# Patient Record
Sex: FEMALE | Race: WHITE | NOT HISPANIC OR LATINO | Employment: FULL TIME | URBAN - METROPOLITAN AREA
[De-identification: names, ages, dates, MRNs, and addresses within clinical notes are randomized per-mention and may not be internally consistent; named-entity substitution may affect disease eponyms.]

---

## 2023-06-01 LAB
CHLAMYDIA TRACH., AMPLIFIED: NEGATIVE
N. GONORRHEA, AMPLIFIED: NEGATIVE
URINE CULTURE: NORMAL

## 2023-06-20 LAB
ERYTHROCYTE DISTRIBUTION WIDTH (RATIO) BY AUTOMATED COUNT: 12.4 % (ref 11.5–14.5)
ERYTHROCYTE MEAN CORPUSCULAR HEMOGLOBIN CONCENTRATION (G/DL) BY AUTOMATED: 33.1 G/DL (ref 32–36)
ERYTHROCYTE MEAN CORPUSCULAR VOLUME (FL) BY AUTOMATED COUNT: 94 FL (ref 80–100)
ERYTHROCYTES (10*6/UL) IN BLOOD BY AUTOMATED COUNT: 4.62 X10E12/L (ref 4–5.2)
HEMATOCRIT (%) IN BLOOD BY AUTOMATED COUNT: 43.5 % (ref 36–46)
HEMOGLOBIN (G/DL) IN BLOOD: 14.4 G/DL (ref 12–16)
LEUKOCYTES (10*3/UL) IN BLOOD BY AUTOMATED COUNT: 7.2 X10E9/L (ref 4.4–11.3)
PLATELETS (10*3/UL) IN BLOOD AUTOMATED COUNT: 184 X10E9/L (ref 150–450)
REFLEX ADDED, ANEMIA PANEL: NORMAL

## 2023-06-21 LAB
ABO GROUP (TYPE) IN BLOOD: NORMAL
ANTIBODY SCREEN: NORMAL
HEPATITIS B VIRUS SURFACE AG PRESENCE IN SERUM: NONREACTIVE
HIV 1/ 2 AG/AB SCREEN: NONREACTIVE
RH FACTOR: NORMAL
RUBELLA VIRUS IGG AB: POSITIVE
SYPHILIS TOTAL AB: NONREACTIVE

## 2023-09-15 RX ORDER — ONDANSETRON 4 MG/1
TABLET, FILM COATED ORAL EVERY 6 HOURS
COMMUNITY
Start: 2023-07-25 | End: 2023-10-18 | Stop reason: ALTCHOICE

## 2023-09-15 RX ORDER — SERTRALINE HYDROCHLORIDE 50 MG/1
1 TABLET, FILM COATED ORAL DAILY
COMMUNITY
Start: 2021-06-07 | End: 2023-10-18 | Stop reason: SDUPTHER

## 2023-09-29 ENCOUNTER — OFFICE VISIT (OUTPATIENT)
Dept: PRIMARY CARE | Facility: CLINIC | Age: 23
End: 2023-09-29
Payer: COMMERCIAL

## 2023-09-29 VITALS
OXYGEN SATURATION: 99 % | SYSTOLIC BLOOD PRESSURE: 128 MMHG | HEIGHT: 66 IN | DIASTOLIC BLOOD PRESSURE: 78 MMHG | WEIGHT: 160 LBS | HEART RATE: 85 BPM | TEMPERATURE: 97.8 F | BODY MASS INDEX: 25.71 KG/M2

## 2023-09-29 DIAGNOSIS — F41.9 ANXIETY: ICD-10-CM

## 2023-09-29 DIAGNOSIS — Z12.4 CERVICAL CANCER SCREENING: Primary | ICD-10-CM

## 2023-09-29 PROCEDURE — 1036F TOBACCO NON-USER: CPT | Performed by: FAMILY MEDICINE

## 2023-09-29 PROCEDURE — 99395 PREV VISIT EST AGE 18-39: CPT | Performed by: FAMILY MEDICINE

## 2023-09-29 SDOH — ECONOMIC STABILITY: FOOD INSECURITY: WITHIN THE PAST 12 MONTHS, THE FOOD YOU BOUGHT JUST DIDN'T LAST AND YOU DIDN'T HAVE MONEY TO GET MORE.: NEVER TRUE

## 2023-09-29 SDOH — HEALTH STABILITY: PHYSICAL HEALTH: ON AVERAGE, HOW MANY MINUTES DO YOU ENGAGE IN EXERCISE AT THIS LEVEL?: 0 MIN

## 2023-09-29 SDOH — ECONOMIC STABILITY: TRANSPORTATION INSECURITY
IN THE PAST 12 MONTHS, HAS THE LACK OF TRANSPORTATION KEPT YOU FROM MEDICAL APPOINTMENTS OR FROM GETTING MEDICATIONS?: NO

## 2023-09-29 SDOH — HEALTH STABILITY: PHYSICAL HEALTH: ON AVERAGE, HOW MANY DAYS PER WEEK DO YOU ENGAGE IN MODERATE TO STRENUOUS EXERCISE (LIKE A BRISK WALK)?: 0 DAYS

## 2023-09-29 SDOH — ECONOMIC STABILITY: FOOD INSECURITY: WITHIN THE PAST 12 MONTHS, YOU WORRIED THAT YOUR FOOD WOULD RUN OUT BEFORE YOU GOT MONEY TO BUY MORE.: NEVER TRUE

## 2023-09-29 SDOH — ECONOMIC STABILITY: TRANSPORTATION INSECURITY
IN THE PAST 12 MONTHS, HAS LACK OF TRANSPORTATION KEPT YOU FROM MEETINGS, WORK, OR FROM GETTING THINGS NEEDED FOR DAILY LIVING?: NO

## 2023-09-29 ASSESSMENT — EDINBURGH POSTNATAL DEPRESSION SCALE (EPDS)
I HAVE BEEN ANXIOUS OR WORRIED FOR NO GOOD REASON: NO, NOT AT ALL
THE THOUGHT OF HARMING MYSELF HAS OCCURRED TO ME: NEVER
I HAVE FELT SAD OR MISERABLE: NO, NOT AT ALL
TOTAL SCORE: 0
I HAVE FELT SCARED OR PANICKY FOR NO GOOD REASON: NO, NOT AT ALL
I HAVE BEEN ABLE TO LAUGH AND SEE THE FUNNY SIDE OF THINGS: AS MUCH AS I ALWAYS COULD
I HAVE LOOKED FORWARD WITH ENJOYMENT TO THINGS: AS MUCH AS I EVER DID
THINGS HAVE BEEN GETTING ON TOP OF ME: NO, I HAVE BEEN COPING AS WELL AS EVER
I HAVE BEEN SO UNHAPPY THAT I HAVE BEEN CRYING: NO, NEVER
I HAVE BLAMED MYSELF UNNECESSARILY WHEN THINGS WENT WRONG: NO, NEVER
I HAVE BEEN SO UNHAPPY THAT I HAVE HAD DIFFICULTY SLEEPING: NOT AT ALL

## 2023-09-29 ASSESSMENT — LIFESTYLE VARIABLES
HOW OFTEN DO YOU HAVE A DRINK CONTAINING ALCOHOL: NEVER
HOW OFTEN DO YOU HAVE SIX OR MORE DRINKS ON ONE OCCASION: NEVER
AUDIT-C TOTAL SCORE: 0
HOW MANY STANDARD DRINKS CONTAINING ALCOHOL DO YOU HAVE ON A TYPICAL DAY: PATIENT DOES NOT DRINK
SKIP TO QUESTIONS 9-10: 1

## 2023-09-29 ASSESSMENT — SOCIAL DETERMINANTS OF HEALTH (SDOH)
HOW HARD IS IT FOR YOU TO PAY FOR THE VERY BASICS LIKE FOOD, HOUSING, MEDICAL CARE, AND HEATING?: NOT HARD AT ALL
WITHIN THE LAST YEAR, HAVE YOU BEEN KICKED, HIT, SLAPPED, OR OTHERWISE PHYSICALLY HURT BY YOUR PARTNER OR EX-PARTNER?: NO
WITHIN THE LAST YEAR, HAVE YOU BEEN AFRAID OF YOUR PARTNER OR EX-PARTNER?: NO
WITHIN THE LAST YEAR, HAVE TO BEEN RAPED OR FORCED TO HAVE ANY KIND OF SEXUAL ACTIVITY BY YOUR PARTNER OR EX-PARTNER?: NO
WITHIN THE LAST YEAR, HAVE YOU BEEN HUMILIATED OR EMOTIONALLY ABUSED IN OTHER WAYS BY YOUR PARTNER OR EX-PARTNER?: NO

## 2023-09-29 ASSESSMENT — PATIENT HEALTH QUESTIONNAIRE - PHQ9
1. LITTLE INTEREST OR PLEASURE IN DOING THINGS: NOT AT ALL
SUM OF ALL RESPONSES TO PHQ9 QUESTIONS 1 & 2: 0
2. FEELING DOWN, DEPRESSED OR HOPELESS: NOT AT ALL

## 2023-09-29 NOTE — PATIENT INSTRUCTIONS
Today we performed your Annual Physical Exam.  Your preventative health care, labs and vaccinations have been reviewed and are up to date.      Flu shot and covid boosters are recommended    Congratulations on your pregnancy!!     Follow up in 1 year for your Complete Physical Exam

## 2023-09-29 NOTE — PROGRESS NOTES
"Subjective   Patient ID: Ilda Peter is a 22 y.o. female who presents for Annual Exam.    Dentist and Eye Doctor appointments: UTD  Immunizations: UTD; defers flu shot   Diet: Pretty good diet  Exercise: Not really   Supplements: Prenatal  Menstrual Cycles: Pregnant  Sexually Active:Yes  Pregnancy History:  Cancer Screening:    Cervical:     Breast:N/A   Colon: N/A   Skin: Wear sunscreen   DEXA:N/A        Patient is 25 weeks pregnant!!- Having a boy         Review of Systems    Objective   /78   Pulse 85   Temp 36.6 °C (97.8 °F) (Temporal)   Ht 1.676 m (5' 6\")   Wt 72.6 kg (160 lb)   LMP 2023   SpO2 99%   BMI 25.82 kg/m²     Physical Exam  Constitutional:       General: She is not in acute distress.     Appearance: She is well-developed. She is not diaphoretic.   HENT:      Head: Normocephalic and atraumatic.      Right Ear: Tympanic membrane normal.      Left Ear: Tympanic membrane normal.      Nose: Nose normal.      Mouth/Throat:      Mouth: Mucous membranes are moist.   Eyes:      General: No scleral icterus.     Pupils: Pupils are equal, round, and reactive to light.   Neck:      Thyroid: No thyromegaly.      Vascular: No JVD.   Cardiovascular:      Rate and Rhythm: Normal rate and regular rhythm.      Heart sounds: Normal heart sounds. No murmur heard.     No friction rub. No gallop.   Pulmonary:      Effort: Pulmonary effort is normal. No respiratory distress.      Breath sounds: Normal breath sounds. No wheezing or rales.   Chest:      Chest wall: No tenderness.   Abdominal:      General: Bowel sounds are normal. There is no distension.      Palpations: Abdomen is soft. There is no mass.      Tenderness: There is no abdominal tenderness. There is no rebound.   Musculoskeletal:         General: Normal range of motion.      Cervical back: Normal range of motion and neck supple.   Lymphadenopathy:      Cervical: No cervical adenopathy.   Skin:     General: Skin is warm and " dry.   Neurological:      General: No focal deficit present.      Mental Status: She is alert and oriented to person, place, and time.      Deep Tendon Reflexes: Reflexes normal.   Psychiatric:         Mood and Affect: Mood normal.         Behavior: Behavior normal.         Assessment/Plan   Diagnoses and all orders for this visit:  Cervical cancer screening  Comments:  5/2023 - Dr. Chery Vargas

## 2023-10-16 PROBLEM — Z12.4 CERVICAL CANCER SCREENING: Status: RESOLVED | Noted: 2023-09-29 | Resolved: 2023-10-16

## 2023-10-16 PROBLEM — Q27.0 SINGLE UMBILICAL ARTERY (HHS-HCC): Status: ACTIVE | Noted: 2023-10-16

## 2023-10-18 ENCOUNTER — ROUTINE PRENATAL (OUTPATIENT)
Dept: OBSTETRICS AND GYNECOLOGY | Facility: CLINIC | Age: 23
End: 2023-10-18
Payer: COMMERCIAL

## 2023-10-18 VITALS
SYSTOLIC BLOOD PRESSURE: 118 MMHG | BODY MASS INDEX: 26.53 KG/M2 | WEIGHT: 164.38 LBS | DIASTOLIC BLOOD PRESSURE: 66 MMHG

## 2023-10-18 DIAGNOSIS — Z3A.27 27 WEEKS GESTATION OF PREGNANCY (HHS-HCC): ICD-10-CM

## 2023-10-18 DIAGNOSIS — F32.A ANXIETY AND DEPRESSION: Primary | ICD-10-CM

## 2023-10-18 DIAGNOSIS — Q27.0 SINGLE UMBILICAL ARTERY (HHS-HCC): ICD-10-CM

## 2023-10-18 DIAGNOSIS — Z13.1 SCREENING FOR DIABETES MELLITUS (DM): ICD-10-CM

## 2023-10-18 DIAGNOSIS — F41.9 ANXIETY AND DEPRESSION: Primary | ICD-10-CM

## 2023-10-18 DIAGNOSIS — Z23 ENCOUNTER FOR VACCINATION: ICD-10-CM

## 2023-10-18 PROCEDURE — 0501F PRENATAL FLOW SHEET: CPT | Performed by: OBSTETRICS & GYNECOLOGY

## 2023-10-18 PROCEDURE — 90686 IIV4 VACC NO PRSV 0.5 ML IM: CPT | Performed by: OBSTETRICS & GYNECOLOGY

## 2023-10-18 PROCEDURE — 36415 COLL VENOUS BLD VENIPUNCTURE: CPT

## 2023-10-18 PROCEDURE — 82947 ASSAY GLUCOSE BLOOD QUANT: CPT

## 2023-10-18 PROCEDURE — 90715 TDAP VACCINE 7 YRS/> IM: CPT | Performed by: OBSTETRICS & GYNECOLOGY

## 2023-10-18 PROCEDURE — 85027 COMPLETE CBC AUTOMATED: CPT

## 2023-10-18 PROCEDURE — 90471 IMMUNIZATION ADMIN: CPT | Performed by: OBSTETRICS & GYNECOLOGY

## 2023-10-18 PROCEDURE — 90472 IMMUNIZATION ADMIN EACH ADD: CPT | Performed by: OBSTETRICS & GYNECOLOGY

## 2023-10-18 RX ORDER — SERTRALINE HYDROCHLORIDE 50 MG/1
50 TABLET, FILM COATED ORAL DAILY
Qty: 90 TABLET | Refills: 2 | Status: SHIPPED | OUTPATIENT
Start: 2023-10-18 | End: 2024-07-14

## 2023-10-18 NOTE — PROGRESS NOTES
No complaints. 1hr GTT and CBC today.   Agrees to vaccine with flu and tdap.    Moods good on Zoloft.  Has growth scan at 30 weeks for Single Um Artery    Cat Gottlieb MD

## 2023-10-19 LAB
ERYTHROCYTE [DISTWIDTH] IN BLOOD BY AUTOMATED COUNT: 15.2 % (ref 11.5–14.5)
GLUCOSE 1H P 50 G GLC PO SERPL-MCNC: 73 MG/DL
HCT VFR BLD AUTO: 32.8 % (ref 36–46)
HGB BLD-MCNC: 10.2 G/DL (ref 12–16)
MCH RBC QN AUTO: 34 PG (ref 26–34)
MCHC RBC AUTO-ENTMCNC: 31.1 G/DL (ref 32–36)
MCV RBC AUTO: 109 FL (ref 80–100)
NRBC BLD-RTO: 0 /100 WBCS (ref 0–0)
PLATELET # BLD AUTO: 99 X10*3/UL (ref 150–450)
PMV BLD AUTO: 10.8 FL (ref 7.5–11.5)
RBC # BLD AUTO: 3 X10*6/UL (ref 4–5.2)
WBC # BLD AUTO: 5.8 X10*3/UL (ref 4.4–11.3)

## 2023-11-02 ENCOUNTER — ROUTINE PRENATAL (OUTPATIENT)
Dept: OBSTETRICS AND GYNECOLOGY | Facility: CLINIC | Age: 23
End: 2023-11-02
Payer: COMMERCIAL

## 2023-11-02 ENCOUNTER — ANCILLARY PROCEDURE (OUTPATIENT)
Dept: RADIOLOGY | Facility: CLINIC | Age: 23
End: 2023-11-02
Payer: COMMERCIAL

## 2023-11-02 VITALS
SYSTOLIC BLOOD PRESSURE: 124 MMHG | DIASTOLIC BLOOD PRESSURE: 78 MMHG | WEIGHT: 166.13 LBS | BODY MASS INDEX: 26.81 KG/M2

## 2023-11-02 DIAGNOSIS — Z34.90 ENCOUNTER FOR SUPERVISION OF NORMAL PREGNANCY, UNSPECIFIED, UNSPECIFIED TRIMESTER (HHS-HCC): ICD-10-CM

## 2023-11-02 DIAGNOSIS — Z3A.30 30 WEEKS GESTATION OF PREGNANCY (HHS-HCC): ICD-10-CM

## 2023-11-02 DIAGNOSIS — D50.8 OTHER IRON DEFICIENCY ANEMIA: Primary | ICD-10-CM

## 2023-11-02 PROCEDURE — 76819 FETAL BIOPHYS PROFIL W/O NST: CPT | Performed by: OBSTETRICS & GYNECOLOGY

## 2023-11-02 PROCEDURE — 0501F PRENATAL FLOW SHEET: CPT | Performed by: OBSTETRICS & GYNECOLOGY

## 2023-11-02 PROCEDURE — 76816 OB US FOLLOW-UP PER FETUS: CPT | Performed by: OBSTETRICS & GYNECOLOGY

## 2023-11-02 PROCEDURE — 76819 FETAL BIOPHYS PROFIL W/O NST: CPT

## 2023-11-02 PROCEDURE — 76816 OB US FOLLOW-UP PER FETUS: CPT

## 2023-11-02 RX ORDER — FERROUS SULFATE 325(65) MG
65 TABLET ORAL
Qty: 30 TABLET | Refills: 2 | Status: SHIPPED | OUTPATIENT
Start: 2023-11-02

## 2023-11-02 NOTE — PROGRESS NOTES
No complaints.  FLU and TDAP done last visit.    Doing well.  GLU wnl. HB = 10 Low in iron , Rx sent.  Had US growth for single umbilical artery today = wnl. Follow up at 36 weeks  Cat Gottlieb MD

## 2023-11-09 LAB — REFLEX ADDED, ANEMIA PANEL: NORMAL

## 2023-11-12 ENCOUNTER — TELEPHONE (OUTPATIENT)
Dept: OBSTETRICS AND GYNECOLOGY | Facility: HOSPITAL | Age: 23
End: 2023-11-12
Payer: COMMERCIAL

## 2023-11-12 DIAGNOSIS — O99.012 ANEMIA DURING PREGNANCY IN SECOND TRIMESTER (HHS-HCC): Primary | ICD-10-CM

## 2023-11-20 ENCOUNTER — ROUTINE PRENATAL (OUTPATIENT)
Dept: OBSTETRICS AND GYNECOLOGY | Facility: CLINIC | Age: 23
End: 2023-11-20
Payer: COMMERCIAL

## 2023-11-20 VITALS — WEIGHT: 168 LBS | SYSTOLIC BLOOD PRESSURE: 122 MMHG | DIASTOLIC BLOOD PRESSURE: 72 MMHG | BODY MASS INDEX: 27.12 KG/M2

## 2023-11-20 DIAGNOSIS — Z3A.32 32 WEEKS GESTATION OF PREGNANCY (HHS-HCC): Primary | ICD-10-CM

## 2023-11-20 PROCEDURE — 0501F PRENATAL FLOW SHEET: CPT | Performed by: OBSTETRICS & GYNECOLOGY

## 2023-12-04 ENCOUNTER — ROUTINE PRENATAL (OUTPATIENT)
Dept: OBSTETRICS AND GYNECOLOGY | Facility: CLINIC | Age: 23
End: 2023-12-04
Payer: COMMERCIAL

## 2023-12-04 VITALS — WEIGHT: 170.8 LBS | SYSTOLIC BLOOD PRESSURE: 136 MMHG | DIASTOLIC BLOOD PRESSURE: 82 MMHG | BODY MASS INDEX: 27.57 KG/M2

## 2023-12-04 DIAGNOSIS — Z34.83 PRENATAL CARE, SUBSEQUENT PREGNANCY IN THIRD TRIMESTER (HHS-HCC): ICD-10-CM

## 2023-12-04 DIAGNOSIS — Z3A.34 34 WEEKS GESTATION OF PREGNANCY (HHS-HCC): Primary | ICD-10-CM

## 2023-12-04 PROCEDURE — 0501F PRENATAL FLOW SHEET: CPT

## 2023-12-04 NOTE — PROGRESS NOTES
Assessment/Plan   22 y.o.  at 34w4d  - Discussed GBS screening to be collected next visit, all questions answered. Pt denies an allergy to PCN.   - Routine prenatal care    Follow up in 2 weeks for next prenatal visit. GBS and labor consent next visit.     LEDY Love-GUSTAVO    Subjective     Ilda Peter is a 22 y.o.  at 34w4d with a working estimated date of delivery of 2024, by Ultrasound presenting for a routine prenatal visit. She is doing well, no concerns. She denies vaginal bleeding, leakage of fluid, or regular contractions. She endorses good FM.    Her pregnancy is complicated by:  Pregnancy Problems (from 23 to present)       Problem Noted Resolved    Single umbilical artery 10/16/2023 by Cat Gottlieb MD No    Priority:  Medium      Overview Signed 10/18/2023  1:27 PM by Cat Gottlieb MD     Growth scan scheduled         Anxiety 2023 by Brandee Cross,  No    Priority:  Medium      Overview Addendum 10/18/2023  1:27 PM by Cat Gottlieb MD     On zoloft 50 mg                   Objective   Weight: 77.5 kg (170 lb 12.8 oz)  TW.2 kg (26 lb 12.8 oz)   Expected Total Weight Gain: 11.5 kg (25 lb)-16 kg (35 lb)   Pregravid BMI: 23.25  Pregravid Weight: 65.3 kg (144 lb)   BP: 136/82  Fetal Heart Rate: 153 Fundal Height (cm): 33 cm Presentation: Vertex

## 2023-12-14 ENCOUNTER — ANCILLARY PROCEDURE (OUTPATIENT)
Dept: RADIOLOGY | Facility: CLINIC | Age: 23
End: 2023-12-14
Payer: COMMERCIAL

## 2023-12-14 DIAGNOSIS — O35.8XX0 MATERNAL CARE FOR OTHER (SUSPECTED) FETAL ABNORMALITY AND DAMAGE, NOT APPLICABLE OR UNSPECIFIED (HHS-HCC): ICD-10-CM

## 2023-12-14 DIAGNOSIS — Z34.90 ENCOUNTER FOR SUPERVISION OF NORMAL PREGNANCY, UNSPECIFIED, UNSPECIFIED TRIMESTER (HHS-HCC): ICD-10-CM

## 2023-12-14 PROCEDURE — 76819 FETAL BIOPHYS PROFIL W/O NST: CPT | Performed by: OBSTETRICS & GYNECOLOGY

## 2023-12-14 PROCEDURE — 76816 OB US FOLLOW-UP PER FETUS: CPT | Performed by: OBSTETRICS & GYNECOLOGY

## 2023-12-14 PROCEDURE — 76819 FETAL BIOPHYS PROFIL W/O NST: CPT

## 2023-12-14 PROCEDURE — 76816 OB US FOLLOW-UP PER FETUS: CPT

## 2023-12-20 ENCOUNTER — ROUTINE PRENATAL (OUTPATIENT)
Dept: OBSTETRICS AND GYNECOLOGY | Facility: CLINIC | Age: 23
End: 2023-12-20
Payer: COMMERCIAL

## 2023-12-20 VITALS — DIASTOLIC BLOOD PRESSURE: 78 MMHG | BODY MASS INDEX: 27.92 KG/M2 | WEIGHT: 173 LBS | SYSTOLIC BLOOD PRESSURE: 124 MMHG

## 2023-12-20 DIAGNOSIS — Z3A.36 36 WEEKS GESTATION OF PREGNANCY (HHS-HCC): ICD-10-CM

## 2023-12-20 PROCEDURE — 0501F PRENATAL FLOW SHEET: CPT | Performed by: OBSTETRICS & GYNECOLOGY

## 2023-12-20 PROCEDURE — 87081 CULTURE SCREEN ONLY: CPT

## 2023-12-24 LAB — GP B STREP GENITAL QL CULT: NORMAL

## 2023-12-26 ENCOUNTER — ROUTINE PRENATAL (OUTPATIENT)
Dept: OBSTETRICS AND GYNECOLOGY | Facility: CLINIC | Age: 23
End: 2023-12-26
Payer: COMMERCIAL

## 2023-12-26 VITALS
SYSTOLIC BLOOD PRESSURE: 126 MMHG | DIASTOLIC BLOOD PRESSURE: 72 MMHG | BODY MASS INDEX: 28.14 KG/M2 | WEIGHT: 174.38 LBS

## 2023-12-26 DIAGNOSIS — Z3A.37 37 WEEKS GESTATION OF PREGNANCY (HHS-HCC): Primary | ICD-10-CM

## 2023-12-26 PROCEDURE — 0501F PRENATAL FLOW SHEET: CPT | Performed by: OBSTETRICS & GYNECOLOGY

## 2024-01-02 ENCOUNTER — ROUTINE PRENATAL (OUTPATIENT)
Dept: OBSTETRICS AND GYNECOLOGY | Facility: CLINIC | Age: 24
End: 2024-01-02
Payer: COMMERCIAL

## 2024-01-02 VITALS — SYSTOLIC BLOOD PRESSURE: 124 MMHG | WEIGHT: 175.5 LBS | DIASTOLIC BLOOD PRESSURE: 76 MMHG | BODY MASS INDEX: 28.33 KG/M2

## 2024-01-02 DIAGNOSIS — Q27.0 SINGLE UMBILICAL ARTERY (HHS-HCC): ICD-10-CM

## 2024-01-02 DIAGNOSIS — F41.9 ANXIETY: ICD-10-CM

## 2024-01-02 DIAGNOSIS — Z3A.38 38 WEEKS GESTATION OF PREGNANCY (HHS-HCC): Primary | ICD-10-CM

## 2024-01-02 PROCEDURE — 99213 OFFICE O/P EST LOW 20 MIN: CPT | Performed by: OBSTETRICS & GYNECOLOGY

## 2024-01-02 NOTE — PROGRESS NOTES
38w5d  Declines labor induction  Moods good on Zoloft 50 mg  GBS neg  Single umb artery : US wnl  Discussed Hosp stay , how to call, breast pump ordered.  Cat Gottlieb MD

## 2024-01-05 ENCOUNTER — ANESTHESIA EVENT (OUTPATIENT)
Dept: OBSTETRICS AND GYNECOLOGY | Facility: HOSPITAL | Age: 24
End: 2024-01-05
Payer: COMMERCIAL

## 2024-01-05 ENCOUNTER — HOSPITAL ENCOUNTER (INPATIENT)
Facility: HOSPITAL | Age: 24
LOS: 1 days | Discharge: HOME | End: 2024-01-06
Attending: OBSTETRICS & GYNECOLOGY | Admitting: MIDWIFE
Payer: COMMERCIAL

## 2024-01-05 ENCOUNTER — ANESTHESIA (OUTPATIENT)
Dept: OBSTETRICS AND GYNECOLOGY | Facility: HOSPITAL | Age: 24
End: 2024-01-05
Payer: COMMERCIAL

## 2024-01-05 LAB
ABO GROUP (TYPE) IN BLOOD: NORMAL
ANTIBODY SCREEN: NORMAL
ERYTHROCYTE [DISTWIDTH] IN BLOOD BY AUTOMATED COUNT: 13.6 % (ref 11.5–14.5)
HCT VFR BLD AUTO: 31 % (ref 36–46)
HGB BLD-MCNC: 10.4 G/DL (ref 12–16)
MCH RBC QN AUTO: 34.4 PG (ref 26–34)
MCHC RBC AUTO-ENTMCNC: 33.5 G/DL (ref 32–36)
MCV RBC AUTO: 103 FL (ref 80–100)
NRBC BLD-RTO: 0 /100 WBCS (ref 0–0)
PLATELET # BLD AUTO: 115 X10*3/UL (ref 150–450)
RBC # BLD AUTO: 3.02 X10*6/UL (ref 4–5.2)
RH FACTOR (ANTIGEN D): NORMAL
T PALLIDUM AB SER QL: NONREACTIVE
WBC # BLD AUTO: 7 X10*3/UL (ref 4.4–11.3)

## 2024-01-05 PROCEDURE — 36415 COLL VENOUS BLD VENIPUNCTURE: CPT | Performed by: MIDWIFE

## 2024-01-05 PROCEDURE — 59400 OBSTETRICAL CARE: CPT | Performed by: OBSTETRICS & GYNECOLOGY

## 2024-01-05 PROCEDURE — 59050 FETAL MONITOR W/REPORT: CPT

## 2024-01-05 PROCEDURE — 2500000004 HC RX 250 GENERAL PHARMACY W/ HCPCS (ALT 636 FOR OP/ED)

## 2024-01-05 PROCEDURE — 1120000001 HC OB PRIVATE ROOM DAILY

## 2024-01-05 PROCEDURE — 7100000016 HC LABOR RECOVERY PER HOUR

## 2024-01-05 PROCEDURE — 85027 COMPLETE CBC AUTOMATED: CPT | Performed by: MIDWIFE

## 2024-01-05 PROCEDURE — 86780 TREPONEMA PALLIDUM: CPT | Mod: STJLAB | Performed by: MIDWIFE

## 2024-01-05 PROCEDURE — 94760 N-INVAS EAR/PLS OXIMETRY 1: CPT

## 2024-01-05 PROCEDURE — 59409 OBSTETRICAL CARE: CPT | Performed by: OBSTETRICS & GYNECOLOGY

## 2024-01-05 PROCEDURE — 86901 BLOOD TYPING SEROLOGIC RH(D): CPT | Performed by: MIDWIFE

## 2024-01-05 PROCEDURE — 2500000001 HC RX 250 WO HCPCS SELF ADMINISTERED DRUGS (ALT 637 FOR MEDICARE OP): Performed by: OBSTETRICS & GYNECOLOGY

## 2024-01-05 PROCEDURE — 01967 NEURAXL LBR ANES VAG DLVR: CPT | Performed by: NURSE ANESTHETIST, CERTIFIED REGISTERED

## 2024-01-05 PROCEDURE — 7210000002 HC LABOR PER HOUR

## 2024-01-05 RX ORDER — HYDRALAZINE HYDROCHLORIDE 20 MG/ML
5 INJECTION INTRAMUSCULAR; INTRAVENOUS ONCE AS NEEDED
Status: DISCONTINUED | OUTPATIENT
Start: 2024-01-05 | End: 2024-01-07 | Stop reason: HOSPADM

## 2024-01-05 RX ORDER — LIDOCAINE HYDROCHLORIDE 10 MG/ML
0.5 INJECTION INFILTRATION; PERINEURAL ONCE AS NEEDED
Status: DISCONTINUED | OUTPATIENT
Start: 2024-01-05 | End: 2024-01-07 | Stop reason: HOSPADM

## 2024-01-05 RX ORDER — METHYLERGONOVINE MALEATE 0.2 MG/ML
0.2 INJECTION INTRAVENOUS ONCE AS NEEDED
Status: DISCONTINUED | OUTPATIENT
Start: 2024-01-05 | End: 2024-01-07 | Stop reason: HOSPADM

## 2024-01-05 RX ORDER — LOPERAMIDE HYDROCHLORIDE 2 MG/1
4 CAPSULE ORAL EVERY 2 HOUR PRN
Status: CANCELLED | OUTPATIENT
Start: 2024-01-05

## 2024-01-05 RX ORDER — NIFEDIPINE 10 MG/1
10 CAPSULE ORAL ONCE AS NEEDED
Status: DISCONTINUED | OUTPATIENT
Start: 2024-01-05 | End: 2024-01-05 | Stop reason: SDUPTHER

## 2024-01-05 RX ORDER — OXYTOCIN 10 [USP'U]/ML
10 INJECTION, SOLUTION INTRAMUSCULAR; INTRAVENOUS ONCE AS NEEDED
Status: DISCONTINUED | OUTPATIENT
Start: 2024-01-05 | End: 2024-01-07 | Stop reason: HOSPADM

## 2024-01-05 RX ORDER — BISACODYL 10 MG/1
10 SUPPOSITORY RECTAL DAILY PRN
Status: CANCELLED | OUTPATIENT
Start: 2024-01-05

## 2024-01-05 RX ORDER — LIDOCAINE HYDROCHLORIDE 10 MG/ML
30 INJECTION INFILTRATION; PERINEURAL ONCE AS NEEDED
Status: DISCONTINUED | OUTPATIENT
Start: 2024-01-05 | End: 2024-01-07 | Stop reason: HOSPADM

## 2024-01-05 RX ORDER — SERTRALINE HYDROCHLORIDE 50 MG/1
50 TABLET, FILM COATED ORAL DAILY
Status: CANCELLED | OUTPATIENT
Start: 2024-01-05

## 2024-01-05 RX ORDER — METOCLOPRAMIDE 10 MG/1
10 TABLET ORAL EVERY 6 HOURS PRN
Status: DISCONTINUED | OUTPATIENT
Start: 2024-01-05 | End: 2024-01-07 | Stop reason: HOSPADM

## 2024-01-05 RX ORDER — ONDANSETRON 4 MG/1
4 TABLET, FILM COATED ORAL EVERY 6 HOURS PRN
Status: DISCONTINUED | OUTPATIENT
Start: 2024-01-05 | End: 2024-01-05 | Stop reason: SDUPTHER

## 2024-01-05 RX ORDER — OXYTOCIN/0.9 % SODIUM CHLORIDE 30/500 ML
60 PLASTIC BAG, INJECTION (ML) INTRAVENOUS ONCE AS NEEDED
Status: CANCELLED | OUTPATIENT
Start: 2024-01-05

## 2024-01-05 RX ORDER — LOPERAMIDE HYDROCHLORIDE 2 MG/1
4 CAPSULE ORAL EVERY 2 HOUR PRN
Status: DISCONTINUED | OUTPATIENT
Start: 2024-01-05 | End: 2024-01-07 | Stop reason: HOSPADM

## 2024-01-05 RX ORDER — ONDANSETRON HYDROCHLORIDE 2 MG/ML
4 INJECTION, SOLUTION INTRAVENOUS EVERY 6 HOURS PRN
Status: DISCONTINUED | OUTPATIENT
Start: 2024-01-05 | End: 2024-01-07 | Stop reason: HOSPADM

## 2024-01-05 RX ORDER — LABETALOL HYDROCHLORIDE 5 MG/ML
20 INJECTION, SOLUTION INTRAVENOUS ONCE AS NEEDED
Status: DISCONTINUED | OUTPATIENT
Start: 2024-01-05 | End: 2024-01-05 | Stop reason: SDUPTHER

## 2024-01-05 RX ORDER — SODIUM CHLORIDE, SODIUM LACTATE, POTASSIUM CHLORIDE, CALCIUM CHLORIDE 600; 310; 30; 20 MG/100ML; MG/100ML; MG/100ML; MG/100ML
125 INJECTION, SOLUTION INTRAVENOUS CONTINUOUS
Status: DISCONTINUED | OUTPATIENT
Start: 2024-01-05 | End: 2024-01-07 | Stop reason: HOSPADM

## 2024-01-05 RX ORDER — MISOPROSTOL 200 UG/1
800 TABLET ORAL ONCE AS NEEDED
Status: CANCELLED | OUTPATIENT
Start: 2024-01-05

## 2024-01-05 RX ORDER — OXYTOCIN/0.9 % SODIUM CHLORIDE 30/500 ML
60 PLASTIC BAG, INJECTION (ML) INTRAVENOUS
Status: DISCONTINUED | OUTPATIENT
Start: 2024-01-05 | End: 2024-01-07 | Stop reason: HOSPADM

## 2024-01-05 RX ORDER — NIFEDIPINE 10 MG/1
10 CAPSULE ORAL ONCE AS NEEDED
Status: CANCELLED | OUTPATIENT
Start: 2024-01-05

## 2024-01-05 RX ORDER — OXYTOCIN 10 [USP'U]/ML
10 INJECTION, SOLUTION INTRAMUSCULAR; INTRAVENOUS ONCE AS NEEDED
Status: CANCELLED | OUTPATIENT
Start: 2024-01-05

## 2024-01-05 RX ORDER — TRANEXAMIC ACID 100 MG/ML
1000 INJECTION, SOLUTION INTRAVENOUS ONCE AS NEEDED
Status: CANCELLED | OUTPATIENT
Start: 2024-01-05

## 2024-01-05 RX ORDER — CARBOPROST TROMETHAMINE 250 UG/ML
250 INJECTION, SOLUTION INTRAMUSCULAR ONCE AS NEEDED
Status: CANCELLED | OUTPATIENT
Start: 2024-01-05

## 2024-01-05 RX ORDER — ONDANSETRON HYDROCHLORIDE 2 MG/ML
4 INJECTION, SOLUTION INTRAVENOUS EVERY 6 HOURS PRN
Status: CANCELLED | OUTPATIENT
Start: 2024-01-05

## 2024-01-05 RX ORDER — METOCLOPRAMIDE HYDROCHLORIDE 5 MG/ML
10 INJECTION INTRAMUSCULAR; INTRAVENOUS EVERY 6 HOURS PRN
Status: DISCONTINUED | OUTPATIENT
Start: 2024-01-05 | End: 2024-01-07 | Stop reason: HOSPADM

## 2024-01-05 RX ORDER — LIDOCAINE 560 MG/1
1 PATCH PERCUTANEOUS; TOPICAL; TRANSDERMAL
Status: CANCELLED | OUTPATIENT
Start: 2024-01-05

## 2024-01-05 RX ORDER — ACETAMINOPHEN 325 MG/1
975 TABLET ORAL EVERY 6 HOURS
Status: DISCONTINUED | OUTPATIENT
Start: 2024-01-05 | End: 2024-01-07 | Stop reason: HOSPADM

## 2024-01-05 RX ORDER — ONDANSETRON 4 MG/1
4 TABLET, FILM COATED ORAL EVERY 6 HOURS PRN
Status: CANCELLED | OUTPATIENT
Start: 2024-01-05

## 2024-01-05 RX ORDER — ADHESIVE BANDAGE
10 BANDAGE TOPICAL
Status: CANCELLED | OUTPATIENT
Start: 2024-01-05

## 2024-01-05 RX ORDER — DIPHENHYDRAMINE HCL 25 MG
25 CAPSULE ORAL EVERY 6 HOURS PRN
Status: CANCELLED | OUTPATIENT
Start: 2024-01-05

## 2024-01-05 RX ORDER — DIPHENHYDRAMINE HYDROCHLORIDE 50 MG/ML
25 INJECTION INTRAMUSCULAR; INTRAVENOUS EVERY 6 HOURS PRN
Status: CANCELLED | OUTPATIENT
Start: 2024-01-05

## 2024-01-05 RX ORDER — LABETALOL HYDROCHLORIDE 5 MG/ML
20 INJECTION, SOLUTION INTRAVENOUS ONCE AS NEEDED
Status: CANCELLED | OUTPATIENT
Start: 2024-01-05

## 2024-01-05 RX ORDER — MISOPROSTOL 200 UG/1
800 TABLET ORAL ONCE AS NEEDED
Status: DISCONTINUED | OUTPATIENT
Start: 2024-01-05 | End: 2024-01-07 | Stop reason: HOSPADM

## 2024-01-05 RX ORDER — TRANEXAMIC ACID 100 MG/ML
1000 INJECTION, SOLUTION INTRAVENOUS ONCE AS NEEDED
Status: DISCONTINUED | OUTPATIENT
Start: 2024-01-05 | End: 2024-01-07 | Stop reason: HOSPADM

## 2024-01-05 RX ORDER — MORPHINE SULFATE 2 MG/ML
INJECTION, SOLUTION INTRAMUSCULAR; INTRAVENOUS
Status: COMPLETED
Start: 2024-01-05 | End: 2024-01-05

## 2024-01-05 RX ORDER — HYDRALAZINE HYDROCHLORIDE 20 MG/ML
5 INJECTION INTRAMUSCULAR; INTRAVENOUS ONCE AS NEEDED
Status: DISCONTINUED | OUTPATIENT
Start: 2024-01-05 | End: 2024-01-05 | Stop reason: SDUPTHER

## 2024-01-05 RX ORDER — TERBUTALINE SULFATE 1 MG/ML
0.25 INJECTION SUBCUTANEOUS ONCE AS NEEDED
Status: DISCONTINUED | OUTPATIENT
Start: 2024-01-05 | End: 2024-01-07 | Stop reason: HOSPADM

## 2024-01-05 RX ORDER — ONDANSETRON HYDROCHLORIDE 2 MG/ML
4 INJECTION, SOLUTION INTRAVENOUS EVERY 6 HOURS PRN
Status: DISCONTINUED | OUTPATIENT
Start: 2024-01-05 | End: 2024-01-05 | Stop reason: SDUPTHER

## 2024-01-05 RX ORDER — NIFEDIPINE 10 MG/1
10 CAPSULE ORAL ONCE AS NEEDED
Status: DISCONTINUED | OUTPATIENT
Start: 2024-01-05 | End: 2024-01-07 | Stop reason: HOSPADM

## 2024-01-05 RX ORDER — SERTRALINE HYDROCHLORIDE 50 MG/1
50 TABLET, FILM COATED ORAL DAILY
Status: DISCONTINUED | OUTPATIENT
Start: 2024-01-05 | End: 2024-01-07 | Stop reason: HOSPADM

## 2024-01-05 RX ORDER — ONDANSETRON 4 MG/1
4 TABLET, FILM COATED ORAL EVERY 6 HOURS PRN
Status: DISCONTINUED | OUTPATIENT
Start: 2024-01-05 | End: 2024-01-07 | Stop reason: HOSPADM

## 2024-01-05 RX ORDER — FENTANYL/ROPIVACAINE/NS/PF 2MCG/ML-.2
0-25 PLASTIC BAG, INJECTION (ML) INJECTION CONTINUOUS
Status: DISCONTINUED | OUTPATIENT
Start: 2024-01-05 | End: 2024-01-07 | Stop reason: HOSPADM

## 2024-01-05 RX ORDER — SIMETHICONE 80 MG
80 TABLET,CHEWABLE ORAL 4 TIMES DAILY PRN
Status: CANCELLED | OUTPATIENT
Start: 2024-01-05

## 2024-01-05 RX ORDER — IBUPROFEN 600 MG/1
600 TABLET ORAL EVERY 6 HOURS
Status: DISCONTINUED | OUTPATIENT
Start: 2024-01-05 | End: 2024-01-07 | Stop reason: HOSPADM

## 2024-01-05 RX ORDER — LABETALOL HYDROCHLORIDE 5 MG/ML
20 INJECTION, SOLUTION INTRAVENOUS ONCE AS NEEDED
Status: DISCONTINUED | OUTPATIENT
Start: 2024-01-05 | End: 2024-01-07 | Stop reason: HOSPADM

## 2024-01-05 RX ORDER — POLYETHYLENE GLYCOL 3350 17 G/17G
17 POWDER, FOR SOLUTION ORAL 2 TIMES DAILY PRN
Status: CANCELLED | OUTPATIENT
Start: 2024-01-05

## 2024-01-05 RX ORDER — METHYLERGONOVINE MALEATE 0.2 MG/ML
0.2 INJECTION INTRAVENOUS ONCE AS NEEDED
Status: CANCELLED | OUTPATIENT
Start: 2024-01-05

## 2024-01-05 RX ORDER — HYDRALAZINE HYDROCHLORIDE 20 MG/ML
5 INJECTION INTRAMUSCULAR; INTRAVENOUS ONCE AS NEEDED
Status: CANCELLED | OUTPATIENT
Start: 2024-01-05

## 2024-01-05 RX ORDER — CARBOPROST TROMETHAMINE 250 UG/ML
250 INJECTION, SOLUTION INTRAMUSCULAR ONCE AS NEEDED
Status: DISCONTINUED | OUTPATIENT
Start: 2024-01-05 | End: 2024-01-07 | Stop reason: HOSPADM

## 2024-01-05 RX ADMIN — IBUPROFEN 600 MG: 600 TABLET, FILM COATED ORAL at 11:49

## 2024-01-05 RX ADMIN — ACETAMINOPHEN 975 MG: 325 TABLET ORAL at 11:49

## 2024-01-05 RX ADMIN — IBUPROFEN 600 MG: 600 TABLET, FILM COATED ORAL at 16:22

## 2024-01-05 RX ADMIN — MORPHINE SULFATE 2 MG: 2 INJECTION, SOLUTION INTRAMUSCULAR; INTRAVENOUS at 04:24

## 2024-01-05 RX ADMIN — ACETAMINOPHEN 975 MG: 325 TABLET ORAL at 16:21

## 2024-01-05 SDOH — HEALTH STABILITY: MENTAL HEALTH: CURRENT SMOKER: 0

## 2024-01-05 ASSESSMENT — PAIN SCALES - GENERAL
PAINLEVEL_OUTOF10: 3
PAINLEVEL_OUTOF10: 0 - NO PAIN
PAINLEVEL_OUTOF10: 0 - NO PAIN
PAINLEVEL_OUTOF10: 1
PAINLEVEL_OUTOF10: 9
PAINLEVEL_OUTOF10: 8
PAINLEVEL_OUTOF10: 0 - NO PAIN
PAIN_LEVEL: 1
PAINLEVEL_OUTOF10: 0 - NO PAIN

## 2024-01-05 ASSESSMENT — PAIN DESCRIPTION - LOCATION: LOCATION: ABDOMEN

## 2024-01-05 ASSESSMENT — PAIN - FUNCTIONAL ASSESSMENT: PAIN_FUNCTIONAL_ASSESSMENT: 0-10

## 2024-01-05 NOTE — ANESTHESIA PROCEDURE NOTES
Epidural Block    Patient location during procedure: OB  Start time: 1/5/2024 4:02 AM  Reason for block: labor analgesia  Staffing  Performed: CRNA   Authorized by: SHARLENE Cordon    Performed by: SHARLENE Cordon    Preanesthetic Checklist  Completed: patient identified, IV checked, site marked, risks and benefits discussed, surgical consent, pre-op evaluation, timeout performed and sterile techniques followed  Block Timeout  RN/Licensed healthcare professional reads aloud to the Anesthesia provider and entire team: Patient identity, procedure with side and site, patient position, and as applicable the availability of implants/special equipment/special requirements.  Patient on coagulant treatment: no  Timeout performed at: 1/5/2024 4:04 AM  Block Placement  Patient position: sitting  Prep: ChloraPrep  Sterility prep: cap, gloves, drape, mask and hand  Sedation level: no sedation  Patient monitoring: blood pressure, continuous pulse oximetry and heart rate  Approach: midline  Local numbing: lidocaine 1% to skin and subcutaneous tissues  Vertebral space: lumbar  Lumbar location: L3-L4  Epidural  Loss of resistance technique: air  Guidance: landmark technique        Needle  Needle type: Tuohy   Needle gauge: 17  Needle length: 10.2 cm  Needle insertion depth: 7 cm    Test dose: Other (see comment)  Test dose given at 1/5/2024 4:08 AM  Test dose: Other (see comment)            Assessment  Number of attempts: 1  Events: blood aspirated  Procedure assessment: procedure terminated at patient's request  Additional Notes  Pt.  Had to push and deliver before epidural catheter could be placed.

## 2024-01-05 NOTE — H&P
Obstetrical Admission History and Physical     Ilda Peter is a 23 y.o.  at 39w1d. JONATHAN: 2024, by Ultrasound. Estimated fetal weight: 7lbs 8oz. She has had prenatal care with Dr. Gottlieb .    Chief Complaint: Leakage/Loss of Fluid (0130 am srom)    Assessment/Plan    A: 24yo  at 39.1 per LMP c/w 6.6 week US      Category 1 FHR      Active labor      GBS-      SROM @0115 today      Anxiety/depression --> Zoloft 50mg daily      Antepartum anemia --> H/H 10/18 10.2/32.8    P: Routine admit labs and orders      Zoloft daily ordered      Epidural PRN      Encourage maternal positioning for labor/comfort      Anticipate       Principal Problem:    Normal labor and delivery      Pregnancy Problems (from 23 to present)       Problem Noted Resolved    Single umbilical artery 10/16/2023 by Cat Gottlieb MD No    Priority:  Medium      Overview Signed 10/18/2023  1:27 PM by Cat Gottlieb MD     Growth scan scheduled         Anxiety 2023 by Brandee Cross DO No    Priority:  Medium      Overview Addendum 10/18/2023  1:27 PM by Cat Gottlieb MD     On zoloft 50 mg                Options for delivery have been discussed with the patient and she elects a vaginal delivery.  Labor has been discussed in detail. The risks, benefits, complications, alternatives, expected outcomes, potential problems during recuperation and recovery, and the risks of not performing the procedure were discussed with the patient. The patient stated understanding that the risks of delivery include, but are not limited to: death; reaction to medications; injury to bowel, bladder, ureters, uterus, cervix, vagina, and other pelvic and abdominal structures, infection; blood loss and possible need for transfusion; and potential need for surgery, including hysterectomy. The risks of injury to the infant during delivery were also discussed. All questions were answered. The patient is wishing to continue with  plans for a vaginal delivery.    Admit to inpatient status. I anticipate that this patient will require a stay exceeding at least 2 midnights for delivery and postpartum.  Active management of rupture of membranes.  Management of pregnancy complications, as indicated.    Fer Gonsales presents to Labor & Delivery with Spontaneous Rupture of Membranes of clear   fluid at 0115   hr today  . Good fetal movement. Denies vaginal bleeding.        Obstetrical History   OB History    Para Term  AB Living   2 1 1 0 0 1   SAB IAB Ectopic Multiple Live Births   0 0 0 0 1      # Outcome Date GA Lbr Ronald/2nd Weight Sex Delivery Anes PTL Lv   2 Current            1 Term 20 39w1d  3487 g M Vag-Spont  N MICHAEL       Past Medical History  Past Medical History:   Diagnosis Date    Cannabis dependence, uncomplicated (CMS/[facility])     Marijuana dependence    Depression         Past Surgical History   Past Surgical History:   Procedure Laterality Date    WISDOM TOOTH EXTRACTION         Social History  Social History     Tobacco Use    Smoking status: Never    Smokeless tobacco: Never   Substance Use Topics    Alcohol use: Not Currently     Substance and Sexual Activity   Drug Use Not Currently    Types: Marijuana       Allergies  Codeine     Medications  Medications Prior to Admission   Medication Sig Dispense Refill Last Dose    ferrous sulfate 325 (65 Fe) MG tablet Take 1 tablet (65 mg of iron) by mouth once daily with a meal. 30 tablet 2     prenatal no.139-iron-folic-dha 33 mg iron- 800 mcg-350 mg combo pack Take by mouth.       sertraline (Zoloft) 50 mg tablet Take 1 tablet (50 mg) by mouth once daily. 90 tablet 2        Objective    Last Vitals  Temp Pulse Resp BP MAP O2 Sat   36.9 °C (98.4 °F) 104 16 132/85   98 %     Physical Examination  GENERAL: Examination reveals a well developed, well nourished, gravid female in no acute distress. She is alert and cooperative.  HEENT: PERRLA. External ears normal. Nose  normal, no erythema or discharge. Mouth and throat clear.  FHR is reactive, 135bpm, +accels, no decels noted  [place] reading:  Nadeen q5min per patient and progressively strengthening since SROM  The fetus is in a vertex presentation, determined by vaginal exam  VAGINA: normal appearing vagina with normal color and discharge and no lesions noted and positive for clear gushes of fluid upon visible exam that is nitrizine+  CERVIX:  7/100/-1 ; MEMBRANES are  SROM  NEUROLOGICAL: alert, oriented, normal speech, no focal findings or movement disorder noted    Lab Review  Labs in chart were reviewed.

## 2024-01-05 NOTE — ANESTHESIA PREPROCEDURE EVALUATION
Patient: Ilda Peter    Evaluation Method: In-person visit    Procedure Information    Date: 01/05/24  Procedure: Labor Analgesia         Relevant Problems   Neuro/Psych   (+) Anxiety       Clinical information reviewed:   Tobacco  Allergies  Meds   Med Hx  Surg Hx   Fam Hx          NPO Detail:  No data recorded     OB/Gyn Evaluation    Present Pregnancy    Patient is pregnant now.   Obstetric History                Physical Exam    Airway  Mallampati: II  TM distance: >3 FB  Neck ROM: full     Cardiovascular - normal exam  Rhythm: regular  Rate: normal     Dental - normal exam     Pulmonary - normal exam  Breath sounds clear to auscultation     Abdominal      Other findings: Gravid        Anesthesia Plan    ASA 2     epidural     The patient is not a current smoker.    Anesthetic plan and risks discussed with patient.  Use of blood products discussed with patient who consented to blood products.

## 2024-01-05 NOTE — L&D DELIVERY NOTE
OB Delivery Note  2024  Ilda Peter  23 y.o.   Vaginal, Spontaneous        Gestational Age: 39w1d  /Para:   Quantitative Blood Loss: Admission to Discharge: 0 mL (2024  2:18 AM - 2024  4:43 AM)  QBL: 200cc  Alejandra Peter [[id number]]      Labor Events    Rupture date/time: 2024 0115  Rupture type: Spontaneous  Fluid color: Clear  Labor type: Spontaneous Onset of Labor  Labor allowed to proceed with plans for an attempted vaginal birth?: Yes  Augmentation: None  Complications: None       Placenta    Placenta delivery date/time:   Placenta removal: Spontaneous  Placenta appearance: Intact  Placenta disposition: lab       Cord    Vessels: 2 vessels  Complications: Knot  Delayed cord clamping?: Yes  Cord blood disposition: Lab  Gases sent?: No  Stem cell collection (by provider): No       Lacerations    Episiotomy: None  Other lacerations?: No       Anesthesia    Method: None       Operative Delivery    Forceps attempted?: No  Vacuum extractor attempted?: No       Shoulder Dystocia    Shoulder dystocia present?: No        Delivery    Birth date/time: 2024 04:15:00  Delivery type: Vaginal, Spontaneous  Complications: None       Apgars    Living status:   Apgar Component Scores:  1 min.:  5 min.:  10 min.:  15 min.:  20 min.:    Skin color:         Heart rate:         Reflex irritability:         Muscle tone:         Respiratory effort:         Total:                Delivery Providers    Delivering clinician:    Provider Role     Delivery Nurse     Nursery Nurse     Resident                 Dixie Davis, DO

## 2024-01-05 NOTE — CARE PLAN
The patient's goals for the shift include stable recovery     The clinical goals for the shift include  stable vital signs    Over the shift, the pt is making progress Barriers to progression include . Recommendations to address these barriers include na.

## 2024-01-05 NOTE — ANESTHESIA POSTPROCEDURE EVALUATION
Patient: Ilda Peter    Procedure Summary       Date: 01/05/24 Room / Location:     Anesthesia Start: 0402 Anesthesia Stop:     Procedure: Labor Analgesia Diagnosis:     Scheduled Providers:  Responsible Provider: SHARLENE Cordon    Anesthesia Type: epidural ASA Status: 2            Anesthesia Type: epidural    Vitals Value Taken Time   /79 01/05/24 0439   Temp 36.5 01/05/24 0443   Pulse 88 01/05/24 0439   Resp 18 01/05/24 0443   SpO2 100 % 01/05/24 0438       Anesthesia Post Evaluation    Patient location during evaluation: bedside  Patient participation: complete - patient participated  Level of consciousness: awake and alert  Pain score: 1  Pain management: adequate  Multimodal analgesia pain management approach  Airway patency: patent  Cardiovascular status: acceptable  Respiratory status: acceptable  Hydration status: acceptable  Postoperative Nausea and Vomiting: none  Comments: Catheter could not be inserted before delivery.      Patient denies headache or severe back pain no bleeding at the site.      No notable events documented.

## 2024-01-05 NOTE — NURSING NOTE
"Pt up to br after stating \"I have to go to the bathroom\"  Gait steady-pt voided 500cc urine-pt walking around room while linen changed-pt had bowel movement in her pants and in the bed-vss  "

## 2024-01-06 VITALS
DIASTOLIC BLOOD PRESSURE: 74 MMHG | HEART RATE: 90 BPM | RESPIRATION RATE: 16 BRPM | TEMPERATURE: 97.8 F | OXYGEN SATURATION: 98 % | SYSTOLIC BLOOD PRESSURE: 127 MMHG

## 2024-01-06 PROCEDURE — 2500000001 HC RX 250 WO HCPCS SELF ADMINISTERED DRUGS (ALT 637 FOR MEDICARE OP): Performed by: OBSTETRICS & GYNECOLOGY

## 2024-01-06 RX ORDER — POLYETHYLENE GLYCOL 3350 17 G/17G
17 POWDER, FOR SOLUTION ORAL DAILY
Qty: 30 PACKET | Refills: 0 | Status: SHIPPED | OUTPATIENT
Start: 2024-01-06 | End: 2024-02-05

## 2024-01-06 RX ORDER — IBUPROFEN 600 MG/1
600 TABLET ORAL EVERY 6 HOURS
Qty: 20 TABLET | Refills: 0 | Status: SHIPPED | OUTPATIENT
Start: 2024-01-06 | End: 2024-01-11

## 2024-01-06 RX ORDER — ACETAMINOPHEN 500 MG
1000 TABLET ORAL EVERY 6 HOURS
Qty: 40 TABLET | Refills: 0 | Status: SHIPPED | OUTPATIENT
Start: 2024-01-06 | End: 2024-01-11

## 2024-01-06 RX ADMIN — IBUPROFEN 600 MG: 600 TABLET, FILM COATED ORAL at 11:35

## 2024-01-06 RX ADMIN — ACETAMINOPHEN 975 MG: 325 TABLET ORAL at 11:35

## 2024-01-06 RX ADMIN — ACETAMINOPHEN 975 MG: 325 TABLET ORAL at 17:28

## 2024-01-06 RX ADMIN — IBUPROFEN 600 MG: 600 TABLET, FILM COATED ORAL at 17:28

## 2024-01-06 RX ADMIN — IBUPROFEN 600 MG: 600 TABLET, FILM COATED ORAL at 04:33

## 2024-01-06 RX ADMIN — ACETAMINOPHEN 975 MG: 325 TABLET ORAL at 04:33

## 2024-01-06 ASSESSMENT — PAIN SCALES - GENERAL
PAINLEVEL_OUTOF10: 0 - NO PAIN

## 2024-01-06 NOTE — PROGRESS NOTES
"Spiritual Care Visit    Clinical Encounter Type  Visited With: Patient and family together  Routine Visit: Introduction          Purpose of visit was to congratulate mom and baby and offer the keepsake children's Bible.  Patient was very welcoming and engaged in conversation with this .  Patient said  Akbar was in yesterday and gave her the Bible.  She was so happy to have it and was glad to have a second visit from [facility] care.  I asked her about the \"blank\" [employer] and she said to update to Mandaen.  I have updated her record.                                         "

## 2024-01-06 NOTE — DISCHARGE SUMMARY
Discharge Summary    Admission Date: 1/5/2024  Discharge Date: 1/6/2024    Discharge Diagnosis  Normal labor and delivery    Hospital Course  Delivery Date: 1/5/2024  4:15 AM   Delivery type: Vaginal, Spontaneous    GA at delivery: 39w1d  Outcome: Living   Anesthesia during delivery: None   Intrapartum complications: None   Feeding method: Breastfeeding Status: No     Procedures: none  Contraception at discharge: none      Pertinent Physical Exam At Time of Discharge  General: Examination reveals a well developed, well nourished, female, in no acute distress and whose affect is appropriate. She is alert and cooperative.  Lungs: comfortable on room air.  Fundus: firm, below umbilicus, and nontender.  Extremities: no redness or tenderness in the calves or thighs, no edema.    Discharge Meds     Your medication list        START taking these medications        Instructions Last Dose Given Next Dose Due   acetaminophen 500 mg tablet  Commonly known as: Tylenol      Take 2 tablets (1,000 mg) by mouth every 6 hours for 5 days.       ibuprofen 600 mg tablet      Take 1 tablet (600 mg) by mouth every 6 hours for 5 days.       polyethylene glycol 17 gram packet  Commonly known as: Glycolax, Miralax      Take 17 g by mouth once daily.              CONTINUE taking these medications        Instructions Last Dose Given Next Dose Due   ferrous sulfate (325 mg ferrous sulfate) tablet      Take 1 tablet (65 mg of iron) by mouth once daily with a meal.       prenatal no.139-iron-folic-dha 33 mg iron- 800 mcg-350 mg combo pack           sertraline 50 mg tablet  Commonly known as: Zoloft      Take 1 tablet (50 mg) by mouth once daily.                 Where to Get Your Medications        These medications were sent to [id number] - [place], [place] - [address] AT [facility]  [address], [facility] [address]      Phone: [phone]   acetaminophen 500 mg  tablet  ibuprofen 600 mg tablet  polyethylene glycol 17 gram packet          Complications Requiring Follow-Up  Routine postpartum care     Test Results Pending At Discharge  Pending Labs       No current pending labs.            Outpatient Follow-Up  Future Appointments   Date Time Provider Department Center   1/9/2024  4:00 PM Cat Gottlieb MD [id number] [facility] spent 30 minutes in the professional and overall care of this patient.      Carmen Hampton MD

## 2024-01-06 NOTE — PROGRESS NOTES
Postpartum Progress Note    Assessment/Plan   Ilda Peter is a 23 y.o., , who delivered at 39w1d gestation and is now postpartum day 1 s/p  at 39.1wga.    PP - recovering well, formula feeding, meeting milestones, vitals wnl     Stable for discharge at patient preference later tonight or tomorrow morning     Principal Problem:    Normal labor and delivery    Pregnancy Problems (from 23 to present)       Problem Noted Resolved    Single umbilical artery 10/16/2023 by Cat Gottlieb MD No    Priority:  Medium      Overview Signed 10/18/2023  1:27 PM by Cat Gottlieb MD     Growth scan scheduled         Anxiety 2023 by Brandee Cross,  No    Priority:  Medium      Overview Addendum 10/18/2023  1:27 PM by Cat Gottlieb MD     On zoloft 50 mg                Hospital course: no complications  Vaginal Birth  Patient is not breastfeedingThe patient's blood type is O POS. The baby's blood type is B POS . Rhogam is not indicated.    Subjective   Her pain is well controlled with current medications  She is passing flatus  She is ambulating well  She is tolerating a Adult diet Regular  She reports no breast or nursing problems  She denies emotional concerns today   Her plan for contraception is none       Objective   Allergies:   Codeine         Last Vitals:  Temp Pulse Resp BP MAP Pulse Ox   36.8 °C (98.2 °F) 69 16 128/78   99 %     Vitals Min/Max Last 24 Hours:  Temp  Min: 36.8 °C (98.2 °F)  Max: 37 °C (98.6 °F)  Pulse  Min: 69  Max: 92  Resp  Min: 16  Max: 20  BP  Min: 111/57  Max: 135/84    Intake/Output:     Intake/Output Summary (Last 24 hours) at 2024 0815  Last data filed at 2024 1200  Gross per 24 hour   Intake --   Output 450 ml   Net -450 ml       Physical Exam:  General: Examination reveals a well developed, well nourished, female, in no acute distress and whose affect is appropriate. She is alert and cooperative.  Lungs: comfortable on room air.  Fundus: firm,  below umbilicus, and nontender.    Lab Data:  Lab Results   Component Value Date    ABO O 01/05/2024    LABRH POS 01/05/2024    ABSCRN NEG 01/05/2024     Lab Results   Component Value Date    WBC 7.0 01/05/2024    HGB 10.4 (L) 01/05/2024    HCT 31.0 (L) 01/05/2024     (L) 01/05/2024     GBS -

## 2024-01-07 NOTE — CARE PLAN
Problem: Postpartum  Goal: Experiences normal postpartum course  Outcome: Progressing  Goal: Appropriate maternal -  bonding  Outcome: Progressing  Goal: Establish and maintain infant feeding pattern for adequate nutrition  Outcome: Progressing  Goal: Incisions, wounds, or drain sites healing without S/S of infection  Outcome: Progressing  Goal: No s/sx infection  Outcome: Progressing  Goal: No s/sx of hemorrhage  Outcome: Progressing  Goal: Minimal s/sx of HDP and BP<160/110  Outcome: Progressing

## 2024-01-09 ENCOUNTER — APPOINTMENT (OUTPATIENT)
Dept: OBSTETRICS AND GYNECOLOGY | Facility: CLINIC | Age: 24
End: 2024-01-09
Payer: COMMERCIAL

## 2024-02-22 ENCOUNTER — POSTPARTUM VISIT (OUTPATIENT)
Dept: OBSTETRICS AND GYNECOLOGY | Facility: CLINIC | Age: 24
End: 2024-02-22
Payer: COMMERCIAL

## 2024-02-22 VITALS
SYSTOLIC BLOOD PRESSURE: 110 MMHG | HEIGHT: 66 IN | DIASTOLIC BLOOD PRESSURE: 60 MMHG | WEIGHT: 165.13 LBS | BODY MASS INDEX: 26.54 KG/M2

## 2024-02-22 DIAGNOSIS — Z30.013 ENCOUNTER FOR INITIAL PRESCRIPTION OF INJECTABLE CONTRACEPTIVE: ICD-10-CM

## 2024-02-22 LAB — PREGNANCY TEST URINE, POC: NEGATIVE

## 2024-02-22 PROCEDURE — 96372 THER/PROPH/DIAG INJ SC/IM: CPT | Performed by: OBSTETRICS & GYNECOLOGY

## 2024-02-22 PROCEDURE — 81025 URINE PREGNANCY TEST: CPT | Performed by: OBSTETRICS & GYNECOLOGY

## 2024-02-22 PROCEDURE — 0503F POSTPARTUM CARE VISIT: CPT | Performed by: OBSTETRICS & GYNECOLOGY

## 2024-02-22 RX ORDER — MEDROXYPROGESTERONE ACETATE 150 MG/ML
150 INJECTION, SUSPENSION INTRAMUSCULAR ONCE
Status: COMPLETED | OUTPATIENT
Start: 2024-02-22 | End: 2024-02-22

## 2024-02-22 RX ADMIN — MEDROXYPROGESTERONE ACETATE 150 MG: 150 INJECTION, SUSPENSION INTRAMUSCULAR at 15:33

## 2024-02-22 ASSESSMENT — EDINBURGH POSTNATAL DEPRESSION SCALE (EPDS)
I HAVE FELT SCARED OR PANICKY FOR NO GOOD REASON: NO, NOT MUCH
I HAVE LOOKED FORWARD WITH ENJOYMENT TO THINGS: AS MUCH AS I EVER DID
I HAVE BEEN SO UNHAPPY THAT I HAVE HAD DIFFICULTY SLEEPING: NOT AT ALL
THINGS HAVE BEEN GETTING ON TOP OF ME: NO, MOST OF THE TIME I HAVE COPED QUITE WELL
I HAVE BEEN SO UNHAPPY THAT I HAVE BEEN CRYING: NO, NEVER
I HAVE BLAMED MYSELF UNNECESSARILY WHEN THINGS WENT WRONG: NOT VERY OFTEN
I HAVE BEEN ABLE TO LAUGH AND SEE THE FUNNY SIDE OF THINGS: AS MUCH AS I ALWAYS COULD
I HAVE FELT SAD OR MISERABLE: NO, NOT AT ALL
I HAVE BEEN ANXIOUS OR WORRIED FOR NO GOOD REASON: HARDLY EVER
TOTAL SCORE: 4
THE THOUGHT OF HARMING MYSELF HAS OCCURRED TO ME: NEVER

## 2024-02-22 NOTE — PROGRESS NOTES
Subjective   Patient ID: Ilda Peter is a 23 y.o. female who presents for Postpartum Follow-up. 6 week PPV for vaginal delivery at [facility] by Dr. Davis on 1/5/2024. Baby boy, Tay, 6lbs 11oz. Last pap done on 5/31/2023 and was normal. Plans on using condoms for birth control.  HPI  Bottle feeding . Doing well. No complaints.       Review of Systems  Neg   Objective   Physical Exam  Physical Exam         Appearance: Normal appearance. Affect normal and alert  Pulmonary:      Effort: Pulmonary effort is normal. Breath sounds clear  Skin: no rashes or lesions   Breasts:     Breasts bilaterally are symmetrical. No masses or axillary adenopathy. No skin or nipple changes    Abdominal:     Abdomen is flat, soft, nontender. No distension. No mass palpated.      Genitourinary:     Labia: no skin lesions or rash       Urethra: No lesions.      Bladder with no prolapse     Vagina: No discharge, mucosa is pink with no lesions.     Cervix:    mobile and nontender no discharge     Uterus:   Not enlarged, small, nontender.      Adnexa: Bilaterally with  No mass or tenderness.            Extremities:  Nontender, no edema. Normal range of motion    Assessment/Plan   Diagnoses and all orders for this visit:  Postpartum exam  Encounter for initial prescription of injectable contraceptive  -     POCT pregnancy, urine manually resulted  -     medroxyPROGESTERone (Depo-Provera) injection 150 mg    Wants depoprovera bec forgets pills. Condoms not reliable.     CG = neg     MD Jennie Vallecillo, [facility] 02/22/24 2:27 PM

## 2024-05-10 ENCOUNTER — OFFICE VISIT (OUTPATIENT)
Dept: OBSTETRICS AND GYNECOLOGY | Facility: CLINIC | Age: 24
End: 2024-05-10
Payer: COMMERCIAL

## 2024-05-10 VITALS
WEIGHT: 152.25 LBS | DIASTOLIC BLOOD PRESSURE: 80 MMHG | HEIGHT: 65 IN | SYSTOLIC BLOOD PRESSURE: 124 MMHG | BODY MASS INDEX: 25.37 KG/M2

## 2024-05-10 DIAGNOSIS — Z30.42 ENCOUNTER FOR MANAGEMENT AND INJECTION OF DEPO-PROVERA: ICD-10-CM

## 2024-05-10 PROCEDURE — 96372 THER/PROPH/DIAG INJ SC/IM: CPT | Performed by: ADVANCED PRACTICE MIDWIFE

## 2024-05-10 RX ORDER — MEDROXYPROGESTERONE ACETATE 150 MG/ML
150 INJECTION, SUSPENSION INTRAMUSCULAR ONCE
Status: COMPLETED | OUTPATIENT
Start: 2024-05-10 | End: 2024-05-10

## 2024-05-10 RX ORDER — MEDROXYPROGESTERONE ACETATE 150 MG/ML
150 INJECTION, SUSPENSION INTRAMUSCULAR
COMMUNITY

## 2024-05-10 RX ADMIN — MEDROXYPROGESTERONE ACETATE 150 MG: 150 INJECTION, SUSPENSION INTRAMUSCULAR at 13:57

## 2024-05-10 NOTE — PROGRESS NOTES
Pt. here today for Depo Provera injection  on 24 started on depo postpartum  bottlefeeding  Last Depo 2024  Next Depo due -2024  Last VITA 2024 for 6 wk PPV where she started DEPO  UPT n/a  Periods irregular , 3/17,  last period brown spotting  Denies h/a, visual changes, CP sob or leg pain  ROS: all negative  PE:  Constitutional: no acute distress, well nourished, alert and oriented  Head and Neck: normocephalic  Lungs: respirations unlabored  Neuropsych: normal mood affect, well groomed good eye contact  Muskuloskeletal: normal posture    A: satisfied with depo provera for contraception  Complaints with being on Depo Provera  Plan:  Pt. given Depo Provera without difficulties  office supplied.  Pt. tolerated injection well.  Education offered.  Rto 10-12 weeks

## 2024-07-01 ENCOUNTER — APPOINTMENT (OUTPATIENT)
Dept: CARDIOLOGY | Facility: HOSPITAL | Age: 24
End: 2024-07-01
Payer: COMMERCIAL

## 2024-07-01 ENCOUNTER — APPOINTMENT (OUTPATIENT)
Dept: PRIMARY CARE | Facility: CLINIC | Age: 24
End: 2024-07-01
Payer: COMMERCIAL

## 2024-07-01 ENCOUNTER — APPOINTMENT (OUTPATIENT)
Dept: RADIOLOGY | Facility: HOSPITAL | Age: 24
End: 2024-07-01
Payer: COMMERCIAL

## 2024-07-01 ENCOUNTER — HOSPITAL ENCOUNTER (OUTPATIENT)
Facility: HOSPITAL | Age: 24
Setting detail: OBSERVATION
Discharge: HOME | End: 2024-07-03
Attending: EMERGENCY MEDICINE | Admitting: STUDENT IN AN ORGANIZED HEALTH CARE EDUCATION/TRAINING PROGRAM
Payer: COMMERCIAL

## 2024-07-01 VITALS
WEIGHT: 137 LBS | TEMPERATURE: 97.6 F | DIASTOLIC BLOOD PRESSURE: 86 MMHG | RESPIRATION RATE: 16 BRPM | SYSTOLIC BLOOD PRESSURE: 128 MMHG | BODY MASS INDEX: 22.8 KG/M2 | OXYGEN SATURATION: 98 % | HEART RATE: 119 BPM

## 2024-07-01 DIAGNOSIS — R17 ELEVATED BILIRUBIN: Primary | ICD-10-CM

## 2024-07-01 DIAGNOSIS — R10.9 ABDOMINAL PAIN, UNSPECIFIED ABDOMINAL LOCATION: Primary | ICD-10-CM

## 2024-07-01 DIAGNOSIS — R17 JAUNDICE: ICD-10-CM

## 2024-07-01 DIAGNOSIS — E79.0 HYPERURICEMIA: ICD-10-CM

## 2024-07-01 DIAGNOSIS — R82.90 ABNORMAL URINALYSIS: ICD-10-CM

## 2024-07-01 DIAGNOSIS — R11.2 INTRACTABLE NAUSEA AND VOMITING: ICD-10-CM

## 2024-07-01 DIAGNOSIS — D64.9 ANEMIA, UNSPECIFIED TYPE: ICD-10-CM

## 2024-07-01 DIAGNOSIS — D61.818 PANCYTOPENIA (MULTI): ICD-10-CM

## 2024-07-01 PROBLEM — E80.6 HYPERBILIRUBINEMIA: Status: ACTIVE | Noted: 2024-07-01

## 2024-07-01 LAB
ABO GROUP (TYPE) IN BLOOD: NORMAL
ALBUMIN SERPL BCP-MCNC: 4.7 G/DL (ref 3.4–5)
ALP SERPL-CCNC: 74 U/L (ref 33–110)
ALT SERPL W P-5'-P-CCNC: 41 U/L (ref 7–45)
ANION GAP SERPL CALC-SCNC: 12 MMOL/L (ref 10–20)
ANTIBODY SCREEN: NORMAL
AST SERPL W P-5'-P-CCNC: 44 U/L (ref 9–39)
B-HCG SERPL-ACNC: <2 MIU/ML
BASOPHILS # BLD MANUAL: 0.04 X10*3/UL (ref 0–0.1)
BASOPHILS NFR BLD MANUAL: 1 %
BILIRUB DIRECT SERPL-MCNC: 0.4 MG/DL (ref 0–0.3)
BILIRUB SERPL-MCNC: 3.2 MG/DL (ref 0–1.2)
BUN SERPL-MCNC: 8 MG/DL (ref 6–23)
CALCIUM SERPL-MCNC: 9.9 MG/DL (ref 8.6–10.3)
CHLORIDE SERPL-SCNC: 104 MMOL/L (ref 98–107)
CO2 SERPL-SCNC: 27 MMOL/L (ref 21–32)
CREAT SERPL-MCNC: 1.02 MG/DL (ref 0.5–1.05)
EGFRCR SERPLBLD CKD-EPI 2021: 79 ML/MIN/1.73M*2
EOSINOPHIL # BLD MANUAL: 0.04 X10*3/UL (ref 0–0.7)
EOSINOPHIL NFR BLD MANUAL: 1 %
ERYTHROCYTE [DISTWIDTH] IN BLOOD BY AUTOMATED COUNT: 14.3 % (ref 11.5–14.5)
GIANT PLATELETS BLD QL SMEAR: ABNORMAL
GLUCOSE SERPL-MCNC: 120 MG/DL (ref 74–99)
HCT VFR BLD AUTO: 23.9 % (ref 36–46)
HGB BLD-MCNC: 8.2 G/DL (ref 12–16)
IMM GRANULOCYTES # BLD AUTO: 0.2 X10*3/UL (ref 0–0.7)
IMM GRANULOCYTES NFR BLD AUTO: 4.9 % (ref 0–0.9)
LIPASE SERPL-CCNC: 18 U/L (ref 9–82)
LYMPHOCYTES # BLD MANUAL: 1.31 X10*3/UL (ref 1.2–4.8)
LYMPHOCYTES NFR BLD MANUAL: 32 %
MCH RBC QN AUTO: 33.1 PG (ref 26–34)
MCHC RBC AUTO-ENTMCNC: 34.3 G/DL (ref 32–36)
MCV RBC AUTO: 96 FL (ref 80–100)
MONOCYTES # BLD MANUAL: 0.04 X10*3/UL (ref 0.1–1)
MONOCYTES NFR BLD MANUAL: 1 %
MONONUCLEOSIS SCREEN: NEGATIVE
NEUTROPHILS # BLD MANUAL: 2.34 X10*3/UL (ref 1.2–7.7)
NEUTS BAND # BLD MANUAL: 0.08 X10*3/UL (ref 0–0.7)
NEUTS BAND NFR BLD MANUAL: 2 %
NEUTS SEG # BLD MANUAL: 2.26 X10*3/UL (ref 1.2–7)
NEUTS SEG NFR BLD MANUAL: 55 %
NRBC BLD-RTO: 10.8 /100 WBCS (ref 0–0)
PLATELET # BLD AUTO: 86 X10*3/UL (ref 150–450)
POC APPEARANCE, URINE: CLEAR
POC BILIRUBIN, URINE: ABNORMAL
POC BLOOD, URINE: ABNORMAL
POC COLOR, URINE: ABNORMAL
POC GLUCOSE, URINE: NEGATIVE MG/DL
POC KETONES, URINE: NEGATIVE MG/DL
POC LEUKOCYTES, URINE: NEGATIVE
POC NITRITE,URINE: NEGATIVE
POC PH, URINE: 6 PH
POC PROTEIN, URINE: ABNORMAL MG/DL
POC SPECIFIC GRAVITY, URINE: 1.02
POC UROBILINOGEN, URINE: 0.2 EU/DL
POLYCHROMASIA BLD QL SMEAR: ABNORMAL
POTASSIUM SERPL-SCNC: 3.5 MMOL/L (ref 3.5–5.3)
PREGNANCY TEST URINE, POC: NEGATIVE
PROT SERPL-MCNC: 7.4 G/DL (ref 6.4–8.2)
RBC # BLD AUTO: 2.48 X10*6/UL (ref 4–5.2)
RBC MORPH BLD: ABNORMAL
RH FACTOR (ANTIGEN D): NORMAL
SODIUM SERPL-SCNC: 139 MMOL/L (ref 136–145)
TOTAL CELLS COUNTED BLD: 100
VARIANT LYMPHS # BLD MANUAL: 0.33 X10*3/UL (ref 0–0.5)
VARIANT LYMPHS NFR BLD: 8 %
WBC # BLD AUTO: 4.1 X10*3/UL (ref 4.4–11.3)

## 2024-07-01 PROCEDURE — 86920 COMPATIBILITY TEST SPIN: CPT

## 2024-07-01 PROCEDURE — 99285 EMERGENCY DEPT VISIT HI MDM: CPT | Performed by: EMERGENCY MEDICINE

## 2024-07-01 PROCEDURE — 99285 EMERGENCY DEPT VISIT HI MDM: CPT

## 2024-07-01 PROCEDURE — 93005 ELECTROCARDIOGRAM TRACING: CPT

## 2024-07-01 PROCEDURE — 86705 HEP B CORE ANTIBODY IGM: CPT | Mod: STJLAB

## 2024-07-01 PROCEDURE — 1036F TOBACCO NON-USER: CPT | Performed by: FAMILY MEDICINE

## 2024-07-01 PROCEDURE — 81002 URINALYSIS NONAUTO W/O SCOPE: CPT | Performed by: FAMILY MEDICINE

## 2024-07-01 PROCEDURE — 83010 ASSAY OF HAPTOGLOBIN QUANT: CPT | Mod: STJLAB,WESLAB

## 2024-07-01 PROCEDURE — 99223 1ST HOSP IP/OBS HIGH 75: CPT | Performed by: STUDENT IN AN ORGANIZED HEALTH CARE EDUCATION/TRAINING PROGRAM

## 2024-07-01 PROCEDURE — 81025 URINE PREGNANCY TEST: CPT | Performed by: FAMILY MEDICINE

## 2024-07-01 PROCEDURE — 36415 COLL VENOUS BLD VENIPUNCTURE: CPT | Performed by: EMERGENCY MEDICINE

## 2024-07-01 PROCEDURE — 82248 BILIRUBIN DIRECT: CPT | Performed by: EMERGENCY MEDICINE

## 2024-07-01 PROCEDURE — 2550000001 HC RX 255 CONTRASTS: Performed by: EMERGENCY MEDICINE

## 2024-07-01 PROCEDURE — 86308 HETEROPHILE ANTIBODY SCREEN: CPT | Performed by: EMERGENCY MEDICINE

## 2024-07-01 PROCEDURE — 84702 CHORIONIC GONADOTROPIN TEST: CPT | Performed by: EMERGENCY MEDICINE

## 2024-07-01 PROCEDURE — 80053 COMPREHEN METABOLIC PANEL: CPT | Performed by: EMERGENCY MEDICINE

## 2024-07-01 PROCEDURE — 99214 OFFICE O/P EST MOD 30 MIN: CPT | Performed by: FAMILY MEDICINE

## 2024-07-01 PROCEDURE — 83690 ASSAY OF LIPASE: CPT | Performed by: EMERGENCY MEDICINE

## 2024-07-01 PROCEDURE — 74177 CT ABD & PELVIS W/CONTRAST: CPT | Mod: FOREIGN READ | Performed by: RADIOLOGY

## 2024-07-01 PROCEDURE — 36415 COLL VENOUS BLD VENIPUNCTURE: CPT

## 2024-07-01 PROCEDURE — 85027 COMPLETE CBC AUTOMATED: CPT | Performed by: EMERGENCY MEDICINE

## 2024-07-01 PROCEDURE — 74177 CT ABD & PELVIS W/CONTRAST: CPT

## 2024-07-01 PROCEDURE — 2500000004 HC RX 250 GENERAL PHARMACY W/ HCPCS (ALT 636 FOR OP/ED): Performed by: EMERGENCY MEDICINE

## 2024-07-01 PROCEDURE — 96360 HYDRATION IV INFUSION INIT: CPT | Mod: 59

## 2024-07-01 PROCEDURE — 83615 LACTATE (LD) (LDH) ENZYME: CPT | Performed by: STUDENT IN AN ORGANIZED HEALTH CARE EDUCATION/TRAINING PROGRAM

## 2024-07-01 PROCEDURE — 86901 BLOOD TYPING SEROLOGIC RH(D): CPT | Performed by: EMERGENCY MEDICINE

## 2024-07-01 PROCEDURE — 85007 BL SMEAR W/DIFF WBC COUNT: CPT | Performed by: EMERGENCY MEDICINE

## 2024-07-01 ASSESSMENT — ACTIVITIES OF DAILY LIVING (ADL)
DRESSING YOURSELF: INDEPENDENT
TOILETING: INDEPENDENT
GROOMING: INDEPENDENT
FEEDING YOURSELF: INDEPENDENT
ADEQUATE_TO_COMPLETE_ADL: YES
BATHING: INDEPENDENT
JUDGMENT_ADEQUATE_SAFELY_COMPLETE_DAILY_ACTIVITIES: YES
WALKS IN HOME: INDEPENDENT
HEARING - RIGHT EAR: FUNCTIONAL
PATIENT'S MEMORY ADEQUATE TO SAFELY COMPLETE DAILY ACTIVITIES?: YES
HEARING - LEFT EAR: FUNCTIONAL

## 2024-07-01 ASSESSMENT — LIFESTYLE VARIABLES
HAVE YOU EVER FELT YOU SHOULD CUT DOWN ON YOUR DRINKING: NO
HAVE PEOPLE ANNOYED YOU BY CRITICIZING YOUR DRINKING: NO
EVER FELT BAD OR GUILTY ABOUT YOUR DRINKING: NO
TOTAL SCORE: 0
EVER HAD A DRINK FIRST THING IN THE MORNING TO STEADY YOUR NERVES TO GET RID OF A HANGOVER: NO

## 2024-07-01 ASSESSMENT — PAIN SCALES - GENERAL
PAINLEVEL_OUTOF10: 0 - NO PAIN
PAINLEVEL_OUTOF10: 0 - NO PAIN

## 2024-07-01 ASSESSMENT — COLUMBIA-SUICIDE SEVERITY RATING SCALE - C-SSRS
6. HAVE YOU EVER DONE ANYTHING, STARTED TO DO ANYTHING, OR PREPARED TO DO ANYTHING TO END YOUR LIFE?: NO
2. HAVE YOU ACTUALLY HAD ANY THOUGHTS OF KILLING YOURSELF?: NO
1. IN THE PAST MONTH, HAVE YOU WISHED YOU WERE DEAD OR WISHED YOU COULD GO TO SLEEP AND NOT WAKE UP?: NO

## 2024-07-01 ASSESSMENT — PAIN - FUNCTIONAL ASSESSMENT: PAIN_FUNCTIONAL_ASSESSMENT: 0-10

## 2024-07-01 NOTE — PATIENT INSTRUCTIONS
Today we have addressed your abdominal pain, weight loss, jaundice and abnormal menstrual bleeding    I am sending you to the emergency room for IV fluids, labs, and a CT scan.      Follow up at discharge.

## 2024-07-01 NOTE — PROGRESS NOTES
Subjective   Patient ID: Ilda Peter is a 23 y.o. female who presents for Nausea (Since getting depo shot in February, patient has been nausea, vomiting, weight loss, yellow eyes, fatigue. Hard to keep liquids down. ).    She started getting depo shot in February, (obgyn office) bel[place].  Starting after that shot about a week so later she started to wake up and vomit.  She denies vomiting up food.  She hasn't been eating she can't keep anything down.  Everytime she eats she gets a dull achey pain in the upper belly and then a couple of hours later she vomits.  This happens every day.  She has lost 15 pounds.  She is trying to drink water and pedialyte.  She has headaches, feels lethargic, can't do anything.  She isn't nursing.  The second depo shot she got in May 10th and the symptoms got a lot worse.  She was 152 in May and now is 137. The past 2 days the pain has worsened to her stomach and lower back. She was having vaginal bleeding x 2 week even though she had depo.  Between February and may she had brown spotting, this time a month later she had non stop bleeding x 2 weeks.  She denies black stool or bloodin the stool.  She is burping a lot.  Her stool is diarrhea.  No vomiting of blood and nothing that looks like coffee grounds.  Her skin color and eyes are yellow.  No other new foods, supplements or recent travel.  No IV drug use, No high risk sexual behaviors, she smokes mj.           Review of Systems    Objective   /86   Pulse (!) 119   Temp 36.4 °C (97.6 °F)   Resp 16   Wt 62.1 kg (137 lb)   SpO2 98%   BMI 22.80 kg/m²     Physical Exam  Constitutional:       Appearance: Normal appearance.   HENT:      Head: Normocephalic.      Right Ear: Tympanic membrane normal.      Left Ear: Tympanic membrane normal.      Nose: No congestion.      Mouth/Throat:      Mouth: Mucous membranes are moist.   Eyes:      Pupils: Pupils are equal, round, and reactive to light.   Cardiovascular:      Rate and Rhythm:  Regular rhythm. Tachycardia present.   Pulmonary:      Effort: Pulmonary effort is normal.      Breath sounds: Normal breath sounds.   Abdominal:      General: Bowel sounds are normal.      Palpations: Abdomen is soft.      Tenderness: There is abdominal tenderness (minimal epigastric discomfort.). There is no guarding or rebound.   Musculoskeletal:      Cervical back: Normal range of motion.   Skin:     General: Skin is warm.      Coloration: Skin is jaundiced.   Neurological:      General: No focal deficit present.      Mental Status: She is alert and oriented to person, place, and time.   Psychiatric:         Mood and Affect: Mood normal.         Behavior: Behavior normal.         Assessment/Plan   Diagnoses and all orders for this visit:  Abdominal pain, unspecified abdominal location  Jaundice  -     POCT pregnancy, urine manually resulted  -     POCT UA (nonautomated) manually resulted  Abnormal urinalysis  -     Urine Culture; Future

## 2024-07-02 ENCOUNTER — APPOINTMENT (OUTPATIENT)
Dept: RADIOLOGY | Facility: HOSPITAL | Age: 24
End: 2024-07-02
Payer: COMMERCIAL

## 2024-07-02 PROBLEM — F12.20 CANNABIS DEPENDENCE (MULTI): Status: RESOLVED | Noted: 2024-07-02 | Resolved: 2024-07-02

## 2024-07-02 PROBLEM — R63.4 UNINTENTIONAL WEIGHT LOSS: Status: ACTIVE | Noted: 2024-07-02

## 2024-07-02 PROBLEM — F41.1 GENERALIZED ANXIETY DISORDER: Status: ACTIVE | Noted: 2024-07-02

## 2024-07-02 PROBLEM — R11.2 INTRACTABLE NAUSEA AND VOMITING: Status: ACTIVE | Noted: 2024-07-02

## 2024-07-02 PROBLEM — D61.818 PANCYTOPENIA (MULTI): Status: ACTIVE | Noted: 2024-07-02

## 2024-07-02 PROBLEM — G43.909 MIGRAINES: Status: ACTIVE | Noted: 2024-07-02

## 2024-07-02 PROBLEM — J31.0 CHRONIC ATROPHIC RHINITIS: Status: RESOLVED | Noted: 2024-07-02 | Resolved: 2024-07-02

## 2024-07-02 PROBLEM — L70.9 ACNE: Status: RESOLVED | Noted: 2024-07-02 | Resolved: 2024-07-02

## 2024-07-02 PROBLEM — B36.0 TINEA VERSICOLOR: Status: RESOLVED | Noted: 2024-07-02 | Resolved: 2024-07-02

## 2024-07-02 PROBLEM — N93.9 VAGINAL BLEEDING, ABNORMAL: Status: ACTIVE | Noted: 2024-07-02

## 2024-07-02 LAB
ALBUMIN SERPL BCP-MCNC: 4 G/DL (ref 3.4–5)
ALP SERPL-CCNC: 63 U/L (ref 33–110)
ALT SERPL W P-5'-P-CCNC: 31 U/L (ref 7–45)
AMPHETAMINES UR QL SCN: ABNORMAL
ANION GAP SERPL CALC-SCNC: 12 MMOL/L (ref 10–20)
APTT PPP: 27 SECONDS (ref 27–38)
AST SERPL W P-5'-P-CCNC: 31 U/L (ref 9–39)
ATRIAL RATE: 114 BPM
BARBITURATES UR QL SCN: ABNORMAL
BASOPHILS # BLD MANUAL: 0 X10*3/UL (ref 0–0.1)
BASOPHILS NFR BLD MANUAL: 0 %
BENZODIAZ UR QL SCN: ABNORMAL
BILIRUB SERPL-MCNC: 2.8 MG/DL (ref 0–1.2)
BUN SERPL-MCNC: 7 MG/DL (ref 6–23)
BZE UR QL SCN: ABNORMAL
CALCIUM SERPL-MCNC: 9 MG/DL (ref 8.6–10.3)
CANNABINOIDS UR QL SCN: ABNORMAL
CHLORIDE SERPL-SCNC: 104 MMOL/L (ref 98–107)
CO2 SERPL-SCNC: 26 MMOL/L (ref 21–32)
CREAT SERPL-MCNC: 0.84 MG/DL (ref 0.5–1.05)
DAT-POLYSPECIFIC: NORMAL
EGFRCR SERPLBLD CKD-EPI 2021: >90 ML/MIN/1.73M*2
EOSINOPHIL # BLD MANUAL: 0.05 X10*3/UL (ref 0–0.7)
EOSINOPHIL NFR BLD MANUAL: 2 %
ERYTHROCYTE [DISTWIDTH] IN BLOOD BY AUTOMATED COUNT: 14.5 % (ref 11.5–14.5)
ERYTHROCYTE [DISTWIDTH] IN BLOOD BY AUTOMATED COUNT: 14.5 % (ref 11.5–14.5)
FENTANYL+NORFENTANYL UR QL SCN: ABNORMAL
FIBRINOGEN PPP-MCNC: 363 MG/DL (ref 200–400)
GLUCOSE SERPL-MCNC: 107 MG/DL (ref 74–99)
HAV IGM SER QL: NONREACTIVE
HBV CORE IGM SER QL: NONREACTIVE
HBV SURFACE AG SERPL QL IA: NONREACTIVE
HCT VFR BLD AUTO: 18.9 % (ref 36–46)
HCT VFR BLD AUTO: 21 % (ref 36–46)
HCV AB SER QL: NONREACTIVE
HGB BLD-MCNC: 6.7 G/DL (ref 12–16)
HGB BLD-MCNC: 7.2 G/DL (ref 12–16)
HGB RETIC QN: 36 PG (ref 28–38)
IMM GRANULOCYTES # BLD AUTO: 0.14 X10*3/UL (ref 0–0.7)
IMM GRANULOCYTES NFR BLD AUTO: 5.2 % (ref 0–0.9)
IMMATURE RETIC FRACTION: 33.2 %
INR PPP: 1.2 (ref 0.9–1.1)
LDH SERPL L TO P-CCNC: 764 U/L (ref 84–246)
LYMPHOCYTES # BLD MANUAL: 1.22 X10*3/UL (ref 1.2–4.8)
LYMPHOCYTES NFR BLD MANUAL: 45 %
MAGNESIUM SERPL-MCNC: 1.81 MG/DL (ref 1.6–2.4)
MCH RBC QN AUTO: 33 PG (ref 26–34)
MCH RBC QN AUTO: 34 PG (ref 26–34)
MCHC RBC AUTO-ENTMCNC: 34.3 G/DL (ref 32–36)
MCHC RBC AUTO-ENTMCNC: 35.4 G/DL (ref 32–36)
MCV RBC AUTO: 96 FL (ref 80–100)
MCV RBC AUTO: 96 FL (ref 80–100)
METAMYELOCYTES # BLD MANUAL: 0.03 X10*3/UL
METAMYELOCYTES NFR BLD MANUAL: 1 %
METHADONE UR QL SCN: ABNORMAL
MONOCYTES # BLD MANUAL: 0.08 X10*3/UL (ref 0.1–1)
MONOCYTES NFR BLD MANUAL: 3 %
MYELOCYTES # BLD MANUAL: 0.03 X10*3/UL
MYELOCYTES NFR BLD MANUAL: 1 %
NEUTS SEG # BLD MANUAL: 1.11 X10*3/UL (ref 1.2–7)
NEUTS SEG NFR BLD MANUAL: 41 %
NRBC BLD-RTO: 10 /100 WBCS (ref 0–0)
NRBC BLD-RTO: 8.6 /100 WBCS (ref 0–0)
OPIATES UR QL SCN: ABNORMAL
OXYCODONE+OXYMORPHONE UR QL SCN: ABNORMAL
P AXIS: 74 DEGREES
P OFFSET: 190 MS
P ONSET: 137 MS
PATH REVIEW-CBC DIFFERENTIAL: NORMAL
PCP UR QL SCN: ABNORMAL
PLATELET # BLD AUTO: 55 X10*3/UL (ref 150–450)
PLATELET # BLD AUTO: 72 X10*3/UL (ref 150–450)
POTASSIUM SERPL-SCNC: 3.3 MMOL/L (ref 3.5–5.3)
PR INTERVAL: 170 MS
PROT SERPL-MCNC: 6.2 G/DL (ref 6.4–8.2)
PROTHROMBIN TIME: 14 SECONDS (ref 9.8–12.8)
Q ONSET: 222 MS
QRS COUNT: 19 BEATS
QRS DURATION: 78 MS
QT INTERVAL: 314 MS
QTC CALCULATION(BAZETT): 432 MS
QTC FREDERICIA: 388 MS
R AXIS: 78 DEGREES
RBC # BLD AUTO: 1.97 X10*6/UL (ref 4–5.2)
RBC # BLD AUTO: 2.18 X10*6/UL (ref 4–5.2)
RBC MORPH BLD: ABNORMAL
RETICS #: 0.03 X10*6/UL (ref 0.02–0.08)
RETICS/RBC NFR AUTO: 1.5 % (ref 0.5–2)
SODIUM SERPL-SCNC: 139 MMOL/L (ref 136–145)
T AXIS: 65 DEGREES
T OFFSET: 379 MS
TOTAL CELLS COUNTED BLD: 100
VARIANT LYMPHS # BLD MANUAL: 0.19 X10*3/UL (ref 0–0.5)
VARIANT LYMPHS NFR BLD: 7 %
VENTRICULAR RATE: 114 BPM
WBC # BLD AUTO: 2.7 X10*3/UL (ref 4.4–11.3)
WBC # BLD AUTO: 3.2 X10*3/UL (ref 4.4–11.3)

## 2024-07-02 PROCEDURE — 85027 COMPLETE CBC AUTOMATED: CPT | Performed by: STUDENT IN AN ORGANIZED HEALTH CARE EDUCATION/TRAINING PROGRAM

## 2024-07-02 PROCEDURE — 76376 3D RENDER W/INTRP POSTPROCES: CPT | Performed by: RADIOLOGY

## 2024-07-02 PROCEDURE — 85007 BL SMEAR W/DIFF WBC COUNT: CPT | Performed by: STUDENT IN AN ORGANIZED HEALTH CARE EDUCATION/TRAINING PROGRAM

## 2024-07-02 PROCEDURE — 74183 MRI ABD W/O CNTR FLWD CNTR: CPT | Performed by: RADIOLOGY

## 2024-07-02 PROCEDURE — 36415 COLL VENOUS BLD VENIPUNCTURE: CPT | Performed by: STUDENT IN AN ORGANIZED HEALTH CARE EDUCATION/TRAINING PROGRAM

## 2024-07-02 PROCEDURE — 2500000004 HC RX 250 GENERAL PHARMACY W/ HCPCS (ALT 636 FOR OP/ED)

## 2024-07-02 PROCEDURE — G0378 HOSPITAL OBSERVATION PER HR: HCPCS

## 2024-07-02 PROCEDURE — 2500000004 HC RX 250 GENERAL PHARMACY W/ HCPCS (ALT 636 FOR OP/ED): Performed by: STUDENT IN AN ORGANIZED HEALTH CARE EDUCATION/TRAINING PROGRAM

## 2024-07-02 PROCEDURE — 83010 ASSAY OF HAPTOGLOBIN QUANT: CPT | Mod: STJLAB,WESLAB | Performed by: STUDENT IN AN ORGANIZED HEALTH CARE EDUCATION/TRAINING PROGRAM

## 2024-07-02 PROCEDURE — A9575 INJ GADOTERATE MEGLUMI 0.1ML: HCPCS

## 2024-07-02 PROCEDURE — 85027 COMPLETE CBC AUTOMATED: CPT

## 2024-07-02 PROCEDURE — 86880 COOMBS TEST DIRECT: CPT

## 2024-07-02 PROCEDURE — 85384 FIBRINOGEN ACTIVITY: CPT | Performed by: STUDENT IN AN ORGANIZED HEALTH CARE EDUCATION/TRAINING PROGRAM

## 2024-07-02 PROCEDURE — 99233 SBSQ HOSP IP/OBS HIGH 50: CPT

## 2024-07-02 PROCEDURE — 99223 1ST HOSP IP/OBS HIGH 75: CPT | Performed by: INTERNAL MEDICINE

## 2024-07-02 PROCEDURE — 74183 MRI ABD W/O CNTR FLWD CNTR: CPT

## 2024-07-02 PROCEDURE — 88189 FLOWCYTOMETRY/READ 16 & >: CPT | Performed by: PATHOLOGY

## 2024-07-02 PROCEDURE — P9040 RBC LEUKOREDUCED IRRADIATED: HCPCS

## 2024-07-02 PROCEDURE — 2500000002 HC RX 250 W HCPCS SELF ADMINISTERED DRUGS (ALT 637 FOR MEDICARE OP, ALT 636 FOR OP/ED)

## 2024-07-02 PROCEDURE — 36430 TRANSFUSION BLD/BLD COMPNT: CPT

## 2024-07-02 PROCEDURE — 99222 1ST HOSP IP/OBS MODERATE 55: CPT | Performed by: NURSE PRACTITIONER

## 2024-07-02 PROCEDURE — 85060 BLOOD SMEAR INTERPRETATION: CPT | Performed by: PATHOLOGY

## 2024-07-02 PROCEDURE — 85045 AUTOMATED RETICULOCYTE COUNT: CPT | Performed by: STUDENT IN AN ORGANIZED HEALTH CARE EDUCATION/TRAINING PROGRAM

## 2024-07-02 PROCEDURE — 80053 COMPREHEN METABOLIC PANEL: CPT | Performed by: STUDENT IN AN ORGANIZED HEALTH CARE EDUCATION/TRAINING PROGRAM

## 2024-07-02 PROCEDURE — 84550 ASSAY OF BLOOD/URIC ACID: CPT | Mod: STJLAB,WESLAB

## 2024-07-02 PROCEDURE — 85610 PROTHROMBIN TIME: CPT | Performed by: STUDENT IN AN ORGANIZED HEALTH CARE EDUCATION/TRAINING PROGRAM

## 2024-07-02 PROCEDURE — 88185 FLOWCYTOMETRY/TC ADD-ON: CPT | Mod: TC | Performed by: STUDENT IN AN ORGANIZED HEALTH CARE EDUCATION/TRAINING PROGRAM

## 2024-07-02 PROCEDURE — 83735 ASSAY OF MAGNESIUM: CPT | Performed by: STUDENT IN AN ORGANIZED HEALTH CARE EDUCATION/TRAINING PROGRAM

## 2024-07-02 PROCEDURE — 80307 DRUG TEST PRSMV CHEM ANLYZR: CPT | Performed by: STUDENT IN AN ORGANIZED HEALTH CARE EDUCATION/TRAINING PROGRAM

## 2024-07-02 RX ORDER — SERTRALINE HYDROCHLORIDE 50 MG/1
50 TABLET, FILM COATED ORAL DAILY
Status: DISCONTINUED | OUTPATIENT
Start: 2024-07-02 | End: 2024-07-03 | Stop reason: HOSPADM

## 2024-07-02 RX ORDER — GADOTERATE MEGLUMINE 376.9 MG/ML
13 INJECTION INTRAVENOUS
Status: COMPLETED | OUTPATIENT
Start: 2024-07-02 | End: 2024-07-02

## 2024-07-02 RX ORDER — ACETAMINOPHEN 325 MG/1
650 TABLET ORAL EVERY 4 HOURS PRN
Status: DISCONTINUED | OUTPATIENT
Start: 2024-07-02 | End: 2024-07-03 | Stop reason: HOSPADM

## 2024-07-02 RX ORDER — ACETAMINOPHEN 160 MG/5ML
650 SOLUTION ORAL EVERY 4 HOURS PRN
Status: DISCONTINUED | OUTPATIENT
Start: 2024-07-02 | End: 2024-07-03 | Stop reason: HOSPADM

## 2024-07-02 RX ORDER — POTASSIUM CHLORIDE 20 MEQ/1
40 TABLET, EXTENDED RELEASE ORAL ONCE
Status: COMPLETED | OUTPATIENT
Start: 2024-07-02 | End: 2024-07-02

## 2024-07-02 RX ORDER — ACETAMINOPHEN 650 MG/1
650 SUPPOSITORY RECTAL EVERY 4 HOURS PRN
Status: DISCONTINUED | OUTPATIENT
Start: 2024-07-02 | End: 2024-07-03 | Stop reason: HOSPADM

## 2024-07-02 RX ORDER — ONDANSETRON 4 MG/1
4 TABLET, ORALLY DISINTEGRATING ORAL EVERY 8 HOURS PRN
Status: DISCONTINUED | OUTPATIENT
Start: 2024-07-02 | End: 2024-07-03 | Stop reason: HOSPADM

## 2024-07-02 RX ORDER — ONDANSETRON HYDROCHLORIDE 2 MG/ML
4 INJECTION, SOLUTION INTRAVENOUS EVERY 8 HOURS PRN
Status: DISCONTINUED | OUTPATIENT
Start: 2024-07-02 | End: 2024-07-03 | Stop reason: HOSPADM

## 2024-07-02 SDOH — ECONOMIC STABILITY: FOOD INSECURITY: WITHIN THE PAST 12 MONTHS, THE FOOD YOU BOUGHT JUST DIDN'T LAST AND YOU DIDN'T HAVE MONEY TO GET MORE.: NEVER TRUE

## 2024-07-02 SDOH — SOCIAL STABILITY: SOCIAL INSECURITY: ARE YOU OR HAVE YOU BEEN THREATENED OR ABUSED PHYSICALLY, EMOTIONALLY, OR SEXUALLY BY ANYONE?: NO

## 2024-07-02 SDOH — SOCIAL STABILITY: SOCIAL NETWORK: HOW OFTEN DO YOU ATTEND CHURCH OR RELIGIOUS SERVICES?: MORE THAN 4 TIMES PER YEAR

## 2024-07-02 SDOH — HEALTH STABILITY: MENTAL HEALTH
STRESS IS WHEN SOMEONE FEELS TENSE, NERVOUS, ANXIOUS, OR CAN'T SLEEP AT NIGHT BECAUSE THEIR MIND IS TROUBLED. HOW STRESSED ARE YOU?: NOT AT ALL

## 2024-07-02 SDOH — SOCIAL STABILITY: SOCIAL NETWORK
IN A TYPICAL WEEK, HOW MANY TIMES DO YOU TALK ON THE PHONE WITH FAMILY, FRIENDS, OR NEIGHBORS?: MORE THAN THREE TIMES A WEEK

## 2024-07-02 SDOH — SOCIAL STABILITY: SOCIAL NETWORK
DO YOU BELONG TO ANY CLUBS OR ORGANIZATIONS SUCH AS CHURCH GROUPS UNIONS, FRATERNAL OR ATHLETIC GROUPS, OR SCHOOL GROUPS?: NO

## 2024-07-02 SDOH — SOCIAL STABILITY: SOCIAL INSECURITY: HAVE YOU HAD THOUGHTS OF HARMING ANYONE ELSE?: NO

## 2024-07-02 SDOH — HEALTH STABILITY: MENTAL HEALTH
HOW OFTEN DO YOU NEED TO HAVE SOMEONE HELP YOU WHEN YOU READ INSTRUCTIONS, PAMPHLETS, OR OTHER WRITTEN MATERIAL FROM YOUR DOCTOR OR PHARMACY?: NEVER

## 2024-07-02 SDOH — SOCIAL STABILITY: SOCIAL INSECURITY
WITHIN THE LAST YEAR, HAVE YOU BEEN KICKED, HIT, SLAPPED, OR OTHERWISE PHYSICALLY HURT BY YOUR PARTNER OR EX-PARTNER?: NO

## 2024-07-02 SDOH — SOCIAL STABILITY: SOCIAL INSECURITY: WERE YOU ABLE TO COMPLETE ALL THE BEHAVIORAL HEALTH SCREENINGS?: YES

## 2024-07-02 SDOH — SOCIAL STABILITY: SOCIAL INSECURITY: ARE THERE ANY APPARENT SIGNS OF INJURIES/BEHAVIORS THAT COULD BE RELATED TO ABUSE/NEGLECT?: NO

## 2024-07-02 SDOH — SOCIAL STABILITY: SOCIAL NETWORK: ARE YOU MARRIED, WIDOWED, DIVORCED, SEPARATED, NEVER MARRIED, OR LIVING WITH A PARTNER?: NEVER MARRIED

## 2024-07-02 SDOH — SOCIAL STABILITY: SOCIAL INSECURITY: WITHIN THE LAST YEAR, HAVE YOU BEEN AFRAID OF YOUR PARTNER OR EX-PARTNER?: NO

## 2024-07-02 SDOH — SOCIAL STABILITY: SOCIAL INSECURITY: HAS ANYONE EVER THREATENED TO HURT YOUR FAMILY OR YOUR PETS?: NO

## 2024-07-02 SDOH — SOCIAL STABILITY: SOCIAL INSECURITY: DO YOU FEEL UNSAFE GOING BACK TO THE PLACE WHERE YOU ARE LIVING?: NO

## 2024-07-02 SDOH — SOCIAL STABILITY: SOCIAL NETWORK: HOW OFTEN DO YOU GET TOGETHER WITH FRIENDS OR RELATIVES?: MORE THAN THREE TIMES A WEEK

## 2024-07-02 SDOH — SOCIAL STABILITY: SOCIAL INSECURITY: WITHIN THE LAST YEAR, HAVE YOU BEEN HUMILIATED OR EMOTIONALLY ABUSED IN OTHER WAYS BY YOUR PARTNER OR EX-PARTNER?: NO

## 2024-07-02 SDOH — HEALTH STABILITY: PHYSICAL HEALTH: ON AVERAGE, HOW MANY DAYS PER WEEK DO YOU ENGAGE IN MODERATE TO STRENUOUS EXERCISE (LIKE A BRISK WALK)?: 0 DAYS

## 2024-07-02 SDOH — SOCIAL STABILITY: SOCIAL INSECURITY
WITHIN THE LAST YEAR, HAVE TO BEEN RAPED OR FORCED TO HAVE ANY KIND OF SEXUAL ACTIVITY BY YOUR PARTNER OR EX-PARTNER?: NO

## 2024-07-02 SDOH — ECONOMIC STABILITY: INCOME INSECURITY: HOW HARD IS IT FOR YOU TO PAY FOR THE VERY BASICS LIKE FOOD, HOUSING, MEDICAL CARE, AND HEATING?: NOT HARD AT ALL

## 2024-07-02 SDOH — ECONOMIC STABILITY: FOOD INSECURITY: WITHIN THE PAST 12 MONTHS, YOU WORRIED THAT YOUR FOOD WOULD RUN OUT BEFORE YOU GOT MONEY TO BUY MORE.: NEVER TRUE

## 2024-07-02 SDOH — SOCIAL STABILITY: SOCIAL INSECURITY: DO YOU FEEL ANYONE HAS EXPLOITED OR TAKEN ADVANTAGE OF YOU FINANCIALLY OR OF YOUR PERSONAL PROPERTY?: NO

## 2024-07-02 SDOH — SOCIAL STABILITY: SOCIAL NETWORK: HOW OFTEN DO YOU ATTENT MEETINGS OF THE CLUB OR ORGANIZATION YOU BELONG TO?: 1 TO 4 TIMES PER YEAR

## 2024-07-02 SDOH — SOCIAL STABILITY: SOCIAL INSECURITY: HAVE YOU HAD ANY THOUGHTS OF HARMING ANYONE ELSE?: NO

## 2024-07-02 SDOH — HEALTH STABILITY: PHYSICAL HEALTH: ON AVERAGE, HOW MANY MINUTES DO YOU ENGAGE IN EXERCISE AT THIS LEVEL?: 0 MIN

## 2024-07-02 SDOH — SOCIAL STABILITY: SOCIAL INSECURITY: ABUSE: ADULT

## 2024-07-02 SDOH — SOCIAL STABILITY: SOCIAL INSECURITY: DOES ANYONE TRY TO KEEP YOU FROM HAVING/CONTACTING OTHER FRIENDS OR DOING THINGS OUTSIDE YOUR HOME?: NO

## 2024-07-02 ASSESSMENT — LIFESTYLE VARIABLES
HOW OFTEN DO YOU HAVE A DRINK CONTAINING ALCOHOL: NEVER
SKIP TO QUESTIONS 9-10: 1
HOW OFTEN DO YOU HAVE 6 OR MORE DRINKS ON ONE OCCASION: NEVER
SUBSTANCE_ABUSE_PAST_12_MONTHS: YES
HOW MANY STANDARD DRINKS CONTAINING ALCOHOL DO YOU HAVE ON A TYPICAL DAY: PATIENT DOES NOT DRINK
PRESCIPTION_ABUSE_PAST_12_MONTHS: NO
AUDIT-C TOTAL SCORE: 0
AUDIT-C TOTAL SCORE: 0

## 2024-07-02 ASSESSMENT — ENCOUNTER SYMPTOMS
ABDOMINAL PAIN: 1
COLOR CHANGE: 1
HEADACHES: 1
CONFUSION: 0
BLOOD IN STOOL: 0
POLYPHAGIA: 0
VOMITING: 1
SPEECH DIFFICULTY: 0
NAUSEA: 1
APPETITE CHANGE: 1
SORE THROAT: 0
NECK PAIN: 0
WEAKNESS: 1
VOICE CHANGE: 0
CHILLS: 0
LIGHT-HEADEDNESS: 0
FATIGUE: 1
DYSURIA: 0
WHEEZING: 0
SHORTNESS OF BREATH: 0
DIARRHEA: 0
FEVER: 0
DIZZINESS: 0
UNEXPECTED WEIGHT CHANGE: 1
PALPITATIONS: 0
DYSPHORIC MOOD: 0
ABDOMINAL DISTENTION: 0

## 2024-07-02 ASSESSMENT — COGNITIVE AND FUNCTIONAL STATUS - GENERAL
MOBILITY SCORE: 24
MOBILITY SCORE: 24
DAILY ACTIVITIY SCORE: 24
DAILY ACTIVITIY SCORE: 24
PATIENT BASELINE BEDBOUND: NO
MOBILITY SCORE: 24

## 2024-07-02 ASSESSMENT — PATIENT HEALTH QUESTIONNAIRE - PHQ9
2. FEELING DOWN, DEPRESSED OR HOPELESS: NOT AT ALL
SUM OF ALL RESPONSES TO PHQ9 QUESTIONS 1 & 2: 0
1. LITTLE INTEREST OR PLEASURE IN DOING THINGS: NOT AT ALL

## 2024-07-02 ASSESSMENT — PAIN - FUNCTIONAL ASSESSMENT
PAIN_FUNCTIONAL_ASSESSMENT: 0-10

## 2024-07-02 ASSESSMENT — PAIN SCALES - GENERAL
PAINLEVEL_OUTOF10: 0 - NO PAIN

## 2024-07-02 ASSESSMENT — ACTIVITIES OF DAILY LIVING (ADL): LACK_OF_TRANSPORTATION: NO

## 2024-07-02 NOTE — ED PROVIDER NOTES
EMERGENCY DEPARTMENT ENCOUNTER      Pt Name: Ilda Peter  MRN: [id number]  Birthdate 2000  Date of evaluation: 7/1/2024    HISTORY OF PRESENT ILLNESS    Ilda Peter is an 23 y.o. female with history including recent pregnancy presenting to the emergency department for weight loss, nausea, vomiting, scleral icterus.  Patient states that since February she has had consistent nausea and vomiting.  She gave birth in January and started taking the Depo shot in February.  Initially she thought it was a reaction to the medication.  She received her second dose in May.  Patient continuingly has abdominal discomfort in the epigastric region.  This is worsened by nausea and vomiting.  In addition patient has cramping sensation in the epigastric region postprandial approximately 30 minutes.  Patient has had difficulty keeping any food down since.  She had a follow-up appoint with her primary care doctor who noted that patient had some scleral icterus and was concerned recommended coming in for evaluation.  Patient states that she is also lost 20 pounds in the last month.  She denies any fevers, night sweats but does have occasional chills.  Patient denies any chest pain, shortness of breath.  She notes that her stool is more pale than normal and her urine is darker than normal.    Marijuana use, denies alcohol tobacco or illicit drugs    PAST MEDICAL HISTORY     Past Medical History:   Diagnosis Date    Cannabis dependence, uncomplicated (Multi)     Marijuana dependence    Depression        SURGICAL HISTORY       Past Surgical History:   Procedure Laterality Date    WISDOM TOOTH EXTRACTION         CURRENT MEDICATIONS       Previous Medications    MEDROXYPROGESTERONE 150 MG/ML INJECTION    Inject 1 mL (150 mg) into the muscle every 12 weeks.    PRENATAL NO.139-IRON-FOLIC-DHA 33 MG IRON- 800 MCG-350 MG COMBO PACK    Take by mouth.    SERTRALINE (ZOLOFT) 50 MG TABLET    Take 1 tablet (50 mg) by mouth once daily.       ALLERGIES      Codeine    FAMILY HISTORY       Family History   Problem Relation Name Age of Onset    No Known Problems Mother      No Known Problems Father      No Known Problems Brother      Other (Bicuspid Aortic Valve; VSD) Son          SOCIAL HISTORY       Social History     Socioeconomic History    Marital status: Single     Spouse name: Not on file    Number of children: 1    Years of education: Not on file    Highest education level: 12th grade   Occupational History    Occupation: [employer]   Tobacco Use    Smoking status: Never    Smokeless tobacco: Never   Vaping Use    Vaping status: Never Used   Substance and Sexual Activity    Alcohol use: Not Currently    Drug use: Not Currently     Types: Marijuana    Sexual activity: Yes     Partners: Male   Other Topics Concern    Not on file   Social History Narrative    Not on file     Social Determinants of Health     Financial Resource Strain: Low Risk  (9/29/2023)    Overall Financial Resource Strain (CARDIA)     Difficulty of Paying Living Expenses: Not hard at all   Food Insecurity: No Food Insecurity (9/29/2023)    Hunger Vital Sign     Worried About Running Out of Food in the Last Year: Never true     Ran Out of Food in the Last Year: Never true   Transportation Needs: No Transportation Needs (9/29/2023)    PRAPARE - Transportation     Lack of Transportation (Medical): No     Lack of Transportation (Non-Medical): No   Physical Activity: Inactive (9/29/2023)    Exercise Vital Sign     Days of Exercise per Week: 0 days     Minutes of Exercise per Session: 0 min   Stress: No Stress Concern Present (9/29/2023)    [employer] - Occupational Stress Questionnaire     Feeling of Stress : Not at all   Social Connections: Not on file   Intimate Partner Violence: Not At Risk (9/29/2023)    Humiliation, Afraid, Rape, and Kick questionnaire     Fear of Current or Ex-Partner: No     Emotionally Abused: No     Physically Abused: No     Sexually Abused:  No       PHYSICAL EXAM       ED Triage Vitals [07/01/24 1825]   Temperature Heart Rate Respirations BP   36.5 °C (97.7 °F) (!) 123 18 (!) 153/93      Pulse Ox Temp Source Heart Rate Source Patient Position   100 % Tympanic Monitor Sitting      BP Location FiO2 (%)     Right arm --       Physical Exam  Constitutional:       General: She is not in acute distress.     Appearance: Normal appearance. She is not ill-appearing.      Interventions: Cervical collar in place.   HENT:      Head: Normocephalic and atraumatic.      Right Ear: No drainage.      Left Ear: No drainage.   Eyes:      General: Scleral icterus present.      Pupils: Pupils are equal, round, and reactive to light.   Neck:      Trachea: Trachea normal.   Cardiovascular:      Rate and Rhythm: Regular rhythm. Tachycardia present.      Heart sounds: S1 normal and S2 normal. No murmur heard.  Pulmonary:      Effort: Pulmonary effort is normal.      Breath sounds: Normal breath sounds.   Abdominal:      General: Abdomen is flat. There is no distension. There are no signs of injury.      Palpations: Abdomen is soft.      Tenderness: There is no abdominal tenderness.   Skin:     General: Skin is warm and dry.      Capillary Refill: Capillary refill takes less than 2 seconds.      Coloration: Skin is pale.      Findings: No bruising, signs of injury, laceration or wound.   Neurological:      General: No focal deficit present.      Mental Status: She is alert and oriented to person, place, and time.      Motor: No weakness.   Psychiatric:         Mood and Affect: Mood normal.         Behavior: Behavior normal.          DIAGNOSTIC RESULTS     LABS:  Labs Reviewed   CBC WITH AUTO DIFFERENTIAL - Abnormal       Result Value    WBC 4.1 (*)     nRBC 10.8 (*)     RBC 2.48 (*)     Hemoglobin 8.2 (*)     Hematocrit 23.9 (*)     MCV 96      MCH 33.1      MCHC 34.3      RDW 14.3      Platelets 86 (*)     Immature Granulocytes %, Automated 4.9 (*)     Immature Granulocytes  Absolute, Automated 0.20     COMPREHENSIVE METABOLIC PANEL - Abnormal    Glucose 120 (*)     Sodium 139      Potassium 3.5      Chloride 104      Bicarbonate 27      Anion Gap 12      Urea Nitrogen 8      Creatinine 1.02      eGFR 79      Calcium 9.9      Albumin 4.7      Alkaline Phosphatase 74      Total Protein 7.4      AST 44 (*)     Bilirubin, Total 3.2 (*)     ALT 41     BILIRUBIN, DIRECT - Abnormal    Bilirubin, Direct 0.4 (*)    MANUAL DIFFERENTIAL - Abnormal    Neutrophils %, Manual 55.0      Bands %, Manual 2.0      Lymphocytes %, Manual 32.0      Monocytes %, Manual 1.0      Eosinophils %, Manual 1.0      Basophils %, Manual 1.0      Atypical Lymphocytes %, Manual 8.0      Seg Neutrophils Absolute, Manual 2.26      Bands Absolute, Manual 0.08      Lymphocytes Absolute, Manual 1.31      Monocytes Absolute, Manual 0.04 (*)     Eosinophils Absolute, Manual 0.04      Basophils Absolute, Manual 0.04      Atypical Lymphs Absolute, Manual 0.33      Total Cells Counted 100      Neutrophils Absolute, Manual 2.34      RBC Morphology See Below      Polychromasia Mild      Giant Platelets Few     HUMAN CHORIONIC GONADOTROPIN, SERUM QUANTITATIVE - Normal    HCG, Beta-Quantitative <2      Narrative:      Total HCG measurement is performed using the [employer]   Immunoassay which detects intact HCG and free beta HCG subunit.    This test is not indicated for use as a tumor marker.   HCG testing is performed using a different test methodology at [facility] than other [facility]. Direct result comparison   should only be made within the same method.       LIPASE - Normal    Lipase 18      Narrative:     Venipuncture immediately after or during the administration of Metamizole may lead to falsely low results. Testing should be performed immediately prior to Metamizole dosing.   MONONUCLEOSIS SCREEN (HETEROPHILE ANTIBODY) - Normal    Mononucleosis Screen Negative     TYPE AND SCREEN    ABO  TYPE O      Rh TYPE POS      ANTIBODY SCREEN NEG     HEPATITIS PANEL, ACUTE       All other labs were within normal range or not returned as of this dictation.    Imaging  CT abdomen pelvis w IV contrast   Final Result   1. No acute inflammatory process or bowel obstruction.   2. Small gallstone.   3. Mild splenomegaly.   Signed by Reymundo Majano MD           Procedures  Procedures     EMERGENCY DEPARTMENT COURSE/MDM:   Medical Decision Making  Ilda Peter is an 23 y.o. female with history including recent pregnancy presenting to the emergency department for weight loss, nausea, vomiting, scleral icterus.  On initial exam patient does appear to be unwell and mildly tachycardic.  Patient given fluids which improved HR.    Patient's lab workup for jaundice shows an elevated total bilirubin at 3.2 more unconjugated.  She is got slight bump in AST and ALT as well.  Patient has a new anemia compared to 5 months ago.  Patient has no leukocytosis or in the likelihood of a leukemia or lymphoma.  CT imaging ordered to make sure patient does not have any evidence of cancer on scan.  Lipase was negative for acute pancreatitis but cannot rule out chronic pancreatitis at this time.  Patient CT imaging reviewed there is evidence of mild splenomegaly and a small gallstone patient could have an obstructive process that cannot be seen on CT and would need a MRCP or HIDA scan.  In addition patient could have hemolysis causing her elevated bilirubin levels.  Hepatitis panel was added onto her workup discussed case with admitting doctor Joellen who will follow-up on any hemolytic workup.        Diagnoses as of 07/02/24 0012   Elevated bilirubin   Anemia, unspecified type        External records reviewed: recent inpatient, clinic, and prior ED notes  Labs and Diagnostic imaging independently reviewed/interpreted by me.    Patient plan, care, lab results and imaging were all discussed with attending.    ED Medications administered this  visit:    Medications   lactated Ringer's bolus 1,000 mL (0 mL intravenous Stopped 7/1/24 2057)   iohexol (OMNIPaque) 350 mg iodine/mL solution 75 mL (75 mL intravenous Given 7/1/24 2039)     New Prescriptions from this visit:    New Prescriptions    No medications on file       (Please note that portions of this note were completed with a voice recognition program.  Efforts were made to edit the dictations but occasionally words are mis-transcribed.)     Jennifer Ruelas DO  Resident  07/02/24 0012

## 2024-07-02 NOTE — H&P
History Of Present Illness  Ilda Peter is a 23 y.o. female presenting with acute on chronic nausea/vomiting. H/o  with most recent delivery 2024,  with antepartum anemia otherwise uncomplicated. Otherwise notable for history of anxiety, cannabis use. Symptoms largely present post-partum starting around 2024, about a week after starting depo-provera. She would wake up and have emesis, with inability to tolerate PO secondary to postprandial ache in upper abdomen--when she did attempt to eat, she would typically vomit partially digested contents about 2 hours later. A/w lethargy, headache. This became profoundly worse after her second depo placement 2024, with persistent vaginal bleeding and spread of the pain to lower back. As a result, she has had around 15lbs of weight loss as noted at her PCP visit prior to arrival--at that time she was also noted to be very jaundiced and sent here. Denies CP, SOB, dysuria, melena, hematemesis, leg swelling, vision changes, recent exposures/travel., diarrhea, other medication changes. Pt does admit to cannabis use.    In the ED, noted to be tachycardic/hypertensive. Exam notable for pallor and scleral icterus, ill-appearing. Labs showing pancytopenia, elevated nRBCs/immature granulocytes, mild AST/ALT elevation, marked bilirubinemia both total/direct. Mono screen negative. Imaging showed small gallstone and splenomegaly. Pt given 1L LR, admitted for further evaluation of acute on chronic PO intolerance, pancytopenia, and hyperbilirubinemia.     Past Medical History  She has a past medical history of Cannabis dependence, uncomplicated (Multi) and Depression.    Surgical History  She has a past surgical history that includes [place] tooth extraction.     Social History  She reports that she has never smoked. She has never used smokeless tobacco. She reports that she does not currently use alcohol. She reports that she does not currently use drugs after having used the  following drugs: Marijuana.    Family History  Family History   Problem Relation Name Age of Onset    No Known Problems Mother      No Known Problems Father      No Known Problems Brother      Other (Bicuspid Aortic Valve; VSD) Son          Allergies  Codeine    Review of Systems   Constitutional:  Positive for appetite change, fatigue and unexpected weight change. Negative for chills and fever.   HENT:  Negative for ear pain, hearing loss, sore throat and voice change.    Eyes:  Negative for visual disturbance.   Respiratory:  Negative for shortness of breath and wheezing.    Cardiovascular:  Negative for chest pain and palpitations.   Gastrointestinal:  Positive for abdominal pain, nausea and vomiting. Negative for abdominal distention, blood in stool and diarrhea.   Endocrine: Negative for polyphagia and polyuria.   Genitourinary:  Positive for vaginal bleeding. Negative for dysuria.   Musculoskeletal:  Negative for neck pain.   Skin:  Positive for color change and pallor. Negative for rash.   Neurological:  Positive for weakness and headaches. Negative for dizziness, syncope, speech difficulty and light-headedness.   Psychiatric/Behavioral:  Negative for confusion, dysphoric mood and suicidal ideas.    All other systems reviewed and are negative.       Physical Exam  Vitals reviewed.   Constitutional:       General: She is awake. She is not in acute distress.     Appearance: Normal appearance. She is not toxic-appearing.   HENT:      Head: Normocephalic and atraumatic.      Right Ear: External ear normal.      Left Ear: External ear normal.      Nose: Nose normal.      Mouth/Throat:      Mouth: Mucous membranes are moist.      Pharynx: Oropharynx is clear.   Eyes:      Extraocular Movements: Extraocular movements intact.   Cardiovascular:      Rate and Rhythm: Normal rate and regular rhythm.   Pulmonary:      Effort: Pulmonary effort is normal. No respiratory distress.   Abdominal:      General: There is no  distension.      Palpations: Abdomen is soft.      Tenderness: There is abdominal tenderness in the epigastric area. There is no guarding.   Musculoskeletal:         General: No signs of injury. Normal range of motion.      Cervical back: Normal range of motion.   Skin:     General: Skin is warm and dry.   Neurological:      General: No focal deficit present.      Mental Status: She is alert and oriented to person, place, and time. Mental status is at baseline.   Psychiatric:         Mood and Affect: Mood normal.         Behavior: Behavior normal. Behavior is cooperative.          Last Recorded Vitals  /65   Pulse 84   Temp 36.5 °C (97.7 °F) (Tympanic)   Resp 18   Wt 62.1 kg (137 lb)   SpO2 100%     Relevant Results        No current facility-administered medications on file prior to encounter.     Current Outpatient Medications on File Prior to Encounter   Medication Sig Dispense Refill    medroxyPROGESTERone 150 mg/mL injection Inject 1 mL (150 mg) into the muscle every 12 weeks.      prenatal no.139-iron-folic-dha 33 mg iron- 800 mcg-350 mg combo pack Take by mouth.      sertraline (Zoloft) 50 mg tablet Take 1 tablet (50 mg) by mouth once daily. 90 tablet 2    [DISCONTINUED] ferrous sulfate 325 (65 Fe) MG tablet Take 1 tablet (65 mg of iron) by mouth once daily with a meal. (Patient not taking: Reported on 7/1/2024) 30 tablet 2     CT abdomen pelvis w IV contrast    Result Date: 7/1/2024  STUDY: CT Abdomen and Pelvis with IV Contrast; 7/1/2024 8:49 PM. INDICATION: New onset of jaundice.  Post prandial epigastric pain and nausea / vomiting. COMPARISON: None. ACCESSION NUMBER(S): [id number] ORDERING CLINICIAN: CAROLA OQUENDO TECHNIQUE: CT of the abdomen and pelvis was performed.  Contiguous axial images were obtained at 3 mm slice thickness through the abdomen and pelvis. Coronal and sagittal reconstructions at 3 mm slice thickness were performed.  Omnipaque 350 75 mL was administered intravenously.   "FINDINGS: LOWER CHEST: No cardiomegaly.  No pericardial effusion.  Lung bases are clear.  ABDOMEN:  LIVER: No hepatomegaly.  Smooth surface contour.  Normal attenuation.  BILE DUCTS: No intrahepatic bile duct dilatation identified.  GALLBLADDER: Gallbladder present.  Small gallstone seen.  STOMACH: No abnormalities identified.  PANCREAS: No masses or ductal dilatation.  SPLEEN: There is mild splenomegaly.  ADRENAL GLANDS: No thickening or nodules.  KIDNEYS AND URETERS: Kidneys are normal in size and location.  Small right renal cysts present.  Right left renal calculus 1 centimeter in size.  PELVIS:  BLADDER: No abnormalities identified.  REPRODUCTIVE ORGANS: No abnormalities identified.  BOWEL: No abnormalities identified.   Appendix appears normal.  VESSELS: No abnormalities identified.  Abdominal aorta is normal in caliber.  PERITONEUM/RETROPERITONEUM/LYMPH NODES: No free fluid.  No pneumoperitoneum. No lymphadenopathy.  ABDOMINAL WALL: No abnormalities identified. SOFT TISSUES: No abnormalities identified.  BONES: No acute fracture or aggressive osseous lesion.    1. No acute inflammatory process or bowel obstruction. 2. Small gallstone. 3. Mild splenomegaly. Signed by Reymundo Majano MD        Results from last 7 days   Lab Units 07/01/24  1830   WBC AUTO x10*3/uL 4.1*   RBC AUTO x10*6/uL 2.48*   HEMOGLOBIN g/dL 8.2*   HEMATOCRIT % 23.9*   MCV fL 96   PLATELETS AUTO x10*3/uL 86*           No lab exists for component: \"B12\"      Results from last 7 days   Lab Units 07/01/24  1830   IG PCT AUTO % 4.9*   BASOS ABS MAN x10*3/uL 0.04   EOS ABS MAN x10*3/uL 0.04   MONO ABS MAN x10*3/uL 0.04*         Results from last 7 days   Lab Units 07/01/24  1830   SODIUM mmol/L 139   POTASSIUM mmol/L 3.5   CHLORIDE mmol/L 104   CO2 mmol/L 27   BUN mg/dL 8   CREATININE mg/dL 1.02   CALCIUM mg/dL 9.9   BILIRUBIN TOTAL mg/dL 3.2*   ALT U/L 41   AST U/L 44*     Results from last 7 days   Lab Units 07/01/24  1801   POC COLOR, URINE " ME  Red-brown*   POC APPEARANCE, URINE  Clear   POC PH, URINE ME PH 6.0   POC SPECIFIC GRAVITY, URINE ME  1.025   POC PROTEIN, URINE ME mg/dl 100 (2+)*   POC BLOOD, URINE ME  LARGE (3+)*   POC NITRITE, URINE ME  NEGATIVE              Assessment/Plan   Principal Problem:    Intractable nausea and vomiting  Active Problems:    Anxiety    Abdominal pain    Jaundice    Elevated bilirubin    Hyperbilirubinemia    Unintentional weight loss    Pancytopenia (Multi)    Vaginal bleeding, abnormal    23/F presenting with acute on chronic nausea/vomiting with acute weight loss and scleral icterus admitted for evaluation of pancytopenia and hyperbilirubinemia.    Intractable NV: Unclear etiology, temporally related to administration of depo-provera. Imaging shows splenomegaly and single gallstone but not clearly obstructive, no wall thickening but profound hyperbili. Mono negative. Mild transaminitis.  - Follow hepatitis panel  - Symptom control with PRN zofran  - s/p 1L NS with resolution of tachycardia, continue mIVF with LR @ 100ml/hr  - While history not entirely c/w cannabis hyperemesis, will hold in differential and obtain UDS.  - Appreciate GI recs re: utility of MRCP/ERCP and evaluation of GI tract  - Given timing, could have utility in discussion with OB/Gyn re: contribution from depo-provera.    Pancytopenia: Likely related to acute prolonged vaginal bleeding, with splenomegaly suggestive of blood product sequestration. Elevated immature grans/nRBCs. Will complete evaluation with hemolysis/consumptive studies.  - AM CBCD  - Follow retics, LDH/haptoglobin, coag screen, fibrinogen, TOMASZ.    Anxiety, cannabis use: Home meds resumed as indicated       DVT PPX: low risk  Nutrition: CLD as tolerated; appreciate nutrition recs    Saurav Cuenca, DO  /Peds  [facility]

## 2024-07-02 NOTE — CONSULTS
Reason For Consult  pancytopenia    History Of Present Illness  Ilda Peter is a 23 y.o. female presenting with nausea, vomiting, rapidly declining blood counts.     Past Medical History  She has a past medical history of Cannabis dependence (Multi) (07/02/2024), Cannabis dependence, uncomplicated (Multi), Chronic atrophic rhinitis (07/02/2024), Depression, Tinea versicolor (07/02/2024), and Tinea versicolor (07/02/2024).    Surgical History  She has a past surgical history that includes [place] tooth extraction.     Social History  She reports that she has never smoked. She has never used smokeless tobacco. She reports that she does not currently use alcohol. She reports that she does not currently use drugs after having used the following drugs: Marijuana.    Family History  Family History   Problem Relation Name Age of Onset    No Known Problems Mother      No Known Problems Father      No Known Problems Brother      Other (Bicuspid Aortic Valve; VSD) Son          Allergies  Codeine    Review of Systems  Review of Systems   Constitutional:  Positive for fatigue.   HENT: Negative.     Eyes: Negative.    Respiratory: Negative.     Cardiovascular: Negative.    Gastrointestinal:  Positive for nausea and vomiting.   Endocrine: Negative.    Genitourinary: Negative.    Musculoskeletal: Negative.    Skin:  Positive for pallor.   Allergic/Immunologic: Negative.    Neurological: Negative.    Hematological: Negative.    Psychiatric/Behavioral: Negative.           Physical Exam  Physical Exam  Vitals reviewed.   Constitutional:       Appearance: Normal appearance.   HENT:      Head: Normocephalic.      Mouth/Throat:      Mouth: Mucous membranes are moist.   Eyes:      Extraocular Movements: Extraocular movements intact.      Pupils: Pupils are equal, round, and reactive to light.   Cardiovascular:      Rate and Rhythm: Normal rate and regular rhythm.      Heart sounds: Normal heart sounds.   Pulmonary:      Breath sounds:  "Normal breath sounds.   Abdominal:      General: Bowel sounds are normal.      Palpations: Abdomen is soft.   Musculoskeletal:         General: Normal range of motion.      Cervical back: Normal range of motion and neck supple.   Skin:     General: Skin is warm.   Neurological:      General: No focal deficit present.      Mental Status: She is alert and oriented to person, place, and time.   Psychiatric:         Mood and Affect: Mood normal.         Behavior: Behavior normal.           Last Recorded Vitals  Blood pressure 123/76, pulse 92, temperature 36.5 °C (97.7 °F), temperature source Temporal, resp. rate 18, height 1.651 m (5' 5\"), weight 63.2 kg (139 lb 4.8 oz), SpO2 100%, unknown if currently breastfeeding.    Relevant Results  5/4/2020 wbc 10.3, hgb 13.3, plt 199,000  7/21/2020 wbc 13.9, hgb 12.2, plt 169,000  6/7/2021 wbc 5.6, hgb 15.1, plt 218,000  6/20/2023 wbc 7.2, hgb 14.4, plt 184,000  10/18/2023 wbc 5.8, hgb 10.2, , plt 99,000  1/5/2024 wbc 7.0, hgb 10.4, , plt 115,000    7/1/2024 wbc 4.1, hgb 8.2, plt 86,000, creatinine 1.02, AST 44, total bili 3.2, ALT 41, direct bili 0.4, , haptoglobin <10  7/2/2024 wbc 2.7, hgb 6.7, plt 55,000, direct aydee negative, retic 1.5%, total bili 2.8  -pathologist noted few immature cells suspicious for blasts  -uric acid 7.1, fibrinogen 363  7/2/2024 wbc 3.2, hgb 7.2, plt 72,000     Assessment/Plan   1) pancytopenia  -2nd child delivered in January (baby boy)  -thrombocytopenia first noted in 10/2023  -now with rapidly evolving pancytopenia  -reviewed smear-no schistocytes seen; but there are numerous blasts seen as well as nucleated reds highly suggestive of infiltrative marrow process, such as leukemia; this was verified by pathologist on call earlier today  -LDH high, indirect bili high; retic normal (inappropriate for her degree of anemia)  -to be transfused 1 unit PRBC  -advise transfer to [facility] ([facility] to expedite workup    2) " anxiety  -on zoloft    3) vomiting  -intermittent, started 2/2024 a week after starting depo-provera  -CT abdomen showed no acute inflammatory process of bowel obstruction, mild splenomegaly  -MRCP showed no choledocholithiasis or biliary obstruction; splenomegaly      I spent 80 minutes in the professional and overall care of this patient.      Pradeep Knight MD

## 2024-07-02 NOTE — PROGRESS NOTES
"Nutrition Initial Assessment:   Nutrition Assessment    Reason for Assessment: Admission nursing screening (MD - education)    Patient is a 23 y.o. female presenting from home with family for intractable nausea and vomiting. Pt w/MRCP today. Per chart note, symptoms started after contraceptive shot (2/24) and worsened after 2nd shot (5/24).     Past Medical History: Anxiety   has a past medical history of Cannabis dependence (Multi) (07/02/2024), Cannabis dependence, uncomplicated (Multi), Chronic atrophic rhinitis (07/02/2024), Depression, Tinea versicolor (07/02/2024), and Tinea versicolor (07/02/2024).  Surgical History   has a past surgical history that includes [place] tooth extraction.    Nutrition History:  Energy Intake: Poor < 50 %  Food and Nutrient History: Pt reports symptoms have been going on x 4 months. Pt is not nursing infant. Pt endorses tolerance of diet has worsened the past 1.5 months. Pt drank water and Pedialyte (Gatorade wasn't tolerated). Pt would have good and bad days and ate toast as tolerated as well.  Vitamin/Herbal Supplement Use: not regular protein drink intake, but has tried in the past.  Food Allergies/Intolerances:  None  GI Symptoms: Nausea, Vomiting, and Abdominal pain  Oral Problems: None       Anthropometrics:  Height: 165.1 cm (5' 5\")   Weight: 63.2 kg (139 lb 4.8 oz)   BMI (Calculated): 23.18             Weight History: Pt w/birth of 1st child 7/22/20 and 2nd child 1/24.  Wt Readings from Last 22 Encounters:   07/02/24 63.2 kg (139 lb 4.8 oz)   07/01/24 62.1 kg (137 lb)   05/10/24 69.1 kg (152 lb 4 oz)   02/22/24 74.9 kg (165 lb 2 oz)   01/02/24 79.6 kg (175 lb 8 oz)   12/26/23 79.1 kg (174 lb 6 oz)   12/20/23 78.5 kg (173 lb)   12/04/23 77.5 kg (170 lb 12.8 oz)   11/20/23 76.2 kg (168 lb)   11/02/23 75.4 kg (166 lb 2 oz)   10/18/23 74.6 kg (164 lb 6 oz)   09/29/23 72.6 kg (160 lb)   08/29/23 68.9 kg (152 lb)   07/25/23 65.5 kg (144 lb 7 oz)   06/20/23 66.7 kg (147 lb) " "  05/31/23 65.4 kg (144 lb 4 oz)   09/28/22 63 kg (139 lb)   07/27/22 59.9 kg (132 lb)   09/27/21 58.1 kg (128 lb)   07/19/21 56 kg (123 lb 6.4 oz)   06/07/21 54.4 kg (120 lb)   09/25/20 60.8 kg (134 lb) (61%, Z= 0.27)*     * Growth percentiles are based on [facility] (Girls, 2-20 Years) data.       Weight Change %:  Weight History / % Weight Change: Per archives 6/7/21 120, 7/27/22 132#, 8/18/23 (during pregnancy), 12/4/23 170# (during pregnancy). Per pt 130# UBW (pre-pregnancy). Pt concerned w/rapid wt loss since early May. 8.5% wt loss x <3 months.  Significant Weight Loss: Yes  Interpretation of Weight Loss: >7.5% in 3 months    Nutrition Focused Physical Exam Findings:    Subcutaneous Fat Loss:   Orbital Fat Pads: Well nourished (slightly bulging fat pads)  Buccal Fat Pads: Well nourished (full, rounded cheeks)  Triceps: Well nourished (ample fat tissue)  Muscle Wasting:  Temporalis: Well nourished (well-defined muscle)  Pectoralis (Clavicular Region): Mild-Moderate (some protrusion of clavicle)  Quadriceps: Well nourished (well developed, well rounded)  Gastrocnemius: Well nourished (well developed bulbous muscle)  Edema:  Edema: none  Physical Findings:  Skin:  (jaundice)    Nutrition Significant Labs:  CBC Trend:   Results from last 7 days   Lab Units 07/02/24  0516 07/01/24  1830   WBC AUTO x10*3/uL 2.7* 4.1*   RBC AUTO x10*6/uL 1.97* 2.48*   HEMOGLOBIN g/dL 6.7* 8.2*   HEMATOCRIT % 18.9* 23.9*   MCV fL 96 96   PLATELETS AUTO x10*3/uL 55* 86*    , BMP Trend:   Results from last 7 days   Lab Units 07/02/24  0516 07/01/24  1830   GLUCOSE mg/dL 107* 120*   CALCIUM mg/dL 9.0 9.9   SODIUM mmol/L 139 139   POTASSIUM mmol/L 3.3* 3.5   CO2 mmol/L 26 27   CHLORIDE mmol/L 104 104   BUN mg/dL 7 8   CREATININE mg/dL 0.84 1.02    , A1C:No results found for: \"HGBA1C\", BG POCT trend:    , Liver Function Trend:   Results from last 7 days   Lab Units 07/02/24  0516 07/01/24  1830   ALK PHOS U/L 63 74   AST U/L 31 44*   ALT U/L 31 " "41   BILIRUBIN TOTAL mg/dL 2.8* 3.2*    , Renal Lab Trend:   Results from last 7 days   Lab Units 07/02/24  0516 07/01/24  1830   POTASSIUM mmol/L 3.3* 3.5   SODIUM mmol/L 139 139   MAGNESIUM mg/dL 1.81  --    EGFR mL/min/1.73m*2 >90 79   BUN mg/dL 7 8   CREATININE mg/dL 0.84 1.02    , TPN/PPN Labs:   Results from last 7 days   Lab Units 07/02/24  0516 07/01/24  1830   GLUCOSE mg/dL 107* 120*   POTASSIUM mmol/L 3.3* 3.5   MAGNESIUM mg/dL 1.81  --    SODIUM mmol/L 139 139   CHLORIDE mmol/L 104 104   ALT U/L 31 41   AST U/L 31 44*   ALK PHOS U/L 63 74   BILIRUBIN TOTAL mg/dL 2.8* 3.2*    , Lipid Panel: No results found for: \"CHOL\", \"HDL\", \"CHHDL\", \"LDLF\", \"VLDL\", \"TRIG\" , Vit D:   Lab Results   Component Value Date    VITD25 30 06/07/2021    , Vit B12: No results found for: \"IICAMKMC53\" , Iron Panel: No results found for: \"IRON\", \"TIBC\", \"FERRITIN\" , Folate: No results found for: \"FOLATE\"     Nutrition Specific Medications:  Scheduled medications     Continuous medications     PRN medications  PRN medications: acetaminophen **OR** acetaminophen **OR** acetaminophen, acetaminophen **OR** acetaminophen **OR** acetaminophen, ondansetron ODT **OR** ondansetron  0-10 (Numeric) Pain Score: 0 - No pain     I/O:   Last BM Date: 07/02/24; Stool Appearance: Loose (07/02/24 0100)    Dietary Orders (From admission, onward)       Start     Ordered    07/02/24 1356  Oral nutritional supplements  Until discontinued        Question Answer Comment   Deliver with All meals    Select supplement: Ensure Clear        07/02/24 1356    07/02/24 0053  Adult diet Clear Liquid  Diet effective now        Question:  Diet type  Answer:  Clear Liquid    07/02/24 0052                     Estimated Needs:   Total Energy Estimated Needs (kCal):  (1575-1900kcal/d (25-30kcal/kg))     Total Protein Estimated Needs (g):  (63-82g/d (1.0-1.3g/kg))     Total Fluid Estimated Needs (mL):  (1mL/kcal)           Nutrition Diagnosis   Malnutrition " "Diagnosis  Patient has Malnutrition Diagnosis: Yes  Diagnosis Status: New  Malnutrition Diagnosis: Moderate malnutrition related to acute disease or injury  As Evidenced by: unintentional wt loss of 8.5% x <3 months and pt consuming <75% of estimated energy needs x 2 months.    Nutrition Diagnosis  Patient has Nutrition Diagnosis: Yes  Diagnosis Status (1): New  Nutrition Diagnosis 1: Altered GI function  Related to (1): unknown etiology - MRCP results pending  As Evidenced by (1): intractable nausea, vomiting and abd pain.       Nutrition Interventions/Recommendations         Nutrition Prescription:  Individualized Nutrition Prescription Provided for : Diet: advance diet as medically appropriate/tolerated to goal of Low Fiber.        Nutrition Interventions:   Interventions: Medical food supplement  Meals and Snacks: Fiber-modified diet  Goal: will consume >50% of meals.  Medical Food Supplement: Commercial beverage  Goal: will provide Ensure Clear (240kcal, 8g pro per 8oz serving) TID w/meals.    Collaboration and Referral of Nutrition Care: Collaboration by nutrition professional with other providers  Goal: JONATHON Perdue    Nutrition Education: further education pending MRCP results.  Education Documentation  Nutrition Care Manual, taught by Nessa Mattson RD at 7/2/2024  2:12 PM.  Learner: Significant Other, Patient  Readiness: Acceptance  Method: Explanation, Handout  Response: Verbalizes Understanding  Comment: AND [place] [facility] [place]a and [place]omiting Nutrition [place]\". Further education to be provided based on pending MRCP results. Provided RD contact info and ONS discussion.         Nutrition Monitoring and Evaluation   Food/Nutrient Related History Monitoring  Monitoring and Evaluation Plan: Energy intake  Energy Intake: Estimated energy intake  Criteria: diet will advance beyond clear liquids in the next 72 hours.    Body Composition/Growth/Weight History  Monitoring and Evaluation Plan: " Weight  Weight: Measured weight  Criteria: obtain daily wts and maintain stable wt    Biochemical Data, Medical Tests and Procedures  Monitoring and Evaluation Plan: Electrolyte/renal panel, Glucose/endocrine profile  Electrolyte and Renal Panel: Sodium, Potassium, Phosphorus, Magnesium, Creatinine, BUN  Criteria: WNR  Glucose/Endocrine Profile: Glucose, casual  Criteria: BG 70-180mg/dL    Nutrition Focused Physical Findings  Monitoring and Evaluation Plan: Digestive System  Digestive System: Nausea, Vomiting  Criteria: will resolve GI symptoms.     Time Spent (min): 60 minutes  Last Date of nutrition visit: 7/2/24  Nutrition follow up needed: 3-8 days

## 2024-07-02 NOTE — PROGRESS NOTES
Ilda Peter is a 23 y.o. female on day 0 of admission presenting with Intractable nausea and vomiting.      Subjective   Seen and examined at bedside.  Has had one episode of emesis since admission, tolerated CLD.  Denies fever, chills, night sweats or rash.       Objective     Last Recorded Vitals  /72 (BP Location: Right arm, Patient Position: Lying)   Pulse 74   Temp 36.3 °C (97.3 °F) (Temporal)   Resp 19   Wt 63.2 kg (139 lb 4.8 oz)   SpO2 100%   Intake/Output last 3 Shifts:    Intake/Output Summary (Last 24 hours) at 7/2/2024 1533  Last data filed at 7/2/2024 1100  Gross per 24 hour   Intake 540 ml   Output 400 ml   Net 140 ml       Admission Weight  Weight: 62.1 kg (137 lb) (07/01/24 1825)    Daily Weight  07/02/24 : 63.2 kg (139 lb 4.8 oz)    Image Results  MRCP pancreas w and wo IV contrast  Narrative: Interpreted By:  Joel Wallace,   STUDY:  MRCP PANCREAS W AND WO IV CONTRAST;  7/2/2024 11:09 am      INDICATION:  Signs/Symptoms:elevated Bilirubin, N/V.      COMPARISON:  CT yesterday      ACCESSION NUMBER(S):  [id number]      ORDERING CLINICIAN:  JOSÉ MIGUEL BARROS      TECHNIQUE:  MRI PANCREAS; Multiplanar magnetic resonance images of the abdomen  were obtained including the following sequences; T2-weighted SSFSE  with and without fat saturation, T1-weighted GRE in/opposed phase,  DWI, fat saturated 3D-T1w GRE pre and dynamically post contrast.  Radial thick slab T2w RARE MRCP and coronally reconstructed navigator  gated high resolution 3-D T2w RESTORE MRCP with MIP reconstruction  were also performed for MRCP.  13 mL of Dotarem was administered  intravenously without immediate complication.      FINDINGS:  Motion degraded exam.      LIVER:  No signal changes of steatosis. Tiny flash fill hemangioma in segment  VII.      BILE DUCTS:  Bile ducts are diminutive. Common bile duct measures 2 mm. No  stricture, filling defect, or extrinsic compression identified.      GALLBLADDER:  No visualized stone or  wall thickening.      PANCREAS:  Grossly unremarkable. The main duct is diminutive which makes  characterization of ductal anatomy difficult.      SPLEEN:  Enlarged measuring 14.6 cm in length.      ADRENAL GLANDS:  Unremarkable.      KIDNEYS:  A few small cortical cysts. Large cortical calcification in the right  upper pole seen on CT is not well characterized on MR. Otherwise  unremarkable kidneys without hydronephrosis.      LYMPH NODES:  No lymphadenopathy.      ABDOMINAL VESSELS:  Abdominal aorta is patent without aneurysm. The major visceral  arterial branches are patent. IVC and visualized major branches are  patent. Major portal venous branches are patent.      BOWEL:  No dilated bowel is visualized.      PERITONEUM/RETROPERITONEUM:  No visualized free fluid.      BONES AND LOWER THORAX:  No abnormal marrow signal. Grossly clear lung bases.          Impression: 1.  No choledocholithiasis or biliary obstruction.  2. The suspected tiny gallstone identified on CT is not apparent on  MRI.  3. No acute findings of the abdomen identified on motion degraded  exam.  4. Splenomegaly.          MACRO:  None      Signed by: Joel Wallace 7/2/2024 12:08 PM  Dictation workstation:   [id number]  ECG 12 lead  Sinus tachycardia  Possible Left atrial enlargement  Borderline ECG  No previous ECGs available      Physical Exam  Constitutional: A&Ox4, NAD, resting comfortable   Head and Face: Atraumatic, normocephalic   Eyes: Normal external exam, EOMI, no scleral icterus noted  ENT: Normal external inspection of ears and nose. Oropharynx normal.  Cardiovascular: RRR, S1/S2, no murmurs, rubs, or gallops, radial pulses +2  Pulmonary: CTAB, no respiratory distress, no wheezing, rales or rhonchi, on RA  Abdomen: +BS, soft, Mild TTP, nondistended, no guarding rigidity or rebound tenderness, no masses noted  MSK: Negative for edema, No joint swelling, normal movements of all extremities.   Neuro: No focal deficits, normal motor  function, normal sensation, follows all commands  Skin- No lesions, contusions, or erythema. Pallor noted  Psychiatric: Judgment intact. Appropriate mood, affect and behavior   Relevant Results    Results for orders placed or performed during the hospital encounter of 07/01/24 (from the past 24 hour(s))   CBC with Differential   Result Value Ref Range    WBC 4.1 (L) 4.4 - 11.3 x10*3/uL    nRBC 10.8 (H) 0.0 - 0.0 /100 WBCs    RBC 2.48 (L) 4.00 - 5.20 x10*6/uL    Hemoglobin 8.2 (L) 12.0 - 16.0 g/dL    Hematocrit 23.9 (L) 36.0 - 46.0 %    MCV 96 80 - 100 fL    MCH 33.1 26.0 - 34.0 pg    MCHC 34.3 32.0 - 36.0 g/dL    RDW 14.3 11.5 - 14.5 %    Platelets 86 (L) 150 - 450 x10*3/uL    Immature Granulocytes %, Automated 4.9 (H) 0.0 - 0.9 %    Immature Granulocytes Absolute, Automated 0.20 0.00 - 0.70 x10*3/uL   Comprehensive Metabolic Panel   Result Value Ref Range    Glucose 120 (H) 74 - 99 mg/dL    Sodium 139 136 - 145 mmol/L    Potassium 3.5 3.5 - 5.3 mmol/L    Chloride 104 98 - 107 mmol/L    Bicarbonate 27 21 - 32 mmol/L    Anion Gap 12 10 - 20 mmol/L    Urea Nitrogen 8 6 - 23 mg/dL    Creatinine 1.02 0.50 - 1.05 mg/dL    eGFR 79 >60 mL/min/1.73m*2    Calcium 9.9 8.6 - 10.3 mg/dL    Albumin 4.7 3.4 - 5.0 g/dL    Alkaline Phosphatase 74 33 - 110 U/L    Total Protein 7.4 6.4 - 8.2 g/dL    AST 44 (H) 9 - 39 U/L    Bilirubin, Total 3.2 (H) 0.0 - 1.2 mg/dL    ALT 41 7 - 45 U/L   HCG, Serum Quantitative   Result Value Ref Range    HCG, Beta-Quantitative <2 <5 mIU/mL   Type and Screen   Result Value Ref Range    ABO TYPE O     Rh TYPE POS     ANTIBODY SCREEN NEG    Lipase   Result Value Ref Range    Lipase 18 9 - 82 U/L   Bilirubin, Direct   Result Value Ref Range    Bilirubin, Direct 0.4 (H) 0.0 - 0.3 mg/dL   Manual Differential   Result Value Ref Range    Neutrophils %, Manual 55.0 40.0 - 80.0 %    Bands %, Manual 2.0 0.0 - 5.0 %    Lymphocytes %, Manual 32.0 13.0 - 44.0 %    Monocytes %, Manual 1.0 2.0 - 10.0 %     Eosinophils %, Manual 1.0 0.0 - 6.0 %    Basophils %, Manual 1.0 0.0 - 2.0 %    Atypical Lymphocytes %, Manual 8.0 0.0 - 2.0 %    Seg Neutrophils Absolute, Manual 2.26 1.20 - 7.00 x10*3/uL    Bands Absolute, Manual 0.08 0.00 - 0.70 x10*3/uL    Lymphocytes Absolute, Manual 1.31 1.20 - 4.80 x10*3/uL    Monocytes Absolute, Manual 0.04 (L) 0.10 - 1.00 x10*3/uL    Eosinophils Absolute, Manual 0.04 0.00 - 0.70 x10*3/uL    Basophils Absolute, Manual 0.04 0.00 - 0.10 x10*3/uL    Atypical Lymphs Absolute, Manual 0.33 0.00 - 0.50 x10*3/uL    Total Cells Counted 100     Neutrophils Absolute, Manual 2.34 1.20 - 7.70 x10*3/uL    RBC Morphology See Below     Polychromasia Mild     Giant Platelets Few    Mononucleosis screen   Result Value Ref Range    Mononucleosis Screen Negative Negative   Lactate dehydrogenase   Result Value Ref Range     (H) 84 - 246 U/L   ECG 12 lead   Result Value Ref Range    Ventricular Rate 114 BPM    Atrial Rate 114 BPM    CO Interval 170 ms    QRS Duration 78 ms    QT Interval 314 ms    QTC Calculation(Bazett) 432 ms    P Axis 74 degrees    R Axis 78 degrees    T Axis 65 degrees    QRS Count 19 beats    Q Onset 222 ms    P Onset 137 ms    P Offset 190 ms    T Offset 379 ms    QTC Fredericia 388 ms   Hepatitis panel, acute   Result Value Ref Range    Hepatitis B Surface AG Nonreactive Nonreactive    Hepatitis A  AB- IgM Nonreactive Nonreactive    Hepatitis B Core AB; IgM Nonreactive Nonreactive    Hepatitis C AB Nonreactive Nonreactive   Drug Screen, Urine   Result Value Ref Range    Amphetamine Screen, Urine Presumptive Negative Presumptive Negative    Barbiturate Screen, Urine Presumptive Negative Presumptive Negative    Benzodiazepines Screen, Urine Presumptive Negative Presumptive Negative    Cannabinoid Screen, Urine Presumptive Positive (A) Presumptive Negative    Cocaine Metabolite Screen, Urine Presumptive Negative Presumptive Negative    Fentanyl Screen, Urine Presumptive Negative  Presumptive Negative    Opiate Screen, Urine Presumptive Negative Presumptive Negative    Oxycodone Screen, Urine Presumptive Negative Presumptive Negative    PCP Screen, Urine Presumptive Negative Presumptive Negative    Methadone Screen, Urine Presumptive Negative Presumptive Negative   CBC and Auto Differential   Result Value Ref Range    WBC 2.7 (L) 4.4 - 11.3 x10*3/uL    nRBC 10.0 (H) 0.0 - 0.0 /100 WBCs    RBC 1.97 (L) 4.00 - 5.20 x10*6/uL    Hemoglobin 6.7 (L) 12.0 - 16.0 g/dL    Hematocrit 18.9 (L) 36.0 - 46.0 %    MCV 96 80 - 100 fL    MCH 34.0 26.0 - 34.0 pg    MCHC 35.4 32.0 - 36.0 g/dL    RDW 14.5 11.5 - 14.5 %    Platelets 55 (L) 150 - 450 x10*3/uL    Immature Granulocytes %, Automated 5.2 (H) 0.0 - 0.9 %    Immature Granulocytes Absolute, Automated 0.14 0.00 - 0.70 x10*3/uL   Direct Antiglobulin Test   Result Value Ref Range    TOMASZ-POLYSPECIFIC NEG    Reticulocytes   Result Value Ref Range    Retic % 1.5 0.5 - 2.0 %    Retic Absolute 0.030 0.018 - 0.083 x10*6/uL    Reticulocyte Hemoglobin 36 28 - 38 pg    Immature Retic fraction 33.2 (H) <=16.0 %   Magnesium   Result Value Ref Range    Magnesium 1.81 1.60 - 2.40 mg/dL   Comprehensive metabolic panel   Result Value Ref Range    Glucose 107 (H) 74 - 99 mg/dL    Sodium 139 136 - 145 mmol/L    Potassium 3.3 (L) 3.5 - 5.3 mmol/L    Chloride 104 98 - 107 mmol/L    Bicarbonate 26 21 - 32 mmol/L    Anion Gap 12 10 - 20 mmol/L    Urea Nitrogen 7 6 - 23 mg/dL    Creatinine 0.84 0.50 - 1.05 mg/dL    eGFR >90 >60 mL/min/1.73m*2    Calcium 9.0 8.6 - 10.3 mg/dL    Albumin 4.0 3.4 - 5.0 g/dL    Alkaline Phosphatase 63 33 - 110 U/L    Total Protein 6.2 (L) 6.4 - 8.2 g/dL    AST 31 9 - 39 U/L    Bilirubin, Total 2.8 (H) 0.0 - 1.2 mg/dL    ALT 31 7 - 45 U/L   Pathologist Review-CBC Differential   Result Value Ref Range    Pathologist Review-CBC Differential       Leukopenia with left shift and few immature cells suspicious for blasts.  Normocytic anemia with increased  nucleated RBC.  Thrombocytopenia.  Recommend flow cytometry and hematology consult for further evaluation.   Manual Differential   Result Value Ref Range    Neutrophils %, Manual 41.0 40.0 - 80.0 %    Lymphocytes %, Manual 45.0 13.0 - 44.0 %    Monocytes %, Manual 3.0 2.0 - 10.0 %    Eosinophils %, Manual 2.0 0.0 - 6.0 %    Basophils %, Manual 0.0 0.0 - 2.0 %    Atypical Lymphocytes %, Manual 7.0 0.0 - 2.0 %    Metamyelocytes %, Manual 1.0 0.0 - 0.0 %    Myelocytes %, Manual 1.0 0.0 - 0.0 %    Seg Neutrophils Absolute, Manual 1.11 (L) 1.20 - 7.00 x10*3/uL    Lymphocytes Absolute, Manual 1.22 1.20 - 4.80 x10*3/uL    Monocytes Absolute, Manual 0.08 (L) 0.10 - 1.00 x10*3/uL    Eosinophils Absolute, Manual 0.05 0.00 - 0.70 x10*3/uL    Basophils Absolute, Manual 0.00 0.00 - 0.10 x10*3/uL    Atypical Lymphs Absolute, Manual 0.19 0.00 - 0.50 x10*3/uL    Metamyelocytes Absolute, Manual 0.03 0.00 - 0.00 x10*3/uL    Myelocytes Absolute, Manual 0.03 0.00 - 0.00 x10*3/uL    Total Cells Counted 100     RBC Morphology No significant RBC morphology present    Coagulation Screen   Result Value Ref Range    Protime 14.0 (H) 9.8 - 12.8 seconds    INR 1.2 (H) 0.9 - 1.1    aPTT 27 27 - 38 seconds   Fibrinogen   Result Value Ref Range    Fibrinogen 363 200 - 400 mg/dL                 Assessment/Plan          Principal Problem:    Intractable nausea and vomiting  Active Problems:    Anxiety    Abdominal pain    Jaundice    Elevated bilirubin    Hyperbilirubinemia    Unintentional weight loss    Pancytopenia (Multi)    Vaginal bleeding, abnormal        Malnutrition Diagnosis Status: New  Malnutrition Diagnosis: Moderate malnutrition related to acute disease or injury  As Evidenced by: unintentional wt loss of 8.5% x <3 months and pt consuming <75% of estimated energy needs x 2 months.  I agree with the dietitian's malnutrition diagnosis.     23/F presenting with acute on chronic nausea/vomiting with acute weight loss and scleral icterus  admitted for evaluation of pancytopenia and hyperbilirubinemia.     Intractable NV: Unclear etiology, temporally related to administration of depo-provera. Imaging shows splenomegaly and single gallstone but not clearly obstructive, no wall thickening but profound hyperbili. Mono negative. Mild transaminitis. She also was positive for marijuana   - Hepatitis panel negative  - Symptom control with PRN zofran  - s/p 2L NS with resolution of tachycardia, continue mIVF with LR @ 100ml/hr  - MRCP: negative for biliary obstruction  - Given timing, could have utility in discussion with OB/Gyn re: contribution from depo-provera.     Pancytopenia: Likely related to acute prolonged vaginal bleeding, with splenomegaly suggestive of blood product sequestration & also had mild splenomegaly  - LDH elevated, immature retic count elevated with normal percentage, manual differential: normal RBC morphology, possible blasts, & leukopenia with left shift  - TOMASZ negative, coag scren WNL, fibrinogen normal  - Will consult Heme/onc and consider transfusion with HgB 6.7, part of decrease of H/H could be dilutional with receiving 2 units pRBCs.     Anxiety, cannabis use: Home meds resumed as indicated  - Resume zoloft    Has multiple hematological abnormalities requiring further evaluation and workup, awaiting heme/onc consult. Complexity High      Gunner Pedro DO

## 2024-07-02 NOTE — CARE PLAN
The patient's goals for the shift include rest, no nausea    The clinical goals for the shift include less nausea, vomitting    Over the shift, the patient did make progress toward the following goals.  Since admission, pt has been dozing most of shift w/o incident.  Declined need for pain or nausea medication.  No new complications.  Safety measures maintained

## 2024-07-02 NOTE — PROGRESS NOTES
07/02/24 1405   Discharge Planning   Living Arrangements Family members   Support Systems Family members   Assistance Needed None   Type of Residence Private residence   Home or Post Acute Services None   Patient expects to be discharged to: Home   Does the patient need discharge transport arranged? No   Financial Resource Strain   How hard is it for you to pay for the very basics like food, housing, medical care, and heating? Not hard   Transportation Needs   In the past 12 months, has lack of transportation kept you from medical appointments or from getting medications? no   In the past 12 months, has lack of transportation kept you from meetings, work, or from getting things needed for daily living? No     Met with patient at the bedside. Confirmed address and contacts. Patient lives at home with family. States she is independent with all care, ADLs, denies issues with transportation. States PCP is Dr Brandee Cross-pamela annually. Denies issues obtaining or affording medications and understands them. Denies any home-going needs at this time.

## 2024-07-02 NOTE — CONSULTS
Reason for consult  Elevated LFT's    HPI  Ilda Peter is a 23 y.o. female presenting with acute on chronic N/V.  PMH anxiety,  cannabis use. Patient reports symptoms starting 1 month postpartum around February shortly after restarting Depo-Provera.  She notes emesis in the a.m. with difficulty tolerating oral intake and developing a dull upper abdominal discomfort after eating.  Emesis occurs within the next 2 hours of attempting to eat and consists of saliva, bile occurring up to 2-6 times daily.  She also reports recent diarrhea episodes a couple of times daily. Symptoms worsened significantly within a week after second Depo-Provera placement 5/2024.  No alleviating measures. She reports a 15 pound weight loss secondary to decreased oral intake.  She was seen by her PCP and was noted to be jaundiced and recommended to come to the ED.  No CP, SOB, hematemesis, constipation, hematochezia or melena.     Patient noted to have elevated bilirubin 3.2, downtrended to 2.8.  Initial direct bilirubin 0.4.  WBC 2.7, normocytic anemia with hemoglobin 8.2 now down trended to 6.7.  INR 1.2.  .  Tox screen positive for cannabinoids.  CT noting no biliary dilation.  Small gallstone noted in gallbladder.  Mild splenomegaly.     PMH  She has a past medical history of Cannabis dependence (Multi) (07/02/2024), Cannabis dependence, uncomplicated (Multi), Chronic atrophic rhinitis (07/02/2024), Depression, Tinea versicolor (07/02/2024), and Tinea versicolor (07/02/2024).    PSH  She has a past surgical history that includes [place] tooth extraction.    Family  Family History   Problem Relation Name Age of Onset    No Known Problems Mother      No Known Problems Father      No Known Problems Brother      Other (Bicuspid Aortic Valve; VSD) Son         Social  She reports that she has never smoked. She has never used smokeless tobacco. She reports that she does not currently use alcohol. She reports that she does not currently use  "drugs after having used the following drugs: Marijuana.      Review of Systems  ROS negative unless stated otherwise in HPI     Objective  /72 (BP Location: Right arm, Patient Position: Lying)   Pulse 74   Temp 36.3 °C (97.3 °F) (Temporal)   Resp 19   Ht 1.651 m (5' 5\")   Wt 63.2 kg (139 lb 4.8 oz)   SpO2 100%   BMI 23.18 kg/m²     Physical Exam  Constitutional: Alert, pleasant and interactive, in NAD  Eyes: PERRL, sclera clear, no conjunctival injection  Skin: Warm and dry, no rash or ecchymosis  ENMT: Mucous membranes moist, no lesions noted  Resp: CTAB, even and unlabored  CV: RRR, normal S1, S2, no m,r,g  GI: +BS, soft, round, NT, no rebound tenderness or guarding, no palpable masses or organomegaly  Extremities: Extremities warm, no edema, contusions, wounds or cyanosis  Neuro: Alert and oriented x3  Psych: Appropriate mood and behavior    Medications  Scheduled medications  lactated Ringer's, 1,000 mL, intravenous, Once      Continuous medications     PRN medications  PRN medications: acetaminophen **OR** acetaminophen **OR** acetaminophen, acetaminophen **OR** acetaminophen **OR** acetaminophen, ondansetron ODT **OR** ondansetron     Labs  Lab Results   Component Value Date    WBC 2.7 (L) 07/02/2024    HGB 6.7 (L) 07/02/2024    HCT 18.9 (L) 07/02/2024    MCV 96 07/02/2024    PLT 55 (L) 07/02/2024     Lab Results   Component Value Date    GLUCOSE 107 (H) 07/02/2024    CALCIUM 9.0 07/02/2024     07/02/2024    K 3.3 (L) 07/02/2024    CO2 26 07/02/2024     07/02/2024    BUN 7 07/02/2024    CREATININE 0.84 07/02/2024     Lab Results   Component Value Date    ALT 31 07/02/2024    AST 31 07/02/2024    ALKPHOS 63 07/02/2024    BILITOT 2.8 (H) 07/02/2024     No results found for: \"IRON\", \"TIBC\", \"FERRITIN\"  Lab Results   Component Value Date    INR 1.2 (H) 07/02/2024    PROTIME 14.0 (H) 07/02/2024       Radiology  MRCP pancreas with and without IV contrast 7/2/2024 noting:  IMPRESSION:  1.  No " choledocholithiasis or biliary obstruction.  2. The suspected tiny gallstone identified on CT is not apparent on  MRI.  3. No acute findings of the abdomen identified on motion degraded  exam.  4. Splenomegaly.    Signed by: Joel Wallace 7/2/2024 12:08 PM  Dictation workstation:   [id number]    Assessment:  Ilda Peter is a 23 y.o. female presenting with N/V, abdominal discomfort.  PMH of Anxiety.  Patient reports that symptoms have progressively worsened after her second dose of Depo-Provera in May.  Bilirubin noted to be 3.2-> 2.8.  .  Normocytic anemia with hemoglobin 8.2-> 6.7.  WBC 2.7.  Acute hepatitis panel and mononucleosis screen negative.  No overt signs of bleeding.  Otherwise remaining liver enzymes WNL.  Imaging noting no biliary dilation or concern for choledocholithiasis.  Mild splenomegaly noted    # N/V  # hyperbilirubinemia  # thrombocytopenia  # hemolysis   # diarrhea  # acute anemia    Plan:  - continue supportive care  - clear liquids as tolerated  - continue to trend liver enzymes daily  - continue analgesics and antiemetics as needed  - agree with heme-onc referral for pancytopenia, hemolysis, splenomegaly    Plan has been discussed with Dr. Mccray. GI will continue to follow.     Pavithra Cordova, LEDY/CNP

## 2024-07-03 ENCOUNTER — DOCUMENTATION (OUTPATIENT)
Dept: HEMATOLOGY/ONCOLOGY | Facility: HOSPITAL | Age: 24
End: 2024-07-03
Payer: COMMERCIAL

## 2024-07-03 ENCOUNTER — APPOINTMENT (OUTPATIENT)
Dept: OTHER | Facility: HOSPITAL | Age: 24
End: 2024-07-03
Payer: COMMERCIAL

## 2024-07-03 ENCOUNTER — TELEPHONE (OUTPATIENT)
Dept: HEMATOLOGY/ONCOLOGY | Facility: HOSPITAL | Age: 24
End: 2024-07-03
Payer: COMMERCIAL

## 2024-07-03 VITALS
BODY MASS INDEX: 23.21 KG/M2 | TEMPERATURE: 96.4 F | DIASTOLIC BLOOD PRESSURE: 78 MMHG | SYSTOLIC BLOOD PRESSURE: 137 MMHG | HEIGHT: 65 IN | HEART RATE: 81 BPM | RESPIRATION RATE: 18 BRPM | WEIGHT: 139.3 LBS | OXYGEN SATURATION: 100 %

## 2024-07-03 DIAGNOSIS — D61.818 PANCYTOPENIA (MULTI): Primary | ICD-10-CM

## 2024-07-03 DIAGNOSIS — D61.818 PANCYTOPENIA (MULTI): ICD-10-CM

## 2024-07-03 PROBLEM — R10.9 ABDOMINAL PAIN: Status: RESOLVED | Noted: 2024-07-01 | Resolved: 2024-07-03

## 2024-07-03 LAB
ALBUMIN SERPL BCP-MCNC: 4 G/DL (ref 3.4–5)
ALP SERPL-CCNC: 69 U/L (ref 33–110)
ALT SERPL W P-5'-P-CCNC: 41 U/L (ref 7–45)
ANION GAP SERPL CALC-SCNC: 9 MMOL/L (ref 10–20)
AST SERPL W P-5'-P-CCNC: 47 U/L (ref 9–39)
BACTERIA UR CULT: NORMAL
BASOPHILS # BLD MANUAL: 0 X10*3/UL (ref 0–0.1)
BASOPHILS NFR BLD MANUAL: 0 %
BILIRUB SERPL-MCNC: 3.1 MG/DL (ref 0–1.2)
BLOOD EXPIRATION DATE: NORMAL
BUN SERPL-MCNC: 5 MG/DL (ref 6–23)
CALCIUM SERPL-MCNC: 9.3 MG/DL (ref 8.6–10.3)
CHLORIDE SERPL-SCNC: 108 MMOL/L (ref 98–107)
CO2 SERPL-SCNC: 27 MMOL/L (ref 21–32)
CREAT SERPL-MCNC: 0.84 MG/DL (ref 0.5–1.05)
DISPENSE STATUS: NORMAL
EGFRCR SERPLBLD CKD-EPI 2021: >90 ML/MIN/1.73M*2
EOSINOPHIL # BLD MANUAL: 0 X10*3/UL (ref 0–0.7)
EOSINOPHIL NFR BLD MANUAL: 0 %
ERYTHROCYTE [DISTWIDTH] IN BLOOD BY AUTOMATED COUNT: 16 % (ref 11.5–14.5)
GIANT PLATELETS BLD QL SMEAR: ABNORMAL
GLUCOSE SERPL-MCNC: 108 MG/DL (ref 74–99)
HAPTOGLOB SERPL-MCNC: <10 MG/DL (ref 37–246)
HCT VFR BLD AUTO: 24.1 % (ref 36–46)
HGB BLD-MCNC: 8.2 G/DL (ref 12–16)
IMM GRANULOCYTES # BLD AUTO: 0.21 X10*3/UL (ref 0–0.7)
IMM GRANULOCYTES NFR BLD AUTO: 7.1 % (ref 0–0.9)
LYMPHOCYTES # BLD MANUAL: 0.99 X10*3/UL (ref 1.2–4.8)
LYMPHOCYTES NFR BLD MANUAL: 33 %
MAGNESIUM SERPL-MCNC: 1.9 MG/DL (ref 1.6–2.4)
MCH RBC QN AUTO: 32 PG (ref 26–34)
MCHC RBC AUTO-ENTMCNC: 34 G/DL (ref 32–36)
MCV RBC AUTO: 94 FL (ref 80–100)
MONOCYTES # BLD MANUAL: 0.15 X10*3/UL (ref 0.1–1)
MONOCYTES NFR BLD MANUAL: 5 %
NEUTROPHILS # BLD MANUAL: 1.35 X10*3/UL (ref 1.2–7.7)
NEUTS BAND # BLD MANUAL: 0.06 X10*3/UL (ref 0–0.7)
NEUTS BAND NFR BLD MANUAL: 2 %
NEUTS SEG # BLD MANUAL: 1.29 X10*3/UL (ref 1.2–7)
NEUTS SEG NFR BLD MANUAL: 43 %
NRBC BLD-RTO: 11.5 /100 WBCS (ref 0–0)
PLATELET # BLD AUTO: 57 X10*3/UL (ref 150–450)
POLYCHROMASIA BLD QL SMEAR: ABNORMAL
POTASSIUM SERPL-SCNC: 4 MMOL/L (ref 3.5–5.3)
PRODUCT BLOOD TYPE: 5100
PRODUCT CODE: NORMAL
PROT SERPL-MCNC: 6.3 G/DL (ref 6.4–8.2)
RBC # BLD AUTO: 2.56 X10*6/UL (ref 4–5.2)
RBC MORPH BLD: ABNORMAL
SODIUM SERPL-SCNC: 140 MMOL/L (ref 136–145)
TOTAL CELLS COUNTED BLD: 100
UNIT ABO: NORMAL
UNIT NUMBER: NORMAL
UNIT RH: NORMAL
UNIT VOLUME: 350
URATE SERPL-MCNC: 7.1 MG/DL (ref 2.3–6.7)
VARIANT LYMPHS # BLD MANUAL: 0.51 X10*3/UL (ref 0–0.5)
VARIANT LYMPHS NFR BLD: 17 %
WBC # BLD AUTO: 3 X10*3/UL (ref 4.4–11.3)
XM INTEP: NORMAL

## 2024-07-03 PROCEDURE — G0378 HOSPITAL OBSERVATION PER HR: HCPCS

## 2024-07-03 PROCEDURE — 80053 COMPREHEN METABOLIC PANEL: CPT | Performed by: STUDENT IN AN ORGANIZED HEALTH CARE EDUCATION/TRAINING PROGRAM

## 2024-07-03 PROCEDURE — 2500000004 HC RX 250 GENERAL PHARMACY W/ HCPCS (ALT 636 FOR OP/ED)

## 2024-07-03 PROCEDURE — 2500000002 HC RX 250 W HCPCS SELF ADMINISTERED DRUGS (ALT 637 FOR MEDICARE OP, ALT 636 FOR OP/ED)

## 2024-07-03 PROCEDURE — 96361 HYDRATE IV INFUSION ADD-ON: CPT | Mod: 59

## 2024-07-03 PROCEDURE — 36415 COLL VENOUS BLD VENIPUNCTURE: CPT | Performed by: STUDENT IN AN ORGANIZED HEALTH CARE EDUCATION/TRAINING PROGRAM

## 2024-07-03 PROCEDURE — 85027 COMPLETE CBC AUTOMATED: CPT | Performed by: STUDENT IN AN ORGANIZED HEALTH CARE EDUCATION/TRAINING PROGRAM

## 2024-07-03 PROCEDURE — 2500000001 HC RX 250 WO HCPCS SELF ADMINISTERED DRUGS (ALT 637 FOR MEDICARE OP)

## 2024-07-03 PROCEDURE — 99239 HOSP IP/OBS DSCHRG MGMT >30: CPT

## 2024-07-03 PROCEDURE — 83735 ASSAY OF MAGNESIUM: CPT | Performed by: STUDENT IN AN ORGANIZED HEALTH CARE EDUCATION/TRAINING PROGRAM

## 2024-07-03 PROCEDURE — 99232 SBSQ HOSP IP/OBS MODERATE 35: CPT | Performed by: NURSE PRACTITIONER

## 2024-07-03 PROCEDURE — 85007 BL SMEAR W/DIFF WBC COUNT: CPT | Performed by: STUDENT IN AN ORGANIZED HEALTH CARE EDUCATION/TRAINING PROGRAM

## 2024-07-03 RX ORDER — ONDANSETRON 4 MG/1
4 TABLET, ORALLY DISINTEGRATING ORAL EVERY 6 HOURS PRN
Qty: 20 TABLET | Refills: 0 | Status: SHIPPED | OUTPATIENT
Start: 2024-07-03 | End: 2024-07-08

## 2024-07-03 RX ORDER — ALLOPURINOL 300 MG/1
300 TABLET ORAL DAILY
Qty: 10 TABLET | Refills: 0 | Status: SHIPPED | OUTPATIENT
Start: 2024-07-03 | End: 2024-07-15

## 2024-07-03 RX ORDER — ALLOPURINOL 300 MG/1
300 TABLET ORAL DAILY
Status: DISCONTINUED | OUTPATIENT
Start: 2024-07-03 | End: 2024-07-03 | Stop reason: HOSPADM

## 2024-07-03 ASSESSMENT — COGNITIVE AND FUNCTIONAL STATUS - GENERAL
MOBILITY SCORE: 24
DAILY ACTIVITIY SCORE: 24

## 2024-07-03 ASSESSMENT — PAIN SCALES - WONG BAKER: WONGBAKER_NUMERICALRESPONSE: NO HURT

## 2024-07-03 ASSESSMENT — PAIN SCALES - GENERAL: PAINLEVEL_OUTOF10: 0 - NO PAIN

## 2024-07-03 NOTE — DISCHARGE INSTRUCTIONS
Plan for bone marrow biopsy on July 5 at 9am at [facility]    Follow up with Dr. Fields (Heme/Onc) on 07/09/24 at 11am    Start allopurinol 300 mg daily, discuss with heme/onc how long you should take it

## 2024-07-03 NOTE — DISCHARGE SUMMARY
Discharge Diagnosis  pancytopenia    Issues Requiring Follow-Up  Plan for bone marrow biopsy on July 5 at 9am at [facility]     Follow up with Dr. Fields (Heme/Onc) on 07/09/24 at 11am     Start allopurinol 300 mg daily, discuss with heme/onc how long you should take it    Discuss with your PCP and ob/gyn if you should continue taking the medroxyprogesterone       Discharge Meds     Your medication list        ASK your doctor about these medications        Instructions Last Dose Given Next Dose Due   medroxyPROGESTERone 150 mg/mL injection  Commonly known as: Depo-Provera           sertraline 50 mg tablet  Commonly known as: Zoloft      Take 1 tablet (50 mg) by mouth once daily.                Test Results Pending At Discharge  Pending Labs       Order Current Status    Haptoglobin Preliminary result            Hospital Course   23 YOF admitted for cyclical vomiting and abdominal.  Found to have an elevated indirect bilirubin CT A/P negative, underwent MRCP abdomen that was negative, no biliary obstruction.  At this time she developed a new onset pancytopenia.  Also had an elevated LDH, Haptoglobin < 10, normal fibrinogen & normal PT. Also had a mildly elevated uric acid. She denied any fever, chills, or night sweats.  Due to these symptoms concern for acute leukemia.  Spoke with both DR. Knight and Heme/Onc at [facility] [place].  Plan for bone marrow biopsy on Friday 07/05 and to see oncology on 07/09.  She also received 1 unit pRBCs due to HgB decreased to 6.7, repeat HgB showed 8.2  Given strict return precautions if she develops fevers, severe weakness, dark urine, or altered mentation. Discharged on allopurinol pure heme/oncs recommendation     Pertinent Physical Exam At Time of Discharge  Physical Exam  Constitutional: A&Ox4, NAD, resting comfortable   Head and Face: Atraumatic, normocephalic   Eyes: Normal external exam, EOMI, no scleral icterus noted  ENT: Normal external inspection of ears and nose.  Oropharynx normal.  Cardiovascular: RRR, S1/S2, no murmurs, rubs, or gallops, radial pulses +2  Pulmonary: CTAB, no respiratory distress, no wheezing, rales or rhonchi, on RA  Abdomen: +BS, soft, Mild TTP, nondistended, no guarding rigidity or rebound tenderness, no masses noted  MSK: Negative for edema, No joint swelling, normal movements of all extremities.   Neuro: No focal deficits, normal motor function, normal sensation, follows all commands  Skin- No lesions, contusions, or erythema. Pallor noted  Psychiatric: Judgment intact. Appropriate mood, affect and behavior   Outpatient Follow-Up  Future Appointments   Date Time Provider Department Center   7/5/2024  9:00 AM [facility] SCT PROCEDURE ROOM 5 [id number] Academic   7/9/2024 11:00 AM Brandee Fields MD [id number] Academic   8/5/2024  1:00 PM DO [id number] OBGYN [facility] NURSE RESOURCE [id number] Edgar Pedro, DO

## 2024-07-03 NOTE — CARE PLAN
The patient's goals for the shift include rest, no nausea    The clinical goals for the shift include will be free of nausea    No c/o nausea this shift.      Goal: Takes deep breaths with improved pain control throughout the shift  Outcome: Progressing  Goal: Turns in bed with improved pain control throughout the shift  Outcome: Progressing  Goal: Walks with improved pain control throughout the shift  Outcome: Progressing  Goal: Performs ADL's with improved pain control throughout shift  Outcome: Progressing  Goal: Participates in PT with improved pain control throughout the shift  Outcome: Progressing

## 2024-07-03 NOTE — PROGRESS NOTES
"Subjective  Patient sitting up in bed with decreased nausea and vomiting.  No current abdominal pain.  Bilirubin stable at 3.1.  Remaining enzymes WNL.  Hematology on board for pancytopenia, hemolysis, splenomegaly.  Plan to follow-up hematology recs.    Objective  Blood pressure 119/60, pulse 61, temperature 36.6 °C (97.9 °F), temperature source Temporal, resp. rate 17, height 1.651 m (5' 5\"), weight 63.2 kg (139 lb 4.8 oz), SpO2 100%, unknown if currently breastfeeding.    Physical Exam  Constitutional: Alert, pleasant and interactive, in NAD  Eyes: PERRL, sclera clear, no conjunctival injection  Skin: Warm and dry, no rash or ecchymosis  ENMT: Mucous membranes moist, no lesions noted  Resp: CTAB, even and unlabored  CV: RRR, normal S1, S2, no m,r,g  GI: +BS, soft, round, NT, no rebound tenderness or guarding, no palpable masses or organomegaly  Extremities: Extremities warm, no edema, contusions, wounds or cyanosis  Neuro: Alert and oriented x3  Psych: Appropriate mood and behavior    Medications  Scheduled medications  allopurinol, 300 mg, oral, Daily  sertraline, 50 mg, oral, Daily      Continuous medications     PRN medications  PRN medications: acetaminophen **OR** acetaminophen **OR** acetaminophen, acetaminophen **OR** acetaminophen **OR** acetaminophen, ondansetron ODT **OR** ondansetron     Labs  Lab Results   Component Value Date    WBC 3.0 (L) 07/03/2024    HGB 8.2 (L) 07/03/2024    HCT 24.1 (L) 07/03/2024    MCV 94 07/03/2024    PLT 57 (L) 07/03/2024     Lab Results   Component Value Date    GLUCOSE 108 (H) 07/03/2024    CALCIUM 9.3 07/03/2024     07/03/2024    K 4.0 07/03/2024    CO2 27 07/03/2024     (H) 07/03/2024    BUN 5 (L) 07/03/2024    CREATININE 0.84 07/03/2024     Lab Results   Component Value Date    ALT 41 07/03/2024    AST 47 (H) 07/03/2024    ALKPHOS 69 07/03/2024    BILITOT 3.1 (H) 07/03/2024     No results found for: \"IRON\", \"TIBC\", \"FERRITIN\"  Lab Results   Component Value " Date    INR 1.2 (H) 07/02/2024    PROTIME 14.0 (H) 07/02/2024        Radiology  MRCP pancreas with and without IV contrast 7/2/2024 noting:  IMPRESSION:  1.  No choledocholithiasis or biliary obstruction.  2. The suspected tiny gallstone identified on CT is not apparent on  MRI.  3. No acute findings of the abdomen identified on motion degraded  exam.  4. Splenomegaly.     Signed by: Joel Wallace 7/2/2024 12:08 PM  Dictation workstation:   [id number]     Assessment:  Ilda Peter is a 23 y.o. female presenting with N/V, abdominal discomfort.  PMH of Anxiety.  Patient reports that symptoms have progressively worsened after her second dose of Depo-Provera in May.  Bilirubin noted to be 3.2-> 2.8.  .  Normocytic anemia with hemoglobin 8.2-> 6.7.  WBC 2.7.  Acute hepatitis panel and mononucleosis screen negative.  No overt signs of bleeding.  Otherwise remaining liver enzymes WNL.  Imaging noting no biliary dilation or concern for choledocholithiasis.  Mild splenomegaly noted     # N/V- improved  # hyperbilirubinemia  # thrombocytopenia-concern for acute leukemia  # hemolysis   # diarrhea  # acute anemia     Plan:  - continue supportive care  - agree with diet as tolerated  - continue to trend liver enzymes daily  - continue analgesics and antiemetics as needed  - in review of chart, heme-onc with concern for acute leukemia  - follow-up hematology recs  - pt with plan for outpatient LP Friday     Plan has been discussed with Dr. Mccray. GI will sign off.      Pavithra Cordova, APRN/CNP        na

## 2024-07-03 NOTE — NURSING NOTE
Patient discharge went over verbally with patient and Significant other. Verbal understanding given of orders and follow ups. Patient discharged home with significant other.

## 2024-07-03 NOTE — PROGRESS NOTES
7/3/24 1545  Received referral for this patient from [facility] ED. Patient is a 22 yo who presented to the ED with n/v, jaundice, 15# weight loss, pancytopenia with a new possible leukemia. Patient will come in 7/5 for marrow, 7/9 to see Dr. Fields. I have spoken with patient. She is aware of appointment details. I have also sent her a text. Patient has my contact information. JONATHON Hernandez

## 2024-07-05 ENCOUNTER — OFFICE VISIT (OUTPATIENT)
Dept: OTHER | Facility: HOSPITAL | Age: 24
End: 2024-07-05
Payer: COMMERCIAL

## 2024-07-05 ENCOUNTER — LAB (OUTPATIENT)
Dept: LAB | Facility: HOSPITAL | Age: 24
End: 2024-07-05
Payer: COMMERCIAL

## 2024-07-05 ENCOUNTER — TELEPHONE (OUTPATIENT)
Dept: PRIMARY CARE | Facility: CLINIC | Age: 24
End: 2024-07-05
Payer: COMMERCIAL

## 2024-07-05 ENCOUNTER — PROCEDURE VISIT (OUTPATIENT)
Dept: OTHER | Facility: HOSPITAL | Age: 24
End: 2024-07-05
Payer: COMMERCIAL

## 2024-07-05 VITALS
BODY MASS INDEX: 23.41 KG/M2 | HEART RATE: 85 BPM | SYSTOLIC BLOOD PRESSURE: 141 MMHG | RESPIRATION RATE: 18 BRPM | OXYGEN SATURATION: 100 % | WEIGHT: 140.65 LBS | DIASTOLIC BLOOD PRESSURE: 77 MMHG | TEMPERATURE: 97.3 F

## 2024-07-05 DIAGNOSIS — E80.6 INDIRECT HYPERBILIRUBINEMIA: ICD-10-CM

## 2024-07-05 DIAGNOSIS — D61.818 PANCYTOPENIA (MULTI): Primary | ICD-10-CM

## 2024-07-05 DIAGNOSIS — D61.818 PANCYTOPENIA (MULTI): ICD-10-CM

## 2024-07-05 LAB
ABO GROUP (TYPE) IN BLOOD: NORMAL
ALBUMIN SERPL BCP-MCNC: 4.4 G/DL (ref 3.4–5)
ALP SERPL-CCNC: 80 U/L (ref 33–110)
ALT SERPL W P-5'-P-CCNC: 46 U/L (ref 7–45)
ANION GAP SERPL CALC-SCNC: 12 MMOL/L (ref 10–20)
ANTIBODY SCREEN: NORMAL
APTT PPP: 29 SECONDS (ref 27–38)
AST SERPL W P-5'-P-CCNC: 37 U/L (ref 9–39)
BASOPHILS # BLD MANUAL: 0.04 X10*3/UL (ref 0–0.1)
BASOPHILS NFR BLD MANUAL: 1 %
BILIRUB SERPL-MCNC: 1.9 MG/DL (ref 0–1.2)
BUN SERPL-MCNC: 16 MG/DL (ref 6–23)
CALCIUM SERPL-MCNC: 9.9 MG/DL (ref 8.6–10.3)
CELL COUNT (BLOOD): 3 X10*3/UL
CELL POPULATIONS: NORMAL
CHLORIDE SERPL-SCNC: 104 MMOL/L (ref 98–107)
CO2 SERPL-SCNC: 27 MMOL/L (ref 21–32)
CREAT SERPL-MCNC: 0.94 MG/DL (ref 0.5–1.05)
DACRYOCYTES BLD QL SMEAR: ABNORMAL
DIAGNOSIS: NORMAL
EGFRCR SERPLBLD CKD-EPI 2021: 88 ML/MIN/1.73M*2
EOSINOPHIL # BLD MANUAL: 0 X10*3/UL (ref 0–0.7)
EOSINOPHIL NFR BLD MANUAL: 0 %
ERYTHROCYTE [DISTWIDTH] IN BLOOD BY AUTOMATED COUNT: 15.1 % (ref 11.5–14.5)
FERRITIN SERPL-MCNC: 287 NG/ML (ref 8–150)
FIBRINOGEN PPP-MCNC: 379 MG/DL (ref 200–400)
FLOW DIFFERENTIAL: NORMAL
FLOW TEST ORDERED: NORMAL
FOLATE SERPL-MCNC: 12.4 NG/ML
GIANT PLATELETS BLD QL SMEAR: ABNORMAL
GLUCOSE SERPL-MCNC: 87 MG/DL (ref 74–99)
HAPTOGLOB SERPL-MCNC: <10 MG/DL (ref 37–246)
HCT VFR BLD AUTO: 25.9 % (ref 36–46)
HGB BLD-MCNC: 9 G/DL (ref 12–16)
HIV 1+2 AB+HIV1 P24 AG SERPL QL IA: NONREACTIVE
IMM GRANULOCYTES # BLD AUTO: 0.13 X10*3/UL (ref 0–0.7)
IMM GRANULOCYTES NFR BLD AUTO: 3.7 % (ref 0–0.9)
INR PPP: 1 (ref 0.9–1.1)
IRON SATN MFR SERPL: 52 % (ref 25–45)
IRON SERPL-MCNC: 157 UG/DL (ref 35–150)
LAB TEST METHOD: NORMAL
LDH SERPL L TO P-CCNC: 723 U/L (ref 84–246)
LYMPHOCYTES # BLD MANUAL: 1.3 X10*3/UL (ref 1.2–4.8)
LYMPHOCYTES NFR BLD MANUAL: 37 %
MCH RBC QN AUTO: 32.5 PG (ref 26–34)
MCHC RBC AUTO-ENTMCNC: 34.7 G/DL (ref 32–36)
MCV RBC AUTO: 94 FL (ref 80–100)
MONOCYTES # BLD MANUAL: 0.04 X10*3/UL (ref 0.1–1)
MONOCYTES NFR BLD MANUAL: 1 %
MYELOCYTES # BLD MANUAL: 0.07 X10*3/UL
MYELOCYTES NFR BLD MANUAL: 2 %
NEUTROPHILS # BLD MANUAL: 1.69 X10*3/UL (ref 1.2–7.7)
NEUTS BAND # BLD MANUAL: 0.11 X10*3/UL (ref 0–0.7)
NEUTS BAND NFR BLD MANUAL: 3 %
NEUTS SEG # BLD MANUAL: 1.58 X10*3/UL (ref 1.2–7)
NEUTS SEG NFR BLD MANUAL: 45 %
NRBC BLD-RTO: 8.3 /100 WBCS (ref 0–0)
NUMBER OF CELLS COLLECTED: NORMAL PER TUBE
OVALOCYTES BLD QL SMEAR: ABNORMAL
PATH REPORT.TOTAL CANCER: NORMAL
PLATELET # BLD AUTO: 61 X10*3/UL (ref 150–450)
POLYCHROMASIA BLD QL SMEAR: ABNORMAL
POTASSIUM SERPL-SCNC: 4.1 MMOL/L (ref 3.5–5.3)
PROT SERPL-MCNC: 7 G/DL (ref 6.4–8.2)
PROTHROMBIN TIME: 11.7 SECONDS (ref 9.8–12.8)
RBC # BLD AUTO: 2.77 X10*6/UL (ref 4–5.2)
RBC MORPH BLD: ABNORMAL
RH FACTOR (ANTIGEN D): NORMAL
SIGNATURE COMMENT: NORMAL
SODIUM SERPL-SCNC: 139 MMOL/L (ref 136–145)
SPECIMEN VIABILITY: NORMAL
TIBC SERPL-MCNC: 303 UG/DL (ref 240–445)
TOTAL CELLS COUNTED BLD: 100
UIBC SERPL-MCNC: 146 UG/DL (ref 110–370)
URATE SERPL-MCNC: 3.1 MG/DL (ref 2.3–6.7)
VARIANT LYMPHS # BLD MANUAL: 0.39 X10*3/UL (ref 0–0.5)
VARIANT LYMPHS NFR BLD: 11 %
VIT B12 SERPL-MCNC: 1191 PG/ML (ref 211–911)
WBC # BLD AUTO: 3.5 X10*3/UL (ref 4.4–11.3)

## 2024-07-05 PROCEDURE — 86901 BLOOD TYPING SEROLOGIC RH(D): CPT

## 2024-07-05 PROCEDURE — 38222 DX BONE MARROW BX & ASPIR: CPT | Performed by: PHYSICIAN ASSISTANT

## 2024-07-05 PROCEDURE — 36415 COLL VENOUS BLD VENIPUNCTURE: CPT

## 2024-07-05 PROCEDURE — 84550 ASSAY OF BLOOD/URIC ACID: CPT

## 2024-07-05 PROCEDURE — 99215 OFFICE O/P EST HI 40 MIN: CPT | Performed by: PHYSICIAN ASSISTANT

## 2024-07-05 PROCEDURE — 82607 VITAMIN B-12: CPT

## 2024-07-05 PROCEDURE — 1036F TOBACCO NON-USER: CPT | Performed by: PHYSICIAN ASSISTANT

## 2024-07-05 PROCEDURE — 85610 PROTHROMBIN TIME: CPT

## 2024-07-05 PROCEDURE — 87389 HIV-1 AG W/HIV-1&-2 AB AG IA: CPT | Performed by: PHYSICIAN ASSISTANT

## 2024-07-05 PROCEDURE — 82746 ASSAY OF FOLIC ACID SERUM: CPT

## 2024-07-05 PROCEDURE — 85384 FIBRINOGEN ACTIVITY: CPT

## 2024-07-05 PROCEDURE — 82728 ASSAY OF FERRITIN: CPT

## 2024-07-05 PROCEDURE — 85007 BL SMEAR W/DIFF WBC COUNT: CPT

## 2024-07-05 PROCEDURE — 84075 ASSAY ALKALINE PHOSPHATASE: CPT

## 2024-07-05 PROCEDURE — 99205 OFFICE O/P NEW HI 60 MIN: CPT | Performed by: PHYSICIAN ASSISTANT

## 2024-07-05 PROCEDURE — 85027 COMPLETE CBC AUTOMATED: CPT

## 2024-07-05 PROCEDURE — 83615 LACTATE (LD) (LDH) ENZYME: CPT

## 2024-07-05 PROCEDURE — 83540 ASSAY OF IRON: CPT

## 2024-07-05 RX ORDER — HEPARIN 100 UNIT/ML
500 SYRINGE INTRAVENOUS AS NEEDED
OUTPATIENT
Start: 2024-07-05

## 2024-07-05 RX ORDER — FAMOTIDINE 10 MG/ML
20 INJECTION INTRAVENOUS ONCE AS NEEDED
OUTPATIENT
Start: 2024-07-05

## 2024-07-05 RX ORDER — EPINEPHRINE 0.3 MG/.3ML
0.3 INJECTION SUBCUTANEOUS EVERY 5 MIN PRN
OUTPATIENT
Start: 2024-07-05

## 2024-07-05 RX ORDER — ALBUTEROL SULFATE 0.83 MG/ML
3 SOLUTION RESPIRATORY (INHALATION) AS NEEDED
OUTPATIENT
Start: 2024-07-05

## 2024-07-05 RX ORDER — HEPARIN SODIUM,PORCINE/PF 10 UNIT/ML
50 SYRINGE (ML) INTRAVENOUS AS NEEDED
OUTPATIENT
Start: 2024-07-05

## 2024-07-05 RX ORDER — DIPHENHYDRAMINE HYDROCHLORIDE 50 MG/ML
50 INJECTION INTRAMUSCULAR; INTRAVENOUS AS NEEDED
OUTPATIENT
Start: 2024-07-05

## 2024-07-05 ASSESSMENT — ENCOUNTER SYMPTOMS
VOMITING: 1
WOUND: 0
ARTHRALGIAS: 0
CHILLS: 0
FEVER: 0
UNEXPECTED WEIGHT CHANGE: 1
DIAPHORESIS: 0
HEADACHES: 0
LEG SWELLING: 0
HEMOPTYSIS: 0
DIZZINESS: 0
ABDOMINAL PAIN: 0
MYALGIAS: 0
FATIGUE: 1
CHEST TIGHTNESS: 0
DYSURIA: 0
ABDOMINAL DISTENTION: 0
BRUISES/BLEEDS EASILY: 0
BLOOD IN STOOL: 0
APPETITE CHANGE: 1
CONSTIPATION: 0
TROUBLE SWALLOWING: 0
EYE PROBLEMS: 0
SHORTNESS OF BREATH: 0
WHEEZING: 0
PALPITATIONS: 0
DIARRHEA: 0
SCLERAL ICTERUS: 1
NAUSEA: 1
SORE THROAT: 0
COUGH: 0
SPEECH DIFFICULTY: 0

## 2024-07-05 NOTE — ASSESSMENT & PLAN NOTE
"- Presented to ED (###) with abdominal pain and found to have pancytopenia with WBC 4.1 k/uL (8% \"atypical lymphocytes\"), Hgb 8.2 g/dL, platelets 86 k/uL  - Also with signs of significant hemolysis, with LDH ### and haptoglobin undetectable, though TOMASZ also negative (7/1/24)  - Reticulocytes inappropriately normal at 0.030 x 10^6/uL  - Pathologist review of preipheral smear from 7/2/24 shows leukopenia with left shift and a few immature cells suspicious for blasts, normocytic anemia with nucelated RBC  - Blood counts ### today (07/05/24), with ###  - Will complete anemia workup with vitamin B12, folate, iron/TIBC, ferritin (07/05/24): pending  - Bone marrow biopsy (07/05/24): pending  "

## 2024-07-05 NOTE — PROGRESS NOTES
"Patient ID: Ilda Peter is a 23 y.o. female.    Diagnosis: Pancytopenia    Past Medical History:     Past Medical History:   Diagnosis Date    Cannabis dependence (Multi) 07/02/2024    Chronic atrophic rhinitis 07/02/2024    Depression     Tinea versicolor 07/02/2024       Surgical History:     Past Surgical History:   Procedure Laterality Date    WISDOM TOOTH EXTRACTION          Family History:     Family History   Problem Relation Name Age of Onset    No Known Problems Mother      No Known Problems Father      No Known Problems Brother      Other (Bicuspid Aortic Valve; VSD) Son         Social History:     Social History     Tobacco Use    Smoking status: Never    Smokeless tobacco: Never   Vaping Use    Vaping status: Never Used   Substance Use Topics    Alcohol use: Not Currently    Drug use: Not Currently     Types: Marijuana      -------------------------------------------------------------------------------------------------------  Subjective       The patient presents to the clinic today (07/05/24) for post-ED follow up for pancytopenia hyperbilirubinemia, nausea, and vomiting; overall, she is doing much better.    She says that she started feeling nauseated every day in February; at first this was 2-3 times we week, and then increased to daily, and then up to 2-6 times per day she was dry-heaving in the bathroom. She had to call off work the first time ever on Sunday. She also lost about 15-16 pounds during that time. Her energy level was also signiciantly decreased during this time. She has not been breastfeeding at all recently.    She was having a little abdominal pain during this time. Hard to  early satiety.     She says that \"the last couple days have been great,\" since she got fluids and a blood transfusion, and she's been able to eat and drink well for the last few days.     Denies chest pain, dyspnea, cough, and productive cough. Denies fevers and chills. No nights sweats. No rashes or mouth " sores. She says recently she's had a little gum sore ness on the top left, but no come gum bleeding.     She says that a few weeks ago she had some swelling under her neck and it was hard to swallow, but this resolved. Denies hematochezia, melena, and hematuria.     She has been having menstrual bleeding relatively constantly for the last 16-17 days, though this has slowed down in the last few days.     Review of Systems   Constitutional:  Positive for appetite change, fatigue and unexpected weight change. Negative for chills, diaphoresis and fever.   HENT:   Negative for hearing loss, mouth sores, nosebleeds, sore throat and trouble swallowing.    Eyes:  Positive for icterus (improved). Negative for eye problems.   Respiratory:  Negative for chest tightness, cough, hemoptysis, shortness of breath and wheezing.    Cardiovascular:  Negative for chest pain, leg swelling and palpitations.   Gastrointestinal:  Positive for nausea (improved) and vomiting (improved). Negative for abdominal distention, abdominal pain, blood in stool, constipation and diarrhea.   Genitourinary:  Positive for vaginal bleeding (x 16 days, improving). Negative for dysuria.    Musculoskeletal:  Negative for arthralgias and myalgias.   Skin:  Negative for itching, rash and wound.   Neurological:  Negative for dizziness, headaches and speech difficulty.   Hematological:  Does not bruise/bleed easily.   All other systems reviewed and are negative.     -------------------------------------------------------------------------------------------------------  Objective   BSA: There is no height or weight on file to calculate BSA.  There were no vitals taken for this visit.    Physical Exam  HENT:      Mouth/Throat:      Mouth: Mucous membranes are moist.      Pharynx: No posterior oropharyngeal erythema.   Eyes:      General: No scleral icterus.     Extraocular Movements: Extraocular movements intact.      Pupils: Pupils are equal, round, and reactive  "to light.   Cardiovascular:      Rate and Rhythm: Normal rate and regular rhythm.      Heart sounds: No murmur heard.     No friction rub. No gallop.   Pulmonary:      Effort: No respiratory distress.      Breath sounds: No stridor. No wheezing, rhonchi or rales.   Abdominal:      General: There is no distension.      Palpations: There is no mass.      Tenderness: There is no abdominal tenderness. There is no guarding or rebound.   Musculoskeletal:         General: No swelling or deformity.      Right lower leg: No edema.      Left lower leg: No edema.   Lymphadenopathy:      Cervical: No cervical adenopathy.   Skin:     Findings: No lesion or rash.   Neurological:      Mental Status: She is alert.      Cranial Nerves: No cranial nerve deficit.      Motor: No weakness.   Psychiatric:         Mood and Affect: Mood normal.         Behavior: Behavior normal.       Performance Status:  Symptomatic; fully ambulatory  -------------------------------------------------------------------------------------------------------  Assessment/Plan    Pancytopenia (Multi)  :: Differential remains fairly broad at this point, and includes viral infection and autoimmune disorder, though the concern for malignancy such as MDS or Acute Leukemia is of course notable  :: The combination of seemingly non-autoimmune hemolysis with pancytopenia, low reticulocytes, low-level blasts along with mild splenomegaly seems to me to be most concerning for an MDS- or MDS/MPN-like picture, though bone marrow examination will be key  - Presented to ED (7/1/24) with abdominal pain and found to have pancytopenia with WBC 4.1 k/uL (8% \"atypical lymphocytes\"), Hgb 8.2 g/dL, platelets 86 k/uL  - CT abdomen (7/1/24): mild splenomegaly  - Also with signs of significant hemolysis, with  U/L and haptoglobin undetectable, though TOMASZ also negative (7/1/24)  - Reticulocytes inappropriately normal at 0.030 x 10^6/uL  - Pathologist review of preipheral smear " "from 7/2/24 shows leukopenia with left shift and a few immature cells suspicious for blasts, normocytic anemia with nucelated RBC  - Peripheral blood flow cytometry (7/2/24): pending  - Blood counts stable/improved today (07/05/24), with WBC 3.5 k/uL (11% \"atypical lymphocytes\"), Hgb 9.0 g/dL, platelets 61 k/uL  - Will complete anemia workup with vitamin B12, folate, iron/TIBC, ferritin (07/05/24): pending  - HIV screen (07/05/24): pending  - Bone marrow biopsy (07/05/24): pending  - Continue allopurinol 300 mg PO daily to protect against possible low-level TLS  - Follow-up with Dr. Fields on 7/9/24, including count check    Indirect hyperbilirubinemia  :: Most likely due to hemolysis secondary to hematologic condition  - Total bilirubin was 3.4 U/L with direct bilirubin only 0.4 U/L (7/1/24)  - LDH elevated and haptoglobin undetectable, as above  - MRCP (7/2/24): no choledocholithiasis or biliary obstruction, but some splenomegaly  - Bilirubin down to 1.9 U/L today (07/05/24)     RTC:  - 7/9/24: Count check and Dr. Fields to follow up on bone marrow biopsy results  - Patient given phone number for [facility] Nurse Line and told to call with any questions or concerns  -------------------------------------------------------------------------------------------------------  Tj Michael PA-C  [facility]  "

## 2024-07-05 NOTE — ASSESSMENT & PLAN NOTE
:: Most likely due to hemolysis secondary to hematologic condition  - Total bilirubin was 3.4 U/L with direct bilirubin only 0.4 U/L (7/1/24)  - LDH elevated and haptoglobin undetectable, as above  - MRCP (7/2/24): no choledocholithiasis or biliary obstruction, but some splenomegaly  - Bilirubin down to 1.9 U/L today (07/05/24)

## 2024-07-05 NOTE — PROGRESS NOTES
Patient ID: Ilda Peter is a 23 y.o. female.    Biopsy    Date/Time: 7/5/2024 10:25 AM    Performed by: Tj Michael PA-C  Authorized by: Tj Michael PA-C    Consent:     Consent obtained:  Verbal    Consent given by:  Patient    Risks, benefits, and alternatives were discussed: yes      Risks discussed:  Bleeding, infection and pain    Alternatives discussed:  Observation  Universal protocol:     Procedure explained and questions answered to patient or proxy's satisfaction: yes      Relevant documents present and verified: yes      Test results available: yes      Site/side marked: yes      Immediately prior to procedure, a time out was called: yes      Patient identity confirmed:  Verbally with patient  Indications:     Indications:  Pancytopenia  Pre-procedure details:     Skin preparation:  Chlorhexidine    Preparation: Patient was prepped and draped in the usual sterile fashion    Sedation:     Sedation type:  None  Anesthesia:     Anesthesia method:  Local infiltration    Local anesthetic:  Lidocaine 1% w/o epi and lidocaine 2% w/o epi  Procedure specific details:      Informed consent was obtained and potential risks including bleeding, infection and pain were reviewed with the patient.     The patient was placed in the prone position, and the Left posterior iliac crest was prepped with chlorhexidine.     The skin, subcutaneous tissues, and periosteum were anesthetized with 5mL of 1% lidocaine and 8 mL of 2% lidocaine.    A small incision was made with a #15 scalpel, and the Jamshidi needle was advanced through the periosteum into the intramedullary space.    10 mL bone marrow was aspirated; the needle was then advanced further and a 1 cm core biopsy obtained. The specimen was sent for morphology, flow cytometry, cytogenetics, and FISH/molecular per Hematopathology.    The needle was removed and hemostasis achieved.     The procedure was tolerated well and there were no  complications.    Procedure completed by: Tj Michael PA-C    Post-procedure details:     Procedure completion:  Tolerated

## 2024-07-05 NOTE — ASSESSMENT & PLAN NOTE
":: Differential remains fairly broad at this point, and includes viral infection and autoimmune disorder, though the concern for malignancy such as MDS or Acute Leukemia is of course notable  :: The combination of seemingly non-autoimmune hemolysis with pancytopenia, low reticulocytes, low-level blasts along with mild splenomegaly seems to me to be most concerning for an MDS- or MDS/MPN-like picture, though bone marrow examination will be key  - Presented to ED (7/1/24) with abdominal pain and found to have pancytopenia with WBC 4.1 k/uL (8% \"atypical lymphocytes\"), Hgb 8.2 g/dL, platelets 86 k/uL  - CT abdomen (7/1/24): mild splenomegaly  - Also with signs of significant hemolysis, with  U/L and haptoglobin undetectable, though TOMASZ also negative (7/1/24)  - Reticulocytes inappropriately normal at 0.030 x 10^6/uL  - Pathologist review of preipheral smear from 7/2/24 shows leukopenia with left shift and a few immature cells suspicious for blasts, normocytic anemia with nucelated RBC  - Peripheral blood flow cytometry (7/2/24): pending  - Blood counts stable/improved today (07/05/24), with WBC 3.5 k/uL (11% \"atypical lymphocytes\"), Hgb 9.0 g/dL, platelets 61 k/uL  - Will complete anemia workup with vitamin B12, folate, iron/TIBC, ferritin (07/05/24): pending  - HIV screen (07/05/24): pending  - Bone marrow biopsy (07/05/24): pending  - Continue allopurinol 300 mg PO daily to protect against possible low-level TLS  - Follow-up with Dr. Fields on 7/9/24, including count check  "

## 2024-07-05 NOTE — PROGRESS NOTES
Patient ID: Ilda Peter is a 23 y.o. female.    Diagnosis: No matching staging information was found for the patient.,    Treatment:   Oncology History    No history exists.       Past Medical History:     Past Medical History:   Diagnosis Date    Cannabis dependence (Multi) 07/02/2024    Chronic atrophic rhinitis 07/02/2024    Depression     Tinea versicolor 07/02/2024       Surgical History:     Past Surgical History:   Procedure Laterality Date    WISDOM TOOTH EXTRACTION          Family History:     Family History   Problem Relation Name Age of Onset    No Known Problems Mother      No Known Problems Father      No Known Problems Brother      Other (Bicuspid Aortic Valve; VSD) Son         Social History:     Social History     Tobacco Use    Smoking status: Never    Smokeless tobacco: Never   Vaping Use    Vaping status: Never Used   Substance Use Topics    Alcohol use: Not Currently    Drug use: Not Currently     Types: Marijuana      -------------------------------------------------------------------------------------------------------  Subjective       The patient presents to the clinic today (07/05/24) for ***; overall, she is doing ***.        Allopurinol    Review of Systems - Oncology   -------------------------------------------------------------------------------------------------------  Objective   BSA: There is no height or weight on file to calculate BSA.  There were no vitals taken for this visit.    Physical Exam    Performance Status:  {ECOG performance status:97907}  -------------------------------------------------------------------------------------------------------  Assessment/Plan    No problem-specific Assessment & Plan notes found for this encounter.      RTC:  - 7/9/24: Count Check, Dr. Fields  -------------------------------------------------------------------------------------------------------  [facility] SCT PROCEDURE ROOM 5  [facility]

## 2024-07-05 NOTE — PROGRESS NOTES
Patient arrived to ASCT unit via self-ambulation for bone marrow biopsy. She is alert and oriented x3, denies pain or any discomfort. Vital signs obtained. Blood drawn via R AC with 24G butterfly needle. Tj LOZADA performed procedure. Dressing is clean, dry and intact per Tj LOZADA. Education provided and PI sheet given. No adverse reactions, no complaints and no assistance needed.

## 2024-07-06 ASSESSMENT — ENCOUNTER SYMPTOMS
FATIGUE: 1
NAUSEA: 1
ENDOCRINE NEGATIVE: 1
ALLERGIC/IMMUNOLOGIC NEGATIVE: 1
NEUROLOGICAL NEGATIVE: 1
PSYCHIATRIC NEGATIVE: 1
HEMATOLOGIC/LYMPHATIC NEGATIVE: 1
MUSCULOSKELETAL NEGATIVE: 1
EYES NEGATIVE: 1
VOMITING: 1
RESPIRATORY NEGATIVE: 1
CARDIOVASCULAR NEGATIVE: 1

## 2024-07-08 LAB
CELL COUNT (BLOOD): 53.88 X10*3/UL
CELL POPULATIONS: NORMAL
DIAGNOSIS: NORMAL
FLOW DIFFERENTIAL: NORMAL
FLOW TEST ORDERED: NORMAL
LAB TEST METHOD: NORMAL
NUMBER OF CELLS COLLECTED: NORMAL
PATH REPORT.TOTAL CANCER: NORMAL
SIGNATURE COMMENT: NORMAL
SPECIMEN VIABILITY: NORMAL

## 2024-07-09 ENCOUNTER — APPOINTMENT (OUTPATIENT)
Dept: OTHER | Facility: HOSPITAL | Age: 24
End: 2024-07-09
Payer: COMMERCIAL

## 2024-07-09 ENCOUNTER — OFFICE VISIT (OUTPATIENT)
Dept: HEMATOLOGY/ONCOLOGY | Facility: HOSPITAL | Age: 24
End: 2024-07-09
Payer: COMMERCIAL

## 2024-07-09 ENCOUNTER — LAB (OUTPATIENT)
Dept: LAB | Facility: HOSPITAL | Age: 24
End: 2024-07-09
Payer: COMMERCIAL

## 2024-07-09 VITALS
BODY MASS INDEX: 23.26 KG/M2 | OXYGEN SATURATION: 100 % | HEART RATE: 109 BPM | SYSTOLIC BLOOD PRESSURE: 130 MMHG | DIASTOLIC BLOOD PRESSURE: 70 MMHG | TEMPERATURE: 97.9 F | WEIGHT: 139.77 LBS | RESPIRATION RATE: 17 BRPM

## 2024-07-09 DIAGNOSIS — D61.818 PANCYTOPENIA (MULTI): ICD-10-CM

## 2024-07-09 DIAGNOSIS — D61.818 PANCYTOPENIA (MULTI): Primary | ICD-10-CM

## 2024-07-09 LAB
ABO GROUP (TYPE) IN BLOOD: NORMAL
ALBUMIN SERPL BCP-MCNC: 4.4 G/DL (ref 3.4–5)
ALP SERPL-CCNC: 75 U/L (ref 33–110)
ALT SERPL W P-5'-P-CCNC: 19 U/L (ref 7–45)
ANION GAP SERPL CALC-SCNC: 11 MMOL/L (ref 10–20)
ANTIBODY SCREEN: NORMAL
AST SERPL W P-5'-P-CCNC: 39 U/L (ref 9–39)
BASOPHILS # BLD MANUAL: 0 X10*3/UL (ref 0–0.1)
BASOPHILS NFR BLD MANUAL: 0 %
BILIRUB SERPL-MCNC: 1.8 MG/DL (ref 0–1.2)
BUN SERPL-MCNC: 10 MG/DL (ref 6–23)
CALCIUM SERPL-MCNC: 10 MG/DL (ref 8.6–10.3)
CHLORIDE SERPL-SCNC: 103 MMOL/L (ref 98–107)
CO2 SERPL-SCNC: 30 MMOL/L (ref 21–32)
CREAT SERPL-MCNC: 0.91 MG/DL (ref 0.5–1.05)
DACRYOCYTES BLD QL SMEAR: ABNORMAL
EGFRCR SERPLBLD CKD-EPI 2021: >90 ML/MIN/1.73M*2
EOSINOPHIL # BLD MANUAL: 0.03 X10*3/UL (ref 0–0.7)
EOSINOPHIL NFR BLD MANUAL: 1 %
ERYTHROCYTE [DISTWIDTH] IN BLOOD BY AUTOMATED COUNT: 15.2 % (ref 11.5–14.5)
GIANT PLATELETS BLD QL SMEAR: ABNORMAL
GLUCOSE SERPL-MCNC: 98 MG/DL (ref 74–99)
HCT VFR BLD AUTO: 23.8 % (ref 36–46)
HGB BLD-MCNC: 8.2 G/DL (ref 12–16)
IMM GRANULOCYTES # BLD AUTO: 0.13 X10*3/UL (ref 0–0.7)
IMM GRANULOCYTES NFR BLD AUTO: 4 % (ref 0–0.9)
LYMPHOCYTES # BLD MANUAL: 1.09 X10*3/UL (ref 1.2–4.8)
LYMPHOCYTES NFR BLD MANUAL: 34 %
MCH RBC QN AUTO: 32.5 PG (ref 26–34)
MCHC RBC AUTO-ENTMCNC: 34.5 G/DL (ref 32–36)
MCV RBC AUTO: 94 FL (ref 80–100)
MONOCYTES # BLD MANUAL: 0.35 X10*3/UL (ref 0.1–1)
MONOCYTES NFR BLD MANUAL: 11 %
NEUTROPHILS # BLD MANUAL: 1.5 X10*3/UL (ref 1.2–7.7)
NEUTS BAND # BLD MANUAL: 0.06 X10*3/UL (ref 0–0.7)
NEUTS BAND NFR BLD MANUAL: 2 %
NEUTS SEG # BLD MANUAL: 1.44 X10*3/UL (ref 1.2–7)
NEUTS SEG NFR BLD MANUAL: 45 %
NRBC BLD-RTO: 6.8 /100 WBCS (ref 0–0)
OVALOCYTES BLD QL SMEAR: ABNORMAL
PLATELET # BLD AUTO: 59 X10*3/UL (ref 150–450)
POLYCHROMASIA BLD QL SMEAR: ABNORMAL
POTASSIUM SERPL-SCNC: 4.1 MMOL/L (ref 3.5–5.3)
PROT SERPL-MCNC: 7.1 G/DL (ref 6.4–8.2)
RBC # BLD AUTO: 2.52 X10*6/UL (ref 4–5.2)
RBC MORPH BLD: ABNORMAL
RH FACTOR (ANTIGEN D): NORMAL
SODIUM SERPL-SCNC: 140 MMOL/L (ref 136–145)
TOTAL CELLS COUNTED BLD: 100
VARIANT LYMPHS # BLD MANUAL: 0.22 X10*3/UL (ref 0–0.5)
VARIANT LYMPHS NFR BLD: 7 %
WBC # BLD AUTO: 3.2 X10*3/UL (ref 4.4–11.3)

## 2024-07-09 PROCEDURE — 99215 OFFICE O/P EST HI 40 MIN: CPT | Performed by: INTERNAL MEDICINE

## 2024-07-09 PROCEDURE — 85007 BL SMEAR W/DIFF WBC COUNT: CPT

## 2024-07-09 PROCEDURE — 86901 BLOOD TYPING SEROLOGIC RH(D): CPT

## 2024-07-09 PROCEDURE — 85027 COMPLETE CBC AUTOMATED: CPT

## 2024-07-09 PROCEDURE — 36415 COLL VENOUS BLD VENIPUNCTURE: CPT

## 2024-07-09 PROCEDURE — 1036F TOBACCO NON-USER: CPT | Performed by: INTERNAL MEDICINE

## 2024-07-09 PROCEDURE — 80053 COMPREHEN METABOLIC PANEL: CPT

## 2024-07-09 PROCEDURE — 86923 COMPATIBILITY TEST ELECTRIC: CPT

## 2024-07-09 ASSESSMENT — PAIN SCALES - GENERAL: PAINLEVEL: 0-NO PAIN

## 2024-07-09 NOTE — PROGRESS NOTES
Patient ID: Ilda Peter is a 23 y.o. female.    ASSESSMENT & PLAN          Pancytopenia (Multi)  New finding pancytopenia, predominantly anemia.  PB and BM IP show increased myeloblast populations.  We are awaiting BM results.  Suspicious for high grade myeloid neoplasm, based on age, suspect most likely AML vs. MDS.  Briefly introduced likelihood she will need intensive inpatient chemotherapy.  Plan for review of BM this week at Tumor Board.  She will return for count check on Friday, and I will see her with BM results and treatment plan.    ______________________________________________________________________________________________________________________________________________    SUBJECTIVE     Here today for planned follow up.  No major issues over the weekend.  She is overall feeling better after transfusions last week.  No new health issues.  Denies fevers, chills, nausea, vomiting, diarrhea, dyspnea, rash, and pain.    OBJECTIVE     /70   Pulse 109   Temp 36.6 °C (97.9 °F)   Resp 17   Wt 63.4 kg (139 lb 12.4 oz)   SpO2 100%   BMI 23.26 kg/m²      Physical Exam  Vitals reviewed.   Constitutional:       Appearance: Normal appearance.   Eyes:      Conjunctiva/sclera: Conjunctivae normal.      Pupils: Pupils are equal, round, and reactive to light.   Skin:     General: Skin is warm and dry.      Findings: No rash.   Psychiatric:         Mood and Affect: Mood normal.         Time Spent  Prep time on day of patient encounter: 10 minutes  Time spent directly with patient, family or caregiver: 25 minutes  Additional Time Spent on Patient Care Activities: 5 minutes  Documentation Time: 5 minutes  Other Time Spent: 0 minutes  Total: 45 minutes        Brandee Fields MD

## 2024-07-09 NOTE — ASSESSMENT & PLAN NOTE
New finding pancytopenia, predominantly anemia.  PB and BM IP show increased myeloblast populations.  We are awaiting BM results.  Suspicious for high grade myeloid neoplasm, based on age, suspect most likely AML vs. MDS.  Briefly introduced likelihood she will need intensive inpatient chemotherapy.  Plan for review of BM this week at Tumor Board.  She will return for count check on Friday, and I will see her with BM results and treatment plan.

## 2024-07-11 ENCOUNTER — TUMOR BOARD CONFERENCE (OUTPATIENT)
Dept: HEMATOLOGY/ONCOLOGY | Facility: HOSPITAL | Age: 24
End: 2024-07-11
Payer: COMMERCIAL

## 2024-07-11 PROBLEM — D46.Z: Status: ACTIVE | Noted: 2024-07-11

## 2024-07-11 LAB
BAND RESOLUTION: 400 BANDS
CHROM ANALY OVERALL INTERP-IMP: NORMAL
CHROM ANALY OVERALL INTERP-IMP: NORMAL
CHROMOSOME ANALYSIS CELLS ANALYZED: 20 CELLS
CHROMOSOME ANALYSIS CELLS IMAGED: 4 CELLS
CHROMOSOME ANALYSIS HYPERMODAL CELL COUNT: 0 CELLS
CHROMOSOME ANALYSIS HYPOMODAL CELL COUNT: 2 CELLS
CHROMOSOME ANALYSIS MODAL CHROMOSOME NO: 46 CHROMOSOMES
CHROMOSOME ANALYSIS STAINING METHOD: NORMAL
ELECTRONICALLY COSIGNED BY CYTOGENETICS: NORMAL
ELECTRONICALLY SIGNED BY CYTOGENETICS: NORMAL
ELECTRONICALLY SIGNED BY CYTOGENETICS: NORMAL
FISH ISCN RESULTS: NORMAL
KARYOTYP MAR: 2 CELLS
PATH REPORT.ADDENDUM SPEC: NORMAL
PATH REPORT.COMMENTS IMP SPEC: NORMAL
PATH REPORT.FINAL DX SPEC: NORMAL
PATH REPORT.GROSS SPEC: NORMAL
PATH REPORT.MICROSCOPIC SPEC OTHER STN: NORMAL
PATH REPORT.RELEVANT HX SPEC: NORMAL
PATH REPORT.TOTAL CANCER: NORMAL
TOTAL CELLS COUNTED MAR: 20 CELLS

## 2024-07-11 NOTE — TUMOR BOARD NOTE
TUMOR BOARD DISCUSSION SUMMARY    PRESENTER: Dr. Brandee Fields    DIAGNOSIS: high grade myeloid neoplasm    SUMMARY/PRESENTATION: Ilda Peter is a 23 y.o. female patient who presents with biopsy confirmed high grade myeloid neoplasm       Information reviewed: Pathology Review    Pathology: (07/05/24) Bone Marrow Biopsy: Hypercellular marrow for age (80-90%) with erythroid predominant hematopoiesis, dyserythropoiesis and increased blasts consistent with high grade myeloid neoplasm.    RECOMMENDATIONS: Consider FLAG+PEDRO vs 7/3 induction        Disclaimer     [facility] tumor board recommendations represent the consensus opinion of physicians present at a weekly patient care conference. The treating [facility] physician is not always present, and many of the physicians formulating the recommendation have not personally seen or examined the patient under discussion. It is understood that the treating [facility] physician considers the expertise of the Tumor Board Recommendation in formulating his/her plan for the patient. However, in many situations, based on individualized patient considerations, a different plan is determined by the treating physician to be the optimal medical management.     Scribe Attestation  By signing my name below, Sonya STRONG Scribe   attest that this documentation has been prepared under the direction and in the presence of [facility] TUMOR BOARD.

## 2024-07-12 ENCOUNTER — APPOINTMENT (OUTPATIENT)
Dept: LAB | Facility: HOSPITAL | Age: 24
End: 2024-07-12
Payer: COMMERCIAL

## 2024-07-12 ENCOUNTER — INFUSION (OUTPATIENT)
Dept: OTHER | Facility: HOSPITAL | Age: 24
End: 2024-07-12
Payer: COMMERCIAL

## 2024-07-12 ENCOUNTER — OFFICE VISIT (OUTPATIENT)
Dept: HEMATOLOGY/ONCOLOGY | Facility: HOSPITAL | Age: 24
End: 2024-07-12
Payer: COMMERCIAL

## 2024-07-12 VITALS
HEART RATE: 84 BPM | WEIGHT: 138.23 LBS | OXYGEN SATURATION: 100 % | SYSTOLIC BLOOD PRESSURE: 127 MMHG | BODY MASS INDEX: 23 KG/M2 | DIASTOLIC BLOOD PRESSURE: 75 MMHG | RESPIRATION RATE: 18 BRPM | TEMPERATURE: 98.4 F

## 2024-07-12 DIAGNOSIS — D61.818 PANCYTOPENIA (MULTI): Primary | ICD-10-CM

## 2024-07-12 DIAGNOSIS — C92.00 ACUTE MYELOID LEUKEMIA NOT HAVING ACHIEVED REMISSION (MULTI): Primary | ICD-10-CM

## 2024-07-12 DIAGNOSIS — D61.818 PANCYTOPENIA (MULTI): ICD-10-CM

## 2024-07-12 DIAGNOSIS — E79.0 HYPERURICEMIA: ICD-10-CM

## 2024-07-12 DIAGNOSIS — E80.6 INDIRECT HYPERBILIRUBINEMIA: ICD-10-CM

## 2024-07-12 LAB
ABO GROUP (TYPE) IN BLOOD: NORMAL
ALBUMIN SERPL BCP-MCNC: 4.1 G/DL (ref 3.4–5)
ALP SERPL-CCNC: 63 U/L (ref 33–110)
ALT SERPL W P-5'-P-CCNC: 14 U/L (ref 7–45)
ANION GAP SERPL CALC-SCNC: 11 MMOL/L (ref 10–20)
ANTIBODY SCREEN: NORMAL
APTT PPP: 31 SECONDS (ref 27–38)
AST SERPL W P-5'-P-CCNC: 34 U/L (ref 9–39)
BASO STIPL BLD QL SMEAR: PRESENT
BASOPHILS # BLD MANUAL: 0.03 X10*3/UL (ref 0–0.1)
BASOPHILS NFR BLD MANUAL: 1 %
BILIRUB SERPL-MCNC: 1.7 MG/DL (ref 0–1.2)
BLOOD EXPIRATION DATE: NORMAL
BUN SERPL-MCNC: 8 MG/DL (ref 6–23)
CALCIUM SERPL-MCNC: 9.4 MG/DL (ref 8.6–10.3)
CHLORIDE SERPL-SCNC: 106 MMOL/L (ref 98–107)
CO2 SERPL-SCNC: 27 MMOL/L (ref 21–32)
CREAT SERPL-MCNC: 0.87 MG/DL (ref 0.5–1.05)
DACRYOCYTES BLD QL SMEAR: ABNORMAL
DISPENSE STATUS: NORMAL
EGFRCR SERPLBLD CKD-EPI 2021: >90 ML/MIN/1.73M*2
ELECTRONICALLY SIGNED BY: NORMAL
EOSINOPHIL # BLD MANUAL: 0 X10*3/UL (ref 0–0.7)
EOSINOPHIL NFR BLD MANUAL: 0 %
ERYTHROCYTE [DISTWIDTH] IN BLOOD BY AUTOMATED COUNT: 15 % (ref 11.5–14.5)
FIBRINOGEN PPP-MCNC: 453 MG/DL (ref 200–400)
GIANT PLATELETS BLD QL SMEAR: ABNORMAL
GLUCOSE SERPL-MCNC: 96 MG/DL (ref 74–99)
HCT VFR BLD AUTO: 20.6 % (ref 36–46)
HGB BLD-MCNC: 6.9 G/DL (ref 12–16)
IMM GRANULOCYTES # BLD AUTO: 0.11 X10*3/UL (ref 0–0.7)
IMM GRANULOCYTES NFR BLD AUTO: 4.3 % (ref 0–0.9)
INR PPP: 1.2 (ref 0.9–1.1)
LDH SERPL L TO P-CCNC: 631 U/L (ref 84–246)
LYMPHOCYTES # BLD MANUAL: 0.88 X10*3/UL (ref 1.2–4.8)
LYMPHOCYTES NFR BLD MANUAL: 35 %
MAGNESIUM SERPL-MCNC: 1.85 MG/DL (ref 1.6–2.4)
MCH RBC QN AUTO: 31.9 PG (ref 26–34)
MCHC RBC AUTO-ENTMCNC: 33.5 G/DL (ref 32–36)
MCV RBC AUTO: 95 FL (ref 80–100)
METAMYELOCYTES # BLD MANUAL: 0.05 X10*3/UL
METAMYELOCYTES NFR BLD MANUAL: 2 %
MONOCYTES # BLD MANUAL: 0.1 X10*3/UL (ref 0.1–1)
MONOCYTES NFR BLD MANUAL: 4 %
MYELOCYTES # BLD MANUAL: 0.05 X10*3/UL
MYELOCYTES NFR BLD MANUAL: 2 %
MYELOID NGS RESULTS: NORMAL
NEUTS SEG # BLD MANUAL: 1.2 X10*3/UL (ref 1.2–7)
NEUTS SEG NFR BLD MANUAL: 48 %
NRBC BLD-RTO: 8.3 /100 WBCS (ref 0–0)
OVALOCYTES BLD QL SMEAR: ABNORMAL
PLATELET # BLD AUTO: 48 X10*3/UL (ref 150–450)
POLYCHROMASIA BLD QL SMEAR: ABNORMAL
POTASSIUM SERPL-SCNC: 3.7 MMOL/L (ref 3.5–5.3)
PRODUCT BLOOD TYPE: 5100
PRODUCT CODE: NORMAL
PROT SERPL-MCNC: 6.6 G/DL (ref 6.4–8.2)
PROTHROMBIN TIME: 13.2 SECONDS (ref 9.8–12.8)
RBC # BLD AUTO: 2.16 X10*6/UL (ref 4–5.2)
RBC MORPH BLD: ABNORMAL
RH FACTOR (ANTIGEN D): NORMAL
SODIUM SERPL-SCNC: 140 MMOL/L (ref 136–145)
TOTAL CELLS COUNTED BLD: 100
UNIT ABO: NORMAL
UNIT NUMBER: NORMAL
UNIT RH: NORMAL
UNIT VOLUME: 350
VARIANT LYMPHS # BLD MANUAL: 0.2 X10*3/UL (ref 0–0.5)
VARIANT LYMPHS NFR BLD: 8 %
WBC # BLD AUTO: 2.5 X10*3/UL (ref 4.4–11.3)
XM INTEP: NORMAL

## 2024-07-12 PROCEDURE — 85610 PROTHROMBIN TIME: CPT

## 2024-07-12 PROCEDURE — 36430 TRANSFUSION BLD/BLD COMPNT: CPT

## 2024-07-12 PROCEDURE — 85384 FIBRINOGEN ACTIVITY: CPT

## 2024-07-12 PROCEDURE — 99215 OFFICE O/P EST HI 40 MIN: CPT | Performed by: INTERNAL MEDICINE

## 2024-07-12 PROCEDURE — 83615 LACTATE (LD) (LDH) ENZYME: CPT

## 2024-07-12 PROCEDURE — 85007 BL SMEAR W/DIFF WBC COUNT: CPT

## 2024-07-12 PROCEDURE — 83735 ASSAY OF MAGNESIUM: CPT

## 2024-07-12 PROCEDURE — 85027 COMPLETE CBC AUTOMATED: CPT

## 2024-07-12 PROCEDURE — 86901 BLOOD TYPING SEROLOGIC RH(D): CPT

## 2024-07-12 PROCEDURE — P9040 RBC LEUKOREDUCED IRRADIATED: HCPCS

## 2024-07-12 PROCEDURE — 82310 ASSAY OF CALCIUM: CPT

## 2024-07-12 RX ORDER — EPINEPHRINE 0.3 MG/.3ML
0.3 INJECTION SUBCUTANEOUS EVERY 5 MIN PRN
OUTPATIENT
Start: 2024-07-12

## 2024-07-12 RX ORDER — FAMOTIDINE 10 MG/ML
20 INJECTION INTRAVENOUS ONCE AS NEEDED
OUTPATIENT
Start: 2024-07-12

## 2024-07-12 RX ORDER — DIPHENHYDRAMINE HYDROCHLORIDE 50 MG/ML
50 INJECTION INTRAMUSCULAR; INTRAVENOUS AS NEEDED
OUTPATIENT
Start: 2024-07-12

## 2024-07-12 RX ORDER — ALBUTEROL SULFATE 0.83 MG/ML
3 SOLUTION RESPIRATORY (INHALATION) AS NEEDED
OUTPATIENT
Start: 2024-07-12

## 2024-07-12 NOTE — PROGRESS NOTES
This nurse assumed care of the patient at 1500. Patient received 1 unit of prbc's as ordered. Patient tolerated the transfusion without noted reaction. Vitals remained stable throughout the transfusion. Right arm PIV flushed with NS, catheter removed intact without incident, site covered with gauze and adhesive dressing. Patient left the unit via self ambulation with mother.

## 2024-07-14 PROBLEM — F41.9 ANXIETY: Status: RESOLVED | Noted: 2023-09-29 | Resolved: 2024-07-14

## 2024-07-14 PROBLEM — G43.909 MIGRAINES: Status: RESOLVED | Noted: 2024-07-02 | Resolved: 2024-07-14

## 2024-07-14 PROBLEM — C92.00: Status: ACTIVE | Noted: 2024-07-14

## 2024-07-14 PROBLEM — C92.00 ACUTE MYELOID LEUKEMIA NOT HAVING ACHIEVED REMISSION (MULTI): Status: ACTIVE | Noted: 2024-07-11

## 2024-07-14 PROBLEM — R11.2 INTRACTABLE NAUSEA AND VOMITING: Status: RESOLVED | Noted: 2024-07-02 | Resolved: 2024-07-14

## 2024-07-14 PROBLEM — Q27.0 SINGLE UMBILICAL ARTERY (HHS-HCC): Status: RESOLVED | Noted: 2023-10-16 | Resolved: 2024-07-14

## 2024-07-14 PROBLEM — C92.00: Status: RESOLVED | Noted: 2024-07-14 | Resolved: 2024-07-14

## 2024-07-14 PROBLEM — N93.9 VAGINAL BLEEDING, ABNORMAL: Status: RESOLVED | Noted: 2024-07-02 | Resolved: 2024-07-14

## 2024-07-14 PROBLEM — R63.4 UNINTENTIONAL WEIGHT LOSS: Status: RESOLVED | Noted: 2024-07-02 | Resolved: 2024-07-14

## 2024-07-14 PROBLEM — D61.818 PANCYTOPENIA (MULTI): Status: RESOLVED | Noted: 2024-07-02 | Resolved: 2024-07-14

## 2024-07-14 RX ORDER — PROCHLORPERAZINE EDISYLATE 5 MG/ML
10 INJECTION INTRAMUSCULAR; INTRAVENOUS EVERY 6 HOURS PRN
Status: CANCELLED | OUTPATIENT
Start: 2024-07-19

## 2024-07-14 RX ORDER — ONDANSETRON HYDROCHLORIDE 2 MG/ML
8 INJECTION, SOLUTION INTRAVENOUS ONCE
OUTPATIENT
Start: 2024-07-24

## 2024-07-14 RX ORDER — PROCHLORPERAZINE MALEATE 10 MG
10 TABLET ORAL EVERY 6 HOURS PRN
Status: CANCELLED | OUTPATIENT
Start: 2024-07-19

## 2024-07-14 RX ORDER — ONDANSETRON HYDROCHLORIDE 2 MG/ML
8 INJECTION, SOLUTION INTRAVENOUS ONCE
OUTPATIENT
Start: 2024-07-22

## 2024-07-14 RX ORDER — ALBUTEROL SULFATE 0.83 MG/ML
3 SOLUTION RESPIRATORY (INHALATION) AS NEEDED
Status: CANCELLED | OUTPATIENT
Start: 2024-07-19

## 2024-07-14 RX ORDER — EPINEPHRINE 1 MG/ML
0.3 INJECTION INTRAMUSCULAR; INTRAVENOUS; SUBCUTANEOUS EVERY 5 MIN PRN
Status: CANCELLED | OUTPATIENT
Start: 2024-07-19

## 2024-07-14 RX ORDER — IDARUBICIN HYDROCHLORIDE 1 MG/ML
12 INJECTION, SOLUTION INTRAVENOUS ONCE
Status: CANCELLED | OUTPATIENT
Start: 2024-07-19

## 2024-07-14 RX ORDER — DIPHENHYDRAMINE HYDROCHLORIDE 50 MG/ML
50 INJECTION INTRAMUSCULAR; INTRAVENOUS AS NEEDED
Status: CANCELLED | OUTPATIENT
Start: 2024-07-19

## 2024-07-14 RX ORDER — IDARUBICIN HYDROCHLORIDE 1 MG/ML
12 INJECTION, SOLUTION INTRAVENOUS ONCE
Status: CANCELLED | OUTPATIENT
Start: 2024-07-20

## 2024-07-14 RX ORDER — ONDANSETRON HYDROCHLORIDE 2 MG/ML
8 INJECTION, SOLUTION INTRAVENOUS ONCE
OUTPATIENT
Start: 2024-07-23

## 2024-07-14 RX ORDER — ONDANSETRON HYDROCHLORIDE 2 MG/ML
8 INJECTION, SOLUTION INTRAVENOUS ONCE
OUTPATIENT
Start: 2024-07-25

## 2024-07-14 RX ORDER — FAMOTIDINE 10 MG/ML
20 INJECTION INTRAVENOUS AS NEEDED
Status: CANCELLED | OUTPATIENT
Start: 2024-07-19

## 2024-07-14 RX ORDER — IDARUBICIN HYDROCHLORIDE 1 MG/ML
12 INJECTION, SOLUTION INTRAVENOUS ONCE
Status: CANCELLED | OUTPATIENT
Start: 2024-07-21

## 2024-07-14 NOTE — ASSESSMENT & PLAN NOTE
Ms. Peter is here today with a new diagnosis of AML after presenting with anemia.  She does not have current evidence of tumor lysis syndrome, DIC, leukostasis, or active infection.  I introduced the diagnosis, natural history, and general treatment approaches.  In particular, I reviewed options of intensive therapy (traditonally 7+3 or similar approach), lower intensity chemotherapy (hypomethylating agent with venetoclax or other targeted agent), novel therapy, supportive care only, and the potential for clinical trials. Given her [id number] AML Risk Stratification: FAVORABLE: Mutated NPM1 without FLT3-ITD risk AML by ELN  and other co-morbidities, I recommended induction with cytarabine and idarubicin (7+3).  I highlighted the toxicities of these therapies, specifically myelosuppression and increased need for transfusions of blood and platelets as well as very high risk for infection.  She is requesting delay in her admission for 1 week so that she can attend her 's doctor's appointments next week.  She was advised to call for any fevers or other new symptoms.  The goal of initial therapy would be to achieve MRD- CR1 with post-remission therapy dictated by her response, AML risk, co-morbidities, and donor options.  I described the impact of cytogenetic and molecular features which can stratify overall rates for relapse, remission, etc.  With active AML, the patient is a at risk for infections, including life threatening, severe cytopenias, and other complications of leukemia.  She is agreeable to proceed.  Will see if we can get her echo and line placed next week prior to admission.  Will have fertility team see her inpatient.

## 2024-07-14 NOTE — PROGRESS NOTES
Patient ID: Ilda Peter is a 23 y.o. female.    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 Peter is here today with a new diagnosis of AML after presenting with anemia.  She does not have current evidence of tumor lysis syndrome, DIC, leukostasis, or active infection.  I introduced the diagnosis, natural history, and general treatment approaches.  In particular, I reviewed options of intensive therapy (traditonally 7+3 or similar approach), lower intensity chemotherapy (hypomethylating agent with venetoclax or other targeted agent), novel therapy, supportive care only, and the potential for clinical trials. Given her [id number] AML Risk Stratification: FAVORABLE: Mutated NPM1 without FLT3-ITD risk AML by ELN  and other co-morbidities, I recommended induction with cytarabine and idarubicin (7+3).  I highlighted the toxicities of these therapies, specifically myelosuppression and increased need for transfusions of blood and platelets as well as very high risk for infection.  She is requesting delay in her admission for 1 week so that she can attend her 's doctor's appointments next week.  She was advised to call for any fevers or other new symptoms.  The goal of initial therapy would be to achieve MRD- CR1 with post-remission  therapy dictated by her response, AML risk, co-morbidities, and donor options.  I described the impact of cytogenetic and molecular features which can stratify overall rates for relapse, remission, etc.  With active AML, the patient is a at risk for infections, including life threatening, severe cytopenias, and other complications of leukemia.  She is agreeable to proceed.  Will see if we can get her echo and line placed next week prior to admission.  Will have fertility team see her inpatient.  Indirect hyperbilirubinemia  Suspect secondary to recent transfusions and some low grade hemolysis.  Will continue to monitor.       ______________________________________________________________________________________________________________    SUBJECTIVE     Here today for planned follow up.  Has been doing well at home.  Some rare nausea.  No new health issues.  Denies fevers, chills, vomiting, diarrhea, dyspnea, rash, and pain.    OBJECTIVE        Physical Exam  Vitals reviewed.   Constitutional:       Appearance: Normal appearance.   Eyes:      Conjunctiva/sclera: Conjunctivae normal.      Pupils: Pupils are equal, round, and reactive to light.   Skin:     General: Skin is warm and dry.      Findings: No rash.   Psychiatric:         Mood and Affect: Mood normal.              Brandee Fields MD

## 2024-07-15 LAB
PATH REPORT.ADDENDUM SPEC: NORMAL
PATH REPORT.ADDENDUM SPEC: NORMAL
PATH REPORT.COMMENTS IMP SPEC: NORMAL
PATH REPORT.FINAL DX SPEC: NORMAL
PATH REPORT.GROSS SPEC: NORMAL
PATH REPORT.MICROSCOPIC SPEC OTHER STN: NORMAL
PATH REPORT.RELEVANT HX SPEC: NORMAL
PATH REPORT.TOTAL CANCER: NORMAL

## 2024-07-15 RX ORDER — ALLOPURINOL 300 MG/1
TABLET ORAL
Qty: 10 TABLET | Refills: 0 | Status: ON HOLD | OUTPATIENT
Start: 2024-07-15

## 2024-07-15 NOTE — TELEPHONE ENCOUNTER
I called Ilda and let her know the RX for Allopurinol was placed. She said Dr. Pedro wasn't her regular internal medicine and this is why Dr. Fields was now prescribing this medication. She had no further questions and will call back if needs anything else.

## 2024-07-15 NOTE — TELEPHONE ENCOUNTER
Ilda called nurse line and said Dr. Fields was supposed to put a RX for Allopurinol at the last visit on 7/12. The RX is pended below I see but for Dr. Pedro and needs signed. Ilda did want to confirm she should continue this medication? She also thought Dr. Fields will be prescribing this medication going forward. Please clarify? Messaged team and added Dr. Pedro.

## 2024-07-16 ENCOUNTER — HOSPITAL ENCOUNTER (OUTPATIENT)
Dept: CARDIOLOGY | Facility: HOSPITAL | Age: 24
Discharge: HOME | End: 2024-07-16
Payer: COMMERCIAL

## 2024-07-16 DIAGNOSIS — Z51.81 ENCOUNTER FOR THERAPEUTIC DRUG LEVEL MONITORING: ICD-10-CM

## 2024-07-16 DIAGNOSIS — C92.00 ACUTE MYELOID LEUKEMIA NOT HAVING ACHIEVED REMISSION (MULTI): ICD-10-CM

## 2024-07-16 LAB
AORTIC VALVE MEAN GRADIENT: 2.8 MMHG
AORTIC VALVE PEAK VELOCITY: 1.12 M/S
AV PEAK GRADIENT: 5 MMHG
AVA (PEAK VEL): 2.72 CM2
AVA (VTI): 2.73 CM2
EJECTION FRACTION APICAL 4 CHAMBER: 52.9
EJECTION FRACTION: 55 %
LEFT ATRIUM VOLUME AREA LENGTH INDEX BSA: 24.4 ML/M2
LEFT VENTRICLE INTERNAL DIMENSION DIASTOLE: 4.38 CM (ref 3.5–6)
LEFT VENTRICULAR OUTFLOW TRACT DIAMETER: 1.95 CM
MITRAL VALVE E/A RATIO: 1.03
RIGHT VENTRICLE FREE WALL PEAK S': 14 CM/S
TRICUSPID ANNULAR PLANE SYSTOLIC EXCURSION: 2.1 CM

## 2024-07-16 PROCEDURE — 76376 3D RENDER W/INTRP POSTPROCES: CPT

## 2024-07-16 PROCEDURE — 93306 TTE W/DOPPLER COMPLETE: CPT | Performed by: INTERNAL MEDICINE

## 2024-07-16 PROCEDURE — 76376 3D RENDER W/INTRP POSTPROCES: CPT | Performed by: INTERNAL MEDICINE

## 2024-07-16 PROCEDURE — 93356 MYOCRD STRAIN IMG SPCKL TRCK: CPT | Performed by: INTERNAL MEDICINE

## 2024-07-17 ENCOUNTER — HOSPITAL ENCOUNTER (OUTPATIENT)
Dept: RADIOLOGY | Facility: HOSPITAL | Age: 24
Discharge: HOME | End: 2024-07-17
Payer: COMMERCIAL

## 2024-07-17 VITALS
BODY MASS INDEX: 22.82 KG/M2 | HEIGHT: 65 IN | SYSTOLIC BLOOD PRESSURE: 131 MMHG | TEMPERATURE: 97.5 F | OXYGEN SATURATION: 98 % | WEIGHT: 137 LBS | DIASTOLIC BLOOD PRESSURE: 74 MMHG | RESPIRATION RATE: 14 BRPM | HEART RATE: 76 BPM

## 2024-07-17 DIAGNOSIS — C92.00 ACUTE MYELOID LEUKEMIA NOT HAVING ACHIEVED REMISSION (MULTI): ICD-10-CM

## 2024-07-17 LAB — HCG UR QL IA.RAPID: NEGATIVE

## 2024-07-17 PROCEDURE — 2500000004 HC RX 250 GENERAL PHARMACY W/ HCPCS (ALT 636 FOR OP/ED): Performed by: RADIOLOGY

## 2024-07-17 PROCEDURE — 7100000009 HC PHASE TWO TIME - INITIAL BASE CHARGE

## 2024-07-17 PROCEDURE — 81025 URINE PREGNANCY TEST: CPT | Performed by: RADIOLOGY

## 2024-07-17 PROCEDURE — 99152 MOD SED SAME PHYS/QHP 5/>YRS: CPT

## 2024-07-17 PROCEDURE — 76937 US GUIDE VASCULAR ACCESS: CPT | Mod: RT | Performed by: RADIOLOGY

## 2024-07-17 PROCEDURE — 36010 PLACE CATHETER IN VEIN: CPT | Performed by: RADIOLOGY

## 2024-07-17 PROCEDURE — 99152 MOD SED SAME PHYS/QHP 5/>YRS: CPT | Performed by: RADIOLOGY

## 2024-07-17 PROCEDURE — C1751 CATH, INF, PER/CENT/MIDLINE: HCPCS

## 2024-07-17 PROCEDURE — 77001 FLUOROGUIDE FOR VEIN DEVICE: CPT | Mod: RT | Performed by: RADIOLOGY

## 2024-07-17 PROCEDURE — 7100000002 HC RECOVERY ROOM TIME - EACH INCREMENTAL 1 MINUTE

## 2024-07-17 PROCEDURE — 7100000010 HC PHASE TWO TIME - EACH INCREMENTAL 1 MINUTE

## 2024-07-17 PROCEDURE — 2720000007 HC OR 272 NO HCPCS

## 2024-07-17 PROCEDURE — 2500000005 HC RX 250 GENERAL PHARMACY W/O HCPCS: Performed by: RADIOLOGY

## 2024-07-17 PROCEDURE — 2780000003 HC OR 278 NO HCPCS

## 2024-07-17 PROCEDURE — 36558 INSERT TUNNELED CV CATH: CPT | Mod: RT | Performed by: RADIOLOGY

## 2024-07-17 PROCEDURE — 7100000001 HC RECOVERY ROOM TIME - INITIAL BASE CHARGE

## 2024-07-17 RX ORDER — SODIUM CHLORIDE 9 MG/ML
30 INJECTION, SOLUTION INTRAVENOUS CONTINUOUS
Status: DISCONTINUED | OUTPATIENT
Start: 2024-07-17 | End: 2024-07-18 | Stop reason: HOSPADM

## 2024-07-17 RX ORDER — LIDOCAINE HYDROCHLORIDE AND EPINEPHRINE 10; 10 MG/ML; UG/ML
INJECTION, SOLUTION INFILTRATION; PERINEURAL
Status: COMPLETED | OUTPATIENT
Start: 2024-07-17 | End: 2024-07-17

## 2024-07-17 RX ORDER — FENTANYL CITRATE 50 UG/ML
INJECTION, SOLUTION INTRAMUSCULAR; INTRAVENOUS
Status: COMPLETED | OUTPATIENT
Start: 2024-07-17 | End: 2024-07-17

## 2024-07-17 RX ORDER — MIDAZOLAM HYDROCHLORIDE 1 MG/ML
INJECTION, SOLUTION INTRAMUSCULAR; INTRAVENOUS
Status: COMPLETED | OUTPATIENT
Start: 2024-07-17 | End: 2024-07-17

## 2024-07-17 ASSESSMENT — PAIN SCALES - GENERAL
PAINLEVEL_OUTOF10: 0 - NO PAIN
PAINLEVEL_OUTOF10: 3
PAINLEVEL_OUTOF10: 0 - NO PAIN

## 2024-07-17 ASSESSMENT — PAIN - FUNCTIONAL ASSESSMENT: PAIN_FUNCTIONAL_ASSESSMENT: 0-10

## 2024-07-17 NOTE — PRE-PROCEDURE NOTE
Interventional Radiology Preprocedure Note    Indication for procedure: The encounter diagnosis was Acute myeloid leukemia not having achieved remission (Multi). Bailon catheter placement for therapy.    Relevant review of systems: NA    Relevant Labs:   Lab Results   Component Value Date    CREATININE 0.87 07/12/2024    EGFR >90 07/12/2024    INR 1.2 (H) 07/12/2024    PROTIME 13.2 (H) 07/12/2024    PREGTESTUR Negative 07/01/2024       Planned Sedation/Anesthesia: Moderate    Airway assessment: normal    Directed physical examination:    Awake and alert, oriented  No acute distress  Regular rate, rhythm  Breathing non-labored      Mallampati: I (soft palate, uvula, fauces, and tonsillar pillars visible)    ASA Score: ASA 3 - Patient with moderate systemic disease with functional limitations    Benefits, risks and alternatives of procedure and planned sedation have been discussed with the patient and/or their representative. All questions answered and they agree to proceed.

## 2024-07-17 NOTE — POST-PROCEDURE NOTE
Interventional Radiology Brief Postprocedure Note    Attending: Arsalan Parker MD    Assistant: none    Diagnosis: AML    Description of procedure: Successful placement of tunneled RIJ dual lumen armstrong catheter. Ready for immediate use      Anesthesia:  moderate    Complications: None    Estimated Blood Loss: none    Medications (Filter: Administrations occurring from 1315 to 1354 on 07/17/24) As of 07/17/24 1354      oxygen (O2) therapy (L/min) Total volume:  Not documented*   *Total volume has not been documented. View each administration to see the amount administered.     Date/Time Rate/Dose/Volume Action       07/17/24  1328 3 L/min - 180,000 mL/hr New Bag               fentaNYL PF (Sublimaze) injection (mcg) Total dose:  50 mcg      Date/Time Rate/Dose/Volume Action       07/17/24  1338 50 mcg Given               midazolam (Versed) injection (mg) Total dose:  2 mg      Date/Time Rate/Dose/Volume Action       07/17/24  1338 2 mg Given               lidocaine-epinephrine (Xylocaine W/EPI) 1 %-1:100,000 injection (mL) Total volume:  15 mL      Date/Time Rate/Dose/Volume Action       07/17/24  1343 5 mL Given      1346 10 mL Given                   No specimens collected      See detailed result report with images in PACS.    The patient tolerated the procedure well without incident or complication and is in stable condition.

## 2024-07-18 LAB
HLA CLS I TYP PNL BLD/T DONR HIGH RES: NORMAL
HLA RESULTS: NORMAL
HLA-DP2 QL: NORMAL
HLA-DQB1 HIGH RES: NORMAL
HLA-DRB1 HIGH RES: NORMAL

## 2024-07-19 ENCOUNTER — HOSPITAL ENCOUNTER (INPATIENT)
Facility: HOSPITAL | Age: 24
End: 2024-07-19
Attending: INTERNAL MEDICINE | Admitting: PHYSICIAN ASSISTANT
Payer: COMMERCIAL

## 2024-07-19 DIAGNOSIS — C92.00 ACUTE MYELOID LEUKEMIA IN ADULT (MULTI): Primary | ICD-10-CM

## 2024-07-19 DIAGNOSIS — C92.00 ACUTE MYELOID LEUKEMIA NOT HAVING ACHIEVED REMISSION (MULTI): ICD-10-CM

## 2024-07-19 LAB
ALBUMIN SERPL BCP-MCNC: 4.2 G/DL (ref 3.4–5)
ALP SERPL-CCNC: 71 U/L (ref 33–110)
ALT SERPL W P-5'-P-CCNC: 15 U/L (ref 7–45)
ANION GAP SERPL CALC-SCNC: 14 MMOL/L (ref 10–20)
APTT PPP: 30 SECONDS (ref 27–38)
AST SERPL W P-5'-P-CCNC: 37 U/L (ref 9–39)
B-HCG SERPL-ACNC: <3 MIU/ML
BASOPHILS # BLD MANUAL: 0.18 X10*3/UL (ref 0–0.1)
BASOPHILS NFR BLD MANUAL: 7.1 %
BILIRUB DIRECT SERPL-MCNC: 0.3 MG/DL (ref 0–0.3)
BILIRUB SERPL-MCNC: 2 MG/DL (ref 0–1.2)
BLASTS # BLD MANUAL: 0.21 X10*3/UL
BLASTS NFR BLD MANUAL: 8 %
BUN SERPL-MCNC: 15 MG/DL (ref 6–23)
CALCIUM SERPL-MCNC: 9.6 MG/DL (ref 8.6–10.6)
CHLORIDE SERPL-SCNC: 105 MMOL/L (ref 98–107)
CHROM ANALY OVERALL INTERP-IMP: NORMAL
CO2 SERPL-SCNC: 26 MMOL/L (ref 21–32)
CREAT SERPL-MCNC: 0.95 MG/DL (ref 0.5–1.05)
EGFRCR SERPLBLD CKD-EPI 2021: 87 ML/MIN/1.73M*2
ELECTRONICALLY COSIGNED BY CYTOGENETICS: NORMAL
ELECTRONICALLY SIGNED BY CYTOGENETICS: NORMAL
EOSINOPHIL # BLD MANUAL: 0.09 X10*3/UL (ref 0–0.7)
EOSINOPHIL NFR BLD MANUAL: 3.5 %
ERYTHROCYTE [DISTWIDTH] IN BLOOD BY AUTOMATED COUNT: 15.8 % (ref 11.5–14.5)
FIBRINOGEN PPP-MCNC: 520 MG/DL (ref 200–400)
FISH ISCN RESULTS: NORMAL
GLUCOSE SERPL-MCNC: 104 MG/DL (ref 74–99)
HCG UR QL IA.RAPID: NEGATIVE
HCT VFR BLD AUTO: 21.8 % (ref 36–46)
HGB BLD-MCNC: 7.2 G/DL (ref 12–16)
HLA RESULTS: NORMAL
HLA-A+B+C AB NFR SER: NORMAL %
HLA-DP+DQ+DR AB NFR SER: NORMAL %
IMM GRANULOCYTES # BLD AUTO: 0.11 X10*3/UL (ref 0–0.7)
IMM GRANULOCYTES NFR BLD AUTO: 4.2 % (ref 0–0.9)
INR PPP: 1.2 (ref 0.9–1.1)
LYMPHOCYTES # BLD MANUAL: 0.87 X10*3/UL (ref 1.2–4.8)
LYMPHOCYTES NFR BLD MANUAL: 33.6 %
MAGNESIUM SERPL-MCNC: 1.69 MG/DL (ref 1.6–2.4)
MCH RBC QN AUTO: 30.6 PG (ref 26–34)
MCHC RBC AUTO-ENTMCNC: 33 G/DL (ref 32–36)
MCV RBC AUTO: 93 FL (ref 80–100)
MONOCYTES # BLD MANUAL: 0 X10*3/UL (ref 0.1–1)
MONOCYTES NFR BLD MANUAL: 0 %
NEUTS SEG # BLD MANUAL: 1.24 X10*3/UL (ref 1.2–7)
NEUTS SEG NFR BLD MANUAL: 47.8 %
NRBC BLD-RTO: 8.1 /100 WBCS (ref 0–0)
OVALOCYTES BLD QL SMEAR: ABNORMAL
PLATELET # BLD AUTO: 40 X10*3/UL (ref 150–450)
POLYCHROMASIA BLD QL SMEAR: ABNORMAL
POTASSIUM SERPL-SCNC: 3.8 MMOL/L (ref 3.5–5.3)
PROT SERPL-MCNC: 6.3 G/DL (ref 6.4–8.2)
PROTHROMBIN TIME: 13.4 SECONDS (ref 9.8–12.8)
RBC # BLD AUTO: 2.35 X10*6/UL (ref 4–5.2)
RBC MORPH BLD: ABNORMAL
SODIUM SERPL-SCNC: 141 MMOL/L (ref 136–145)
TOTAL CELLS COUNTED BLD: 113
URATE SERPL-MCNC: 4.4 MG/DL (ref 2.3–6.7)
WBC # BLD AUTO: 2.6 X10*3/UL (ref 4.4–11.3)

## 2024-07-19 PROCEDURE — 85007 BL SMEAR W/DIFF WBC COUNT: CPT | Performed by: PHYSICIAN ASSISTANT

## 2024-07-19 PROCEDURE — 81025 URINE PREGNANCY TEST: CPT | Performed by: INTERNAL MEDICINE

## 2024-07-19 PROCEDURE — 84702 CHORIONIC GONADOTROPIN TEST: CPT | Performed by: PHYSICIAN ASSISTANT

## 2024-07-19 PROCEDURE — 84550 ASSAY OF BLOOD/URIC ACID: CPT | Performed by: PHYSICIAN ASSISTANT

## 2024-07-19 PROCEDURE — 82248 BILIRUBIN DIRECT: CPT | Performed by: INTERNAL MEDICINE

## 2024-07-19 PROCEDURE — 1170000001 HC PRIVATE ONCOLOGY ROOM DAILY

## 2024-07-19 PROCEDURE — 2500000004 HC RX 250 GENERAL PHARMACY W/ HCPCS (ALT 636 FOR OP/ED): Performed by: PHYSICIAN ASSISTANT

## 2024-07-19 PROCEDURE — 83010 ASSAY OF HAPTOGLOBIN QUANT: CPT | Mod: WESLAB | Performed by: NURSE PRACTITIONER

## 2024-07-19 PROCEDURE — 85384 FIBRINOGEN ACTIVITY: CPT | Performed by: PHYSICIAN ASSISTANT

## 2024-07-19 PROCEDURE — 84075 ASSAY ALKALINE PHOSPHATASE: CPT | Performed by: PHYSICIAN ASSISTANT

## 2024-07-19 PROCEDURE — XW043B3 INTRODUCTION OF CYTARABINE AND DAUNORUBICIN LIPOSOME ANTINEOPLASTIC INTO CENTRAL VEIN, PERCUTANEOUS APPROACH, NEW TECHNOLOGY GROUP 3: ICD-10-PCS | Performed by: INTERNAL MEDICINE

## 2024-07-19 PROCEDURE — 2500000004 HC RX 250 GENERAL PHARMACY W/ HCPCS (ALT 636 FOR OP/ED): Performed by: INTERNAL MEDICINE

## 2024-07-19 PROCEDURE — 85610 PROTHROMBIN TIME: CPT | Performed by: PHYSICIAN ASSISTANT

## 2024-07-19 PROCEDURE — 2500000001 HC RX 250 WO HCPCS SELF ADMINISTERED DRUGS (ALT 637 FOR MEDICARE OP): Performed by: PHYSICIAN ASSISTANT

## 2024-07-19 PROCEDURE — 83735 ASSAY OF MAGNESIUM: CPT | Performed by: PHYSICIAN ASSISTANT

## 2024-07-19 PROCEDURE — 85027 COMPLETE CBC AUTOMATED: CPT | Performed by: PHYSICIAN ASSISTANT

## 2024-07-19 PROCEDURE — 99223 1ST HOSP IP/OBS HIGH 75: CPT | Performed by: INTERNAL MEDICINE

## 2024-07-19 RX ORDER — FAMOTIDINE 10 MG/ML
20 INJECTION INTRAVENOUS AS NEEDED
Status: ACTIVE | OUTPATIENT
Start: 2024-07-19

## 2024-07-19 RX ORDER — PROCHLORPERAZINE MALEATE 10 MG
10 TABLET ORAL EVERY 6 HOURS PRN
Status: DISCONTINUED | OUTPATIENT
Start: 2024-07-19 | End: 2024-07-25

## 2024-07-19 RX ORDER — ALLOPURINOL 300 MG/1
300 TABLET ORAL DAILY
Status: DISCONTINUED | OUTPATIENT
Start: 2024-07-20 | End: 2024-07-24

## 2024-07-19 RX ORDER — DIPHENHYDRAMINE HYDROCHLORIDE 50 MG/ML
50 INJECTION INTRAMUSCULAR; INTRAVENOUS AS NEEDED
Status: ACTIVE | OUTPATIENT
Start: 2024-07-19

## 2024-07-19 RX ORDER — LANOLIN ALCOHOL/MO/W.PET/CERES
400 CREAM (GRAM) TOPICAL ONCE
Status: COMPLETED | OUTPATIENT
Start: 2024-07-19 | End: 2024-07-19

## 2024-07-19 RX ORDER — ACYCLOVIR 400 MG/1
400 TABLET ORAL EVERY 12 HOURS SCHEDULED
Status: DISPENSED | OUTPATIENT
Start: 2024-07-19

## 2024-07-19 RX ORDER — ONDANSETRON HYDROCHLORIDE 2 MG/ML
8 INJECTION, SOLUTION INTRAVENOUS EVERY 8 HOURS PRN
Status: DISCONTINUED | OUTPATIENT
Start: 2024-07-19 | End: 2024-07-25

## 2024-07-19 RX ORDER — EPINEPHRINE 1 MG/ML
0.3 INJECTION, SOLUTION, CONCENTRATE INTRAVENOUS EVERY 5 MIN PRN
Status: ACTIVE | OUTPATIENT
Start: 2024-07-19

## 2024-07-19 RX ORDER — ALBUTEROL SULFATE 0.83 MG/ML
3 SOLUTION RESPIRATORY (INHALATION) AS NEEDED
Status: ACTIVE | OUTPATIENT
Start: 2024-07-19

## 2024-07-19 RX ORDER — SERTRALINE HYDROCHLORIDE 50 MG/1
50 TABLET, FILM COATED ORAL DAILY
Status: DISPENSED | OUTPATIENT
Start: 2024-07-20

## 2024-07-19 RX ORDER — PROCHLORPERAZINE EDISYLATE 5 MG/ML
10 INJECTION INTRAMUSCULAR; INTRAVENOUS EVERY 6 HOURS PRN
Status: DISCONTINUED | OUTPATIENT
Start: 2024-07-19 | End: 2024-07-25

## 2024-07-19 RX ORDER — PANTOPRAZOLE SODIUM 40 MG/1
40 TABLET, DELAYED RELEASE ORAL
Status: DISPENSED | OUTPATIENT
Start: 2024-07-20

## 2024-07-19 RX ORDER — PROCHLORPERAZINE EDISYLATE 5 MG/ML
10 INJECTION INTRAMUSCULAR; INTRAVENOUS EVERY 6 HOURS PRN
Status: ACTIVE | OUTPATIENT
Start: 2024-07-19

## 2024-07-19 RX ORDER — POSACONAZOLE 100 MG/1
300 TABLET, DELAYED RELEASE ORAL
Status: DISCONTINUED | OUTPATIENT
Start: 2024-07-20 | End: 2024-07-28

## 2024-07-19 RX ORDER — ONDANSETRON 8 MG/1
8 TABLET, ORALLY DISINTEGRATING ORAL EVERY 8 HOURS PRN
Status: DISCONTINUED | OUTPATIENT
Start: 2024-07-19 | End: 2024-07-19

## 2024-07-19 SDOH — SOCIAL STABILITY: SOCIAL INSECURITY: DO YOU FEEL ANYONE HAS EXPLOITED OR TAKEN ADVANTAGE OF YOU FINANCIALLY OR OF YOUR PERSONAL PROPERTY?: NO

## 2024-07-19 SDOH — SOCIAL STABILITY: SOCIAL INSECURITY: ARE YOU OR HAVE YOU BEEN THREATENED OR ABUSED PHYSICALLY, EMOTIONALLY, OR SEXUALLY BY ANYONE?: NO

## 2024-07-19 SDOH — SOCIAL STABILITY: SOCIAL INSECURITY: DO YOU FEEL UNSAFE GOING BACK TO THE PLACE WHERE YOU ARE LIVING?: NO

## 2024-07-19 SDOH — SOCIAL STABILITY: SOCIAL INSECURITY: WERE YOU ABLE TO COMPLETE ALL THE BEHAVIORAL HEALTH SCREENINGS?: YES

## 2024-07-19 SDOH — SOCIAL STABILITY: SOCIAL INSECURITY: HAVE YOU HAD THOUGHTS OF HARMING ANYONE ELSE?: NO

## 2024-07-19 SDOH — SOCIAL STABILITY: SOCIAL INSECURITY: DOES ANYONE TRY TO KEEP YOU FROM HAVING/CONTACTING OTHER FRIENDS OR DOING THINGS OUTSIDE YOUR HOME?: NO

## 2024-07-19 SDOH — SOCIAL STABILITY: SOCIAL INSECURITY: ARE THERE ANY APPARENT SIGNS OF INJURIES/BEHAVIORS THAT COULD BE RELATED TO ABUSE/NEGLECT?: NO

## 2024-07-19 SDOH — SOCIAL STABILITY: SOCIAL INSECURITY: HAVE YOU HAD ANY THOUGHTS OF HARMING ANYONE ELSE?: NO

## 2024-07-19 SDOH — ECONOMIC STABILITY: INCOME INSECURITY: HOW HARD IS IT FOR YOU TO PAY FOR THE VERY BASICS LIKE FOOD, HOUSING, MEDICAL CARE, AND HEATING?: NOT VERY HARD

## 2024-07-19 SDOH — SOCIAL STABILITY: SOCIAL INSECURITY: ABUSE: ADULT

## 2024-07-19 SDOH — SOCIAL STABILITY: SOCIAL INSECURITY: HAS ANYONE EVER THREATENED TO HURT YOUR FAMILY OR YOUR PETS?: NO

## 2024-07-19 ASSESSMENT — ENCOUNTER SYMPTOMS
DIFFICULTY URINATING: 0
DIARRHEA: 1
CONFUSION: 0
ABDOMINAL PAIN: 0
NUMBNESS: 0
JOINT SWELLING: 0
NAUSEA: 1
FEVER: 0
CONSTIPATION: 0
VOMITING: 1
DIZZINESS: 1
SHORTNESS OF BREATH: 0
SORE THROAT: 0
CHILLS: 0
HEADACHES: 0

## 2024-07-19 ASSESSMENT — COLUMBIA-SUICIDE SEVERITY RATING SCALE - C-SSRS
2. HAVE YOU ACTUALLY HAD ANY THOUGHTS OF KILLING YOURSELF?: NO
1. IN THE PAST MONTH, HAVE YOU WISHED YOU WERE DEAD OR WISHED YOU COULD GO TO SLEEP AND NOT WAKE UP?: NO
6. HAVE YOU EVER DONE ANYTHING, STARTED TO DO ANYTHING, OR PREPARED TO DO ANYTHING TO END YOUR LIFE?: NO

## 2024-07-19 ASSESSMENT — COGNITIVE AND FUNCTIONAL STATUS - GENERAL
DAILY ACTIVITIY SCORE: 24
PATIENT BASELINE BEDBOUND: NO
MOBILITY SCORE: 24

## 2024-07-19 ASSESSMENT — ACTIVITIES OF DAILY LIVING (ADL)
BATHING: INDEPENDENT
TOILETING: INDEPENDENT
JUDGMENT_ADEQUATE_SAFELY_COMPLETE_DAILY_ACTIVITIES: YES
DRESSING YOURSELF: INDEPENDENT
FEEDING YOURSELF: INDEPENDENT
PATIENT'S MEMORY ADEQUATE TO SAFELY COMPLETE DAILY ACTIVITIES?: YES
ADEQUATE_TO_COMPLETE_ADL: YES
WALKS IN HOME: INDEPENDENT
GROOMING: INDEPENDENT
HEARING - LEFT EAR: FUNCTIONAL
HEARING - RIGHT EAR: FUNCTIONAL

## 2024-07-19 ASSESSMENT — PAIN - FUNCTIONAL ASSESSMENT
PAIN_FUNCTIONAL_ASSESSMENT: 0-10
PAIN_FUNCTIONAL_ASSESSMENT: 0-10

## 2024-07-19 ASSESSMENT — LIFESTYLE VARIABLES
SUBSTANCE_ABUSE_PAST_12_MONTHS: NO
HOW MANY STANDARD DRINKS CONTAINING ALCOHOL DO YOU HAVE ON A TYPICAL DAY: PATIENT DOES NOT DRINK
AUDIT-C TOTAL SCORE: 0
HOW OFTEN DO YOU HAVE 6 OR MORE DRINKS ON ONE OCCASION: NEVER
PRESCIPTION_ABUSE_PAST_12_MONTHS: NO
HOW OFTEN DO YOU HAVE A DRINK CONTAINING ALCOHOL: NEVER
AUDIT-C TOTAL SCORE: 0
SKIP TO QUESTIONS 9-10: 1

## 2024-07-19 ASSESSMENT — PAIN SCALES - GENERAL
PAINLEVEL_OUTOF10: 0 - NO PAIN
PAINLEVEL_OUTOF10: 0 - NO PAIN

## 2024-07-19 NOTE — H&P
History Of Present Illness  Ilda Peter is a 23 y.o. female with newly diagnosed AML admitted on 7/19/2024 for induction chemotherapy with Cytarabine and Idarubicin.    On admit, patient feeling better. Per patient, she initially noticed not feeling well in February of this year. She developed nausea w/intermittent vomiting at this time which progressively got worse over the next couple of months. She subsequently was evaluated in the ED for persistent nausea, vomiting, and dizziness (early July 2024). On evaluation, the patient was found to be pancytopenic with elevated bilirubin levels. She was discharged from the ED and had scheduled Bmbx and follow up with Dr. Fields outpatient. Bmbx done on 7/5/24 c/w AML. Patient now admitted today for induction chemo with 7+3. Patient reports nausea and vomiting is better since receiving fluids and blood transfusion recently. She does note recent dizziness when getting up too quickly. She is currently menstruating and goes through 2 pads daily. She endorses having a depo injection last February and May of this year. She is also reporting diarrhea (3-4 small stools daily). She currently denies fever, chills, headache, mouth sores, sore throat, congestion, SOB, CP, constipation, abdominal pain, vision changes, rash, urinary issues, or numbness/tingling of her extremities.      Past Medical History  She has a past medical history of AML (acute myeloblastic leukemia) (Multi), Anxiety, Cannabis dependence (Multi) (07/02/2024), Chronic atrophic rhinitis (07/02/2024), Depression, Hyperbilirubinemia, and Tinea versicolor (07/02/2024).    Surgical History  She has a past surgical history that includes Pelican tooth extraction.    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 She has never used smokeless tobacco. She reports that she does not currently use alcohol. She reports current drug use. Drug: Marijuana.  Worked as supervisor at [employer]  Has 2 children. Youngest is 6 months old.    Family Hx  Father, Mother, and brother - alive and well  No family hx/o  hematological malignancies       Allergies  Codeine    Review of Systems   Constitutional:  Negative for chills and fever.   HENT:  Negative for congestion, mouth sores and sore throat.    Eyes:  Negative for visual disturbance.   Respiratory:  Negative for shortness of breath.    Cardiovascular:  Negative for chest pain and leg swelling.   Gastrointestinal:  Positive for diarrhea, nausea and vomiting. Negative for abdominal pain and constipation.   Genitourinary:  Positive for vaginal bleeding. Negative for difficulty urinating.   Musculoskeletal:  Negative for joint swelling.   Skin:  Negative for rash.   Neurological:  Positive for dizziness. Negative for numbness and headaches.   Hematological:         Several bruises on the extremities   Psychiatric/Behavioral:  Negative for confusion.    All other systems reviewed and are negative.       Physical Exam  Vitals reviewed.   Constitutional:       General: She is not in acute distress.     Appearance: Normal appearance. She is not toxic-appearing.   HENT:      Head: Normocephalic and atraumatic.      Nose: Nose normal.      Mouth/Throat:      Mouth: Mucous membranes are moist.   Eyes:      Extraocular Movements: Extraocular movements intact.      Pupils: Pupils  "are equal, round, and reactive to light.   Cardiovascular:      Rate and Rhythm: Normal rate and regular rhythm.      Heart sounds: Normal heart sounds.   Pulmonary:      Effort: Pulmonary effort is normal.      Breath sounds: Normal breath sounds. No wheezing, rhonchi or rales.   Abdominal:      General: Abdomen is flat. Bowel sounds are normal.      Palpations: Abdomen is soft.      Tenderness: There is no abdominal tenderness.      Comments: Palpable spleen   Musculoskeletal:         General: No swelling. Normal range of motion.      Cervical back: Normal range of motion.   Skin:     General: Skin is warm and dry.      Findings: Bruising present. No rash.      Comments: Mild jaundice   Neurological:      General: No focal deficit present.      Mental Status: She is alert and oriented to person, place, and time.      Cranial Nerves: No cranial nerve deficit.      Gait: Gait normal.   Psychiatric:         Mood and Affect: Mood normal.         Behavior: Behavior normal.          Last Recorded Vitals  Blood pressure 115/77, pulse 101, temperature 36.7 °C (98.1 °F), temperature source Temporal, resp. rate 16, height (S) 1.65 m (5' 4.96\"), weight (S) 61.9 kg (136 lb 7.4 oz), last menstrual period 07/17/2024, SpO2 98%, not currently breastfeeding.    Relevant Results  Type Scheduled medications  [START ON 7/20/2024] allopurinol, 300 mg, oral, Daily  cytarabine, 100 mg/m2 (Treatment Plan Recorded), intravenous, Once  dexAMETHasone, 12 mg, intravenous, Once  IDArubicin, 12 mg/m2 (Treatment Plan Recorded), intravenous, Once  ondansetron, 16 mg, intravenous, Once  [START ON 7/20/2024] pantoprazole, 40 mg, oral, Daily before breakfast  [START ON 7/20/2024] sertraline, 50 mg, oral, Daily      Continuous medications     PRN medications  PRN medications: albuterol, alteplase, dextrose, diphenhydrAMINE, EPINEPHrine HCl, famotidine, methylPREDNISolone sodium succinate (PF), [DISCONTINUED] ondansetron ODT **OR** ondansetron, " prochlorperazine, prochlorperazine, prochlorperazine, sodium chloride    Type   Results for orders placed or performed during the hospital encounter of 07/19/24 (from the past 24 hour(s))   Comprehensive Metabolic Panel   Result Value Ref Range    Glucose 104 (H) 74 - 99 mg/dL    Sodium 141 136 - 145 mmol/L    Potassium 3.8 3.5 - 5.3 mmol/L    Chloride 105 98 - 107 mmol/L    Bicarbonate 26 21 - 32 mmol/L    Anion Gap 14 10 - 20 mmol/L    Urea Nitrogen 15 6 - 23 mg/dL    Creatinine 0.95 0.50 - 1.05 mg/dL    eGFR 87 >60 mL/min/1.73m*2    Calcium 9.6 8.6 - 10.6 mg/dL    Albumin 4.2 3.4 - 5.0 g/dL    Alkaline Phosphatase 71 33 - 110 U/L    Total Protein 6.3 (L) 6.4 - 8.2 g/dL    AST 37 9 - 39 U/L    Bilirubin, Total 2.0 (H) 0.0 - 1.2 mg/dL    ALT 15 7 - 45 U/L   Coagulation Screen   Result Value Ref Range    Protime 13.4 (H) 9.8 - 12.8 seconds    INR 1.2 (H) 0.9 - 1.1    aPTT 30 27 - 38 seconds   Magnesium   Result Value Ref Range    Magnesium 1.69 1.60 - 2.40 mg/dL   Uric Acid   Result Value Ref Range    Uric Acid 4.4 2.3 - 6.7 mg/dL   CBC and Auto Differential   Result Value Ref Range    WBC 2.6 (L) 4.4 - 11.3 x10*3/uL    nRBC 8.1 (H) 0.0 - 0.0 /100 WBCs    RBC 2.35 (L) 4.00 - 5.20 x10*6/uL    Hemoglobin 7.2 (L) 12.0 - 16.0 g/dL    Hematocrit 21.8 (L) 36.0 - 46.0 %    MCV 93 80 - 100 fL    MCH 30.6 26.0 - 34.0 pg    MCHC 33.0 32.0 - 36.0 g/dL    RDW 15.8 (H) 11.5 - 14.5 %    Platelets 40 (LL) 150 - 450 x10*3/uL    Immature Granulocytes %, Automated 4.2 (H) 0.0 - 0.9 %    Immature Granulocytes Absolute, Automated 0.11 0.00 - 0.70 x10*3/uL   Fibrinogen   Result Value Ref Range    Fibrinogen 520 (H) 200 - 400 mg/dL   hCG, quantitative, pregnancy   Result Value Ref Range    HCG, Beta-Quantitative <3 <5 mIU/mL   Manual Differential   Result Value Ref Range    Neutrophils %, Manual 47.8 40.0 - 80.0 %    Lymphocytes %, Manual 33.6 13.0 - 44.0 %    Monocytes %, Manual 0.0 2.0 - 10.0 %    Eosinophils %, Manual 3.5 0.0 - 6.0 %     Basophils %, Manual 7.1 0.0 - 2.0 %    Blasts %, Manual 8.0 0.0 - 0.0 %    Seg Neutrophils Absolute, Manual 1.24 1.20 - 7.00 x10*3/uL    Lymphocytes Absolute, Manual 0.87 (L) 1.20 - 4.80 x10*3/uL    Monocytes Absolute, Manual 0.00 (L) 0.10 - 1.00 x10*3/uL    Eosinophils Absolute, Manual 0.09 0.00 - 0.70 x10*3/uL    Basophils Absolute, Manual 0.18 (H) 0.00 - 0.10 x10*3/uL    Blasts Absolute, Manual 0.21 0.00 - 0.00 x10*3/uL    Total Cells Counted 113     RBC Morphology See Below     Polychromasia Mild     Ovalocytes Few              Assessment/Plan   Active Problems:    Acute myeloid leukemia in adult (Multi)    22 yo female with newly diagnosed AML admitted on 7/19/24 for induction chemotherapy with Cytarabine and Idarubicin.    Cytarabine (100mg/m2) = 170mg IV daily x 7 days (7/19->)  Idarubicin (12mg/m2) = 20mg IV daily x 3 days (7/19->)     ONC:  - Newly diagnosed AML   - Bmbx (7/5/24) c/w hypercellular BM w/erythroid predominant hematopoiesis, dyserythropoiesis and increased blasts c/w high grade myeloid neoplasm.  - FISH neg  - Myeloid NGS: NPM1 (54%), NRAS (25%), PTPN11 (15%), IDH1 (2%)  - HLA typing sent 7/9  - HCG, beta quantitative neg on admit  - Start Cytarabine and Idarubicin this evening (7/19)      HEME:  - Pancytopenia d/t malignancy  - Keep hgb > 7 and Plts > 10  - Transfuse as needed      ID:  - Afebrile  - Started ACV and Posaconazole prophy on admit  - Plan Levaquin when neutropenic  - Plan Zosyn if febrile      FEN/GI:  - Admit wt: 61.9 kg  - Hypomagnesemia repleted last on 7/19  - Monitor electrolytes and replete as needed  - Started PPI on admit  - Recent hx/o nausea/vomiting. Added PRN Compazine/PRN Zofran on admit.  - Recent diarrhea.  Check Cdiff with next episode.   - Cont Allopurinol prophy. Monitor for TLS.      CV:  - ECHO (7/16/23) w/EF of 55%    GYN:  - Recent Vaginal bleeding. Had Depo shot in February and May of this year. Consider consulting GYN d/t ongoing vaginal bleeding.  -  Per Dr. Fields, plan to contact Fertility team early next week      PSYCH:  - Anxiety. Cont Sertraline      DISP:  - Full Code  - Patient of Dr. Fields  - Bailon line placed in IR on 7/17/24  - PT ordered        Patient seen, examined, and discussed with Dr. Fields    I spent 60 minutes in the professional and overall care of this patient.      ALLAN CaroC

## 2024-07-19 NOTE — CARE PLAN
The patient's goals for the shift include      The clinical goals for the shift include Remain HDS    Over the shift, the patient did not make progress toward the following goals. Barriers to progression include education. Recommendations to address these barriers include new diagnosis.

## 2024-07-20 LAB
ALBUMIN SERPL BCP-MCNC: 4 G/DL (ref 3.4–5)
ALBUMIN SERPL BCP-MCNC: 4.3 G/DL (ref 3.4–5)
ALP SERPL-CCNC: 75 U/L (ref 33–110)
ALT SERPL W P-5'-P-CCNC: 20 U/L (ref 7–45)
ANION GAP SERPL CALC-SCNC: 13 MMOL/L (ref 10–20)
ANION GAP SERPL CALC-SCNC: 14 MMOL/L (ref 10–20)
AST SERPL W P-5'-P-CCNC: 43 U/L (ref 9–39)
BASOPHILS # BLD MANUAL: 0.09 X10*3/UL (ref 0–0.1)
BASOPHILS NFR BLD MANUAL: 3.4 %
BILIRUB SERPL-MCNC: 2 MG/DL (ref 0–1.2)
BUN SERPL-MCNC: 17 MG/DL (ref 6–23)
BUN SERPL-MCNC: 18 MG/DL (ref 6–23)
CALCIUM SERPL-MCNC: 9.2 MG/DL (ref 8.6–10.6)
CALCIUM SERPL-MCNC: 9.4 MG/DL (ref 8.6–10.6)
CHLORIDE SERPL-SCNC: 102 MMOL/L (ref 98–107)
CHLORIDE SERPL-SCNC: 103 MMOL/L (ref 98–107)
CO2 SERPL-SCNC: 25 MMOL/L (ref 21–32)
CO2 SERPL-SCNC: 27 MMOL/L (ref 21–32)
CREAT SERPL-MCNC: 0.93 MG/DL (ref 0.5–1.05)
CREAT SERPL-MCNC: 0.97 MG/DL (ref 0.5–1.05)
EGFRCR SERPLBLD CKD-EPI 2021: 84 ML/MIN/1.73M*2
EGFRCR SERPLBLD CKD-EPI 2021: 89 ML/MIN/1.73M*2
EOSINOPHIL # BLD MANUAL: 0.02 X10*3/UL (ref 0–0.7)
EOSINOPHIL NFR BLD MANUAL: 0.9 %
ERYTHROCYTE [DISTWIDTH] IN BLOOD BY AUTOMATED COUNT: 15.8 % (ref 11.5–14.5)
GLUCOSE SERPL-MCNC: 143 MG/DL (ref 74–99)
GLUCOSE SERPL-MCNC: 95 MG/DL (ref 74–99)
HCT VFR BLD AUTO: 22.3 % (ref 36–46)
HGB BLD-MCNC: 7.2 G/DL (ref 12–16)
IMM GRANULOCYTES # BLD AUTO: 0.19 X10*3/UL (ref 0–0.7)
IMM GRANULOCYTES NFR BLD AUTO: 7.2 % (ref 0–0.9)
LYMPHOCYTES # BLD MANUAL: 0.5 X10*3/UL (ref 1.2–4.8)
LYMPHOCYTES NFR BLD MANUAL: 19.3 %
MAGNESIUM SERPL-MCNC: 1.71 MG/DL (ref 1.6–2.4)
MCH RBC QN AUTO: 30 PG (ref 26–34)
MCHC RBC AUTO-ENTMCNC: 32.3 G/DL (ref 32–36)
MCV RBC AUTO: 93 FL (ref 80–100)
MONOCYTES # BLD MANUAL: 0 X10*3/UL (ref 0.1–1)
MONOCYTES NFR BLD MANUAL: 0 %
MYELOCYTES # BLD MANUAL: 0.02 X10*3/UL
MYELOCYTES NFR BLD MANUAL: 0.8 %
NEUTS SEG # BLD MANUAL: 1.84 X10*3/UL (ref 1.2–7)
NEUTS SEG NFR BLD MANUAL: 70.6 %
NRBC BLD-RTO: 6.8 /100 WBCS (ref 0–0)
PHOSPHATE SERPL-MCNC: 4.3 MG/DL (ref 2.5–4.9)
PLATELET # BLD AUTO: 37 X10*3/UL (ref 150–450)
POTASSIUM SERPL-SCNC: 3.4 MMOL/L (ref 3.5–5.3)
POTASSIUM SERPL-SCNC: 4 MMOL/L (ref 3.5–5.3)
PROMYELOCYTES # BLD MANUAL: 0.02 X10*3/UL
PROMYELOCYTES NFR BLD MANUAL: 0.8 %
PROT SERPL-MCNC: 6.5 G/DL (ref 6.4–8.2)
RBC # BLD AUTO: 2.4 X10*6/UL (ref 4–5.2)
RBC MORPH BLD: ABNORMAL
SODIUM SERPL-SCNC: 138 MMOL/L (ref 136–145)
SODIUM SERPL-SCNC: 139 MMOL/L (ref 136–145)
TOTAL CELLS COUNTED BLD: 119
URATE SERPL-MCNC: 3.4 MG/DL (ref 2.3–6.7)
URATE SERPL-MCNC: 3.8 MG/DL (ref 2.3–6.7)
VARIANT LYMPHS # BLD MANUAL: 0.11 X10*3/UL (ref 0–0.5)
VARIANT LYMPHS NFR BLD: 4.2 %
WBC # BLD AUTO: 2.6 X10*3/UL (ref 4.4–11.3)

## 2024-07-20 PROCEDURE — 1170000001 HC PRIVATE ONCOLOGY ROOM DAILY

## 2024-07-20 PROCEDURE — 84550 ASSAY OF BLOOD/URIC ACID: CPT | Performed by: PHYSICIAN ASSISTANT

## 2024-07-20 PROCEDURE — 2500000004 HC RX 250 GENERAL PHARMACY W/ HCPCS (ALT 636 FOR OP/ED): Performed by: INTERNAL MEDICINE

## 2024-07-20 PROCEDURE — 99233 SBSQ HOSP IP/OBS HIGH 50: CPT | Performed by: INTERNAL MEDICINE

## 2024-07-20 PROCEDURE — 80069 RENAL FUNCTION PANEL: CPT | Mod: CCI | Performed by: PHYSICIAN ASSISTANT

## 2024-07-20 PROCEDURE — 85007 BL SMEAR W/DIFF WBC COUNT: CPT | Performed by: PHYSICIAN ASSISTANT

## 2024-07-20 PROCEDURE — 80053 COMPREHEN METABOLIC PANEL: CPT | Performed by: PHYSICIAN ASSISTANT

## 2024-07-20 PROCEDURE — 2500000001 HC RX 250 WO HCPCS SELF ADMINISTERED DRUGS (ALT 637 FOR MEDICARE OP): Performed by: PHYSICIAN ASSISTANT

## 2024-07-20 PROCEDURE — 2500000002 HC RX 250 W HCPCS SELF ADMINISTERED DRUGS (ALT 637 FOR MEDICARE OP, ALT 636 FOR OP/ED): Performed by: PHYSICIAN ASSISTANT

## 2024-07-20 PROCEDURE — 85027 COMPLETE CBC AUTOMATED: CPT | Performed by: PHYSICIAN ASSISTANT

## 2024-07-20 PROCEDURE — 83735 ASSAY OF MAGNESIUM: CPT | Performed by: PHYSICIAN ASSISTANT

## 2024-07-20 RX ORDER — POTASSIUM CHLORIDE 1.5 G/1.58G
40 POWDER, FOR SOLUTION ORAL ONCE
Status: COMPLETED | OUTPATIENT
Start: 2024-07-20 | End: 2024-07-21

## 2024-07-20 ASSESSMENT — COGNITIVE AND FUNCTIONAL STATUS - GENERAL
MOBILITY SCORE: 24
DAILY ACTIVITIY SCORE: 24
DAILY ACTIVITIY SCORE: 24
MOBILITY SCORE: 24
MOBILITY SCORE: 24
DAILY ACTIVITIY SCORE: 24

## 2024-07-20 ASSESSMENT — PAIN - FUNCTIONAL ASSESSMENT
PAIN_FUNCTIONAL_ASSESSMENT: 0-10

## 2024-07-20 ASSESSMENT — PAIN SCALES - GENERAL
PAINLEVEL_OUTOF10: 0 - NO PAIN

## 2024-07-20 NOTE — PROGRESS NOTES
07/20/24 1226   Discharge Planning   Living Arrangements Spouse/significant other   Support Systems Spouse/significant other   Assistance Needed none   Type of Residence Private residence   Number of Stairs to Enter Residence 0   Number of Stairs Within Residence 0   Do you have animals or pets at home? No   Who is requesting discharge planning? Provider   Expected Discharge Disposition HH Services   Does the patient need discharge transport arranged? No   Financial Resource Strain   How hard is it for you to pay for the very basics like food, housing, medical care, and heating? Not very   Transportation Needs   In the past 12 months, has lack of transportation kept you from medical appointments or from getting medications? no   Patient Choice   Provider Choice list and CMS website (https://medicare.gov/care-compare#search) for post-acute Quality and Resource Measure Data were provided and reviewed with: Patient   Patient / Family choosing to utilize agency / facility established prior to hospitalization No

## 2024-07-20 NOTE — CARE PLAN
Problem: Nutrition  Goal: Less than 5 days NPO/clear liquids  Outcome: Progressing  Goal: Oral intake greater than 50%  Outcome: Progressing  Goal: Oral intake greater 75%  Outcome: Progressing  Goal: Consume prescribed supplement  Outcome: Progressing  Goal: Adequate PO fluid intake  Outcome: Progressing  Goal: Nutrition support goals are met within 48 hrs  Outcome: Progressing  Goal: Nutrition support is meeting 75% of nutrient needs  Outcome: Progressing  Goal: Tube feed tolerance  Outcome: Progressing  Goal: BG  mg/dL  Outcome: Progressing  Goal: Lab values WNL  Outcome: Progressing  Goal: Electrolytes WNL  Outcome: Progressing  Goal: Promote healing  Outcome: Progressing  Goal: Maintain stable weight  Outcome: Progressing  Goal: Reduce weight from edema/fluid  Outcome: Progressing  Goal: Gradual weight gain  Outcome: Progressing  Goal: Improve ostomy output  Outcome: Progressing   The patient's goals for the shift include      The clinical goals for the shift include

## 2024-07-20 NOTE — PROGRESS NOTES
Ilda Peter is a 23 y.o. 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    Subjective   Overall feeling well, had one episode of nausea and vomiting this AM, patient felt it was related to indigestion and not chemo related. Denies CP SOB palpitations HA vision changes no abdominal pain dysuria or flank pain    Continues to endorse vaginal bleeding that has not changed in several weeks, stated the menorrhea has lasted approx 3 weeks, without any secondary symptoms, denies cramping, weight gain. Denies clots. Encouraged patient to notify team if any sudden changes in bleeding volume        Objective     Physical Exam  Constitutional:       General: She is not in acute distress.     Appearance: She is not toxic-appearing.   HENT:      Head: Normocephalic.      Nose: Nose normal. No congestion.      Mouth/Throat:      Mouth: Mucous membranes are moist.      Pharynx: Oropharynx is clear. No oropharyngeal exudate.   Eyes:      General: No scleral icterus.     Pupils: Pupils are equal, round, and reactive to light.   Cardiovascular:      Rate and Rhythm: Normal rate and regular rhythm.   Pulmonary:      Effort: Pulmonary effort is normal. No respiratory distress.      Breath sounds: Normal breath sounds. No wheezing or rhonchi.   Abdominal:      General: Bowel sounds are normal. There is no distension.      Palpations: Abdomen is soft.      Tenderness: There is no abdominal tenderness. There is no guarding.   Genitourinary:     Comments: Vaginal bleeding  Musculoskeletal:         General: No swelling or deformity. Normal range of motion.      Cervical back: Normal range of motion.   Skin:     General: Skin is warm.      Capillary Refill: Capillary refill takes less than 2 seconds.      Findings: Bruising present.   Neurological:      Mental Status: She is alert and oriented to person, place, and time.         Last Recorded Vitals  Blood pressure  "110/66, pulse 84, temperature 36.8 °C (98.2 °F), temperature source Temporal, resp. rate 16, height (S) 1.65 m (5' 4.96\"), weight (S) 61.9 kg (136 lb 7.4 oz), last menstrual period 07/17/2024, SpO2 100%, not currently breastfeeding.  Intake/Output last 3 Shifts:  No intake/output data recorded.    Relevant Results  Scheduled medications  acyclovir, 400 mg, oral, q12h JORDYN  allopurinol, 300 mg, oral, Daily  cytarabine, 100 mg/m2 (Treatment Plan Recorded), intravenous, Once  cytarabine, 100 mg/m2 (Treatment Plan Recorded), intravenous, Once  dexAMETHasone, 12 mg, intravenous, Once  IDArubicin, 12 mg/m2 (Treatment Plan Recorded), intravenous, Once  ondansetron, 16 mg, intravenous, Once  pantoprazole, 40 mg, oral, Daily before breakfast  posaconazole, 300 mg, oral, Daily with breakfast  sertraline, 50 mg, oral, Daily        Assessment/Plan   Active Problems:    Acute myeloid leukemia in adult (Multi)  24 yo female with newly diagnosed AML admitted on 7/19/24 for induction chemotherapy with Cytarabine and Idarubicin.     Currently D2 Induction 7+3 (Estefany) started on 7/19    ONC:  - Newly diagnosed AML   - Bmbx (7/5/24) c/w hypercellular BM w/erythroid predominant hematopoiesis, dyserythropoiesis and increased blasts c/w high grade myeloid neoplasm.  - FISH neg  - Myeloid NGS: NPM1 (54%), NRAS (25%), PTPN11 (15%), IDH1 (2%)  - HLA typing sent 7/9    CHEMO:  Hydration and anti-emetics per chemotherapy order set  Cytarabine (100mg/m2) = 170mg IV daily x 7 days (7/19->)  Idarubicin (12mg/m2) = 20mg IV daily x 3 days (7/19->)    - HCG, beta quantitative neg on admit    HEME:  Pancytopenia d/t malignancy, known vaginal bleeding on admit   - Keep hgb > 7 and Plts > 30 (for vaginal bleeding)  - Transfuse as needed     ID:  - Afebrile  - Started ACV and Posaconazole prophy on admit  - Plan Levaquin when neutropenic  - Plan Zosyn if febrile        FEN/GI:  - Admit wt: 61.9 kg  Hydration and anti-emetics per chemotherapy order set   - " Monitor electrolytes and replete as needed  - Started PPI on admit  - PRN Compazine/PRN Zofran on admit.  - Cont Allopurinol prophy        CV:  - ECHO (7/16/23) w/EF of 55%     GYN:  Prolong Vaginal bleeding since early July  --Depo shot last 05/2024   --Consider consulting Gyn for recommendations on hormone therapy given break thru bleeding on current treatment   - Per Dr. Fields, plan to contact Fertility team early next week     PSYCH:  - Anxiety. Cont Sertraline     DISP:  - Full Code  - Patient of Dr. Fields  - Bailon line placed in IR on 7/17/24  - PT ordered    seen, discussed and examined with Dr. Brandee Max, APRN-CNP

## 2024-07-20 NOTE — CARE PLAN
The patient's goals for the shift include remain HDS.     The clinical goals for the shift include Patient will remain HDS through end of shift    Over the shift, the patient did not make progress toward the following goals. Barriers to progression include chemotherapy administration. Recommendations to address these barriers include encourage patient to be up for meals and ambulating in hallway.

## 2024-07-21 VITALS
TEMPERATURE: 98.2 F | RESPIRATION RATE: 16 BRPM | HEIGHT: 65 IN | DIASTOLIC BLOOD PRESSURE: 66 MMHG | WEIGHT: 138 LBS | HEART RATE: 80 BPM | SYSTOLIC BLOOD PRESSURE: 110 MMHG | BODY MASS INDEX: 22.99 KG/M2 | OXYGEN SATURATION: 98 %

## 2024-07-21 LAB
ABO GROUP (TYPE) IN BLOOD: NORMAL
ALBUMIN SERPL BCP-MCNC: 3.9 G/DL (ref 3.4–5)
ALBUMIN SERPL BCP-MCNC: 3.9 G/DL (ref 3.4–5)
ALP SERPL-CCNC: 65 U/L (ref 33–110)
ALT SERPL W P-5'-P-CCNC: 18 U/L (ref 7–45)
ANION GAP SERPL CALC-SCNC: 13 MMOL/L (ref 10–20)
ANION GAP SERPL CALC-SCNC: 13 MMOL/L (ref 10–20)
ANTIBODY SCREEN: NORMAL
AST SERPL W P-5'-P-CCNC: 31 U/L (ref 9–39)
BASOPHILS # BLD AUTO: 0.01 X10*3/UL (ref 0–0.1)
BASOPHILS NFR BLD AUTO: 0.6 %
BILIRUB SERPL-MCNC: 1.8 MG/DL (ref 0–1.2)
BLOOD EXPIRATION DATE: NORMAL
BUN SERPL-MCNC: 16 MG/DL (ref 6–23)
BUN SERPL-MCNC: 18 MG/DL (ref 6–23)
CALCIUM SERPL-MCNC: 9 MG/DL (ref 8.6–10.6)
CALCIUM SERPL-MCNC: 9 MG/DL (ref 8.6–10.6)
CHLORIDE SERPL-SCNC: 103 MMOL/L (ref 98–107)
CHLORIDE SERPL-SCNC: 104 MMOL/L (ref 98–107)
CO2 SERPL-SCNC: 24 MMOL/L (ref 21–32)
CO2 SERPL-SCNC: 24 MMOL/L (ref 21–32)
CREAT SERPL-MCNC: 0.85 MG/DL (ref 0.5–1.05)
CREAT SERPL-MCNC: 0.93 MG/DL (ref 0.5–1.05)
DISPENSE STATUS: NORMAL
EGFRCR SERPLBLD CKD-EPI 2021: 89 ML/MIN/1.73M*2
EGFRCR SERPLBLD CKD-EPI 2021: >90 ML/MIN/1.73M*2
EOSINOPHIL # BLD AUTO: 0 X10*3/UL (ref 0–0.7)
EOSINOPHIL NFR BLD AUTO: 0 %
ERYTHROCYTE [DISTWIDTH] IN BLOOD BY AUTOMATED COUNT: 15.9 % (ref 11.5–14.5)
GLUCOSE BLD MANUAL STRIP-MCNC: 123 MG/DL (ref 74–99)
GLUCOSE SERPL-MCNC: 183 MG/DL (ref 74–99)
GLUCOSE SERPL-MCNC: 260 MG/DL (ref 74–99)
HCT VFR BLD AUTO: 19.7 % (ref 36–46)
HGB BLD-MCNC: 6.5 G/DL (ref 12–16)
IMM GRANULOCYTES # BLD AUTO: 0.1 X10*3/UL (ref 0–0.7)
IMM GRANULOCYTES NFR BLD AUTO: 5.7 % (ref 0–0.9)
LYMPHOCYTES # BLD AUTO: 0.33 X10*3/UL (ref 1.2–4.8)
LYMPHOCYTES NFR BLD AUTO: 19 %
MAGNESIUM SERPL-MCNC: 1.79 MG/DL (ref 1.6–2.4)
MCH RBC QN AUTO: 30.7 PG (ref 26–34)
MCHC RBC AUTO-ENTMCNC: 33 G/DL (ref 32–36)
MCV RBC AUTO: 93 FL (ref 80–100)
MONOCYTES # BLD AUTO: 0.09 X10*3/UL (ref 0.1–1)
MONOCYTES NFR BLD AUTO: 5.2 %
NEUTROPHILS # BLD AUTO: 1.21 X10*3/UL (ref 1.2–7.7)
NEUTROPHILS NFR BLD AUTO: 69.5 %
NRBC BLD-RTO: 2.9 /100 WBCS (ref 0–0)
PHOSPHATE SERPL-MCNC: 3.9 MG/DL (ref 2.5–4.9)
PLATELET # BLD AUTO: 33 X10*3/UL (ref 150–450)
POTASSIUM SERPL-SCNC: 4.1 MMOL/L (ref 3.5–5.3)
POTASSIUM SERPL-SCNC: 4.1 MMOL/L (ref 3.5–5.3)
PRODUCT BLOOD TYPE: 5100
PRODUCT CODE: NORMAL
PROT SERPL-MCNC: 6.1 G/DL (ref 6.4–8.2)
RBC # BLD AUTO: 2.12 X10*6/UL (ref 4–5.2)
RBC MORPH BLD: NORMAL
RH FACTOR (ANTIGEN D): NORMAL
SODIUM SERPL-SCNC: 136 MMOL/L (ref 136–145)
SODIUM SERPL-SCNC: 137 MMOL/L (ref 136–145)
UNIT ABO: NORMAL
UNIT NUMBER: NORMAL
UNIT RH: NORMAL
UNIT VOLUME: 350
URATE SERPL-MCNC: 3 MG/DL (ref 2.3–6.7)
URATE SERPL-MCNC: 3.3 MG/DL (ref 2.3–6.7)
WBC # BLD AUTO: 1.7 X10*3/UL (ref 4.4–11.3)
XM INTEP: NORMAL

## 2024-07-21 PROCEDURE — 83735 ASSAY OF MAGNESIUM: CPT | Performed by: PHYSICIAN ASSISTANT

## 2024-07-21 PROCEDURE — 1170000001 HC PRIVATE ONCOLOGY ROOM DAILY

## 2024-07-21 PROCEDURE — 36430 TRANSFUSION BLD/BLD COMPNT: CPT

## 2024-07-21 PROCEDURE — 85025 COMPLETE CBC W/AUTO DIFF WBC: CPT | Performed by: PHYSICIAN ASSISTANT

## 2024-07-21 PROCEDURE — 84550 ASSAY OF BLOOD/URIC ACID: CPT | Performed by: PHYSICIAN ASSISTANT

## 2024-07-21 PROCEDURE — 82947 ASSAY GLUCOSE BLOOD QUANT: CPT

## 2024-07-21 PROCEDURE — 84075 ASSAY ALKALINE PHOSPHATASE: CPT | Performed by: PHYSICIAN ASSISTANT

## 2024-07-21 PROCEDURE — 99233 SBSQ HOSP IP/OBS HIGH 50: CPT | Performed by: INTERNAL MEDICINE

## 2024-07-21 PROCEDURE — 80069 RENAL FUNCTION PANEL: CPT | Mod: CCI | Performed by: PHYSICIAN ASSISTANT

## 2024-07-21 PROCEDURE — 2500000002 HC RX 250 W HCPCS SELF ADMINISTERED DRUGS (ALT 637 FOR MEDICARE OP, ALT 636 FOR OP/ED): Performed by: PHYSICIAN ASSISTANT

## 2024-07-21 PROCEDURE — 86923 COMPATIBILITY TEST ELECTRIC: CPT

## 2024-07-21 PROCEDURE — P9040 RBC LEUKOREDUCED IRRADIATED: HCPCS

## 2024-07-21 PROCEDURE — 2500000001 HC RX 250 WO HCPCS SELF ADMINISTERED DRUGS (ALT 637 FOR MEDICARE OP): Performed by: PHYSICIAN ASSISTANT

## 2024-07-21 PROCEDURE — 2500000001 HC RX 250 WO HCPCS SELF ADMINISTERED DRUGS (ALT 637 FOR MEDICARE OP)

## 2024-07-21 PROCEDURE — 86901 BLOOD TYPING SEROLOGIC RH(D): CPT

## 2024-07-21 RX ORDER — DEXTROSE 50 % IN WATER (D50W) INTRAVENOUS SYRINGE
12.5
Status: DISCONTINUED | OUTPATIENT
Start: 2024-07-21 | End: 2024-07-24

## 2024-07-21 RX ORDER — INSULIN LISPRO 100 [IU]/ML
0-5 INJECTION, SOLUTION INTRAVENOUS; SUBCUTANEOUS
Status: DISCONTINUED | OUTPATIENT
Start: 2024-07-21 | End: 2024-07-23

## 2024-07-21 RX ORDER — DEXTROSE 50 % IN WATER (D50W) INTRAVENOUS SYRINGE
25
Status: DISCONTINUED | OUTPATIENT
Start: 2024-07-21 | End: 2024-07-24

## 2024-07-21 ASSESSMENT — COGNITIVE AND FUNCTIONAL STATUS - GENERAL
DAILY ACTIVITIY SCORE: 24
MOBILITY SCORE: 24
MOBILITY SCORE: 24
DAILY ACTIVITIY SCORE: 24

## 2024-07-21 ASSESSMENT — PAIN - FUNCTIONAL ASSESSMENT
PAIN_FUNCTIONAL_ASSESSMENT: 0-10

## 2024-07-21 ASSESSMENT — PAIN SCALES - GENERAL
PAINLEVEL_OUTOF10: 0 - NO PAIN

## 2024-07-21 NOTE — PROGRESS NOTES
"Ilda Peter is a 23 y.o. female on day 2 of admission presenting with Acute myeloid leukemia (AML) due to recurrent genetic abnormality (Multi).    Subjective   Afebrile. Offers no complaints     Vaginal bleeding has stopped        Objective     Physical Exam  Constitutional:       General: She is not in acute distress.     Appearance: She is not toxic-appearing.   HENT:      Head: Normocephalic.      Nose: Nose normal. No congestion.      Mouth/Throat:      Mouth: Mucous membranes are moist.      Pharynx: Oropharynx is clear. No oropharyngeal exudate.   Eyes:      General: No scleral icterus.     Pupils: Pupils are equal, round, and reactive to light.   Cardiovascular:      Rate and Rhythm: Normal rate and regular rhythm.   Pulmonary:      Effort: Pulmonary effort is normal. No respiratory distress.      Breath sounds: Normal breath sounds. No wheezing or rhonchi.   Abdominal:      General: Bowel sounds are normal. There is no distension.      Palpations: Abdomen is soft.      Tenderness: There is no abdominal tenderness. There is no guarding.   Genitourinary:     Comments: Vaginal bleeding  Musculoskeletal:         General: No swelling or deformity. Normal range of motion.      Cervical back: Normal range of motion.   Skin:     General: Skin is warm.      Capillary Refill: Capillary refill takes less than 2 seconds.      Findings: Bruising present.   Neurological:      Mental Status: She is alert and oriented to person, place, and time.         Last Recorded Vitals  Blood pressure 103/64, pulse 78, temperature 36.7 °C (98.1 °F), temperature source Temporal, resp. rate 14, height (S) 1.65 m (5' 4.96\"), weight 62.6 kg (138 lb), last menstrual period 07/17/2024, SpO2 100%, not currently breastfeeding.  Intake/Output last 3 Shifts:  I/O last 3 completed shifts:  In: 1278 (20.4 mL/kg) [P.O.:100; IV Piggyback:1178]  Out: - (0 mL/kg)   Weight: 62.6 kg     Relevant Results  Scheduled medications  acyclovir, 400 mg, oral, " q12h JORDYN  allopurinol, 300 mg, oral, Daily  cytarabine, 100 mg/m2 (Treatment Plan Recorded), intravenous, Once  cytarabine, 100 mg/m2 (Treatment Plan Recorded), intravenous, Once  dexAMETHasone, 12 mg, intravenous, Once  IDArubicin, 12 mg/m2 (Treatment Plan Recorded), intravenous, Once  ondansetron, 16 mg, intravenous, Once  pantoprazole, 40 mg, oral, Daily before breakfast  posaconazole, 300 mg, oral, Daily with breakfast  sertraline, 50 mg, oral, Daily        Assessment/Plan   Active Problems:    Acute myeloid leukemia in adult (Multi)  24 yo female with newly diagnosed AML admitted on 7/19/24 for induction chemotherapy with Cytarabine and Idarubicin.     Currently D3 Induction 7+3 (Estefany) started on 7/19    ONC:  - Newly diagnosed AML   - Bmbx (7/5/24) c/w hypercellular BM w/erythroid predominant hematopoiesis, dyserythropoiesis and increased blasts c/w high grade myeloid neoplasm.  - FISH neg  - Myeloid NGS: NPM1 (54%), NRAS (25%), PTPN11 (15%), IDH1 (2%)  - HLA typing sent 7/9    CHEMO:  Hydration and anti-emetics per chemotherapy order set  Cytarabine (100mg/m2) = 170mg IV daily x 7 days (7/19->)  Idarubicin (12mg/m2) = 20mg IV daily x 3 days (7/19->)    - HCG, beta quantitative neg on admit    HEME:  Pancytopenia d/t malignancy, known vaginal bleeding on admit   - Keep hgb > 7 and Plts > 30 (for vaginal bleeding)      - RBC transfusion 7/21  - Transfuse as needed     ID:  - Afebrile  - Started ACV and Posaconazole prophy on admit  - Plan Levaquin when neutropenic  - Plan Zosyn if febrile        FEN/GI:  - Admit wt: 61.9 kg Daily Wt: 62.6 kg  Hydration and anti-emetics per chemotherapy order set   - Monitor electrolytes and replete as needed  - Started PPI on admit  - PRN Compazine/PRN Zofran on admit.  - Cont Allopurinol prophy        CV:  - ECHO (7/16/23) w/EF of 55%     GYN:  Prolong Vaginal bleeding since early July  --Depo shot last 05/2024   --Consider consulting Gyn for recommendations on hormone therapy  given break thru bleeding on current treatment   - Fertility team Monday 7/21 for hormone therapy     ENDO:  Steroid induced hyperglycemia  - Lispro SS #1 with meals     - A1c (7/22): PENDING     PSYCH:  - Anxiety. Cont Sertraline     DISP:  - Full Code  - Patient of Dr. Fields  - Bailon line placed in IR on 7/17/24  - PT ordered    seen, discussed and examined with Dr. Brandee Max, APRN-CNP

## 2024-07-21 NOTE — CARE PLAN
Problem: Nutrition  Goal: Adequate PO fluid intake  Outcome: Progressing  Goal: BG  mg/dL  Outcome: Progressing  Goal: Lab values WNL  Outcome: Progressing  Goal: Electrolytes WNL  Outcome: Progressing  Goal: Promote healing  Outcome: Progressing  Goal: Maintain stable weight  Outcome: Progressing     Problem: Pain - Adult  Goal: Verbalizes/displays adequate comfort level or baseline comfort level  Outcome: Progressing     Problem: Safety - Adult  Goal: Free from fall injury  Outcome: Progressing     Problem: PICC line  Goal: I will remain free from symptoms of infection  Outcome: Progressing

## 2024-07-21 NOTE — CARE PLAN
The patient's goals for the shift include      The clinical goals for the shift include patient shall remain safe and free from falls    Over the shift, the patient did not make progress toward the following goals. Barriers to progression include n/a. Recommendations to address these barriers include n/a.      Problem: Nutrition  Goal: Less than 5 days NPO/clear liquids  Outcome: Progressing  Goal: Oral intake greater than 50%  Outcome: Progressing  Goal: Oral intake greater 75%  Outcome: Progressing  Goal: Consume prescribed supplement  Outcome: Progressing  Goal: Adequate PO fluid intake  Outcome: Progressing  Goal: Nutrition support goals are met within 48 hrs  Outcome: Progressing  Goal: Nutrition support is meeting 75% of nutrient needs  Outcome: Progressing  Goal: Tube feed tolerance  Outcome: Progressing  Goal: BG  mg/dL  Outcome: Progressing  Goal: Lab values WNL  Outcome: Progressing  Goal: Electrolytes WNL  Outcome: Progressing  Goal: Promote healing  Outcome: Progressing  Goal: Maintain stable weight  Outcome: Progressing  Goal: Reduce weight from edema/fluid  Outcome: Progressing  Goal: Gradual weight gain  Outcome: Progressing  Goal: Improve ostomy output  Outcome: Progressing

## 2024-07-22 LAB
ALBUMIN SERPL BCP-MCNC: 3.9 G/DL (ref 3.4–5)
ALBUMIN SERPL BCP-MCNC: 4.1 G/DL (ref 3.4–5)
ALP SERPL-CCNC: 59 U/L (ref 33–110)
ALT SERPL W P-5'-P-CCNC: 14 U/L (ref 7–45)
ANION GAP SERPL CALC-SCNC: 11 MMOL/L (ref 10–20)
ANION GAP SERPL CALC-SCNC: 12 MMOL/L (ref 10–20)
APTT PPP: 24 SECONDS (ref 27–38)
AST SERPL W P-5'-P-CCNC: 24 U/L (ref 9–39)
BASOPHILS # BLD MANUAL: 0.02 X10*3/UL (ref 0–0.1)
BASOPHILS NFR BLD MANUAL: 0.9 %
BILIRUB SERPL-MCNC: 2.1 MG/DL (ref 0–1.2)
BUN SERPL-MCNC: 18 MG/DL (ref 6–23)
BUN SERPL-MCNC: 19 MG/DL (ref 6–23)
CALCIUM SERPL-MCNC: 9 MG/DL (ref 8.6–10.6)
CALCIUM SERPL-MCNC: 9.1 MG/DL (ref 8.6–10.6)
CHLORIDE SERPL-SCNC: 102 MMOL/L (ref 98–107)
CHLORIDE SERPL-SCNC: 103 MMOL/L (ref 98–107)
CO2 SERPL-SCNC: 26 MMOL/L (ref 21–32)
CO2 SERPL-SCNC: 26 MMOL/L (ref 21–32)
CREAT SERPL-MCNC: 0.79 MG/DL (ref 0.5–1.05)
CREAT SERPL-MCNC: 0.82 MG/DL (ref 0.5–1.05)
EGFRCR SERPLBLD CKD-EPI 2021: >90 ML/MIN/1.73M*2
EGFRCR SERPLBLD CKD-EPI 2021: >90 ML/MIN/1.73M*2
EOSINOPHIL # BLD MANUAL: 0 X10*3/UL (ref 0–0.7)
EOSINOPHIL NFR BLD MANUAL: 0 %
ERYTHROCYTE [DISTWIDTH] IN BLOOD BY AUTOMATED COUNT: 15.5 % (ref 11.5–14.5)
EST. AVERAGE GLUCOSE BLD GHB EST-MCNC: 126 MG/DL
GLUCOSE BLD MANUAL STRIP-MCNC: 115 MG/DL (ref 74–99)
GLUCOSE BLD MANUAL STRIP-MCNC: 125 MG/DL (ref 74–99)
GLUCOSE BLD MANUAL STRIP-MCNC: 126 MG/DL (ref 74–99)
GLUCOSE SERPL-MCNC: 129 MG/DL (ref 74–99)
GLUCOSE SERPL-MCNC: 161 MG/DL (ref 74–99)
HBA1C MFR BLD: 6 %
HCT VFR BLD AUTO: 23.7 % (ref 36–46)
HGB BLD-MCNC: 8 G/DL (ref 12–16)
IMM GRANULOCYTES # BLD AUTO: 0.03 X10*3/UL (ref 0–0.7)
IMM GRANULOCYTES NFR BLD AUTO: 1.3 % (ref 0–0.9)
INR PPP: 1.1 (ref 0.9–1.1)
LYMPHOCYTES # BLD MANUAL: 0.12 X10*3/UL (ref 1.2–4.8)
LYMPHOCYTES NFR BLD MANUAL: 5.3 %
MAGNESIUM SERPL-MCNC: 1.84 MG/DL (ref 1.6–2.4)
MCH RBC QN AUTO: 30.7 PG (ref 26–34)
MCHC RBC AUTO-ENTMCNC: 33.8 G/DL (ref 32–36)
MCV RBC AUTO: 91 FL (ref 80–100)
MONOCYTES # BLD MANUAL: 0 X10*3/UL (ref 0.1–1)
MONOCYTES NFR BLD MANUAL: 0 %
NEUTROPHILS # BLD MANUAL: 2.14 X10*3/UL (ref 1.2–7.7)
NEUTS BAND # BLD MANUAL: 0.02 X10*3/UL (ref 0–0.7)
NEUTS BAND NFR BLD MANUAL: 0.9 %
NEUTS SEG # BLD MANUAL: 2.12 X10*3/UL (ref 1.2–7)
NEUTS SEG NFR BLD MANUAL: 92 %
NRBC BLD-RTO: 1.3 /100 WBCS (ref 0–0)
PHOSPHATE SERPL-MCNC: 3.8 MG/DL (ref 2.5–4.9)
PLATELET # BLD AUTO: 38 X10*3/UL (ref 150–450)
POTASSIUM SERPL-SCNC: 4 MMOL/L (ref 3.5–5.3)
POTASSIUM SERPL-SCNC: 4.1 MMOL/L (ref 3.5–5.3)
PROT SERPL-MCNC: 6.1 G/DL (ref 6.4–8.2)
PROTHROMBIN TIME: 12.2 SECONDS (ref 9.8–12.8)
RBC # BLD AUTO: 2.61 X10*6/UL (ref 4–5.2)
RBC MORPH BLD: ABNORMAL
SODIUM SERPL-SCNC: 135 MMOL/L (ref 136–145)
SODIUM SERPL-SCNC: 137 MMOL/L (ref 136–145)
TOTAL CELLS COUNTED BLD: 112
URATE SERPL-MCNC: 2.8 MG/DL (ref 2.3–6.7)
URATE SERPL-MCNC: 3.1 MG/DL (ref 2.3–6.7)
VARIANT LYMPHS # BLD MANUAL: 0.02 X10*3/UL (ref 0–0.5)
VARIANT LYMPHS NFR BLD: 0.9 %
WBC # BLD AUTO: 2.3 X10*3/UL (ref 4.4–11.3)

## 2024-07-22 PROCEDURE — 84460 ALANINE AMINO (ALT) (SGPT): CPT | Performed by: PHYSICIAN ASSISTANT

## 2024-07-22 PROCEDURE — 80069 RENAL FUNCTION PANEL: CPT | Mod: CCI | Performed by: PHYSICIAN ASSISTANT

## 2024-07-22 PROCEDURE — 99222 1ST HOSP IP/OBS MODERATE 55: CPT | Performed by: NURSE PRACTITIONER

## 2024-07-22 PROCEDURE — 2500000004 HC RX 250 GENERAL PHARMACY W/ HCPCS (ALT 636 FOR OP/ED): Performed by: INTERNAL MEDICINE

## 2024-07-22 PROCEDURE — 83036 HEMOGLOBIN GLYCOSYLATED A1C: CPT | Performed by: NURSE PRACTITIONER

## 2024-07-22 PROCEDURE — 85007 BL SMEAR W/DIFF WBC COUNT: CPT | Performed by: PHYSICIAN ASSISTANT

## 2024-07-22 PROCEDURE — 2500000001 HC RX 250 WO HCPCS SELF ADMINISTERED DRUGS (ALT 637 FOR MEDICARE OP): Performed by: PHYSICIAN ASSISTANT

## 2024-07-22 PROCEDURE — 97161 PT EVAL LOW COMPLEX 20 MIN: CPT | Mod: GP

## 2024-07-22 PROCEDURE — 2500000002 HC RX 250 W HCPCS SELF ADMINISTERED DRUGS (ALT 637 FOR MEDICARE OP, ALT 636 FOR OP/ED): Performed by: PHYSICIAN ASSISTANT

## 2024-07-22 PROCEDURE — 83735 ASSAY OF MAGNESIUM: CPT | Performed by: PHYSICIAN ASSISTANT

## 2024-07-22 PROCEDURE — 1170000001 HC PRIVATE ONCOLOGY ROOM DAILY

## 2024-07-22 PROCEDURE — 99232 SBSQ HOSP IP/OBS MODERATE 35: CPT | Performed by: INTERNAL MEDICINE

## 2024-07-22 PROCEDURE — 84550 ASSAY OF BLOOD/URIC ACID: CPT | Performed by: PHYSICIAN ASSISTANT

## 2024-07-22 PROCEDURE — 82947 ASSAY GLUCOSE BLOOD QUANT: CPT

## 2024-07-22 PROCEDURE — 85027 COMPLETE CBC AUTOMATED: CPT | Performed by: PHYSICIAN ASSISTANT

## 2024-07-22 PROCEDURE — 81371 HLA I & II TYPE VERIFY LR: CPT | Mod: OUT | Performed by: INTERNAL MEDICINE

## 2024-07-22 PROCEDURE — 85730 THROMBOPLASTIN TIME PARTIAL: CPT | Performed by: PHYSICIAN ASSISTANT

## 2024-07-22 RX ORDER — ONDANSETRON HYDROCHLORIDE 2 MG/ML
8 INJECTION, SOLUTION INTRAVENOUS ONCE
Status: COMPLETED | OUTPATIENT
Start: 2024-07-22 | End: 2024-07-22

## 2024-07-22 ASSESSMENT — COGNITIVE AND FUNCTIONAL STATUS - GENERAL
DAILY ACTIVITIY SCORE: 24
MOBILITY SCORE: 24
DAILY ACTIVITIY SCORE: 24
MOBILITY SCORE: 24
MOBILITY SCORE: 24

## 2024-07-22 ASSESSMENT — PAIN - FUNCTIONAL ASSESSMENT
PAIN_FUNCTIONAL_ASSESSMENT: 0-10

## 2024-07-22 ASSESSMENT — PAIN SCALES - GENERAL
PAINLEVEL_OUTOF10: 0 - NO PAIN

## 2024-07-22 ASSESSMENT — ACTIVITIES OF DAILY LIVING (ADL): ADL_ASSISTANCE: INDEPENDENT

## 2024-07-22 NOTE — PROGRESS NOTES
Physical Therapy    Physical Therapy Evaluation    Patient Name: Ilda Peter  MRN: [id number]  Today's Date: 7/22/2024   Time Calculation  Start Time: 1041  Stop Time: 1053  Time Calculation (min): 12 min    Assessment/Plan   PT Assessment  PT Assessment Results:  (Pt demonstrates no deficits at this time; we will continue to monitor pt's status as they continue their stay in the hospital and continue treatment)  Rehab Prognosis: Good  Evaluation/Treatment Tolerance: Patient tolerated treatment well  Medical Staff Made Aware: Yes  End of Session Communication: Bedside nurse  Assessment Comment: Pt is a 23 YOF who presents with AML; pt does not demonstrate any deficits at this time and we will continue to monitor functional mobility as she continues treatment  End of Session Patient Position: Bed, 3 rail up, Alarm off, not on at start of session  IP OR SWING BED PT PLAN  Inpatient or Swing Bed: Inpatient  PT Plan  Treatment/Interventions: Bed mobility, Transfer training, Gait training, Stair training, Balance training, Neuromuscular re-education, Strengthening, Endurance training, Range of motion, Therapeutic exercise, Therapeutic activity, Home exercise program, Positioning, Postural re-education  PT Plan: Ongoing PT  PT Frequency: 1 time per week  PT Discharge Recommendations: No PT needed after discharge  PT Recommended Transfer Status: Independent        Subjective   General Visit Information:  General  Reason for Referral: AML  Past Medical History Relevant to Rehab: Anxiety, Cannabis dependence (Multi) (07/02/2024), Chronic atrophic rhinitis (07/02/2024), Depression, Hyperbilirubinemia, and Tinea versicolor  Prior to Session Communication: Bedside nurse  Patient Position Received: Bed, 3 rail up, Alarm off, not on at start of session  Preferred Learning Style: verbal, visual  General Comment: Pt is pleasant, cooperative, and willing to participate in therapy  Home Living:  Home Living  Type of Home: [place]  Lives  With: Parent(s), Significant other, Other (Comment) (Children; two boys (4 and 6mo))  Home Adaptive Equipment: None  Home Layout: Two level, Able to live on main level with bedroom/bathroom  Home Access: Stairs to enter with rails  Entrance Stairs-Number of Steps: 2  Bathroom Shower/Tub: Tub/shower unit  Bathroom Toilet: Standard  Bathroom Equipment: None  Prior Level of Function:  Prior Function Per Pt/Caregiver Report  Level of [place]: Independent with ADLs and functional transfers, Independent with homemaking with ambulation  Receives Help From: Family  ADL Assistance: Independent  Homemaking Assistance: Independent  Ambulatory Assistance: Independent  Vocational: Full time employment  Leisure: Go on walks, be outside  Prior Function Comments: (+) driving, (-) hx of falls  Precautions:  Precautions  Medical Precautions: Fall precautions  Precautions Comment: Protective H/H: 8/23.7; Plt: 38; WBC: 2.3      Objective   Pain:  Pain Assessment  Pain Assessment: 0-10  0-10 (Numeric) Pain Score: 0 - No pain  Cognition:  Cognition  Overall Cognitive Status: Within Functional Limits  Orientation Level: Oriented X4    General Assessments:  Activity Tolerance  Endurance: Endurance does not limit participation in activity    Sensation  Sensation Comment: Pt does not report numbness or tingling in hands or feet at this time    Postural Control  Postural Control: Within Functional Limits    Static Sitting Balance  Static Sitting-Balance Support: Feet supported, No upper extremity supported  Static Sitting-Level of Assistance: Independent  Dynamic Sitting Balance  Dynamic Sitting-Balance Support: Feet supported, No upper extremity supported  Dynamic Sitting-Comments: Independent    Static Standing Balance  Static Standing-Balance Support: No upper extremity supported  Static Standing-Level of Assistance: Independent  Dynamic Standing Balance  Dynamic Standing-Balance Support: No upper extremity supported  Dynamic  Standing-Comments: Independent  Functional Assessments:  Bed Mobility  Bed Mobility: Yes  Bed Mobility 1  Bed Mobility 1: Supine to sitting, Sitting to supine  Level of Assistance 1: Independent    Transfers  Transfer: Yes  Transfer 1  Transfer From 1: Sit to, Stand to  Transfer to 1: Stand, Sit  Technique 1: Sit to stand, Stand to sit  Transfer Level of Assistance 1: Independent  Trials/Comments 1: multiple trials during session; good control of COM over JORDAN    Ambulation/Gait Training  Ambulation/Gait Training Performed: Yes  Ambulation/Gait Training 1  Surface 1: Level tile  Device 1: IV Pole (Stable enough to walk without IV pole)  Assistance 1: Independent  Quality of Gait 1: Narrow base of support  Comments/Distance (ft) 1: 930 ft; good control of gait, COM over JORDAN well controlled  Extremity/Trunk Assessments:  RUE   RUE : Within Functional Limits (>/= 3+/5 observed via function)  LUE   LUE: Within Functional Limits (>/= 3+/5 observed via function)  RLE   RLE : Within Functional Limits (>/= 3+/5 observed via function)  LLE   LLE : Within Functional Limits (>/= 3+/5 observed via function)  Outcome Measures:  [facility] Basic Mobility  Turning from your back to your side while in a flat bed without using bedrails: None  Moving from lying on your back to sitting on the side of a flat bed without using bedrails: None  Moving to and from bed to chair (including a wheelchair): None  Standing up from a chair using your arms (e.g. wheelchair or bedside chair): None  To walk in hospital room: None  Climbing 3-5 steps with railing: None  Basic Mobility - Total Score: 24    Other Measures  30 Second Sit to Stand: 11 in 30 sec    Encounter Problems       Encounter Problems (Active)       Balance       Pt will score a 25/28 or greater on the Tinetti balance assessment in order to demonstrate low fall risk.  (Progressing)       Start:  07/22/24    Expected End:  08/05/24               Mobility       STG - Patient will ambulate  1000 ft indep with LRD and report no fatigue or SOB (Progressing)       Start:  07/22/24    Expected End:  08/05/24            STG - Patient will ascend and descend four to six stairs indep (Progressing)       Start:  07/22/24    Expected End:  08/05/24            Pt will be able to ambulate 1200 ft independently within 6 minutes in order to demonstrate functional endurance of a community dweller.  (Progressing)       Start:  07/22/24    Expected End:  08/05/24               PT Transfers       STG - Patient will perform bed mobility indep (Progressing)       Start:  07/22/24    Expected End:  08/05/24            Pt will be able to perform 12 STS transfers in 30 seconds to demonstrate average LE strength of community dweller  (Progressing)       Start:  07/22/24    Expected End:  08/05/24                   Education Documentation  Home Exercise Program, taught by WIL Ramesh at 7/22/2024 11:27 AM.  Learner: Patient  Readiness: Acceptance  Method: Explanation  Response: Verbalizes Understanding    Mobility Training, taught by WIL Ramesh at 7/22/2024 11:27 AM.  Learner: Patient  Readiness: Acceptance  Method: Explanation  Response: Verbalizes Understanding    Education Comments  No comments found.

## 2024-07-22 NOTE — PROGRESS NOTES
"Ilda Peter is a 23 y.o. female on day 3 of admission presenting with Acute myeloid leukemia (AML) due to recurrent genetic abnormality (Multi).    Subjective   Afebrile. Offers no complaints no episodes of NV in the past 24h    Vaginal bleeding has stopped        Objective     Physical Exam  Constitutional:       General: She is not in acute distress.     Appearance: She is not toxic-appearing.   HENT:      Head: Normocephalic.      Nose: Nose normal. No congestion.      Mouth/Throat:      Mouth: Mucous membranes are moist.      Pharynx: Oropharynx is clear. No oropharyngeal exudate.   Eyes:      General: No scleral icterus.     Pupils: Pupils are equal, round, and reactive to light.   Cardiovascular:      Rate and Rhythm: Normal rate and regular rhythm.   Pulmonary:      Effort: Pulmonary effort is normal. No respiratory distress.      Breath sounds: Normal breath sounds. No wheezing or rhonchi.   Abdominal:      General: Bowel sounds are normal. There is no distension.      Palpations: Abdomen is soft.      Tenderness: There is no abdominal tenderness. There is no guarding.   Musculoskeletal:         General: No swelling or deformity. Normal range of motion.      Cervical back: Normal range of motion.   Skin:     General: Skin is warm.      Capillary Refill: Capillary refill takes less than 2 seconds.      Findings: Bruising present.   Neurological:      Mental Status: She is alert and oriented to person, place, and time.         Last Recorded Vitals  Blood pressure 115/56, pulse 80, temperature 36.5 °C (97.7 °F), temperature source Temporal, resp. rate 16, height (S) 1.65 m (5' 4.96\"), weight 61.4 kg (135 lb 5.8 oz), last menstrual period 07/17/2024, SpO2 100%, not currently breastfeeding.  Intake/Output last 3 Shifts:  I/O last 3 completed shifts:  In: 1068.1 (17.1 mL/kg) [P.O.:490; Blood:322.1; IV Piggyback:256]  Out: 2 (0 mL/kg) [Urine:2 (0 mL/kg/hr)]  Weight: 62.6 kg     Relevant Results  Scheduled " medications  acyclovir, 400 mg, oral, q12h JORDYN  allopurinol, 300 mg, oral, Daily  cytarabine, 100 mg/m2 (Treatment Plan Recorded), intravenous, Once  cytarabine, 100 mg/m2 (Treatment Plan Recorded), intravenous, Once  insulin lispro, 0-5 Units, subcutaneous, TID  ondansetron, 8 mg, intravenous, Once  pantoprazole, 40 mg, oral, Daily before breakfast  posaconazole, 300 mg, oral, Daily with breakfast  sertraline, 50 mg, oral, Daily        Assessment/Plan   Active Problems:    Acute myeloid leukemia in adult (Multi)  Ms. Ilda Peter is a 24 yo female with newly diagnosed AML admitted on 7/19/24 for induction chemotherapy with Cytarabine and Idarubicin.     Currently D4 Induction 7+3 (Estefany) started on 7/19    ONC:  - Newly diagnosed AML   - Bmbx (7/5/24) c/w hypercellular BM w/erythroid predominant hematopoiesis, dyserythropoiesis and increased blasts c/w high grade myeloid neoplasm.  - FISH neg  - Myeloid NGS: NPM1 (54%), NRAS (25%), PTPN11 (15%), IDH1 (2%)  - HLA typing sent 7/9    CHEMO:  Hydration and anti-emetics per chemotherapy order set  Cytarabine (100mg/m2) = 170mg IV daily x 7 days (7/19->)  Idarubicin (12mg/m2) = 20mg IV daily x 3 days (7/19->)    - HCG, beta quantitative neg on admit  HLA typing sent (7/23)    HEME:  Pancytopenia d/t malignancy, known vaginal bleeding on admit since resolved  - Keep hgb > 7 and Plts > 10       - RBC transfusion 7/21  - Transfuse as needed     ID:  - Afebrile  - Started ACV and Posaconazole prophy on admit  - Plan Levaquin when neutropenic  - Plan Zosyn if febrile        FEN/GI:  - Admit wt: 61.9 kg Daily Wt: 62.6 kg  Hydration and anti-emetics per chemotherapy order set   - Monitor electrolytes and replete as needed  - Started PPI on admit  - PRN Compazine/PRN Zofran on admit.  - Cont Allopurinol prophy        CV:  - ECHO (7/16/23) w/EF of 55%     GYN:  Prolong Vaginal bleeding since early July  --Depo shot last 05/2024   --Consider consulting Gyn for recommendations on  hormone therapy given break thru bleeding on current treatment   - [facility] Fertility consulted (7/22) appreciate recs    ENDO:  Steroid induced hyperglycemia  - Lispro SS #1 with meals     - A1c (7/22): 6    Elevated Liver Enzymes of unclear etiology   - Tbili (7/22) 2   - will continue to trend T/Th/Sat  - may need to adjust antifungal if levels continue to elevate     PSYCH:  - Anxiety. Cont Sertraline     DISP:  - Full Code  - Patient of Dr. Fields  - Bailon line placed in IR on 7/17/24  - PT ordered    seen, discussed and examined with Dr. Layton Max, APRN-CNP

## 2024-07-22 NOTE — PROGRESS NOTES
Child Life Assessment:   Reason for Consult  Discipline: Child Life Specialist  Reason for Consult: Family support, Coping skill development/planning  Referral Source: Self         Patient Intervention(s)  Type of Intervention Performed: Healing environment interventions  Healing Environment Intervention(s): Assessment, Coping skill development/planning, Empathetic listening/validation of emotions, Resources provided, Orientation to services, Expressive outlet            Session Details: Certified Child Life Specialist (CCLS) met with patient to introduce services, assess coping and provide support.  CCLS and Patient explored some of Patient's coping mechanisms and talked about Patient's children.  Patient stated that today is her son's 4th birthday.  CCLS provided some education and information on helping children understand and managing a parent's illness.  CCLS also provided some developmentally appropriate activities for when Patient's son comes to visit.   Child Life will continue to follow.    Rose Florentino MA, CCLS (secure chat)

## 2024-07-22 NOTE — CONSULTS
Reason For Consult  Fertility Preservation/Young Adult Oncology    History Of Present Illness  Ilda Peter is a 23 y.o. female presenting with new diagnosis of acute myeloid leukemia.    Relays history of GI side effects beginning in February which she attributed to her injection of depo-provera; these symptoms initially resolved and then recurred after second dose of depo. She then went on to develop jaundice and dizziness prompting her to present to ED 24; there she was found to be pancytopenic, smear concerning for infiltrative process/blasts noted. 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     She was admitted  for induction chemotherapy with cytarabine and idarubicin (7+3).     Overall relays coping well. Notes history of anxiety, well controlled with sertraline, has never done 1:1 therapy in the past. Feels adjusted to illness, notes boredom and irritation at prolonged inpatient stay. Understands diagnosis, underlying molecular testing and future treatment plan pending she achieves remission. Relays prior to diagnosis she experienced prolonged vaginal bleeding x 4 weeks, vaginal bleeding stopped yesterday. She is interested in future family building.     OB History:       Menstrual/Gyn History: Menarche at 11  LMP: 24  Cycle Length: 29 days                                 Premenstrual spotting: occasionally                    Dysmenorrhea: no  Dyspareunia: no  Abnl paps: no  Last pap:   STD: no  Contraception: yes, prior depo provera last dose 2024, OCPs historically  Hx PCOS: no  Hx cysts: no  Hx endometriosis: no  Hx fibroids: no  Previous mammogram: no  Sexual activity: yes - straight female, no concerns with sexual function    Endocrine/Infertility History:  Hx of infertility: no  Nipple Discharge: no  Vision changes / headaches: no  Excess hair growth: no  Acne/oily skin: yes  Recent weight change:  "no  Exercise: no     Family History:  Baptist heritage: no  Breast ca: no  Ovarian ca: no  Other gyn ca: no  Colon ca: no  Blood ca: no  VTE < 50 years: no  MI or stroke < 50: no  CF: no  MR: no  Sickle cell disease: no  SIblings: 1 brother, Children: 2 sons, Trouble conceiving or miscarriages no  Genetic diseases/birth defects: no    Social History: Grew up in and lives in [place] with her two children. Is close to parents. Works as a supervisor at [employer]. Is in a relationship, boyfriend is the father of her 6mo old son Tay, she also has a 4 year old son from a previous relationship, Sergio. She is a never smoker, she does not vape, she does not drink alcohol, she does use marijuana.      Past Medical History  She has a past medical history of AML (acute myeloblastic leukemia) (Multi), Anxiety, Cannabis dependence (Multi) (07/02/2024), Chronic atrophic rhinitis (07/02/2024), Depression, Hyperbilirubinemia, and Tinea versicolor (07/02/2024).    Surgical History  She has a past surgical history that includes [place] tooth extraction.     Social History  She reports that she has never smoked. She has never used smokeless tobacco. She reports that she does not currently use alcohol. She reports current drug use. Drug: Marijuana.    Family History  Family History   Problem Relation Name Age of Onset    No Known Problems Mother      No Known Problems Father      No Known Problems Brother      Other (Bicuspid Aortic Valve; VSD) Son Jorge      Allergies  Codeine    Last Recorded Vitals  Blood pressure 115/73, pulse 82, temperature 36.4 °C (97.5 °F), temperature source Temporal, resp. rate 17, height (S) 1.65 m (5' 4.96\"), weight 61.4 kg (135 lb 5.8 oz), last menstrual period 07/17/2024, SpO2 98%, not currently breastfeeding.    Relevant Results  Lab Results   Component Value Date    WBC 2.3 (L) 07/22/2024    HGB 8.0 (L) 07/22/2024    HCT 23.7 (L) 07/22/2024    MCV 91 07/22/2024    PLT 38 (LL) 07/22/2024 "     Assessment/Plan   Ilda Peter is a 23 y.o. F with a recent diagnosis of acute myeloid leukemia, currently receiving induction chemotherapy with cytarabine/idarubicin    #Risk for infertility:  - Discussed the damaging effect cancer treatment can have on the ovaries.   - Discussed natural age related decline in fertility and menopause that occurs as a result of ovarian aging, and that cancer treatments can accellerate that progression and diminish ovarian reserve.  - Individuals may experience varying degrees of short or long term ovarian dysfunction and amenorrhea, and that this is dependent upon type of cancer treatment, cumulative dose, and patients age.   - Loss of ovarian function may be permanent or temporary.  - Amenorrhea may be temporary or permanent -- temporary amenorrhea results from destruction of maturing follicles whereas permanent amenorrhea results from depletion of viable primordial follicles.  - Risk to fertility is LOW based on cancer treatment of Cytarabine/Idarubicin -- we did discuss if she does not respond as anticipated or if stem cell transplant as part of treatment plan her risk for infertility would then be HIGH  - Briefly discussed options of ovarian stimulation/egg retrieval or ovarian tissue freezing in first remission  - Send baseline hormone testing FSH, LH, E2, AMH   - Discussed menstrual suppression/ovarian suppression with lupron - recommend its use based on literature documenting decreased time to count recovery and decreased transfusion burden in leukemia patients - Unable to dose depo lupron while on chemotherapy -- consider its use at least 2 weeks after chemotherapy is administered, when she experiences count recovery,     #Psychosocial:  - Following with child-life services for young adult <25 years and parent to two young boys   - Provided her with resource for how to talk to her children about her cancer  - Introduced young adult oncology supportive resources and social  programs, will send monthly invitations to meet ups     #Sexual Dysfunction/Contraception:  - Will address at follow up    I spent 60 minutes in the professional and overall care of this patient.      LEDY Whitfield-CNP

## 2024-07-22 NOTE — CONSULTS
"Nutrition Initial Assessment:   Nutrition Assessment    --->Reason for Assessment: Admission nursing screening    Patient is a 23 y.o. female with newly diagnosed AML, admitted on 07/19/24 for Induction Chemo--today is day #4 of treatment.    Nutrition History:  This service met with pt this am, was sitting up on couch at time of visit with her.    Tells she has had issues with intermittent n/v x at least the last 4-5 mos, worsened over the last 1-2 mos PTA.    Appetite/PO intakes would depend upon how she was feeling, some days she could eat more than others but reported her appetite as always being, \"small.\" Rather than trying to eat larger meals throughout the day would eat smaller/more frequent meals/snacks. Had not been using any oral nutrition supplements on a regular basis at home.    Since admit, PO remains the same here as what it has been at home. Was eating breakfast meal during visit with her, had consumed 100% of her chocolate pudding + 25% of her pancakes, not finished eating yet. Feels the n/v has been under control since admit, last episode of emesis was 2 days ago. Denied any n/v during visit with her this am.    Reported no issues with diarrhea or trouble chewing/swallowing at home or since admit.    --Vitamin/Herbal Supplement Use: none  --Food Allergies/Intolerances: none       Anthropometrics:  Height: (S) 165 cm (5' 4.96\")   Weight: 61.4 kg (135 lb 5.8 oz)   BMI (Calculated): 22.55  IBW/kg (Dietitian Calculated): 56.8 kg  Percent of IBW: 108 %       Weight History:   --Pt reports she had a baby in 01/2024. Quickly lost ~17# thereafter, feels weight losses a combination of loss of fluids from pregnancy + decreased PO with h/o n/v.    Wt Readings per review of EMR:   07/22/24 61.4 kg (135 lb 5.8 oz) (current wt)   07/17/24 62.1 kg (137 lb)   07/12/24 62.7 kg (138 lb 3.7 oz)   07/09/24 63.4 kg (139 lb 12.4 oz)   07/05/24 63.8 kg (140 lb 10.5 oz)   07/02/24 63.2 kg (139 lb 4.8 oz)   07/01/24 62.1 " kg (137 lb)   05/10/24 69.1 kg (152 lb 4 oz)--->11% wt loss from this date to present (significant)   02/22/24 74.9 kg (165 lb 2 oz)--->18% wt loss from this date to present (significant)   01/02/24 79.6 kg (175 lb 8 oz)--->23% wt loss from this date to present (significant)   12/26/23 79.1 kg (174 lb 6 oz)   12/20/23 78.5 kg (173 lb)   12/04/23 77.5 kg (170 lb 12.8 oz)   11/20/23 76.2 kg (168 lb)   11/02/23 75.4 kg (166 lb 2 oz)   10/18/23 74.6 kg (164 lb 6 oz)   09/29/23 72.6 kg (160 lb)   08/29/23 68.9 kg (152 lb)   07/25/23 65.5 kg (144 lb 7 oz)   06/20/23 66.7 kg (147 lb)   --Pt presenting with significant wt losses, but this writer having difficulty differentiating between fluid losses s/p childbirth beginning of 2024 v loss of LBM d/t decreased PO with n/v. Weight losses likely a combination of the two, though unable to distinguish how much from fluid v other causes at this time.    Nutrition Focused Physical Exam Findings:  defer: pt eating breakfast meal; visually appears well-nourished  Edema:  Edema: none  Physical Findings:  Skin: Negative    Nutrition Significant Labs:  CBC Trend:   Results from last 7 days   Lab Units 07/22/24  0143 07/21/24  0320 07/20/24  0143 07/19/24  1740   WBC AUTO x10*3/uL 2.3* 1.7* 2.6* 2.6*   RBC AUTO x10*6/uL 2.61* 2.12* 2.40* 2.35*   HEMOGLOBIN g/dL 8.0* 6.5* 7.2* 7.2*   HEMATOCRIT % 23.7* 19.7* 22.3* 21.8*   MCV fL 91 93 93 93   PLATELETS AUTO x10*3/uL 38* 33* 37* 40*   BMP Trend:   Results from last 7 days   Lab Units 07/22/24  0143 07/21/24  1815 07/21/24  0320 07/20/24  2055   GLUCOSE mg/dL 129* 183* 260* 95   CALCIUM mg/dL 9.1 9.0 9.0 9.2   SODIUM mmol/L 137 137 136 139   POTASSIUM mmol/L 4.0 4.1 4.1 3.4*   CO2 mmol/L 26 24 24 27   CHLORIDE mmol/L 103 104 103 102   BUN mg/dL 18 16 18 18   CREATININE mg/dL 0.79 0.85 0.93 0.93   Liver Function Trend:   Results from last 7 days   Lab Units 07/22/24  0143 07/21/24  0320 07/20/24  0143 07/19/24  1740   ALK PHOS U/L 59 65 75  71   AST U/L 24 31 43* 37   ALT U/L 14 18 20 15   BILIRUBIN TOTAL mg/dL 2.1* 1.8* 2.0* 2.0*   Renal Lab Trend:   Results from last 7 days   Lab Units 07/22/24  0143 07/21/24  1815 07/21/24  0320 07/20/24 2055   POTASSIUM mmol/L 4.0 4.1 4.1 3.4*   PHOSPHORUS mg/dL  --  3.9  --  4.3   SODIUM mmol/L 137 137 136 139   MAGNESIUM mg/dL 1.84  --  1.79  --    EGFR mL/min/1.73m*2 >90 >90 89 89   BUN mg/dL 18 16 18 18   CREATININE mg/dL 0.79 0.85 0.93 0.93   Vit D:   Lab Results   Component Value Date    VITD25 30 06/07/2021   Vit B12:   Lab Results   Component Value Date    YAPQXQPS06 1,191 (H) 07/05/2024   Folate:   Lab Results   Component Value Date    FOLATE 12.4 07/05/2024     Medications:  Scheduled medications  acyclovir, 400 mg, oral, q12h JORDYN  allopurinol, 300 mg, oral, Daily  cytarabine, 100 mg/m2 (Treatment Plan Recorded), intravenous, Once  cytarabine, 100 mg/m2 (Treatment Plan Recorded), intravenous, Once  insulin lispro, 0-5 Units, subcutaneous, TID  ondansetron, 8 mg, intravenous, Once  pantoprazole, 40 mg, oral, Daily before breakfast  posaconazole, 300 mg, oral, Daily with breakfast  sertraline, 50 mg, oral, Daily    PRN medications  PRN medications: albuterol, alteplase, dextrose, dextrose, dextrose, diphenhydrAMINE, EPINEPHrine HCl, famotidine, glucagon, glucagon, methylPREDNISolone sodium succinate (PF), [DISCONTINUED] ondansetron ODT **OR** ondansetron, prochlorperazine, prochlorperazine, prochlorperazine, sodium chloride    I/O:   Last BM Date: 07/21/24; Stool Appearance: Formed (07/21/24 0945)    Dietary Orders (From admission, onward)       Start     Ordered    07/22/24 1231  Snacks  Until discontinued        Comments: pt may order from Extended Stay Menu + [facility] Snack List, if requested    07/22/24 1230    07/19/24 1729  Adult diet Low pathogen  Diet effective now        Question:  Diet type  Answer:  Low pathogen    07/19/24 1733                Estimated Needs:   Total Energy Estimated Needs  (kCal): 5044-7039  Method for Estimating Needs: 61 x ~30-35  Total Protein Estimated Needs (g): 75+  Method for Estimating Needs: 61 x ~1.2g/kg+  Total Fluid Estimated Needs (mL): per MD/team        Nutrition Diagnosis   Malnutrition Diagnosis  Patient has Malnutrition Diagnosis: Unable to fully determine nutritional status at this time, will reattempt nutrition exam, as able, at nutrition follow-up.    Nutrition Diagnosis  Patient has Nutrition Diagnosis: Yes  Diagnosis Status (1): New  Nutrition Diagnosis 1: Inadequate oral intake  Related to (1): decreased PO with newly diagnosed AML  As Evidenced by (1): pt report of issues with n/v and variable/poor PO x ~4-5 mos PTA, consuming smaller amounts of food at a time    Additional Nutrition Diagnosis: Diagnosis 2  Diagnosis Status (2): New  Nutrition Diagnosis 2: Increased nutrient needs  Related to (2): increased metabolic demand  As Evidenced by (2): pt with new AML, undergoing treatment    Additional Assessment Information: Do feel, considering decreased PO with hypermetabolic state, pt would benefit from use of oral nutrition supplements in-house; discussed with her, would like to hold off for now but is open to them in the future, as needed, pending ongoing PO.       Nutrition Interventions/Recommendations     1. Continue Low Pathogen Diet, only as tolerated.  --Pt declines need for oral nutrition supplements for now. As needed/if pt agreeable, recommend trial Ensure Clear v Ensure Plus for added nutrition, if ongoing PO decreased.  --RDN provided Extended Stay Menu + [facility] Snack List to allow pt more options when ordering.        Nutrition Prescription for Oral Nutrition: 8095-8069 kcals/day, 75+ g pro/day        Nutrition Interventions:   Interventions: Meals and snacks  Meals and Snacks: General healthful diet  Goal: Low Pathogen Diet    Nutrition Education: Encouraged ongoing PO, as tolerated. Reviewed role of oral nutrition supplements in the daily diet.  Pt denied any particular questions for RDN at this time.       Nutrition Monitoring and Evaluation   Food/Nutrient Related History Monitoring  Monitoring and Evaluation Plan: Amount of food  Amount of Food: Estimated amout of food  Criteria: consume >50% of meals/snacks    Body Composition/Growth/Weight History  Monitoring and Evaluation Plan: Weight  Weight: Measured weight  Criteria: maintain stable wt    Biochemical Data, Medical Tests and Procedures  Monitoring and Evaluation Plan: Electrolyte/renal panel  Electrolyte and Renal Panel: Magnesium, Phosphorus, Sodium, Potassium  Criteria: WNL        Time Spent (min): 45 minutes

## 2024-07-22 NOTE — SIGNIFICANT EVENT
07/22/24 0348   Prechemo Checklist   Has the patient been in the hospital, ED, or urgent care since last date of service N/A   Chemo/Immuno Consent Completed and Signed Yes   Protocol/Indications Verified Yes   Confirmed to previous date/time of medication Yes   Compared to previous dose Yes   All medications are dated accurately Yes   Pregnancy Test Negative Yes   Parameters Met Yes   BSA/Weight-Height Verified Yes   Dose Calculations Verified (current, total, cumulative) Yes

## 2024-07-22 NOTE — CARE PLAN
The patient's goals for the shift include      The clinical goals for the shift include patient shall remain safe and free from falls    Over the shift, the patient did not make progress toward the following goals. Barriers to progression include n/a. Recommendations to address these barriers include n/a.        Problem: Nutrition  Goal: Adequate PO fluid intake  Outcome: Progressing  Goal: BG  mg/dL  Outcome: Progressing  Goal: Lab values WNL  Outcome: Progressing  Goal: Electrolytes WNL  Outcome: Progressing  Goal: Promote healing  Outcome: Progressing  Goal: Maintain stable weight  Outcome: Progressing     Problem: Pain - Adult  Goal: Verbalizes/displays adequate comfort level or baseline comfort level  Outcome: Progressing     Problem: Safety - Adult  Goal: Free from fall injury  Outcome: Progressing     Problem: PICC line  Goal: I will remain free from symptoms of infection  Outcome: Progressing

## 2024-07-22 NOTE — CARE PLAN
The patient's goals for the shift include      The clinical goals for the shift include patient shall remain safe and free from falls

## 2024-07-23 LAB
ALBUMIN SERPL BCP-MCNC: 3.7 G/DL (ref 3.4–5)
ALBUMIN SERPL BCP-MCNC: 3.9 G/DL (ref 3.4–5)
ALP SERPL-CCNC: 54 U/L (ref 33–110)
ALT SERPL W P-5'-P-CCNC: 12 U/L (ref 7–45)
ANION GAP SERPL CALC-SCNC: 11 MMOL/L (ref 10–20)
ANION GAP SERPL CALC-SCNC: 12 MMOL/L (ref 10–20)
APPEARANCE UR: CLEAR
AST SERPL W P-5'-P-CCNC: 21 U/L (ref 9–39)
BASOPHILS # BLD AUTO: 0 X10*3/UL (ref 0–0.1)
BASOPHILS NFR BLD AUTO: 0 %
BILIRUB DIRECT SERPL-MCNC: 0.4 MG/DL (ref 0–0.3)
BILIRUB SERPL-MCNC: 2.3 MG/DL (ref 0–1.2)
BILIRUB UR STRIP.AUTO-MCNC: NEGATIVE MG/DL
BUN SERPL-MCNC: 23 MG/DL (ref 6–23)
BUN SERPL-MCNC: 24 MG/DL (ref 6–23)
CALCIUM SERPL-MCNC: 8.8 MG/DL (ref 8.6–10.6)
CALCIUM SERPL-MCNC: 9.3 MG/DL (ref 8.6–10.6)
CHLORIDE SERPL-SCNC: 102 MMOL/L (ref 98–107)
CHLORIDE SERPL-SCNC: 103 MMOL/L (ref 98–107)
CO2 SERPL-SCNC: 25 MMOL/L (ref 21–32)
CO2 SERPL-SCNC: 28 MMOL/L (ref 21–32)
COLOR UR: NORMAL
CREAT SERPL-MCNC: 0.78 MG/DL (ref 0.5–1.05)
CREAT SERPL-MCNC: 0.92 MG/DL (ref 0.5–1.05)
CREAT UR-MCNC: 63.7 MG/DL (ref 20–320)
DAT-POLYSPECIFIC: NORMAL
EGFRCR SERPLBLD CKD-EPI 2021: 90 ML/MIN/1.73M*2
EGFRCR SERPLBLD CKD-EPI 2021: >90 ML/MIN/1.73M*2
EOSINOPHIL # BLD AUTO: 0 X10*3/UL (ref 0–0.7)
EOSINOPHIL NFR BLD AUTO: 0 %
ERYTHROCYTE [DISTWIDTH] IN BLOOD BY AUTOMATED COUNT: 15.6 % (ref 11.5–14.5)
ESTRADIOL SERPL-MCNC: 34 PG/ML
FSH SERPL-ACNC: 3.8 IU/L
GLUCOSE BLD MANUAL STRIP-MCNC: 107 MG/DL (ref 74–99)
GLUCOSE BLD MANUAL STRIP-MCNC: 120 MG/DL (ref 74–99)
GLUCOSE BLD MANUAL STRIP-MCNC: 125 MG/DL (ref 74–99)
GLUCOSE BLD MANUAL STRIP-MCNC: 89 MG/DL (ref 74–99)
GLUCOSE SERPL-MCNC: 133 MG/DL (ref 74–99)
GLUCOSE SERPL-MCNC: 95 MG/DL (ref 74–99)
GLUCOSE UR STRIP.AUTO-MCNC: NORMAL MG/DL
HCT VFR BLD AUTO: 21.9 % (ref 36–46)
HGB BLD-MCNC: 7.3 G/DL (ref 12–16)
HGB RETIC QN: 37 PG (ref 28–38)
IMM GRANULOCYTES # BLD AUTO: 0.02 X10*3/UL (ref 0–0.7)
IMM GRANULOCYTES NFR BLD AUTO: 1 % (ref 0–0.9)
IMMATURE RETIC FRACTION: 3.2 %
KETONES UR STRIP.AUTO-MCNC: NEGATIVE MG/DL
LDH SERPL L TO P-CCNC: 377 U/L (ref 84–246)
LEUKOCYTE ESTERASE UR QL STRIP.AUTO: NEGATIVE
LH SERPL-ACNC: 4.5 IU/L
LYMPHOCYTES # BLD AUTO: 0.1 X10*3/UL (ref 1.2–4.8)
LYMPHOCYTES NFR BLD AUTO: 4.8 %
MAGNESIUM SERPL-MCNC: 1.74 MG/DL (ref 1.6–2.4)
MCH RBC QN AUTO: 30.2 PG (ref 26–34)
MCHC RBC AUTO-ENTMCNC: 33.3 G/DL (ref 32–36)
MCV RBC AUTO: 91 FL (ref 80–100)
MONOCYTES # BLD AUTO: 0.01 X10*3/UL (ref 0.1–1)
MONOCYTES NFR BLD AUTO: 0.5 %
NEUTROPHILS # BLD AUTO: 1.97 X10*3/UL (ref 1.2–7.7)
NEUTROPHILS NFR BLD AUTO: 93.7 %
NITRITE UR QL STRIP.AUTO: NEGATIVE
NRBC BLD-RTO: 0 /100 WBCS (ref 0–0)
PH UR STRIP.AUTO: 6.5 [PH]
PHOSPHATE SERPL-MCNC: 3.5 MG/DL (ref 2.5–4.9)
PHOSPHATE SERPL-MCNC: 3.9 MG/DL (ref 2.5–4.9)
PLATELET # BLD AUTO: 33 X10*3/UL (ref 150–450)
POTASSIUM SERPL-SCNC: 4.1 MMOL/L (ref 3.5–5.3)
POTASSIUM SERPL-SCNC: 4.1 MMOL/L (ref 3.5–5.3)
PROT SERPL-MCNC: 6.2 G/DL (ref 6.4–8.2)
PROT UR STRIP.AUTO-MCNC: NEGATIVE MG/DL
PROT UR-ACNC: 6 MG/DL (ref 5–24)
PROT/CREAT UR: 0.09 MG/MG CREAT (ref 0–0.17)
RBC # BLD AUTO: 2.42 X10*6/UL (ref 4–5.2)
RBC # UR STRIP.AUTO: NEGATIVE /UL
RETICS #: 0.01 X10*6/UL (ref 0.02–0.08)
RETICS/RBC NFR AUTO: 0.4 % (ref 0.5–2)
SODIUM SERPL-SCNC: 135 MMOL/L (ref 136–145)
SODIUM SERPL-SCNC: 138 MMOL/L (ref 136–145)
SP GR UR STRIP.AUTO: 1.01
URATE SERPL-MCNC: 2.7 MG/DL (ref 2.3–6.7)
URATE SERPL-MCNC: 3.2 MG/DL (ref 2.3–6.7)
UROBILINOGEN UR STRIP.AUTO-MCNC: NORMAL MG/DL
WBC # BLD AUTO: 2.1 X10*3/UL (ref 4.4–11.3)

## 2024-07-23 PROCEDURE — 83615 LACTATE (LD) (LDH) ENZYME: CPT | Performed by: NURSE PRACTITIONER

## 2024-07-23 PROCEDURE — 1170000001 HC PRIVATE ONCOLOGY ROOM DAILY

## 2024-07-23 PROCEDURE — 86880 COOMBS TEST DIRECT: CPT

## 2024-07-23 PROCEDURE — 82570 ASSAY OF URINE CREATININE: CPT | Performed by: NURSE PRACTITIONER

## 2024-07-23 PROCEDURE — 2500000004 HC RX 250 GENERAL PHARMACY W/ HCPCS (ALT 636 FOR OP/ED): Performed by: INTERNAL MEDICINE

## 2024-07-23 PROCEDURE — 85025 COMPLETE CBC W/AUTO DIFF WBC: CPT | Performed by: PHYSICIAN ASSISTANT

## 2024-07-23 PROCEDURE — 84550 ASSAY OF BLOOD/URIC ACID: CPT | Performed by: PHYSICIAN ASSISTANT

## 2024-07-23 PROCEDURE — 83001 ASSAY OF GONADOTROPIN (FSH): CPT | Performed by: NURSE PRACTITIONER

## 2024-07-23 PROCEDURE — 2500000004 HC RX 250 GENERAL PHARMACY W/ HCPCS (ALT 636 FOR OP/ED): Performed by: PHYSICIAN ASSISTANT

## 2024-07-23 PROCEDURE — 99232 SBSQ HOSP IP/OBS MODERATE 35: CPT | Performed by: INTERNAL MEDICINE

## 2024-07-23 PROCEDURE — 83002 ASSAY OF GONADOTROPIN (LH): CPT | Performed by: NURSE PRACTITIONER

## 2024-07-23 PROCEDURE — 80048 BASIC METABOLIC PNL TOTAL CA: CPT | Performed by: NURSE PRACTITIONER

## 2024-07-23 PROCEDURE — 2500000001 HC RX 250 WO HCPCS SELF ADMINISTERED DRUGS (ALT 637 FOR MEDICARE OP): Performed by: PHYSICIAN ASSISTANT

## 2024-07-23 PROCEDURE — 81003 URINALYSIS AUTO W/O SCOPE: CPT | Performed by: NURSE PRACTITIONER

## 2024-07-23 PROCEDURE — 80069 RENAL FUNCTION PANEL: CPT | Mod: CCI | Performed by: PHYSICIAN ASSISTANT

## 2024-07-23 PROCEDURE — 84100 ASSAY OF PHOSPHORUS: CPT | Performed by: NURSE PRACTITIONER

## 2024-07-23 PROCEDURE — 85045 AUTOMATED RETICULOCYTE COUNT: CPT | Performed by: NURSE PRACTITIONER

## 2024-07-23 PROCEDURE — 82670 ASSAY OF TOTAL ESTRADIOL: CPT | Performed by: NURSE PRACTITIONER

## 2024-07-23 PROCEDURE — 86832 HLA CLASS I HIGH DEFIN QUAL: CPT | Performed by: NURSE PRACTITIONER

## 2024-07-23 PROCEDURE — 81379 HLA I TYPING COMPLETE HR: CPT | Performed by: NURSE PRACTITIONER

## 2024-07-23 PROCEDURE — 82947 ASSAY GLUCOSE BLOOD QUANT: CPT

## 2024-07-23 PROCEDURE — 83735 ASSAY OF MAGNESIUM: CPT | Performed by: PHYSICIAN ASSISTANT

## 2024-07-23 PROCEDURE — 83520 IMMUNOASSAY QUANT NOS NONAB: CPT | Performed by: NURSE PRACTITIONER

## 2024-07-23 PROCEDURE — 82248 BILIRUBIN DIRECT: CPT | Performed by: NURSE PRACTITIONER

## 2024-07-23 PROCEDURE — 2500000002 HC RX 250 W HCPCS SELF ADMINISTERED DRUGS (ALT 637 FOR MEDICARE OP, ALT 636 FOR OP/ED): Performed by: PHYSICIAN ASSISTANT

## 2024-07-23 RX ORDER — ONDANSETRON HYDROCHLORIDE 2 MG/ML
8 INJECTION, SOLUTION INTRAVENOUS ONCE
Status: COMPLETED | OUTPATIENT
Start: 2024-07-23 | End: 2024-07-24

## 2024-07-23 ASSESSMENT — COGNITIVE AND FUNCTIONAL STATUS - GENERAL
DAILY ACTIVITIY SCORE: 24
DAILY ACTIVITIY SCORE: 24
MOBILITY SCORE: 24
MOBILITY SCORE: 24

## 2024-07-23 ASSESSMENT — PAIN SCALES - GENERAL
PAINLEVEL_OUTOF10: 0 - NO PAIN
PAINLEVEL_OUTOF10: 0 - NO PAIN

## 2024-07-23 NOTE — PROGRESS NOTES
"Ilda Peter is a 23 y.o. female on day 4 of admission presenting with Acute myeloid leukemia (AML) due to recurrent genetic abnormality (Multi).    Subjective   Overall feeling well offers no complaints this AM. Denies bleeding       Objective     Physical Exam  Constitutional:       General: She is not in acute distress.     Appearance: She is not toxic-appearing.   HENT:      Head: Normocephalic.      Nose: Nose normal. No congestion.      Mouth/Throat:      Mouth: Mucous membranes are moist.      Pharynx: Oropharynx is clear. No oropharyngeal exudate.   Eyes:      General: No scleral icterus.     Pupils: Pupils are equal, round, and reactive to light.   Cardiovascular:      Rate and Rhythm: Normal rate and regular rhythm.   Pulmonary:      Effort: Pulmonary effort is normal. No respiratory distress.      Breath sounds: Normal breath sounds. No wheezing or rhonchi.   Abdominal:      General: Bowel sounds are normal. There is no distension.      Palpations: Abdomen is soft.      Tenderness: There is no abdominal tenderness. There is no guarding.   Musculoskeletal:         General: No swelling or deformity. Normal range of motion.      Cervical back: Normal range of motion.   Skin:     General: Skin is warm.      Capillary Refill: Capillary refill takes less than 2 seconds.      Findings: Bruising present.   Neurological:      Mental Status: She is alert and oriented to person, place, and time.         Last Recorded Vitals  Blood pressure 105/63, pulse 82, temperature 36.4 °C (97.5 °F), temperature source Temporal, resp. rate 18, height (S) 1.65 m (5' 4.96\"), weight 61.7 kg (136 lb 0.4 oz), last menstrual period 07/17/2024, SpO2 99%, not currently breastfeeding.  Intake/Output last 3 Shifts:  I/O last 3 completed shifts:  In: 191 (3.1 mL/kg) [IV Piggyback:191]  Out: - (0 mL/kg)   Weight: 61.4 kg     Relevant Results  Scheduled medications  acyclovir, 400 mg, oral, q12h JORDYN  allopurinol, 300 mg, oral, " Daily  cytarabine, 100 mg/m2 (Treatment Plan Recorded), intravenous, Once  cytarabine, 100 mg/m2 (Treatment Plan Recorded), intravenous, Once  insulin lispro, 0-5 Units, subcutaneous, TID  ondansetron, 8 mg, intravenous, Once  pantoprazole, 40 mg, oral, Daily before breakfast  posaconazole, 300 mg, oral, Daily with breakfast  sertraline, 50 mg, oral, Daily        Assessment/Plan   Active Problems:    Acute myeloid leukemia in adult (Multi)  Ms. Ilda Peter is a 22 yo female with newly diagnosed AML admitted on 7/19/24 for induction chemotherapy with Cytarabine and Idarubicin.     Currently D5 Induction 7+3 (Estefany) started on 7/19    ONC:  - Newly diagnosed AML   - Bmbx (7/5/24) c/w hypercellular BM w/erythroid predominant hematopoiesis, dyserythropoiesis and increased blasts c/w high grade myeloid neoplasm.  - FISH neg  - Myeloid NGS: NPM1 (54%), NRAS (25%), PTPN11 (15%), IDH1 (2%)  - HLA typing sent 7/9    CHEMO:  Hydration and anti-emetics per chemotherapy order set  Cytarabine (100mg/m2) = 170mg IV daily x 7 days (7/19->)  Idarubicin (12mg/m2) = 20mg IV daily x 3 days (7/19->)    - HCG, beta quantitative neg on admit  HLA typing sent (7/23)    HEME:  Pancytopenia d/t malignancy, known vaginal bleeding on admit since resolved  - Keep hgb > 7 and Plts > 10       - RBC transfusion 7/21  - Transfuse as needed    Hemolysis work-up sent (7/23) for elevated LFTs  Protein Urine: PENDING   Urinalysis: PENDING  LDH: PENDING; Retics: PENDING; TOMASZ: PENDING        ID:  - Afebrile  - Started ACV and Posaconazole prophy on admit  - Plan Levaquin when neutropenic  - Plan Zosyn if febrile        FEN/GI:  - Admit wt: 61.9 kg Daily Wt: 62.6 kg  Hydration and anti-emetics per chemotherapy order set   - Monitor electrolytes and replete as needed  - Started PPI on admit  - PRN Compazine/PRN Zofran on admit.  - Cont Allopurinol prophy        CV:  - ECHO (7/16/23) w/EF of 55%     GYN:  Prolong Vaginal bleeding since early July  --Depo shot  last 05/2024   --Consider consulting Gyn for recommendations on hormone therapy given break thru bleeding on current treatment   - Onco Fertility consulted (7/22) Labs sent     ENDO:  Steroid induced hyperglycemia  - Lispro SS #1 with meals     - A1c (7/22): 6    Elevated Liver Enzymes of unclear etiology   - Tbili (7/22) 2   - will continue to trend T/Th/Sat  - Due to elevated LFTs will send hemolysis labs (7/23) see above  - may need to adjust antifungal if levels continue to elevate     PSYCH:  - Anxiety. Cont Sertraline     DISP:  - Full Code  - Patient of Dr. Diamond Bailon line placed in IR on 7/17/24  - PT ordered    seen, discussed and examined with Dr. Layton Max, APRN-CNP

## 2024-07-23 NOTE — CARE PLAN
Problem: Nutrition  Goal: Adequate PO fluid intake  Outcome: Progressing  Goal: BG  mg/dL  Outcome: Progressing  Goal: Lab values WNL  Outcome: Progressing  Goal: Electrolytes WNL  Outcome: Progressing  Goal: Promote healing  Outcome: Progressing  Goal: Maintain stable weight  Outcome: Progressing     Problem: Pain - Adult  Goal: Verbalizes/displays adequate comfort level or baseline comfort level  Outcome: Progressing     Problem: Safety - Adult  Goal: Free from fall injury  Outcome: Progressing     Problem: PICC line  Goal: I will remain free from symptoms of infection  Outcome: Progressing   The patient's goals for the shift include

## 2024-07-24 LAB
ALBUMIN SERPL BCP-MCNC: 3.5 G/DL (ref 3.4–5)
ALBUMIN SERPL BCP-MCNC: 3.6 G/DL (ref 3.4–5)
ALBUMIN SERPL BCP-MCNC: 4.1 G/DL (ref 3.4–5)
ALP SERPL-CCNC: 52 U/L (ref 33–110)
ALT SERPL W P-5'-P-CCNC: 13 U/L (ref 7–45)
ANION GAP SERPL CALC-SCNC: 12 MMOL/L (ref 10–20)
ANION GAP SERPL CALC-SCNC: 12 MMOL/L (ref 10–20)
AST SERPL W P-5'-P-CCNC: 20 U/L (ref 9–39)
BASOPHILS # BLD MANUAL: 0 X10*3/UL (ref 0–0.1)
BASOPHILS NFR BLD MANUAL: 0 %
BILIRUB DIRECT SERPL-MCNC: 0.3 MG/DL (ref 0–0.3)
BILIRUB SERPL-MCNC: 1.9 MG/DL (ref 0–1.2)
BLOOD EXPIRATION DATE: NORMAL
BUN SERPL-MCNC: 19 MG/DL (ref 6–23)
BUN SERPL-MCNC: 22 MG/DL (ref 6–23)
CALCIUM SERPL-MCNC: 8.7 MG/DL (ref 8.6–10.6)
CALCIUM SERPL-MCNC: 8.9 MG/DL (ref 8.6–10.6)
CHLORIDE SERPL-SCNC: 102 MMOL/L (ref 98–107)
CHLORIDE SERPL-SCNC: 99 MMOL/L (ref 98–107)
CO2 SERPL-SCNC: 25 MMOL/L (ref 21–32)
CO2 SERPL-SCNC: 27 MMOL/L (ref 21–32)
CREAT SERPL-MCNC: 0.81 MG/DL (ref 0.5–1.05)
CREAT SERPL-MCNC: 0.86 MG/DL (ref 0.5–1.05)
DISPENSE STATUS: NORMAL
EGFRCR SERPLBLD CKD-EPI 2021: >90 ML/MIN/1.73M*2
EGFRCR SERPLBLD CKD-EPI 2021: >90 ML/MIN/1.73M*2
EOSINOPHIL # BLD MANUAL: 0 X10*3/UL (ref 0–0.7)
EOSINOPHIL NFR BLD MANUAL: 0 %
ERYTHROCYTE [DISTWIDTH] IN BLOOD BY AUTOMATED COUNT: 15.2 % (ref 11.5–14.5)
GLUCOSE BLD MANUAL STRIP-MCNC: 96 MG/DL (ref 74–99)
GLUCOSE SERPL-MCNC: 107 MG/DL (ref 74–99)
GLUCOSE SERPL-MCNC: 98 MG/DL (ref 74–99)
HCT VFR BLD AUTO: 19.7 % (ref 36–46)
HGB BLD-MCNC: 6.5 G/DL (ref 12–16)
HLA RESULTS: NORMAL
HLA-A+B+C AB NFR SER: NORMAL %
HLA-DP+DQ+DR AB NFR SER: NORMAL %
HOLD SPECIMEN: NORMAL
IMM GRANULOCYTES # BLD AUTO: 0.14 X10*3/UL (ref 0–0.7)
IMM GRANULOCYTES NFR BLD AUTO: 19.2 % (ref 0–0.9)
LDH SERPL L TO P-CCNC: 298 U/L (ref 84–246)
LYMPHOCYTES # BLD MANUAL: 0.08 X10*3/UL (ref 1.2–4.8)
LYMPHOCYTES NFR BLD MANUAL: 11.2 %
MAGNESIUM SERPL-MCNC: 1.57 MG/DL (ref 1.6–2.4)
MCH RBC QN AUTO: 30.2 PG (ref 26–34)
MCHC RBC AUTO-ENTMCNC: 33 G/DL (ref 32–36)
MCV RBC AUTO: 92 FL (ref 80–100)
MONOCYTES # BLD MANUAL: 0 X10*3/UL (ref 0.1–1)
MONOCYTES NFR BLD MANUAL: 0 %
NEUTS SEG # BLD MANUAL: 0.62 X10*3/UL (ref 1.2–7)
NEUTS SEG NFR BLD MANUAL: 88.8 %
NRBC BLD-RTO: 0 /100 WBCS (ref 0–0)
PHOSPHATE SERPL-MCNC: 3.5 MG/DL (ref 2.5–4.9)
PHOSPHATE SERPL-MCNC: 4.1 MG/DL (ref 2.5–4.9)
PLATELET # BLD AUTO: 20 X10*3/UL (ref 150–450)
POTASSIUM SERPL-SCNC: 3.9 MMOL/L (ref 3.5–5.3)
POTASSIUM SERPL-SCNC: 3.9 MMOL/L (ref 3.5–5.3)
PRODUCT BLOOD TYPE: 5100
PRODUCT CODE: NORMAL
PROT SERPL-MCNC: 5.6 G/DL (ref 6.4–8.2)
RBC # BLD AUTO: 2.15 X10*6/UL (ref 4–5.2)
RBC MORPH BLD: ABNORMAL
SODIUM SERPL-SCNC: 134 MMOL/L (ref 136–145)
SODIUM SERPL-SCNC: 135 MMOL/L (ref 136–145)
TOTAL CELLS COUNTED BLD: 89
UNIT ABO: NORMAL
UNIT NUMBER: NORMAL
UNIT RH: NORMAL
UNIT VOLUME: 350
URATE SERPL-MCNC: 3 MG/DL (ref 2.3–6.7)
URATE SERPL-MCNC: 3 MG/DL (ref 2.3–6.7)
WBC # BLD AUTO: 0.7 X10*3/UL (ref 4.4–11.3)
XM INTEP: NORMAL

## 2024-07-24 PROCEDURE — P9040 RBC LEUKOREDUCED IRRADIATED: HCPCS

## 2024-07-24 PROCEDURE — 83615 LACTATE (LD) (LDH) ENZYME: CPT | Performed by: NURSE PRACTITIONER

## 2024-07-24 PROCEDURE — 83735 ASSAY OF MAGNESIUM: CPT | Performed by: PHYSICIAN ASSISTANT

## 2024-07-24 PROCEDURE — 84550 ASSAY OF BLOOD/URIC ACID: CPT | Performed by: PHYSICIAN ASSISTANT

## 2024-07-24 PROCEDURE — 85007 BL SMEAR W/DIFF WBC COUNT: CPT | Performed by: PHYSICIAN ASSISTANT

## 2024-07-24 PROCEDURE — 1170000001 HC PRIVATE ONCOLOGY ROOM DAILY

## 2024-07-24 PROCEDURE — 82947 ASSAY GLUCOSE BLOOD QUANT: CPT

## 2024-07-24 PROCEDURE — 84100 ASSAY OF PHOSPHORUS: CPT | Performed by: NURSE PRACTITIONER

## 2024-07-24 PROCEDURE — 36430 TRANSFUSION BLD/BLD COMPNT: CPT

## 2024-07-24 PROCEDURE — 82040 ASSAY OF SERUM ALBUMIN: CPT | Performed by: PHYSICIAN ASSISTANT

## 2024-07-24 PROCEDURE — 2500000001 HC RX 250 WO HCPCS SELF ADMINISTERED DRUGS (ALT 637 FOR MEDICARE OP): Performed by: NURSE PRACTITIONER

## 2024-07-24 PROCEDURE — 82040 ASSAY OF SERUM ALBUMIN: CPT | Performed by: NURSE PRACTITIONER

## 2024-07-24 PROCEDURE — 2500000004 HC RX 250 GENERAL PHARMACY W/ HCPCS (ALT 636 FOR OP/ED)

## 2024-07-24 PROCEDURE — 2500000001 HC RX 250 WO HCPCS SELF ADMINISTERED DRUGS (ALT 637 FOR MEDICARE OP): Performed by: PHYSICIAN ASSISTANT

## 2024-07-24 PROCEDURE — 80069 RENAL FUNCTION PANEL: CPT | Mod: CCI | Performed by: NURSE PRACTITIONER

## 2024-07-24 PROCEDURE — 2500000002 HC RX 250 W HCPCS SELF ADMINISTERED DRUGS (ALT 637 FOR MEDICARE OP, ALT 636 FOR OP/ED): Performed by: PHYSICIAN ASSISTANT

## 2024-07-24 PROCEDURE — 85027 COMPLETE CBC AUTOMATED: CPT | Performed by: PHYSICIAN ASSISTANT

## 2024-07-24 PROCEDURE — 99232 SBSQ HOSP IP/OBS MODERATE 35: CPT | Performed by: INTERNAL MEDICINE

## 2024-07-24 PROCEDURE — 2500000004 HC RX 250 GENERAL PHARMACY W/ HCPCS (ALT 636 FOR OP/ED): Performed by: INTERNAL MEDICINE

## 2024-07-24 RX ORDER — ONDANSETRON HYDROCHLORIDE 2 MG/ML
8 INJECTION, SOLUTION INTRAVENOUS ONCE
Status: COMPLETED | OUTPATIENT
Start: 2024-07-25 | End: 2024-07-25

## 2024-07-24 RX ORDER — LEVOFLOXACIN 500 MG/1
500 TABLET, FILM COATED ORAL
Status: DISCONTINUED | OUTPATIENT
Start: 2024-07-24 | End: 2024-07-29

## 2024-07-24 RX ORDER — MAGNESIUM SULFATE HEPTAHYDRATE 40 MG/ML
2 INJECTION, SOLUTION INTRAVENOUS ONCE
Status: COMPLETED | OUTPATIENT
Start: 2024-07-24 | End: 2024-07-24

## 2024-07-24 ASSESSMENT — PAIN SCALES - GENERAL
PAINLEVEL_OUTOF10: 0 - NO PAIN

## 2024-07-24 ASSESSMENT — COGNITIVE AND FUNCTIONAL STATUS - GENERAL
MOBILITY SCORE: 24
DAILY ACTIVITIY SCORE: 24
DAILY ACTIVITIY SCORE: 24
MOBILITY SCORE: 24

## 2024-07-24 ASSESSMENT — PAIN - FUNCTIONAL ASSESSMENT: PAIN_FUNCTIONAL_ASSESSMENT: 0-10

## 2024-07-24 NOTE — PROGRESS NOTES
"Ilda Peter is a 23 y.o. female on day 5 of admission presenting with Acute myeloid leukemia (AML) due to recurrent genetic abnormality (Multi).    Subjective   Pt lying in bed, offers no complaints. Starting Levo prophy today.    Denies SOB, HA, CP, chills, n/v/d/c, abdom pain, dysuria, tarry stools.       Objective     Physical Exam  Constitutional:       General: She is not in acute distress.     Appearance: She is not toxic-appearing.   HENT:      Head: Normocephalic.      Nose: Nose normal. No congestion.      Mouth/Throat:      Mouth: Mucous membranes are moist.      Pharynx: Oropharynx is clear. No oropharyngeal exudate.   Eyes:      General: No scleral icterus.     Pupils: Pupils are equal, round, and reactive to light.   Cardiovascular:      Rate and Rhythm: Normal rate and regular rhythm.   Pulmonary:      Effort: Pulmonary effort is normal. No respiratory distress.      Breath sounds: Normal breath sounds. No wheezing or rhonchi.   Abdominal:      General: Bowel sounds are normal. There is no distension.      Palpations: Abdomen is soft.      Tenderness: There is no abdominal tenderness. There is no guarding.   Musculoskeletal:         General: No swelling or deformity. Normal range of motion.      Cervical back: Normal range of motion.   Skin:     General: Skin is warm.      Capillary Refill: Capillary refill takes less than 2 seconds.      Findings: Bruising present.   Neurological:      Mental Status: She is alert and oriented to person, place, and time.         Last Recorded Vitals  Blood pressure 96/60, pulse 92, temperature 37.5 °C (99.5 °F), temperature source Temporal, resp. rate 18, height (S) 1.65 m (5' 4.96\"), weight 61.7 kg (136 lb 0.4 oz), last menstrual period 07/17/2024, SpO2 98%, not currently breastfeeding.  Intake/Output last 3 Shifts:  I/O last 3 completed shifts:  In: 1113 (18 mL/kg) [IV Piggyback:1113]  Out: - (0 mL/kg)   Weight: 61.7 kg     Relevant Results  Scheduled " medications  acyclovir, 400 mg, oral, q12h JORDYN  allopurinol, 300 mg, oral, Daily  cytarabine, 100 mg/m2 (Treatment Plan Recorded), intravenous, Once  [START ON 7/25/2024] cytarabine, 100 mg/m2 (Treatment Plan Recorded), intravenous, Once  levoFLOXacin, 500 mg, oral, q24h JORDYN  [START ON 7/25/2024] ondansetron, 8 mg, intravenous, Once  pantoprazole, 40 mg, oral, Daily before breakfast  posaconazole, 300 mg, oral, Daily with breakfast  sertraline, 50 mg, oral, Daily        Assessment/Plan   Active Problems:    Acute myeloid leukemia in adult (Multi)    Ms. Ilda Peter is a 24 yo female with newly diagnosed AML admitted on 7/19/24 for induction chemotherapy with Cytarabine and Idarubicin.     Day 6 with induction 7+3 (Estefany) (started on 7/19/24)    ONC:  - Newly diagnosed AML   - Bmbx (7/5/24) c/w hypercellular BM w/erythroid predominant hematopoiesis, dyserythropoiesis and increased blasts c/w high grade myeloid neoplasm.  - FISH neg  - Myeloid NGS: NPM1 (54%), NRAS (25%), PTPN11 (15%), IDH1 (2%)  - HLA typing sent 7/9  - Plan for Day 14 BMBx (around 8/1)    CHEMO:  Hydration and anti-emetics per chemotherapy order set  Cytarabine (100mg/m2) = 170mg IV daily x 7 days (7/19->)  Idarubicin (12mg/m2) = 20mg IV daily x 3 days (7/19->)    - HCG, beta quantitative neg on admit  HLA typing sent (7/23)  HLA Buccal swab ordered 7/24    HEME:  # Pancytopenia d/t malignancy, known vaginal bleeding on admit since resolved  - Keep hgb > 7 and Plts > 10   # Hemolysis work-up sent (7/23) for elevated LFTs  Protein Urine: PENDING   Urinalysis: PENDING  LDH: PENDING; Retics: PENDING; TOMASZ: PENDING        ID:  - Afebrile  - Started ACV and Posaconazole prophy on admit  - Plan Zosyn if febrile  - Started Levo (7/24) for neutropenia        FEN/GI:  - Admit wt: 61.9 kg Daily Wt: 61.7 kg (7/23)  - Hydration and anti-emetics per chemotherapy order set   - Monitor electrolytes and replete as needed  - Started PPI on admit  - PRN Compazine/PRN  Zofran on admit.  - Cont Allopurinol prophy        CV:  - ECHO (7/16/23) w/EF of 55%     GYN:  Prolong Vaginal bleeding since early July  --Depo shot last 05/2024   --Consider consulting Gyn for recommendations on hormone therapy given break thru bleeding on current treatment   - Onco Fertility consulted (7/22) Labs sent     ENDO:  Steroid induced hyperglycemia  - Lispro SS #1 with meals     - A1c (7/22): 6    Elevated Liver Enzymes of unclear etiology   - Tbili (7/22) 2   - will continue to trend T/Th/Sat  - Due to elevated LFTs will send hemolysis labs (7/23) see above  - may need to adjust antifungal if levels continue to elevate     PSYCH:  - Anxiety. Cont Sertraline     DISP:  - Full Code  - Patient of Dr. Fields  - Adamaris line placed in IR on 7/17/24  - PT ordered    Pt seen, discussed, and examined with Dr. Layton Decker, LEDY-CNP

## 2024-07-24 NOTE — CARE PLAN
The patient's goals for the shift include  adequate rest today    The clinical goals for the shift include Patient will remain safe and free from falls    VSS; afebrile this shift. Patient had no c/o pain this shift; no c/o n/v/d.  Patient started on po levaquin per order.  Remained safe and free of injury.       Problem: Nutrition  Goal: Adequate PO fluid intake  Outcome: Progressing     Problem: Pain - Adult  Goal: Verbalizes/displays adequate comfort level or baseline comfort level  Outcome: Progressing     Problem: Safety - Adult  Goal: Free from fall injury  Outcome: Progressing     Problem: PICC line  Goal: I will remain free from symptoms of infection  Outcome: Progressing

## 2024-07-25 ENCOUNTER — LAB REQUISITION (OUTPATIENT)
Dept: LAB | Facility: CLINIC | Age: 24
End: 2024-07-25
Payer: COMMERCIAL

## 2024-07-25 DIAGNOSIS — D61.818 OTHER PANCYTOPENIA (MULTI): ICD-10-CM

## 2024-07-25 LAB
ALBUMIN SERPL BCP-MCNC: 3.5 G/DL (ref 3.4–5)
ALP SERPL-CCNC: 55 U/L (ref 33–110)
ALT SERPL W P-5'-P-CCNC: 19 U/L (ref 7–45)
ANION GAP SERPL CALC-SCNC: 11 MMOL/L (ref 10–20)
AST SERPL W P-5'-P-CCNC: 22 U/L (ref 9–39)
BASOPHILS # BLD AUTO: 0.01 X10*3/UL (ref 0–0.1)
BASOPHILS NFR BLD AUTO: 1.7 %
BILIRUB DIRECT SERPL-MCNC: 0.2 MG/DL (ref 0–0.3)
BILIRUB SERPL-MCNC: 1.4 MG/DL (ref 0–1.2)
BUN SERPL-MCNC: 18 MG/DL (ref 6–23)
CALCIUM SERPL-MCNC: 8.5 MG/DL (ref 8.6–10.6)
CHLORIDE SERPL-SCNC: 101 MMOL/L (ref 98–107)
CO2 SERPL-SCNC: 26 MMOL/L (ref 21–32)
CREAT SERPL-MCNC: 0.61 MG/DL (ref 0.5–1.05)
EGFRCR SERPLBLD CKD-EPI 2021: >90 ML/MIN/1.73M*2
EOSINOPHIL # BLD AUTO: 0 X10*3/UL (ref 0–0.7)
EOSINOPHIL NFR BLD AUTO: 0 %
ERYTHROCYTE [DISTWIDTH] IN BLOOD BY AUTOMATED COUNT: 14.6 % (ref 11.5–14.5)
GLUCOSE SERPL-MCNC: 107 MG/DL (ref 74–99)
HAPTOGLOB SERPL-MCNC: <10 MG/DL (ref 37–246)
HCT VFR BLD AUTO: 21.4 % (ref 36–46)
HGB BLD-MCNC: 7.3 G/DL (ref 12–16)
IMM GRANULOCYTES # BLD AUTO: 0.02 X10*3/UL (ref 0–0.7)
IMM GRANULOCYTES NFR BLD AUTO: 3.4 % (ref 0–0.9)
LYMPHOCYTES # BLD AUTO: 0.31 X10*3/UL (ref 1.2–4.8)
LYMPHOCYTES NFR BLD AUTO: 53.4 %
MAGNESIUM SERPL-MCNC: 1.91 MG/DL (ref 1.6–2.4)
MCH RBC QN AUTO: 30.3 PG (ref 26–34)
MCHC RBC AUTO-ENTMCNC: 34.1 G/DL (ref 32–36)
MCV RBC AUTO: 89 FL (ref 80–100)
MIS SERPL-MCNC: 7.74 NG/ML (ref 0.4–16.02)
MONOCYTES # BLD AUTO: 0.01 X10*3/UL (ref 0.1–1)
MONOCYTES NFR BLD AUTO: 1.7 %
NEUTROPHILS # BLD AUTO: 0.23 X10*3/UL (ref 1.2–7.7)
NEUTROPHILS NFR BLD AUTO: 39.8 %
NRBC BLD-RTO: 0 /100 WBCS (ref 0–0)
PHOSPHATE SERPL-MCNC: 3.4 MG/DL (ref 2.5–4.9)
PLATELET # BLD AUTO: 23 X10*3/UL (ref 150–450)
POTASSIUM SERPL-SCNC: 3.6 MMOL/L (ref 3.5–5.3)
PROT SERPL-MCNC: 5.5 G/DL (ref 6.4–8.2)
RBC # BLD AUTO: 2.41 X10*6/UL (ref 4–5.2)
RBC MORPH BLD: NORMAL
SODIUM SERPL-SCNC: 134 MMOL/L (ref 136–145)
SPECIMEN HANDLING: NORMAL
URATE SERPL-MCNC: 2.8 MG/DL (ref 2.3–6.7)
WBC # BLD AUTO: 0.6 X10*3/UL (ref 4.4–11.3)

## 2024-07-25 PROCEDURE — 99232 SBSQ HOSP IP/OBS MODERATE 35: CPT | Performed by: INTERNAL MEDICINE

## 2024-07-25 PROCEDURE — 99001 SPECIMEN HANDLING PT-LAB: CPT | Mod: OUT | Performed by: INTERNAL MEDICINE

## 2024-07-25 PROCEDURE — 84550 ASSAY OF BLOOD/URIC ACID: CPT | Performed by: PHYSICIAN ASSISTANT

## 2024-07-25 PROCEDURE — 84100 ASSAY OF PHOSPHORUS: CPT | Performed by: NURSE PRACTITIONER

## 2024-07-25 PROCEDURE — 80048 BASIC METABOLIC PNL TOTAL CA: CPT | Performed by: NURSE PRACTITIONER

## 2024-07-25 PROCEDURE — 2500000002 HC RX 250 W HCPCS SELF ADMINISTERED DRUGS (ALT 637 FOR MEDICARE OP, ALT 636 FOR OP/ED): Performed by: PHYSICIAN ASSISTANT

## 2024-07-25 PROCEDURE — 85025 COMPLETE CBC W/AUTO DIFF WBC: CPT | Performed by: PHYSICIAN ASSISTANT

## 2024-07-25 PROCEDURE — 1170000001 HC PRIVATE ONCOLOGY ROOM DAILY

## 2024-07-25 PROCEDURE — 83735 ASSAY OF MAGNESIUM: CPT | Performed by: PHYSICIAN ASSISTANT

## 2024-07-25 PROCEDURE — 2500000001 HC RX 250 WO HCPCS SELF ADMINISTERED DRUGS (ALT 637 FOR MEDICARE OP): Performed by: NURSE PRACTITIONER

## 2024-07-25 PROCEDURE — 2500000004 HC RX 250 GENERAL PHARMACY W/ HCPCS (ALT 636 FOR OP/ED): Performed by: NURSE PRACTITIONER

## 2024-07-25 PROCEDURE — 2500000004 HC RX 250 GENERAL PHARMACY W/ HCPCS (ALT 636 FOR OP/ED): Performed by: INTERNAL MEDICINE

## 2024-07-25 PROCEDURE — 2500000001 HC RX 250 WO HCPCS SELF ADMINISTERED DRUGS (ALT 637 FOR MEDICARE OP): Performed by: PHYSICIAN ASSISTANT

## 2024-07-25 PROCEDURE — 82248 BILIRUBIN DIRECT: CPT | Performed by: NURSE PRACTITIONER

## 2024-07-25 RX ORDER — ONDANSETRON HYDROCHLORIDE 2 MG/ML
8 INJECTION, SOLUTION INTRAVENOUS ONCE
Status: COMPLETED | OUTPATIENT
Start: 2024-07-26 | End: 2024-07-26

## 2024-07-25 RX ORDER — ONDANSETRON HYDROCHLORIDE 2 MG/ML
8 INJECTION, SOLUTION INTRAVENOUS EVERY 8 HOURS PRN
Status: DISCONTINUED | OUTPATIENT
Start: 2024-07-25 | End: 2024-07-27

## 2024-07-25 RX ORDER — POTASSIUM CHLORIDE 29.8 MG/ML
40 INJECTION INTRAVENOUS ONCE
Status: COMPLETED | OUTPATIENT
Start: 2024-07-25 | End: 2024-07-25

## 2024-07-25 ASSESSMENT — PAIN SCALES - GENERAL
PAINLEVEL_OUTOF10: 0 - NO PAIN
PAINLEVEL_OUTOF10: 0 - NO PAIN

## 2024-07-25 ASSESSMENT — COGNITIVE AND FUNCTIONAL STATUS - GENERAL
DAILY ACTIVITIY SCORE: 24
MOBILITY SCORE: 24
DAILY ACTIVITIY SCORE: 24
MOBILITY SCORE: 24

## 2024-07-25 ASSESSMENT — PAIN - FUNCTIONAL ASSESSMENT: PAIN_FUNCTIONAL_ASSESSMENT: 0-10

## 2024-07-25 NOTE — CARE PLAN
Problem: Nutrition  Goal: Adequate PO fluid intake  Outcome: Progressing  Goal: BG  mg/dL  Outcome: Progressing  Goal: Lab values WNL  Outcome: Progressing  Goal: Electrolytes WNL  Outcome: Progressing  Goal: Promote healing  Outcome: Progressing  Goal: Maintain stable weight  Outcome: Progressing     Problem: Pain - Adult  Goal: Verbalizes/displays adequate comfort level or baseline comfort level  Outcome: Progressing     Problem: Safety - Adult  Goal: Free from fall injury  Outcome: Progressing     Problem: PICC line  Goal: I will remain free from symptoms of infection  Outcome: Progressing   The patient's goals for the shift include      The clinical goals for the shift include Patient will have no chemo related n/v/d

## 2024-07-25 NOTE — CARE PLAN
The patient's goals for the shift include  remain free of falls    The clinical goals for the shift include Patient will have no chemo related n/v/d    VSS; afebrile this shift. Patient had no c/o pain; no c/o n/v/d this shift. Patient continues to receive cytarabine per order.  Patient tolerating well.  Up in room and in hallways ad emilee.  Remained safe and free of falls.       Problem: Nutrition  Goal: Adequate PO fluid intake  Outcome: Progressing     Problem: Pain - Adult  Goal: Verbalizes/displays adequate comfort level or baseline comfort level  Outcome: Progressing     Problem: Safety - Adult  Goal: Free from fall injury  Outcome: Progressing     Problem: PICC line  Goal: I will remain free from symptoms of infection  Outcome: Progressing

## 2024-07-25 NOTE — PROGRESS NOTES
"Ilda Peter is a 23 y.o. female on day 6 of admission presenting with Acute myeloid leukemia (AML) due to recurrent genetic abnormality (Multi).    Subjective   Pt awake sitting up in bed. No complaints today. Feels overall a little better than yesterday. Appetite fair, encouraged fluids and food as tolerated.    Denies SOB, HA, CP, chills, n/v/d/c, abdom pain, dysuria, tarry stools.       Objective     Physical Exam  Constitutional:       General: She is not in acute distress.     Appearance: She is not toxic-appearing.   HENT:      Head: Normocephalic.      Nose: Nose normal. No congestion.      Mouth/Throat:      Mouth: Mucous membranes are moist.      Pharynx: Oropharynx is clear. No oropharyngeal exudate.   Eyes:      General: No scleral icterus.     Pupils: Pupils are equal, round, and reactive to light.   Cardiovascular:      Rate and Rhythm: Normal rate and regular rhythm.   Pulmonary:      Effort: Pulmonary effort is normal. No respiratory distress.      Breath sounds: Normal breath sounds. No wheezing or rhonchi.   Abdominal:      General: Bowel sounds are normal. There is no distension.      Palpations: Abdomen is soft.      Tenderness: There is no abdominal tenderness. There is no guarding.   Musculoskeletal:         General: No swelling or deformity. Normal range of motion.      Cervical back: Normal range of motion.   Skin:     General: Skin is warm.      Capillary Refill: Capillary refill takes less than 2 seconds.      Findings: Bruising present.   Neurological:      Mental Status: She is alert and oriented to person, place, and time.         Last Recorded Vitals  Blood pressure 107/69, pulse 67, temperature 36.7 °C (98.1 °F), temperature source Temporal, resp. rate 18, height (S) 1.65 m (5' 4.96\"), weight 61.7 kg (136 lb 0.4 oz), last menstrual period 07/17/2024, SpO2 100%, not currently breastfeeding.  Intake/Output last 3 Shifts:  I/O last 3 completed shifts:  In: 1400 (22.7 mL/kg) [I.V.:50 (0.8 " mL/kg); Blood:350; IV Piggyback:1000]  Out: - (0 mL/kg)   Weight: 61.7 kg     Relevant Results  Scheduled medications  acyclovir, 400 mg, oral, q12h JORDYN  cytarabine, 100 mg/m2 (Treatment Plan Recorded), intravenous, Once  [START ON 7/26/2024] cytarabine, 100 mg/m2 (Treatment Plan Recorded), intravenous, Once  levoFLOXacin, 500 mg, oral, q24h JORDYN  [START ON 7/26/2024] ondansetron, 8 mg, intravenous, Once  pantoprazole, 40 mg, oral, Daily before breakfast  posaconazole, 300 mg, oral, Daily with breakfast  sertraline, 50 mg, oral, Daily        Assessment/Plan   Active Problems:    Acute myeloid leukemia in adult (Multi)    Ms. Ilda Peter is a 24 yo female with newly diagnosed AML admitted on 7/19/24 for induction chemotherapy with Cytarabine and Idarubicin. No other significant PMHx.     Day 7 with induction 7+3 (Estefany) (started on 7/19/24)    ONC:  # Newly diagnosed AML, favorable risk  - Presented with n/v & dizziness worsening over the last few months  - Found to be pancytopenic in ED, was discharged home and had BMBx done outpatient.  - Bmbx (7/5/24): c/w hypercellular BM w/erythroid predominant hematopoiesis, dyserythropoiesis and increased blasts c/w high grade myeloid neoplasm.  - FISH neg  - Myeloid NGS: NPM1 (54%), NRAS (25%), PTPN11 (15%), IDH1 (2%)  - HLA typing sent 7/9  - Plan for Day 14 BMBx (around 8/1)    CHEMO:  Hydration and anti-emetics per chemotherapy order set  Cytarabine (100mg/m2) = 170mg IV daily x 7 days (7/19->)  Idarubicin (12mg/m2) = 20mg IV daily x 3 days (7/19->)    - HCG, beta quantitative neg on admit  HLA typing sent (7/23)  HLA Buccal swab ordered 7/24    HEME:  # Pancytopenia d/t malignancy, known vaginal bleeding on admit since resolved  - Keep hgb > 7 and Plts > 10   # Transaminitis, likely d/t chemotherapy, resolved.  - Hemolysis workup neg    ID:  - Afebrile  # Prophylaxis: ACV and Posaconazole  - Plan Zosyn if febrile  - Started Levo (7/24) for neutropenia     FEN/GI:  - Admit wt:  61.9 kg Daily Wt: 61.7 kg (7/23)  - Hydration and anti-emetics per chemotherapy order set   - Monitor electrolytes and replete as needed  - Started PPI on admit  - PRN Compazine/PRN Zofran on admit.     CV:  - ECHO (7/16/23) w/EF of 55%     GYN:  Prolong Vaginal bleeding since early July  --Depo shot last 05/2024   --Consider consulting Gyn for recommendations on hormone therapy given break thru bleeding on current treatment   - Onco Fertility consulted (7/22) Labs sent     ENDO:  Steroid induced hyperglycemia  - Lispro SS #1 with meals  - A1c (7/22): 6.0     PSYCH:  # Anxiety.   - Cont home Sertraline     DISP:  - Full Code  - Primary Onc: Dr. Fields  - LINDA AliceaBailon line placed in IR on 7/17/24  - Discharge pending count recovery    Pt seen, discussed, and examined with Dr. Layton Decker, APRAGUILAR-CNP

## 2024-07-25 NOTE — SIGNIFICANT EVENT
07/25/24 0335   Prechemo Checklist   Has the patient been in the hospital, ED, or urgent care since last date of service N/A   Chemo/Immuno Consent Completed and Signed Yes   Protocol/Indications Verified Yes   Confirmed to previous date/time of medication Yes   Compared to previous dose Yes   All medications are dated accurately Yes   Pregnancy Test Negative Yes   Parameters Met Yes   BSA/Weight-Height Verified Yes   Dose Calculations Verified (current, total, cumulative) Yes

## 2024-07-26 LAB
ALBUMIN SERPL BCP-MCNC: 3.7 G/DL (ref 3.4–5)
ALP SERPL-CCNC: 61 U/L (ref 33–110)
ALT SERPL W P-5'-P-CCNC: 24 U/L (ref 7–45)
ANION GAP SERPL CALC-SCNC: 13 MMOL/L (ref 10–20)
AST SERPL W P-5'-P-CCNC: 19 U/L (ref 9–39)
BASOPHILS # BLD AUTO: 0.01 X10*3/UL (ref 0–0.1)
BASOPHILS NFR BLD AUTO: 1 %
BILIRUB DIRECT SERPL-MCNC: 0.2 MG/DL (ref 0–0.3)
BILIRUB SERPL-MCNC: 1.3 MG/DL (ref 0–1.2)
BUN SERPL-MCNC: 19 MG/DL (ref 6–23)
CALCIUM SERPL-MCNC: 8.9 MG/DL (ref 8.6–10.6)
CHLORIDE SERPL-SCNC: 105 MMOL/L (ref 98–107)
CO2 SERPL-SCNC: 24 MMOL/L (ref 21–32)
CREAT SERPL-MCNC: 0.71 MG/DL (ref 0.5–1.05)
EGFRCR SERPLBLD CKD-EPI 2021: >90 ML/MIN/1.73M*2
EOSINOPHIL # BLD AUTO: 0.25 X10*3/UL (ref 0–0.7)
EOSINOPHIL NFR BLD AUTO: 24 %
ERYTHROCYTE [DISTWIDTH] IN BLOOD BY AUTOMATED COUNT: 14.6 % (ref 11.5–14.5)
GLUCOSE SERPL-MCNC: 98 MG/DL (ref 74–99)
HCT VFR BLD AUTO: 23.1 % (ref 36–46)
HGB BLD-MCNC: 7.7 G/DL (ref 12–16)
IMM GRANULOCYTES # BLD AUTO: 0.02 X10*3/UL (ref 0–0.7)
IMM GRANULOCYTES NFR BLD AUTO: 1.9 % (ref 0–0.9)
LDH SERPL L TO P-CCNC: 233 U/L (ref 84–246)
LYMPHOCYTES # BLD AUTO: 0.36 X10*3/UL (ref 1.2–4.8)
LYMPHOCYTES NFR BLD AUTO: 34.6 %
MAGNESIUM SERPL-MCNC: 1.95 MG/DL (ref 1.6–2.4)
MCH RBC QN AUTO: 29.7 PG (ref 26–34)
MCHC RBC AUTO-ENTMCNC: 33.3 G/DL (ref 32–36)
MCV RBC AUTO: 89 FL (ref 80–100)
MONOCYTES # BLD AUTO: 0.02 X10*3/UL (ref 0.1–1)
MONOCYTES NFR BLD AUTO: 1.9 %
NEUTROPHILS # BLD AUTO: 0.38 X10*3/UL (ref 1.2–7.7)
NEUTROPHILS NFR BLD AUTO: 36.6 %
NRBC BLD-RTO: 0 /100 WBCS (ref 0–0)
PHOSPHATE SERPL-MCNC: 3.5 MG/DL (ref 2.5–4.9)
PLATELET # BLD AUTO: 19 X10*3/UL (ref 150–450)
POTASSIUM SERPL-SCNC: 4.3 MMOL/L (ref 3.5–5.3)
PROT SERPL-MCNC: 5.6 G/DL (ref 6.4–8.2)
RBC # BLD AUTO: 2.59 X10*6/UL (ref 4–5.2)
RBC MORPH BLD: NORMAL
SODIUM SERPL-SCNC: 138 MMOL/L (ref 136–145)
URATE SERPL-MCNC: 3.2 MG/DL (ref 2.3–6.7)
WBC # BLD AUTO: 1 X10*3/UL (ref 4.4–11.3)

## 2024-07-26 PROCEDURE — 83735 ASSAY OF MAGNESIUM: CPT | Performed by: PHYSICIAN ASSISTANT

## 2024-07-26 PROCEDURE — 2500000004 HC RX 250 GENERAL PHARMACY W/ HCPCS (ALT 636 FOR OP/ED): Performed by: INTERNAL MEDICINE

## 2024-07-26 PROCEDURE — 83615 LACTATE (LD) (LDH) ENZYME: CPT | Performed by: NURSE PRACTITIONER

## 2024-07-26 PROCEDURE — 1170000001 HC PRIVATE ONCOLOGY ROOM DAILY

## 2024-07-26 PROCEDURE — 2500000002 HC RX 250 W HCPCS SELF ADMINISTERED DRUGS (ALT 637 FOR MEDICARE OP, ALT 636 FOR OP/ED): Performed by: PHYSICIAN ASSISTANT

## 2024-07-26 PROCEDURE — 99232 SBSQ HOSP IP/OBS MODERATE 35: CPT | Performed by: INTERNAL MEDICINE

## 2024-07-26 PROCEDURE — 2500000001 HC RX 250 WO HCPCS SELF ADMINISTERED DRUGS (ALT 637 FOR MEDICARE OP): Performed by: PHYSICIAN ASSISTANT

## 2024-07-26 PROCEDURE — 84100 ASSAY OF PHOSPHORUS: CPT | Performed by: NURSE PRACTITIONER

## 2024-07-26 PROCEDURE — 85025 COMPLETE CBC W/AUTO DIFF WBC: CPT | Performed by: PHYSICIAN ASSISTANT

## 2024-07-26 PROCEDURE — 2500000001 HC RX 250 WO HCPCS SELF ADMINISTERED DRUGS (ALT 637 FOR MEDICARE OP): Performed by: NURSE PRACTITIONER

## 2024-07-26 PROCEDURE — 82248 BILIRUBIN DIRECT: CPT | Performed by: NURSE PRACTITIONER

## 2024-07-26 PROCEDURE — 80048 BASIC METABOLIC PNL TOTAL CA: CPT | Performed by: NURSE PRACTITIONER

## 2024-07-26 PROCEDURE — 84550 ASSAY OF BLOOD/URIC ACID: CPT | Performed by: PHYSICIAN ASSISTANT

## 2024-07-26 PROCEDURE — 84460 ALANINE AMINO (ALT) (SGPT): CPT | Performed by: NURSE PRACTITIONER

## 2024-07-26 ASSESSMENT — COGNITIVE AND FUNCTIONAL STATUS - GENERAL
DAILY ACTIVITIY SCORE: 24
MOBILITY SCORE: 24

## 2024-07-26 ASSESSMENT — PAIN SCALES - GENERAL
PAINLEVEL_OUTOF10: 0 - NO PAIN
PAINLEVEL_OUTOF10: 0 - NO PAIN

## 2024-07-26 NOTE — PROGRESS NOTES
"Ilda Peter is a 23 y.o. female on day 7 of admission presenting with Acute myeloid leukemia (AML) due to recurrent genetic abnormality (Multi).    Subjective   Pt awake sitting up in bed. No complaints today. Feels overall a little better than yesterday. Appetite fair, encouraged fluids and food as tolerated.    Denies SOB, HA, CP, chills, n/v/d/c, abdom pain, dysuria, tarry stools.       Objective     Physical Exam  Constitutional:       General: She is not in acute distress.     Appearance: She is not toxic-appearing.   HENT:      Head: Normocephalic.      Nose: Nose normal. No congestion.      Mouth/Throat:      Mouth: Mucous membranes are moist.      Pharynx: Oropharynx is clear. No oropharyngeal exudate.   Eyes:      General: No scleral icterus.     Pupils: Pupils are equal, round, and reactive to light.   Cardiovascular:      Rate and Rhythm: Normal rate and regular rhythm.   Pulmonary:      Effort: Pulmonary effort is normal. No respiratory distress.      Breath sounds: Normal breath sounds. No wheezing or rhonchi.   Abdominal:      General: Bowel sounds are normal. There is no distension.      Palpations: Abdomen is soft.      Tenderness: There is no abdominal tenderness. There is no guarding.   Musculoskeletal:         General: No swelling or deformity. Normal range of motion.      Cervical back: Normal range of motion.   Skin:     General: Skin is warm.      Capillary Refill: Capillary refill takes less than 2 seconds.      Findings: Bruising present.   Neurological:      Mental Status: She is alert and oriented to person, place, and time.         Last Recorded Vitals  Blood pressure 95/60, pulse 82, temperature 37.1 °C (98.8 °F), temperature source Temporal, resp. rate 18, height (S) 1.65 m (5' 4.96\"), weight 61 kg (134 lb 7.7 oz), last menstrual period 07/17/2024, SpO2 100%, not currently breastfeeding.  Intake/Output last 3 Shifts:  I/O last 3 completed shifts:  In: 1100 (18 mL/kg) [IV " Piggyback:1100]  Out: - (0 mL/kg)   Weight: 61 kg     Relevant Results  Scheduled medications  acyclovir, 400 mg, oral, q12h JORDYN  cytarabine, 100 mg/m2 (Treatment Plan Recorded), intravenous, Once  levoFLOXacin, 500 mg, oral, q24h JORDYN  pantoprazole, 40 mg, oral, Daily before breakfast  posaconazole, 300 mg, oral, Daily with breakfast  sertraline, 50 mg, oral, Daily        Assessment/Plan   Active Problems:    Acute myeloid leukemia in adult (Multi)    Ms. Ilda Peter is a 24 yo female with newly diagnosed AML admitted on 7/19/24 for induction chemotherapy with Cytarabine and Idarubicin. No other significant PMHx.     Day 8 with induction 7+3 (Estefany) (started on 7/19/24)    ONC:  # Newly diagnosed AML, favorable risk  - Presented with n/v & dizziness worsening over the last few months  - Found to be pancytopenic in ED, was discharged home and had BMBx done outpatient.  - Bmbx (7/5/24): c/w hypercellular BM w/erythroid predominant hematopoiesis, dyserythropoiesis and increased blasts c/w high grade myeloid neoplasm.  - FISH neg  - Myeloid NGS: NPM1 (54%), NRAS (25%), PTPN11 (15%), IDH1 (2%)  - HLA typing sent 7/9  - Plan for Day 14 BMBx (around 8/1)    CHEMO:  Hydration and anti-emetics per chemotherapy order set  Cytarabine (100mg/m2) = 170mg IV daily x 7 days (7/19->)  Idarubicin (12mg/m2) = 20mg IV daily x 3 days (7/19->)    - HCG, beta quantitative neg on admit  HLA typing sent (7/23)  HLA Buccal swab ordered 7/24    HEME:  # Pancytopenia d/t malignancy, known vaginal bleeding on admit since resolved  - Keep hgb > 7 and Plts > 10   # Transaminitis, likely d/t chemotherapy, resolved.  - Hemolysis workup neg    ID:  - Afebrile  # Prophylaxis: ACV and Posaconazole  - Plan Zosyn if febrile  - Started Levo (7/24) for neutropenia     FEN/GI:  - Admit wt: 61.9 kg Daily Wt: 61 kg (7/26)  - Hydration and anti-emetics per chemotherapy order set   - Monitor electrolytes and replete as needed  - Started PPI on admit  - PRN  Compazine/PRN Zofran on admit.     CV:  - ECHO (7/16/23) w/EF of 55%     GYN:  Prolong Vaginal bleeding since early July  --Depo shot last 05/2024   --Consider consulting Gyn for recommendations on hormone therapy given break thru bleeding on current treatment   - Onco Fertility consulted (7/22) Labs sent     ENDO:  Steroid induced hyperglycemia  - Lispro SS #1 with meals  - A1c (7/22): 6.0     PSYCH:  # Anxiety.   - Cont home Sertraline     DISP:  - Full Code  - Primary Onc: Dr. Fields  - LINDA AliceaBailon line placed in IR on 7/17/24  - Discharge pending count recovery & Day 14 BMBx    Pt seen, discussed, and examined with LEDY Lew-CNP

## 2024-07-26 NOTE — CARE PLAN
The patient's goals for the shift include      The clinical goals for the shift include Patient will fritz=main free from nausea and vomiting through end of shift    Over the shift, the patient did not make progress toward the following goals. Barriers to progression include chemotherapy administration . Recommendations to address these barriers include anti-emetics as needed.

## 2024-07-27 LAB
ALBUMIN SERPL BCP-MCNC: 3.8 G/DL (ref 3.4–5)
ANION GAP SERPL CALC-SCNC: 13 MMOL/L (ref 10–20)
APTT PPP: 29 SECONDS (ref 27–38)
BASOPHILS # BLD MANUAL: 0.02 X10*3/UL (ref 0–0.1)
BASOPHILS NFR BLD MANUAL: 3.3 %
BUN SERPL-MCNC: 13 MG/DL (ref 6–23)
CALCIUM SERPL-MCNC: 9.2 MG/DL (ref 8.6–10.6)
CHLORIDE SERPL-SCNC: 104 MMOL/L (ref 98–107)
CO2 SERPL-SCNC: 26 MMOL/L (ref 21–32)
CREAT SERPL-MCNC: 0.69 MG/DL (ref 0.5–1.05)
EGFRCR SERPLBLD CKD-EPI 2021: >90 ML/MIN/1.73M*2
EOSINOPHIL # BLD MANUAL: 0 X10*3/UL (ref 0–0.7)
EOSINOPHIL NFR BLD MANUAL: 0 %
ERYTHROCYTE [DISTWIDTH] IN BLOOD BY AUTOMATED COUNT: 14.4 % (ref 11.5–14.5)
GLUCOSE SERPL-MCNC: 91 MG/DL (ref 74–99)
HCT VFR BLD AUTO: 21.3 % (ref 36–46)
HGB BLD-MCNC: 7.2 G/DL (ref 12–16)
HLA CLS I TYP PNL BLD/T DONR HIGH RES: NORMAL
HLA RESULTS: NORMAL
HLA-DP2 QL: NORMAL
HLA-DQB1 HIGH RES: NORMAL
HLA-DRB1 HIGH RES: NORMAL
IMM GRANULOCYTES # BLD AUTO: 0.06 X10*3/UL (ref 0–0.7)
IMM GRANULOCYTES NFR BLD AUTO: 8.5 % (ref 0–0.9)
INR PPP: 1.3 (ref 0.9–1.1)
LDH SERPL L TO P-CCNC: 212 U/L (ref 84–246)
LYMPHOCYTES # BLD MANUAL: 0.29 X10*3/UL (ref 1.2–4.8)
LYMPHOCYTES NFR BLD MANUAL: 41.3 %
MAGNESIUM SERPL-MCNC: 1.94 MG/DL (ref 1.6–2.4)
MCH RBC QN AUTO: 30.1 PG (ref 26–34)
MCHC RBC AUTO-ENTMCNC: 33.8 G/DL (ref 32–36)
MCV RBC AUTO: 89 FL (ref 80–100)
MONOCYTES # BLD MANUAL: 0 X10*3/UL (ref 0.1–1)
MONOCYTES NFR BLD MANUAL: 0 %
NEUTROPHILS # BLD MANUAL: 0.39 X10*3/UL (ref 1.2–7.7)
NEUTS BAND # BLD MANUAL: 0.01 X10*3/UL (ref 0–0.7)
NEUTS BAND NFR BLD MANUAL: 1.1 %
NEUTS SEG # BLD MANUAL: 0.38 X10*3/UL (ref 1.2–7)
NEUTS SEG NFR BLD MANUAL: 54.3 %
NRBC BLD-RTO: 0 /100 WBCS (ref 0–0)
PHOSPHATE SERPL-MCNC: 3.2 MG/DL (ref 2.5–4.9)
PLATELET # BLD AUTO: 13 X10*3/UL (ref 150–450)
POTASSIUM SERPL-SCNC: 3.9 MMOL/L (ref 3.5–5.3)
PROTHROMBIN TIME: 14.2 SECONDS (ref 9.8–12.8)
RBC # BLD AUTO: 2.39 X10*6/UL (ref 4–5.2)
RBC MORPH BLD: ABNORMAL
SODIUM SERPL-SCNC: 139 MMOL/L (ref 136–145)
TOTAL CELLS COUNTED BLD: 92
URATE SERPL-MCNC: 3.1 MG/DL (ref 2.3–6.7)
WBC # BLD AUTO: 0.7 X10*3/UL (ref 4.4–11.3)

## 2024-07-27 PROCEDURE — 85027 COMPLETE CBC AUTOMATED: CPT | Performed by: PHYSICIAN ASSISTANT

## 2024-07-27 PROCEDURE — 2500000004 HC RX 250 GENERAL PHARMACY W/ HCPCS (ALT 636 FOR OP/ED)

## 2024-07-27 PROCEDURE — 83615 LACTATE (LD) (LDH) ENZYME: CPT

## 2024-07-27 PROCEDURE — 2500000002 HC RX 250 W HCPCS SELF ADMINISTERED DRUGS (ALT 637 FOR MEDICARE OP, ALT 636 FOR OP/ED): Performed by: PHYSICIAN ASSISTANT

## 2024-07-27 PROCEDURE — 84550 ASSAY OF BLOOD/URIC ACID: CPT | Performed by: PHYSICIAN ASSISTANT

## 2024-07-27 PROCEDURE — 1170000001 HC PRIVATE ONCOLOGY ROOM DAILY

## 2024-07-27 PROCEDURE — 2500000001 HC RX 250 WO HCPCS SELF ADMINISTERED DRUGS (ALT 637 FOR MEDICARE OP): Performed by: PHYSICIAN ASSISTANT

## 2024-07-27 PROCEDURE — 85007 BL SMEAR W/DIFF WBC COUNT: CPT | Performed by: PHYSICIAN ASSISTANT

## 2024-07-27 PROCEDURE — 99232 SBSQ HOSP IP/OBS MODERATE 35: CPT | Performed by: INTERNAL MEDICINE

## 2024-07-27 PROCEDURE — 83735 ASSAY OF MAGNESIUM: CPT | Performed by: PHYSICIAN ASSISTANT

## 2024-07-27 PROCEDURE — 80069 RENAL FUNCTION PANEL: CPT | Performed by: NURSE PRACTITIONER

## 2024-07-27 PROCEDURE — 2500000001 HC RX 250 WO HCPCS SELF ADMINISTERED DRUGS (ALT 637 FOR MEDICARE OP): Performed by: NURSE PRACTITIONER

## 2024-07-27 PROCEDURE — 85610 PROTHROMBIN TIME: CPT

## 2024-07-27 RX ORDER — ONDANSETRON HYDROCHLORIDE 2 MG/ML
8 INJECTION, SOLUTION INTRAVENOUS EVERY 8 HOURS
Status: DISPENSED | OUTPATIENT
Start: 2024-07-27

## 2024-07-27 ASSESSMENT — COGNITIVE AND FUNCTIONAL STATUS - GENERAL
MOBILITY SCORE: 24
DAILY ACTIVITIY SCORE: 24
DAILY ACTIVITIY SCORE: 24
MOBILITY SCORE: 24

## 2024-07-27 ASSESSMENT — PAIN SCALES - GENERAL
PAINLEVEL_OUTOF10: 0 - NO PAIN

## 2024-07-27 NOTE — CARE PLAN
The patient's goals for the shift include      The clinical goals for the shift include patient will remain free from falls and injury this shift

## 2024-07-27 NOTE — CARE PLAN
The patient's goals for the shift include      The clinical goals for the shift include Patient will have no complaints of nausea or vomiting through end of shift.    Over the shift, the patient did not make progress toward the following goals. Barriers to progression include chemotherapy.  Recommendations to address these barriers include food choices and medications to control nausea.

## 2024-07-27 NOTE — PROGRESS NOTES
"Ilda Peter is a 23 y.o. female on day 8 of admission presenting with Acute myeloid leukemia (AML) due to recurrent genetic abnormality (Multi).    Subjective   Pt awake sitting up in bed. Endorings nausea today, states she threw up in the shower this morning due to the heat and moisture. Zofran and Compazine ordered PRN. Will consider scehduling the Zofran to gain better control of her nausea.     Denies SOB, HA, CP, chills, n/v/d/c, abdom pain, dysuria, tarry stools.       Objective     Physical Exam  Constitutional:       General: She is not in acute distress.     Appearance: She is not toxic-appearing.   HENT:      Head: Normocephalic.      Nose: Nose normal. No congestion.      Mouth/Throat:      Mouth: Mucous membranes are moist.      Pharynx: Oropharynx is clear. No oropharyngeal exudate.   Eyes:      General: No scleral icterus.     Pupils: Pupils are equal, round, and reactive to light.   Cardiovascular:      Rate and Rhythm: Normal rate and regular rhythm.   Pulmonary:      Effort: Pulmonary effort is normal. No respiratory distress.      Breath sounds: Normal breath sounds. No wheezing or rhonchi.   Abdominal:      General: Bowel sounds are normal. There is no distension.      Palpations: Abdomen is soft.      Tenderness: There is no abdominal tenderness. There is no guarding.   Musculoskeletal:         General: No swelling or deformity. Normal range of motion.      Cervical back: Normal range of motion.   Skin:     General: Skin is warm.      Capillary Refill: Capillary refill takes less than 2 seconds.      Findings: Bruising present.   Neurological:      Mental Status: She is alert and oriented to person, place, and time.         Last Recorded Vitals  Blood pressure 122/86, pulse 99, temperature 36.5 °C (97.7 °F), temperature source Temporal, resp. rate 18, height (S) 1.65 m (5' 4.96\"), weight 61 kg (134 lb 7.7 oz), last menstrual period 07/17/2024, SpO2 100%, not currently breastfeeding.  Intake/Output " last 3 Shifts:  I/O last 3 completed shifts:  In: 1000 (16.4 mL/kg) [IV Piggyback:1000]  Out: - (0 mL/kg)   Weight: 61 kg     Relevant Results  Scheduled medications  acyclovir, 400 mg, oral, q12h JORDYN  levoFLOXacin, 500 mg, oral, q24h JORDYN  pantoprazole, 40 mg, oral, Daily before breakfast  posaconazole, 300 mg, oral, Daily with breakfast  sertraline, 50 mg, oral, Daily        Assessment/Plan   Active Problems:    Acute myeloid leukemia in adult (Multi)    Ms. Ilda Peter is a 24 yo female with newly diagnosed AML admitted on 7/19/24 for induction chemotherapy with Cytarabine and Idarubicin. No other significant PMHx.     Day 9 with induction 7+3 (Estefany) (started on 7/19/24)    ONC:  # Newly diagnosed AML, favorable risk  - Presented with n/v & dizziness worsening over the last few months  - Found to be pancytopenic in ED, was discharged home and had BMBx done outpatient.  - Bmbx (7/5/24): c/w hypercellular BM w/erythroid predominant hematopoiesis, dyserythropoiesis and increased blasts c/w high grade myeloid neoplasm.  - FISH neg  - Myeloid NGS: NPM1 (54%), NRAS (25%), PTPN11 (15%), IDH1 (2%)  - HLA typing sent 7/9  - Plan for Day 14 BMBx (around 8/1)    CHEMO:  Hydration and anti-emetics per chemotherapy order set  Cytarabine (100mg/m2) = 170mg IV daily x 7 days (7/19->)  Idarubicin (12mg/m2) = 20mg IV daily x 3 days (7/19->)    - HCG, beta quantitative neg on admit  HLA typing sent (7/23)  HLA Buccal swab ordered 7/24    HEME:  # Pancytopenia d/t malignancy, known vaginal bleeding on admit since resolved  - Keep hgb > 7 and Plts > 10   # Transaminitis, likely d/t chemotherapy, resolved.  - Hemolysis workup neg    ID:  - Afebrile  # Prophylaxis: ACV and Posaconazole  - Plan Zosyn if febrile  - Started Levo (7/24) for neutropenia     FEN/GI:  - Admit wt: 61.9 kg Daily Wt: 61 kg (7/26)  - Hydration and anti-emetics per chemotherapy order set. Scheduled zofran trial on 7/27.   - Monitor electrolytes and replete as  needed  - Started PPI on admit  - PRN Compazine/PRN Zofran on admit.     CV:  - ECHO (7/16/23) w/EF of 55%     GYN:  Prolong Vaginal bleeding since early July  --Depo shot last 05/2024   --Consider consulting Gyn for recommendations on hormone therapy given break thru bleeding on current treatment   - Onco Fertility consulted (7/22) Labs sent     ENDO:  Steroid induced hyperglycemia  - Lispro SS #1 with meals  - A1c (7/22): 6.0     PSYCH:  # Anxiety.   - Cont home Sertraline     DISP:  - Full Code  - Primary Onc: Dr. Fields  - DL Bailon line placed in IR on 7/17/24  - Discharge pending count recovery & Day 14 BMBx    Pt seen, discussed, and examined with Dr. Layton Bertrand PA-C

## 2024-07-28 ENCOUNTER — OFFICE VISIT (OUTPATIENT)
Dept: HEMATOLOGY/ONCOLOGY | Facility: HOSPITAL | Age: 24
End: 2024-07-28
Payer: COMMERCIAL

## 2024-07-28 VITALS
SYSTOLIC BLOOD PRESSURE: 109 MMHG | BODY MASS INDEX: 22.41 KG/M2 | OXYGEN SATURATION: 100 % | RESPIRATION RATE: 18 BRPM | HEART RATE: 125 BPM | DIASTOLIC BLOOD PRESSURE: 61 MMHG | HEIGHT: 65 IN | TEMPERATURE: 98.6 F | WEIGHT: 134.48 LBS

## 2024-07-28 LAB
ABO GROUP (TYPE) IN BLOOD: NORMAL
ALBUMIN SERPL BCP-MCNC: 3.7 G/DL (ref 3.4–5)
ANION GAP SERPL CALC-SCNC: 13 MMOL/L (ref 10–20)
ANTIBODY SCREEN: NORMAL
APTT PPP: 28 SECONDS (ref 27–38)
BASOPHILS # BLD MANUAL: 0.03 X10*3/UL (ref 0–0.1)
BASOPHILS NFR BLD MANUAL: 4 %
BLOOD EXPIRATION DATE: NORMAL
BLOOD EXPIRATION DATE: NORMAL
BUN SERPL-MCNC: 16 MG/DL (ref 6–23)
CALCIUM SERPL-MCNC: 9 MG/DL (ref 8.6–10.6)
CHLORIDE SERPL-SCNC: 103 MMOL/L (ref 98–107)
CO2 SERPL-SCNC: 25 MMOL/L (ref 21–32)
CREAT SERPL-MCNC: 0.77 MG/DL (ref 0.5–1.05)
DISPENSE STATUS: NORMAL
DISPENSE STATUS: NORMAL
EGFRCR SERPLBLD CKD-EPI 2021: >90 ML/MIN/1.73M*2
EOSINOPHIL # BLD MANUAL: 0.02 X10*3/UL (ref 0–0.7)
EOSINOPHIL NFR BLD MANUAL: 2.7 %
ERYTHROCYTE [DISTWIDTH] IN BLOOD BY AUTOMATED COUNT: 14.1 % (ref 11.5–14.5)
GLUCOSE SERPL-MCNC: 106 MG/DL (ref 74–99)
HCT VFR BLD AUTO: 18.1 % (ref 36–46)
HGB BLD-MCNC: 6.2 G/DL (ref 12–16)
IMM GRANULOCYTES # BLD AUTO: 0.01 X10*3/UL (ref 0–0.7)
IMM GRANULOCYTES NFR BLD AUTO: 1.4 % (ref 0–0.9)
INR PPP: 1.2 (ref 0.9–1.1)
LDH SERPL L TO P-CCNC: 183 U/L (ref 84–246)
LYMPHOCYTES # BLD MANUAL: 0.38 X10*3/UL (ref 1.2–4.8)
LYMPHOCYTES NFR BLD MANUAL: 54.6 %
MAGNESIUM SERPL-MCNC: 1.9 MG/DL (ref 1.6–2.4)
MCH RBC QN AUTO: 30.5 PG (ref 26–34)
MCHC RBC AUTO-ENTMCNC: 34.3 G/DL (ref 32–36)
MCV RBC AUTO: 89 FL (ref 80–100)
MONOCYTES # BLD MANUAL: 0 X10*3/UL (ref 0.1–1)
MONOCYTES NFR BLD MANUAL: 0 %
NEUTS SEG # BLD MANUAL: 0.27 X10*3/UL (ref 1.2–7)
NEUTS SEG NFR BLD MANUAL: 38.7 %
NRBC BLD-RTO: 0 /100 WBCS (ref 0–0)
PHOSPHATE SERPL-MCNC: 3.8 MG/DL (ref 2.5–4.9)
PLATELET # BLD AUTO: 9 X10*3/UL (ref 150–450)
POTASSIUM SERPL-SCNC: 4 MMOL/L (ref 3.5–5.3)
PRODUCT BLOOD TYPE: 5100
PRODUCT BLOOD TYPE: 5100
PRODUCT CODE: NORMAL
PRODUCT CODE: NORMAL
PROTHROMBIN TIME: 14 SECONDS (ref 9.8–12.8)
RBC # BLD AUTO: 2.03 X10*6/UL (ref 4–5.2)
RBC MORPH BLD: ABNORMAL
RH FACTOR (ANTIGEN D): NORMAL
SODIUM SERPL-SCNC: 137 MMOL/L (ref 136–145)
TOTAL CELLS COUNTED BLD: 75
UNIT ABO: NORMAL
UNIT ABO: NORMAL
UNIT NUMBER: NORMAL
UNIT NUMBER: NORMAL
UNIT RH: NORMAL
UNIT RH: NORMAL
UNIT VOLUME: 235
UNIT VOLUME: 350
URATE SERPL-MCNC: 3.2 MG/DL (ref 2.3–6.7)
WBC # BLD AUTO: 0.7 X10*3/UL (ref 4.4–11.3)
XM INTEP: NORMAL

## 2024-07-28 PROCEDURE — 80069 RENAL FUNCTION PANEL: CPT | Performed by: NURSE PRACTITIONER

## 2024-07-28 PROCEDURE — 2500000004 HC RX 250 GENERAL PHARMACY W/ HCPCS (ALT 636 FOR OP/ED): Mod: JZ | Performed by: PHYSICIAN ASSISTANT

## 2024-07-28 PROCEDURE — 86901 BLOOD TYPING SEROLOGIC RH(D): CPT

## 2024-07-28 PROCEDURE — 84550 ASSAY OF BLOOD/URIC ACID: CPT | Performed by: PHYSICIAN ASSISTANT

## 2024-07-28 PROCEDURE — 30233R1 TRANSFUSION OF NONAUTOLOGOUS PLATELETS INTO PERIPHERAL VEIN, PERCUTANEOUS APPROACH: ICD-10-PCS | Performed by: INTERNAL MEDICINE

## 2024-07-28 PROCEDURE — P9037 PLATE PHERES LEUKOREDU IRRAD: HCPCS

## 2024-07-28 PROCEDURE — 93005 ELECTROCARDIOGRAM TRACING: CPT

## 2024-07-28 PROCEDURE — 83735 ASSAY OF MAGNESIUM: CPT | Performed by: PHYSICIAN ASSISTANT

## 2024-07-28 PROCEDURE — 85007 BL SMEAR W/DIFF WBC COUNT: CPT | Performed by: PHYSICIAN ASSISTANT

## 2024-07-28 PROCEDURE — 85610 PROTHROMBIN TIME: CPT

## 2024-07-28 PROCEDURE — 2500000004 HC RX 250 GENERAL PHARMACY W/ HCPCS (ALT 636 FOR OP/ED)

## 2024-07-28 PROCEDURE — 30233N1 TRANSFUSION OF NONAUTOLOGOUS RED BLOOD CELLS INTO PERIPHERAL VEIN, PERCUTANEOUS APPROACH: ICD-10-PCS | Performed by: INTERNAL MEDICINE

## 2024-07-28 PROCEDURE — 2500000002 HC RX 250 W HCPCS SELF ADMINISTERED DRUGS (ALT 637 FOR MEDICARE OP, ALT 636 FOR OP/ED): Performed by: PHYSICIAN ASSISTANT

## 2024-07-28 PROCEDURE — 1170000001 HC PRIVATE ONCOLOGY ROOM DAILY

## 2024-07-28 PROCEDURE — 86923 COMPATIBILITY TEST ELECTRIC: CPT

## 2024-07-28 PROCEDURE — 85027 COMPLETE CBC AUTOMATED: CPT | Performed by: PHYSICIAN ASSISTANT

## 2024-07-28 PROCEDURE — 36430 TRANSFUSION BLD/BLD COMPNT: CPT

## 2024-07-28 PROCEDURE — P9040 RBC LEUKOREDUCED IRRADIATED: HCPCS

## 2024-07-28 PROCEDURE — 2500000001 HC RX 250 WO HCPCS SELF ADMINISTERED DRUGS (ALT 637 FOR MEDICARE OP): Performed by: PHYSICIAN ASSISTANT

## 2024-07-28 PROCEDURE — 2500000001 HC RX 250 WO HCPCS SELF ADMINISTERED DRUGS (ALT 637 FOR MEDICARE OP): Performed by: NURSE PRACTITIONER

## 2024-07-28 PROCEDURE — 93010 ELECTROCARDIOGRAM REPORT: CPT | Performed by: INTERNAL MEDICINE

## 2024-07-28 PROCEDURE — 99232 SBSQ HOSP IP/OBS MODERATE 35: CPT | Performed by: INTERNAL MEDICINE

## 2024-07-28 PROCEDURE — 83615 LACTATE (LD) (LDH) ENZYME: CPT

## 2024-07-28 RX ORDER — POSACONAZOLE 100 MG/1
300 TABLET, DELAYED RELEASE ORAL
Status: DISPENSED | OUTPATIENT
Start: 2024-07-29

## 2024-07-28 ASSESSMENT — PAIN - FUNCTIONAL ASSESSMENT
PAIN_FUNCTIONAL_ASSESSMENT: 0-10
PAIN_FUNCTIONAL_ASSESSMENT: 0-10

## 2024-07-28 ASSESSMENT — COGNITIVE AND FUNCTIONAL STATUS - GENERAL
DAILY ACTIVITIY SCORE: 24
MOBILITY SCORE: 24
DAILY ACTIVITIY SCORE: 24
MOBILITY SCORE: 24

## 2024-07-28 ASSESSMENT — PAIN SCALES - GENERAL
PAINLEVEL_OUTOF10: 0 - NO PAIN

## 2024-07-28 NOTE — PROGRESS NOTES
Ilda Peter is a 23 y.o. 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    Subjective   Pt awake sitting up in bed. Endorings stomach upset with her mornign meds. Rescheduling her posa to later in the day and will get zofran before her levaquin and acyclovir in the AM. If patient unable to tolerate PO ACV and Levaquin will transition to IV formulation. Patient otherwise doing well and denies any other acute complaints. Remainder of ROS is negative. PT was able to spend time with her friend yesterday grabbing coffee and spending time in the [facility] seating area.     Denies SOB, HA, CP, chills, n/v/d/c, abdom pain, dysuria, tarry stools.       Objective     Physical Exam  Constitutional:       General: She is not in acute distress.     Appearance: She is not toxic-appearing.   HENT:      Head: Normocephalic.      Nose: Nose normal. No congestion.      Mouth/Throat:      Mouth: Mucous membranes are moist.      Pharynx: Oropharynx is clear. No oropharyngeal exudate.   Eyes:      General: No scleral icterus.     Pupils: Pupils are equal, round, and reactive to light.   Cardiovascular:      Rate and Rhythm: Normal rate and regular rhythm.   Pulmonary:      Effort: Pulmonary effort is normal. No respiratory distress.      Breath sounds: Normal breath sounds. No wheezing or rhonchi.   Abdominal:      General: Bowel sounds are normal. There is no distension.      Palpations: Abdomen is soft.      Tenderness: There is no abdominal tenderness. There is no guarding.   Musculoskeletal:         General: No swelling or deformity. Normal range of motion.      Cervical back: Normal range of motion.   Skin:     General: Skin is warm.      Capillary Refill: Capillary refill takes less than 2 seconds.      Findings: Bruising present.   Neurological:      Mental Status: She is alert and oriented to person, place, and time.         Last Recorded Vitals  Blood  "pressure 128/82, pulse 98, temperature 36.7 °C (98.1 °F), temperature source Temporal, resp. rate 18, height (S) 1.65 m (5' 4.96\"), weight 61 kg (134 lb 7.7 oz), last menstrual period 07/17/2024, SpO2 100%, not currently breastfeeding.  Intake/Output last 3 Shifts:  I/O last 3 completed shifts:  In: 240 (3.9 mL/kg) [P.O.:240]  Out: - (0 mL/kg)   Weight: 61 kg     Relevant Results  Scheduled medications  acyclovir, 400 mg, oral, q12h JORDYN  levoFLOXacin, 500 mg, oral, q24h JORDYN  ondansetron, 8 mg, intravenous, q8h  pantoprazole, 40 mg, oral, Daily before breakfast  posaconazole, 300 mg, oral, Daily with breakfast  sertraline, 50 mg, oral, Daily        Assessment/Plan   Active Problems:    Acute myeloid leukemia in adult (Multi)    Ms. Ilda Peter is a 24 yo female with newly diagnosed AML admitted on 7/19/24 for induction chemotherapy with Cytarabine and Idarubicin. No other significant PMHx.     Day 10 with induction 7+3 (Estefany) (started on 7/19/24)    ONC:  # Newly diagnosed AML, favorable risk  - Presented with n/v & dizziness worsening over the last few months  - Found to be pancytopenic in ED, was discharged home and had BMBx done outpatient.  - Bmbx (7/5/24): c/w hypercellular BM w/erythroid predominant hematopoiesis, dyserythropoiesis and increased blasts c/w high grade myeloid neoplasm.  - FISH neg  - Myeloid NGS: NPM1 (54%), NRAS (25%), PTPN11 (15%), IDH1 (2%)  - HLA typing sent 7/9  - Plan for Day 14 BMBx (around 8/1)    CHEMO:  Hydration and anti-emetics per chemotherapy order set  Cytarabine (100mg/m2) = 170mg IV daily x 7 days (7/19->)  Idarubicin (12mg/m2) = 20mg IV daily x 3 days (7/19->)    - HCG, beta quantitative neg on admit  HLA typing sent (7/23)  HLA Buccal swab ordered 7/24    HEME:  # Pancytopenia d/t malignancy, known vaginal bleeding on admit since resolved  - Keep hgb > 7 and Plts > 10   # Transaminitis, likely d/t chemotherapy, resolved.  - Hemolysis workup neg    ID:  - Afebrile  # Prophylaxis: " ACV and Posaconazole  - Plan Zosyn if febrile  - Started Levo (7/24) for neutropenia     FEN/GI:  - Admit wt: 61.9 kg Daily Wt: 61 kg (7/25)  - Hydration and anti-emetics per chemotherapy order set. - Monitor electrolytes and replete as needed  - Started PPI on admit  - PRN Compazine/PRN Zofran on admit. Scheduled zofran steartedon 7/27.      CV:  - ECHO (7/16/23) w/EF of 55%     GYN:  Prolong Vaginal bleeding since early July  --Depo shot last 05/2024   --Consider consulting Gyn for recommendations on hormone therapy given break thru bleeding on current treatment   - Onco Fertility consulted (7/22) Labs sent     ENDO:  Steroid induced hyperglycemia  - Lispro SS #1 with meals  - A1c (7/22): 6.0     PSYCH:  # Anxiety.   - Cont home Sertraline     DISP:  - Full Code  - Primary Onc: Dr. Fields  - DL Bailon line placed in IR on 7/17/24  - Discharge pending count recovery & Day 14 BMBx    Pt seen, discussed, and examined with Dr. Layton Bertrand PA-C

## 2024-07-28 NOTE — CARE PLAN
The patient's goals for the shift include      The clinical goals for the shift include Patient will have no nausea or vomiting this shift

## 2024-07-28 NOTE — CARE PLAN
The patient's goals for the shift include  remain safe and free of falls.     The clinical goals for the shift include Patient will remain HDS    VSS; afebrile this shift.  Patient had no c/o pain; no c/o n/v/d this shift, but remains on scheduled IVP zofran per order.  Patient received 1u pRBCs this shift and 5-pk platelets with no premedications given. Patient tolerated well.  Up in room ad emilee; remained safe and free of falls.        Problem: Nutrition  Goal: Adequate PO fluid intake  Outcome: Progressing     Problem: Pain - Adult  Goal: Verbalizes/displays adequate comfort level or baseline comfort level  Outcome: Progressing     Problem: Safety - Adult  Goal: Free from fall injury  Outcome: Progressing     Problem: PICC line  Goal: I will remain free from symptoms of infection  Outcome: Progressing

## 2024-07-29 ENCOUNTER — APPOINTMENT (OUTPATIENT)
Dept: RADIOLOGY | Facility: HOSPITAL | Age: 24
End: 2024-07-29
Payer: COMMERCIAL

## 2024-07-29 LAB
ALBUMIN SERPL BCP-MCNC: 4 G/DL (ref 3.4–5)
ALP SERPL-CCNC: 93 U/L (ref 33–110)
ALT SERPL W P-5'-P-CCNC: 31 U/L (ref 7–45)
ANION GAP SERPL CALC-SCNC: 13 MMOL/L (ref 10–20)
APPEARANCE UR: CLEAR
APTT PPP: 29 SECONDS (ref 27–38)
AST SERPL W P-5'-P-CCNC: 17 U/L (ref 9–39)
BASOPHILS # BLD MANUAL: 0.02 X10*3/UL (ref 0–0.1)
BASOPHILS NFR BLD MANUAL: 10.3 %
BILIRUB DIRECT SERPL-MCNC: 0.6 MG/DL (ref 0–0.3)
BILIRUB SERPL-MCNC: 2.4 MG/DL (ref 0–1.2)
BILIRUB UR STRIP.AUTO-MCNC: NEGATIVE MG/DL
BUN SERPL-MCNC: 14 MG/DL (ref 6–23)
CALCIUM SERPL-MCNC: 8.9 MG/DL (ref 8.6–10.6)
CHLORIDE SERPL-SCNC: 99 MMOL/L (ref 98–107)
CO2 SERPL-SCNC: 26 MMOL/L (ref 21–32)
COLOR UR: YELLOW
CREAT SERPL-MCNC: 0.86 MG/DL (ref 0.5–1.05)
EGFRCR SERPLBLD CKD-EPI 2021: >90 ML/MIN/1.73M*2
EOSINOPHIL # BLD MANUAL: 0 X10*3/UL (ref 0–0.7)
EOSINOPHIL NFR BLD MANUAL: 0 %
ERYTHROCYTE [DISTWIDTH] IN BLOOD BY AUTOMATED COUNT: 13.9 % (ref 11.5–14.5)
FIBRINOGEN PPP-MCNC: 585 MG/DL (ref 200–400)
GLUCOSE SERPL-MCNC: 116 MG/DL (ref 74–99)
GLUCOSE UR STRIP.AUTO-MCNC: NORMAL MG/DL
HCT VFR BLD AUTO: 21.8 % (ref 36–46)
HGB BLD-MCNC: 7.5 G/DL (ref 12–16)
HOLD SPECIMEN: NORMAL
IMM GRANULOCYTES # BLD AUTO: 0 X10*3/UL (ref 0–0.7)
IMM GRANULOCYTES NFR BLD AUTO: 0 % (ref 0–0.9)
INR PPP: 1.4 (ref 0.9–1.1)
KETONES UR STRIP.AUTO-MCNC: NEGATIVE MG/DL
LDH SERPL L TO P-CCNC: 188 U/L (ref 84–246)
LEUKOCYTE ESTERASE UR QL STRIP.AUTO: NEGATIVE
LYMPHOCYTES # BLD MANUAL: 0.12 X10*3/UL (ref 1.2–4.8)
LYMPHOCYTES NFR BLD MANUAL: 62.1 %
MAGNESIUM SERPL-MCNC: 1.64 MG/DL (ref 1.6–2.4)
MCH RBC QN AUTO: 30.2 PG (ref 26–34)
MCHC RBC AUTO-ENTMCNC: 34.4 G/DL (ref 32–36)
MCV RBC AUTO: 88 FL (ref 80–100)
MONOCYTES # BLD MANUAL: 0 X10*3/UL (ref 0.1–1)
MONOCYTES NFR BLD MANUAL: 0 %
NEUTS SEG # BLD MANUAL: 0.06 X10*3/UL (ref 1.2–7)
NEUTS SEG NFR BLD MANUAL: 27.6 %
NITRITE UR QL STRIP.AUTO: NEGATIVE
NRBC BLD-RTO: 0 /100 WBCS (ref 0–0)
PH UR STRIP.AUTO: 6.5 [PH]
PHOSPHATE SERPL-MCNC: 3.4 MG/DL (ref 2.5–4.9)
PLATELET # BLD AUTO: 18 X10*3/UL (ref 150–450)
POTASSIUM SERPL-SCNC: 3.9 MMOL/L (ref 3.5–5.3)
PROT SERPL-MCNC: 6.1 G/DL (ref 6.4–8.2)
PROT UR STRIP.AUTO-MCNC: ABNORMAL MG/DL
PROTHROMBIN TIME: 15.3 SECONDS (ref 9.8–12.8)
RBC # BLD AUTO: 2.48 X10*6/UL (ref 4–5.2)
RBC # UR STRIP.AUTO: ABNORMAL /UL
RBC #/AREA URNS AUTO: NORMAL /HPF
RBC MORPH BLD: ABNORMAL
SODIUM SERPL-SCNC: 134 MMOL/L (ref 136–145)
SP GR UR STRIP.AUTO: 1.02
SQUAMOUS #/AREA URNS AUTO: NORMAL /HPF
TOTAL CELLS COUNTED BLD: 29
URATE SERPL-MCNC: 2.6 MG/DL (ref 2.3–6.7)
UROBILINOGEN UR STRIP.AUTO-MCNC: ABNORMAL MG/DL
WBC # BLD AUTO: 0.2 X10*3/UL (ref 4.4–11.3)
WBC #/AREA URNS AUTO: NORMAL /HPF

## 2024-07-29 PROCEDURE — 80076 HEPATIC FUNCTION PANEL: CPT | Performed by: NURSE PRACTITIONER

## 2024-07-29 PROCEDURE — 85027 COMPLETE CBC AUTOMATED: CPT | Performed by: PHYSICIAN ASSISTANT

## 2024-07-29 PROCEDURE — 71045 X-RAY EXAM CHEST 1 VIEW: CPT

## 2024-07-29 PROCEDURE — 2500000002 HC RX 250 W HCPCS SELF ADMINISTERED DRUGS (ALT 637 FOR MEDICARE OP, ALT 636 FOR OP/ED)

## 2024-07-29 PROCEDURE — 2500000004 HC RX 250 GENERAL PHARMACY W/ HCPCS (ALT 636 FOR OP/ED)

## 2024-07-29 PROCEDURE — 83735 ASSAY OF MAGNESIUM: CPT | Performed by: PHYSICIAN ASSISTANT

## 2024-07-29 PROCEDURE — 99233 SBSQ HOSP IP/OBS HIGH 50: CPT | Performed by: STUDENT IN AN ORGANIZED HEALTH CARE EDUCATION/TRAINING PROGRAM

## 2024-07-29 PROCEDURE — 36415 COLL VENOUS BLD VENIPUNCTURE: CPT

## 2024-07-29 PROCEDURE — 87040 BLOOD CULTURE FOR BACTERIA: CPT

## 2024-07-29 PROCEDURE — 2500000002 HC RX 250 W HCPCS SELF ADMINISTERED DRUGS (ALT 637 FOR MEDICARE OP, ALT 636 FOR OP/ED): Performed by: PHYSICIAN ASSISTANT

## 2024-07-29 PROCEDURE — 1170000001 HC PRIVATE ONCOLOGY ROOM DAILY

## 2024-07-29 PROCEDURE — 2500000001 HC RX 250 WO HCPCS SELF ADMINISTERED DRUGS (ALT 637 FOR MEDICARE OP): Performed by: PHYSICIAN ASSISTANT

## 2024-07-29 PROCEDURE — 84550 ASSAY OF BLOOD/URIC ACID: CPT | Performed by: PHYSICIAN ASSISTANT

## 2024-07-29 PROCEDURE — 85007 BL SMEAR W/DIFF WBC COUNT: CPT | Performed by: PHYSICIAN ASSISTANT

## 2024-07-29 PROCEDURE — 85384 FIBRINOGEN ACTIVITY: CPT | Performed by: NURSE PRACTITIONER

## 2024-07-29 PROCEDURE — 83615 LACTATE (LD) (LDH) ENZYME: CPT | Performed by: NURSE PRACTITIONER

## 2024-07-29 PROCEDURE — 85610 PROTHROMBIN TIME: CPT

## 2024-07-29 PROCEDURE — 71045 X-RAY EXAM CHEST 1 VIEW: CPT | Performed by: RADIOLOGY

## 2024-07-29 PROCEDURE — 84100 ASSAY OF PHOSPHORUS: CPT | Performed by: NURSE PRACTITIONER

## 2024-07-29 PROCEDURE — 81001 URINALYSIS AUTO W/SCOPE: CPT

## 2024-07-29 RX ORDER — ACETAMINOPHEN 325 MG/1
650 TABLET ORAL ONCE
Status: COMPLETED | OUTPATIENT
Start: 2024-07-29 | End: 2024-07-29

## 2024-07-29 ASSESSMENT — COGNITIVE AND FUNCTIONAL STATUS - GENERAL
MOBILITY SCORE: 24
DAILY ACTIVITIY SCORE: 24

## 2024-07-29 ASSESSMENT — PAIN SCALES - GENERAL
PAINLEVEL_OUTOF10: 0 - NO PAIN

## 2024-07-29 ASSESSMENT — PAIN - FUNCTIONAL ASSESSMENT: PAIN_FUNCTIONAL_ASSESSMENT: 0-10

## 2024-07-29 NOTE — PROGRESS NOTES
"Ilda Peter is a 23 y.o. 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    Subjective   Febrile overnight, infectious workup started. Nausea improved, sitting up on couch starting a puzzle. Appetite fair.    Patient otherwise doing well and denies any other acute complaints.     Remainder of ROS is negative. PT was able to spend time with her friend yesterday grabbing coffee and spending time in the [facility] seating area.     Denies SOB, HA, CP, chills, n/v/d/c, abdom pain, dysuria, tarry stools.       Objective     Physical Exam  Constitutional:       General: She is not in acute distress.     Appearance: She is not toxic-appearing.   HENT:      Head: Normocephalic.      Nose: Nose normal. No congestion.      Mouth/Throat:      Mouth: Mucous membranes are moist.      Pharynx: Oropharynx is clear. No oropharyngeal exudate.   Eyes:      General: No scleral icterus.     Pupils: Pupils are equal, round, and reactive to light.   Cardiovascular:      Rate and Rhythm: Normal rate and regular rhythm.   Pulmonary:      Effort: Pulmonary effort is normal. No respiratory distress.      Breath sounds: Normal breath sounds. No wheezing or rhonchi.   Abdominal:      General: Bowel sounds are normal. There is no distension.      Palpations: Abdomen is soft.      Tenderness: There is no abdominal tenderness. There is no guarding.   Musculoskeletal:         General: No swelling or deformity. Normal range of motion.      Cervical back: Normal range of motion.   Skin:     General: Skin is warm.      Capillary Refill: Capillary refill takes less than 2 seconds.      Findings: Bruising present.   Neurological:      Mental Status: She is alert and oriented to person, place, and time.       Last Recorded Vitals  Blood pressure 99/63, pulse 87, temperature 36.2 °C (97.2 °F), temperature source Temporal, resp. rate 18, height (S) 1.65 m (5' 4.96\"), weight 59.3 kg " (130 lb 11.7 oz), last menstrual period 07/17/2024, SpO2 99%, not currently breastfeeding.  Intake/Output last 3 Shifts:  I/O last 3 completed shifts:  In: 1559.8 (25.6 mL/kg) [P.O.:860; Blood:649.8; IV Piggyback:50]  Out: 300 (4.9 mL/kg) [Urine:300 (0.1 mL/kg/hr)]  Weight: 61 kg     Relevant Results  Scheduled medications  acyclovir, 400 mg, oral, q12h JORDYN  ondansetron, 8 mg, intravenous, q8h  pantoprazole, 40 mg, oral, Daily before breakfast  piperacillin-tazobactam, 3.375 g, intravenous, q6h  posaconazole, 300 mg, oral, Daily with breakfast  sertraline, 50 mg, oral, Daily        Assessment/Plan   Active Problems:    Acute myeloid leukemia in adult (Multi)    Ms. Ilda Peter is a 22 yo female with newly diagnosed AML admitted on 7/19/24 for induction chemotherapy with Cytarabine and Idarubicin. No other significant PMHx.     Day 12 with induction 7+3 (Estefany) (started on 7/19/24)    ONC:  # Newly diagnosed AML, favorable risk  - Presented with n/v & dizziness worsening over the last few months  - Found to be pancytopenic in ED, was discharged home and had BMBx done outpatient.  - Bmbx (7/5/24): c/w hypercellular BM w/erythroid predominant hematopoiesis, dyserythropoiesis and increased blasts c/w high grade myeloid neoplasm.  - FISH neg  - Myeloid NGS: NPM1 (54%), NRAS (25%), PTPN11 (15%), IDH1 (2%)  - HLA typing & buccal done (7/24)  - Family typing info given  - Plan for Day 14 BMBx (around 8/1)    CHEMO:  - Cytarabine (100mg/m2) = 170mg IV daily x 7 days (7/19)  - Idarubicin (12mg/m2) = 20mg IV daily x 3 days (7/19)   - HCG quantitative neg (7/19)    HEME:  # Pancytopenia d/t malignancy, known vaginal bleeding on admit since resolved  - Keep hgb > 7 and Plts > 10   # Transaminitis, likely d/t chemotherapy, resolved.  - Hemolysis workup neg    ID:  # Neutropenic fever 38.6 on 7/28  - Full workup done: BC x2, CXR, UA  - Zosyn (started 7/28)  # Prophylaxis: ACV and Posaconazole       FEN/GI:  Admit wt: 61.9 kg, Daily  Wt: 59.3 kg (7/29)  - Hydration and anti-emetics per chemotherapy order set.   - Monitor electrolytes and replete as needed  - Started PPI on admit  # LANETTE  - PRN Compazine/PRN Zofran on admit.   - Scheduled Zofran Q8 (7/27) w/ symptom improvement.  # Diarrhea worsening  - Ordered Cdiff r/o     CV:  - ECHO (7/16/23) w/EF of 55%     GYN:  # Prolonged Vaginal bleeding since early July  - Depo shot last 05/2024   - Consider consulting Gyn for recommendations on hormone therapy given break thru bleeding on current treatment   - Onco Fertility consulted (7/22) and labs sent.    ENDO:  # Steroid induced hyperglycemia, now resolved  - Lispro SS #1 with meals  - A1c (7/22): 6.0     PSYCH:  # Anxiety  - Cont home Sertraline     DISP:  - Full Code  - Primary Onc: Dr. Fields  - DL Bailon line placed in IR on 7/17/24  - Discharge pending count recovery & Day 14 BMBx    Pt seen, discussed, and examined with Dr. Sherron Decker, APRN-CNP

## 2024-07-29 NOTE — CARE PLAN
The patient's goals for the shift include      The clinical goals for the shift include Patient will remain HDS

## 2024-07-29 NOTE — CARE PLAN
The patient's goals for the shift include      The clinical goals for the shift include Patient will remain afebrile through end of shift    Over the shift, the patient did not make progress toward the following goals. Barriers to progression include neutropenia.  Recommendations to address these barriers include continue neutropenic precautions.

## 2024-07-29 NOTE — HOSPITAL COURSE
Ilda Peter is a 23 yr old female with no significant PMHx, presented to OSH ED for worsening n/v & dizziness, found to be pancytopenic on labs. Had outpatient BMBx done, found to have favorable risk AML (NPM1 positive). Pt started induction with 7+3 (on 7/29/24). Day 14 Bmbx empty.    Hospital course:  - Neutropenic fever on 7/28. Infectious work up neg.  - Chemo induced diarrhea. Cdiff neg. Uses Imodium as needed.    Will go home on ACV prophy    DISPO:  - Adamaris (placed 7/17/24)  - Primary Onc: Diamond, rosy  - Has recovery BMBx on 8/21

## 2024-07-29 NOTE — PROGRESS NOTES
Ilda Peter is a 23 y.o. 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    LSW met with pt at bedside for support. Pt reports she lives at home with her mom, boyfriend and her young children. Pt reports her mom doesn't work d/t her own health issues and pt/boyfriend were paying the bills. 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 Pt reports her loss of income d/t diagnosis/treatment has been a big hit for them and she is concerned about being able to provide for her children. LSW provided supportive presence and validation of feelings. LSW discussed resources including SSI/SSDI, LLS, SNAP, WIC, TGP and unemployment and provided applications/resources. LSW also recommended pt reach out to mortgage/utility companies and inquire about medical payment forbearance. LSW to remain available for assistance as needed.    OSIRIS Wilson

## 2024-07-30 ENCOUNTER — LAB REQUISITION (OUTPATIENT)
Dept: LAB | Facility: CLINIC | Age: 24
End: 2024-07-30
Payer: COMMERCIAL

## 2024-07-30 DIAGNOSIS — D61.818 OTHER PANCYTOPENIA (MULTI): ICD-10-CM

## 2024-07-30 LAB
ALBUMIN SERPL BCP-MCNC: 3.3 G/DL (ref 3.4–5)
ALBUMIN SERPL BCP-MCNC: 3.4 G/DL (ref 3.4–5)
ALP SERPL-CCNC: 88 U/L (ref 33–110)
ALT SERPL W P-5'-P-CCNC: 33 U/L (ref 7–45)
ANION GAP SERPL CALC-SCNC: 13 MMOL/L (ref 10–20)
APTT PPP: 28 SECONDS (ref 27–38)
AST SERPL W P-5'-P-CCNC: 17 U/L (ref 9–39)
BASOPHILS # BLD AUTO: 0 X10*3/UL (ref 0–0.1)
BASOPHILS NFR BLD AUTO: 0 %
BILIRUB DIRECT SERPL-MCNC: 0.4 MG/DL (ref 0–0.3)
BILIRUB SERPL-MCNC: 1.2 MG/DL (ref 0–1.2)
BLOOD EXPIRATION DATE: NORMAL
BUN SERPL-MCNC: 20 MG/DL (ref 6–23)
CALCIUM SERPL-MCNC: 8.7 MG/DL (ref 8.6–10.6)
CHLORIDE SERPL-SCNC: 104 MMOL/L (ref 98–107)
CO2 SERPL-SCNC: 25 MMOL/L (ref 21–32)
CREAT SERPL-MCNC: 0.99 MG/DL (ref 0.5–1.05)
DISPENSE STATUS: NORMAL
EGFRCR SERPLBLD CKD-EPI 2021: 82 ML/MIN/1.73M*2
EOSINOPHIL # BLD AUTO: 0.02 X10*3/UL (ref 0–0.7)
EOSINOPHIL NFR BLD AUTO: 4.9 %
ERYTHROCYTE [DISTWIDTH] IN BLOOD BY AUTOMATED COUNT: 13.7 % (ref 11.5–14.5)
GLUCOSE SERPL-MCNC: 111 MG/DL (ref 74–99)
HCT VFR BLD AUTO: 18.9 % (ref 36–46)
HGB BLD-MCNC: 6.6 G/DL (ref 12–16)
HLA CLS I TYP PNL BLD/T DONR HIGH RES: NORMAL
HLA RESULTS: NORMAL
HLA-DP2 QL: NORMAL
HLA-DQB1 HIGH RES: NORMAL
HLA-DRB1 HIGH RES: NORMAL
IMM GRANULOCYTES # BLD AUTO: 0 X10*3/UL (ref 0–0.7)
IMM GRANULOCYTES NFR BLD AUTO: 0 % (ref 0–0.9)
INR PPP: 1.3 (ref 0.9–1.1)
LDH SERPL L TO P-CCNC: 145 U/L (ref 84–246)
LYMPHOCYTES # BLD AUTO: 0.31 X10*3/UL (ref 1.2–4.8)
LYMPHOCYTES NFR BLD AUTO: 75.6 %
MAGNESIUM SERPL-MCNC: 1.78 MG/DL (ref 1.6–2.4)
MCH RBC QN AUTO: 30.6 PG (ref 26–34)
MCHC RBC AUTO-ENTMCNC: 34.9 G/DL (ref 32–36)
MCV RBC AUTO: 88 FL (ref 80–100)
MONOCYTES # BLD AUTO: 0 X10*3/UL (ref 0.1–1)
MONOCYTES NFR BLD AUTO: 0 %
NEUTROPHILS # BLD AUTO: 0.08 X10*3/UL (ref 1.2–7.7)
NEUTROPHILS NFR BLD AUTO: 19.5 %
NRBC BLD-RTO: 0 /100 WBCS (ref 0–0)
PHOSPHATE SERPL-MCNC: 3.1 MG/DL (ref 2.5–4.9)
PLATELET # BLD AUTO: 11 X10*3/UL (ref 150–450)
POTASSIUM SERPL-SCNC: 3.7 MMOL/L (ref 3.5–5.3)
PRODUCT BLOOD TYPE: 5100
PRODUCT CODE: NORMAL
PROT SERPL-MCNC: 5.6 G/DL (ref 6.4–8.2)
PROTHROMBIN TIME: 15.2 SECONDS (ref 9.8–12.8)
RBC # BLD AUTO: 2.16 X10*6/UL (ref 4–5.2)
RBC MORPH BLD: NORMAL
SODIUM SERPL-SCNC: 138 MMOL/L (ref 136–145)
UNIT ABO: NORMAL
UNIT NUMBER: NORMAL
UNIT RH: NORMAL
UNIT VOLUME: 333
URATE SERPL-MCNC: 1.6 MG/DL (ref 2.3–6.7)
WBC # BLD AUTO: 0.4 X10*3/UL (ref 4.4–11.3)
XM INTEP: NORMAL

## 2024-07-30 PROCEDURE — 87493 C DIFF AMPLIFIED PROBE: CPT | Performed by: NURSE PRACTITIONER

## 2024-07-30 PROCEDURE — 83735 ASSAY OF MAGNESIUM: CPT | Performed by: PHYSICIAN ASSISTANT

## 2024-07-30 PROCEDURE — 82040 ASSAY OF SERUM ALBUMIN: CPT | Performed by: NURSE PRACTITIONER

## 2024-07-30 PROCEDURE — 83615 LACTATE (LD) (LDH) ENZYME: CPT

## 2024-07-30 PROCEDURE — 85025 COMPLETE CBC W/AUTO DIFF WBC: CPT | Performed by: PHYSICIAN ASSISTANT

## 2024-07-30 PROCEDURE — 2500000004 HC RX 250 GENERAL PHARMACY W/ HCPCS (ALT 636 FOR OP/ED)

## 2024-07-30 PROCEDURE — 80069 RENAL FUNCTION PANEL: CPT | Performed by: NURSE PRACTITIONER

## 2024-07-30 PROCEDURE — 84100 ASSAY OF PHOSPHORUS: CPT | Performed by: NURSE PRACTITIONER

## 2024-07-30 PROCEDURE — 1170000001 HC PRIVATE ONCOLOGY ROOM DAILY

## 2024-07-30 PROCEDURE — 2500000001 HC RX 250 WO HCPCS SELF ADMINISTERED DRUGS (ALT 637 FOR MEDICARE OP): Performed by: PHYSICIAN ASSISTANT

## 2024-07-30 PROCEDURE — 85610 PROTHROMBIN TIME: CPT

## 2024-07-30 PROCEDURE — 2500000004 HC RX 250 GENERAL PHARMACY W/ HCPCS (ALT 636 FOR OP/ED): Performed by: NURSE PRACTITIONER

## 2024-07-30 PROCEDURE — 2500000002 HC RX 250 W HCPCS SELF ADMINISTERED DRUGS (ALT 637 FOR MEDICARE OP, ALT 636 FOR OP/ED): Performed by: PHYSICIAN ASSISTANT

## 2024-07-30 PROCEDURE — 84550 ASSAY OF BLOOD/URIC ACID: CPT | Performed by: PHYSICIAN ASSISTANT

## 2024-07-30 PROCEDURE — 36430 TRANSFUSION BLD/BLD COMPNT: CPT

## 2024-07-30 PROCEDURE — P9040 RBC LEUKOREDUCED IRRADIATED: HCPCS

## 2024-07-30 PROCEDURE — 2500000002 HC RX 250 W HCPCS SELF ADMINISTERED DRUGS (ALT 637 FOR MEDICARE OP, ALT 636 FOR OP/ED)

## 2024-07-30 RX ORDER — POTASSIUM CHLORIDE 29.8 MG/ML
40 INJECTION INTRAVENOUS ONCE
Status: COMPLETED | OUTPATIENT
Start: 2024-07-30 | End: 2024-07-30

## 2024-07-30 RX ORDER — MAGNESIUM SULFATE HEPTAHYDRATE 40 MG/ML
2 INJECTION, SOLUTION INTRAVENOUS ONCE
Status: COMPLETED | OUTPATIENT
Start: 2024-07-30 | End: 2024-07-30

## 2024-07-30 ASSESSMENT — PAIN SCALES - GENERAL
PAINLEVEL_OUTOF10: 0 - NO PAIN
PAINLEVEL_OUTOF10: 0 - NO PAIN

## 2024-07-30 ASSESSMENT — COGNITIVE AND FUNCTIONAL STATUS - GENERAL
MOBILITY SCORE: 24
DAILY ACTIVITIY SCORE: 24
MOBILITY SCORE: 24
DAILY ACTIVITIY SCORE: 24

## 2024-07-30 NOTE — CARE PLAN
The patient's goals for the shift include      The clinical goals for the shift include Patient will report a decrease in dairrhea through end of shift    Over the shift, the patient did not make progress toward the following goals. Barriers to progression include chemotherapy.   Recommendations to address these barriers include possible anti diarrheal administration.

## 2024-07-31 LAB
ALBUMIN SERPL BCP-MCNC: 3.3 G/DL (ref 3.4–5)
ALP SERPL-CCNC: 77 U/L (ref 33–110)
ALT SERPL W P-5'-P-CCNC: 27 U/L (ref 7–45)
ANION GAP SERPL CALC-SCNC: 13 MMOL/L (ref 10–20)
APTT PPP: 28 SECONDS (ref 27–38)
AST SERPL W P-5'-P-CCNC: 11 U/L (ref 9–39)
BASOPHILS # BLD AUTO: 0.01 X10*3/UL (ref 0–0.1)
BASOPHILS NFR BLD AUTO: 1.9 %
BILIRUB DIRECT SERPL-MCNC: 0.3 MG/DL (ref 0–0.3)
BILIRUB SERPL-MCNC: 1.6 MG/DL (ref 0–1.2)
BLOOD EXPIRATION DATE: NORMAL
BLOOD EXPIRATION DATE: NORMAL
BUN SERPL-MCNC: 17 MG/DL (ref 6–23)
C DIF TOX TCDA+TCDB STL QL NAA+PROBE: NOT DETECTED
CALCIUM SERPL-MCNC: 8.8 MG/DL (ref 8.6–10.6)
CHLORIDE SERPL-SCNC: 107 MMOL/L (ref 98–107)
CO2 SERPL-SCNC: 25 MMOL/L (ref 21–32)
CREAT SERPL-MCNC: 0.81 MG/DL (ref 0.5–1.05)
DISPENSE STATUS: NORMAL
DISPENSE STATUS: NORMAL
EGFRCR SERPLBLD CKD-EPI 2021: >90 ML/MIN/1.73M*2
EOSINOPHIL # BLD AUTO: 0 X10*3/UL (ref 0–0.7)
EOSINOPHIL NFR BLD AUTO: 0 %
ERYTHROCYTE [DISTWIDTH] IN BLOOD BY AUTOMATED COUNT: 14.3 % (ref 11.5–14.5)
GLUCOSE SERPL-MCNC: 102 MG/DL (ref 74–99)
HCT VFR BLD AUTO: 19 % (ref 36–46)
HGB BLD-MCNC: 6.6 G/DL (ref 12–16)
IMM GRANULOCYTES # BLD AUTO: 0 X10*3/UL (ref 0–0.7)
IMM GRANULOCYTES NFR BLD AUTO: 0 % (ref 0–0.9)
INR PPP: 1.2 (ref 0.9–1.1)
LDH SERPL L TO P-CCNC: 129 U/L (ref 84–246)
LYMPHOCYTES # BLD AUTO: 0.41 X10*3/UL (ref 1.2–4.8)
LYMPHOCYTES NFR BLD AUTO: 77.4 %
MAGNESIUM SERPL-MCNC: 2.12 MG/DL (ref 1.6–2.4)
MCH RBC QN AUTO: 29.3 PG (ref 26–34)
MCHC RBC AUTO-ENTMCNC: 34.7 G/DL (ref 32–36)
MCV RBC AUTO: 84 FL (ref 80–100)
MONOCYTES # BLD AUTO: 0.01 X10*3/UL (ref 0.1–1)
MONOCYTES NFR BLD AUTO: 1.9 %
NEUTROPHILS # BLD AUTO: 0.1 X10*3/UL (ref 1.2–7.7)
NEUTROPHILS NFR BLD AUTO: 18.8 %
NRBC BLD-RTO: 0 /100 WBCS (ref 0–0)
PHOSPHATE SERPL-MCNC: 3.3 MG/DL (ref 2.5–4.9)
PLATELET # BLD AUTO: 7 X10*3/UL (ref 150–450)
POTASSIUM SERPL-SCNC: 3.8 MMOL/L (ref 3.5–5.3)
PRODUCT BLOOD TYPE: 5100
PRODUCT BLOOD TYPE: 5100
PRODUCT CODE: NORMAL
PRODUCT CODE: NORMAL
PROT SERPL-MCNC: 5.3 G/DL (ref 6.4–8.2)
PROTHROMBIN TIME: 13.8 SECONDS (ref 9.8–12.8)
RBC # BLD AUTO: 2.25 X10*6/UL (ref 4–5.2)
RBC MORPH BLD: NORMAL
SODIUM SERPL-SCNC: 141 MMOL/L (ref 136–145)
UNIT ABO: NORMAL
UNIT ABO: NORMAL
UNIT NUMBER: NORMAL
UNIT NUMBER: NORMAL
UNIT RH: NORMAL
UNIT RH: NORMAL
UNIT VOLUME: 279
UNIT VOLUME: 325
URATE SERPL-MCNC: 2 MG/DL (ref 2.3–6.7)
WBC # BLD AUTO: 0.5 X10*3/UL (ref 4.4–11.3)
XM INTEP: NORMAL

## 2024-07-31 PROCEDURE — 2500000004 HC RX 250 GENERAL PHARMACY W/ HCPCS (ALT 636 FOR OP/ED)

## 2024-07-31 PROCEDURE — 84550 ASSAY OF BLOOD/URIC ACID: CPT | Performed by: PHYSICIAN ASSISTANT

## 2024-07-31 PROCEDURE — 85025 COMPLETE CBC W/AUTO DIFF WBC: CPT | Performed by: PHYSICIAN ASSISTANT

## 2024-07-31 PROCEDURE — 2500000001 HC RX 250 WO HCPCS SELF ADMINISTERED DRUGS (ALT 637 FOR MEDICARE OP): Performed by: PHYSICIAN ASSISTANT

## 2024-07-31 PROCEDURE — 83615 LACTATE (LD) (LDH) ENZYME: CPT | Performed by: NURSE PRACTITIONER

## 2024-07-31 PROCEDURE — 83735 ASSAY OF MAGNESIUM: CPT | Performed by: PHYSICIAN ASSISTANT

## 2024-07-31 PROCEDURE — 80048 BASIC METABOLIC PNL TOTAL CA: CPT | Performed by: NURSE PRACTITIONER

## 2024-07-31 PROCEDURE — 2500000001 HC RX 250 WO HCPCS SELF ADMINISTERED DRUGS (ALT 637 FOR MEDICARE OP)

## 2024-07-31 PROCEDURE — 99233 SBSQ HOSP IP/OBS HIGH 50: CPT | Performed by: STUDENT IN AN ORGANIZED HEALTH CARE EDUCATION/TRAINING PROGRAM

## 2024-07-31 PROCEDURE — 2500000002 HC RX 250 W HCPCS SELF ADMINISTERED DRUGS (ALT 637 FOR MEDICARE OP, ALT 636 FOR OP/ED)

## 2024-07-31 PROCEDURE — 36430 TRANSFUSION BLD/BLD COMPNT: CPT

## 2024-07-31 PROCEDURE — 82248 BILIRUBIN DIRECT: CPT | Performed by: NURSE PRACTITIONER

## 2024-07-31 PROCEDURE — 84100 ASSAY OF PHOSPHORUS: CPT | Performed by: NURSE PRACTITIONER

## 2024-07-31 PROCEDURE — 85610 PROTHROMBIN TIME: CPT

## 2024-07-31 PROCEDURE — P9040 RBC LEUKOREDUCED IRRADIATED: HCPCS

## 2024-07-31 PROCEDURE — 1170000001 HC PRIVATE ONCOLOGY ROOM DAILY

## 2024-07-31 PROCEDURE — 2500000002 HC RX 250 W HCPCS SELF ADMINISTERED DRUGS (ALT 637 FOR MEDICARE OP, ALT 636 FOR OP/ED): Performed by: PHYSICIAN ASSISTANT

## 2024-07-31 PROCEDURE — P9073 PLATELETS PHERESIS PATH REDU: HCPCS

## 2024-07-31 RX ORDER — LOPERAMIDE HYDROCHLORIDE 2 MG/1
2 CAPSULE ORAL 4 TIMES DAILY PRN
Status: DISCONTINUED | OUTPATIENT
Start: 2024-07-31 | End: 2024-08-02

## 2024-07-31 SDOH — ECONOMIC STABILITY: FOOD INSECURITY: WITHIN THE PAST 12 MONTHS, YOU WORRIED THAT YOUR FOOD WOULD RUN OUT BEFORE YOU GOT MONEY TO BUY MORE.: NEVER TRUE

## 2024-07-31 SDOH — ECONOMIC STABILITY: FOOD INSECURITY: WITHIN THE PAST 12 MONTHS, THE FOOD YOU BOUGHT JUST DIDN'T LAST AND YOU DIDN'T HAVE MONEY TO GET MORE.: NEVER TRUE

## 2024-07-31 SDOH — ECONOMIC STABILITY: FOOD INSECURITY: WITHIN THE PAST 12 MONTHS, YOU WORRIED THAT YOUR FOOD WOULD RUN OUT BEFORE YOU GOT THE MONEY TO BUY MORE.: NEVER TRUE

## 2024-07-31 SDOH — ECONOMIC STABILITY: TRANSPORTATION INSECURITY: IN THE PAST 12 MONTHS, HAS LACK OF TRANSPORTATION KEPT YOU FROM MEDICAL APPOINTMENTS OR FROM GETTING MEDICATIONS?: NO

## 2024-07-31 SDOH — ECONOMIC STABILITY: FOOD INSECURITY

## 2024-07-31 SDOH — ECONOMIC STABILITY: TRANSPORTATION INSECURITY

## 2024-07-31 ASSESSMENT — COGNITIVE AND FUNCTIONAL STATUS - GENERAL
MOBILITY SCORE: 24
DAILY ACTIVITIY SCORE: 24

## 2024-07-31 ASSESSMENT — ACTIVITIES OF DAILY LIVING (ADL): LACK_OF_TRANSPORTATION: NO

## 2024-07-31 ASSESSMENT — PAIN - FUNCTIONAL ASSESSMENT: PAIN_FUNCTIONAL_ASSESSMENT: 0-10

## 2024-07-31 ASSESSMENT — PAIN SCALES - GENERAL
PAINLEVEL_OUTOF10: 0 - NO PAIN
PAINLEVEL_OUTOF10: 0 - NO PAIN

## 2024-07-31 NOTE — TREATMENT PLAN
This is a shared visit. I have reviewed the Advanced Practice Provider's encounter note, approve the Advanced Practice Provider's documentation, and provide the following additional information from my personal encounter.      Ms. Peter is a 22 yo woman with newly diagnosed AML admitted for induction chemotherapy with Cytarabine and Idarubicin.     Currently D12 Induction 7+3 (Estefany) started on 7/19     No acute events overnight.  VS, labs, medications reviewed.  WBC 0.4, ANC 80, Hgb 6.6.     On interview, pt lying in bed in a fetal position.  C/o abdominal pain and cramping.  Thinks her period is coming soon due to pink discharge.  Frequent BMs.  RRR, CTAB, normoactive bowel sounds.     PLAN:    Favorable risk AML:  NPM1 54%, NRAS 25%, PTPN11 15%, IDH1 2%  - Cont 7+3 induction, D14 BMBx on 8/1  - HLA typed, family typing information obtained (though will not need transplant unless MRD+)  - Onco-fertility following; s/p depo 5/2024     ID:  - ppx with ACV, posa  - zosyn for neutropenic fever     CV:  - TTE with EF of 55% on 7/16     GI:  - Hyperbilirubinemia fluctuating but overall downtrending     Pt will follow with RK/MD after discharge.

## 2024-07-31 NOTE — PROGRESS NOTES
"Ilda Peter is a 23 y.o. 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    Subjective   ROS unremarkable. No longer having vaginal bleeding.      Objective     Physical Exam  Constitutional:       Appearance: Normal appearance.   HENT:      Head: Normocephalic and atraumatic.      Nose: Nose normal.      Mouth/Throat:      Mouth: Mucous membranes are moist.      Pharynx: Oropharynx is clear.   Eyes:      Conjunctiva/sclera: Conjunctivae normal.      Pupils: Pupils are equal, round, and reactive to light.   Cardiovascular:      Rate and Rhythm: Normal rate and regular rhythm.      Pulses: Normal pulses.      Heart sounds: Normal heart sounds.   Pulmonary:      Effort: Pulmonary effort is normal.      Breath sounds: Normal breath sounds.   Abdominal:      General: Bowel sounds are normal.      Palpations: Abdomen is soft.   Musculoskeletal:         General: Normal range of motion.      Cervical back: Normal range of motion and neck supple.   Skin:     General: Skin is warm and dry.      Capillary Refill: Capillary refill takes 2 to 3 seconds.   Neurological:      General: No focal deficit present.      Mental Status: She is alert and oriented to person, place, and time.   Psychiatric:         Mood and Affect: Mood normal.         Behavior: Behavior normal.         Last Recorded Vitals  Blood pressure 118/77, pulse 66, temperature 36.5 °C (97.7 °F), temperature source Temporal, resp. rate 16, height (S) 1.65 m (5' 4.96\"), weight 60.5 kg (133 lb 6.1 oz), last menstrual period 07/17/2024, SpO2 99%, not currently breastfeeding.  Intake/Output last 3 Shifts:  I/O last 3 completed shifts:  In: 396.7 (6.7 mL/kg) [I.V.:50 (0.8 mL/kg); Blood:346.7]  Out: - (0 mL/kg)   Weight: 59.3 kg     Daily labs reviewed (7/31): Needs blood and plts today (ordered)    Assessment/Plan   Active Problems:    Acute myeloid leukemia in adult (Multi)    Ms. Ilda Peter is a " 24 yo female with newly diagnosed AML admitted on 7/19/24 for induction chemotherapy with Cytarabine and Idarubicin. No other significant PMHx.     Day 13 with induction 7+3 (Estefany) (started on 7/19/24)     ONC:  # Newly diagnosed AML, favorable risk  - Presented with n/v & dizziness worsening over the last few months  - Found to be pancytopenic in ED, was discharged home and had BMBx done outpatient.  - Bmbx (7/5/24): c/w hypercellular BM w/erythroid predominant hematopoiesis, dyserythropoiesis and increased blasts c/w high grade myeloid neoplasm.  - FISH neg  - Myeloid NGS: NPM1 (54%), NRAS (25%), PTPN11 (15%), IDH1 (2%)  - HLA typing & buccal done (7/24)  - Family typing info given  - Day 14 BMBx scheduled for BMBx on 8/1 @ 1230. Will needs platelets prior     CHEMO:  - Cytarabine (100mg/m2) = 170mg IV daily x 7 days (7/19)  - Idarubicin (12mg/m2) = 20mg IV daily x 3 days (7/19)   - HCG quantitative neg (7/19)     HEME:  # Pancytopenia d/t malignancy, known vaginal bleeding on admit since resolved  - Keep Hgb > 7 and Plts > 10     - S/p PRBC: 7/31    - S/p PLTS: 7/31  # Transaminitis, likely d/t chemotherapy, resolved.  - Hemolysis workup neg     ID:  # Neutropenic fever 38.6 on 7/28  - Full workup done: Bld cx x2, CXR, UA all negative   - Zosyn (started 7/28)  # Prophylaxis: ACV and Posaconazole        FEN/GI:  Admit wt: 61.9 kg, Daily Wt: 60.5 kg (7/31)  - Hydration and anti-emetics per chemotherapy order set.   - Monitor electrolytes and replete as needed  - Started PPI on admit  # LANETTE  - PRN Compazine/PRN Zofran on admit.   - Scheduled Zofran Q8 (7/27) w/ symptom improvement.  # Diarrhea worsening  - Ordered Cdiff r/o     CV:  - ECHO (7/16/23) w/EF of 55%     GYN:  # Prolonged Vaginal bleeding since early July  - Depo shot last 05/2024   - Consider consulting Gyn for recommendations on hormone therapy given break thru bleeding on current treatment. Did not get a chance to do this on 7/31. Vaginal bleeding did  resolve  - Onco Fertility consulted (7/22) and labs sent.     ENDO:  # Steroid induced hyperglycemia, now resolved  - Lispro SS #1 with meals  - A1c (7/22): 6.0     PSYCH:  # Anxiety  - Cont home Sertraline     DISP:  - Full Code  - Primary Onc: Dr. Fields  - DL Bailon line placed in IR on 7/17/24  - Discharge pending count recovery & Day 14 BMBx     Pt seen, discussed, and examined with Dr. Sherron Hwang  I spent 45 minutes in the professional and overall care of this patient.      Emily Josue, APRN-CNP

## 2024-07-31 NOTE — CARE PLAN
Problem: Pain - Adult  Goal: Verbalizes/displays adequate comfort level or baseline comfort level  Outcome: Progressing     Problem: Safety - Adult  Goal: Free from fall injury  Outcome: Progressing     Problem: PICC line  Goal: I will remain free from symptoms of infection  Outcome: Progressing

## 2024-07-31 NOTE — PROGRESS NOTES
"Nutrition Follow Up Assessment  Nutrition Assessment      Since first nutrition visit, today is day #13 of Induction with 7+3 (started on 07/19).    Nutrition History:  This service met with pt earlier today, was sitting up on edge of bed at time of visit with her.    Tells appetite is fair, is making sure she eats throughout the day, just still not able to consume a lot at one time, \"I make goals to try to eat at least 1/2 of my meal.\" Did report her n/v is under much better control now, \"the medicine really helps that.\" Denied trouble chewing/swallowing. Is still able to taste but feels her taste is starting to change. Also with c/o loose stools/diarrhea at times.    Family has been bringing food in for her + she also orders meals from this facility. This am consumed 100% of an \"Uncrustable\" sandwich (family has been providing those for her, did make her aware we do have them here--order placed to allow pt to order them PRN). Last pm ate ~50% of the chicken parmesan from this facility's kitchen.    Anthropometrics:  Height: (S) 165 cm (5' 4.96\")   Weight: 60.5 kg (133 lb 6.1 oz)   BMI (Calculated): 22.22  IBW/kg (Dietitian Calculated): 56.8 kg  Percent of IBW: 107 %       Admission Weight Trend:  07/19-62.4 kg (admit wt)  07/22-61.4 kg (wt at last nutrition visit)  07/31-60.5 kg (current wt)--total of 3% wt loss from admit to present (significant)    Nutrition Focused Physical Exam Findings:  Subcutaneous Fat Loss:   Orbital Fat Pads: Mild-Moderate (slight dark circles and slight hollowing)  Buccal Fat Pads: Mild-Moderate (flat cheeks, minimal bounce)  Triceps: Mild-Moderate (less than ample fat tissue)  Ribs: Defer  Muscle Wasting:  Temporalis: Mild-Moderate (slight depression)  Pectoralis (Clavicular Region): Mild-Moderate (some protrusion of clavicle)  Deltoid/Trapezius: Mild-Moderate (slight protrusion of acromion process)  Interosseous: Mild-Moderate (slightly depressed area between thumb and " forefinger)  Trapezius/Infraspinatus/Supraspinatus (Scapular Region): Mild-Moderate (slight protrusion of scapula)  Quadriceps: Defer  Gastrocnemius: Defer  Edema:  Edema: none  Physical Findings:  Hair: Negative  Eyes: Negative  Nails: Negative  Skin: Negative    Nutrition Significant Labs:  CBC Trend:   Results from last 7 days   Lab Units 07/31/24 0133 07/30/24 0126 07/29/24 0155 07/28/24  0312   WBC AUTO x10*3/uL 0.5* 0.4* 0.2* 0.7*   RBC AUTO x10*6/uL 2.25* 2.16* 2.48* 2.03*   HEMOGLOBIN g/dL 6.6* 6.6* 7.5* 6.2*   HEMATOCRIT % 19.0* 18.9* 21.8* 18.1*   MCV fL 84 88 88 89   PLATELETS AUTO x10*3/uL 7* 11* 18* 9*   BMP Trend:   Results from last 7 days   Lab Units 07/31/24 0133 07/30/24 0126 07/29/24 0155 07/28/24  0312   GLUCOSE mg/dL 102* 111* 116* 106*   CALCIUM mg/dL 8.8 8.7 8.9 9.0   SODIUM mmol/L 141 138 134* 137   POTASSIUM mmol/L 3.8 3.7 3.9 4.0   CO2 mmol/L 25 25 26 25   CHLORIDE mmol/L 107 104 99 103   BUN mg/dL 17 20 14 16   CREATININE mg/dL 0.81 0.99 0.86 0.77   Liver Function Trend:   Results from last 7 days   Lab Units 07/31/24 0133 07/30/24 0126 07/29/24 0155 07/26/24  0333   ALK PHOS U/L 77 88 93 61   AST U/L 11 17 17 19   ALT U/L 27 33 31 24   BILIRUBIN TOTAL mg/dL 1.6* 1.2 2.4* 1.3*   Renal Lab Trend:   Results from last 7 days   Lab Units 07/31/24 0133 07/30/24 0126 07/29/24 0155 07/28/24  0312   POTASSIUM mmol/L 3.8 3.7 3.9 4.0   PHOSPHORUS mg/dL 3.3 3.1 3.4 3.8   SODIUM mmol/L 141 138 134* 137   MAGNESIUM mg/dL 2.12 1.78 1.64 1.90   EGFR mL/min/1.73m*2 >90 82 >90 >90   BUN mg/dL 17 20 14 16   CREATININE mg/dL 0.81 0.99 0.86 0.77      Medications:  Scheduled medications  acyclovir, 400 mg, oral, q12h JORDYN  ondansetron, 8 mg, intravenous, q8h  pantoprazole, 40 mg, oral, Daily before breakfast  piperacillin-tazobactam, 3.375 g, intravenous, q6h  posaconazole, 300 mg, oral, Daily with breakfast  sertraline, 50 mg, oral, Daily    PRN medications  PRN medications: albuterol, alteplase,  "dextrose, diphenhydrAMINE, EPINEPHrine HCl, famotidine, loperamide, methylPREDNISolone sodium succinate (PF), prochlorperazine, sodium chloride    I/O:   Last BM Date: 07/31/24; Stool Appearance: Loose (07/31/24 0925)    Dietary Orders (From admission, onward)       Start     Ordered    07/31/24 1221  Snacks  Until discontinued        Comments: please allow pt to order \"Uncrustable\" peanut butter and jelly sandwiches whenever she desires    07/31/24 1221    07/31/24 1220  Oral nutritional supplements  Until discontinued        Comments: please provide Ensure Clear (mixed berry) once/day--pt may have more than once/day, if requested   Question Answer Comment   Deliver with Lunch    Select supplement: Ensure Clear        07/31/24 1220    07/22/24 1231  Snacks  Until discontinued        Comments: pt may order from [facility] [employer] Snack List, if requested    07/22/24 1230    07/19/24 1729  Adult diet Low pathogen  Diet effective now        Question:  Diet type  Answer:  Low pathogen    07/19/24 1732                Estimated Needs:   Total Energy Estimated Needs (kCal): 0018-0551  Method for Estimating Needs: 61 x ~30-35  Total Protein Estimated Needs (g): 75+  Method for Estimating Needs: 61 x ~1.2g/kg+  Total Fluid Estimated Needs (mL): per MD/team        Nutrition Diagnosis   Malnutrition Diagnosis  Patient has Malnutrition Diagnosis: Yes  Diagnosis Status: New  Malnutrition Diagnosis: Moderate malnutrition related to chronic disease or condition (acute-on-chronic; h/o prolonged issues with n/v, newly diagnosed AML)  As Evidenced by: pt with areas of mild muscle and adipose losses, consuming <75% estimated energy needs x >1 month    Nutrition Diagnosis  Patient has Nutrition Diagnosis: Yes  Diagnosis Status (1): Ongoing  Nutrition Diagnosis 1: Inadequate oral intake  Related to (1): decreased PO with newly diagnosed AML  As Evidenced by (1): pt report of issues with n/v and variable/poor PO x ~4-5 mos " PTA, consuming smaller amounts of food at a time    Additional Nutrition Diagnosis: Diagnosis 2  Diagnosis Status (2): Ongoing  Nutrition Diagnosis 2: Increased nutrient needs  Related to (2): increased metabolic demand  As Evidenced by (2): pt with new AML, undergoing treatment    Additional Assessment Information: Low threshold for further decline in overall nutritional status, pending ongoing PO and weights in-house.    Considering hypermetabolic state with malnutrition with ongoing decreased PO since admit, do feel pt would benefit from use of oral nutrition supplements in-house, again discussed addition--pt reports she has tried Ensure Clear before and is agreeable to that daily--order placed by this writer.       Nutrition Interventions/Recommendations     1. Continue Low Pathogen Diet, only as tolerated  --RDN placed order for Ensure Clear daily for added nutrition        Nutrition Prescription for Oral Nutrition: 2892-4284 kcals/day, 75+ g pro/day        Nutrition Interventions:   Food and/or Nutrient Delivery Interventions  Interventions: Meals and snacks, Medical food supplement  Meals and Snacks: General healthful diet  Goal: Low Pathogen Diet  Medical Food Supplement: Commercial beverage  Goal: Ensure Clear daily (240 kcals, 8g pro each)    Nutrition Education: Verbally discussed role of oral nutrition supplements in the daily diet + went over various types offered at this facility. Pt verbalized understanding, denied any particular questions for RDN at this time.       Nutrition Monitoring and Evaluation   Food/Nutrient Related History Monitoring  Monitoring and Evaluation Plan: Amount of food  Amount of Food: Estimated amout of food, Medical food intake  Criteria: consume >50% of meals/snacks/supplements    Body Composition/Growth/Weight History  Monitoring and Evaluation Plan: Weight  Weight: Measured weight  Criteria: maintain stable wt    Biochemical Data, Medical Tests and Procedures  Monitoring  and Evaluation Plan: Electrolyte/renal panel  Electrolyte and Renal Panel: Magnesium, Phosphorus, Potassium, Sodium  Criteria: WNL          Time Spent (min): 30 minutes

## 2024-07-31 NOTE — PROGRESS NOTES
Ilda Peter is a 23 y.o. 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    LSW emailed SNAP/Cash assistance application to [facility] on pt's behalf.       OSIRIS Wilson

## 2024-07-31 NOTE — CARE PLAN
The patient's goals for the shift include    Problem: Nutrition  Goal: Adequate PO fluid intake  Outcome: Progressing  Goal: BG  mg/dL  Outcome: Progressing  Goal: Lab values WNL  Outcome: Progressing  Goal: Electrolytes WNL  Outcome: Progressing  Goal: Promote healing  Outcome: Progressing  Goal: Maintain stable weight  Outcome: Progressing     Problem: Pain - Adult  Goal: Verbalizes/displays adequate comfort level or baseline comfort level  Outcome: Progressing     Problem: Safety - Adult  Goal: Free from fall injury  Outcome: Progressing     Problem: PICC line  Goal: I will remain free from symptoms of infection  Outcome: Progressing    The clinical goals for the shift include Patient will remain HDS

## 2024-08-01 LAB
ALBUMIN SERPL BCP-MCNC: 3.3 G/DL (ref 3.4–5)
ANION GAP SERPL CALC-SCNC: 11 MMOL/L (ref 10–20)
APTT PPP: 29 SECONDS (ref 27–38)
BASOPHILS # BLD MANUAL: 0.03 X10*3/UL (ref 0–0.1)
BASOPHILS NFR BLD MANUAL: 6.7 %
BLOOD EXPIRATION DATE: NORMAL
BUN SERPL-MCNC: 17 MG/DL (ref 6–23)
CALCIUM SERPL-MCNC: 8.8 MG/DL (ref 8.6–10.6)
CHLORIDE SERPL-SCNC: 107 MMOL/L (ref 98–107)
CO2 SERPL-SCNC: 27 MMOL/L (ref 21–32)
CREAT SERPL-MCNC: 0.92 MG/DL (ref 0.5–1.05)
DISPENSE STATUS: NORMAL
DNA CLASS I + II VERIFICATION TYPING: NORMAL
EGFRCR SERPLBLD CKD-EPI 2021: 90 ML/MIN/1.73M*2
EOSINOPHIL # BLD MANUAL: 0.01 X10*3/UL (ref 0–0.7)
EOSINOPHIL NFR BLD MANUAL: 1.7 %
ERYTHROCYTE [DISTWIDTH] IN BLOOD BY AUTOMATED COUNT: 13.4 % (ref 11.5–14.5)
GLUCOSE SERPL-MCNC: 106 MG/DL (ref 74–99)
HCT VFR BLD AUTO: 22.4 % (ref 36–46)
HGB BLD-MCNC: 7.6 G/DL (ref 12–16)
HLA RESULTS: NORMAL
IMM GRANULOCYTES # BLD AUTO: 0 X10*3/UL (ref 0–0.7)
IMM GRANULOCYTES NFR BLD AUTO: 0 % (ref 0–0.9)
INR PPP: 1.2 (ref 0.9–1.1)
LDH SERPL L TO P-CCNC: 124 U/L (ref 84–246)
LYMPHOCYTES # BLD MANUAL: 0.44 X10*3/UL (ref 1.2–4.8)
LYMPHOCYTES NFR BLD MANUAL: 88.3 %
MAGNESIUM SERPL-MCNC: 1.78 MG/DL (ref 1.6–2.4)
MCH RBC QN AUTO: 29.2 PG (ref 26–34)
MCHC RBC AUTO-ENTMCNC: 33.9 G/DL (ref 32–36)
MCV RBC AUTO: 86 FL (ref 80–100)
MONOCYTES # BLD MANUAL: 0.01 X10*3/UL (ref 0.1–1)
MONOCYTES NFR BLD MANUAL: 1.7 %
NEUTS SEG # BLD MANUAL: 0.01 X10*3/UL (ref 1.2–7)
NEUTS SEG NFR BLD MANUAL: 1.6 %
NRBC BLD-RTO: 0 /100 WBCS (ref 0–0)
OVALOCYTES BLD QL SMEAR: ABNORMAL
PHOSPHATE SERPL-MCNC: 4 MG/DL (ref 2.5–4.9)
PLATELET # BLD AUTO: 11 X10*3/UL (ref 150–450)
POTASSIUM SERPL-SCNC: 4 MMOL/L (ref 3.5–5.3)
PRODUCT BLOOD TYPE: 9500
PRODUCT CODE: NORMAL
PROTHROMBIN TIME: 13 SECONDS (ref 9.8–12.8)
RBC # BLD AUTO: 2.6 X10*6/UL (ref 4–5.2)
RBC MORPH BLD: ABNORMAL
SODIUM SERPL-SCNC: 141 MMOL/L (ref 136–145)
TOTAL CELLS COUNTED BLD: 60
UNIT ABO: NORMAL
UNIT NUMBER: NORMAL
UNIT RH: NORMAL
UNIT VOLUME: 273
URATE SERPL-MCNC: 1.9 MG/DL (ref 2.3–6.7)
WBC # BLD AUTO: 0.5 X10*3/UL (ref 4.4–11.3)

## 2024-08-01 PROCEDURE — 83735 ASSAY OF MAGNESIUM: CPT | Performed by: PHYSICIAN ASSISTANT

## 2024-08-01 PROCEDURE — 36430 TRANSFUSION BLD/BLD COMPNT: CPT

## 2024-08-01 PROCEDURE — 2500000004 HC RX 250 GENERAL PHARMACY W/ HCPCS (ALT 636 FOR OP/ED)

## 2024-08-01 PROCEDURE — 2500000001 HC RX 250 WO HCPCS SELF ADMINISTERED DRUGS (ALT 637 FOR MEDICARE OP): Performed by: PHYSICIAN ASSISTANT

## 2024-08-01 PROCEDURE — 81310 NPM1 GENE: CPT | Performed by: PHYSICIAN ASSISTANT

## 2024-08-01 PROCEDURE — 2500000001 HC RX 250 WO HCPCS SELF ADMINISTERED DRUGS (ALT 637 FOR MEDICARE OP)

## 2024-08-01 PROCEDURE — 079T3ZX DRAINAGE OF BONE MARROW, PERCUTANEOUS APPROACH, DIAGNOSTIC: ICD-10-PCS | Performed by: PHYSICIAN ASSISTANT

## 2024-08-01 PROCEDURE — 88305 TISSUE EXAM BY PATHOLOGIST: CPT | Mod: TC,SUR | Performed by: PHYSICIAN ASSISTANT

## 2024-08-01 PROCEDURE — 2500000004 HC RX 250 GENERAL PHARMACY W/ HCPCS (ALT 636 FOR OP/ED): Performed by: PHYSICIAN ASSISTANT

## 2024-08-01 PROCEDURE — 38222 DX BONE MARROW BX & ASPIR: CPT | Performed by: PHYSICIAN ASSISTANT

## 2024-08-01 PROCEDURE — 85610 PROTHROMBIN TIME: CPT

## 2024-08-01 PROCEDURE — G0452 MOLECULAR PATHOLOGY INTERPR: HCPCS | Performed by: PATHOLOGY

## 2024-08-01 PROCEDURE — 2500000002 HC RX 250 W HCPCS SELF ADMINISTERED DRUGS (ALT 637 FOR MEDICARE OP, ALT 636 FOR OP/ED): Performed by: PHYSICIAN ASSISTANT

## 2024-08-01 PROCEDURE — 2500000002 HC RX 250 W HCPCS SELF ADMINISTERED DRUGS (ALT 637 FOR MEDICARE OP, ALT 636 FOR OP/ED)

## 2024-08-01 PROCEDURE — 88184 FLOWCYTOMETRY/ TC 1 MARKER: CPT | Mod: TC | Performed by: PHYSICIAN ASSISTANT

## 2024-08-01 PROCEDURE — 85027 COMPLETE CBC AUTOMATED: CPT | Performed by: PHYSICIAN ASSISTANT

## 2024-08-01 PROCEDURE — 99233 SBSQ HOSP IP/OBS HIGH 50: CPT | Performed by: STUDENT IN AN ORGANIZED HEALTH CARE EDUCATION/TRAINING PROGRAM

## 2024-08-01 PROCEDURE — 1170000001 HC PRIVATE ONCOLOGY ROOM DAILY

## 2024-08-01 PROCEDURE — P9073 PLATELETS PHERESIS PATH REDU: HCPCS

## 2024-08-01 PROCEDURE — 85097 BONE MARROW INTERPRETATION: CPT | Performed by: PHYSICIAN ASSISTANT

## 2024-08-01 PROCEDURE — 84100 ASSAY OF PHOSPHORUS: CPT | Performed by: NURSE PRACTITIONER

## 2024-08-01 PROCEDURE — 83615 LACTATE (LD) (LDH) ENZYME: CPT

## 2024-08-01 PROCEDURE — 84550 ASSAY OF BLOOD/URIC ACID: CPT | Performed by: PHYSICIAN ASSISTANT

## 2024-08-01 PROCEDURE — 85007 BL SMEAR W/DIFF WBC COUNT: CPT | Performed by: PHYSICIAN ASSISTANT

## 2024-08-01 PROCEDURE — 07DR3ZX EXTRACTION OF ILIAC BONE MARROW, PERCUTANEOUS APPROACH, DIAGNOSTIC: ICD-10-PCS | Performed by: PHYSICIAN ASSISTANT

## 2024-08-01 RX ORDER — LORAZEPAM 2 MG/ML
1 INJECTION INTRAMUSCULAR ONCE
Status: COMPLETED | OUTPATIENT
Start: 2024-08-01 | End: 2024-08-01

## 2024-08-01 RX ORDER — MAGNESIUM SULFATE HEPTAHYDRATE 40 MG/ML
2 INJECTION, SOLUTION INTRAVENOUS ONCE
Status: COMPLETED | OUTPATIENT
Start: 2024-08-01 | End: 2024-08-01

## 2024-08-01 ASSESSMENT — COGNITIVE AND FUNCTIONAL STATUS - GENERAL
MOBILITY SCORE: 24
DAILY ACTIVITIY SCORE: 24

## 2024-08-01 ASSESSMENT — PAIN SCALES - GENERAL
PAINLEVEL_OUTOF10: 0 - NO PAIN
PAINLEVEL_OUTOF10: 0 - NO PAIN

## 2024-08-01 NOTE — PROGRESS NOTES
Ilda Peter is a 23 y.o. 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    Subjective   Afebrile. Eating and drinking okay. Having several small loose stools daily. Cdiff (7/30) neg.  Using Imodium as needed. Has scant vaginal bleeding. Ambulating without difficulty. Denies N/V or abdominal bleeding. ROS otherwise unremarkable.      Objective     Physical Exam  Constitutional:       Appearance: Normal appearance.   HENT:      Head: Normocephalic and atraumatic.      Nose: Nose normal.      Mouth/Throat:      Mouth: Mucous membranes are moist.      Pharynx: Oropharynx is clear.   Eyes:      Conjunctiva/sclera: Conjunctivae normal.      Pupils: Pupils are equal, round, and reactive to light.   Cardiovascular:      Rate and Rhythm: Normal rate and regular rhythm.      Pulses: Normal pulses.      Heart sounds: Normal heart sounds.   Pulmonary:      Effort: Pulmonary effort is normal.      Breath sounds: Normal breath sounds.   Abdominal:      General: Bowel sounds are normal.      Palpations: Abdomen is soft.   Musculoskeletal:         General: Normal range of motion.      Cervical back: Normal range of motion and neck supple.   Skin:     General: Skin is warm and dry.      Capillary Refill: Capillary refill takes 2 to 3 seconds.   Neurological:      General: No focal deficit present.      Mental Status: She is alert and oriented to person, place, and time.   Psychiatric:         Mood and Affect: Mood normal.         Behavior: Behavior normal.       Type Scheduled medications  acyclovir, 400 mg, oral, q12h JORDYN  ondansetron, 8 mg, intravenous, q8h  pantoprazole, 40 mg, oral, Daily before breakfast  piperacillin-tazobactam, 3.375 g, intravenous, q6h  posaconazole, 300 mg, oral, Daily with breakfast  sertraline, 50 mg, oral, Daily      Continuous medications     PRN medications  PRN medications: albuterol, alteplase, dextrose, diphenhydrAMINE,  "EPINEPHrine HCl, famotidine, loperamide, methylPREDNISolone sodium succinate (PF), prochlorperazine, sodium chloride     Last Recorded Vitals  Blood pressure 126/81, pulse 77, temperature 36.3 °C (97.3 °F), temperature source Temporal, resp. rate 16, height (S) 1.65 m (5' 4.96\"), weight 61.1 kg (134 lb 11.2 oz), last menstrual period 07/17/2024, SpO2 100%, not currently breastfeeding.  Intake/Output last 3 Shifts:  I/O last 3 completed shifts:  In: 1067.5 (17.6 mL/kg) [P.O.:480; Blood:587.5]  Out: - (0 mL/kg)   Weight: 60.5 kg     Type   Results for orders placed or performed during the hospital encounter of 07/19/24 (from the past 24 hour(s))   CBC and Auto Differential   Result Value Ref Range    WBC 0.5 (LL) 4.4 - 11.3 x10*3/uL    nRBC 0.0 0.0 - 0.0 /100 WBCs    RBC 2.60 (L) 4.00 - 5.20 x10*6/uL    Hemoglobin 7.6 (L) 12.0 - 16.0 g/dL    Hematocrit 22.4 (L) 36.0 - 46.0 %    MCV 86 80 - 100 fL    MCH 29.2 26.0 - 34.0 pg    MCHC 33.9 32.0 - 36.0 g/dL    RDW 13.4 11.5 - 14.5 %    Platelets 11 (LL) 150 - 450 x10*3/uL    Immature Granulocytes %, Automated 0.0 0.0 - 0.9 %    Immature Granulocytes Absolute, Automated 0.00 0.00 - 0.70 x10*3/uL   Uric Acid   Result Value Ref Range    Uric Acid 1.9 (L) 2.3 - 6.7 mg/dL   Magnesium   Result Value Ref Range    Magnesium 1.78 1.60 - 2.40 mg/dL   Renal function panel   Result Value Ref Range    Glucose 106 (H) 74 - 99 mg/dL    Sodium 141 136 - 145 mmol/L    Potassium 4.0 3.5 - 5.3 mmol/L    Chloride 107 98 - 107 mmol/L    Bicarbonate 27 21 - 32 mmol/L    Anion Gap 11 10 - 20 mmol/L    Urea Nitrogen 17 6 - 23 mg/dL    Creatinine 0.92 0.50 - 1.05 mg/dL    eGFR 90 >60 mL/min/1.73m*2    Calcium 8.8 8.6 - 10.6 mg/dL    Phosphorus 4.0 2.5 - 4.9 mg/dL    Albumin 3.3 (L) 3.4 - 5.0 g/dL   Lactate Dehydrogenase   Result Value Ref Range     84 - 246 U/L   Coagulation Screen   Result Value Ref Range    Protime 13.0 (H) 9.8 - 12.8 seconds    INR 1.2 (H) 0.9 - 1.1    aPTT 29 27 - 38 " seconds   Manual Differential   Result Value Ref Range    Neutrophils %, Manual 1.6 40.0 - 80.0 %    Lymphocytes %, Manual 88.3 13.0 - 44.0 %    Monocytes %, Manual 1.7 2.0 - 10.0 %    Eosinophils %, Manual 1.7 0.0 - 6.0 %    Basophils %, Manual 6.7 0.0 - 2.0 %    Seg Neutrophils Absolute, Manual 0.01 (L) 1.20 - 7.00 x10*3/uL    Lymphocytes Absolute, Manual 0.44 (L) 1.20 - 4.80 x10*3/uL    Monocytes Absolute, Manual 0.01 (L) 0.10 - 1.00 x10*3/uL    Eosinophils Absolute, Manual 0.01 0.00 - 0.70 x10*3/uL    Basophils Absolute, Manual 0.03 0.00 - 0.10 x10*3/uL    Total Cells Counted 60     RBC Morphology See Below     Ovalocytes Few    Prepare Platelets: 1 Units, Irradiated, Leukocytes Reduced (CMV reduced risk)   Result Value Ref Range    PRODUCT CODE [id number]     Unit Number [id number]     Unit ABO O     Unit RH NEG     Dispense Status TR     Blood Expiration Date 8/1/2024 11:59:00 PM EDT     PRODUCT BLOOD TYPE 9500     UNIT VOLUME 273         Assessment/Plan   Active Problems:    Acute myeloid leukemia in adult (Multi)    Ms. Ilda Peter is a 24 yo female with newly diagnosed AML admitted on 7/19/24 for induction chemotherapy with Cytarabine and Idarubicin. No other significant PMHx.     Day 14 with induction 7+3 (Estefany) (started on 7/19/24)     ONC:  # Newly diagnosed AML, favorable risk  - Presented with n/v & dizziness worsening over the last few months  - Found to be pancytopenic in ED, was discharged home and had BMBx done outpatient.  - Bmbx (7/5/24): c/w hypercellular BM w/erythroid predominant hematopoiesis, dyserythropoiesis and increased blasts c/w high grade myeloid neoplasm.  - FISH neg  - Myeloid NGS: NPM1 (54%), NRAS (25%), PTPN11 (15%), IDH1 (2%)  - HLA typing & buccal done (7/24)  - Family typing info given  - Day 14 BMBx completed on 8/1. Results pending.     CHEMO:  - Cytarabine (100mg/m2) = 170mg IV daily x 7 days (7/19)  - Idarubicin (12mg/m2) = 20mg IV daily x 3 days (7/19)   - HCG  quantitative neg (7/19)     HEME:  # Pancytopenia d/t malignancy, known vaginal bleeding on admit since resolved  - Keep Hgb > 7 and Plts > 10     - S/p PRBC: 7/31    - S/p PLTS: 7/31, 8/1  # Transaminitis, likely d/t chemotherapy, resolved.  - Hemolysis workup neg     ID:  - Currently Afebrile  # Neutropenic fever 38.6 on 7/28  - Full workup done: Bld cx x2, CXR, UA all negative   - Zosyn (started 7/28)  # Prophylaxis: ACV and Posaconazole        FEN/GI:  Admit wt: 61.9 kg, Daily Wt: 61.1 kg (8/1)  - Hydration and anti-emetics per chemotherapy order set.   - Hypomagnesemia repleted last on 8/1  - Started PPI on admit  # LANETTE  - PRN Compazine/PRN Zofran on admit.   - Scheduled Zofran Q8 (7/27) w/ symptom improvement.  # Diarrhea worsening  - Cdiff (7/30) neg  - Cont PRN Imodium     CV:  - ECHO (7/16/23) w/EF of 55%     GYN:  # Prolonged Vaginal bleeding since early July  - Depo shot last 05/2024   - Consider consulting Gyn for recommendations on hormone therapy given break thru bleeding on current treatment. Did not get a chance to do this on 7/31. Vaginal bleeding did resolve  - Onco Fertility consulted (7/22) and labs sent.     ENDO:  # Steroid induced hyperglycemia, now resolved  - Lispro SS #1 with meals  - A1c (7/22): 6.0     PSYCH:  # Anxiety  - Cont home Sertraline     DISP:  - Full Code  - Primary Onc: Dr. Fields  - DL Bailon line placed in IR on 7/17/24  - Discharge pending count recovery & Day 14 BMBx     Pt seen, discussed, and examined with Dr. Sherron Schilling PA-C

## 2024-08-01 NOTE — POST-PROCEDURE NOTE
The patient was explained the risks and benefits of the bone marrow biopsy and aspirate procedure.  Their questions were answered and consent was obtained.  Patient's most recent history and physical reviewed and relevant findings were noted.  Medications and allergies reviewed.  The patient was premedicated with Ativan 1mg IV once.  Prior to the procedure the patient's identity was confirmed using their name, medical record number, and date of birth.  The patient lay in the prone position and their right iliac crest was cleansed and draped in sterile fashion.  15ml of 1% plain lidocaine was injected locally.  Using a Jamshidi the core and aspirate samples were obtained without difficulty and sent for pathology, flow cytometry, and testing per heme/path.  A sterile dressing was applied once hemostasis achieved.  The patient tolerated the procedure well with no immediate complications and less than 5ml of blood loss.  The patient was educated to leave the dressing on for 24hrs and they verbalized understanding.

## 2024-08-01 NOTE — CARE PLAN
The patient's goals for the shift include      The clinical goals for the shift include Patient will remain HDS    Over the shift, the patient did not make progress toward the following goals. Barriers to progression include recent chemotherapy.  Recommendations to address these barriers include continue monitoring.

## 2024-08-02 LAB
ABO GROUP (TYPE) IN BLOOD: NORMAL
ALBUMIN SERPL BCP-MCNC: 3.3 G/DL (ref 3.4–5)
ALP SERPL-CCNC: 91 U/L (ref 33–110)
ALT SERPL W P-5'-P-CCNC: 20 U/L (ref 7–45)
ANION GAP SERPL CALC-SCNC: 12 MMOL/L (ref 10–20)
ANTIBODY SCREEN: NORMAL
APTT PPP: 30 SECONDS (ref 27–38)
AST SERPL W P-5'-P-CCNC: 11 U/L (ref 9–39)
BACTERIA BLD CULT: NORMAL
BACTERIA BLD CULT: NORMAL
BASOPHILS # BLD MANUAL: 0.04 X10*3/UL (ref 0–0.1)
BASOPHILS NFR BLD MANUAL: 7.7 %
BILIRUB DIRECT SERPL-MCNC: 0.3 MG/DL (ref 0–0.3)
BILIRUB SERPL-MCNC: 1.3 MG/DL (ref 0–1.2)
BUN SERPL-MCNC: 15 MG/DL (ref 6–23)
CALCIUM SERPL-MCNC: 8.9 MG/DL (ref 8.6–10.6)
CHLORIDE SERPL-SCNC: 106 MMOL/L (ref 98–107)
CO2 SERPL-SCNC: 26 MMOL/L (ref 21–32)
CREAT SERPL-MCNC: 0.87 MG/DL (ref 0.5–1.05)
EGFRCR SERPLBLD CKD-EPI 2021: >90 ML/MIN/1.73M*2
EOSINOPHIL # BLD MANUAL: 0 X10*3/UL (ref 0–0.7)
EOSINOPHIL NFR BLD MANUAL: 0 %
ERYTHROCYTE [DISTWIDTH] IN BLOOD BY AUTOMATED COUNT: 13 % (ref 11.5–14.5)
GLUCOSE SERPL-MCNC: 95 MG/DL (ref 74–99)
HCT VFR BLD AUTO: 20.1 % (ref 36–46)
HGB BLD-MCNC: 7.3 G/DL (ref 12–16)
IMM GRANULOCYTES # BLD AUTO: 0.01 X10*3/UL (ref 0–0.7)
IMM GRANULOCYTES NFR BLD AUTO: 2 % (ref 0–0.9)
INR PPP: 1.2 (ref 0.9–1.1)
LDH SERPL L TO P-CCNC: 114 U/L (ref 84–246)
LYMPHOCYTES # BLD MANUAL: 0.44 X10*3/UL (ref 1.2–4.8)
LYMPHOCYTES NFR BLD MANUAL: 87.2 %
MAGNESIUM SERPL-MCNC: 2.09 MG/DL (ref 1.6–2.4)
MCH RBC QN AUTO: 29.6 PG (ref 26–34)
MCHC RBC AUTO-ENTMCNC: 36.3 G/DL (ref 32–36)
MCV RBC AUTO: 81 FL (ref 80–100)
MONOCYTES # BLD MANUAL: 0.02 X10*3/UL (ref 0.1–1)
MONOCYTES NFR BLD MANUAL: 3.8 %
NEUTS SEG # BLD MANUAL: 0.01 X10*3/UL (ref 1.2–7)
NEUTS SEG NFR BLD MANUAL: 1.3 %
NRBC BLD-RTO: 0 /100 WBCS (ref 0–0)
PATH REPORT.COMMENTS IMP SPEC: NORMAL
PATH REPORT.FINAL DX SPEC: NORMAL
PATH REPORT.GROSS SPEC: NORMAL
PATH REPORT.MICROSCOPIC SPEC OTHER STN: NORMAL
PATH REPORT.RELEVANT HX SPEC: NORMAL
PATH REPORT.RELEVANT HX SPEC: NORMAL
PATH REPORT.TOTAL CANCER: NORMAL
PHOSPHATE SERPL-MCNC: 4.2 MG/DL (ref 2.5–4.9)
PLATELET # BLD AUTO: 18 X10*3/UL (ref 150–450)
POTASSIUM SERPL-SCNC: 4.3 MMOL/L (ref 3.5–5.3)
PROT SERPL-MCNC: 5.2 G/DL (ref 6.4–8.2)
PROTHROMBIN TIME: 13.6 SECONDS (ref 9.8–12.8)
RBC # BLD AUTO: 2.47 X10*6/UL (ref 4–5.2)
RBC MORPH BLD: ABNORMAL
RH FACTOR (ANTIGEN D): NORMAL
SODIUM SERPL-SCNC: 140 MMOL/L (ref 136–145)
TOTAL CELLS COUNTED BLD: 78
URATE SERPL-MCNC: 1.6 MG/DL (ref 2.3–6.7)
WBC # BLD AUTO: 0.5 X10*3/UL (ref 4.4–11.3)

## 2024-08-02 PROCEDURE — 85027 COMPLETE CBC AUTOMATED: CPT | Performed by: PHYSICIAN ASSISTANT

## 2024-08-02 PROCEDURE — 2500000001 HC RX 250 WO HCPCS SELF ADMINISTERED DRUGS (ALT 637 FOR MEDICARE OP): Performed by: PHYSICIAN ASSISTANT

## 2024-08-02 PROCEDURE — 99233 SBSQ HOSP IP/OBS HIGH 50: CPT | Performed by: STUDENT IN AN ORGANIZED HEALTH CARE EDUCATION/TRAINING PROGRAM

## 2024-08-02 PROCEDURE — 83735 ASSAY OF MAGNESIUM: CPT | Performed by: PHYSICIAN ASSISTANT

## 2024-08-02 PROCEDURE — 2500000004 HC RX 250 GENERAL PHARMACY W/ HCPCS (ALT 636 FOR OP/ED)

## 2024-08-02 PROCEDURE — 85610 PROTHROMBIN TIME: CPT

## 2024-08-02 PROCEDURE — 84550 ASSAY OF BLOOD/URIC ACID: CPT | Performed by: PHYSICIAN ASSISTANT

## 2024-08-02 PROCEDURE — 83615 LACTATE (LD) (LDH) ENZYME: CPT | Performed by: NURSE PRACTITIONER

## 2024-08-02 PROCEDURE — 85007 BL SMEAR W/DIFF WBC COUNT: CPT | Performed by: PHYSICIAN ASSISTANT

## 2024-08-02 PROCEDURE — 2500000001 HC RX 250 WO HCPCS SELF ADMINISTERED DRUGS (ALT 637 FOR MEDICARE OP)

## 2024-08-02 PROCEDURE — 2500000002 HC RX 250 W HCPCS SELF ADMINISTERED DRUGS (ALT 637 FOR MEDICARE OP, ALT 636 FOR OP/ED): Performed by: PHYSICIAN ASSISTANT

## 2024-08-02 PROCEDURE — 86923 COMPATIBILITY TEST ELECTRIC: CPT

## 2024-08-02 PROCEDURE — 80076 HEPATIC FUNCTION PANEL: CPT | Performed by: NURSE PRACTITIONER

## 2024-08-02 PROCEDURE — 1170000001 HC PRIVATE ONCOLOGY ROOM DAILY

## 2024-08-02 PROCEDURE — 2500000002 HC RX 250 W HCPCS SELF ADMINISTERED DRUGS (ALT 637 FOR MEDICARE OP, ALT 636 FOR OP/ED)

## 2024-08-02 PROCEDURE — 86901 BLOOD TYPING SEROLOGIC RH(D): CPT

## 2024-08-02 PROCEDURE — 84100 ASSAY OF PHOSPHORUS: CPT | Performed by: NURSE PRACTITIONER

## 2024-08-02 RX ORDER — LOPERAMIDE HYDROCHLORIDE 2 MG/1
2 CAPSULE ORAL 4 TIMES DAILY
Status: DISPENSED | OUTPATIENT
Start: 2024-08-02

## 2024-08-02 ASSESSMENT — PAIN - FUNCTIONAL ASSESSMENT
PAIN_FUNCTIONAL_ASSESSMENT: 0-10

## 2024-08-02 ASSESSMENT — COGNITIVE AND FUNCTIONAL STATUS - GENERAL
DAILY ACTIVITIY SCORE: 24
MOBILITY SCORE: 24
MOBILITY SCORE: 24
DAILY ACTIVITIY SCORE: 24

## 2024-08-02 ASSESSMENT — PAIN SCALES - GENERAL
PAINLEVEL_OUTOF10: 0 - NO PAIN

## 2024-08-02 NOTE — PROGRESS NOTES
Ilda Peter is a 23 y.o. 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    Subjective   Afebrile.  She is still having several small loose stools daily. Cdiff (7/30) neg.  Changed PRN Imodium to scheduled 4x a day. Has scant vaginal bleeding. Ambulating without difficulty. Has minimal pain at bmbx site. Denies N/V or abdominal bleeding. ROS otherwise unremarkable.      Objective     Physical Exam  Constitutional:       Appearance: Normal appearance.   HENT:      Head: Normocephalic and atraumatic.      Nose: Nose normal.      Mouth/Throat:      Mouth: Mucous membranes are moist.      Pharynx: Oropharynx is clear.   Eyes:      Conjunctiva/sclera: Conjunctivae normal.      Pupils: Pupils are equal, round, and reactive to light.   Cardiovascular:      Rate and Rhythm: Normal rate and regular rhythm.      Pulses: Normal pulses.      Heart sounds: Normal heart sounds.   Pulmonary:      Effort: Pulmonary effort is normal.      Breath sounds: Normal breath sounds.   Abdominal:      General: Bowel sounds are normal.      Palpations: Abdomen is soft.   Musculoskeletal:         General: Normal range of motion.      Cervical back: Normal range of motion and neck supple.   Skin:     General: Skin is warm and dry.      Capillary Refill: Capillary refill takes 2 to 3 seconds.      Bmbx site - clean, dry, and minimal bruising  Neurological:      General: No focal deficit present.      Mental Status: She is alert and oriented to person, place, and time.   Psychiatric:         Mood and Affect: Mood normal.         Behavior: Behavior normal.     ,,,,,...6Scheduled medications  acyclovir, 400 mg, oral, q12h JORDYN  ondansetron, 8 mg, intravenous, q8h  pantoprazole, 40 mg, oral, Daily before breakfast  piperacillin-tazobactam, 3.375 g, intravenous, q6h  posaconazole, 300 mg, oral, Daily with breakfast  sertraline, 50 mg, oral, Daily      Continuous medications    "  PRN medications  PRN medications: albuterol, alteplase, dextrose, diphenhydrAMINE, EPINEPHrine HCl, famotidine, loperamide, methylPREDNISolone sodium succinate (PF), prochlorperazine, sodium chloride     Last Recorded Vitals  Blood pressure 106/67, pulse 63, temperature 36.9 °C (98.4 °F), resp. rate 16, height (S) 1.65 m (5' 4.96\"), weight 60.6 kg (133 lb 9.6 oz), last menstrual period 07/17/2024, SpO2 100%, not currently breastfeeding.  Intake/Output last 3 Shifts:  I/O last 3 completed shifts:  In: 1070 (17.5 mL/kg) [P.O.:720; Blood:300; IV Piggyback:50]  Out: - (0 mL/kg)   Weight: 61.1 kg     Type   Results for orders placed or performed during the hospital encounter of 07/19/24 (from the past 24 hour(s))   Type and screen   Result Value Ref Range    ABO TYPE O     Rh TYPE POS     ANTIBODY SCREEN NEG    CBC and Auto Differential   Result Value Ref Range    WBC 0.5 (LL) 4.4 - 11.3 x10*3/uL    nRBC 0.0 0.0 - 0.0 /100 WBCs    RBC 2.47 (L) 4.00 - 5.20 x10*6/uL    Hemoglobin 7.3 (L) 12.0 - 16.0 g/dL    Hematocrit 20.1 (L) 36.0 - 46.0 %    MCV 81 80 - 100 fL    MCH 29.6 26.0 - 34.0 pg    MCHC 36.3 (H) 32.0 - 36.0 g/dL    RDW 13.0 11.5 - 14.5 %    Platelets 18 (LL) 150 - 450 x10*3/uL    Immature Granulocytes %, Automated 2.0 (H) 0.0 - 0.9 %    Immature Granulocytes Absolute, Automated 0.01 0.00 - 0.70 x10*3/uL   Uric Acid   Result Value Ref Range    Uric Acid 1.6 (L) 2.3 - 6.7 mg/dL   Magnesium   Result Value Ref Range    Magnesium 2.09 1.60 - 2.40 mg/dL   Hepatic function panel   Result Value Ref Range    Albumin 3.3 (L) 3.4 - 5.0 g/dL    Bilirubin, Total 1.3 (H) 0.0 - 1.2 mg/dL    Bilirubin, Direct 0.3 0.0 - 0.3 mg/dL    Alkaline Phosphatase 91 33 - 110 U/L    ALT 20 7 - 45 U/L    AST 11 9 - 39 U/L    Total Protein 5.2 (L) 6.4 - 8.2 g/dL   Lactate dehydrogenase   Result Value Ref Range     84 - 246 U/L   Coagulation Screen   Result Value Ref Range    Protime 13.6 (H) 9.8 - 12.8 seconds    INR 1.2 (H) 0.9 - " 1.1    aPTT 30 27 - 38 seconds   Phosphorus   Result Value Ref Range    Phosphorus 4.2 2.5 - 4.9 mg/dL   Basic Metabolic Panel   Result Value Ref Range    Glucose 95 74 - 99 mg/dL    Sodium 140 136 - 145 mmol/L    Potassium 4.3 3.5 - 5.3 mmol/L    Chloride 106 98 - 107 mmol/L    Bicarbonate 26 21 - 32 mmol/L    Anion Gap 12 10 - 20 mmol/L    Urea Nitrogen 15 6 - 23 mg/dL    Creatinine 0.87 0.50 - 1.05 mg/dL    eGFR >90 >60 mL/min/1.73m*2    Calcium 8.9 8.6 - 10.6 mg/dL   Manual Differential   Result Value Ref Range    Neutrophils %, Manual 1.3 40.0 - 80.0 %    Lymphocytes %, Manual 87.2 13.0 - 44.0 %    Monocytes %, Manual 3.8 2.0 - 10.0 %    Eosinophils %, Manual 0.0 0.0 - 6.0 %    Basophils %, Manual 7.7 0.0 - 2.0 %    Seg Neutrophils Absolute, Manual 0.01 (L) 1.20 - 7.00 x10*3/uL    Lymphocytes Absolute, Manual 0.44 (L) 1.20 - 4.80 x10*3/uL    Monocytes Absolute, Manual 0.02 (L) 0.10 - 1.00 x10*3/uL    Eosinophils Absolute, Manual 0.00 0.00 - 0.70 x10*3/uL    Basophils Absolute, Manual 0.04 0.00 - 0.10 x10*3/uL    Total Cells Counted 78     RBC Morphology No significant RBC morphology present         Assessment/Plan   Active Problems:    Acute myeloid leukemia in adult (Multi)    Ms. Ilda Peter is a 24 yo female with newly diagnosed AML admitted on 7/19/24 for induction chemotherapy with Cytarabine and Idarubicin. No other significant PMHx.     Day 15 with induction 7+3 (Estefany) (started on 7/19/24)     ONC:  # Newly diagnosed AML, favorable risk  - Presented with n/v & dizziness worsening over the last few months  - Found to be pancytopenic in ED, was discharged home and had BMBx done outpatient.  - Bmbx (7/5/24): c/w hypercellular BM w/erythroid predominant hematopoiesis, dyserythropoiesis and increased blasts c/w high grade myeloid neoplasm.  - FISH neg  - Myeloid NGS: NPM1 (54%), NRAS (25%), PTPN11 (15%), IDH1 (2%)  - HLA typing & buccal done (7/24)  - Family typing info given  - Day 14 BMBx completed on 8/1.  Results pending.     CHEMO:  - Cytarabine (100mg/m2) = 170mg IV daily x 7 days (7/19)  - Idarubicin (12mg/m2) = 20mg IV daily x 3 days (7/19)   - HCG quantitative neg (7/19)     HEME:  # Pancytopenia d/t malignancy, known vaginal bleeding on admit since resolved  - Keep Hgb > 7 and Plts > 10     - S/p PRBC: 7/31    - S/p PLTS: 7/31, 8/1  # Transaminitis, likely d/t chemotherapy, resolved.  - Hemolysis workup neg     ID:  - Currently Afebrile  # Neutropenic fever 38.6 on 7/28  - Full workup done: Bld cx x2, CXR, UA all negative   - Zosyn (started 7/28)  # Prophylaxis: ACV and Posaconazole        FEN/GI:  Admit wt: 61.9 kg, Daily Wt: 60.6 kg (8/2)  - Hydration and anti-emetics per chemotherapy order set.   - Hypomagnesemia repleted last on 8/1  - Started PPI on admit  # LANETTE  - PRN Compazine/PRN Zofran on admit.   - Scheduled Zofran Q8 (7/27) w/ symptom improvement.  # Diarrhea worsening  - Cdiff (7/30) neg  - Scheduled ATC Imodium     CV:  - ECHO (7/16/23) w/EF of 55%     GYN:  # Prolonged Vaginal bleeding since early July  - Depo shot last 05/2024   - Patient having scant vaginal bleeding at present  - Onco Fertility consulted (7/22) and labs sent.     ENDO:  # Steroid induced hyperglycemia, now resolved  - Lispro SS #1 with meals  - A1c (7/22): 6.0     PSYCH:  # Anxiety  - Cont home Sertraline     DISP:  - Full Code  - Primary Onc: Dr. Fields  - LINDA Bailon line placed in IR on 7/17/24  - Discharge pending count recovery & Day 14 BMBx     Pt seen, discussed, and examined with Dr. Sherron Schilling PA-C

## 2024-08-03 LAB
ALBUMIN SERPL BCP-MCNC: 3.5 G/DL (ref 3.4–5)
ANION GAP SERPL CALC-SCNC: 11 MMOL/L (ref 10–20)
APTT PPP: 32 SECONDS (ref 27–38)
ATRIAL RATE: 119 BPM
BASOPHILS # BLD MANUAL: 0.02 X10*3/UL (ref 0–0.1)
BASOPHILS NFR BLD MANUAL: 5.9 %
BLOOD EXPIRATION DATE: NORMAL
BUN SERPL-MCNC: 18 MG/DL (ref 6–23)
CALCIUM SERPL-MCNC: 8.9 MG/DL (ref 8.6–10.6)
CHLORIDE SERPL-SCNC: 104 MMOL/L (ref 98–107)
CO2 SERPL-SCNC: 27 MMOL/L (ref 21–32)
CREAT SERPL-MCNC: 1.12 MG/DL (ref 0.5–1.05)
DISPENSE STATUS: NORMAL
EGFRCR SERPLBLD CKD-EPI 2021: 71 ML/MIN/1.73M*2
EOSINOPHIL # BLD MANUAL: 0.01 X10*3/UL (ref 0–0.7)
EOSINOPHIL NFR BLD MANUAL: 2.3 %
ERYTHROCYTE [DISTWIDTH] IN BLOOD BY AUTOMATED COUNT: 12.9 % (ref 11.5–14.5)
GLUCOSE SERPL-MCNC: 101 MG/DL (ref 74–99)
HCT VFR BLD AUTO: 19.9 % (ref 36–46)
HGB BLD-MCNC: 6.9 G/DL (ref 12–16)
IMM GRANULOCYTES # BLD AUTO: 0 X10*3/UL (ref 0–0.7)
IMM GRANULOCYTES NFR BLD AUTO: 0 % (ref 0–0.9)
INR PPP: 1.2 (ref 0.9–1.1)
LDH SERPL L TO P-CCNC: 115 U/L (ref 84–246)
LYMPHOCYTES # BLD MANUAL: 0.36 X10*3/UL (ref 1.2–4.8)
LYMPHOCYTES NFR BLD MANUAL: 89.4 %
MAGNESIUM SERPL-MCNC: 1.8 MG/DL (ref 1.6–2.4)
MCH RBC QN AUTO: 29.1 PG (ref 26–34)
MCHC RBC AUTO-ENTMCNC: 34.7 G/DL (ref 32–36)
MCV RBC AUTO: 84 FL (ref 80–100)
MONOCYTES # BLD MANUAL: 0 X10*3/UL (ref 0.1–1)
MONOCYTES NFR BLD MANUAL: 0 %
NEUTROPHILS # BLD MANUAL: 0.01 X10*3/UL (ref 1.2–7.7)
NEUTS BAND # BLD MANUAL: 0.01 X10*3/UL (ref 0–0.7)
NEUTS BAND NFR BLD MANUAL: 2.4 %
NEUTS SEG # BLD MANUAL: 0 X10*3/UL (ref 1.2–7)
NEUTS SEG NFR BLD MANUAL: 0 %
NRBC BLD-RTO: 0 /100 WBCS (ref 0–0)
OVALOCYTES BLD QL SMEAR: ABNORMAL
P AXIS: 79 DEGREES
P OFFSET: 175 MS
P ONSET: 127 MS
PHOSPHATE SERPL-MCNC: 4 MG/DL (ref 2.5–4.9)
PLATELET # BLD AUTO: 12 X10*3/UL (ref 150–450)
POTASSIUM SERPL-SCNC: 3.8 MMOL/L (ref 3.5–5.3)
PR INTERVAL: 186 MS
PRODUCT BLOOD TYPE: 5100
PRODUCT CODE: NORMAL
PROTHROMBIN TIME: 13.8 SECONDS (ref 9.8–12.8)
Q ONSET: 220 MS
QRS COUNT: 20 BEATS
QRS DURATION: 80 MS
QT INTERVAL: 302 MS
QTC CALCULATION(BAZETT): 424 MS
QTC FREDERICIA: 379 MS
R AXIS: 74 DEGREES
RBC # BLD AUTO: 2.37 X10*6/UL (ref 4–5.2)
RBC MORPH BLD: ABNORMAL
SODIUM SERPL-SCNC: 138 MMOL/L (ref 136–145)
T AXIS: 68 DEGREES
T OFFSET: 371 MS
TOTAL CELLS COUNTED BLD: 85
UNIT ABO: NORMAL
UNIT NUMBER: NORMAL
UNIT RH: NORMAL
UNIT VOLUME: 350
URATE SERPL-MCNC: 2.2 MG/DL (ref 2.3–6.7)
VENTRICULAR RATE: 119 BPM
WBC # BLD AUTO: 0.4 X10*3/UL (ref 4.4–11.3)
XM INTEP: NORMAL

## 2024-08-03 PROCEDURE — 36430 TRANSFUSION BLD/BLD COMPNT: CPT

## 2024-08-03 PROCEDURE — 2500000002 HC RX 250 W HCPCS SELF ADMINISTERED DRUGS (ALT 637 FOR MEDICARE OP, ALT 636 FOR OP/ED): Performed by: PHYSICIAN ASSISTANT

## 2024-08-03 PROCEDURE — 83615 LACTATE (LD) (LDH) ENZYME: CPT

## 2024-08-03 PROCEDURE — 2500000001 HC RX 250 WO HCPCS SELF ADMINISTERED DRUGS (ALT 637 FOR MEDICARE OP): Performed by: PHYSICIAN ASSISTANT

## 2024-08-03 PROCEDURE — 80069 RENAL FUNCTION PANEL: CPT | Performed by: NURSE PRACTITIONER

## 2024-08-03 PROCEDURE — 1170000001 HC PRIVATE ONCOLOGY ROOM DAILY

## 2024-08-03 PROCEDURE — 2500000004 HC RX 250 GENERAL PHARMACY W/ HCPCS (ALT 636 FOR OP/ED)

## 2024-08-03 PROCEDURE — 83735 ASSAY OF MAGNESIUM: CPT | Performed by: PHYSICIAN ASSISTANT

## 2024-08-03 PROCEDURE — 99233 SBSQ HOSP IP/OBS HIGH 50: CPT | Performed by: STUDENT IN AN ORGANIZED HEALTH CARE EDUCATION/TRAINING PROGRAM

## 2024-08-03 PROCEDURE — 2500000004 HC RX 250 GENERAL PHARMACY W/ HCPCS (ALT 636 FOR OP/ED): Performed by: NURSE PRACTITIONER

## 2024-08-03 PROCEDURE — 85027 COMPLETE CBC AUTOMATED: CPT | Performed by: PHYSICIAN ASSISTANT

## 2024-08-03 PROCEDURE — 84550 ASSAY OF BLOOD/URIC ACID: CPT | Performed by: PHYSICIAN ASSISTANT

## 2024-08-03 PROCEDURE — 85610 PROTHROMBIN TIME: CPT

## 2024-08-03 PROCEDURE — 85007 BL SMEAR W/DIFF WBC COUNT: CPT | Performed by: PHYSICIAN ASSISTANT

## 2024-08-03 PROCEDURE — 2500000002 HC RX 250 W HCPCS SELF ADMINISTERED DRUGS (ALT 637 FOR MEDICARE OP, ALT 636 FOR OP/ED)

## 2024-08-03 PROCEDURE — P9040 RBC LEUKOREDUCED IRRADIATED: HCPCS

## 2024-08-03 ASSESSMENT — COGNITIVE AND FUNCTIONAL STATUS - GENERAL
MOBILITY SCORE: 24
DAILY ACTIVITIY SCORE: 24

## 2024-08-03 ASSESSMENT — PAIN SCALES - GENERAL
PAINLEVEL_OUTOF10: 0 - NO PAIN

## 2024-08-03 ASSESSMENT — PAIN - FUNCTIONAL ASSESSMENT
PAIN_FUNCTIONAL_ASSESSMENT: 0-10
PAIN_FUNCTIONAL_ASSESSMENT: 0-10

## 2024-08-03 NOTE — PROGRESS NOTES
"Ilda Peter is a 23 y.o. 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    Subjective   Afebrile.  She is still having several small loose stools daily. Using imodium scheduled. Endorsed scant menstrual bleeding requiring only a panty line, encouraged to notify staff if flow changes.     ROS otherwise unremarkable        Objective     Physical Exam  Constitutional:       General: She is not in acute distress.     Appearance: She is not ill-appearing or toxic-appearing.   HENT:      Head: Normocephalic.      Nose: Nose normal. No congestion.      Mouth/Throat:      Mouth: Mucous membranes are moist.      Pharynx: Oropharynx is clear. No oropharyngeal exudate.   Eyes:      General: No scleral icterus.     Pupils: Pupils are equal, round, and reactive to light.   Cardiovascular:      Rate and Rhythm: Normal rate and regular rhythm.      Pulses: Normal pulses.      Heart sounds: Normal heart sounds. No murmur heard.  Pulmonary:      Effort: Pulmonary effort is normal.      Breath sounds: Normal breath sounds. No stridor. No wheezing or rhonchi.   Abdominal:      General: Bowel sounds are normal.      Palpations: Abdomen is soft.      Tenderness: There is no abdominal tenderness. There is no guarding.   Genitourinary:     Comments: + menses  Musculoskeletal:         General: No swelling or tenderness. Normal range of motion.      Cervical back: Normal range of motion.   Skin:     General: Skin is warm.      Capillary Refill: Capillary refill takes less than 2 seconds.      Coloration: Skin is pale.   Neurological:      Mental Status: She is alert and oriented to person, place, and time.         Last Recorded Vitals  Blood pressure 101/67, pulse 61, temperature 36.6 °C (97.9 °F), temperature source Temporal, resp. rate 16, height (S) 1.65 m (5' 4.96\"), weight 60.1 kg (132 lb 7.9 oz), last menstrual period 07/17/2024, SpO2 99%, not currently " breastfeeding.  Intake/Output last 3 Shifts:  I/O last 3 completed shifts:  In: 894.2 (14.8 mL/kg) [P.O.:480; I.V.:114.2 (1.9 mL/kg); IV Piggyback:300]  Out: - (0 mL/kg)   Weight: 60.6 kg     Relevant Results  Scheduled medications  acyclovir, 400 mg, oral, q12h JORDYN  loperamide, 2 mg, oral, 4x daily  ondansetron, 8 mg, intravenous, q8h  pantoprazole, 40 mg, oral, Daily before breakfast  piperacillin-tazobactam, 3.375 g, intravenous, q6h  posaconazole, 300 mg, oral, Daily with breakfast  sertraline, 50 mg, oral, Daily  sodium chloride, 1,000 mL, intravenous, Once      Continuous medications     PRN medications  PRN medications: albuterol, alteplase, dextrose, diphenhydrAMINE, EPINEPHrine HCl, famotidine, methylPREDNISolone sodium succinate (PF), prochlorperazine, sodium chloride        Assessment/Plan   Active Problems:    Acute myeloid leukemia in adult (Multi)    Ms. Ilda Peter is a 22 yo female with newly diagnosed AML admitted on 7/19/24 for induction chemotherapy with Cytarabine and Idarubicin. No other significant PMHx.     Day 15 with induction 7+3 (Estefany) (started on 7/19/24)     ONC:  # Newly diagnosed AML, favorable risk  - Presented with n/v & dizziness worsening over the last few months  - Found to be pancytopenic in ED, was discharged home and had BMBx done outpatient.  - Bmbx (7/5/24): c/w hypercellular BM w/erythroid predominant hematopoiesis, dyserythropoiesis and increased blasts c/w high grade myeloid neoplasm.  - FISH neg  - Myeloid NGS: NPM1 (54%), NRAS (25%), PTPN11 (15%), IDH1 (2%)  - HLA typing & buccal done (7/24)  - Family typing info given  - Day 14 BMBx completed on 8/1. Results pending.     CHEMO:  - Cytarabine (100mg/m2) = 170mg IV daily x 7 days (7/19)  - Idarubicin (12mg/m2) = 20mg IV daily x 3 days (7/19)   - HCG quantitative neg (7/19)     HEME:  # Pancytopenia d/t malignancy, known vaginal bleeding on admit since resolved  - Keep Hgb > 7 and Plts > 10     - S/p PRBC: 8/3    - S/p PLTS:  7/31, 8/1  # Transaminitis, likely d/t chemotherapy, resolved.  - Hemolysis workup neg  + Menses (scant) 8/3     ID:  - Currently Afebrile  # Neutropenic fever 38.6 on 7/28  - Full workup done: Bld cx x2, CXR, UA all negative   - Zosyn (started 7/28)  # Prophylaxis: ACV and Posaconazole        FEN/GI:  Admit wt: 61.9 kg, Daily Wt: 60.1 kg (8/1)  - Hydration and anti-emetics per chemotherapy order set.   - Hypomagnesemia repleted last on 8/1  - Started PPI on admit  # LANETTE  - PRN Compazine/PRN Zofran on admit.   - Scheduled Zofran Q8 (7/27) w/ symptom improvement.  # Diarrhea worsening  - Cdiff (7/30) neg  - Scheduled ATC Imodium   WANDER (sCr 1.12 8/3) likely secondary to volume depletion     - Bolus 1L NS (8/3) over 4 h    CV:  - ECHO (7/16/23) w/EF of 55%     GYN:  # Prolonged Vaginal bleeding since early July  - Depo shot last 05/2024   - Patient having scant vaginal bleeding at present  - Onco Fertility consulted (7/22) and labs sent.     ENDO:  # Steroid induced hyperglycemia, now resolved  - Lispro SS #1 with meals  - A1c (7/22): 6.0     PSYCH:  # Anxiety  - Cont home Sertraline     DISP:  - Full Code  - Primary Onc: Dr. Fields  - DL Bailon line placed in IR on 7/17/24  - Discharge pending count recovery & Day 14 BMBx     Pt seen, discussed, and examined with Dr. Sherron Max, APRN-CNP

## 2024-08-04 VITALS
OXYGEN SATURATION: 100 % | RESPIRATION RATE: 16 BRPM | WEIGHT: 134.48 LBS | BODY MASS INDEX: 22.41 KG/M2 | SYSTOLIC BLOOD PRESSURE: 114 MMHG | HEART RATE: 73 BPM | HEIGHT: 65 IN | TEMPERATURE: 97.2 F | DIASTOLIC BLOOD PRESSURE: 69 MMHG

## 2024-08-04 LAB
ALBUMIN SERPL BCP-MCNC: 3.4 G/DL (ref 3.4–5)
ANION GAP SERPL CALC-SCNC: 13 MMOL/L (ref 10–20)
BASOPHILS # BLD MANUAL: 0.02 X10*3/UL (ref 0–0.1)
BASOPHILS NFR BLD MANUAL: 4.4 %
BLOOD EXPIRATION DATE: NORMAL
BUN SERPL-MCNC: 18 MG/DL (ref 6–23)
CALCIUM SERPL-MCNC: 9.1 MG/DL (ref 8.6–10.6)
CHLORIDE SERPL-SCNC: 105 MMOL/L (ref 98–107)
CO2 SERPL-SCNC: 26 MMOL/L (ref 21–32)
CREAT SERPL-MCNC: 0.99 MG/DL (ref 0.5–1.05)
DISPENSE STATUS: NORMAL
EGFRCR SERPLBLD CKD-EPI 2021: 82 ML/MIN/1.73M*2
EOSINOPHIL # BLD MANUAL: 0 X10*3/UL (ref 0–0.7)
EOSINOPHIL NFR BLD MANUAL: 0.9 %
ERYTHROCYTE [DISTWIDTH] IN BLOOD BY AUTOMATED COUNT: 12.9 % (ref 11.5–14.5)
GLUCOSE SERPL-MCNC: 91 MG/DL (ref 74–99)
HCT VFR BLD AUTO: 21 % (ref 36–46)
HGB BLD-MCNC: 7.5 G/DL (ref 12–16)
IMM GRANULOCYTES # BLD AUTO: 0 X10*3/UL (ref 0–0.7)
IMM GRANULOCYTES NFR BLD AUTO: 0 % (ref 0–0.9)
LYMPHOCYTES # BLD MANUAL: 0.47 X10*3/UL (ref 1.2–4.8)
LYMPHOCYTES NFR BLD MANUAL: 94.7 %
MAGNESIUM SERPL-MCNC: 1.87 MG/DL (ref 1.6–2.4)
MCH RBC QN AUTO: 29.3 PG (ref 26–34)
MCHC RBC AUTO-ENTMCNC: 35.7 G/DL (ref 32–36)
MCV RBC AUTO: 82 FL (ref 80–100)
MONOCYTES # BLD MANUAL: 0 X10*3/UL (ref 0.1–1)
MONOCYTES NFR BLD MANUAL: 0 %
NEUTS SEG # BLD MANUAL: 0 X10*3/UL (ref 1.2–7)
NEUTS SEG NFR BLD MANUAL: 0 %
NRBC BLD-RTO: 0 /100 WBCS (ref 0–0)
OVALOCYTES BLD QL SMEAR: ABNORMAL
PHOSPHATE SERPL-MCNC: 4.3 MG/DL (ref 2.5–4.9)
PLATELET # BLD AUTO: 8 X10*3/UL (ref 150–450)
POTASSIUM SERPL-SCNC: 4.1 MMOL/L (ref 3.5–5.3)
PRODUCT BLOOD TYPE: 5100
PRODUCT CODE: NORMAL
RBC # BLD AUTO: 2.56 X10*6/UL (ref 4–5.2)
RBC MORPH BLD: ABNORMAL
SODIUM SERPL-SCNC: 140 MMOL/L (ref 136–145)
TOTAL CELLS COUNTED BLD: 114
UNIT ABO: NORMAL
UNIT NUMBER: NORMAL
UNIT RH: NORMAL
UNIT VOLUME: 221
WBC # BLD AUTO: 0.5 X10*3/UL (ref 4.4–11.3)

## 2024-08-04 PROCEDURE — 2500000002 HC RX 250 W HCPCS SELF ADMINISTERED DRUGS (ALT 637 FOR MEDICARE OP, ALT 636 FOR OP/ED)

## 2024-08-04 PROCEDURE — 80069 RENAL FUNCTION PANEL: CPT | Performed by: NURSE PRACTITIONER

## 2024-08-04 PROCEDURE — 2500000002 HC RX 250 W HCPCS SELF ADMINISTERED DRUGS (ALT 637 FOR MEDICARE OP, ALT 636 FOR OP/ED): Performed by: PHYSICIAN ASSISTANT

## 2024-08-04 PROCEDURE — 85027 COMPLETE CBC AUTOMATED: CPT | Performed by: PHYSICIAN ASSISTANT

## 2024-08-04 PROCEDURE — 2500000004 HC RX 250 GENERAL PHARMACY W/ HCPCS (ALT 636 FOR OP/ED)

## 2024-08-04 PROCEDURE — 2500000001 HC RX 250 WO HCPCS SELF ADMINISTERED DRUGS (ALT 637 FOR MEDICARE OP): Performed by: PHYSICIAN ASSISTANT

## 2024-08-04 PROCEDURE — 85007 BL SMEAR W/DIFF WBC COUNT: CPT | Performed by: PHYSICIAN ASSISTANT

## 2024-08-04 PROCEDURE — 99233 SBSQ HOSP IP/OBS HIGH 50: CPT | Performed by: INTERNAL MEDICINE

## 2024-08-04 PROCEDURE — 1170000001 HC PRIVATE ONCOLOGY ROOM DAILY

## 2024-08-04 PROCEDURE — 83735 ASSAY OF MAGNESIUM: CPT | Performed by: PHYSICIAN ASSISTANT

## 2024-08-04 PROCEDURE — P9037 PLATE PHERES LEUKOREDU IRRAD: HCPCS

## 2024-08-04 PROCEDURE — 36430 TRANSFUSION BLD/BLD COMPNT: CPT

## 2024-08-04 ASSESSMENT — COGNITIVE AND FUNCTIONAL STATUS - GENERAL
DAILY ACTIVITIY SCORE: 24
MOBILITY SCORE: 24
DAILY ACTIVITIY SCORE: 24
MOBILITY SCORE: 24

## 2024-08-04 ASSESSMENT — PAIN SCALES - GENERAL
PAINLEVEL_OUTOF10: 0 - NO PAIN
PAINLEVEL_OUTOF10: 0 - NO PAIN

## 2024-08-04 ASSESSMENT — PAIN - FUNCTIONAL ASSESSMENT
PAIN_FUNCTIONAL_ASSESSMENT: 0-10
PAIN_FUNCTIONAL_ASSESSMENT: 0-10

## 2024-08-04 NOTE — PROGRESS NOTES
"Ilda Peter is a 23 y.o. 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    Subjective   Afebrile.  She is still having several small loose stools daily however less frequent than yesterday. Using imodium scheduled. menstrual bleeding slightly heavier today, will transfuse plts. Endorsed mild mucositis this AM w/o difficulty swallowing or taking pills    ROS otherwise unremarkable        Objective     Physical Exam  Constitutional:       General: She is not in acute distress.     Appearance: She is not ill-appearing or toxic-appearing.   HENT:      Head: Normocephalic.      Nose: Nose normal. No congestion.      Mouth/Throat:      Mouth: Mucous membranes are moist.      Pharynx: Oropharynx is clear. No oropharyngeal exudate.      Comments: Esophagitis R>L  Eyes:      General: No scleral icterus.     Pupils: Pupils are equal, round, and reactive to light.   Cardiovascular:      Rate and Rhythm: Normal rate and regular rhythm.      Pulses: Normal pulses.      Heart sounds: Normal heart sounds. No murmur heard.  Pulmonary:      Effort: Pulmonary effort is normal.      Breath sounds: Normal breath sounds. No stridor. No wheezing or rhonchi.   Abdominal:      General: Bowel sounds are normal.      Palpations: Abdomen is soft.      Tenderness: There is no abdominal tenderness. There is no guarding.   Genitourinary:     Comments: + menses  Musculoskeletal:         General: No swelling or tenderness. Normal range of motion.      Cervical back: Normal range of motion.   Skin:     General: Skin is warm.      Capillary Refill: Capillary refill takes less than 2 seconds.      Coloration: Skin is pale.   Neurological:      Mental Status: She is alert and oriented to person, place, and time.         Last Recorded Vitals  Blood pressure 106/71, pulse 71, temperature 36.4 °C (97.5 °F), resp. rate 16, height (S) 1.65 m (5' 4.96\"), weight 61 kg (134 lb 7.7 oz), " last menstrual period 07/17/2024, SpO2 99%, not currently breastfeeding.  Intake/Output last 3 Shifts:  I/O last 3 completed shifts:  In: 577.1 (9.6 mL/kg) [I.V.:92.1 (1.5 mL/kg); Blood:335; IV Piggyback:150]  Out: - (0 mL/kg)   Weight: 60.1 kg     Relevant Results  Scheduled medications  acyclovir, 400 mg, oral, q12h JORDYN  loperamide, 2 mg, oral, 4x daily  ondansetron, 8 mg, intravenous, q8h  pantoprazole, 40 mg, oral, Daily before breakfast  piperacillin-tazobactam, 3.375 g, intravenous, q6h  posaconazole, 300 mg, oral, Daily with breakfast  sertraline, 50 mg, oral, Daily      Continuous medications     PRN medications  PRN medications: albuterol, alteplase, dextrose, diphenhydrAMINE, EPINEPHrine HCl, famotidine, methylPREDNISolone sodium succinate (PF), prochlorperazine, sodium chloride        Assessment/Plan   Active Problems:    Acute myeloid leukemia in adult (Multi)    Ms. Ilda Peter is a 24 yo female with newly diagnosed AML admitted on 7/19/24 for induction chemotherapy with Cytarabine and Idarubicin. No other significant PMHx.     Day 16 with induction 7+3 (Estefany) (started on 7/19/24)     ONC:  # Newly diagnosed AML, favorable risk  - Presented with n/v & dizziness worsening over the last few months  - Found to be pancytopenic in ED, was discharged home and had BMBx done outpatient.  - Bmbx (7/5/24): c/w hypercellular BM w/erythroid predominant hematopoiesis, dyserythropoiesis and increased blasts c/w high grade myeloid neoplasm.  - FISH neg  - Myeloid NGS: NPM1 (54%), NRAS (25%), PTPN11 (15%), IDH1 (2%)  - HLA typing & buccal done (7/24)  - Family typing info given  - Day 14 BMBx completed on 8/1. Results pending.     CHEMO:  - Cytarabine (100mg/m2) = 170mg IV daily x 7 days (7/19)  - Idarubicin (12mg/m2) = 20mg IV daily x 3 days (7/19)   - HCG quantitative neg (7/19)     HEME:  # Pancytopenia d/t malignancy, known vaginal bleeding on admit since resolved  - Keep Hgb > 7 and Plts > 10     - S/p PRBC: 8/3     - S/p PLTS: 8/4  # Transaminitis, likely d/t chemotherapy, resolved.  - Hemolysis workup neg  + Menses (Mild) 8/3     ID:  - Currently Afebrile  # Neutropenic fever 38.6 on 7/28  - Full workup done: Bld cx x2, CXR, UA all negative   - Zosyn (started 7/28)  # Prophylaxis: ACV and Posaconazole        FEN/GI:  Admit wt: 61.9 kg, Daily Wt: 60.1 kg (8/1)  - Hydration and anti-emetics per chemotherapy order set.   - Hypomagnesemia repleted last on 8/1  - Started PPI on admit  # LANETTE  - PRN Compazine/PRN Zofran on admit.   - Scheduled Zofran Q8 (7/27) w/ symptom improvement.  # Diarrhea worsening  - Cdiff (7/30) neg  - Scheduled ATC Imodium      CV:  - ECHO (7/16/23) w/EF of 55%     GYN:  # Prolonged Vaginal bleeding since early July  - Depo shot last 05/2024   - Patient having scant vaginal bleeding at present  - Onco Fertility consulted (7/22) and labs sent.     ENDO:  # Steroid induced hyperglycemia, now resolved  - Lispro SS #1 with meals  - A1c (7/22): 6.0     PSYCH:  # Anxiety  - Cont home Sertraline     DISP:  - Full Code  - Primary Onc: Dr. Fields  - DL Bailon line placed in IR on 7/17/24  - Discharge pending count recovery & Day 14 BMBx     Pt seen, discussed, and examined with Dr. Brandee Max, APRN-CNP

## 2024-08-05 ENCOUNTER — APPOINTMENT (OUTPATIENT)
Dept: OBSTETRICS AND GYNECOLOGY | Facility: CLINIC | Age: 24
End: 2024-08-05
Payer: COMMERCIAL

## 2024-08-05 LAB
ALBUMIN SERPL BCP-MCNC: 3.5 G/DL (ref 3.4–5)
ALP SERPL-CCNC: 131 U/L (ref 33–110)
ALT SERPL W P-5'-P-CCNC: 24 U/L (ref 7–45)
ANION GAP SERPL CALC-SCNC: 11 MMOL/L (ref 10–20)
APTT PPP: 38 SECONDS (ref 27–38)
AST SERPL W P-5'-P-CCNC: 13 U/L (ref 9–39)
BASOPHILS # BLD AUTO: 0 X10*3/UL (ref 0–0.1)
BASOPHILS NFR BLD AUTO: 0 %
BILIRUB DIRECT SERPL-MCNC: 0.3 MG/DL (ref 0–0.3)
BILIRUB SERPL-MCNC: 1.1 MG/DL (ref 0–1.2)
BUN SERPL-MCNC: 16 MG/DL (ref 6–23)
CALCIUM SERPL-MCNC: 8.8 MG/DL (ref 8.6–10.6)
CHLORIDE SERPL-SCNC: 106 MMOL/L (ref 98–107)
CO2 SERPL-SCNC: 28 MMOL/L (ref 21–32)
CREAT SERPL-MCNC: 1.08 MG/DL (ref 0.5–1.05)
EGFRCR SERPLBLD CKD-EPI 2021: 74 ML/MIN/1.73M*2
EOSINOPHIL # BLD AUTO: 0 X10*3/UL (ref 0–0.7)
EOSINOPHIL NFR BLD AUTO: 0 %
ERYTHROCYTE [DISTWIDTH] IN BLOOD BY AUTOMATED COUNT: 12.6 % (ref 11.5–14.5)
FIBRINOGEN PPP-MCNC: 707 MG/DL (ref 200–400)
GLUCOSE SERPL-MCNC: 98 MG/DL (ref 74–99)
HCT VFR BLD AUTO: 20.7 % (ref 36–46)
HGB BLD-MCNC: 7.3 G/DL (ref 12–16)
IMM GRANULOCYTES # BLD AUTO: 0 X10*3/UL (ref 0–0.7)
IMM GRANULOCYTES NFR BLD AUTO: 0 % (ref 0–0.9)
INR PPP: 1.2 (ref 0.9–1.1)
LDH SERPL L TO P-CCNC: 112 U/L (ref 84–246)
LYMPHOCYTES # BLD AUTO: 0.45 X10*3/UL (ref 1.2–4.8)
LYMPHOCYTES NFR BLD AUTO: 95.7 %
MAGNESIUM SERPL-MCNC: 1.91 MG/DL (ref 1.6–2.4)
MCH RBC QN AUTO: 29.2 PG (ref 26–34)
MCHC RBC AUTO-ENTMCNC: 35.3 G/DL (ref 32–36)
MCV RBC AUTO: 83 FL (ref 80–100)
MONOCYTES # BLD AUTO: 0 X10*3/UL (ref 0.1–1)
MONOCYTES NFR BLD AUTO: 0 %
NEUTROPHILS # BLD AUTO: 0.02 X10*3/UL (ref 1.2–7.7)
NEUTROPHILS NFR BLD AUTO: 4.3 %
NRBC BLD-RTO: 0 /100 WBCS (ref 0–0)
OVALOCYTES BLD QL SMEAR: NORMAL
PHOSPHATE SERPL-MCNC: 4.1 MG/DL (ref 2.5–4.9)
PLATELET # BLD AUTO: 20 X10*3/UL (ref 150–450)
POTASSIUM SERPL-SCNC: 3.7 MMOL/L (ref 3.5–5.3)
PROT SERPL-MCNC: 5.4 G/DL (ref 6.4–8.2)
PROTHROMBIN TIME: 13.1 SECONDS (ref 9.8–12.8)
RBC # BLD AUTO: 2.5 X10*6/UL (ref 4–5.2)
RBC MORPH BLD: NORMAL
SODIUM SERPL-SCNC: 141 MMOL/L (ref 136–145)
WBC # BLD AUTO: 0.5 X10*3/UL (ref 4.4–11.3)

## 2024-08-05 PROCEDURE — 2500000002 HC RX 250 W HCPCS SELF ADMINISTERED DRUGS (ALT 637 FOR MEDICARE OP, ALT 636 FOR OP/ED)

## 2024-08-05 PROCEDURE — 85610 PROTHROMBIN TIME: CPT | Performed by: PHYSICIAN ASSISTANT

## 2024-08-05 PROCEDURE — 85384 FIBRINOGEN ACTIVITY: CPT | Performed by: NURSE PRACTITIONER

## 2024-08-05 PROCEDURE — 82248 BILIRUBIN DIRECT: CPT | Performed by: NURSE PRACTITIONER

## 2024-08-05 PROCEDURE — 2500000004 HC RX 250 GENERAL PHARMACY W/ HCPCS (ALT 636 FOR OP/ED): Performed by: PHYSICIAN ASSISTANT

## 2024-08-05 PROCEDURE — 2500000002 HC RX 250 W HCPCS SELF ADMINISTERED DRUGS (ALT 637 FOR MEDICARE OP, ALT 636 FOR OP/ED): Performed by: PHYSICIAN ASSISTANT

## 2024-08-05 PROCEDURE — 83735 ASSAY OF MAGNESIUM: CPT | Performed by: PHYSICIAN ASSISTANT

## 2024-08-05 PROCEDURE — 83615 LACTATE (LD) (LDH) ENZYME: CPT | Performed by: NURSE PRACTITIONER

## 2024-08-05 PROCEDURE — 2500000004 HC RX 250 GENERAL PHARMACY W/ HCPCS (ALT 636 FOR OP/ED)

## 2024-08-05 PROCEDURE — 2500000001 HC RX 250 WO HCPCS SELF ADMINISTERED DRUGS (ALT 637 FOR MEDICARE OP): Performed by: PHYSICIAN ASSISTANT

## 2024-08-05 PROCEDURE — 85025 COMPLETE CBC W/AUTO DIFF WBC: CPT | Performed by: PHYSICIAN ASSISTANT

## 2024-08-05 PROCEDURE — 80048 BASIC METABOLIC PNL TOTAL CA: CPT | Performed by: NURSE PRACTITIONER

## 2024-08-05 PROCEDURE — 84100 ASSAY OF PHOSPHORUS: CPT | Performed by: NURSE PRACTITIONER

## 2024-08-05 PROCEDURE — 1170000001 HC PRIVATE ONCOLOGY ROOM DAILY

## 2024-08-05 PROCEDURE — 99233 SBSQ HOSP IP/OBS HIGH 50: CPT | Performed by: INTERNAL MEDICINE

## 2024-08-05 RX ORDER — POTASSIUM CHLORIDE 29.8 MG/ML
40 INJECTION INTRAVENOUS ONCE
Status: COMPLETED | OUTPATIENT
Start: 2024-08-05 | End: 2024-08-05

## 2024-08-05 ASSESSMENT — COGNITIVE AND FUNCTIONAL STATUS - GENERAL
DAILY ACTIVITIY SCORE: 24
DAILY ACTIVITIY SCORE: 24
MOBILITY SCORE: 24
MOBILITY SCORE: 24

## 2024-08-05 ASSESSMENT — PAIN SCALES - GENERAL
PAINLEVEL_OUTOF10: 0 - NO PAIN
PAINLEVEL_OUTOF10: 2

## 2024-08-05 ASSESSMENT — PAIN - FUNCTIONAL ASSESSMENT
PAIN_FUNCTIONAL_ASSESSMENT: 0-10
PAIN_FUNCTIONAL_ASSESSMENT: 0-10

## 2024-08-05 NOTE — PROGRESS NOTES
"Ilda Peter is a 23 y.o. 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    Subjective   Afebrile. Patient still reporting sore throat with swallowing. Able to still eat/drink and take her pills. Diarrhea better. Denies N/V. ROS otherwise unremarkable.       Objective     Physical Exam  Constitutional:       General: She is not in acute distress.     Appearance: She is not ill-appearing or toxic-appearing.   HENT:      Head: Normocephalic.      Nose: Nose normal. No congestion.      Mouth/Throat:      Mouth: Mucous membranes are moist.      Pharynx: Oropharynx is clear. No oropharyngeal exudate.      Comments: Esophagitis R>L  Eyes:      General: No scleral icterus.     Pupils: Pupils are equal, round, and reactive to light.   Cardiovascular:      Rate and Rhythm: Normal rate and regular rhythm.      Pulses: Normal pulses.      Heart sounds: Normal heart sounds. No murmur heard.  Pulmonary:      Effort: Pulmonary effort is normal.      Breath sounds: Normal breath sounds. No stridor. No wheezing or rhonchi.   Abdominal:      General: Bowel sounds are normal.      Palpations: Abdomen is soft.      Tenderness: There is no abdominal tenderness. There is no guarding.   Genitourinary:     Comments: + menses  Musculoskeletal:         General: No swelling or tenderness. Normal range of motion.      Cervical back: Normal range of motion.   Skin:     General: Skin is warm.      Capillary Refill: Capillary refill takes less than 2 seconds.      Coloration: Skin is pale.   Neurological:      Mental Status: She is alert and oriented to person, place, and time.         Last Recorded Vitals  Blood pressure 121/73, pulse 76, temperature 37 °C (98.6 °F), resp. rate 16, height (S) 1.65 m (5' 4.96\"), weight 60.4 kg (133 lb 2.5 oz), last menstrual period 07/17/2024, SpO2 100%, not currently breastfeeding.  Intake/Output last 3 Shifts:  I/O last 3 completed " shifts:  In: 1250 (20.5 mL/kg) [P.O.:700; Blood:300; IV Piggyback:250]  Out: - (0 mL/kg)   Weight: 61 kg     Relevant Results  Scheduled medications  acyclovir, 400 mg, oral, q12h JORDYN  loperamide, 2 mg, oral, 4x daily  ondansetron, 8 mg, intravenous, q8h  pantoprazole, 40 mg, oral, Daily before breakfast  piperacillin-tazobactam, 3.375 g, intravenous, q6h  posaconazole, 300 mg, oral, Daily with breakfast  sertraline, 50 mg, oral, Daily      Continuous medications     PRN medications  PRN medications: albuterol, alteplase, dextrose, diphenhydrAMINE, EPINEPHrine HCl, famotidine, lidocaine-diphenhydraMINE-Maalox 1:1:1, methylPREDNISolone sodium succinate (PF), prochlorperazine, sodium chloride        Assessment/Plan   Active Problems:    Acute myeloid leukemia in adult (Multi)    Ms. Ilda Peter is a 22 yo female with newly diagnosed AML admitted on 7/19/24 for induction chemotherapy with Cytarabine and Idarubicin. No other significant PMHx.     Day 17 with induction 7+3 (Estefany) (started on 7/19/24)     ONC:  # Newly diagnosed AML, favorable risk  - Presented with n/v & dizziness worsening over the last few months  - Found to be pancytopenic in ED, was discharged home and had BMBx done outpatient.  - Bmbx (7/5/24): c/w hypercellular BM w/erythroid predominant hematopoiesis, dyserythropoiesis and increased blasts c/w high grade myeloid neoplasm.  - FISH neg  - Myeloid NGS: NPM1 (54%), NRAS (25%), PTPN11 (15%), IDH1 (2%)  - HLA typing & buccal done (7/24)  - Family typing info given  - Day 14 BMBx completed on 8/1 c/w chemoablated marrow.     CHEMO:  - Cytarabine (100mg/m2) = 170mg IV daily x 7 days (7/19)  - Idarubicin (12mg/m2) = 20mg IV daily x 3 days (7/19)   - HCG quantitative neg (7/19)     HEME:  # Pancytopenia d/t malignancy, known vaginal bleeding on admit since resolved  - Keep Hgb > 7 and Plts > 10     - S/p PRBC: 8/3    - S/p PLTS: 8/4  # Transaminitis, likely d/t chemotherapy, resolved.  - Hemolysis workup  neg  + Menses (Mild) 8/3     ID:  - Currently Afebrile  # Neutropenic fever 38.6 on 7/28  - Full workup done: Bld cx x2, CXR, UA all negative   - Zosyn (started 7/28)  # Prophylaxis: ACV and Posaconazole        FEN/GI:  Admit wt: 61.9 kg, Daily Wt: 60.4 kg (8/5)  - Hydration and anti-emetics per chemotherapy order set.   - Hypokalemia repleted last on 8/5  - Started PPI on admit  # LANETTE  - PRN Compazine/PRN Zofran on admit.   - Scheduled Zofran Q8 (7/27) w/ symptom improvement.  # Diarrhea improving  - Cdiff (7/30) neg  - Scheduled ATC Imodium  #Mild mucositis. PRN BMX ordered.      CV:  - ECHO (7/16/23) w/EF of 55%     GYN:  # Prolonged Vaginal bleeding since early July  - Depo shot last 05/2024   - Patient having scant vaginal bleeding at present  - Onco Fertility consulted (7/22) and labs sent.     ENDO:  # Steroid induced hyperglycemia, now resolved  - Lispro SS #1 with meals  - A1c (7/22): 6.0     PSYCH:  # Anxiety  - Cont home Sertraline     DISP:  - Full Code  - Primary Onc: Dr. Fields  - DL Bailon line placed in IR on 7/17/24  - Discharge pending count recovery & Day 14 BMBx     Pt seen, discussed, and examined with Dr. Layton Schilling PA-C

## 2024-08-05 NOTE — CARE PLAN
The patient's goals for the shift include      The clinical goals for the shift include Patient will have a decrease in diarrhea    Over the shift, the patient did not make progress toward the following goals. Barriers to progression include chemotherapy administration. Recommendations to address these barriers include anti-diarrheal.

## 2024-08-06 LAB
ALBUMIN SERPL BCP-MCNC: 3.8 G/DL (ref 3.4–5)
ANION GAP SERPL CALC-SCNC: 14 MMOL/L (ref 10–20)
BASOPHILS # BLD MANUAL: 0.02 X10*3/UL (ref 0–0.1)
BASOPHILS NFR BLD MANUAL: 5 %
BUN SERPL-MCNC: 16 MG/DL (ref 6–23)
CALCIUM SERPL-MCNC: 8.8 MG/DL (ref 8.6–10.6)
CHLORIDE SERPL-SCNC: 101 MMOL/L (ref 98–107)
CO2 SERPL-SCNC: 26 MMOL/L (ref 21–32)
CREAT SERPL-MCNC: 1.09 MG/DL (ref 0.5–1.05)
EGFRCR SERPLBLD CKD-EPI 2021: 73 ML/MIN/1.73M*2
EOSINOPHIL # BLD MANUAL: 0 X10*3/UL (ref 0–0.7)
EOSINOPHIL NFR BLD MANUAL: 0 %
ERYTHROCYTE [DISTWIDTH] IN BLOOD BY AUTOMATED COUNT: 12.4 % (ref 11.5–14.5)
GLUCOSE SERPL-MCNC: 106 MG/DL (ref 74–99)
HCT VFR BLD AUTO: 22.1 % (ref 36–46)
HGB BLD-MCNC: 7.7 G/DL (ref 12–16)
IMM GRANULOCYTES # BLD AUTO: 0 X10*3/UL (ref 0–0.7)
IMM GRANULOCYTES NFR BLD AUTO: 0 % (ref 0–0.9)
LYMPHOCYTES # BLD MANUAL: 0.37 X10*3/UL (ref 1.2–4.8)
LYMPHOCYTES NFR BLD MANUAL: 93.3 %
MAGNESIUM SERPL-MCNC: 1.8 MG/DL (ref 1.6–2.4)
MCH RBC QN AUTO: 29.4 PG (ref 26–34)
MCHC RBC AUTO-ENTMCNC: 34.8 G/DL (ref 32–36)
MCV RBC AUTO: 84 FL (ref 80–100)
MONOCYTES # BLD MANUAL: 0 X10*3/UL (ref 0.1–1)
MONOCYTES NFR BLD MANUAL: 0 %
NEUTS SEG # BLD MANUAL: 0.01 X10*3/UL (ref 1.2–7)
NEUTS SEG NFR BLD MANUAL: 1.7 %
NRBC BLD-RTO: 0 /100 WBCS (ref 0–0)
OVALOCYTES BLD QL SMEAR: ABNORMAL
PHOSPHATE SERPL-MCNC: 3.8 MG/DL (ref 2.5–4.9)
PLATELET # BLD AUTO: 15 X10*3/UL (ref 150–450)
POTASSIUM SERPL-SCNC: 4 MMOL/L (ref 3.5–5.3)
RBC # BLD AUTO: 2.62 X10*6/UL (ref 4–5.2)
RBC MORPH BLD: ABNORMAL
SODIUM SERPL-SCNC: 137 MMOL/L (ref 136–145)
TOTAL CELLS COUNTED BLD: 119
WBC # BLD AUTO: 0.4 X10*3/UL (ref 4.4–11.3)

## 2024-08-06 PROCEDURE — 2500000001 HC RX 250 WO HCPCS SELF ADMINISTERED DRUGS (ALT 637 FOR MEDICARE OP): Performed by: PHYSICIAN ASSISTANT

## 2024-08-06 PROCEDURE — 2500000002 HC RX 250 W HCPCS SELF ADMINISTERED DRUGS (ALT 637 FOR MEDICARE OP, ALT 636 FOR OP/ED)

## 2024-08-06 PROCEDURE — 2500000004 HC RX 250 GENERAL PHARMACY W/ HCPCS (ALT 636 FOR OP/ED)

## 2024-08-06 PROCEDURE — 85027 COMPLETE CBC AUTOMATED: CPT | Performed by: PHYSICIAN ASSISTANT

## 2024-08-06 PROCEDURE — 83735 ASSAY OF MAGNESIUM: CPT | Performed by: PHYSICIAN ASSISTANT

## 2024-08-06 PROCEDURE — 80069 RENAL FUNCTION PANEL: CPT | Performed by: NURSE PRACTITIONER

## 2024-08-06 PROCEDURE — 99233 SBSQ HOSP IP/OBS HIGH 50: CPT | Performed by: INTERNAL MEDICINE

## 2024-08-06 PROCEDURE — 1170000001 HC PRIVATE ONCOLOGY ROOM DAILY

## 2024-08-06 PROCEDURE — 2500000001 HC RX 250 WO HCPCS SELF ADMINISTERED DRUGS (ALT 637 FOR MEDICARE OP): Performed by: NURSE PRACTITIONER

## 2024-08-06 PROCEDURE — 85007 BL SMEAR W/DIFF WBC COUNT: CPT | Performed by: PHYSICIAN ASSISTANT

## 2024-08-06 PROCEDURE — 2500000002 HC RX 250 W HCPCS SELF ADMINISTERED DRUGS (ALT 637 FOR MEDICARE OP, ALT 636 FOR OP/ED): Performed by: PHYSICIAN ASSISTANT

## 2024-08-06 RX ORDER — LOPERAMIDE HYDROCHLORIDE 2 MG/1
2 CAPSULE ORAL ONCE
Status: COMPLETED | OUTPATIENT
Start: 2024-08-06 | End: 2024-08-06

## 2024-08-06 ASSESSMENT — PAIN SCALES - GENERAL
PAINLEVEL_OUTOF10: 3
PAINLEVEL_OUTOF10: 0 - NO PAIN
PAINLEVEL_OUTOF10: 0 - NO PAIN

## 2024-08-06 ASSESSMENT — COGNITIVE AND FUNCTIONAL STATUS - GENERAL
MOBILITY SCORE: 24
DAILY ACTIVITIY SCORE: 24

## 2024-08-06 ASSESSMENT — PAIN - FUNCTIONAL ASSESSMENT: PAIN_FUNCTIONAL_ASSESSMENT: 0-10

## 2024-08-06 NOTE — PROGRESS NOTES
"Ilda Peter is a 23 y.o. female on day 18 of admission presenting with Acute myeloid leukemia (AML) due to recurrent genetic abnormality (Multi).    Subjective   Afebrile. Patient still reporting sore throat with swallowing. Able to still eat/drink and take her pills. Continues to endorse having diarrhea. Taking PRN Imodium more regularly. Vaginal bleeding slowing down. Denies N/V. ROS otherwise unremarkable.       Objective     Physical Exam  Constitutional:       General: She is not in acute distress.     Appearance: She is not ill-appearing or toxic-appearing.   HENT:      Head: Normocephalic.      Nose: Nose normal. No congestion.      Mouth/Throat:      Mouth: Mucous membranes are moist.      Pharynx: Oropharynx is clear. No oropharyngeal exudate.   Eyes:      General: No scleral icterus.     Pupils: Pupils are equal, round, and reactive to light.   Cardiovascular:      Rate and Rhythm: Normal rate and regular rhythm.      Pulses: Normal pulses.      Heart sounds: Normal heart sounds. No murmur heard.  Pulmonary:      Effort: Pulmonary effort is normal.      Breath sounds: Normal breath sounds. No stridor. No wheezing or rhonchi.   Abdominal:      General: Bowel sounds are normal.      Palpations: Abdomen is soft.      Tenderness: There is no abdominal tenderness. There is no guarding.   Genitourinary:     Comments: + menses  Musculoskeletal:         General: No swelling or tenderness. Normal range of motion.      Cervical back: Normal range of motion.   Skin:     General: Skin is warm.      Capillary Refill: Capillary refill takes less than 2 seconds.      Coloration: Skin is pale.   Neurological:      Mental Status: She is alert and oriented to person, place, and time.     Last Recorded Vitals  Blood pressure 112/70, pulse 72, temperature 36.7 °C (98.1 °F), resp. rate 18, height (S) 1.65 m (5' 4.96\"), weight 60.4 kg (133 lb 2.5 oz), last menstrual period 07/17/2024, SpO2 97%, not currently " breastfeeding.  Intake/Output last 3 Shifts:  I/O last 3 completed shifts:  In: 800 (13.2 mL/kg) [P.O.:400; IV Piggyback:400]  Out: - (0 mL/kg)   Weight: 60.4 kg     Relevant Results  Scheduled medications  acyclovir, 400 mg, oral, q12h JORDYN  loperamide, 2 mg, oral, 4x daily  ondansetron, 8 mg, intravenous, q8h  pantoprazole, 40 mg, oral, Daily before breakfast  piperacillin-tazobactam, 3.375 g, intravenous, q6h  posaconazole, 300 mg, oral, Daily with breakfast  sertraline, 50 mg, oral, Daily      Continuous medications     PRN medications  PRN medications: albuterol, alteplase, dextrose, diphenhydrAMINE, EPINEPHrine HCl, famotidine, lidocaine-diphenhydraMINE-Maalox 1:1:1, methylPREDNISolone sodium succinate (PF), prochlorperazine, sodium chloride        Assessment/Plan   Active Problems:    Acute myeloid leukemia in adult (Multi)    Ms. Ilda Peter is a 24 yo female with newly diagnosed AML admitted on 7/19/24 for induction chemotherapy with Cytarabine and Idarubicin. No other significant PMHx.     Day 18 with induction 7+3 (Estefany) (started on 7/19/24)     ONC:  # Newly diagnosed AML, favorable risk  - Presented with n/v & dizziness worsening over the last few months  - Found to be pancytopenic in ED, was discharged home and had BMBx done outpatient.  - Bmbx (7/5/24): c/w hypercellular BM w/erythroid predominant hematopoiesis, dyserythropoiesis and increased blasts c/w high grade myeloid neoplasm.  - FISH neg  - Myeloid NGS: NPM1 (54%), NRAS (25%), PTPN11 (15%), IDH1 (2%)  - HLA typing & buccal done (7/24)  - Family typing info given  - Day 14 BMBx completed on 8/1 c/w chemoablated marrow.     CHEMO:  - Cytarabine (100mg/m2) = 170mg IV daily x 7 days (7/19)  - Idarubicin (12mg/m2) = 20mg IV daily x 3 days (7/19)   - HCG quantitative neg (7/19)     HEME:  # Pancytopenia d/t malignancy, known vaginal bleeding on admit since resolved  - Keep Hgb > 7 and Plts > 10     - S/p PRBC: 8/3    - S/p PLTS: 8/4  # Transaminitis,  likely d/t chemotherapy, resolved.  - Hemolysis workup neg  + Menses (Mild) 8/3     ID:  - Currently Afebrile  # Neutropenic fever 38.6 on 7/28  - Full workup done: Bld cx x2, CXR, UA all negative   - Zosyn (started 7/28)  # Prophylaxis: ACV and Posaconazole        FEN/GI:  Admit wt: 61.9 kg, Daily Wt: 60.4 kg (8/6)  - Hydration and anti-emetics per chemotherapy order set.   - Hypokalemia repleted last on 8/5  - Started PPI on admit  # LANETTE  - PRN Compazine/PRN Zofran on admit.   - Scheduled Zofran Q8 (7/27) w/ symptom improvement.  # Diarrhea improving  - Cdiff (7/30) neg  - Scheduled ATC Imodium  #Mild mucositis. PRN BMX ordered.      CV:  - ECHO (7/16/23) w/EF of 55%     GYN:  # Prolonged Vaginal bleeding since early July  - Depo shot last 05/2024   - Patient having scant vaginal bleeding at present  - Onco Fertility consulted (7/22) and labs sent.     ENDO:  # Steroid induced hyperglycemia, now resolved  - Lispro SS #1 with meals  - A1c (7/22): 6.0     PSYCH:  # Anxiety  - Cont home Sertraline     DISP:  - Full Code  - Primary Onc: Dr. Fields  - DL Bailon line placed in IR on 7/17/24  - Discharge pending count recovery     Pt seen, discussed, and examined with Dr. Layton Schilling PA-C

## 2024-08-06 NOTE — PROGRESS NOTES
"Nutrition Follow Up Assessment  Nutrition Assessment      Today is day #18 of Induction Chemo with 7+3.    Nutrition History:  This service met with pt earlier today, was sitting up in bed at time of visit with her.    Tells appetite/PO intakes are stable, remain what they were at last nutrition visit. Continues to eat ~1-2 meals/day, then has snacks throughout the day, as well. Has been getting protein, such as meat or dairy. Likes eating the yogurt on the menu + also orders the chocolate whole milk chugs.    Reports she does like the Ensure Clear, drinks 1 bottle/day on a regular basis.    Continues to tell her n/v is under control with medication. Is having diarrhea, though has been taking Imodium for that and it helps. No trouble chewing/swallowing. Did have c/o taste \"changing, things don't taste the way they used to,\" + dry mouth/throat.    Anthropometrics:  Height: (S) 165 cm (5' 4.96\")   Weight: 60.4 kg (133 lb 2.5 oz)   BMI (Calculated): 22.19  IBW/kg (Dietitian Calculated): 56.8 kg  Percent of IBW: 106 %       Admission Weight Trend:  07/19-62.4 kg (admit wt)  07/31-60.5 kg (wt at last nutrition visit)  08/06-60.4 kg (current wt)--total of 3.2% wt loss from admit to present (significant), wt does appear stable x 1 week    Nutrition Focused Physical Exam Findings:  defer: completed at a prior nutrition visit  Edema:  Edema: none  Physical Findings:  Skin: Negative    Nutrition Significant Labs:  CBC Trend:   Results from last 7 days   Lab Units 08/06/24  0203 08/05/24  0344 08/04/24  0502 08/03/24  0226   WBC AUTO x10*3/uL 0.4* 0.5* 0.5* 0.4*   RBC AUTO x10*6/uL 2.62* 2.50* 2.56* 2.37*   HEMOGLOBIN g/dL 7.7* 7.3* 7.5* 6.9*   HEMATOCRIT % 22.1* 20.7* 21.0* 19.9*   MCV fL 84 83 82 84   PLATELETS AUTO x10*3/uL 15* 20* 8* 12*   BMP Trend:   Results from last 7 days   Lab Units 08/06/24  0203 08/05/24  0344 08/04/24  0502 08/03/24  0226   GLUCOSE mg/dL 106* 98 91 101*   CALCIUM mg/dL 8.8 8.8 9.1 8.9   SODIUM " "mmol/L 137 141 140 138   POTASSIUM mmol/L 4.0 3.7 4.1 3.8   CO2 mmol/L 26 28 26 27   CHLORIDE mmol/L 101 106 105 104   BUN mg/dL 16 16 18 18   CREATININE mg/dL 1.09* 1.08* 0.99 1.12*   A1C:  Lab Results   Component Value Date    HGBA1C 6.0 (H) 07/22/2024   Liver Function Trend:   Results from last 7 days   Lab Units 08/05/24  0344 08/02/24  0401 07/31/24  0133   ALK PHOS U/L 131* 91 77   AST U/L 13 11 11   ALT U/L 24 20 27   BILIRUBIN TOTAL mg/dL 1.1 1.3* 1.6*   Renal Lab Trend:   Results from last 7 days   Lab Units 08/06/24  0203 08/05/24  0344 08/04/24  0502 08/03/24  0226   POTASSIUM mmol/L 4.0 3.7 4.1 3.8   PHOSPHORUS mg/dL 3.8 4.1 4.3 4.0   SODIUM mmol/L 137 141 140 138   MAGNESIUM mg/dL 1.80 1.91 1.87 1.80   EGFR mL/min/1.73m*2 73 74 82 71   BUN mg/dL 16 16 18 18   CREATININE mg/dL 1.09* 1.08* 0.99 1.12*      Medications:  Scheduled medications  acyclovir, 400 mg, oral, q12h JORDYN  loperamide, 2 mg, oral, 4x daily  ondansetron, 8 mg, intravenous, q8h  pantoprazole, 40 mg, oral, Daily before breakfast  piperacillin-tazobactam, 3.375 g, intravenous, q6h  posaconazole, 300 mg, oral, Daily with breakfast  sertraline, 50 mg, oral, Daily    PRN medications  PRN medications: albuterol, alteplase, dextrose, diphenhydrAMINE, EPINEPHrine HCl, famotidine, lidocaine-diphenhydraMINE-Maalox 1:1:1, methylPREDNISolone sodium succinate (PF), prochlorperazine, sodium chloride    I/O:   Last BM Date: 08/06/24; Stool Appearance: Watery (08/06/24 0818)    Dietary Orders (From admission, onward)       Start     Ordered    07/31/24 1221  Snacks  Until discontinued        Comments: please allow pt to order \"Uncrustable\" peanut butter and jelly sandwiches whenever she desires    07/31/24 1221    07/31/24 1220  Oral nutritional supplements  Until discontinued        Comments: please provide Ensure Clear (mixed berry) once/day--pt may have more than once/day, if requested   Question Answer Comment   Deliver with Lunch    Select " supplement: Ensure Clear        07/31/24 1220    07/22/24 1231  Snacks  Until discontinued        Comments: pt may order from [facility] Menu + [employer] Snack List, if requested    07/22/24 1230    07/19/24 1729  Adult diet Low pathogen  Diet effective now        Question:  Diet type  Answer:  Low pathogen    07/19/24 1732                Estimated Needs:   Total Energy Estimated Needs (kCal): 7729-5524  Method for Estimating Needs: 61 x ~30-35  Total Protein Estimated Needs (g): 75+  Method for Estimating Needs: 61 x ~1.2g/kg+  Total Fluid Estimated Needs (mL): per MD/team        Nutrition Diagnosis   Malnutrition Diagnosis  Patient has Malnutrition Diagnosis: Yes  Diagnosis Status: Ongoing  Malnutrition Diagnosis: Moderate malnutrition related to chronic disease or condition (acute-on-chronic; h/o prolonged issues with n/v, newly diagnosed AML)  As Evidenced by: pt with areas of mild muscle and adipose losses, consuming <75% estimated energy needs x >1 month    Nutrition Diagnosis  Patient has Nutrition Diagnosis: Yes  Diagnosis Status (1): Ongoing  Nutrition Diagnosis 1: Inadequate oral intake  Related to (1): decreased PO with newly diagnosed AML  As Evidenced by (1): pt report of issues with n/v and variable/poor PO x ~4-5 mos PTA, consuming smaller amounts of food at a time    Additional Nutrition Diagnosis: Diagnosis 2  Diagnosis Status (2): Ongoing  Nutrition Diagnosis 2: Increased nutrient needs  Related to (2): increased metabolic demand  As Evidenced by (2): pt with new AML, undergoing treatment    Additional Assessment Information: Wt and PO stable--feel moderate malnutrition continues at this time.     Considering hypermetabolic state with malnutrition, feel pt continues to benefit from use of oral nutrition supplements in-house; agreeable to continue order for Ensure Clear daily + also provided lemon drop candies d/t c/o dysgeusia and dry mouth/throat.       Nutrition Interventions/Recommendations      1. Continue Low Pathogen Diet, only as tolerated  --RDN to continue order for Ensure Clear daily for added nutrition  --RDN provided lemon drop candies to pt d/t c/o dysgeusia and dry mouth/throat        Nutrition Prescription for Oral Nutrition: 1234-2612 kcals/day, 75+ g pro/day        Nutrition Interventions:   Food and/or Nutrient Delivery Interventions  Interventions: Meals and snacks, Medical food supplement  Meals and Snacks: General healthful diet  Goal: Low Pathogen Diet  Medical Food Supplement: Commercial beverage  Goal: Ensure Clear daily (240 kcals, 8 g pro each)    Nutrition Education: Encouraged ongoing PO. Reviewed tips for taste changes and dry mouth/throat. Pt verbalized understanding, denied any particular questions for RDN at this time.       Nutrition Monitoring and Evaluation   Food/Nutrient Related History Monitoring  Monitoring and Evaluation Plan: Amount of food  Amount of Food: Estimated amout of food, Medical food intake  Criteria: consume >50% of meals/snacks/supplements    Body Composition/Growth/Weight History  Monitoring and Evaluation Plan: Weight  Weight: Measured weight  Criteria: maintain stable wt    Biochemical Data, Medical Tests and Procedures  Monitoring and Evaluation Plan: Electrolyte/renal panel  Electrolyte and Renal Panel: Magnesium, Phosphorus, Potassium, Sodium  Criteria: WNL        Time Spent (min): 30 minutes

## 2024-08-06 NOTE — CARE PLAN
The patient's goals for the shift include      The clinical goals for the shift include Pt will remain free from falls/injury throughout shift.      Problem: Nutrition  Goal: Lab values WNL  Outcome: Progressing  Goal: Electrolytes WNL  Outcome: Progressing     Problem: Pain - Adult  Goal: Verbalizes/displays adequate comfort level or baseline comfort level  Outcome: Progressing     Problem: Safety - Adult  Goal: Free from fall injury  Outcome: Progressing

## 2024-08-07 ENCOUNTER — APPOINTMENT (OUTPATIENT)
Dept: RADIOLOGY | Facility: HOSPITAL | Age: 24
End: 2024-08-07
Payer: COMMERCIAL

## 2024-08-07 LAB
ABO GROUP (TYPE) IN BLOOD: NORMAL
ALBUMIN SERPL BCP-MCNC: 3.5 G/DL (ref 3.4–5)
ALP SERPL-CCNC: 144 U/L (ref 33–110)
ALT SERPL W P-5'-P-CCNC: 24 U/L (ref 7–45)
ANION GAP SERPL CALC-SCNC: 11 MMOL/L (ref 10–20)
ANTIBODY SCREEN: NORMAL
AST SERPL W P-5'-P-CCNC: 11 U/L (ref 9–39)
BASOPHILS # BLD MANUAL: 0 X10*3/UL (ref 0–0.1)
BASOPHILS NFR BLD MANUAL: 0 %
BILIRUB DIRECT SERPL-MCNC: 0.3 MG/DL (ref 0–0.3)
BILIRUB SERPL-MCNC: 1.3 MG/DL (ref 0–1.2)
BLOOD EXPIRATION DATE: NORMAL
BUN SERPL-MCNC: 16 MG/DL (ref 6–23)
CALCIUM SERPL-MCNC: 9 MG/DL (ref 8.6–10.6)
CHLORIDE SERPL-SCNC: 103 MMOL/L (ref 98–107)
CO2 SERPL-SCNC: 28 MMOL/L (ref 21–32)
CREAT SERPL-MCNC: 1.12 MG/DL (ref 0.5–1.05)
DISPENSE STATUS: NORMAL
EGFRCR SERPLBLD CKD-EPI 2021: 71 ML/MIN/1.73M*2
ELECTRONICALLY SIGNED BY: NORMAL
EOSINOPHIL # BLD MANUAL: 0 X10*3/UL (ref 0–0.7)
EOSINOPHIL NFR BLD MANUAL: 0 %
ERYTHROCYTE [DISTWIDTH] IN BLOOD BY AUTOMATED COUNT: 12.2 % (ref 11.5–14.5)
GLUCOSE SERPL-MCNC: 98 MG/DL (ref 74–99)
HCT VFR BLD AUTO: 19.5 % (ref 36–46)
HGB BLD-MCNC: 6.8 G/DL (ref 12–16)
IMM GRANULOCYTES # BLD AUTO: 0 X10*3/UL (ref 0–0.7)
IMM GRANULOCYTES NFR BLD AUTO: 0 % (ref 0–0.9)
LDH SERPL L TO P-CCNC: 107 U/L (ref 84–246)
LYMPHOCYTES # BLD MANUAL: 0.39 X10*3/UL (ref 1.2–4.8)
LYMPHOCYTES NFR BLD MANUAL: 97.5 %
MAGNESIUM SERPL-MCNC: 1.65 MG/DL (ref 1.6–2.4)
MCH RBC QN AUTO: 28.8 PG (ref 26–34)
MCHC RBC AUTO-ENTMCNC: 34.9 G/DL (ref 32–36)
MCV RBC AUTO: 83 FL (ref 80–100)
MONOCYTES # BLD MANUAL: 0 X10*3/UL (ref 0.1–1)
MONOCYTES NFR BLD MANUAL: 0 %
NEUTS SEG # BLD MANUAL: 0 X10*3/UL (ref 1.2–7)
NEUTS SEG NFR BLD MANUAL: 0 %
NPM1 RESULTS: NORMAL
NRBC BLD-RTO: 0 /100 WBCS (ref 0–0)
PHOSPHATE SERPL-MCNC: 3.7 MG/DL (ref 2.5–4.9)
PLATELET # BLD AUTO: 11 X10*3/UL (ref 150–450)
POTASSIUM SERPL-SCNC: 3.9 MMOL/L (ref 3.5–5.3)
PRODUCT BLOOD TYPE: 5100
PRODUCT CODE: NORMAL
PROT SERPL-MCNC: 5.8 G/DL (ref 6.4–8.2)
RBC # BLD AUTO: 2.36 X10*6/UL (ref 4–5.2)
RBC MORPH BLD: ABNORMAL
RH FACTOR (ANTIGEN D): NORMAL
SODIUM SERPL-SCNC: 138 MMOL/L (ref 136–145)
TOTAL CELLS COUNTED BLD: 40
UNIT ABO: NORMAL
UNIT NUMBER: NORMAL
UNIT RH: NORMAL
UNIT VOLUME: 280
VARIANT LYMPHS # BLD MANUAL: 0.01 X10*3/UL (ref 0–0.5)
VARIANT LYMPHS NFR BLD: 2.5 %
WBC # BLD AUTO: 0.4 X10*3/UL (ref 4.4–11.3)
XM INTEP: NORMAL

## 2024-08-07 PROCEDURE — 36430 TRANSFUSION BLD/BLD COMPNT: CPT

## 2024-08-07 PROCEDURE — 2500000004 HC RX 250 GENERAL PHARMACY W/ HCPCS (ALT 636 FOR OP/ED)

## 2024-08-07 PROCEDURE — P9040 RBC LEUKOREDUCED IRRADIATED: HCPCS

## 2024-08-07 PROCEDURE — 83735 ASSAY OF MAGNESIUM: CPT | Performed by: PHYSICIAN ASSISTANT

## 2024-08-07 PROCEDURE — 2500000002 HC RX 250 W HCPCS SELF ADMINISTERED DRUGS (ALT 637 FOR MEDICARE OP, ALT 636 FOR OP/ED)

## 2024-08-07 PROCEDURE — 2500000001 HC RX 250 WO HCPCS SELF ADMINISTERED DRUGS (ALT 637 FOR MEDICARE OP): Performed by: INTERNAL MEDICINE

## 2024-08-07 PROCEDURE — 93010 ELECTROCARDIOGRAM REPORT: CPT | Performed by: INTERNAL MEDICINE

## 2024-08-07 PROCEDURE — 84100 ASSAY OF PHOSPHORUS: CPT | Performed by: NURSE PRACTITIONER

## 2024-08-07 PROCEDURE — 83615 LACTATE (LD) (LDH) ENZYME: CPT | Performed by: NURSE PRACTITIONER

## 2024-08-07 PROCEDURE — 71045 X-RAY EXAM CHEST 1 VIEW: CPT

## 2024-08-07 PROCEDURE — 85007 BL SMEAR W/DIFF WBC COUNT: CPT | Performed by: PHYSICIAN ASSISTANT

## 2024-08-07 PROCEDURE — C9113 INJ PANTOPRAZOLE SODIUM, VIA: HCPCS

## 2024-08-07 PROCEDURE — 85027 COMPLETE CBC AUTOMATED: CPT | Performed by: PHYSICIAN ASSISTANT

## 2024-08-07 PROCEDURE — 2500000001 HC RX 250 WO HCPCS SELF ADMINISTERED DRUGS (ALT 637 FOR MEDICARE OP): Performed by: PHYSICIAN ASSISTANT

## 2024-08-07 PROCEDURE — 86923 COMPATIBILITY TEST ELECTRIC: CPT

## 2024-08-07 PROCEDURE — 99233 SBSQ HOSP IP/OBS HIGH 50: CPT | Performed by: INTERNAL MEDICINE

## 2024-08-07 PROCEDURE — 82248 BILIRUBIN DIRECT: CPT | Performed by: NURSE PRACTITIONER

## 2024-08-07 PROCEDURE — 2500000004 HC RX 250 GENERAL PHARMACY W/ HCPCS (ALT 636 FOR OP/ED): Performed by: INTERNAL MEDICINE

## 2024-08-07 PROCEDURE — 86901 BLOOD TYPING SEROLOGIC RH(D): CPT | Performed by: NURSE PRACTITIONER

## 2024-08-07 PROCEDURE — 71045 X-RAY EXAM CHEST 1 VIEW: CPT | Performed by: RADIOLOGY

## 2024-08-07 PROCEDURE — 1170000001 HC PRIVATE ONCOLOGY ROOM DAILY

## 2024-08-07 PROCEDURE — 2500000005 HC RX 250 GENERAL PHARMACY W/O HCPCS: Performed by: INTERNAL MEDICINE

## 2024-08-07 PROCEDURE — 2500000002 HC RX 250 W HCPCS SELF ADMINISTERED DRUGS (ALT 637 FOR MEDICARE OP, ALT 636 FOR OP/ED): Performed by: PHYSICIAN ASSISTANT

## 2024-08-07 PROCEDURE — 80048 BASIC METABOLIC PNL TOTAL CA: CPT | Performed by: NURSE PRACTITIONER

## 2024-08-07 RX ORDER — NYSTATIN 100000 [USP'U]/ML
4 SUSPENSION ORAL 2 TIMES DAILY
Status: DISCONTINUED | OUTPATIENT
Start: 2024-08-07 | End: 2024-08-14

## 2024-08-07 RX ORDER — OXYCODONE HYDROCHLORIDE 5 MG/1
5 TABLET ORAL EVERY 6 HOURS PRN
Status: DISCONTINUED | OUTPATIENT
Start: 2024-08-07 | End: 2024-08-08

## 2024-08-07 RX ORDER — PANTOPRAZOLE SODIUM 40 MG/10ML
40 INJECTION, POWDER, LYOPHILIZED, FOR SOLUTION INTRAVENOUS 2 TIMES DAILY
Status: DISCONTINUED | OUTPATIENT
Start: 2024-08-07 | End: 2024-08-14

## 2024-08-07 RX ORDER — LEVOFLOXACIN 5 MG/ML
500 INJECTION, SOLUTION INTRAVENOUS EVERY 24 HOURS
Status: DISCONTINUED | OUTPATIENT
Start: 2024-08-07 | End: 2024-08-13

## 2024-08-07 ASSESSMENT — PAIN SCALES - GENERAL
PAINLEVEL_OUTOF10: 7
PAINLEVEL_OUTOF10: 0 - NO PAIN
PAINLEVEL_OUTOF10: 5 - MODERATE PAIN

## 2024-08-07 ASSESSMENT — PAIN DESCRIPTION - LOCATION: LOCATION: CHEST

## 2024-08-07 ASSESSMENT — PAIN - FUNCTIONAL ASSESSMENT
PAIN_FUNCTIONAL_ASSESSMENT: 0-10
PAIN_FUNCTIONAL_ASSESSMENT: 0-10

## 2024-08-07 ASSESSMENT — PAIN DESCRIPTION - DESCRIPTORS: DESCRIPTORS: ACHING

## 2024-08-07 NOTE — PROGRESS NOTES
Child Life Assessment:   Reason for Consult  Discipline: Child Life Specialist  Referral Source: Ongoing  Total Time Spent (min): 15 minutes    Patient Intervention(s)  Type of Intervention Performed:  (supportive check in; verbal indirect support for 5yo son)  Healing Environment Intervention(s): Empathetic listening/validation of emotions    Evaluation  Evaluation/Plan of Care: Provide ongoing support    LATA Liu  ([employer] Chat)

## 2024-08-07 NOTE — CARE PLAN
The patient's goals for the shift include      The clinical goals for the shift include pt will remain free from falls and injury for remainder of shift      Problem: Nutrition  Goal: Adequate PO fluid intake  Outcome: Progressing  Goal: BG  mg/dL  Outcome: Progressing  Goal: Lab values WNL  Outcome: Progressing  Goal: Electrolytes WNL  Outcome: Progressing  Goal: Promote healing  Outcome: Progressing  Goal: Maintain stable weight  Outcome: Progressing     Problem: Pain - Adult  Goal: Verbalizes/displays adequate comfort level or baseline comfort level  Outcome: Progressing     Problem: Safety - Adult  Goal: Free from fall injury  Outcome: Progressing     Problem: PICC line  Goal: I will remain free from symptoms of infection  Outcome: Progressing

## 2024-08-07 NOTE — PROGRESS NOTES
"Ilda Peter is a 23 y.o. 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    Subjective     Seen and examined, slept well. Still having throat \"soreness\" worse than yesterday associated with chest discomfort. Denied fevr, chills, productive cough. We discussed doing CXR today, transfusing 1 unit of blood and starting Oxy for pain. The pt was agreeable with plan. All questions answered.     Objective     Physical Exam  Constitutional:       General: She is not in acute distress.     Appearance: She is not ill-appearing or toxic-appearing.   HENT:      Head: Normocephalic.      Nose: Nose normal. No congestion.      Mouth/Throat:      Mouth: Mucous membranes are moist.      Pharynx: Oropharynx is clear. No oropharyngeal exudate.   Eyes:      General: No scleral icterus.     Pupils: Pupils are equal, round, and reactive to light.   Cardiovascular:      Rate and Rhythm: Normal rate and regular rhythm.      Pulses: Normal pulses.      Heart sounds: Normal heart sounds. No murmur heard.  Pulmonary:      Effort: Pulmonary effort is normal.      Breath sounds: Normal breath sounds. No stridor. No wheezing or rhonchi.   Abdominal:      General: Bowel sounds are normal.      Palpations: Abdomen is soft.      Tenderness: There is no abdominal tenderness. There is no guarding.   Genitourinary:     Comments: + menses  Musculoskeletal:         General: No swelling or tenderness. Normal range of motion.      Cervical back: Normal range of motion.   Skin:     General: Skin is warm.      Capillary Refill: Capillary refill takes less than 2 seconds.      Coloration: Skin is pale.   Neurological:      Mental Status: She is alert and oriented to person, place, and time.     Last Recorded Vitals  Blood pressure 134/72, pulse 72, temperature 36.5 °C (97.7 °F), temperature source Temporal, resp. rate 18, height (S) 1.65 m (5' 4.96\"), weight 60.4 kg (133 lb 2.5 oz), " last menstrual period 07/17/2024, SpO2 100%, not currently breastfeeding.  Intake/Output last 3 Shifts:  I/O last 3 completed shifts:  In: 420 (7 mL/kg) [P.O.:320; IV Piggyback:100]  Out: - (0 mL/kg)   Weight: 60.4 kg     Relevant Results  Scheduled medications  acyclovir, 400 mg, oral, q12h JORDYN  levoFLOXacin, 500 mg, intravenous, q24h  lidocaine-diphenhydraMINE-Maalox 1:1:1, 10 mL, Swish & Spit, BID  loperamide, 2 mg, oral, 4x daily  nystatin, 4 mL, Swish & Swallow, BID  ondansetron, 8 mg, intravenous, q8h  pantoprazole, 40 mg, oral, Daily before breakfast  posaconazole, 300 mg, oral, Daily with breakfast  sertraline, 50 mg, oral, Daily      Continuous medications     PRN medications  PRN medications: albuterol, alteplase, dextrose, diphenhydrAMINE, EPINEPHrine HCl, famotidine, lidocaine-diphenhydraMINE-Maalox 1:1:1, methylPREDNISolone sodium succinate (PF), oxyCODONE, prochlorperazine, sodium chloride        Assessment/Plan   Active Problems:    Acute myeloid leukemia in adult (Multi)    Ms. Ilda Peter is a 22 yo female with newly diagnosed AML admitted on 7/19/24 for induction chemotherapy with Cytarabine and Idarubicin. No other significant PMHx.     Day 19 with induction 7+3 (Estefany) (started on 7/19/24)     #UPDATES 08/07/24:  - HDS, afebrile since 07/28  - WBC 0.4, Hgb 6.8, PLTs 11, ANC 0, srCr 1.09>1.12,   - Prophylaxis: Acyclovir (07/19- ),  Posa (07/29- )  - Zosyn (07/28-08/7) switched to IV Levaquin (08/7- ) given active swallowing issues  - CXR 08/07 today showed no acute disease   - 1 Unit of PRBCs to be transfused   - 1 Unit of PLTS ordered and on hold  - Oxy 5mg PRN for throat pain ordered      ONC:  # Newly diagnosed AML, favorable risk  - Presented with n/v & dizziness worsening over the last few months  - Found to be pancytopenic in ED, was discharged home and had BMBx done outpatient.  - Bmbx (7/5/24): c/w hypercellular BM w/erythroid predominant hematopoiesis, dyserythropoiesis and increased  blasts c/w high grade myeloid neoplasm.  - FISH neg  - Myeloid NGS: NPM1 (54%), NRAS (25%), PTPN11 (15%), IDH1 (2%)  - HLA typing & buccal done (7/24)  - Family typing info given  - Day 14 BMBx completed on 8/1 c/w chemoablated marrow.     CHEMO:  - Cytarabine (100mg/m2) = 170mg IV daily x 7 days (7/19)  - Idarubicin (12mg/m2) = 20mg IV daily x 3 days (7/19)   - HCG quantitative neg (7/19)     HEME:  # Pancytopenia d/t malignancy, known vaginal bleeding on admit since resolved  - Keep Hgb > 7 and Plts > 10     - S/p PRBC: 8/3    - S/p PLTS: 8/4    - S/p PRBC 08/07  # Transaminitis, likely d/t chemotherapy, resolved.  - Hemolysis workup neg  + Menses (Mild) 8/3     ID:  - Currently Afebrile  # Neutropenic fever 38.6 on 7/28  - Full workup done: Bld cx x2, CXR, UA all negative   - Zosyn (started 7/28)  # Prophylaxis: ACV and Posaconazole        FEN/GI:  Admit wt: 61.9 kg, Daily Wt: 60.4 kg (8/6)  - Hydration and anti-emetics per chemotherapy order set.   - Hypokalemia repleted last on 8/5  - Started PPI on admit  # LANETTE  - PRN Compazine/PRN Zofran on admit.   - Scheduled Zofran Q8 (7/27) w/ symptom improvement.  # Diarrhea improving  - Cdiff (7/30) neg  - Scheduled ATC Imodium  #Mild mucositis. PRN BMX ordered.      CV:  - ECHO (7/16/23) w/EF of 55%     GYN:  # Prolonged Vaginal bleeding since early July  - Depo shot last 05/2024   - Patient having scant vaginal bleeding at present  - Onco Fertility consulted (7/22) and labs sent.     ENDO:  # Steroid induced hyperglycemia, now resolved  - Lispro SS #1 with meals  - A1c (7/22): 6.0     PSYCH:  # Anxiety  - Cont home Sertraline     DISP:  - Full Code  - Primary Onc: Dr. Fields  - DL Bailon line placed in IR on 7/17/24  - Discharge pending count recovery     Pt seen, discussed, and examined with Dr. Layton Benson MD  Hematology Oncology PGY-4

## 2024-08-07 NOTE — CARE PLAN
The patient's goals for the shift include      The clinical goals for the shift include Pt will remain free from falls/injury throughout shift.

## 2024-08-08 ENCOUNTER — OFFICE VISIT (OUTPATIENT)
Dept: HEMATOLOGY/ONCOLOGY | Facility: HOSPITAL | Age: 24
End: 2024-08-08
Payer: COMMERCIAL

## 2024-08-08 LAB
ABO GROUP (TYPE) IN BLOOD: NORMAL
ALBUMIN SERPL BCP-MCNC: 3.5 G/DL (ref 3.4–5)
ANION GAP SERPL CALC-SCNC: 12 MMOL/L (ref 10–20)
ANTIBODY SCREEN: NORMAL
BASOPHILS # BLD AUTO: 0 X10*3/UL (ref 0–0.1)
BASOPHILS NFR BLD AUTO: 0 %
BLOOD EXPIRATION DATE: NORMAL
BUN SERPL-MCNC: 14 MG/DL (ref 6–23)
CALCIUM SERPL-MCNC: 8.7 MG/DL (ref 8.6–10.6)
CHLORIDE SERPL-SCNC: 103 MMOL/L (ref 98–107)
CO2 SERPL-SCNC: 26 MMOL/L (ref 21–32)
CREAT SERPL-MCNC: 0.97 MG/DL (ref 0.5–1.05)
DISPENSE STATUS: NORMAL
EGFRCR SERPLBLD CKD-EPI 2021: 84 ML/MIN/1.73M*2
EOSINOPHIL # BLD AUTO: 0 X10*3/UL (ref 0–0.7)
EOSINOPHIL NFR BLD AUTO: 0 %
ERYTHROCYTE [DISTWIDTH] IN BLOOD BY AUTOMATED COUNT: 13.6 % (ref 11.5–14.5)
GLUCOSE SERPL-MCNC: 122 MG/DL (ref 74–99)
HCT VFR BLD AUTO: 21.1 % (ref 36–46)
HGB BLD-MCNC: 7.3 G/DL (ref 12–16)
IMM GRANULOCYTES # BLD AUTO: 0 X10*3/UL (ref 0–0.7)
IMM GRANULOCYTES NFR BLD AUTO: 0 % (ref 0–0.9)
LYMPHOCYTES # BLD AUTO: 0.42 X10*3/UL (ref 1.2–4.8)
LYMPHOCYTES NFR BLD AUTO: 93.3 %
MAGNESIUM SERPL-MCNC: 1.62 MG/DL (ref 1.6–2.4)
MCH RBC QN AUTO: 27.8 PG (ref 26–34)
MCHC RBC AUTO-ENTMCNC: 34.6 G/DL (ref 32–36)
MCV RBC AUTO: 80 FL (ref 80–100)
MONOCYTES # BLD AUTO: 0.01 X10*3/UL (ref 0.1–1)
MONOCYTES NFR BLD AUTO: 2.2 %
NEUTROPHILS # BLD AUTO: 0.02 X10*3/UL (ref 1.2–7.7)
NEUTROPHILS NFR BLD AUTO: 4.5 %
NRBC BLD-RTO: 0 /100 WBCS (ref 0–0)
PHOSPHATE SERPL-MCNC: 3 MG/DL (ref 2.5–4.9)
PLATELET # BLD AUTO: 8 X10*3/UL (ref 150–450)
POTASSIUM SERPL-SCNC: 3.6 MMOL/L (ref 3.5–5.3)
PRODUCT BLOOD TYPE: 6200
PRODUCT CODE: NORMAL
RBC # BLD AUTO: 2.63 X10*6/UL (ref 4–5.2)
RH FACTOR (ANTIGEN D): NORMAL
SODIUM SERPL-SCNC: 137 MMOL/L (ref 136–145)
UNIT ABO: NORMAL
UNIT NUMBER: NORMAL
UNIT RH: NORMAL
UNIT VOLUME: 278
WBC # BLD AUTO: 0.5 X10*3/UL (ref 4.4–11.3)

## 2024-08-08 PROCEDURE — 2500000004 HC RX 250 GENERAL PHARMACY W/ HCPCS (ALT 636 FOR OP/ED): Performed by: INTERNAL MEDICINE

## 2024-08-08 PROCEDURE — 36430 TRANSFUSION BLD/BLD COMPNT: CPT

## 2024-08-08 PROCEDURE — C9113 INJ PANTOPRAZOLE SODIUM, VIA: HCPCS

## 2024-08-08 PROCEDURE — 83735 ASSAY OF MAGNESIUM: CPT | Performed by: PHYSICIAN ASSISTANT

## 2024-08-08 PROCEDURE — 86901 BLOOD TYPING SEROLOGIC RH(D): CPT | Performed by: NURSE PRACTITIONER

## 2024-08-08 PROCEDURE — 1170000001 HC PRIVATE ONCOLOGY ROOM DAILY

## 2024-08-08 PROCEDURE — 2500000004 HC RX 250 GENERAL PHARMACY W/ HCPCS (ALT 636 FOR OP/ED)

## 2024-08-08 PROCEDURE — 2500000002 HC RX 250 W HCPCS SELF ADMINISTERED DRUGS (ALT 637 FOR MEDICARE OP, ALT 636 FOR OP/ED): Performed by: PHYSICIAN ASSISTANT

## 2024-08-08 PROCEDURE — 2500000002 HC RX 250 W HCPCS SELF ADMINISTERED DRUGS (ALT 637 FOR MEDICARE OP, ALT 636 FOR OP/ED)

## 2024-08-08 PROCEDURE — 2500000001 HC RX 250 WO HCPCS SELF ADMINISTERED DRUGS (ALT 637 FOR MEDICARE OP): Performed by: PHYSICIAN ASSISTANT

## 2024-08-08 PROCEDURE — 80069 RENAL FUNCTION PANEL: CPT | Performed by: NURSE PRACTITIONER

## 2024-08-08 PROCEDURE — 93005 ELECTROCARDIOGRAM TRACING: CPT

## 2024-08-08 PROCEDURE — 2500000001 HC RX 250 WO HCPCS SELF ADMINISTERED DRUGS (ALT 637 FOR MEDICARE OP): Performed by: INTERNAL MEDICINE

## 2024-08-08 PROCEDURE — 85025 COMPLETE CBC W/AUTO DIFF WBC: CPT | Performed by: PHYSICIAN ASSISTANT

## 2024-08-08 PROCEDURE — P9037 PLATE PHERES LEUKOREDU IRRAD: HCPCS

## 2024-08-08 PROCEDURE — 99233 SBSQ HOSP IP/OBS HIGH 50: CPT | Performed by: INTERNAL MEDICINE

## 2024-08-08 PROCEDURE — 2500000005 HC RX 250 GENERAL PHARMACY W/O HCPCS: Performed by: INTERNAL MEDICINE

## 2024-08-08 RX ORDER — HYDROMORPHONE HYDROCHLORIDE 1 MG/ML
0.2 INJECTION, SOLUTION INTRAMUSCULAR; INTRAVENOUS; SUBCUTANEOUS
Status: DISCONTINUED | OUTPATIENT
Start: 2024-08-08 | End: 2024-08-15 | Stop reason: HOSPADM

## 2024-08-08 ASSESSMENT — PAIN SCALES - GENERAL
PAINLEVEL_OUTOF10: 4
PAINLEVEL_OUTOF10: 0 - NO PAIN
PAINLEVEL_OUTOF10: 0 - NO PAIN
PAINLEVEL_OUTOF10: 6
PAINLEVEL_OUTOF10: 0 - NO PAIN
PAINLEVEL_OUTOF10: 7

## 2024-08-08 ASSESSMENT — COGNITIVE AND FUNCTIONAL STATUS - GENERAL
MOBILITY SCORE: 24
DAILY ACTIVITIY SCORE: 24

## 2024-08-08 ASSESSMENT — PAIN - FUNCTIONAL ASSESSMENT
PAIN_FUNCTIONAL_ASSESSMENT: 0-10

## 2024-08-08 ASSESSMENT — PAIN DESCRIPTION - DESCRIPTORS: DESCRIPTORS: ACHING;DULL

## 2024-08-08 NOTE — CONSULTS
Reason for consult:  Blurred vision and diplopia     HPI:    Patient is a 23 year old female with a PMH of newly diagnosed AML (s/p induction Cytarabine and Idarubicin) ophthalmology consulted for a 4 day history of intermittent blurred vision and FBS. She reports mild intermittent binocular blurred vision that improves with blinking and associated ocular discomfort and FBS both occurring in the last 4 days. Symptoms have been stable since onset. Of note, patient started induction chemotherapy on 7/19 and ended 7/26 with Cytarabine and Idarubicin.  She has not tried eye drops.    She also complains of 4 day history of mild horizontal diplopia that is worse at the end of the day but resolves with blinking. She reports that the diplopia might improve slightly with monocular occlusion, but is unsure. Monocular occlusion does not resolve the diplopia. She denies ptosis, difficulty swallowing or extremity weakness. Diplopia is not worse in specific direction of gaze. She does not report a history of similar symptoms.     Denies any flashes, floaters, curtains in vision, worsening tearing or discharge. No significant past ocular hx.    Past Medical History: as above  Family History: reviewed and not pertinent to chief complaint  Medications: please refer to medication reconciliation  Allergies: please refer to patient allergy list      Base Eye Exam       Visual Acuity (Snellen - Linear)         Right Left    Near sc 20/20 20/20              Tonometry (Tonopen, 12:56 PM)         Right Left    Pressure 17 16              Pupils         Dark Light Shape React APD    Right 4 2 Round Brisk None    Left 4 2 Round Brisk None              Visual Fields         Left Right     Full Full              Extraocular Movement         Right Left     Full, Ortho Full, Ortho                  Additional Tests       Color         Right Left    Ishihara 11/11 11/11                  Slit Lamp and Fundus Exam       External Exam         Right  Left    External Normal Normal              Slit Lamp Exam         Right Left    Lids/Lashes Normal Normal    Conjunctiva/Sclera White and quiet White and quiet    Cornea 1+ SPK 1+ SPK    Anterior Chamber Deep and quiet Deep and quiet    Iris Round and reactive Round and reactive    Lens Clear Clear    Anterior Vitreous Normal Normal              Fundus Exam         Right Left    Disc Normal Normal    C/D Ratio 0.2 0.2    Macula Normal Normal    Vessels Normal Normal    Periphery two dot blot along inferior arcade One flame hemorrhage in superior arcade                       Phenylephrine 2.5% and Tropicamide 1% drops administered for dilated exam.       A&P:    #MAHESH OU  -Exam today reassuring with 20/20 vision in both eyes, and SLE findings of SPK OU.  -Patient s/p induction chemotherapy with Cytarabine and Idarubicin. A known side effect of Cytarabine is keratoconjunctivitis. 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 Ortho in all positions of gaze with cover uncover testing.  - Possible that diplopia is exclusively monocular and will improve with Ats, also possible that there is a small component of decompensated phoria given stressful situation  - Pt instructed to alert primary team for symptomatic worsening    #Retinal Hemorrhages OU  -DFE revealed few small hemorrhages OU, likely attributable to patient's profound anemia and thrombocytopenia  -Staffed with  retina fellow Dr. Palomino, given very mild findings, will monitor  -Recommend outpatient follow up on discharge, unless acute worsening in vision inpatient      Iwona Parker MD  Ophthalmology, PGY1    Patient seen, discussed, and examined with senior resident Sami Mcmullen, PGY3.      Note not final until signed by attending physician    Ophthalmology Adult Pager: [phone]  Ophthalmology Peds Pager: [phone]    For adult follow up appts, call [phone]  For pediatric follow up appts, call [phone]

## 2024-08-08 NOTE — PROGRESS NOTES
"Ilda Peter is a 23 y.o. 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    Subjective     Seen and examined, had worsening throat/chest pain worse with lying down and with sold food; last night, CXR and EKG are reassuring. Denied fevr, chills, productive cough. The pt was agreeable to try IV Dilaudid today. All questions answered.     Objective     Physical Exam  Constitutional:       General: She is not in acute distress.     Appearance: She is not ill-appearing or toxic-appearing.   HENT:      Head: Normocephalic.      Nose: Nose normal. No congestion.      Mouth/Throat: Throat erythema     Mouth: Mucous membranes are moist.      Pharynx: Oropharynx is clear. No oropharyngeal exudate.   Eyes:      General: No scleral icterus.     Pupils: Pupils are equal, round, and reactive to light.   Cardiovascular:      Rate and Rhythm: Normal rate and regular rhythm.      Pulses: Normal pulses.      Heart sounds: Normal heart sounds. No murmur heard.  Pulmonary:      Effort: Pulmonary effort is normal.      Breath sounds: Normal breath sounds. No stridor. No wheezing or rhonchi.   Abdominal:      General: Bowel sounds are normal.      Palpations: Abdomen is soft.      Tenderness: There is no abdominal tenderness. There is no guarding.   Genitourinary:     Comments: + menses  Musculoskeletal:         General: No swelling or tenderness. Normal range of motion.      Cervical back: Normal range of motion.   Skin:     General: Skin is warm.      Capillary Refill: Capillary refill takes less than 2 seconds.      Coloration: Skin is pale.   Neurological:      Mental Status: She is alert and oriented to person, place, and time.     Last Recorded Vitals  Blood pressure 105/66, pulse 76, temperature 36.2 °C (97.2 °F), temperature source Temporal, resp. rate 17, height (S) 1.65 m (5' 4.96\"), weight 59.7 kg (131 lb 9.8 oz), last menstrual period 07/17/2024, SpO2 " 97%, not currently breastfeeding.  Intake/Output last 3 Shifts:  I/O last 3 completed shifts:  In: 950 (15.7 mL/kg) [Blood:700; IV Piggyback:250]  Out: - (0 mL/kg)   Weight: 60.4 kg     Relevant Results  Scheduled medications  acyclovir, 400 mg, oral, q12h JORDYN  levoFLOXacin, 500 mg, intravenous, q24h  lidocaine-diphenhydraMINE-Maalox 1:1:1, 10 mL, Swish & Spit, BID  loperamide, 2 mg, oral, 4x daily  nystatin, 4 mL, Swish & Swallow, BID  ondansetron, 8 mg, intravenous, q8h  pantoprazole, 40 mg, intravenous, BID  posaconazole, 300 mg, oral, Daily with breakfast  sertraline, 50 mg, oral, Daily      Continuous medications     PRN medications  PRN medications: albuterol, alteplase, dextrose, diphenhydrAMINE, EPINEPHrine HCl, famotidine, lidocaine-diphenhydraMINE-Maalox 1:1:1, methylPREDNISolone sodium succinate (PF), oxyCODONE, prochlorperazine, sodium chloride        Assessment/Plan   Active Problems:    Acute myeloid leukemia in adult (Multi)    Ms. Ilda Peter is a 24 yo female with newly diagnosed AML admitted on 7/19/24 for induction chemotherapy with Cytarabine and Idarubicin. No other significant PMHx.     Day 20 with induction 7+3 (Estefany) (started on 7/19/24)     #UPDATES 08/08/24:  - HDS, afebrile since 07/28  - WBC 0.5, Hgb 7.3, PLTs 8, ANC 0, srCr 1.12> 0.97,  - Prophylaxis: Acyclovir (07/19- ),  Posa (07/29- )  - IV Levaquin (08/7- ) given active swallowing issues  - CXR 08/07 today showed no acute disease   -  S/P 1 Unit of PRBCs 08/07/24  - 1 Unit of PLTS to be transfused today  - Mucositis: Magic wash, Nystatin, started on Dilaudid IV 0.2 PRN 08/08      ONC:  # Newly diagnosed AML, favorable risk  - Presented with n/v & dizziness worsening over the last few months  - Found to be pancytopenic in ED, was discharged home and had BMBx done outpatient.  - Bmbx (7/5/24): c/w hypercellular BM w/erythroid predominant hematopoiesis, dyserythropoiesis and increased blasts c/w high grade myeloid neoplasm.  - FISH neg  -  Myeloid NGS: NPM1 (54%), NRAS (25%), PTPN11 (15%), IDH1 (2%)  - HLA typing & buccal done (7/24)  - Family typing info given  - Day 14 BMBx completed on 8/1 c/w chemoablated marrow.     CHEMO:  - Cytarabine (100mg/m2) = 170mg IV daily x 7 days (7/19)  - Idarubicin (12mg/m2) = 20mg IV daily x 3 days (7/19)   - HCG quantitative neg (7/19)     HEME:  # Pancytopenia d/t malignancy, known vaginal bleeding on admit since resolved  - Keep Hgb > 7 and Plts > 10     - S/p PRBC: 8/3    - S/p PLTS: 8/4    - S/p PRBC 08/07     -S/p PLTS 08/8  # Transaminitis, likely d/t chemotherapy, resolved.  - Hemolysis workup neg  + Menses (Mild) 8/3     ID:  - Currently Afebrile  # Neutropenic fever 38.6 on 7/28  - Full workup done: Bld cx x2, CXR, UA all negative   - IV Levaquin (08/7 -)  # Prophylaxis: ACV and Posaconazole        FEN/GI:  Admit wt: 61.9 kg, Daily Wt: 60.4 kg (8/6)  - Hydration and anti-emetics per chemotherapy order set.   - Hypokalemia repleted last on 8/5  - Started PPI on admit  # LANETTE  - PRN Compazine/PRN Zofran on admit.   - Scheduled Zofran Q8 (7/27) w/ symptom improvement.  # Diarrhea improving  - Cdiff (7/30) neg  - Scheduled ATC Imodium  #Mild mucositis. PRN BMX ordered.      CV:  - ECHO (7/16/23) w/EF of 55%     GYN:  # Prolonged Vaginal bleeding since early July  - Depo shot last 05/2024   - Patient having scant vaginal bleeding at present  - Onco Fertility consulted (7/22) and labs sent.     ENDO:  # Steroid induced hyperglycemia, now resolved  - Lispro SS #1 with meals  - A1c (7/22): 6.0     PSYCH:  # Anxiety  - Cont home Sertraline     DISP:  - Full Code  - Primary Onc: Dr. Fields  - DL Bailon line placed in IR on 7/17/24  - Discharge pending count recovery     Pt seen, discussed, and examined with Dr. Layton Benson MD  Hematology Oncology PGY-4

## 2024-08-09 ENCOUNTER — LAB REQUISITION (OUTPATIENT)
Dept: LAB | Facility: CLINIC | Age: 24
End: 2024-08-09
Payer: COMMERCIAL

## 2024-08-09 DIAGNOSIS — D61.818 OTHER PANCYTOPENIA (MULTI): ICD-10-CM

## 2024-08-09 LAB
ALBUMIN SERPL BCP-MCNC: 3.6 G/DL (ref 3.4–5)
ALP SERPL-CCNC: 133 U/L (ref 33–110)
ALT SERPL W P-5'-P-CCNC: 20 U/L (ref 7–45)
ANION GAP SERPL CALC-SCNC: 10 MMOL/L (ref 10–20)
AST SERPL W P-5'-P-CCNC: 10 U/L (ref 9–39)
BASOPHILS # BLD AUTO: 0 X10*3/UL (ref 0–0.1)
BASOPHILS NFR BLD AUTO: 0 %
BILIRUB DIRECT SERPL-MCNC: 0.1 MG/DL (ref 0–0.3)
BILIRUB SERPL-MCNC: 0.8 MG/DL (ref 0–1.2)
BUN SERPL-MCNC: 16 MG/DL (ref 6–23)
CALCIUM SERPL-MCNC: 8.8 MG/DL (ref 8.6–10.6)
CELL COUNT (BLOOD): 0.44 X10*3/UL
CELL POPULATIONS: NORMAL
CHLORIDE SERPL-SCNC: 105 MMOL/L (ref 98–107)
CO2 SERPL-SCNC: 26 MMOL/L (ref 21–32)
CREAT SERPL-MCNC: 0.95 MG/DL (ref 0.5–1.05)
DIAGNOSIS: NORMAL
EGFRCR SERPLBLD CKD-EPI 2021: 87 ML/MIN/1.73M*2
EOSINOPHIL # BLD AUTO: 0 X10*3/UL (ref 0–0.7)
EOSINOPHIL NFR BLD AUTO: 0 %
ERYTHROCYTE [DISTWIDTH] IN BLOOD BY AUTOMATED COUNT: 13.4 % (ref 11.5–14.5)
FLOW DIFFERENTIAL: NORMAL
FLOW TEST ORDERED: NORMAL
GLUCOSE SERPL-MCNC: 121 MG/DL (ref 74–99)
HCT VFR BLD AUTO: 19.2 % (ref 36–46)
HGB BLD-MCNC: 6.7 G/DL (ref 12–16)
HLA CLS I TYP PNL BLD/T DONR HIGH RES: NORMAL
HLA RESULTS: NORMAL
HLA-DP2 QL: NORMAL
HLA-DQB1 HIGH RES: NORMAL
HLA-DRB1 HIGH RES: NORMAL
IMM GRANULOCYTES # BLD AUTO: 0 X10*3/UL (ref 0–0.7)
IMM GRANULOCYTES NFR BLD AUTO: 0 % (ref 0–0.9)
LAB TEST METHOD: NORMAL
LYMPHOCYTES # BLD AUTO: 0.47 X10*3/UL (ref 1.2–4.8)
LYMPHOCYTES NFR BLD AUTO: 88.7 %
MAGNESIUM SERPL-MCNC: 1.66 MG/DL (ref 1.6–2.4)
MCH RBC QN AUTO: 28 PG (ref 26–34)
MCHC RBC AUTO-ENTMCNC: 34.9 G/DL (ref 32–36)
MCV RBC AUTO: 80 FL (ref 80–100)
MONOCYTES # BLD AUTO: 0.05 X10*3/UL (ref 0.1–1)
MONOCYTES NFR BLD AUTO: 9.4 %
NEUTROPHILS # BLD AUTO: 0.01 X10*3/UL (ref 1.2–7.7)
NEUTROPHILS NFR BLD AUTO: 1.9 %
NRBC BLD-RTO: 0 /100 WBCS (ref 0–0)
NUMBER OF CELLS COLLECTED: NORMAL
PATH REPORT.TOTAL CANCER: NORMAL
PHOSPHATE SERPL-MCNC: 3.1 MG/DL (ref 2.5–4.9)
PLATELET # BLD AUTO: 14 X10*3/UL (ref 150–450)
POTASSIUM SERPL-SCNC: 3.7 MMOL/L (ref 3.5–5.3)
PROT SERPL-MCNC: 5.7 G/DL (ref 6.4–8.2)
RBC # BLD AUTO: 2.39 X10*6/UL (ref 4–5.2)
RBC MORPH BLD: NORMAL
SIGNATURE COMMENT: NORMAL
SODIUM SERPL-SCNC: 137 MMOL/L (ref 136–145)
SPECIMEN VIABILITY: NORMAL
WBC # BLD AUTO: 0.5 X10*3/UL (ref 4.4–11.3)

## 2024-08-09 PROCEDURE — 2500000002 HC RX 250 W HCPCS SELF ADMINISTERED DRUGS (ALT 637 FOR MEDICARE OP, ALT 636 FOR OP/ED): Performed by: PHYSICIAN ASSISTANT

## 2024-08-09 PROCEDURE — 2500000004 HC RX 250 GENERAL PHARMACY W/ HCPCS (ALT 636 FOR OP/ED)

## 2024-08-09 PROCEDURE — 85025 COMPLETE CBC W/AUTO DIFF WBC: CPT | Performed by: PHYSICIAN ASSISTANT

## 2024-08-09 PROCEDURE — 83735 ASSAY OF MAGNESIUM: CPT | Performed by: PHYSICIAN ASSISTANT

## 2024-08-09 PROCEDURE — 2500000004 HC RX 250 GENERAL PHARMACY W/ HCPCS (ALT 636 FOR OP/ED): Performed by: INTERNAL MEDICINE

## 2024-08-09 PROCEDURE — 2500000001 HC RX 250 WO HCPCS SELF ADMINISTERED DRUGS (ALT 637 FOR MEDICARE OP): Performed by: INTERNAL MEDICINE

## 2024-08-09 PROCEDURE — 99233 SBSQ HOSP IP/OBS HIGH 50: CPT | Performed by: INTERNAL MEDICINE

## 2024-08-09 PROCEDURE — 84100 ASSAY OF PHOSPHORUS: CPT | Performed by: NURSE PRACTITIONER

## 2024-08-09 PROCEDURE — 2500000002 HC RX 250 W HCPCS SELF ADMINISTERED DRUGS (ALT 637 FOR MEDICARE OP, ALT 636 FOR OP/ED)

## 2024-08-09 PROCEDURE — 2500000001 HC RX 250 WO HCPCS SELF ADMINISTERED DRUGS (ALT 637 FOR MEDICARE OP): Performed by: PHYSICIAN ASSISTANT

## 2024-08-09 PROCEDURE — C9113 INJ PANTOPRAZOLE SODIUM, VIA: HCPCS

## 2024-08-09 PROCEDURE — 82565 ASSAY OF CREATININE: CPT | Performed by: NURSE PRACTITIONER

## 2024-08-09 PROCEDURE — 36430 TRANSFUSION BLD/BLD COMPNT: CPT

## 2024-08-09 PROCEDURE — 80076 HEPATIC FUNCTION PANEL: CPT | Performed by: NURSE PRACTITIONER

## 2024-08-09 PROCEDURE — 1170000001 HC PRIVATE ONCOLOGY ROOM DAILY

## 2024-08-09 PROCEDURE — P9040 RBC LEUKOREDUCED IRRADIATED: HCPCS

## 2024-08-09 ASSESSMENT — PAIN SCALES - GENERAL
PAINLEVEL_OUTOF10: 7
PAINLEVEL_OUTOF10: 0 - NO PAIN
PAINLEVEL_OUTOF10: 2
PAINLEVEL_OUTOF10: 0 - NO PAIN

## 2024-08-09 ASSESSMENT — PAIN DESCRIPTION - LOCATION: LOCATION: THROAT

## 2024-08-09 ASSESSMENT — PAIN - FUNCTIONAL ASSESSMENT
PAIN_FUNCTIONAL_ASSESSMENT: 0-10
PAIN_FUNCTIONAL_ASSESSMENT: 0-10

## 2024-08-09 NOTE — PROGRESS NOTES
"Ilda Peter is a 23 y.o. 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    Subjective     Seen and examined, feeling better today, dilaudid helped with the throat/chest discomfort. Imodium is helping with diarrhea. No new complaints.     Objective     Physical Exam  Constitutional:       General: She is not in acute distress.     Appearance: She is not ill-appearing or toxic-appearing.   HENT:      Head: Normocephalic.      Nose: Nose normal. No congestion.      Mouth/Throat: left cheek erythema     Mouth: Mucous membranes are moist.      Pharynx: Oropharynx is clear. No oropharyngeal exudate.   Eyes:      General: No scleral icterus.     Pupils: Pupils are equal, round, and reactive to light.   Cardiovascular:      Rate and Rhythm: Normal rate and regular rhythm.      Pulses: Normal pulses.      Heart sounds: Normal heart sounds. No murmur heard.  Pulmonary:      Effort: Pulmonary effort is normal.      Breath sounds: Normal breath sounds. No stridor. No wheezing or rhonchi.   Abdominal:      General: Bowel sounds are normal.      Palpations: Abdomen is soft.      Tenderness: There is no abdominal tenderness. There is no guarding.   Musculoskeletal:         General: No swelling or tenderness. Normal range of motion.      Cervical back: Normal range of motion.   Skin:     General: Skin is warm.      Capillary Refill: Capillary refill takes less than 2 seconds.      Coloration: Skin is pale.   Neurological:      Mental Status: She is alert and oriented to person, place, and time.     Last Recorded Vitals  Blood pressure 115/73, pulse 78, temperature 36.4 °C (97.5 °F), temperature source Temporal, resp. rate 18, height (S) 1.65 m (5' 4.96\"), weight 59.7 kg (131 lb 9.8 oz), last menstrual period 07/17/2024, SpO2 100%, not currently breastfeeding.  Intake/Output last 3 Shifts:  I/O last 3 completed shifts:  In: 1429.8 (23.9 mL/kg) [P.O.:1080; " Blood:249.8; IV Piggyback:100]  Out: 6 (0.1 mL/kg) [Urine:6 (0 mL/kg/hr)]  Weight: 59.7 kg     Relevant Results  Scheduled medications  acyclovir, 400 mg, oral, q12h JORDYN  levoFLOXacin, 500 mg, intravenous, q24h  lidocaine-diphenhydraMINE-Maalox 1:1:1, 10 mL, Swish & Spit, BID  loperamide, 2 mg, oral, 4x daily  nystatin, 4 mL, Swish & Swallow, BID  ondansetron, 8 mg, intravenous, q8h  pantoprazole, 40 mg, intravenous, BID  posaconazole, 300 mg, oral, Daily with breakfast  sertraline, 50 mg, oral, Daily      Continuous medications     PRN medications  PRN medications: albuterol, alteplase, dextrose, diphenhydrAMINE, EPINEPHrine HCl, famotidine, HYDROmorphone, lidocaine-diphenhydraMINE-Maalox 1:1:1, methylPREDNISolone sodium succinate (PF), prochlorperazine, sodium chloride        Assessment/Plan   Active Problems:    Acute myeloid leukemia in adult (Multi)    Ms. Ilda Peter is a 24 yo female with newly diagnosed AML admitted on 7/19/24 for induction chemotherapy with Cytarabine and Idarubicin. No other significant PMHx.     Day 21 with induction 7+3 (Estefany) (started on 7/19/24)     #UPDATES 08/09/24:  - HDS, afebrile since 07/28  - WBC 0.5, Hgb 6.7, PLTs 14, ANC 0,   - Prophylaxis: Acyclovir (07/19- ),  Posa (07/29- ), IV Levaquin (08/7- ) -  S/P 1 Unit of PRBCs 08/07/24  - 1 Unit of PRBCs to be transfused today  - Opthalmology consulted, with recs to start artifical tears QID OU, Erythromycin ointment at bedtime, oupt FU with retina specialist.       ONC:  # Newly diagnosed AML, favorable risk  - Presented with n/v & dizziness worsening over the last few months  - Found to be pancytopenic in ED, was discharged home and had BMBx done outpatient.  - Bmbx (7/5/24): c/w hypercellular BM w/erythroid predominant hematopoiesis, dyserythropoiesis and increased blasts c/w high grade myeloid neoplasm.  - FISH neg  - Myeloid NGS: NPM1 (54%), NRAS (25%), PTPN11 (15%), IDH1 (2%)  - HLA typing & buccal done (7/24)  - Family typing  info given  - Day 14 BMBx completed on 8/1 c/w chemoablated marrow.     CHEMO:  - Cytarabine (100mg/m2) = 170mg IV daily x 7 days (7/19)  - Idarubicin (12mg/m2) = 20mg IV daily x 3 days (7/19)   - HCG quantitative neg (7/19)     HEME:  # Pancytopenia d/t malignancy, known vaginal bleeding on admit since resolved  - Keep Hgb > 7 and Plts > 10     - S/p PRBC: 8/3    - S/p PLTS: 8/4    - S/p PRBC 08/07     -S/p PLTS 08/8     - S/p RBCs 08/09  # Transaminitis, likely d/t chemotherapy, resolved.  - Hemolysis workup neg  + Menses (Mild) 8/3     ID:  - Currently Afebrile  # Neutropenic fever 38.6 on 7/28  - Full workup done: Bld cx x2, CXR, UA all negative   - IV Levaquin (08/7 -)  # Prophylaxis: ACV and Posaconazole        FEN/GI:  Admit wt: 61.9 kg, Daily Wt: 60.4 kg (8/6)  - Hydration and anti-emetics per chemotherapy order set.   - Hypokalemia repleted last on 8/5  - Started PPI on admit  # LANETTE  - PRN Compazine/PRN Zofran on admit.   - Scheduled Zofran Q8 (7/27) w/ symptom improvement.  # Diarrhea improving  - Cdiff (7/30) neg  - Scheduled ATC Imodium  #Mild mucositis. PRN BMX ordered.      NEURO  #MAHESH OU   #Diplopia   #Retinal Hemorrhages OU   - Opthalmology consulted 08/08, with recs to start artifical tears QID OU, Erythromycin ointment at bedtime, oupt FU with retina specialist.       CV:  - ECHO (7/16/23) w/EF of 55%     GYN:  # Prolonged Vaginal bleeding since early July  - Depo shot last 05/2024   - Patient having scant vaginal bleeding at present  - Onco Fertility consulted (7/22) and labs sent.     ENDO:  # Steroid induced hyperglycemia, now resolved  - Lispro SS #1 with meals  - A1c (7/22): 6.0     PSYCH:  # Anxiety  - Cont home Sertraline     DISP:  - Full Code  - Primary Onc: Dr. Fields  - DL Bailon line placed in IR on 7/17/24  - Discharge pending count recovery     Pt seen, discussed, and examined with Dr. Layton Benson MD  Hematology Oncology PGY-4

## 2024-08-10 LAB
ALBUMIN SERPL BCP-MCNC: 3.8 G/DL (ref 3.4–5)
ANION GAP SERPL CALC-SCNC: 13 MMOL/L (ref 10–20)
BASOPHILS # BLD MANUAL: 0 X10*3/UL (ref 0–0.1)
BASOPHILS NFR BLD MANUAL: 0 %
BLOOD EXPIRATION DATE: NORMAL
BUN SERPL-MCNC: 13 MG/DL (ref 6–23)
CALCIUM SERPL-MCNC: 9 MG/DL (ref 8.6–10.6)
CHLORIDE SERPL-SCNC: 103 MMOL/L (ref 98–107)
CO2 SERPL-SCNC: 22 MMOL/L (ref 21–32)
CREAT SERPL-MCNC: 0.96 MG/DL (ref 0.5–1.05)
DISPENSE STATUS: NORMAL
EGFRCR SERPLBLD CKD-EPI 2021: 85 ML/MIN/1.73M*2
EOSINOPHIL # BLD MANUAL: 0.01 X10*3/UL (ref 0–0.7)
EOSINOPHIL NFR BLD MANUAL: 2.1 %
ERYTHROCYTE [DISTWIDTH] IN BLOOD BY AUTOMATED COUNT: 13.1 % (ref 11.5–14.5)
GLUCOSE SERPL-MCNC: 106 MG/DL (ref 74–99)
HCT VFR BLD AUTO: 24.8 % (ref 36–46)
HGB BLD-MCNC: 8.6 G/DL (ref 12–16)
IMM GRANULOCYTES # BLD AUTO: 0 X10*3/UL (ref 0–0.7)
IMM GRANULOCYTES NFR BLD AUTO: 0 % (ref 0–0.9)
LYMPHOCYTES # BLD MANUAL: 0.46 X10*3/UL (ref 1.2–4.8)
LYMPHOCYTES NFR BLD MANUAL: 77.3 %
MAGNESIUM SERPL-MCNC: 1.57 MG/DL (ref 1.6–2.4)
MCH RBC QN AUTO: 28.1 PG (ref 26–34)
MCHC RBC AUTO-ENTMCNC: 34.7 G/DL (ref 32–36)
MCV RBC AUTO: 81 FL (ref 80–100)
MONOCYTES # BLD MANUAL: 0.01 X10*3/UL (ref 0.1–1)
MONOCYTES NFR BLD MANUAL: 1 %
NEUTS SEG # BLD MANUAL: 0.02 X10*3/UL (ref 1.2–7)
NEUTS SEG NFR BLD MANUAL: 3.1 %
NRBC BLD-RTO: 0 /100 WBCS (ref 0–0)
PHOSPHATE SERPL-MCNC: 3.4 MG/DL (ref 2.5–4.9)
PLATELET # BLD AUTO: 24 X10*3/UL (ref 150–450)
POTASSIUM SERPL-SCNC: 4.1 MMOL/L (ref 3.5–5.3)
PRODUCT BLOOD TYPE: 5100
PRODUCT CODE: NORMAL
RBC # BLD AUTO: 3.06 X10*6/UL (ref 4–5.2)
RBC MORPH BLD: ABNORMAL
SODIUM SERPL-SCNC: 134 MMOL/L (ref 136–145)
TOTAL CELLS COUNTED BLD: 97
UNIT ABO: NORMAL
UNIT NUMBER: NORMAL
UNIT RH: NORMAL
UNIT VOLUME: 286
URATE SERPL-MCNC: 2.9 MG/DL (ref 2.3–6.7)
VARIANT LYMPHS # BLD MANUAL: 0.1 X10*3/UL (ref 0–0.5)
VARIANT LYMPHS NFR BLD: 16.5 %
WBC # BLD AUTO: 0.6 X10*3/UL (ref 4.4–11.3)
XM INTEP: NORMAL

## 2024-08-10 PROCEDURE — 84550 ASSAY OF BLOOD/URIC ACID: CPT | Performed by: NURSE PRACTITIONER

## 2024-08-10 PROCEDURE — 2500000004 HC RX 250 GENERAL PHARMACY W/ HCPCS (ALT 636 FOR OP/ED)

## 2024-08-10 PROCEDURE — 85027 COMPLETE CBC AUTOMATED: CPT | Performed by: PHYSICIAN ASSISTANT

## 2024-08-10 PROCEDURE — 2500000002 HC RX 250 W HCPCS SELF ADMINISTERED DRUGS (ALT 637 FOR MEDICARE OP, ALT 636 FOR OP/ED)

## 2024-08-10 PROCEDURE — 2500000001 HC RX 250 WO HCPCS SELF ADMINISTERED DRUGS (ALT 637 FOR MEDICARE OP): Performed by: PHYSICIAN ASSISTANT

## 2024-08-10 PROCEDURE — 1170000001 HC PRIVATE ONCOLOGY ROOM DAILY

## 2024-08-10 PROCEDURE — 80069 RENAL FUNCTION PANEL: CPT | Performed by: NURSE PRACTITIONER

## 2024-08-10 PROCEDURE — 83735 ASSAY OF MAGNESIUM: CPT | Performed by: PHYSICIAN ASSISTANT

## 2024-08-10 PROCEDURE — 2500000004 HC RX 250 GENERAL PHARMACY W/ HCPCS (ALT 636 FOR OP/ED): Performed by: INTERNAL MEDICINE

## 2024-08-10 PROCEDURE — 85007 BL SMEAR W/DIFF WBC COUNT: CPT | Performed by: PHYSICIAN ASSISTANT

## 2024-08-10 PROCEDURE — 2500000001 HC RX 250 WO HCPCS SELF ADMINISTERED DRUGS (ALT 637 FOR MEDICARE OP): Performed by: INTERNAL MEDICINE

## 2024-08-10 PROCEDURE — 2500000005 HC RX 250 GENERAL PHARMACY W/O HCPCS: Performed by: INTERNAL MEDICINE

## 2024-08-10 PROCEDURE — C9113 INJ PANTOPRAZOLE SODIUM, VIA: HCPCS

## 2024-08-10 PROCEDURE — 2500000002 HC RX 250 W HCPCS SELF ADMINISTERED DRUGS (ALT 637 FOR MEDICARE OP, ALT 636 FOR OP/ED): Performed by: PHYSICIAN ASSISTANT

## 2024-08-10 PROCEDURE — 99233 SBSQ HOSP IP/OBS HIGH 50: CPT | Performed by: INTERNAL MEDICINE

## 2024-08-10 RX ORDER — MAGNESIUM SULFATE HEPTAHYDRATE 40 MG/ML
2 INJECTION, SOLUTION INTRAVENOUS ONCE
Status: COMPLETED | OUTPATIENT
Start: 2024-08-10 | End: 2024-08-10

## 2024-08-10 RX ORDER — ERYTHROMYCIN 5 MG/G
1 OINTMENT OPHTHALMIC NIGHTLY
Status: DISCONTINUED | OUTPATIENT
Start: 2024-08-10 | End: 2024-08-15 | Stop reason: HOSPADM

## 2024-08-10 ASSESSMENT — PAIN - FUNCTIONAL ASSESSMENT
PAIN_FUNCTIONAL_ASSESSMENT: 0-10

## 2024-08-10 ASSESSMENT — PAIN SCALES - GENERAL
PAINLEVEL_OUTOF10: 2
PAINLEVEL_OUTOF10: 0 - NO PAIN
PAINLEVEL_OUTOF10: 7
PAINLEVEL_OUTOF10: 0 - NO PAIN

## 2024-08-10 NOTE — PROGRESS NOTES
"Ilda Peter is a 23 y.o. 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    Subjective     Seen and examined, feeling well today, dilaudid helped with the throat/chest discomfort, wish is completely resolved. No new complaints.     Objective     Physical Exam  Constitutional:       General: She is not in acute distress.     Appearance: She is not ill-appearing or toxic-appearing.   HENT:      Head: Normocephalic.      Nose: Nose normal. No congestion.      Mouth/Throat: left cheek erythema resolved     Mouth: Mucous membranes are moist.      Pharynx: Oropharynx is clear. No oropharyngeal exudate.   Eyes:      General: No scleral icterus.     Pupils: Pupils are equal, round, and reactive to light.   Cardiovascular:      Rate and Rhythm: Normal rate and regular rhythm.      Pulses: Normal pulses.      Heart sounds: Normal heart sounds. No murmur heard.  Pulmonary:      Effort: Pulmonary effort is normal.      Breath sounds: Normal breath sounds. No stridor. No wheezing or rhonchi.   Abdominal:      General: Bowel sounds are normal.      Palpations: Abdomen is soft.      Tenderness: There is no abdominal tenderness. There is no guarding.   Musculoskeletal:         General: No swelling or tenderness. Normal range of motion.      Cervical back: Normal range of motion.   Skin:     General: Skin is warm.      Capillary Refill: Capillary refill takes less than 2 seconds.      Coloration: Skin is pale.   Neurological:      Mental Status: She is alert and oriented to person, place, and time.     Last Recorded Vitals  Blood pressure 102/67, pulse 68, temperature 37.1 °C (98.8 °F), temperature source Temporal, resp. rate 18, height (S) 1.65 m (5' 4.96\"), weight 59.9 kg (132 lb 0.9 oz), last menstrual period 07/17/2024, SpO2 97%, not currently breastfeeding.  Intake/Output last 3 Shifts:  I/O last 3 completed shifts:  In: 1475 (24.7 mL/kg) [P.O.:1080; " Blood:295; IV Piggyback:100]  Out: - (0 mL/kg)   Weight: 59.7 kg     Relevant Results  Scheduled medications  acyclovir, 400 mg, oral, q12h JORDYN  erythromycin, 1 cm, Both Eyes, Nightly  levoFLOXacin, 500 mg, intravenous, q24h  lidocaine-diphenhydraMINE-Maalox 1:1:1, 10 mL, Swish & Spit, BID  loperamide, 2 mg, oral, 4x daily  lubricating eye drops, 1 drop, Both Eyes, Daily  nystatin, 4 mL, Swish & Swallow, BID  ondansetron, 8 mg, intravenous, q8h  pantoprazole, 40 mg, intravenous, BID  posaconazole, 300 mg, oral, Daily with breakfast  sertraline, 50 mg, oral, Daily      Continuous medications     PRN medications  PRN medications: albuterol, alteplase, dextrose, diphenhydrAMINE, EPINEPHrine HCl, famotidine, HYDROmorphone, lidocaine-diphenhydraMINE-Maalox 1:1:1, methylPREDNISolone sodium succinate (PF), prochlorperazine, sodium chloride        Assessment/Plan   Active Problems:    Acute myeloid leukemia in adult (Multi)    Ms. Ilda Peter is a 24 yo female with newly diagnosed AML admitted on 7/19/24 for induction chemotherapy with Cytarabine and Idarubicin. No other significant PMHx.     Day 22 with induction 7+3 (Estefany) (started on 7/19/24)     #UPDATES 08/10/24:  - HDS, afebrile since 07/28  - WT stable  - WBC 0.6, Hgb 8.6, PLTs 24, ANC 0, Neutrophils 3.1 %  - Prophylaxis: Acyclovir (07/19- ),  Posa (07/29- ), IV Levaquin (08/7- ) -  S/P 1 Unit of PRBCs 08/07/24  - Ophthalmology following       ONC:  # Newly diagnosed AML, favorable risk  - Presented with n/v & dizziness worsening over the last few months  - Found to be pancytopenic in ED, was discharged home and had BMBx done outpatient.  - Bmbx (7/5/24): c/w hypercellular BM w/erythroid predominant hematopoiesis, dyserythropoiesis and increased blasts c/w high grade myeloid neoplasm.  - FISH neg  - Myeloid NGS: NPM1 (54%), NRAS (25%), PTPN11 (15%), IDH1 (2%)  - HLA typing & buccal done (7/24)  - Family typing info given  - Day 14 BMBx completed on 8/1 c/w chemoablated  marrow.     CHEMO:  - Cytarabine (100mg/m2) = 170mg IV daily x 7 days (7/19)  - Idarubicin (12mg/m2) = 20mg IV daily x 3 days (7/19)   - HCG quantitative neg (7/19)     HEME:  # Pancytopenia d/t malignancy, known vaginal bleeding on admit since resolved  - Keep Hgb > 7 and Plts > 10     - S/p PRBC: 8/3    - S/p PLTS: 8/4    - S/p PRBC 08/07     -S/p PLTS 08/8     - S/p RBCs 08/09  # Transaminitis, likely d/t chemotherapy, resolved.  - Hemolysis workup neg  + Menses (Mild) 8/3     ID:  - Currently Afebrile  # Neutropenic fever 38.6 on 7/28  - Full workup done: Bld cx x2, CXR, UA all negative   - IV Levaquin (08/7 -)  # Prophylaxis: ACV and Posaconazole        FEN/GI:  Admit wt: 61.9 kg, Daily Wt: 60.4 kg (8/6)  - Hydration and anti-emetics per chemotherapy order set.   - Hypokalemia repleted last on 8/5  - Started PPI on admit  # LANETTE  - PRN Compazine/PRN Zofran on admit.   - Scheduled Zofran Q8 (7/27) w/ symptom improvement.  # Diarrhea improving  - Cdiff (7/30) neg  - Scheduled ATC Imodium  #Mild mucositis. PRN BMX ordered.      NEURO  #MAHESH OU   #Diplopia   #Retinal Hemorrhages OU   - Opthalmology consulted 08/08, with recs to start artifical tears QID OU, Erythromycin ointment at bedtime, oupt FU with retina specialist.       CV:  - ECHO (7/16/23) w/EF of 55%     GYN:  # Prolonged Vaginal bleeding since early July  - Depo shot last 05/2024   - Patient having scant vaginal bleeding at present  - Onco Fertility consulted (7/22) and labs sent.     ENDO:  # Steroid induced hyperglycemia, now resolved  - Lispro SS #1 with meals  - A1c (7/22): 6.0     PSYCH:  # Anxiety  - Cont home Sertraline     DISP:  - Full Code  - Primary Onc: Dr. Fields  - DL Bailon line placed in IR on 7/17/24  - Discharge pending count recovery     Pt seen, discussed, and examined with Dr. Layton Benson MD  Hematology Oncology PGY-4

## 2024-08-11 LAB
ALBUMIN SERPL BCP-MCNC: 3.8 G/DL (ref 3.4–5)
ANION GAP SERPL CALC-SCNC: 14 MMOL/L (ref 10–20)
ATRIAL RATE: 68 BPM
BASOPHILS # BLD MANUAL: 0 X10*3/UL (ref 0–0.1)
BASOPHILS NFR BLD MANUAL: 0 %
BLASTS # BLD MANUAL: ABNORMAL 10*3/UL
BLASTS NFR BLD MANUAL: ABNORMAL %
BUN SERPL-MCNC: 13 MG/DL (ref 6–23)
CALCIUM SERPL-MCNC: 8.6 MG/DL (ref 8.6–10.6)
CHLORIDE SERPL-SCNC: 104 MMOL/L (ref 98–107)
CO2 SERPL-SCNC: 23 MMOL/L (ref 21–32)
CREAT SERPL-MCNC: 0.9 MG/DL (ref 0.5–1.05)
EGFRCR SERPLBLD CKD-EPI 2021: >90 ML/MIN/1.73M*2
EOSINOPHIL # BLD MANUAL: 0 X10*3/UL (ref 0–0.7)
EOSINOPHIL NFR BLD MANUAL: 0 %
ERYTHROCYTE [DISTWIDTH] IN BLOOD BY AUTOMATED COUNT: 13 % (ref 11.5–14.5)
GLUCOSE SERPL-MCNC: 108 MG/DL (ref 74–99)
HCT VFR BLD AUTO: 24.5 % (ref 36–46)
HGB BLD-MCNC: 8.4 G/DL (ref 12–16)
IMM GRANULOCYTES # BLD AUTO: 0 X10*3/UL (ref 0–0.7)
IMM GRANULOCYTES NFR BLD AUTO: 0 % (ref 0–0.9)
LYMPHOCYTES # BLD MANUAL: 0.91 X10*3/UL (ref 1.2–4.8)
LYMPHOCYTES NFR BLD MANUAL: 82.8 %
MAGNESIUM SERPL-MCNC: 1.9 MG/DL (ref 1.6–2.4)
MCH RBC QN AUTO: 27.9 PG (ref 26–34)
MCHC RBC AUTO-ENTMCNC: 34.3 G/DL (ref 32–36)
MCV RBC AUTO: 81 FL (ref 80–100)
MONOCYTES # BLD MANUAL: 0.14 X10*3/UL (ref 0.1–1)
MONOCYTES NFR BLD MANUAL: 12.9 %
NEUTROPHILS # BLD MANUAL: 0.03 X10*3/UL (ref 1.2–7.7)
NEUTS BAND # BLD MANUAL: 0.03 X10*3/UL (ref 0–0.7)
NEUTS BAND NFR BLD MANUAL: 2.6 %
NEUTS SEG # BLD MANUAL: 0 X10*3/UL (ref 1.2–7)
NEUTS SEG NFR BLD MANUAL: 0 %
NRBC BLD-RTO: 0 /100 WBCS (ref 0–0)
P AXIS: 70 DEGREES
P OFFSET: 171 MS
P ONSET: 121 MS
PHOSPHATE SERPL-MCNC: 3 MG/DL (ref 2.5–4.9)
PLATELET # BLD AUTO: 35 X10*3/UL (ref 150–450)
POTASSIUM SERPL-SCNC: 3.7 MMOL/L (ref 3.5–5.3)
PR INTERVAL: 202 MS
Q ONSET: 222 MS
QRS COUNT: 11 BEATS
QRS DURATION: 90 MS
QT INTERVAL: 400 MS
QTC CALCULATION(BAZETT): 425 MS
QTC FREDERICIA: 417 MS
R AXIS: 69 DEGREES
RBC # BLD AUTO: 3.01 X10*6/UL (ref 4–5.2)
RBC MORPH BLD: ABNORMAL
SODIUM SERPL-SCNC: 137 MMOL/L (ref 136–145)
T AXIS: 55 DEGREES
T OFFSET: 422 MS
TOTAL CELLS COUNTED BLD: 116
VARIANT LYMPHS # BLD MANUAL: 0.02 X10*3/UL (ref 0–0.5)
VARIANT LYMPHS NFR BLD: 1.7 %
VENTRICULAR RATE: 68 BPM
WBC # BLD AUTO: 1.1 X10*3/UL (ref 4.4–11.3)

## 2024-08-11 PROCEDURE — 2500000004 HC RX 250 GENERAL PHARMACY W/ HCPCS (ALT 636 FOR OP/ED): Performed by: INTERNAL MEDICINE

## 2024-08-11 PROCEDURE — 88185 FLOWCYTOMETRY/TC ADD-ON: CPT | Mod: TC | Performed by: INTERNAL MEDICINE

## 2024-08-11 PROCEDURE — 2500000002 HC RX 250 W HCPCS SELF ADMINISTERED DRUGS (ALT 637 FOR MEDICARE OP, ALT 636 FOR OP/ED)

## 2024-08-11 PROCEDURE — 85007 BL SMEAR W/DIFF WBC COUNT: CPT | Performed by: PHYSICIAN ASSISTANT

## 2024-08-11 PROCEDURE — 2500000004 HC RX 250 GENERAL PHARMACY W/ HCPCS (ALT 636 FOR OP/ED)

## 2024-08-11 PROCEDURE — 2500000001 HC RX 250 WO HCPCS SELF ADMINISTERED DRUGS (ALT 637 FOR MEDICARE OP): Performed by: PHYSICIAN ASSISTANT

## 2024-08-11 PROCEDURE — 85027 COMPLETE CBC AUTOMATED: CPT | Performed by: PHYSICIAN ASSISTANT

## 2024-08-11 PROCEDURE — 99233 SBSQ HOSP IP/OBS HIGH 50: CPT | Performed by: INTERNAL MEDICINE

## 2024-08-11 PROCEDURE — 84100 ASSAY OF PHOSPHORUS: CPT | Performed by: NURSE PRACTITIONER

## 2024-08-11 PROCEDURE — C9113 INJ PANTOPRAZOLE SODIUM, VIA: HCPCS

## 2024-08-11 PROCEDURE — 2500000001 HC RX 250 WO HCPCS SELF ADMINISTERED DRUGS (ALT 637 FOR MEDICARE OP): Performed by: INTERNAL MEDICINE

## 2024-08-11 PROCEDURE — 2500000002 HC RX 250 W HCPCS SELF ADMINISTERED DRUGS (ALT 637 FOR MEDICARE OP, ALT 636 FOR OP/ED): Performed by: PHYSICIAN ASSISTANT

## 2024-08-11 PROCEDURE — 1170000001 HC PRIVATE ONCOLOGY ROOM DAILY

## 2024-08-11 PROCEDURE — 83735 ASSAY OF MAGNESIUM: CPT | Performed by: PHYSICIAN ASSISTANT

## 2024-08-11 PROCEDURE — 2500000005 HC RX 250 GENERAL PHARMACY W/O HCPCS: Performed by: INTERNAL MEDICINE

## 2024-08-11 ASSESSMENT — PAIN SCALES - GENERAL
PAINLEVEL_OUTOF10: 2
PAINLEVEL_OUTOF10: 0 - NO PAIN
PAINLEVEL_OUTOF10: 0 - NO PAIN

## 2024-08-11 ASSESSMENT — PAIN DESCRIPTION - DESCRIPTORS: DESCRIPTORS: DISCOMFORT

## 2024-08-11 ASSESSMENT — PAIN - FUNCTIONAL ASSESSMENT
PAIN_FUNCTIONAL_ASSESSMENT: 0-10

## 2024-08-11 NOTE — PROGRESS NOTES
"Ilda Peter is a 23 y.o. 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    Subjective     Seen and examined, feeling well today, no active issues except for irritaion around her line. Dilaudid is helping with chest/throat discomfort. No new complaints.     Objective     Physical Exam  Constitutional:       General: She is not in acute distress.     Appearance: She is not ill-appearing or toxic-appearing.   HENT:      Head: Normocephalic.      Nose: Nose normal. No congestion.      Mouth/Throat: left cheek erythema resolved     Mouth: Mucous membranes are moist.      Pharynx: Oropharynx is clear. No oropharyngeal exudate.   Eyes:      General: No scleral icterus.     Pupils: Pupils are equal, round, and reactive to light.   Cardiovascular:      Rate and Rhythm: Normal rate and regular rhythm.      Pulses: Normal pulses.      Heart sounds: Normal heart sounds. No murmur heard.  Pulmonary:      Effort: Pulmonary effort is normal.      Breath sounds: Normal breath sounds. No stridor. No wheezing or rhonchi.     Line with no signs of infection    Abdominal:      General: Bowel sounds are normal.      Palpations: Abdomen is soft.      Tenderness: There is no abdominal tenderness. There is no guarding.   Musculoskeletal:         General: No swelling or tenderness. Normal range of motion.      Cervical back: Normal range of motion.   Skin:     General: Skin is warm.      Capillary Refill: Capillary refill takes less than 2 seconds.      Coloration: Skin is pale.   Neurological:      Mental Status: She is alert and oriented to person, place, and time.     Last Recorded Vitals  Blood pressure 112/67, pulse 81, temperature 36.9 °C (98.4 °F), temperature source Temporal, resp. rate 16, height (S) 1.65 m (5' 4.96\"), weight 60.3 kg (132 lb 15 oz), last menstrual period 07/17/2024, SpO2 100%, not currently breastfeeding.  Intake/Output last 3 Shifts:  I/O " last 3 completed shifts:  In: 50 (0.8 mL/kg) [I.V.:50 (0.8 mL/kg)]  Out: - (0 mL/kg)   Weight: 59.9 kg     Relevant Results  Scheduled medications  acyclovir, 400 mg, oral, q12h JORDYN  erythromycin, 1 cm, Both Eyes, Nightly  levoFLOXacin, 500 mg, intravenous, q24h  lidocaine-diphenhydraMINE-Maalox 1:1:1, 10 mL, Swish & Spit, BID  loperamide, 2 mg, oral, 4x daily  lubricating eye drops, 1 drop, Both Eyes, Daily  nystatin, 4 mL, Swish & Swallow, BID  ondansetron, 8 mg, intravenous, q8h  pantoprazole, 40 mg, intravenous, BID  posaconazole, 300 mg, oral, Daily with breakfast  sertraline, 50 mg, oral, Daily      Continuous medications     PRN medications  PRN medications: albuterol, alteplase, dextrose, diphenhydrAMINE, EPINEPHrine HCl, famotidine, HYDROmorphone, lidocaine-diphenhydraMINE-Maalox 1:1:1, methylPREDNISolone sodium succinate (PF), prochlorperazine, sodium chloride        Assessment/Plan   Active Problems:    Acute myeloid leukemia in adult (Multi)    Ms. Ilda Peter is a 24 yo female with newly diagnosed AML admitted on 7/19/24 for induction chemotherapy with Cytarabine and Idarubicin. No other significant PMHx.     Day 22 with induction 7+3 (Estefany) (started on 7/19/24)     #UPDATES 08/10/24:  - HDS, afebrile since 07/28  - WT 59.1 >> 60.3  - WBC 1.1, Hgb 8.4, PLTs 35, ANC 0, Neutrophils 0.03 %, atypical lymph's 0.02  - Prophylaxis: Acyclovir (07/19- ),  Posa (07/29- ), IV Levaquin (08/7- ) -  S/P 1 Unit of PRBCs 08/07/24  - Ophthalmology following   - Will continue to monitor closely CBC differentials including Blasts and atypical lymph's  and consider ordering Flow.       ONC:  # Newly diagnosed AML, favorable risk  - Presented with n/v & dizziness worsening over the last few months  - Found to be pancytopenic in ED, was discharged home and had BMBx done outpatient.  - Bmbx (7/5/24): c/w hypercellular BM w/erythroid predominant hematopoiesis, dyserythropoiesis and increased blasts c/w high grade myeloid  neoplasm.  - FISH neg  - Myeloid NGS: NPM1 (54%), NRAS (25%), PTPN11 (15%), IDH1 (2%)  - HLA typing & buccal done (7/24)  - Family typing info given  - Day 14 BMBx completed on 8/1 c/w chemoablated marrow.     CHEMO:  - Cytarabine (100mg/m2) = 170mg IV daily x 7 days (7/19)  - Idarubicin (12mg/m2) = 20mg IV daily x 3 days (7/19)   - HCG quantitative neg (7/19)     HEME:  # Pancytopenia d/t malignancy, known vaginal bleeding on admit since resolved  - Keep Hgb > 7 and Plts > 10     - S/p PRBC: 8/3    - S/p PLTS: 8/4    - S/p PRBC 08/07     -S/p PLTS 08/8     - S/p RBCs 08/09  # Transaminitis, likely d/t chemotherapy, resolved.  - Hemolysis workup neg  + Menses (Mild) 8/3     ID:  - Currently Afebrile  # Neutropenic fever 38.6 on 7/28  - Full workup done: Bld cx x2, CXR, UA all negative   - IV Levaquin (08/7 -)  # Prophylaxis: ACV and Posaconazole        FEN/GI:  Admit wt: 61.9 kg, Daily Wt: 60.4 kg (8/6)  - Hydration and anti-emetics per chemotherapy order set.   - Hypokalemia repleted last on 8/5  - Started PPI on admit  # LANETTE  - PRN Compazine/PRN Zofran on admit.   - Scheduled Zofran Q8 (7/27) w/ symptom improvement.  # Diarrhea improving  - Cdiff (7/30) neg  - Scheduled ATC Imodium  #Mild mucositis. PRN BMX ordered.      NEURO  #MAHESH OU   #Diplopia   #Retinal Hemorrhages OU   - Opthalmology consulted 08/08, with recs to start artifical tears QID OU, Erythromycin ointment at bedtime, oupt FU with retina specialist.       CV:  - ECHO (7/16/23) w/EF of 55%     GYN:  # Prolonged Vaginal bleeding since early July  - Depo shot last 05/2024   - Patient having scant vaginal bleeding at present  - Onco Fertility consulted (7/22) and labs sent.     ENDO:  # Steroid induced hyperglycemia, now resolved  - Lispro SS #1 with meals  - A1c (7/22): 6.0     PSYCH:  # Anxiety  - Cont home Sertraline     DISP:  - Full Code  - Primary Onc: Dr. Fields  - DL Bailon line placed in IR on 7/17/24  - Discharge pending count recovery      Pt seen, discussed, and examined with Dr. Layton Benson MD  Hematology Oncology PGY-4

## 2024-08-11 NOTE — CARE PLAN
The patient's goals for the shift include      The clinical goals for the shift include pt will remain safe and free from injury through shift      Problem: Nutrition  Goal: Adequate PO fluid intake  Outcome: Not Progressing  Goal: BG  mg/dL  Outcome: Not Progressing  Goal: Lab values WNL  Outcome: Not Progressing  Goal: Electrolytes WNL  Outcome: Not Progressing  Goal: Promote healing  Outcome: Not Progressing  Goal: Maintain stable weight  Outcome: Not Progressing     Problem: Pain - Adult  Goal: Verbalizes/displays adequate comfort level or baseline comfort level  Outcome: Not Progressing     Problem: Safety - Adult  Goal: Free from fall injury  Outcome: Not Progressing     Problem: PICC line  Goal: I will remain free from symptoms of infection  Outcome: Not Progressing

## 2024-08-12 LAB
ALBUMIN SERPL BCP-MCNC: 3.6 G/DL (ref 3.4–5)
ALP SERPL-CCNC: 139 U/L (ref 33–110)
ALT SERPL W P-5'-P-CCNC: 21 U/L (ref 7–45)
ANION GAP SERPL CALC-SCNC: 13 MMOL/L (ref 10–20)
APTT PPP: 36 SECONDS (ref 27–38)
AST SERPL W P-5'-P-CCNC: 14 U/L (ref 9–39)
BASOPHILS # BLD MANUAL: 0 X10*3/UL (ref 0–0.1)
BASOPHILS NFR BLD MANUAL: 0 %
BILIRUB DIRECT SERPL-MCNC: 0.2 MG/DL (ref 0–0.3)
BILIRUB SERPL-MCNC: 0.6 MG/DL (ref 0–1.2)
BUN SERPL-MCNC: 12 MG/DL (ref 6–23)
CALCIUM SERPL-MCNC: 8.8 MG/DL (ref 8.6–10.6)
CHLORIDE SERPL-SCNC: 106 MMOL/L (ref 98–107)
CO2 SERPL-SCNC: 24 MMOL/L (ref 21–32)
CREAT SERPL-MCNC: 0.97 MG/DL (ref 0.5–1.05)
EGFRCR SERPLBLD CKD-EPI 2021: 84 ML/MIN/1.73M*2
EOSINOPHIL # BLD MANUAL: 0 X10*3/UL (ref 0–0.7)
EOSINOPHIL NFR BLD MANUAL: 0 %
ERYTHROCYTE [DISTWIDTH] IN BLOOD BY AUTOMATED COUNT: 13.1 % (ref 11.5–14.5)
FIBRINOGEN PPP-MCNC: 525 MG/DL (ref 200–400)
GLUCOSE SERPL-MCNC: 115 MG/DL (ref 74–99)
HCT VFR BLD AUTO: 22.6 % (ref 36–46)
HGB BLD-MCNC: 7.8 G/DL (ref 12–16)
IMM GRANULOCYTES # BLD AUTO: 0.01 X10*3/UL (ref 0–0.7)
IMM GRANULOCYTES NFR BLD AUTO: 0.8 % (ref 0–0.9)
INR PPP: 1.4 (ref 0.9–1.1)
LYMPHOCYTES # BLD MANUAL: 1.02 X10*3/UL (ref 1.2–4.8)
LYMPHOCYTES NFR BLD MANUAL: 84.6 %
MAGNESIUM SERPL-MCNC: 1.68 MG/DL (ref 1.6–2.4)
MCH RBC QN AUTO: 27.7 PG (ref 26–34)
MCHC RBC AUTO-ENTMCNC: 34.5 G/DL (ref 32–36)
MCV RBC AUTO: 80 FL (ref 80–100)
MONOCYTES # BLD MANUAL: 0.09 X10*3/UL (ref 0.1–1)
MONOCYTES NFR BLD MANUAL: 7.7 %
NEUTS SEG # BLD MANUAL: 0 X10*3/UL (ref 1.2–7)
NEUTS SEG NFR BLD MANUAL: 0 %
NRBC BLD-RTO: 0 /100 WBCS (ref 0–0)
PHOSPHATE SERPL-MCNC: 3.4 MG/DL (ref 2.5–4.9)
PLATELET # BLD AUTO: 62 X10*3/UL (ref 150–450)
POLYCHROMASIA BLD QL SMEAR: ABNORMAL
POTASSIUM SERPL-SCNC: 3.9 MMOL/L (ref 3.5–5.3)
PROMYELOCYTES # BLD MANUAL: 0.09 X10*3/UL
PROMYELOCYTES NFR BLD MANUAL: 7.7 %
PROT SERPL-MCNC: 5.6 G/DL (ref 6.4–8.2)
PROTHROMBIN TIME: 16.3 SECONDS (ref 9.8–12.8)
RBC # BLD AUTO: 2.82 X10*6/UL (ref 4–5.2)
RBC MORPH BLD: ABNORMAL
SODIUM SERPL-SCNC: 139 MMOL/L (ref 136–145)
TOTAL CELLS COUNTED BLD: 13
WBC # BLD AUTO: 1.2 X10*3/UL (ref 4.4–11.3)

## 2024-08-12 PROCEDURE — 2500000004 HC RX 250 GENERAL PHARMACY W/ HCPCS (ALT 636 FOR OP/ED)

## 2024-08-12 PROCEDURE — 85027 COMPLETE CBC AUTOMATED: CPT | Performed by: PHYSICIAN ASSISTANT

## 2024-08-12 PROCEDURE — 2500000002 HC RX 250 W HCPCS SELF ADMINISTERED DRUGS (ALT 637 FOR MEDICARE OP, ALT 636 FOR OP/ED)

## 2024-08-12 PROCEDURE — 83735 ASSAY OF MAGNESIUM: CPT | Performed by: PHYSICIAN ASSISTANT

## 2024-08-12 PROCEDURE — 1170000001 HC PRIVATE ONCOLOGY ROOM DAILY

## 2024-08-12 PROCEDURE — 85610 PROTHROMBIN TIME: CPT | Performed by: PHYSICIAN ASSISTANT

## 2024-08-12 PROCEDURE — 2500000001 HC RX 250 WO HCPCS SELF ADMINISTERED DRUGS (ALT 637 FOR MEDICARE OP): Performed by: PHYSICIAN ASSISTANT

## 2024-08-12 PROCEDURE — 85007 BL SMEAR W/DIFF WBC COUNT: CPT | Performed by: PHYSICIAN ASSISTANT

## 2024-08-12 PROCEDURE — 2500000001 HC RX 250 WO HCPCS SELF ADMINISTERED DRUGS (ALT 637 FOR MEDICARE OP): Performed by: INTERNAL MEDICINE

## 2024-08-12 PROCEDURE — 99233 SBSQ HOSP IP/OBS HIGH 50: CPT | Performed by: INTERNAL MEDICINE

## 2024-08-12 PROCEDURE — 2500000004 HC RX 250 GENERAL PHARMACY W/ HCPCS (ALT 636 FOR OP/ED): Performed by: INTERNAL MEDICINE

## 2024-08-12 PROCEDURE — 85384 FIBRINOGEN ACTIVITY: CPT | Performed by: NURSE PRACTITIONER

## 2024-08-12 PROCEDURE — 84100 ASSAY OF PHOSPHORUS: CPT | Performed by: NURSE PRACTITIONER

## 2024-08-12 PROCEDURE — 2500000005 HC RX 250 GENERAL PHARMACY W/O HCPCS: Performed by: INTERNAL MEDICINE

## 2024-08-12 PROCEDURE — C9113 INJ PANTOPRAZOLE SODIUM, VIA: HCPCS

## 2024-08-12 PROCEDURE — 80076 HEPATIC FUNCTION PANEL: CPT | Performed by: NURSE PRACTITIONER

## 2024-08-12 PROCEDURE — 2500000002 HC RX 250 W HCPCS SELF ADMINISTERED DRUGS (ALT 637 FOR MEDICARE OP, ALT 636 FOR OP/ED): Performed by: PHYSICIAN ASSISTANT

## 2024-08-12 ASSESSMENT — PAIN - FUNCTIONAL ASSESSMENT: PAIN_FUNCTIONAL_ASSESSMENT: 0-10

## 2024-08-12 ASSESSMENT — PAIN SCALES - GENERAL
PAINLEVEL_OUTOF10: 0 - NO PAIN
PAINLEVEL_OUTOF10: 0 - NO PAIN

## 2024-08-12 NOTE — PROGRESS NOTES
"Ilda Peter is a 23 y.o. 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    Subjective     Seen and examined, feeling well today, no active issues. No new complaints.     Objective     Physical Exam  Constitutional:       General: She is not in acute distress.     Appearance: She is not ill-appearing or toxic-appearing.   HENT:      Head: Normocephalic.      Nose: Nose normal. No congestion.      Mouth/Throat: left cheek erythema resolved     Mouth: Mucous membranes are moist.      Pharynx: Oropharynx is clear. No oropharyngeal exudate.   Eyes:      General: No scleral icterus.     Pupils: Pupils are equal, round, and reactive to light.   Cardiovascular:      Rate and Rhythm: Normal rate and regular rhythm.      Pulses: Normal pulses.      Heart sounds: Normal heart sounds. No murmur heard.  Pulmonary:      Effort: Pulmonary effort is normal.      Breath sounds: Normal breath sounds. No stridor. No wheezing or rhonchi.     Line with no signs of infection    Abdominal:      General: Bowel sounds are normal.      Palpations: Abdomen is soft.      Tenderness: There is no abdominal tenderness. There is no guarding.   Musculoskeletal:         General: No swelling or tenderness. Normal range of motion.      Cervical back: Normal range of motion.   Skin:     General: Skin is warm.      Capillary Refill: Capillary refill takes less than 2 seconds.      Coloration: Skin is pale.   Neurological:      Mental Status: She is alert and oriented to person, place, and time.     Last Recorded Vitals  Blood pressure 102/63, pulse 63, temperature 36 °C (96.8 °F), resp. rate 18, height (S) 1.65 m (5' 4.96\"), weight 60.3 kg (132 lb 15 oz), last menstrual period 07/17/2024, SpO2 100%, not currently breastfeeding.  Intake/Output last 3 Shifts:  I/O last 3 completed shifts:  In: 480 (8 mL/kg) [P.O.:480]  Out: - (0 mL/kg)   Weight: 60.3 kg     Relevant " Results  Scheduled medications  acyclovir, 400 mg, oral, q12h JORDYN  erythromycin, 1 cm, Both Eyes, Nightly  levoFLOXacin, 500 mg, intravenous, q24h  lidocaine-diphenhydraMINE-Maalox 1:1:1, 10 mL, Swish & Spit, BID  loperamide, 2 mg, oral, 4x daily  lubricating eye drops, 1 drop, Both Eyes, Daily  nystatin, 4 mL, Swish & Swallow, BID  ondansetron, 8 mg, intravenous, q8h  pantoprazole, 40 mg, intravenous, BID  posaconazole, 300 mg, oral, Daily with breakfast  sertraline, 50 mg, oral, Daily      Continuous medications     PRN medications  PRN medications: albuterol, alteplase, dextrose, diphenhydrAMINE, EPINEPHrine HCl, famotidine, HYDROmorphone, lidocaine-diphenhydraMINE-Maalox 1:1:1, methylPREDNISolone sodium succinate (PF), prochlorperazine, sodium chloride        Assessment/Plan   Active Problems:    Acute myeloid leukemia in adult (Multi)    Ms. Ilda Peter is a 24 yo female with newly diagnosed AML admitted on 7/19/24 for induction chemotherapy with Cytarabine and Idarubicin. No other significant PMHx.     Day 23 with induction 7+3 (Estefany) (started on 7/19/24)     #UPDATES 08/11/24:  - HDS, afebrile since 07/28  - WT 59.1 >> 60.3 yesterday  - WBC 1.2, Hgb 7.8, PLTs 62, ANC 0, Neutrophils 0.03 %, atypical lymph's 0.02, polychromasia  - Prophylaxis: Acyclovir (07/19- ),  Posa (07/29- ), IV Levaquin (08/7- ) -  Ophthalmology following   - Will continue to monitor closely CBC differentials including Blasts and atypical lymph's    - Flow pending      ONC:  # Newly diagnosed AML, favorable risk  - Presented with n/v & dizziness worsening over the last few months  - Found to be pancytopenic in ED, was discharged home and had BMBx done outpatient.  - Bmbx (7/5/24): c/w hypercellular BM w/erythroid predominant hematopoiesis, dyserythropoiesis and increased blasts c/w high grade myeloid neoplasm.  - FISH neg  - Myeloid NGS: NPM1 (54%), NRAS (25%), PTPN11 (15%), IDH1 (2%)  - HLA typing & buccal done (7/24)  - Family typing info  given  - Day 14 BMBx completed on 8/1 c/w chemoablated marrow.     CHEMO:  - Cytarabine (100mg/m2) = 170mg IV daily x 7 days (7/19)  - Idarubicin (12mg/m2) = 20mg IV daily x 3 days (7/19)   - HCG quantitative neg (7/19)     HEME:  # Pancytopenia d/t malignancy, known vaginal bleeding on admit since resolved  - Keep Hgb > 7 and Plts > 10     - S/p PRBC: 8/3    - S/p PLTS: 8/4    - S/p PRBC 08/07     -S/p PLTS 08/8     - S/p RBCs 08/09  # Transaminitis, likely d/t chemotherapy, resolved.  - Hemolysis workup neg  + Menses (Mild) 8/3     ID:  - Currently Afebrile  # Neutropenic fever 38.6 on 7/28  - Full workup done: Bld cx x2, CXR, UA all negative   - IV Levaquin (08/7 -)  # Prophylaxis: ACV and Posaconazole        FEN/GI:  Admit wt: 61.9 kg  - Hydration and anti-emetics per chemotherapy order set.   - Started PPI on admit  # LANETTE  - PRN Compazine/PRN Zofran on admit.   - Scheduled Zofran Q8 (7/27) w/ symptom improvement.  # Diarrhea improving  - Cdiff (7/30) neg  - Scheduled ATC Imodium  #Mild mucositis. PRN BMX ordered.      NEURO  #MAHESH OU   #Diplopia   #Retinal Hemorrhages OU   - Opthalmology consulted 08/08, with recs to start artifical tears QID OU, Erythromycin ointment at bedtime, oupt FU with retina specialist.       CV:  - ECHO (7/16/23) w/EF of 55%     GYN:  # Prolonged Vaginal bleeding since early July  - Depo shot last 05/2024   - Patient having scant vaginal bleeding at present  - Onco Fertility consulted (7/22) and labs sent.     ENDO:  # Steroid induced hyperglycemia, now resolved  - Lispro SS #1 with meals  - A1c (7/22): 6.0     PSYCH:  # Anxiety  - Cont home Sertraline     DISP:  - Full Code  - Primary Onc: Dr. Fields  - DL Bailon line placed in IR on 7/17/24  - Discharge pending count recovery     Pt seen, discussed, and examined with Dr. Lee Benson MD  Hematology Oncology PGY-4

## 2024-08-13 LAB
ALBUMIN SERPL BCP-MCNC: 3.7 G/DL (ref 3.4–5)
ANION GAP SERPL CALC-SCNC: 11 MMOL/L (ref 10–20)
BASOPHILS # BLD MANUAL: 0 X10*3/UL (ref 0–0.1)
BASOPHILS NFR BLD MANUAL: 0 %
BUN SERPL-MCNC: 14 MG/DL (ref 6–23)
CALCIUM SERPL-MCNC: 9 MG/DL (ref 8.6–10.6)
CHLORIDE SERPL-SCNC: 108 MMOL/L (ref 98–107)
CO2 SERPL-SCNC: 26 MMOL/L (ref 21–32)
CREAT SERPL-MCNC: 0.96 MG/DL (ref 0.5–1.05)
EGFRCR SERPLBLD CKD-EPI 2021: 85 ML/MIN/1.73M*2
EOSINOPHIL # BLD MANUAL: 0 X10*3/UL (ref 0–0.7)
EOSINOPHIL NFR BLD MANUAL: 0 %
ERYTHROCYTE [DISTWIDTH] IN BLOOD BY AUTOMATED COUNT: 13.2 % (ref 11.5–14.5)
GLUCOSE SERPL-MCNC: 93 MG/DL (ref 74–99)
HCT VFR BLD AUTO: 24.5 % (ref 36–46)
HGB BLD-MCNC: 8.4 G/DL (ref 12–16)
IMM GRANULOCYTES # BLD AUTO: 0.04 X10*3/UL (ref 0–0.7)
IMM GRANULOCYTES NFR BLD AUTO: 2.8 % (ref 0–0.9)
LYMPHOCYTES # BLD MANUAL: 0.8 X10*3/UL (ref 1.2–4.8)
LYMPHOCYTES NFR BLD MANUAL: 57 %
MAGNESIUM SERPL-MCNC: 1.63 MG/DL (ref 1.6–2.4)
MCH RBC QN AUTO: 28.2 PG (ref 26–34)
MCHC RBC AUTO-ENTMCNC: 34.3 G/DL (ref 32–36)
MCV RBC AUTO: 82 FL (ref 80–100)
MONOCYTES # BLD MANUAL: 0.28 X10*3/UL (ref 0.1–1)
MONOCYTES NFR BLD MANUAL: 20 %
MYELOCYTES # BLD MANUAL: 0.04 X10*3/UL
MYELOCYTES NFR BLD MANUAL: 3 %
NEUTROPHILS # BLD MANUAL: 0.16 X10*3/UL (ref 1.2–7.7)
NEUTS BAND # BLD MANUAL: 0.01 X10*3/UL (ref 0–0.7)
NEUTS BAND NFR BLD MANUAL: 1 %
NEUTS SEG # BLD MANUAL: 0.15 X10*3/UL (ref 1.2–7)
NEUTS SEG NFR BLD MANUAL: 11 %
NRBC BLD-RTO: 1.4 /100 WBCS (ref 0–0)
PHOSPHATE SERPL-MCNC: 3.9 MG/DL (ref 2.5–4.9)
PLATELET # BLD AUTO: 108 X10*3/UL (ref 150–450)
POTASSIUM SERPL-SCNC: 3.9 MMOL/L (ref 3.5–5.3)
PROMYELOCYTES # BLD MANUAL: 0.01 X10*3/UL
PROMYELOCYTES NFR BLD MANUAL: 1 %
RBC # BLD AUTO: 2.98 X10*6/UL (ref 4–5.2)
RBC MORPH BLD: ABNORMAL
SODIUM SERPL-SCNC: 141 MMOL/L (ref 136–145)
TOTAL CELLS COUNTED BLD: 100
VARIANT LYMPHS # BLD MANUAL: 0.1 X10*3/UL (ref 0–0.5)
VARIANT LYMPHS NFR BLD: 7 %
WBC # BLD AUTO: 1.4 X10*3/UL (ref 4.4–11.3)

## 2024-08-13 PROCEDURE — 2500000005 HC RX 250 GENERAL PHARMACY W/O HCPCS: Performed by: INTERNAL MEDICINE

## 2024-08-13 PROCEDURE — 2500000004 HC RX 250 GENERAL PHARMACY W/ HCPCS (ALT 636 FOR OP/ED): Performed by: NURSE PRACTITIONER

## 2024-08-13 PROCEDURE — 2500000004 HC RX 250 GENERAL PHARMACY W/ HCPCS (ALT 636 FOR OP/ED): Performed by: INTERNAL MEDICINE

## 2024-08-13 PROCEDURE — 83735 ASSAY OF MAGNESIUM: CPT | Performed by: PHYSICIAN ASSISTANT

## 2024-08-13 PROCEDURE — 82565 ASSAY OF CREATININE: CPT | Performed by: NURSE PRACTITIONER

## 2024-08-13 PROCEDURE — 2500000002 HC RX 250 W HCPCS SELF ADMINISTERED DRUGS (ALT 637 FOR MEDICARE OP, ALT 636 FOR OP/ED): Performed by: PHYSICIAN ASSISTANT

## 2024-08-13 PROCEDURE — 2500000004 HC RX 250 GENERAL PHARMACY W/ HCPCS (ALT 636 FOR OP/ED)

## 2024-08-13 PROCEDURE — 1170000001 HC PRIVATE ONCOLOGY ROOM DAILY

## 2024-08-13 PROCEDURE — 2500000001 HC RX 250 WO HCPCS SELF ADMINISTERED DRUGS (ALT 637 FOR MEDICARE OP): Performed by: PHYSICIAN ASSISTANT

## 2024-08-13 PROCEDURE — RXMED WILLOW AMBULATORY MEDICATION CHARGE

## 2024-08-13 PROCEDURE — 85007 BL SMEAR W/DIFF WBC COUNT: CPT | Performed by: PHYSICIAN ASSISTANT

## 2024-08-13 PROCEDURE — 99233 SBSQ HOSP IP/OBS HIGH 50: CPT | Performed by: INTERNAL MEDICINE

## 2024-08-13 PROCEDURE — 85027 COMPLETE CBC AUTOMATED: CPT | Performed by: PHYSICIAN ASSISTANT

## 2024-08-13 PROCEDURE — 2500000002 HC RX 250 W HCPCS SELF ADMINISTERED DRUGS (ALT 637 FOR MEDICARE OP, ALT 636 FOR OP/ED)

## 2024-08-13 PROCEDURE — 2500000001 HC RX 250 WO HCPCS SELF ADMINISTERED DRUGS (ALT 637 FOR MEDICARE OP): Performed by: INTERNAL MEDICINE

## 2024-08-13 RX ORDER — ERYTHROMYCIN 5 MG/G
OINTMENT OPHTHALMIC NIGHTLY
Qty: 3.5 G | Refills: 0 | Status: SHIPPED | OUTPATIENT
Start: 2024-08-13

## 2024-08-13 RX ORDER — POSACONAZOLE 100 MG/1
300 TABLET, DELAYED RELEASE ORAL
Qty: 90 TABLET | Refills: 0 | Status: SHIPPED | OUTPATIENT
Start: 2024-08-14 | End: 2024-08-15 | Stop reason: HOSPADM

## 2024-08-13 RX ORDER — MAGNESIUM SULFATE HEPTAHYDRATE 40 MG/ML
2 INJECTION, SOLUTION INTRAVENOUS ONCE
Status: COMPLETED | OUTPATIENT
Start: 2024-08-13 | End: 2024-08-13

## 2024-08-13 RX ORDER — LEVOFLOXACIN 500 MG/1
500 TABLET, FILM COATED ORAL
Status: DISCONTINUED | OUTPATIENT
Start: 2024-08-14 | End: 2024-08-15 | Stop reason: HOSPADM

## 2024-08-13 RX ORDER — ACYCLOVIR 400 MG/1
400 TABLET ORAL EVERY 12 HOURS SCHEDULED
Qty: 60 TABLET | Refills: 0 | Status: SHIPPED | OUTPATIENT
Start: 2024-08-13

## 2024-08-13 ASSESSMENT — COGNITIVE AND FUNCTIONAL STATUS - GENERAL
MOBILITY SCORE: 24
DAILY ACTIVITIY SCORE: 24

## 2024-08-13 ASSESSMENT — PAIN SCALES - GENERAL: PAINLEVEL_OUTOF10: 0 - NO PAIN

## 2024-08-13 NOTE — PROGRESS NOTES
"Ilda Peter is a 23 y.o. 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    Subjective     Pt lying in bed, overall feeling well. Counts are starting to recover, plan discharge when ANC>500.    Objective     Physical Exam  Constitutional:       General: She is not in acute distress.     Appearance: She is not ill-appearing or toxic-appearing.   HENT:      Head: Normocephalic.      Nose: Nose normal. No congestion.      Mouth/Throat: left cheek erythema resolved     Mouth: Mucous membranes are moist.      Pharynx: Oropharynx is clear. No oropharyngeal exudate.   Eyes:      General: No scleral icterus.     Pupils: Pupils are equal, round, and reactive to light.   Cardiovascular:      Rate and Rhythm: Normal rate and regular rhythm.      Pulses: Normal pulses.      Heart sounds: Normal heart sounds. No murmur heard.  Pulmonary:      Effort: Pulmonary effort is normal.      Breath sounds: Normal breath sounds. No stridor. No wheezing or rhonchi.     Line with no signs of infection    Abdominal:      General: Bowel sounds are normal.      Palpations: Abdomen is soft.      Tenderness: There is no abdominal tenderness. There is no guarding.   Musculoskeletal:         General: No swelling or tenderness. Normal range of motion.      Cervical back: Normal range of motion.   Skin:     General: Skin is warm.      Capillary Refill: Capillary refill takes less than 2 seconds.      Coloration: Skin is pale.   Neurological:      Mental Status: She is alert and oriented to person, place, and time.     Last Recorded Vitals  Blood pressure 118/74, pulse 82, temperature 36.1 °C (97 °F), temperature source Temporal, resp. rate 18, height (S) 1.65 m (5' 4.96\"), weight 60.3 kg (132 lb 15 oz), last menstrual period 07/17/2024, SpO2 99%, not currently breastfeeding.  Intake/Output last 3 Shifts:  I/O last 3 completed shifts:  In: 580 (9.6 mL/kg) [P.O.:480; IV " Piggyback:100]  Out: - (0 mL/kg)   Weight: 60.3 kg     Relevant Results  Scheduled medications  acyclovir, 400 mg, oral, q12h JORDYN  erythromycin, 1 cm, Both Eyes, Nightly  [START ON 8/14/2024] levoFLOXacin, 500 mg, oral, q24h JORDYN  lidocaine-diphenhydraMINE-Maalox 1:1:1, 10 mL, Swish & Spit, BID  loperamide, 2 mg, oral, 4x daily  lubricating eye drops, 1 drop, Both Eyes, Daily  nystatin, 4 mL, Swish & Swallow, BID  ondansetron, 8 mg, intravenous, q8h  pantoprazole, 40 mg, intravenous, BID  posaconazole, 300 mg, oral, Daily with breakfast  sertraline, 50 mg, oral, Daily      Continuous medications     PRN medications  PRN medications: albuterol, alteplase, dextrose, diphenhydrAMINE, EPINEPHrine HCl, famotidine, HYDROmorphone, lidocaine-diphenhydraMINE-Maalox 1:1:1, methylPREDNISolone sodium succinate (PF), prochlorperazine, sodium chloride        Assessment/Plan   Active Problems:    Acute myeloid leukemia in adult (Multi)    Ms. Ilda Pteer is a 22 yo female with newly diagnosed AML admitted on 7/19/24 for induction chemotherapy with Cytarabine and Idarubicin. No other significant PMHx.     Day 24 with induction 7+3 (Estefany) (started on 7/19/24)    ONC:  # Newly diagnosed AML, favorable risk  - Presented with n/v & dizziness worsening over the last few months  - Found to be pancytopenic in ED, was discharged home and had BMBx done outpatient.  - Bmbx (7/5/24): c/w hypercellular BM w/erythroid predominant hematopoiesis, dyserythropoiesis and increased blasts c/w high grade myeloid neoplasm.  - FISH neg  - Myeloid NGS: NPM1 (54%), NRAS (25%), PTPN11 (15%), IDH1 (2%)  - HLA typing & buccal done (7/24)  - Family typing info given  - Day 14 BMBx completed on 8/1 c/w chemoablated marrow.  - Counts are starting to recover, plan discharge when ANC>500     CHEMO:  - Cytarabine (100 mg/m2) = 170 mg IV daily x 7 days (7/19)  - Idarubicin (12 mg/m2) = 20 mg IV daily x 3 days (7/19)   - HCG quantitative neg (7/19)     HEME:  #  Pancytopenia d/t malignancy, known vaginal bleeding on admit since resolved  - Keep Hgb > 7 and Plts > 10   # Transaminitis, likely d/t chemotherapy, resolved.  - Hemolysis workup neg  + Menses (Mild) 8/3     ID:  - Currently Afebrile  # Neutropenic fever 38.6 on 7/28  - Full workup done: Bld cx x2, CXR, UA were all negative   - Deescalated to Levaquin (8/7)  # Prophylaxis: ACV, Posaconazole        FEN/GI:  Admit wt: 61.9 kg  - Hydration and anti-emetics per chemotherapy order set.   - Started PPI on admit  # LANETTE  - PRN Compazine/PRN Zofran on admit.   - Scheduled Zofran Q8 (7/27) w/ symptom improvement.  # Diarrhea improving  - Cdiff (7/30) neg  - Scheduled ATC Imodium  # Mild mucositis. PRN BMX ordered.      NEURO  # MAHESH OU   # Diplopia   # Retinal Hemorrhages OU   - Opthalmology consulted 8/8, with recs to start artifical tears QID OU, Erythromycin ointment at bedtime, oupt FU with retina specialist.       CV:  - ECHO (7/16/23) w/EF of 55%     GYN:  # Prolonged Vaginal bleeding since early July  - Depo shot last 05/2024   - Patient having scant vaginal bleeding at present  - Onco Fertility consulted (7/22) and labs sent.     ENDO:  # Steroid induced hyperglycemia, now resolved  - Lispro SS #1 with meals  - A1c (7/22): 6.0     PSYCH:  # Anxiety  - Cont home Sertraline     DISP:  - Full Code  - Primary Onc: Dr. Fields  - DL Bailon line placed in IR on 7/17/24  - Discharge pending count recovery, by the end of the week.  - Will need count recovery biopsy and appt with Dr. Fields  - Will need follow up with retina specialist outpatient    Pt seen, discussed, and examined with Dr. Lee Decker, APRN-CNP       0.66

## 2024-08-13 NOTE — PROGRESS NOTES
"Nutrition Follow Up Assessment  Nutrition Assessment      Today is day #24 of Induction Chemo with 7+3. Awaiting count recovery for discharge.    Nutrition History:  This service met with pt this afternoon, pt sitting up in bed, visit kept brief, per pt's request.    Tells appetite remains stable, continues to eat ~1-2 meals/day + snacks. Also drinks 1 carton of Ensure Clear every day + still drinking chocolate milk. RDN did review meal orders--protein sources noted with meals.    Denied any issues with n/v/d or trouble chewing/swallowing at time of visit with her. Did try the lemon drop candies d/t her taste changes and dry mouth/throat, \"they helped some,\" declined need for any additional at this time.    Anthropometrics:  Height: (S) 165 cm (5' 4.96\")   Weight: 60.3 kg (132 lb 15 oz)   BMI (Calculated): 22.15  IBW/kg (Dietitian Calculated): 56.8 kg  Percent of IBW: 106 %       Admission Weight Trend:  07/19-61.9 kg (admit wt)  08/06-60.4 kg (wt at last nutrition visit)  08/11-60.3 kg (current wt)--wt remains relatively stable at this time    Nutrition Focused Physical Exam Findings:  defer: completed at a prior nutrition visit  Edema:  Edema: none  Physical Findings:  Skin: Negative    Nutrition Significant Labs:  CBC Trend:   Results from last 7 days   Lab Units 08/13/24  0149 08/12/24  0231 08/11/24  0144 08/10/24  0252   WBC AUTO x10*3/uL 1.4* 1.2* 1.1* 0.6*   RBC AUTO x10*6/uL 2.98* 2.82* 3.01* 3.06*   HEMOGLOBIN g/dL 8.4* 7.8* 8.4* 8.6*   HEMATOCRIT % 24.5* 22.6* 24.5* 24.8*   MCV fL 82 80 81 81   PLATELETS AUTO x10*3/uL 108* 62* 35* 24*   BMP Trend:   Results from last 7 days   Lab Units 08/13/24 0149 08/12/24  0231 08/11/24  0144 08/10/24  0252   GLUCOSE mg/dL 93 115* 108* 106*   CALCIUM mg/dL 9.0 8.8 8.6 9.0   SODIUM mmol/L 141 139 137 134*   POTASSIUM mmol/L 3.9 3.9 3.7 4.1   CO2 mmol/L 26 24 23 22   CHLORIDE mmol/L 108* 106 104 103   BUN mg/dL 14 12 13 13   CREATININE mg/dL 0.96 0.97 0.90 0.96   Liver " "Function Trend:   Results from last 7 days   Lab Units 08/12/24  0231 08/09/24  0234 08/07/24  0516   ALK PHOS U/L 139* 133* 144*   AST U/L 14 10 11   ALT U/L 21 20 24   BILIRUBIN TOTAL mg/dL 0.6 0.8 1.3*   Renal Lab Trend:   Results from last 7 days   Lab Units 08/13/24  0149 08/12/24  0231 08/11/24  0144 08/10/24  0252   POTASSIUM mmol/L 3.9 3.9 3.7 4.1   PHOSPHORUS mg/dL 3.9 3.4 3.0 3.4   SODIUM mmol/L 141 139 137 134*   MAGNESIUM mg/dL 1.63 1.68 1.90 1.57*   EGFR mL/min/1.73m*2 85 84 >90 85   BUN mg/dL 14 12 13 13   CREATININE mg/dL 0.96 0.97 0.90 0.96      Medications:  Scheduled medications  acyclovir, 400 mg, oral, q12h JORDYN  erythromycin, 1 cm, Both Eyes, Nightly  [START ON 8/14/2024] levoFLOXacin, 500 mg, oral, q24h JORDYN  lidocaine-diphenhydraMINE-Maalox 1:1:1, 10 mL, Swish & Spit, BID  loperamide, 2 mg, oral, 4x daily  lubricating eye drops, 1 drop, Both Eyes, Daily  nystatin, 4 mL, Swish & Swallow, BID  ondansetron, 8 mg, intravenous, q8h  pantoprazole, 40 mg, intravenous, BID  posaconazole, 300 mg, oral, Daily with breakfast  sertraline, 50 mg, oral, Daily    PRN medications  PRN medications: albuterol, alteplase, dextrose, diphenhydrAMINE, EPINEPHrine HCl, famotidine, HYDROmorphone, lidocaine-diphenhydraMINE-Maalox 1:1:1, methylPREDNISolone sodium succinate (PF), prochlorperazine, sodium chloride    I/O:   Last BM Date: 08/12/24; Stool Appearance: Loose, Soft (08/12/24 8438)    Dietary Orders (From admission, onward)       Start     Ordered    07/31/24 1221  Snacks  Until discontinued        Comments: please allow pt to order \"Uncrustable\" peanut butter and jelly sandwiches whenever she desires    07/31/24 1221    07/31/24 1220  Oral nutritional supplements  Until discontinued        Comments: please provide Ensure Clear (mixed berry) once/day--pt may have more than once/day, if requested   Question Answer Comment   Deliver with Lunch    Select supplement: Ensure Clear        07/31/24 1220    07/22/24 " 1231  Snacks  Until discontinued        Comments: pt may order from Extended Stay Menu + [employer] Snack List, if requested    07/22/24 1230    07/19/24 1729  Adult diet Low pathogen  Diet effective now        Question:  Diet type  Answer:  Low pathogen    07/19/24 1732                Estimated Needs:   Total Energy Estimated Needs (kCal): 3358-6851  Method for Estimating Needs: 61 x ~30-35  Total Protein Estimated Needs (g): 75+  Method for Estimating Needs: 61 x ~1.2g/kg+  Total Fluid Estimated Needs (mL): per MD/team        Nutrition Diagnosis   Malnutrition Diagnosis  Patient has Malnutrition Diagnosis: Yes  Diagnosis Status: Ongoing  Malnutrition Diagnosis: Moderate malnutrition related to chronic disease or condition (acute-on-chronic; h/o prolonged issues with n/v, newly diagnosed AML)  As Evidenced by: pt with areas of mild muscle and adipose losses, consuming <75% estimated energy needs x >1 month    Nutrition Diagnosis  Patient has Nutrition Diagnosis: Yes  Diagnosis Status (1): Ongoing  Nutrition Diagnosis 1: Inadequate oral intake  Related to (1): decreased PO with newly diagnosed AML  As Evidenced by (1): pt report of issues with n/v and variable/poor PO x ~4-5 mos PTA, consuming smaller amounts of food at a time    Additional Nutrition Diagnosis: Diagnosis 2  Diagnosis Status (2): Ongoing  Nutrition Diagnosis 2: Increased nutrient needs  Related to (2): increased metabolic demand  As Evidenced by (2): pt with new AML, undergoing treatment    Additional Assessment Information: Wt and PO remain stable--feel moderate malnutrition continues at this time. Overall nutritional status may improving, pending ongoing PO and wt status.     Considering hypermetabolic state with malnutrition, feel pt continues to benefit from use of oral nutrition supplements in-house; agreeable to continue order for Ensure Clear daily.       Nutrition Interventions/Recommendations     1. Continue Low Pathogen Diet, only as  tolerated  --RDN to continue order for Ensure Clear daily for added nutrition        Nutrition Prescription for Oral Nutrition: 0559-4906 kcals/day, 75+ g pro/day        Nutrition Interventions:   Food and/or Nutrient Delivery Interventions  Interventions: Meals and snacks, Medical food supplement  Meals and Snacks: General healthful diet  Goal: Low Pathogen Diet  Medical Food Supplement: Commercial beverage  Goal: Ensure Clear daily (240 kcals, 8g pro each)    Nutrition Education: Pt denied any questions for RDN at this time.       Nutrition Monitoring and Evaluation   Food/Nutrient Related History Monitoring  Monitoring and Evaluation Plan: Amount of food  Amount of Food: Estimated amout of food, Medical food intake  Criteria: consume >50% of meals/snacks/supplements    Body Composition/Growth/Weight History  Monitoring and Evaluation Plan: Weight  Weight: Measured weight  Criteria: maintain stable wt    Biochemical Data, Medical Tests and Procedures  Monitoring and Evaluation Plan: Electrolyte/renal panel  Electrolyte and Renal Panel: Magnesium, Phosphorus, Potassium, Sodium  Criteria: WNL          Time Spent (min): 30 minutes

## 2024-08-13 NOTE — CARE PLAN
The patient's goals for the shift include  rest well overnight    The clinical goals for the shift include patient will remain HDS    VSS; afebrile this shift.  Patient had no c/o pain; no c/o n/v/d, but does remain on scheduled zofran, which patient states is effective.  No c/o diarrhea; and patient refused imodium this shift. No IVF infusing overnight.  Up in room and in hallways ad emilee; remained safe and free of falls.       Problem: Nutrition  Goal: Adequate PO fluid intake  Outcome: Progressing  Goal: BG  mg/dL  Outcome: Progressing  Goal: Lab values WNL  Outcome: Progressing  Goal: Electrolytes WNL  Outcome: Progressing  Goal: Promote healing  Outcome: Progressing  Goal: Maintain stable weight  Outcome: Progressing     Problem: Pain - Adult  Goal: Verbalizes/displays adequate comfort level or baseline comfort level  Outcome: Progressing     Problem: Safety - Adult  Goal: Free from fall injury  Outcome: Progressing     Problem: PICC line  Goal: I will remain free from symptoms of infection  Outcome: Progressing

## 2024-08-14 ENCOUNTER — DOCUMENTATION (OUTPATIENT)
Dept: OPHTHALMOLOGY | Facility: HOSPITAL | Age: 24
End: 2024-08-14
Payer: COMMERCIAL

## 2024-08-14 LAB
ALBUMIN SERPL BCP-MCNC: 3.7 G/DL (ref 3.4–5)
ALP SERPL-CCNC: 124 U/L (ref 33–110)
ALT SERPL W P-5'-P-CCNC: 22 U/L (ref 7–45)
ANION GAP SERPL CALC-SCNC: 13 MMOL/L (ref 10–20)
AST SERPL W P-5'-P-CCNC: 15 U/L (ref 9–39)
BASOPHILS # BLD MANUAL: 0.02 X10*3/UL (ref 0–0.1)
BASOPHILS NFR BLD MANUAL: 0.9 %
BILIRUB DIRECT SERPL-MCNC: 0.1 MG/DL (ref 0–0.3)
BILIRUB SERPL-MCNC: 0.5 MG/DL (ref 0–1.2)
BUN SERPL-MCNC: 14 MG/DL (ref 6–23)
CALCIUM SERPL-MCNC: 8.9 MG/DL (ref 8.6–10.6)
CHLORIDE SERPL-SCNC: 105 MMOL/L (ref 98–107)
CO2 SERPL-SCNC: 25 MMOL/L (ref 21–32)
CREAT SERPL-MCNC: 1.06 MG/DL (ref 0.5–1.05)
EGFRCR SERPLBLD CKD-EPI 2021: 76 ML/MIN/1.73M*2
EOSINOPHIL # BLD MANUAL: 0 X10*3/UL (ref 0–0.7)
EOSINOPHIL NFR BLD MANUAL: 0 %
ERYTHROCYTE [DISTWIDTH] IN BLOOD BY AUTOMATED COUNT: 13.2 % (ref 11.5–14.5)
GLUCOSE SERPL-MCNC: 105 MG/DL (ref 74–99)
HCT VFR BLD AUTO: 24.1 % (ref 36–46)
HGB BLD-MCNC: 8.2 G/DL (ref 12–16)
IMM GRANULOCYTES # BLD AUTO: 0.06 X10*3/UL (ref 0–0.7)
IMM GRANULOCYTES NFR BLD AUTO: 3.6 % (ref 0–0.9)
LYMPHOCYTES # BLD MANUAL: 0.81 X10*3/UL (ref 1.2–4.8)
LYMPHOCYTES NFR BLD MANUAL: 47.5 %
MAGNESIUM SERPL-MCNC: 1.91 MG/DL (ref 1.6–2.4)
MCH RBC QN AUTO: 28.1 PG (ref 26–34)
MCHC RBC AUTO-ENTMCNC: 34 G/DL (ref 32–36)
MCV RBC AUTO: 83 FL (ref 80–100)
METAMYELOCYTES # BLD MANUAL: 0.04 X10*3/UL
METAMYELOCYTES NFR BLD MANUAL: 2.5 %
MONOCYTES # BLD MANUAL: 0.35 X10*3/UL (ref 0.1–1)
MONOCYTES NFR BLD MANUAL: 20.3 %
MYELOCYTES # BLD MANUAL: 0.04 X10*3/UL
MYELOCYTES NFR BLD MANUAL: 2.5 %
NEUTROPHILS # BLD MANUAL: 0.32 X10*3/UL (ref 1.2–7.7)
NEUTS BAND # BLD MANUAL: 0.22 X10*3/UL (ref 0–0.7)
NEUTS BAND NFR BLD MANUAL: 12.7 %
NEUTS SEG # BLD MANUAL: 0.1 X10*3/UL (ref 1.2–7)
NEUTS SEG NFR BLD MANUAL: 5.9 %
NRBC BLD-RTO: 1.2 /100 WBCS (ref 0–0)
OVALOCYTES BLD QL SMEAR: ABNORMAL
PHOSPHATE SERPL-MCNC: 3.9 MG/DL (ref 2.5–4.9)
PLATELET # BLD AUTO: 157 X10*3/UL (ref 150–450)
POTASSIUM SERPL-SCNC: 3.8 MMOL/L (ref 3.5–5.3)
PROMYELOCYTES # BLD MANUAL: 0.04 X10*3/UL
PROMYELOCYTES NFR BLD MANUAL: 2.6 %
PROT SERPL-MCNC: 5.8 G/DL (ref 6.4–8.2)
RBC # BLD AUTO: 2.92 X10*6/UL (ref 4–5.2)
RBC MORPH BLD: ABNORMAL
SODIUM SERPL-SCNC: 139 MMOL/L (ref 136–145)
TOTAL CELLS COUNTED BLD: 118
VARIANT LYMPHS # BLD MANUAL: 0.09 X10*3/UL (ref 0–0.5)
VARIANT LYMPHS NFR BLD: 5.1 %
WBC # BLD AUTO: 1.7 X10*3/UL (ref 4.4–11.3)

## 2024-08-14 PROCEDURE — 84100 ASSAY OF PHOSPHORUS: CPT | Performed by: NURSE PRACTITIONER

## 2024-08-14 PROCEDURE — 85027 COMPLETE CBC AUTOMATED: CPT | Performed by: PHYSICIAN ASSISTANT

## 2024-08-14 PROCEDURE — 2500000002 HC RX 250 W HCPCS SELF ADMINISTERED DRUGS (ALT 637 FOR MEDICARE OP, ALT 636 FOR OP/ED)

## 2024-08-14 PROCEDURE — 2500000004 HC RX 250 GENERAL PHARMACY W/ HCPCS (ALT 636 FOR OP/ED): Mod: JZ | Performed by: PHYSICIAN ASSISTANT

## 2024-08-14 PROCEDURE — 2500000004 HC RX 250 GENERAL PHARMACY W/ HCPCS (ALT 636 FOR OP/ED): Performed by: NURSE PRACTITIONER

## 2024-08-14 PROCEDURE — 2500000001 HC RX 250 WO HCPCS SELF ADMINISTERED DRUGS (ALT 637 FOR MEDICARE OP): Performed by: NURSE PRACTITIONER

## 2024-08-14 PROCEDURE — 83735 ASSAY OF MAGNESIUM: CPT | Performed by: PHYSICIAN ASSISTANT

## 2024-08-14 PROCEDURE — 2500000005 HC RX 250 GENERAL PHARMACY W/O HCPCS: Performed by: PHYSICIAN ASSISTANT

## 2024-08-14 PROCEDURE — 2500000005 HC RX 250 GENERAL PHARMACY W/O HCPCS: Performed by: INTERNAL MEDICINE

## 2024-08-14 PROCEDURE — 99233 SBSQ HOSP IP/OBS HIGH 50: CPT | Performed by: INTERNAL MEDICINE

## 2024-08-14 PROCEDURE — 1170000001 HC PRIVATE ONCOLOGY ROOM DAILY

## 2024-08-14 PROCEDURE — 2500000002 HC RX 250 W HCPCS SELF ADMINISTERED DRUGS (ALT 637 FOR MEDICARE OP, ALT 636 FOR OP/ED): Performed by: PHYSICIAN ASSISTANT

## 2024-08-14 PROCEDURE — 2500000004 HC RX 250 GENERAL PHARMACY W/ HCPCS (ALT 636 FOR OP/ED)

## 2024-08-14 PROCEDURE — 2500000001 HC RX 250 WO HCPCS SELF ADMINISTERED DRUGS (ALT 637 FOR MEDICARE OP): Performed by: PHYSICIAN ASSISTANT

## 2024-08-14 PROCEDURE — 82248 BILIRUBIN DIRECT: CPT | Performed by: NURSE PRACTITIONER

## 2024-08-14 PROCEDURE — 85007 BL SMEAR W/DIFF WBC COUNT: CPT | Performed by: PHYSICIAN ASSISTANT

## 2024-08-14 PROCEDURE — 2500000001 HC RX 250 WO HCPCS SELF ADMINISTERED DRUGS (ALT 637 FOR MEDICARE OP): Performed by: INTERNAL MEDICINE

## 2024-08-14 RX ORDER — PANTOPRAZOLE SODIUM 40 MG/1
40 TABLET, DELAYED RELEASE ORAL
Status: DISCONTINUED | OUTPATIENT
Start: 2024-08-14 | End: 2024-08-15 | Stop reason: HOSPADM

## 2024-08-14 RX ORDER — ONDANSETRON HYDROCHLORIDE 2 MG/ML
8 INJECTION, SOLUTION INTRAVENOUS 2 TIMES DAILY
Status: DISCONTINUED | OUTPATIENT
Start: 2024-08-14 | End: 2024-08-15 | Stop reason: HOSPADM

## 2024-08-14 RX ORDER — LOPERAMIDE HYDROCHLORIDE 2 MG/1
2 CAPSULE ORAL 2 TIMES DAILY
Status: DISCONTINUED | OUTPATIENT
Start: 2024-08-14 | End: 2024-08-15 | Stop reason: HOSPADM

## 2024-08-14 ASSESSMENT — COGNITIVE AND FUNCTIONAL STATUS - GENERAL
DAILY ACTIVITIY SCORE: 24
MOBILITY SCORE: 24
DAILY ACTIVITIY SCORE: 24
MOBILITY SCORE: 24
DAILY ACTIVITIY SCORE: 24
MOBILITY SCORE: 24

## 2024-08-14 ASSESSMENT — PAIN SCALES - GENERAL
PAINLEVEL_OUTOF10: 0 - NO PAIN
PAINLEVEL_OUTOF10: 0 - NO PAIN

## 2024-08-14 ASSESSMENT — PAIN - FUNCTIONAL ASSESSMENT: PAIN_FUNCTIONAL_ASSESSMENT: 0-10

## 2024-08-14 NOTE — CARE PLAN
The patient's goals for the shift include      The clinical goals for the shift include pt will remain safe and free from injury through shift      Problem: Safety - Adult  Goal: Free from fall injury  Outcome: Progressing     Problem: PICC line  Goal: I will remain free from symptoms of infection  Outcome: Progressing     Problem: Pain - Adult  Goal: Verbalizes/displays adequate comfort level or baseline comfort level  Outcome: Progressing

## 2024-08-14 NOTE — PROGRESS NOTES
"Ilda Peter is a 23 y.o. 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    Subjective   Pt sitting up on couch. Reports most symptoms have resolved. Awaiting count recovery. Deescalating Zofran & Imodium.    Denies SOB, HA, CP, chills, n/v/d/c, abdom pain, dysuria, tarry stools.    Objective     Physical Exam  Constitutional:       General: She is not in acute distress.     Appearance: She is not ill-appearing or toxic-appearing.   HENT:      Head: Normocephalic.      Nose: Nose normal. No congestion.      Mouth/Throat: left cheek erythema resolved     Mouth: Mucous membranes are moist.      Pharynx: Oropharynx is clear. No oropharyngeal exudate.   Eyes:      General: No scleral icterus.     Pupils: Pupils are equal, round, and reactive to light.   Cardiovascular:      Rate and Rhythm: Normal rate and regular rhythm.      Pulses: Normal pulses.      Heart sounds: Normal heart sounds. No murmur heard.  Pulmonary:      Effort: Pulmonary effort is normal.      Breath sounds: Normal breath sounds. No stridor. No wheezing or rhonchi.     Line with no signs of infection    Abdominal:      General: Bowel sounds are normal.      Palpations: Abdomen is soft.      Tenderness: There is no abdominal tenderness. There is no guarding.   Musculoskeletal:         General: No swelling or tenderness. Normal range of motion.      Cervical back: Normal range of motion.   Skin:     General: Skin is warm.      Capillary Refill: Capillary refill takes less than 2 seconds.      Coloration: Skin is pale.   Neurological:      Mental Status: She is alert and oriented to person, place, and time.     Last Recorded Vitals  Blood pressure 108/72, pulse 64, temperature 36.8 °C (98.2 °F), temperature source Temporal, resp. rate 18, height (S) 1.65 m (5' 4.96\"), weight 60.2 kg (132 lb 11.5 oz), last menstrual period 07/17/2024, SpO2 100%, not currently " breastfeeding.  Intake/Output last 3 Shifts:  No intake/output data recorded.    Relevant Results  Scheduled medications  acyclovir, 400 mg, oral, q12h JORDYN  erythromycin, 1 cm, Both Eyes, Nightly  lactated Ringer's, 1,000 mL, intravenous, Once  levoFLOXacin, 500 mg, oral, q24h JORDYN  loperamide, 2 mg, oral, BID  lubricating eye drops, 1 drop, Both Eyes, Daily  ondansetron, 8 mg, intravenous, BID  pantoprazole, 40 mg, oral, Daily before breakfast  posaconazole, 300 mg, oral, Daily with breakfast  sertraline, 50 mg, oral, Daily      Continuous medications     PRN medications  PRN medications: albuterol, alteplase, dextrose, diphenhydrAMINE, EPINEPHrine HCl, famotidine, HYDROmorphone, lidocaine-diphenhydraMINE-Maalox 1:1:1, methylPREDNISolone sodium succinate (PF), prochlorperazine, sodium chloride    Assessment/Plan   Active Problems:    Acute myeloid leukemia in adult (Multi)    Ms. Ilda Peter is a 24 yo female with newly diagnosed AML admitted on 7/19/24 for induction chemotherapy with Cytarabine and Idarubicin. No other significant PMHx.     Day 25 with induction 7+3 (Estefany) (started on 7/19/24)    ONC:  # Newly diagnosed AML, favorable risk  - Presented with n/v & dizziness worsening over the last few months  - Found to be pancytopenic in ED, was discharged home and had BMBx done outpatient.  - Bmbx (7/5/24): c/w hypercellular BM w/erythroid predominant hematopoiesis, dyserythropoiesis and increased blasts c/w high grade myeloid neoplasm.  - FISH neg  - Myeloid NGS: NPM1 (54%), NRAS (25%), PTPN11 (15%), IDH1 (2%)  - HLA typing & buccal done (7/24)  - Family typing info given  - Day 14 BMBx (8/1): empty, no evidence of AML  - Counts are starting to recover, plan discharge when ANC>500     CHEMO:  - Cytarabine (100 mg/m2) = 170 mg IV daily x 7 days (7/19)  - Idarubicin (12 mg/m2) = 20 mg IV daily x 3 days (7/19)   - HCG quantitative neg (7/19)     HEME:  # Pancytopenia d/t malignancy, known vaginal bleeding on admit  since resolved  - Keep Hgb > 7 and Plts > 10   # Transaminitis, likely d/t chemotherapy, resolved.  - Hemolysis workup neg  + Menses (Mild) 8/3     ID:  # Neutropenic fever 38.6 on 7/28  - Full workup done: Bld cx x2, CXR, UA were all negative   - Deescalated to Levaquin (8/7)  # Prophylaxis: ACV, Posaconazole     FEN/GI:  Admit wt: 61.9 kg, Current wt: 60.2 kg (8/14)  - Hydration and anti-emetics per chemotherapy order set.   - Started PPI on admit  # LANETTE  - PRN Compazine/PRN Zofran on admit.   - Scheduled Zofran Q8 (7/27) w/ symptom improvement.  # Diarrhea improving  - Cdiff (7/30) neg  - Scheduled ATC Imodium  # Mild mucositis. PRN BMX ordered.    NEURO  # MAHESH OU   # Diplopia   # Retinal Hemorrhages OU   - Opthalmology consulted 8/8, with recs to start artifical tears QID OU, Erythromycin ointment at bedtime, oupt FU with retina specialist.     CV:  - ECHO (7/16/23) w/EF of 55%     GYN:  # Prolonged Vaginal bleeding since early July, stable  - Depo shot last 05/2024   - Patient having scant vaginal bleeding at present  - Onco Fertility consulted (7/22) and labs sent.     ENDO:  # Steroid induced hyperglycemia, now resolved  - Lispro SS #1 with meals  - A1C (7/22): 6.0     PSYCH:  # Anxiety  - Cont home Sertraline     DISP:  - Full Code  - Primary Onc: Dr. Fields  - LINDA Bailon (7/17/24)  - Discharge pending count recovery, hopefully by the end of the week.  - Appt next Wed 8/21 for BMBx, Mal Heme (Hinali)  - Requested appt with Opthalm Clinic  - Homecare ordered for [place]    Pt seen, discussed, and examined with Dr. Lee Decker, APRN-CNP

## 2024-08-14 NOTE — CONSULTS
Reason for consult: Blurred vision and diplopia    Patient is a 23 year old female with a PMH of newly diagnosed AML (s/p induction Cytarabine and Idarubicin) and no significant POHx, ophthalmology consulted and following for intermittent blurred vision and FBS. Today, patient reports significant improvement in ocular symptoms. Feeling better overall.     Denies any flashes, floaters, curtains in vision, worsening tearing or discharge, ocular pain, double vision, or sudden vision loss.    Past Medical History: as above  Family History: reviewed and not pertinent to chief complaint  Medications: please refer to medication reconciliation  Allergies: please refer to patient allergy list    Base Eye Exam       Visual Acuity (Snellen - Linear)         Right Left    Near sc 20/20 20/20              Tonometry (Tonopen)         Right Left    Pressure 15 14              Pupils         Dark Light Shape React APD    Right 5 3 Round Brisk None    Left 5 3 Round Brisk None              Extraocular Movement         Right Left     Full, Ortho Full, Ortho   Ortho in all positions of gaze with cover uncover testing             Neuro/Psych       Oriented x3: Yes                  Additional Tests       Color         Right Left    Ishihara 11/11 11/11                  Slit Lamp and Fundus Exam       External Exam         Right Left    External Normal Normal              Slit Lamp Exam         Right Left    Lids/Lashes Normal Normal    Conjunctiva/Sclera White and quiet White and quiet    Cornea Trace to no SPK Trace to no SPK    Anterior Chamber Deep and quiet Deep and quiet    Iris Round and reactive Round and reactive    Lens Clear Clear    Anterior Vitreous Normal Normal                    A&P: Patient is a 23 year old female with a PMH of newly diagnosed AML (s/p induction Cytarabine and Idarubicin) and no significant POHx, ophthalmology consulted and following for intermittent blurred vision and FBS.     UPDATE 8/14/24:  - SLE  findings with significant improvement OU after usage of artificial tears, trace to no SPK OU on staining.   - Given upcoming discharge, okay to follow up with outpatient Ophthalmology.  - Please page when preparing for discharge or if symptoms worsen.    #MAHESH OU, improving  - Patient s/p induction chemotherapy with Cytarabine and Idarubicin. A known side effect of Cytarabine is keratoconjunctivitis. Pt's symptoms of intermittent blurriness and monocular diplopia, and findings of dryness in both eyes, in conjunction with symptom development concurrently with treatment, are likely consistent with very mild keratoconjunctivitis from this agent  - Exam today continuing to demonstrate excellent VA and pressure OU  - SLE findings with significant improvement OU, trace to no SPK OU on staining  - Continue with artifical tears QID OU   - Continue Erythromycin ointment at bedtime OU   - Ophthalmology will follow peripherally      #Diplopia, improving  - Patient not endorsing double vision on exam today  - Diplopia likely monocular, given improvement with artificial tears   - No evidence of misalignment or cranial nerve palsy on initial examination, findings not concerning for MG or HAROLDO.   - EOMs full, ortho in all positions of gaze with cover uncover testing on initial exam  - Pt instructed to alert primary team for symptomatic worsening     #Retinal Hemorrhages OU  -Previous DFE revealing few small hemorrhages OU, likely attributable to patient's profound anemia and thrombocytopenia  -Staffed with retina fellow Dr. Palomino, given very mild findings, will monitor  -Recommend outpatient follow up on discharge, unless acute worsening in vision inpatient        Nohemi Guerrero MD  Ophthalmology Intern     Patient seen, discussed, and examined with senior resident Dr. Sami Mcmullen, PGY3.     Note not final until signed by attending physician    Ophthalmology Adult Pager: [phone]  Ophthalmology Peds Pager: [phone]    For adult follow up  appts, call [phone]  For pediatric follow up appts, call [phone]

## 2024-08-15 ENCOUNTER — LAB REQUISITION (OUTPATIENT)
Dept: LAB | Facility: CLINIC | Age: 24
End: 2024-08-15
Payer: COMMERCIAL

## 2024-08-15 ENCOUNTER — TELEPHONE (OUTPATIENT)
Dept: HOME HEALTH SERVICES | Facility: HOME HEALTH | Age: 24
End: 2024-08-15
Payer: COMMERCIAL

## 2024-08-15 ENCOUNTER — PHARMACY VISIT (OUTPATIENT)
Dept: PHARMACY | Facility: CLINIC | Age: 24
End: 2024-08-15
Payer: MEDICAID

## 2024-08-15 ENCOUNTER — HOME HEALTH ADMISSION (OUTPATIENT)
Dept: HOME HEALTH SERVICES | Facility: HOME HEALTH | Age: 24
End: 2024-08-15
Payer: COMMERCIAL

## 2024-08-15 ENCOUNTER — HOME INFUSION (OUTPATIENT)
Dept: INFUSION THERAPY | Age: 24
End: 2024-08-15
Payer: COMMERCIAL

## 2024-08-15 VITALS
BODY MASS INDEX: 22.41 KG/M2 | HEIGHT: 65 IN | DIASTOLIC BLOOD PRESSURE: 78 MMHG | HEART RATE: 77 BPM | TEMPERATURE: 97.2 F | OXYGEN SATURATION: 100 % | WEIGHT: 134.48 LBS | SYSTOLIC BLOOD PRESSURE: 127 MMHG | RESPIRATION RATE: 18 BRPM

## 2024-08-15 DIAGNOSIS — D61.818 OTHER PANCYTOPENIA (MULTI): ICD-10-CM

## 2024-08-15 LAB
ALBUMIN SERPL BCP-MCNC: 3.6 G/DL (ref 3.4–5)
ANION GAP SERPL CALC-SCNC: 13 MMOL/L (ref 10–20)
BASOPHILS # BLD MANUAL: 0.02 X10*3/UL (ref 0–0.1)
BASOPHILS NFR BLD MANUAL: 0.8 %
BLASTS # BLD MANUAL: 0.31 X10*3/UL
BLASTS NFR BLD MANUAL: 10.9 %
BUN SERPL-MCNC: 12 MG/DL (ref 6–23)
CALCIUM SERPL-MCNC: 8.9 MG/DL (ref 8.6–10.6)
CELL COUNT (BLOOD): 1.1 X10*3/UL
CELL POPULATIONS: NORMAL
CHLORIDE SERPL-SCNC: 106 MMOL/L (ref 98–107)
CO2 SERPL-SCNC: 25 MMOL/L (ref 21–32)
CREAT SERPL-MCNC: 1.08 MG/DL (ref 0.5–1.05)
DIAGNOSIS: NORMAL
EGFRCR SERPLBLD CKD-EPI 2021: 74 ML/MIN/1.73M*2
EOSINOPHIL # BLD MANUAL: 0 X10*3/UL (ref 0–0.7)
EOSINOPHIL NFR BLD MANUAL: 0 %
ERYTHROCYTE [DISTWIDTH] IN BLOOD BY AUTOMATED COUNT: 13.6 % (ref 11.5–14.5)
FLOW DIFFERENTIAL: NORMAL
FLOW TEST ORDERED: NORMAL
GLUCOSE SERPL-MCNC: 103 MG/DL (ref 74–99)
HCT VFR BLD AUTO: 25.1 % (ref 36–46)
HGB BLD-MCNC: 8.5 G/DL (ref 12–16)
HLA CLS I TYP PNL BLD/T DONR HIGH RES: NORMAL
HLA RESULTS: NORMAL
HLA-DP2 QL: NORMAL
HLA-DQB1 HIGH RES: NORMAL
HLA-DRB1 HIGH RES: NORMAL
IMM GRANULOCYTES # BLD AUTO: 0.15 X10*3/UL (ref 0–0.7)
IMM GRANULOCYTES NFR BLD AUTO: 5.5 % (ref 0–0.9)
LAB TEST METHOD: NORMAL
LYMPHOCYTES # BLD MANUAL: 1.08 X10*3/UL (ref 1.2–4.8)
LYMPHOCYTES NFR BLD MANUAL: 38.7 %
MAGNESIUM SERPL-MCNC: 1.7 MG/DL (ref 1.6–2.4)
MCH RBC QN AUTO: 28.5 PG (ref 26–34)
MCHC RBC AUTO-ENTMCNC: 33.9 G/DL (ref 32–36)
MCV RBC AUTO: 84 FL (ref 80–100)
METAMYELOCYTES # BLD MANUAL: 0.02 X10*3/UL
METAMYELOCYTES NFR BLD MANUAL: 0.8 %
MONOCYTES # BLD MANUAL: 0.33 X10*3/UL (ref 0.1–1)
MONOCYTES NFR BLD MANUAL: 11.8 %
MYELOCYTES # BLD MANUAL: 0.05 X10*3/UL
MYELOCYTES NFR BLD MANUAL: 1.7 %
NEUTROPHILS # BLD MANUAL: 0.99 X10*3/UL (ref 1.2–7.7)
NEUTS BAND # BLD MANUAL: 0.07 X10*3/UL (ref 0–0.7)
NEUTS BAND NFR BLD MANUAL: 2.5 %
NEUTS SEG # BLD MANUAL: 0.92 X10*3/UL (ref 1.2–7)
NEUTS SEG NFR BLD MANUAL: 32.8 %
NRBC BLD-RTO: 2.2 /100 WBCS (ref 0–0)
NUMBER OF CELLS COLLECTED: NORMAL PER TUBE
PATH REPORT.TOTAL CANCER: NORMAL
PHOSPHATE SERPL-MCNC: 3.6 MG/DL (ref 2.5–4.9)
PLATELET # BLD AUTO: 226 X10*3/UL (ref 150–450)
POLYCHROMASIA BLD QL SMEAR: ABNORMAL
POTASSIUM SERPL-SCNC: 3.8 MMOL/L (ref 3.5–5.3)
RBC # BLD AUTO: 2.98 X10*6/UL (ref 4–5.2)
RBC MORPH BLD: ABNORMAL
SIGNATURE COMMENT: NORMAL
SODIUM SERPL-SCNC: 140 MMOL/L (ref 136–145)
SPECIMEN VIABILITY: NORMAL
TOTAL CELLS COUNTED BLD: 119
WBC # BLD AUTO: 2.8 X10*3/UL (ref 4.4–11.3)

## 2024-08-15 PROCEDURE — 85027 COMPLETE CBC AUTOMATED: CPT | Performed by: PHYSICIAN ASSISTANT

## 2024-08-15 PROCEDURE — 83735 ASSAY OF MAGNESIUM: CPT | Performed by: PHYSICIAN ASSISTANT

## 2024-08-15 PROCEDURE — 2500000001 HC RX 250 WO HCPCS SELF ADMINISTERED DRUGS (ALT 637 FOR MEDICARE OP): Performed by: PHYSICIAN ASSISTANT

## 2024-08-15 PROCEDURE — 2500000001 HC RX 250 WO HCPCS SELF ADMINISTERED DRUGS (ALT 637 FOR MEDICARE OP): Performed by: NURSE PRACTITIONER

## 2024-08-15 PROCEDURE — 2500000002 HC RX 250 W HCPCS SELF ADMINISTERED DRUGS (ALT 637 FOR MEDICARE OP, ALT 636 FOR OP/ED)

## 2024-08-15 PROCEDURE — 2500000005 HC RX 250 GENERAL PHARMACY W/O HCPCS: Performed by: INTERNAL MEDICINE

## 2024-08-15 PROCEDURE — 2500000004 HC RX 250 GENERAL PHARMACY W/ HCPCS (ALT 636 FOR OP/ED): Performed by: NURSE PRACTITIONER

## 2024-08-15 PROCEDURE — 82565 ASSAY OF CREATININE: CPT | Performed by: NURSE PRACTITIONER

## 2024-08-15 PROCEDURE — 2500000002 HC RX 250 W HCPCS SELF ADMINISTERED DRUGS (ALT 637 FOR MEDICARE OP, ALT 636 FOR OP/ED): Performed by: PHYSICIAN ASSISTANT

## 2024-08-15 PROCEDURE — 85007 BL SMEAR W/DIFF WBC COUNT: CPT | Performed by: PHYSICIAN ASSISTANT

## 2024-08-15 RX ORDER — HEPARIN SODIUM,PORCINE/PF 10 UNIT/ML
5 SYRINGE (ML) INTRAVENOUS DAILY
Start: 2024-08-15

## 2024-08-15 RX ORDER — LEVOFLOXACIN 500 MG/1
500 TABLET, FILM COATED ORAL DAILY
Qty: 3 TABLET | Refills: 0 | Status: SHIPPED | OUTPATIENT
Start: 2024-08-15 | End: 2024-08-18

## 2024-08-15 RX ORDER — ONDANSETRON HYDROCHLORIDE 8 MG/1
8 TABLET, FILM COATED ORAL EVERY 8 HOURS PRN
Qty: 30 TABLET | Refills: 0 | Status: SHIPPED | OUTPATIENT
Start: 2024-08-15 | End: 2024-09-14

## 2024-08-15 RX ORDER — SODIUM CHLORIDE 0.9 % (FLUSH) 0.9 %
10 SYRINGE (ML) INJECTION DAILY
Start: 2024-08-15

## 2024-08-15 ASSESSMENT — COGNITIVE AND FUNCTIONAL STATUS - GENERAL
DAILY ACTIVITIY SCORE: 24
MOBILITY SCORE: 24

## 2024-08-15 ASSESSMENT — PAIN SCALES - GENERAL: PAINLEVEL_OUTOF10: 0 - NO PAIN

## 2024-08-15 NOTE — PROGRESS NOTES
Ilda Peter is a 23 y.o. patient discharged from hospital to [facility] for line care.  Pt has LINDA Bailon placed 7/17 with orders to flush per [facility] protocol    Pharmacy to dispense 1 week of flushes and supplies for double lumen line while waiting for insurance benefits to be confirmed. Will deliver longer supply at next delivery.    Pt care rep to maintain cath care needs for pt.    Follow up 8/22 to check patient needs

## 2024-08-15 NOTE — DISCHARGE SUMMARY
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 Had outpatient BMBx done, found to have favorable risk AML (NPM1 positive). Pt started induction with 7+3 (on 7/29/24). Day 14 Bmbx empty.    Hospital course:  - Neutropenic fever on 7/28. Infectious work up neg.  - Chemo induced diarrhea. Cdiff neg. Uses Imodium as needed.    Will go home on ACV prophy    DISPO:  - Bailon (placed 7/17/24)  - Primary Onc: Diamond, requested  - Has recovery BMBx on 8/21    MsLevar Peter is a 24 yo female with newly diagnosed AML admitted on 7/19/24 for induction chemotherapy with Cytarabine and Idarubicin. Hospital course included Neutropenic fever on 7/28. Infectious work up neg. Mucositis, resolved. Diarrhea resolved. The pt was doing well on Day 26 with induction 7+3 (Estefany) (started on 7/19/24), ROS is neg. Counts improving, WBC 2.8, Hgb 8.5, PLTs 226, . The patient was felling comfortable for discharge home with home health care today on Levaquin x 3 days and Acyclovir.       Pertinent Physical Exam At Time of Discharge  Physical Exam  Constitutional:       General: She is not in acute distress.     Appearance: She is not ill-appearing or toxic-appearing.   HENT:      Head: Normocephalic.      Nose: Nose normal. No congestion.      Mouth/Throat: no active disease     Mouth: Mucous membranes are moist.      Pharynx: Oropharynx is clear. No  oropharyngeal exudate.   Eyes:      General: No scleral icterus.     Pupils: Pupils are equal, round, and reactive to light.   Cardiovascular:      Rate and Rhythm: Normal rate and regular rhythm.      Pulses: Normal pulses.      Heart sounds: Normal heart sounds. No murmur heard.  Pulmonary:      Effort: Pulmonary effort is normal.      Breath sounds: Normal breath sounds. No stridor. No wheezing or rhonchi.     Line with no signs of infection    Abdominal:      General: Bowel sounds are normal.      Palpations: Abdomen is soft.      Tenderness: There is no abdominal tenderness. There is no guarding.   Musculoskeletal:         General: No swelling or tenderness. Normal range of motion.      Cervical back: Normal range of motion.   Skin:     General: Skin is warm.      Capillary Refill: Capillary refill takes less than 2 seconds.      Coloration: Skin is pale.   Neurological:      Mental Status: She is alert and oriented to person, place, and time.   Home Medications     Medication List      START taking these medications     acyclovir 400 mg tablet; Commonly known as: Zovirax; Take 1 tablet (400   mg) by mouth every 12 hours.   erythromycin 5 mg/gram (0.5 %) ophthalmic ointment; Commonly known as:   Romycin; Apply to both eyes once daily at bedtime. Apply Amount per Dose:   0.5 inch (~1 cm) per dose.   heparin flush 10 unit/mL injection; 5 mL (50 Units) by intra-catheter   route once daily. Per lumen   Lubricant Eye Drops 0.5 % ophthalmic solution; Generic drug: lubricating   eye drops; Administer 1 drop into both eyes once daily.   Normal Saline Flush flush; Generic drug: sodium chloride 0.9%; Infuse 10   mL into a venous catheter once daily. Per lumen   ondansetron 8 mg tablet; Commonly known as: Zofran; Take 1 tablet (8 mg)   by mouth every 8 hours if needed for nausea or vomiting.     CONTINUE taking these medications     sertraline 50 mg tablet; Commonly known as: Zoloft; Take 1 tablet (50   mg) by mouth once  daily.     STOP taking these medications     allopurinol 300 mg tablet; Commonly known as: Zyloprim       Outpatient Follow-Up  Future Appointments   Date Time Provider Department Center   8/21/2024  8:00 AM INF 30 [facility]       Barb Benson MD

## 2024-08-15 NOTE — CARE PLAN
The patient's goals for the shift include      The clinical goals for the shift include Patient will report no nausea through end of shift    Patient discharged to home via private vehicle.  Patient CVC dressing changed per protocol prior to discharge.  Discharge instructions reviewed with patient and friend.  All questions answered.  Patient remained free from injury and falls through discharge.

## 2024-08-15 NOTE — PROGRESS NOTES
"Ilda Peter is a 23 y.o. 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    Subjective   Doing well, no active symptoms.    Denies SOB, HA, CP, chills, n/v/d/c, abdom pain, dysuria, tarry stools.    Objective     Physical Exam  Constitutional:       General: She is not in acute distress.     Appearance: She is not ill-appearing or toxic-appearing.   HENT:      Head: Normocephalic.      Nose: Nose normal. No congestion.      Mouth/Throat: no active disease     Mouth: Mucous membranes are moist.      Pharynx: Oropharynx is clear. No oropharyngeal exudate.   Eyes:      General: No scleral icterus.     Pupils: Pupils are equal, round, and reactive to light.   Cardiovascular:      Rate and Rhythm: Normal rate and regular rhythm.      Pulses: Normal pulses.      Heart sounds: Normal heart sounds. No murmur heard.  Pulmonary:      Effort: Pulmonary effort is normal.      Breath sounds: Normal breath sounds. No stridor. No wheezing or rhonchi.     Line with no signs of infection    Abdominal:      General: Bowel sounds are normal.      Palpations: Abdomen is soft.      Tenderness: There is no abdominal tenderness. There is no guarding.   Musculoskeletal:         General: No swelling or tenderness. Normal range of motion.      Cervical back: Normal range of motion.   Skin:     General: Skin is warm.      Capillary Refill: Capillary refill takes less than 2 seconds.      Coloration: Skin is pale.   Neurological:      Mental Status: She is alert and oriented to person, place, and time.     Last Recorded Vitals  Blood pressure 136/74, pulse 61, temperature 36 °C (96.8 °F), temperature source Temporal, resp. rate 18, height (S) 1.65 m (5' 4.96\"), weight 61 kg (134 lb 7.7 oz), last menstrual period 07/17/2024, SpO2 100%, not currently breastfeeding.  Intake/Output last 3 Shifts:  I/O last 3 completed shifts:  In: 1000 (16.6 mL/kg) [IV Piggyback:1000]  Out: 0 " (0 mL/kg)   Weight: 60.2 kg     Relevant Results  Scheduled medications  acyclovir, 400 mg, oral, q12h JORDYN  erythromycin, 1 cm, Both Eyes, Nightly  levoFLOXacin, 500 mg, oral, q24h JORDYN  loperamide, 2 mg, oral, BID  lubricating eye drops, 1 drop, Both Eyes, Daily  ondansetron, 8 mg, intravenous, BID  pantoprazole, 40 mg, oral, Daily before breakfast  posaconazole, 300 mg, oral, Daily with breakfast  sertraline, 50 mg, oral, Daily      Continuous medications     PRN medications  PRN medications: albuterol, alteplase, dextrose, diphenhydrAMINE, EPINEPHrine HCl, famotidine, HYDROmorphone, lidocaine-diphenhydraMINE-Maalox 1:1:1, methylPREDNISolone sodium succinate (PF), prochlorperazine, sodium chloride    Assessment/Plan   Active Problems:    Acute myeloid leukemia in adult (Multi)    Ms. Ilda Peter is a 22 yo female with newly diagnosed AML admitted on 7/19/24 for induction chemotherapy with Cytarabine and Idarubicin. No other significant PMHx.     Day 26 with induction 7+3 (Estefany) (started on 7/19/24)    ONC:  # Newly diagnosed AML, favorable risk  - Presented with n/v & dizziness worsening over the last few months  - Found to be pancytopenic in ED, was discharged home and had BMBx done outpatient.  - Bmbx (7/5/24): c/w hypercellular BM w/erythroid predominant hematopoiesis, dyserythropoiesis and increased blasts c/w high grade myeloid neoplasm.  - FISH neg  - Myeloid NGS: NPM1 (54%), NRAS (25%), PTPN11 (15%), IDH1 (2%)  - HLA typing & buccal done (7/24)  - Family typing info given  - Day 14 BMBx (8/1): empty, no evidence of AML  - Counts are starting to recover, plan discharge when ANC>500     CHEMO:  - Cytarabine (100 mg/m2) = 170 mg IV daily x 7 days (7/19)  - Idarubicin (12 mg/m2) = 20 mg IV daily x 3 days (7/19)   - HCG quantitative neg (7/19)     HEME:  # Pancytopenia d/t malignancy, known vaginal bleeding on admit since resolved  - Keep Hgb > 7 and Plts > 10   # Transaminitis, likely d/t chemotherapy,  resolved.  - Hemolysis workup neg  + Menses (Mild) 8/3     ID:  # Neutropenic fever 38.6 on 7/28  - Full workup done: Bld cx x2, CXR, UA were all negative   - Deescalated to Levaquin (8/7)  # Prophylaxis: ACV, Posaconazole     FEN/GI:  Admit wt: 61.9 kg, Current wt: 60.2 kg (8/14)  - Hydration and anti-emetics per chemotherapy order set.   - Started PPI on admit  # LANETTE  - PRN Compazine/PRN Zofran on admit.   - Scheduled Zofran Q8 (7/27) w/ symptom improvement.  # Diarrhea improving  - Cdiff (7/30) neg  - Scheduled ATC Imodium  # Mild mucositis. PRN BMX ordered.    NEURO  # MAHESH OU   # Diplopia   # Retinal Hemorrhages OU   - Opthalmology consulted 8/8, with recs to start artifical tears QID OU, Erythromycin ointment at bedtime, oupt FU with retina specialist.     CV:  - ECHO (7/16/23) w/EF of 55%     GYN:  # Prolonged Vaginal bleeding since early July, stable  - Depo shot last 05/2024   - Patient having scant vaginal bleeding at present  - Onco Fertility consulted (7/22) and labs sent.     ENDO:  # Steroid induced hyperglycemia, now resolved  - Lispro SS #1 with meals  - A1C (7/22): 6.0     PSYCH:  # Anxiety  - Cont home Sertraline     DISP:  - Full Code  - Primary Onc: Dr. Fields  - LINDA Bailon (7/17/24)  - Discharge pending count recovery, hopefully by the end of the week.  - Appt next Wed 8/21 for BMBx, Mal Heme (Hinali)  - Requested appt with Opthalm Clinic  - Homecare ordered for Adamaris    Pt seen, discussed, and examined with Dr. Lee Benson MD

## 2024-08-15 NOTE — TELEPHONE ENCOUNTER
TC with mom Lashon to discuss flush orders for line care only home care order.  Lashon states she is willing and able to assist patient with flushing.

## 2024-08-16 ENCOUNTER — HOME CARE VISIT (OUTPATIENT)
Dept: HOME HEALTH SERVICES | Facility: HOME HEALTH | Age: 24
End: 2024-08-16
Payer: COMMERCIAL

## 2024-08-16 VITALS
OXYGEN SATURATION: 99 % | TEMPERATURE: 96.8 F | DIASTOLIC BLOOD PRESSURE: 62 MMHG | RESPIRATION RATE: 18 BRPM | HEART RATE: 70 BPM | SYSTOLIC BLOOD PRESSURE: 110 MMHG

## 2024-08-16 LAB
ATRIAL RATE: 114 BPM
P AXIS: 74 DEGREES
P OFFSET: 190 MS
P ONSET: 137 MS
PR INTERVAL: 170 MS
Q ONSET: 222 MS
QRS COUNT: 19 BEATS
QRS DURATION: 78 MS
QT INTERVAL: 314 MS
QTC CALCULATION(BAZETT): 432 MS
QTC FREDERICIA: 388 MS
R AXIS: 78 DEGREES
T AXIS: 65 DEGREES
T OFFSET: 379 MS
VENTRICULAR RATE: 114 BPM

## 2024-08-16 PROCEDURE — G0299 HHS/HOSPICE OF RN EA 15 MIN: HCPCS

## 2024-08-16 ASSESSMENT — ENCOUNTER SYMPTOMS
PAIN: 1
DEPRESSION: 0
PAIN LOCATION - PAIN SEVERITY: 2/10
OCCASIONAL FEELINGS OF UNSTEADINESS: 0
PAIN LOCATION - PAIN FREQUENCY: INTERMITTENT
LAST BOWEL MOVEMENT: 67068
LOWEST PAIN SEVERITY IN PAST 24 HOURS: 0/10
PAIN LOCATION - PAIN QUALITY: ACHES
HIGHEST PAIN SEVERITY IN PAST 24 HOURS: 2/10
APPETITE LEVEL: FAIR
LOSS OF SENSATION IN FEET: 0
SUBJECTIVE PAIN PROGRESSION: UNCHANGED
PAIN LOCATION: BACK
PAIN SEVERITY GOAL: 0/10
PERSON REPORTING PAIN: PATIENT
CHANGE IN APPETITE: UNCHANGED

## 2024-08-16 ASSESSMENT — PAIN SCALES - PAIN ASSESSMENT IN ADVANCED DEMENTIA (PAINAD)
NEGVOCALIZATION: 0
BODYLANGUAGE: 0
FACIALEXPRESSION: 0 - SMILING OR INEXPRESSIVE.
CONSOLABILITY: 0 - NO NEED TO CONSOLE.
CONSOLABILITY: 0
NEGVOCALIZATION: 0 - NONE.
FACIALEXPRESSION: 0
BODYLANGUAGE: 0 - RELAXED.
BREATHING: 0
TOTALSCORE: 0

## 2024-08-16 ASSESSMENT — ACTIVITIES OF DAILY LIVING (ADL)
OASIS_M1830: 03
ENTERING_EXITING_HOME: MODERATE ASSIST

## 2024-08-16 NOTE — HOME HEALTH
SKILLED NURSING VISIT FOR ADMISSION TO HOMECARE SERVICES ASSESSMENT TEACHING OF MEDICATIONS DISEASE PROCESS AND INSTRUCTION IN DAILY CENTRAL LINE FLUSH. PT TOLERATED PROCEDURES WELL.PT AND MOTHER INDEPENDENT IN ALL FACETS OF HIVT.

## 2024-08-21 ENCOUNTER — PROCEDURE VISIT (OUTPATIENT)
Dept: HEMATOLOGY/ONCOLOGY | Facility: HOSPITAL | Age: 24
End: 2024-08-21
Payer: COMMERCIAL

## 2024-08-21 ENCOUNTER — OFFICE VISIT (OUTPATIENT)
Dept: HEMATOLOGY/ONCOLOGY | Facility: HOSPITAL | Age: 24
End: 2024-08-21
Payer: COMMERCIAL

## 2024-08-21 VITALS
HEART RATE: 72 BPM | OXYGEN SATURATION: 100 % | SYSTOLIC BLOOD PRESSURE: 125 MMHG | TEMPERATURE: 97.3 F | WEIGHT: 140.87 LBS | DIASTOLIC BLOOD PRESSURE: 64 MMHG | RESPIRATION RATE: 16 BRPM | BODY MASS INDEX: 23.47 KG/M2

## 2024-08-21 DIAGNOSIS — C92.00 ACUTE MYELOID LEUKEMIA IN ADULT (MULTI): ICD-10-CM

## 2024-08-21 DIAGNOSIS — D84.9 IMMUNOSUPPRESSED STATUS (MULTI): ICD-10-CM

## 2024-08-21 DIAGNOSIS — E80.6 INDIRECT HYPERBILIRUBINEMIA: ICD-10-CM

## 2024-08-21 DIAGNOSIS — C92.00 ACUTE MYELOID LEUKEMIA NOT HAVING ACHIEVED REMISSION (MULTI): Primary | ICD-10-CM

## 2024-08-21 DIAGNOSIS — C92.00 ACUTE MYELOID LEUKEMIA IN ADULT (MULTI): Primary | ICD-10-CM

## 2024-08-21 DIAGNOSIS — H35.63 RETINAL HEMORRHAGE OF BOTH EYES: ICD-10-CM

## 2024-08-21 LAB
ALBUMIN SERPL BCP-MCNC: 3.9 G/DL (ref 3.4–5)
ALP SERPL-CCNC: 90 U/L (ref 33–110)
ALT SERPL W P-5'-P-CCNC: 24 U/L (ref 7–45)
ANION GAP SERPL CALC-SCNC: 12 MMOL/L (ref 10–20)
AST SERPL W P-5'-P-CCNC: 19 U/L (ref 9–39)
BASOPHILS # BLD AUTO: 0.02 X10*3/UL (ref 0–0.1)
BASOPHILS NFR BLD AUTO: 0.4 %
BILIRUB SERPL-MCNC: 0.6 MG/DL (ref 0–1.2)
BUN SERPL-MCNC: 11 MG/DL (ref 6–23)
CALCIUM SERPL-MCNC: 9.3 MG/DL (ref 8.6–10.3)
CHLORIDE SERPL-SCNC: 106 MMOL/L (ref 98–107)
CO2 SERPL-SCNC: 29 MMOL/L (ref 21–32)
CREAT SERPL-MCNC: 1 MG/DL (ref 0.5–1.05)
DACRYOCYTES BLD QL SMEAR: NORMAL
EGFRCR SERPLBLD CKD-EPI 2021: 81 ML/MIN/1.73M*2
EOSINOPHIL # BLD AUTO: 0 X10*3/UL (ref 0–0.7)
EOSINOPHIL NFR BLD AUTO: 0 %
ERYTHROCYTE [DISTWIDTH] IN BLOOD BY AUTOMATED COUNT: 20.7 % (ref 11.5–14.5)
GLUCOSE SERPL-MCNC: 93 MG/DL (ref 74–99)
HCT VFR BLD AUTO: 30.8 % (ref 36–46)
HGB BLD-MCNC: 10.3 G/DL (ref 12–16)
IMM GRANULOCYTES # BLD AUTO: 0.04 X10*3/UL (ref 0–0.7)
IMM GRANULOCYTES NFR BLD AUTO: 0.9 % (ref 0–0.9)
LYMPHOCYTES # BLD AUTO: 0.87 X10*3/UL (ref 1.2–4.8)
LYMPHOCYTES NFR BLD AUTO: 18.8 %
MAGNESIUM SERPL-MCNC: 1.79 MG/DL (ref 1.6–2.4)
MCH RBC QN AUTO: 29.9 PG (ref 26–34)
MCHC RBC AUTO-ENTMCNC: 33.4 G/DL (ref 32–36)
MCV RBC AUTO: 90 FL (ref 80–100)
MONOCYTES # BLD AUTO: 0.86 X10*3/UL (ref 0.1–1)
MONOCYTES NFR BLD AUTO: 18.6 %
NEUTROPHILS # BLD AUTO: 2.83 X10*3/UL (ref 1.2–7.7)
NEUTROPHILS NFR BLD AUTO: 61.3 %
NRBC BLD-RTO: 0 /100 WBCS (ref 0–0)
OVALOCYTES BLD QL SMEAR: NORMAL
PLATELET # BLD AUTO: 300 X10*3/UL (ref 150–450)
POLYCHROMASIA BLD QL SMEAR: NORMAL
POTASSIUM SERPL-SCNC: 4 MMOL/L (ref 3.5–5.3)
PROT SERPL-MCNC: 6.1 G/DL (ref 6.4–8.2)
RBC # BLD AUTO: 3.44 X10*6/UL (ref 4–5.2)
RBC MORPH BLD: NORMAL
SODIUM SERPL-SCNC: 143 MMOL/L (ref 136–145)
WBC # BLD AUTO: 4.6 X10*3/UL (ref 4.4–11.3)

## 2024-08-21 PROCEDURE — 36591 DRAW BLOOD OFF VENOUS DEVICE: CPT

## 2024-08-21 PROCEDURE — 99215 OFFICE O/P EST HI 40 MIN: CPT | Performed by: STUDENT IN AN ORGANIZED HEALTH CARE EDUCATION/TRAINING PROGRAM

## 2024-08-21 PROCEDURE — 38222 DX BONE MARROW BX & ASPIR: CPT | Mod: LT | Performed by: PHYSICIAN ASSISTANT

## 2024-08-21 PROCEDURE — 83735 ASSAY OF MAGNESIUM: CPT

## 2024-08-21 PROCEDURE — 88184 FLOWCYTOMETRY/ TC 1 MARKER: CPT | Mod: TC | Performed by: PHYSICIAN ASSISTANT

## 2024-08-21 PROCEDURE — 96374 THER/PROPH/DIAG INJ IV PUSH: CPT | Mod: INF

## 2024-08-21 PROCEDURE — 2500000004 HC RX 250 GENERAL PHARMACY W/ HCPCS (ALT 636 FOR OP/ED)

## 2024-08-21 PROCEDURE — 85025 COMPLETE CBC W/AUTO DIFF WBC: CPT

## 2024-08-21 PROCEDURE — 85097 BONE MARROW INTERPRETATION: CPT | Performed by: PHYSICIAN ASSISTANT

## 2024-08-21 PROCEDURE — 84075 ASSAY ALKALINE PHOSPHATASE: CPT

## 2024-08-21 PROCEDURE — 38222 DX BONE MARROW BX & ASPIR: CPT | Performed by: PHYSICIAN ASSISTANT

## 2024-08-21 RX ORDER — LORAZEPAM 2 MG/ML
1 INJECTION INTRAMUSCULAR ONCE
Status: COMPLETED | OUTPATIENT
Start: 2024-08-21 | End: 2024-08-21

## 2024-08-21 RX ORDER — POSACONAZOLE 100 MG/1
300 TABLET, DELAYED RELEASE ORAL
Qty: 90 TABLET | Refills: 2 | Status: SHIPPED | OUTPATIENT
Start: 2024-08-21

## 2024-08-21 ASSESSMENT — PAIN SCALES - GENERAL: PAINLEVEL: 0-NO PAIN

## 2024-08-21 NOTE — ASSESSMENT & PLAN NOTE
High risk for infections. No current signs or symptoms of infection. Completed outpatient course of levofloxacin for recent neutropenic fever. Continue acyclovir. Will need posaconazole for upcoming consolidation during periods of neutropenia (added back to med list).

## 2024-08-21 NOTE — PROGRESS NOTES
Bone Marrow Biopsy and Aspiration     Date: 8/21/24  Performed by: Maria L Nowak CNP    Consent:   Consent obtained:  Written  Consent given by:  Patient  Risks, benefits, and alternatives were discussed: yes   Risks discussed:  Bleeding, infection and pain    Indications: Disease Evaluation    Universal protocol:   Procedure explained and questions answered to patient or proxy's satisfaction: yes    Relevant documents present and verified: yes    Test results available: yes   Site/side marked: yes    Immediately prior to procedure, a time out was called: yes    Patient identity confirmed:  Verbally with patient and provided demographic data    Pre-procedure details:   Skin preparation:  Povidone-Iodine  Preparation: Patient was prepped and draped in the usual sterile fashion      Sedation:   Sedation type:  None    Anesthesia:   Anesthesia method:  Local infiltration  Local anesthetic:  10mL of 1% Lidocaine and 10mL of 2% Lidocaine     Procedure specific details:   Patient lay in the prone position and the Left iliac crest was accessed. A total of 20 ml of lidocaine was utilized for the procedure. First attempt was unsuccessful. Repositioned Sincereshidi and tried again with the assistance of NEVILLE Lyle. Aspirate samples were obtained without difficulty. Core obtained.     Post-procedure details:   Procedure completion:  Tolerated well, no immediate complications    LEDY Haley-ML

## 2024-08-21 NOTE — PROGRESS NOTES
Patient ID: Ilda Peter is a 23 y.o. female.    ASSESSMENT & PLAN       Oncology History   Acute myeloid leukemia not having achieved remission (Multi)   7/5/2024 Initial Diagnosis    ICC 2022 DX: 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 (>=10% blasts)  [id number] AML Risk Stratification: FAVORABLE: Mutated NPM1 without FLT3-ITD  Presented with anemia and circulating blasts    BONE MARROW (07/05/2024)  Hypercellular with erythroid predominance, dyserythropoiesis, and dysmegakaryopoiesis  FISH: AML negative  KARYOTYPE:  46XX [20]  MYELOID NGS: NPM1 (54%), NRAS (25%), PTPN11 (15%), IDH1 (2%)       7/19/2024 -  Chemotherapy    Induction with 7+3 (AraC+tarun)  - D14 Bm (8/1/24):  ablated, ALCON            Assessment & Plan  Acute myeloid leukemia in adult (Multi)  Newly diagnosed favorable risk AML (NPM1 mutated), now s/p induction with cytarabine and idarubicin (7+3), hospital course complicated by culture negative neutropenic fever, c diff negative diarrhea, and mucositis. D14 BM empty. Now feeling recovered, counts recovered. Will have count recovery marrow today. NPM1 requested. Follow up with Dr. Fields next week for BM review and planning of consolidation.   Indirect hyperbilirubinemia  Transient elevation during induction admission. Could be related to transfusions. Normalized now.   Immunosuppressed status (Multi)  High risk for infections. No current signs or symptoms of infection. Completed outpatient course of levofloxacin for recent neutropenic fever. Continue acyclovir. Will need posaconazole for upcoming consolidation during periods of neutropenia (added back to med list).   Retinal hemorrhage of both eyes  Developed horizontal diplopia and blurred vision during last admission. Retinal hemorrhages bilaterally as well as keratoconjunctivitis. Felt to be secondary to her profound thrombocytopenia and anemia, and cytarabine. Given findings were very mild, reccs made for artificial tears 4 x daily,  erythromycin ointment at bedtime. Follow up outpatient recommended, referral placed today.   _____________________________________________________________________________________________________________    SUBJECTIVE     Here today for planned hospital discharge follow up, also scheduled for count recovery marrow.  Has been doing well at home since discharge. Went to the zoo with her kids yesterday, then took another walk in the evening. Eating and hydrating well without nausea. Mucositis and diarrhea resolved. No new health issues.  Denies fevers, chills, vomiting, diarrhea, dyspnea, rash, and pain.    OBJECTIVE        Physical Exam  Vitals and nursing note reviewed.   Constitutional:       General: She is not in acute distress.     Appearance: Normal appearance.   HENT:      Head: Normocephalic.   Eyes:      Conjunctiva/sclera: Conjunctivae normal.      Pupils: Pupils are equal, round, and reactive to light.   Cardiovascular:      Rate and Rhythm: Normal rate and regular rhythm.   Pulmonary:      Effort: Pulmonary effort is normal.      Breath sounds: Normal breath sounds.   Musculoskeletal:         General: Normal range of motion.   Skin:     General: Skin is warm and dry.      Findings: No rash.   Neurological:      Mental Status: She is alert.   Psychiatric:         Mood and Affect: Mood normal.         [facility]

## 2024-08-21 NOTE — ASSESSMENT & PLAN NOTE
Newly diagnosed favorable risk AML (NPM1 mutated), now s/p induction with cytarabine and idarubicin (7+3), hospital course complicated by culture negative neutropenic fever, c diff negative diarrhea, and mucositis. D14 BM empty. Now feeling recovered, counts recovered. Will have count recovery marrow today. NPM1 requested. Follow up with Dr. Fields next week for BM review and planning of consolidation.

## 2024-08-21 NOTE — ASSESSMENT & PLAN NOTE
Transient elevation during induction admission. Could be related to transfusions. Normalized now.

## 2024-08-21 NOTE — PROGRESS NOTES
Pt here for a bone marrow biopsy. This RN norris and sent labs. Adamaris is working great. Both lines had brisk blood return and were flushed. Both caps were changed and a dressing change was completed. Next dressing change due on 8/28/24. Pt was given 1mg Ativan prior to procedure.

## 2024-08-22 ENCOUNTER — HOME CARE VISIT (OUTPATIENT)
Dept: HOME HEALTH SERVICES | Facility: HOME HEALTH | Age: 24
End: 2024-08-22
Payer: COMMERCIAL

## 2024-08-22 ENCOUNTER — TELEPHONE (OUTPATIENT)
Dept: INFUSION THERAPY | Age: 24
End: 2024-08-22
Payer: COMMERCIAL

## 2024-08-22 VITALS
OXYGEN SATURATION: 98 % | SYSTOLIC BLOOD PRESSURE: 124 MMHG | DIASTOLIC BLOOD PRESSURE: 80 MMHG | TEMPERATURE: 98.6 F | HEART RATE: 77 BPM

## 2024-08-22 LAB
CELL COUNT (BLOOD): 15.75 X10*3/UL
CELL POPULATIONS: NORMAL
DIAGNOSIS: NORMAL
FLOW DIFFERENTIAL: NORMAL
FLOW TEST ORDERED: NORMAL
LAB TEST METHOD: NORMAL
NUMBER OF CELLS COLLECTED: NORMAL
PATH REPORT.COMMENTS IMP SPEC: NORMAL
PATH REPORT.FINAL DX SPEC: NORMAL
PATH REPORT.GROSS SPEC: NORMAL
PATH REPORT.MICROSCOPIC SPEC OTHER STN: NORMAL
PATH REPORT.RELEVANT HX SPEC: NORMAL
PATH REPORT.TOTAL CANCER: NORMAL
PATH REPORT.TOTAL CANCER: NORMAL
SIGNATURE COMMENT: NORMAL
SPECIMEN VIABILITY: NORMAL

## 2024-08-22 PROCEDURE — G0299 HHS/HOSPICE OF RN EA 15 MIN: HCPCS

## 2024-08-22 ASSESSMENT — ENCOUNTER SYMPTOMS
CHANGE IN APPETITE: UNCHANGED
DENIES PAIN: 1
PERSON REPORTING PAIN: PATIENT
APPETITE LEVEL: GOOD

## 2024-08-22 NOTE — TELEPHONE ENCOUNTER
Spoke with patient. They are requesting a delivery for their DL Bailon at this time.     Sending 2 dressing kits, line care supplies, alcohol etc (patient sometimes has dressing changed in clinic) + associated flushes for 1 month worth of daily flushing plus extras for labs.     Delivery will be tomorrow 1-5 pm  with the  to call on the way. Delivery to same address as last time as confirmed with patient.     No questions.  Follow up in ~3 weeks.

## 2024-08-26 PROBLEM — E80.6 INDIRECT HYPERBILIRUBINEMIA: Status: RESOLVED | Noted: 2024-07-01 | Resolved: 2024-08-26

## 2024-08-26 PROBLEM — C92.01 ACUTE MYELOID LEUKEMIA IN REMISSION (MULTI): Status: ACTIVE | Noted: 2024-07-11

## 2024-08-26 NOTE — ASSESSMENT & PLAN NOTE
No active signs or symptoms of infection.  Will continue prophylaxis with acyclovir, levofloxacin, and posaconazole during periods of neutropenia.

## 2024-08-26 NOTE — ASSESSMENT & PLAN NOTE
S/p induction and in CR1.  NPM1 MRD studies pending.  Planning for consolidation with HIDAC, and will reassess her MRD status after 1st consolidation to risk stratify for possible allogeneic stem cell transplant.  Her search is favorable, including MUD, related, and cord options.     I discussed high dose cytarabine as consolidation with plans for 4 cycles.  Common risks such as cytopenias, alopecia, fevers, need for transfusion support, mucositis, CINV, diarrhea, taste alterations, anorexia, bleeding, and fatigue were highlighted.  Rare complications such as cerebellar toxicity, severe infections (bloodstream, pneumonia, etc.), DIC, severe tumor lysis syndrome, thromboemboic disease, and organ dysfunction (renal, liver, respiratory, cardiac, etc.) were discussed.  Supportive care will include peg filgrastim for neutropenia, twice weekly count check monitoring for transfusion needs, and anti-microbial prophylaxis during periods of neutropenia.  Additional education regarding the risk for fevers/infections and associated precautions were reviewed.  Ms. Peter had opportunity to ask questions which were addressed.  She consents to proceeding.

## 2024-08-27 ENCOUNTER — OFFICE VISIT (OUTPATIENT)
Dept: HEMATOLOGY/ONCOLOGY | Facility: HOSPITAL | Age: 24
End: 2024-08-27
Payer: COMMERCIAL

## 2024-08-27 ENCOUNTER — LAB (OUTPATIENT)
Dept: HEMATOLOGY/ONCOLOGY | Facility: HOSPITAL | Age: 24
End: 2024-08-27
Payer: COMMERCIAL

## 2024-08-27 ENCOUNTER — ONCOLOGY MEDICATION OUTREACH (OUTPATIENT)
Dept: HEMATOLOGY/ONCOLOGY | Facility: HOSPITAL | Age: 24
End: 2024-08-27

## 2024-08-27 ENCOUNTER — PHARMACY VISIT (OUTPATIENT)
Dept: PHARMACY | Facility: CLINIC | Age: 24
End: 2024-08-27
Payer: MEDICAID

## 2024-08-27 VITALS
WEIGHT: 140.65 LBS | HEART RATE: 78 BPM | OXYGEN SATURATION: 100 % | RESPIRATION RATE: 17 BRPM | SYSTOLIC BLOOD PRESSURE: 135 MMHG | TEMPERATURE: 97.5 F | DIASTOLIC BLOOD PRESSURE: 84 MMHG | BODY MASS INDEX: 23.43 KG/M2

## 2024-08-27 DIAGNOSIS — F41.9 ANXIETY AND DEPRESSION: ICD-10-CM

## 2024-08-27 DIAGNOSIS — Z91.89 AT RISK FOR INFERTILITY: ICD-10-CM

## 2024-08-27 DIAGNOSIS — C92.01 ACUTE MYELOID LEUKEMIA IN REMISSION (MULTI): ICD-10-CM

## 2024-08-27 DIAGNOSIS — C92.00 ACUTE MYELOID LEUKEMIA NOT HAVING ACHIEVED REMISSION (MULTI): ICD-10-CM

## 2024-08-27 DIAGNOSIS — D84.9 IMMUNOSUPPRESSED STATUS (MULTI): Primary | ICD-10-CM

## 2024-08-27 DIAGNOSIS — F41.1 GENERALIZED ANXIETY DISORDER: ICD-10-CM

## 2024-08-27 DIAGNOSIS — N94.89 MENSTRUAL SUPPRESSION: ICD-10-CM

## 2024-08-27 DIAGNOSIS — Z59.41 MILD FOOD INSECURITY: ICD-10-CM

## 2024-08-27 DIAGNOSIS — Z51.5 ENCOUNTER FOR PALLIATIVE CARE: ICD-10-CM

## 2024-08-27 DIAGNOSIS — C92.01 ACUTE MYELOID LEUKEMIA IN REMISSION (MULTI): Primary | ICD-10-CM

## 2024-08-27 DIAGNOSIS — F32.A ANXIETY AND DEPRESSION: ICD-10-CM

## 2024-08-27 DIAGNOSIS — C92.00 ACUTE MYELOID LEUKEMIA IN ADULT (MULTI): ICD-10-CM

## 2024-08-27 LAB
ALBUMIN SERPL BCP-MCNC: 4 G/DL (ref 3.4–5)
ALP SERPL-CCNC: 75 U/L (ref 33–110)
ALT SERPL W P-5'-P-CCNC: 18 U/L (ref 7–45)
ANION GAP SERPL CALC-SCNC: 14 MMOL/L (ref 10–20)
AST SERPL W P-5'-P-CCNC: 15 U/L (ref 9–39)
BASOPHILS # BLD AUTO: 0.04 X10*3/UL (ref 0–0.1)
BASOPHILS NFR BLD AUTO: 0.8 %
BILIRUB SERPL-MCNC: 0.8 MG/DL (ref 0–1.2)
BUN SERPL-MCNC: 14 MG/DL (ref 6–23)
CALCIUM SERPL-MCNC: 8.7 MG/DL (ref 8.6–10.3)
CHLORIDE SERPL-SCNC: 106 MMOL/L (ref 98–107)
CO2 SERPL-SCNC: 25 MMOL/L (ref 21–32)
CREAT SERPL-MCNC: 0.95 MG/DL (ref 0.5–1.05)
DACRYOCYTES BLD QL SMEAR: NORMAL
EGFRCR SERPLBLD CKD-EPI 2021: 87 ML/MIN/1.73M*2
EOSINOPHIL # BLD AUTO: 0 X10*3/UL (ref 0–0.7)
EOSINOPHIL NFR BLD AUTO: 0 %
ERYTHROCYTE [DISTWIDTH] IN BLOOD BY AUTOMATED COUNT: 21.5 % (ref 11.5–14.5)
GLUCOSE SERPL-MCNC: 95 MG/DL (ref 74–99)
HCT VFR BLD AUTO: 35 % (ref 36–46)
HGB BLD-MCNC: 11.3 G/DL (ref 12–16)
IMM GRANULOCYTES # BLD AUTO: 0.01 X10*3/UL (ref 0–0.7)
IMM GRANULOCYTES NFR BLD AUTO: 0.2 % (ref 0–0.9)
LYMPHOCYTES # BLD AUTO: 0.64 X10*3/UL (ref 1.2–4.8)
LYMPHOCYTES NFR BLD AUTO: 13.5 %
MCH RBC QN AUTO: 30.5 PG (ref 26–34)
MCHC RBC AUTO-ENTMCNC: 32.3 G/DL (ref 32–36)
MCV RBC AUTO: 95 FL (ref 80–100)
MONOCYTES # BLD AUTO: 0.56 X10*3/UL (ref 0.1–1)
MONOCYTES NFR BLD AUTO: 11.8 %
NEUTROPHILS # BLD AUTO: 3.49 X10*3/UL (ref 1.2–7.7)
NEUTROPHILS NFR BLD AUTO: 73.7 %
NRBC BLD-RTO: 0 /100 WBCS (ref 0–0)
OVALOCYTES BLD QL SMEAR: NORMAL
PLATELET # BLD AUTO: 225 X10*3/UL (ref 150–450)
POLYCHROMASIA BLD QL SMEAR: NORMAL
POTASSIUM SERPL-SCNC: 3.9 MMOL/L (ref 3.5–5.3)
PROT SERPL-MCNC: 6.5 G/DL (ref 6.4–8.2)
RBC # BLD AUTO: 3.7 X10*6/UL (ref 4–5.2)
RBC MORPH BLD: NORMAL
SODIUM SERPL-SCNC: 141 MMOL/L (ref 136–145)
WBC # BLD AUTO: 4.7 X10*3/UL (ref 4.4–11.3)

## 2024-08-27 PROCEDURE — RXMED WILLOW AMBULATORY MEDICATION CHARGE

## 2024-08-27 PROCEDURE — 99417 PROLNG OP E/M EACH 15 MIN: CPT | Performed by: NURSE PRACTITIONER

## 2024-08-27 PROCEDURE — 99215 OFFICE O/P EST HI 40 MIN: CPT | Performed by: NURSE PRACTITIONER

## 2024-08-27 PROCEDURE — 85025 COMPLETE CBC W/AUTO DIFF WBC: CPT

## 2024-08-27 PROCEDURE — 36591 DRAW BLOOD OFF VENOUS DEVICE: CPT

## 2024-08-27 PROCEDURE — 1036F TOBACCO NON-USER: CPT | Performed by: INTERNAL MEDICINE

## 2024-08-27 PROCEDURE — 99215 OFFICE O/P EST HI 40 MIN: CPT | Performed by: INTERNAL MEDICINE

## 2024-08-27 PROCEDURE — 2500000004 HC RX 250 GENERAL PHARMACY W/ HCPCS (ALT 636 FOR OP/ED): Performed by: PHYSICIAN ASSISTANT

## 2024-08-27 PROCEDURE — 80053 COMPREHEN METABOLIC PANEL: CPT

## 2024-08-27 RX ORDER — SERTRALINE HYDROCHLORIDE 50 MG/1
50 TABLET, FILM COATED ORAL DAILY
Qty: 90 TABLET | Refills: 2 | Status: SHIPPED | OUTPATIENT
Start: 2024-08-27 | End: 2025-05-24

## 2024-08-27 RX ORDER — FAMOTIDINE 10 MG/ML
20 INJECTION INTRAVENOUS ONCE AS NEEDED
OUTPATIENT
Start: 2024-09-10

## 2024-08-27 RX ORDER — PROCHLORPERAZINE MALEATE 10 MG
10 TABLET ORAL EVERY 6 HOURS PRN
OUTPATIENT
Start: 2024-09-11

## 2024-08-27 RX ORDER — DEXAMETHASONE 4 MG/1
12 TABLET ORAL ONCE
OUTPATIENT
Start: 2024-09-12

## 2024-08-27 RX ORDER — EPINEPHRINE 0.3 MG/.3ML
0.3 INJECTION SUBCUTANEOUS EVERY 5 MIN PRN
OUTPATIENT
Start: 2024-09-10

## 2024-08-27 RX ORDER — PROCHLORPERAZINE MALEATE 10 MG
10 TABLET ORAL EVERY 6 HOURS PRN
OUTPATIENT
Start: 2024-09-12

## 2024-08-27 RX ORDER — FAMOTIDINE 10 MG/ML
20 INJECTION INTRAVENOUS ONCE AS NEEDED
OUTPATIENT
Start: 2024-09-12

## 2024-08-27 RX ORDER — ALBUTEROL SULFATE 0.83 MG/ML
3 SOLUTION RESPIRATORY (INHALATION) AS NEEDED
OUTPATIENT
Start: 2024-09-10

## 2024-08-27 RX ORDER — PROCHLORPERAZINE EDISYLATE 5 MG/ML
10 INJECTION INTRAMUSCULAR; INTRAVENOUS EVERY 6 HOURS PRN
OUTPATIENT
Start: 2024-09-10

## 2024-08-27 RX ORDER — DIPHENHYDRAMINE HYDROCHLORIDE 50 MG/ML
50 INJECTION INTRAMUSCULAR; INTRAVENOUS AS NEEDED
OUTPATIENT
Start: 2024-09-10

## 2024-08-27 RX ORDER — ONDANSETRON HYDROCHLORIDE 8 MG/1
16 TABLET, FILM COATED ORAL ONCE
OUTPATIENT
Start: 2024-09-11

## 2024-08-27 RX ORDER — ALBUTEROL SULFATE 0.83 MG/ML
3 SOLUTION RESPIRATORY (INHALATION) AS NEEDED
OUTPATIENT
Start: 2024-09-12

## 2024-08-27 RX ORDER — HEPARIN SODIUM,PORCINE/PF 10 UNIT/ML
50 SYRINGE (ML) INTRAVENOUS AS NEEDED
OUTPATIENT
Start: 2024-08-27

## 2024-08-27 RX ORDER — PROCHLORPERAZINE MALEATE 10 MG
10 TABLET ORAL EVERY 6 HOURS PRN
Qty: 30 TABLET | Refills: 3 | Status: SHIPPED | OUTPATIENT
Start: 2024-08-27 | End: 2024-09-26

## 2024-08-27 RX ORDER — POSACONAZOLE 100 MG/1
300 TABLET, DELAYED RELEASE ORAL
Qty: 90 TABLET | Refills: 2 | Status: SHIPPED | OUTPATIENT
Start: 2024-08-27

## 2024-08-27 RX ORDER — ONDANSETRON HYDROCHLORIDE 8 MG/1
16 TABLET, FILM COATED ORAL ONCE
OUTPATIENT
Start: 2024-09-10

## 2024-08-27 RX ORDER — ALBUTEROL SULFATE 0.83 MG/ML
3 SOLUTION RESPIRATORY (INHALATION) AS NEEDED
OUTPATIENT
Start: 2024-09-14

## 2024-08-27 RX ORDER — PROCHLORPERAZINE MALEATE 10 MG
10 TABLET ORAL EVERY 6 HOURS PRN
OUTPATIENT
Start: 2024-09-10

## 2024-08-27 RX ORDER — DIPHENHYDRAMINE HYDROCHLORIDE 50 MG/ML
50 INJECTION INTRAMUSCULAR; INTRAVENOUS AS NEEDED
OUTPATIENT
Start: 2024-09-11

## 2024-08-27 RX ORDER — EPINEPHRINE 0.3 MG/.3ML
0.3 INJECTION SUBCUTANEOUS EVERY 5 MIN PRN
OUTPATIENT
Start: 2024-09-03

## 2024-08-27 RX ORDER — ALBUTEROL SULFATE 0.83 MG/ML
3 SOLUTION RESPIRATORY (INHALATION) AS NEEDED
OUTPATIENT
Start: 2024-09-11

## 2024-08-27 RX ORDER — HEPARIN 100 UNIT/ML
500 SYRINGE INTRAVENOUS AS NEEDED
OUTPATIENT
Start: 2024-08-27

## 2024-08-27 RX ORDER — DEXAMETHASONE 4 MG/1
12 TABLET ORAL ONCE
OUTPATIENT
Start: 2024-09-10

## 2024-08-27 RX ORDER — PROCHLORPERAZINE EDISYLATE 5 MG/ML
10 INJECTION INTRAMUSCULAR; INTRAVENOUS EVERY 6 HOURS PRN
OUTPATIENT
Start: 2024-09-11

## 2024-08-27 RX ORDER — FAMOTIDINE 10 MG/ML
20 INJECTION INTRAVENOUS ONCE AS NEEDED
OUTPATIENT
Start: 2024-09-11

## 2024-08-27 RX ORDER — PREDNISOLONE ACETATE 10 MG/ML
2 SUSPENSION/ DROPS OPHTHALMIC EVERY 6 HOURS
Qty: 5 ML | Refills: 3 | Status: SHIPPED | OUTPATIENT
Start: 2024-08-27 | End: 2024-09-03

## 2024-08-27 RX ORDER — EPINEPHRINE 0.3 MG/.3ML
0.3 INJECTION SUBCUTANEOUS EVERY 5 MIN PRN
OUTPATIENT
Start: 2024-09-11

## 2024-08-27 RX ORDER — DIPHENHYDRAMINE HYDROCHLORIDE 50 MG/ML
50 INJECTION INTRAMUSCULAR; INTRAVENOUS AS NEEDED
OUTPATIENT
Start: 2024-09-12

## 2024-08-27 RX ORDER — DIPHENHYDRAMINE HYDROCHLORIDE 50 MG/ML
50 INJECTION INTRAMUSCULAR; INTRAVENOUS AS NEEDED
OUTPATIENT
Start: 2024-09-03

## 2024-08-27 RX ORDER — ONDANSETRON HYDROCHLORIDE 8 MG/1
16 TABLET, FILM COATED ORAL ONCE
OUTPATIENT
Start: 2024-09-12

## 2024-08-27 RX ORDER — EPINEPHRINE 0.3 MG/.3ML
0.3 INJECTION SUBCUTANEOUS EVERY 5 MIN PRN
OUTPATIENT
Start: 2024-09-12

## 2024-08-27 RX ORDER — ACYCLOVIR 400 MG/1
400 TABLET ORAL 2 TIMES DAILY
Qty: 180 TABLET | Refills: 3 | Status: SHIPPED | OUTPATIENT
Start: 2024-08-27 | End: 2025-08-22

## 2024-08-27 RX ORDER — PROCHLORPERAZINE EDISYLATE 5 MG/ML
10 INJECTION INTRAMUSCULAR; INTRAVENOUS EVERY 6 HOURS PRN
OUTPATIENT
Start: 2024-09-12

## 2024-08-27 RX ORDER — DIPHENHYDRAMINE HYDROCHLORIDE 50 MG/ML
50 INJECTION INTRAMUSCULAR; INTRAVENOUS AS NEEDED
OUTPATIENT
Start: 2024-09-14

## 2024-08-27 RX ORDER — HEPARIN SODIUM,PORCINE/PF 10 UNIT/ML
50 SYRINGE (ML) INTRAVENOUS AS NEEDED
Status: DISCONTINUED | OUTPATIENT
Start: 2024-08-27 | End: 2024-08-27 | Stop reason: HOSPADM

## 2024-08-27 RX ORDER — EPINEPHRINE 0.3 MG/.3ML
0.3 INJECTION SUBCUTANEOUS EVERY 5 MIN PRN
OUTPATIENT
Start: 2024-09-14

## 2024-08-27 RX ORDER — ALBUTEROL SULFATE 0.83 MG/ML
3 SOLUTION RESPIRATORY (INHALATION) AS NEEDED
OUTPATIENT
Start: 2024-09-03

## 2024-08-27 RX ORDER — DEXAMETHASONE 4 MG/1
12 TABLET ORAL ONCE
OUTPATIENT
Start: 2024-09-11

## 2024-08-27 RX ORDER — LEVOFLOXACIN 500 MG/1
500 TABLET, FILM COATED ORAL DAILY
Qty: 30 TABLET | Refills: 3 | Status: SHIPPED | OUTPATIENT
Start: 2024-08-27 | End: 2024-12-25

## 2024-08-27 RX ORDER — POSACONAZOLE 100 MG/1
300 TABLET, DELAYED RELEASE ORAL DAILY
Qty: 90 TABLET | Refills: 0 | Status: SHIPPED | OUTPATIENT
Start: 2024-08-27 | End: 2024-09-26

## 2024-08-27 RX ORDER — FAMOTIDINE 10 MG/ML
20 INJECTION INTRAVENOUS ONCE AS NEEDED
OUTPATIENT
Start: 2024-09-03

## 2024-08-27 RX ORDER — FAMOTIDINE 10 MG/ML
20 INJECTION INTRAVENOUS ONCE AS NEEDED
OUTPATIENT
Start: 2024-09-14

## 2024-08-27 SDOH — ECONOMIC STABILITY - FOOD INSECURITY: FOOD INSECURITY: Z59.41

## 2024-08-27 ASSESSMENT — PAIN SCALES - GENERAL: PAINLEVEL: 0-NO PAIN

## 2024-08-27 NOTE — PROGRESS NOTES
Patient ID: Ilda Peter is a 23 y.o. female.    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)  [id number] AML Risk Stratification: FAVORABLE: Mutated NPM1 without FLT3-ITD  Presented with anemia and circulating blasts    BONE MARROW (07/05/2024)  Hypercellular with erythroid predominance, dyserythropoiesis, and dysmegakaryopoiesis  FISH: AML negative  KARYOTYPE:  46XX [20]  MYELOID NGS: NPM1 (54%), NRAS (25%), PTPN11 (15%), IDH1 (2%)       7/19/2024 -  Chemotherapy    Induction with 7+3 (AraC+tarun) -> CR1  - D14 BM (8/1/24):  ablated, ALCON  - Count recovery:  ANC D28, plts D23  - Post induction BM (8/21/24):  ALCON, MFC negative       9/10/2024 -  Chemotherapy    HiDAC-123 (High-Dose Cytarabine), 28 Day Cycles - Consolidation          Assessment & Plan  Immunosuppressed status (Multi)  No active signs or symptoms of infection.  Will continue prophylaxis with acyclovir, levofloxacin, and posaconazole during periods of neutropenia.   Acute myeloid leukemia in remission (Multi)  S/p induction and in CR1.  NPM1 MRD studies pending.  Planning for consolidation with HIDAC, and will reassess her MRD status after 1st consolidation to risk stratify for possible allogeneic stem cell transplant.  Her search is favorable, including MUD, related, and cord options.     I discussed high dose cytarabine as consolidation with plans for 4 cycles.  Common risks such as cytopenias, alopecia, fevers, need for transfusion support, mucositis, CINV, diarrhea, taste alterations, anorexia, bleeding, and fatigue were highlighted.  Rare complications such as cerebellar toxicity, severe infections (bloodstream, pneumonia, etc.), DIC, severe tumor lysis syndrome, thromboemboic disease, and organ dysfunction (renal, liver, respiratory, cardiac, etc.) were discussed.  Supportive care will include peg  filgrastim for neutropenia, twice weekly count check monitoring for transfusion needs, and anti-microbial prophylaxis during periods of neutropenia.  Additional education regarding the risk for fevers/infections and associated precautions were reviewed.  Ms. Peter had opportunity to ask questions which were addressed.  She consents to proceeding.          ______________________________________________________________________________________________________________________________________________    SUBJECTIVE     Here today for planned follow up.  Has been doing well at home.  Increasing activity.  Still some intermittent mild nausea.  No new health issues.  Denies fevers, chills, diarrhea, dyspnea, rash, and pain.    OBJECTIVE     /84   Pulse 78   Temp 36.4 °C (97.5 °F)   Resp 17   Wt 63.8 kg (140 lb 10.5 oz)   SpO2 100%   BMI 23.43 kg/m²      Physical Exam  Vitals reviewed.   Constitutional:       Appearance: Normal appearance.   Eyes:      Conjunctiva/sclera: Conjunctivae normal.      Pupils: Pupils are equal, round, and reactive to light.   Skin:     General: Skin is warm and dry.      Findings: No rash.   Psychiatric:         Mood and Affect: Mood normal.         Time Spent  Prep time on day of patient encounter: 8 minutes  Time spent directly with patient, family or caregiver: 22 minutes  Additional Time Spent on Patient Care Activities: 10 minutes  Documentation Time: 5 minutes  Other Time Spent: 0 minutes  Total: 45 minutes        Brandee Fields MD

## 2024-08-27 NOTE — PROGRESS NOTES
Ilda Peter is a 23 y.o. female with AML s/p induction starting consolidative OP [id number], Educated patient and mother in clinic.    Assessment/Plan   DRUG REVIEW  Plan is to start OP [id number] on 9/10 for AML consolidation. Labs reviewed. CrCl 83. Drug-drug interactions = none. Rxs sent to [facility] pharmacy. Patient expected to  posa from [facility] today. Appropriateness confirmed based on my review.     Infectious diseases  HSV ppx - acyclovir  Fungal ppx - posa to start D5  Bacterial ppx - levo to start D5    Supportive care  Nausea/vomiting - prn zofran and compazine. No zofran at home on days 1-3 since given during infusion visit  Pred eye gtts for days 1-7, extended for 2 additional days dt hemorrhage and conjunctivitis during induction    DRUG EDUCATION  Reviewed outpatient HIDAC on days 1,2,3 with g-csf on day 5. Counseled on potential side effects including but not limited to chemotherapy side effects: neutropenia, infection risk, anemia, fatigue, weakness, low energy, thrombocytopenia, bleeding/bruising, n/v, diarrhea, electrolyte changes, cerebellar toxicity, and ocular toxicity leading to redness, discomfort, vision changes. Discussed techniques to mitigate severity of side effects such as blood count checks, prophylactic anti-infective medicines, temperature checks, blood product transfusions, electrolyte monitoring, antiemetic use, eye drops, and neurotoxicity monitoring during the infusion visit. Patient to be careful of staying out in the sun and to wear appropriate sunblock. Patient will have a  and refrain from driving. Provided medication/regimen handout. All questions answered and contact information was given to patient.     Objective   Lab Results   Component Value Date    WBC 4.7 08/27/2024    HGB 11.3 (L) 08/27/2024     08/27/2024    ANC 0.99 (L) 08/15/2024    SEGSABS 0.92 (L) 08/15/2024    BANDSABS 0.07 08/15/2024      Lab Results   Component Value Date    GLUCOSE 95  08/27/2024    CALCIUM 8.7 08/27/2024     08/27/2024    K 3.9 08/27/2024    CO2 25 08/27/2024     08/27/2024    BUN 14 08/27/2024    CREATININE 0.95 08/27/2024    URICACID 2.9 08/10/2024     Lab Results   Component Value Date    ALT 18 08/27/2024    AST 15 08/27/2024    ALKPHOS 75 08/27/2024    BILITOT 0.8 08/27/2024       Treatment history  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 (>=10% blasts)  [id number] AML Risk Stratification: FAVORABLE: Mutated NPM1 without FLT3-ITD  Presented with anemia and circulating blasts    BONE MARROW (07/05/2024)  Hypercellular with erythroid predominance, dyserythropoiesis, and dysmegakaryopoiesis  FISH: AML negative  KARYOTYPE:  46XX [20]  MYELOID NGS: NPM1 (54%), NRAS (25%), PTPN11 (15%), IDH1 (2%)       7/19/2024 -  Chemotherapy    Induction with 7+3 (AraC+tarun) -> CR1  - D14 BM (8/1/24):  ablated, ALCON  - Count recovery:  ANC D28, plts D23  - Post induction BM (8/21/24):  ALCON, MFC negative       9/10/2024 -  Chemotherapy    HiDAC-123 (High-Dose Cytarabine), 28 Day Cycles - Consolidation         Allergies and Medications   Allergies   Allergen Reactions    Codeine Nausea/vomiting     Current Outpatient Medications   Medication Instructions    acyclovir (ZOVIRAX) 400 mg, oral, 2 times daily    erythromycin (Romycin) 5 mg/gram (0.5 %) ophthalmic ointment Both Eyes, Nightly, Apply Amount per Dose: 0.5 inch (~1 cm) per dose.    heparin flush 50 Units, intra-catheter, Daily, Per lumen    levoFLOXacin (LEVAQUIN) 500 mg, oral, Daily    lubricating eye drops ophthalmic solution 1 drop, Both Eyes, Daily    ondansetron (ZOFRAN) 8 mg, oral, Every 8 hours PRN    posaconazole (NOXAFIL) 300 mg, oral, Daily with breakfast, Do not crush, chew, or split.    prednisoLONE acetate (Pred-Forte) 1 % ophthalmic suspension 2 drops, Both Eyes, Every 6 hours, Beginning the day of  Cytarabine and continuing for 7 days    prochlorperazine (COMPAZINE) 10 mg, oral, Every 6 hours PRN    sertraline (ZOLOFT) 50 mg, oral, Daily    sodium chloride 0.9% (Normal Saline Flush) flush 10 mL, intravenous, Daily, Per lumen

## 2024-08-27 NOTE — PROGRESS NOTES
Patient ID: Ilda Peter is a 23 y.o. female.  Referring Physician: No referring provider defined for this encounter.  Primary Care Provider: Brandee Cross,       Subjective    Ilda Peter is a 23 y.o. with diagnoses of acute myeloid leukemia, returns today in follow up regarding issues unique to being a young adult with cancer.    S/p induction chemotherapy, bone marrow biopsy at count recovery demonstrates remission. Plan for 4 cycles of consolidation with high dose cytarabine. Ilda and mother Morena both report good understanding of treatment plan.    Physically, she relays doing very well. Improvement from prior fatigue, is eating well. Notes need to drink more fluids. Denies problems with sleep. Is coping well, denies problems with anxiety, depressed mood, or difficulty functioning. Continues on sertraline daily. Feels adjusted to diagnosis, notes good support from family. Was able to connect with two other young adult females during inpatient stay and they have a chat via text daily.     LMP 07/30/24 - did note prolonged bleeding this cycle and prior to chemotherapy. Is interested in future family building.     Practically, she notes financial difficulty. She is not currently working, lives with mother, partner and their two sons 7 mo and 4 years. Only source of income is partner. Has applied to social security and approved, but will not receive income until February - uncertain how they will make ends meet for the next several months.       Social History: Grew up in and lives in [place] with her two children. Is close to parents. Works as a supervisor at [employer]. Is in a relationship, boyfriend is the father of her 6mo old son Tay, she also has a 4 year old son from a previous relationship, Sergio. She is a never smoker, she does not vape, she does not drink alcohol, she does use marijuana.      Past Medical History  She has a past medical history of AML (acute myeloblastic leukemia) (Multi),  Anxiety, Cannabis dependence (Multi) (07/02/2024), Chronic atrophic rhinitis (07/02/2024), Depression, Hyperbilirubinemia, and Tinea versicolor (07/02/2024).     Surgical History  She has a past surgical history that includes [place] tooth extraction.  Review of Systems   Constitutional:  Positive for appetite change and fatigue. Negative for chills and fever.   Endocrine: Negative for hot flashes.   Genitourinary:  Negative for menstrual problem.    Psychiatric/Behavioral:  Negative for depression and sleep disturbance. The patient is not nervous/anxious.       Objective   BSA: There is no height or weight on file to calculate BSA.  There were no vitals taken for this visit. VS and weight reviewed in other visit encounter.    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 (>=10% blasts)  [id number] AML Risk Stratification: FAVORABLE: Mutated NPM1 without FLT3-ITD  Presented with anemia and circulating blasts    BONE MARROW (07/05/2024)  Hypercellular with erythroid predominance, dyserythropoiesis, and dysmegakaryopoiesis  FISH: AML negative  KARYOTYPE:  46XX [20]  MYELOID NGS: NPM1 (54%), NRAS (25%), PTPN11 (15%), IDH1 (2%)       7/19/2024 -  Chemotherapy    Induction with 7+3 (AraC+tarun) -> CR1  - D14 BM (8/1/24):  ablated, ALCON  - Count recovery:  ANC D28, plts D23  - Post induction BM (8/21/24):  ALCON, MFC negative       9/10/2024 -  Chemotherapy    HiDAC-123 (High-Dose Cytarabine), 28 Day Cycles - Consolidation       Ilda Peter  reports that she has never smoked. She has never used smokeless tobacco.  She  reports that she does not currently use alcohol.  She  reports current drug use. Drug: Marijuana.    Physical  Exam  Constitutional:       General: She is not in acute distress.     Appearance: Normal appearance. She is not ill-appearing.   Neurological:      General: No focal deficit present.      Mental Status: She is alert and oriented to person, place, and time.      Cranial Nerves: No cranial nerve deficit.   Psychiatric:         Mood and Affect: Mood normal.         Behavior: Behavior normal.         Thought Content: Thought content normal.         Judgment: Judgment normal.       Performance Status:  Asymptomatic    Assessment/Plan    Ilda Peter is a 23 y.o. F with a recent diagnosis of acute myeloid leukemia, s/p induction therapy in CR1, with future plans for consolidation chemotherapy with high dose cytarabine x 4 cycles.     #Psychosocial: hx of anxiety/depression, young adult with cancer, parent to young children  - Continues on zoloft 50mg/daily  - Connected with [facility] regarding two young children/adjustment to mother's illness  - Provided patient with resource regarding talking to her children about cancer  - Provided patient with community resources including [facility] and [facility]  - Connected to young adult oncology program and receiving monthly social programming invitations  - Placed referral to [facility] at  d/t food insecurity  - Placed referral to Sofía RICHARDSON regarding financial concerns    #Fatigue: likely related to chemotherapy, and anemia  - We discussed gentle exercise is the only intervention documented to improve fatigue related to cancer treatments - encouraged continued walking daily to help manage fatigue    #Sexual Dysfunction/Contraception:  - Discussed the possibility for sexual side effects related to cancer treatment this may manifest as decreased interest, arousal, vaginal dryness, or pain with sex  - Normalized conversation regarding sex and intimacy and encouraged patient to notify myself or team if she experiences any sexual  side effects that impact her QOL  We discussed the seriousness of getting pregnant while receiving chemotherapy due to the harmful effects this could have on the  fetus, and on her cancer treatment given the decreased availability of medical  services.   - Options for contraception include intrauterine devices, hormonal methods in the form of implant, injection, patches, vaginal rings, oral pills, or barrier methods. We discussed safest options for her include IUD or progesterone only contraceptive pill or injection.   - Patient plans to use barrier method for contraception management  - We discussed that small amounts of chemotherapy may be found in her body secretions including vaginal secretions and she should use a barrier form of contraception such as a male or female condom to protect her partner from chemotherapy exposure.  - We discussed that it is safe to engage in sex if she feels able to, but should take precautions to avoid penetrative sex when her neutrophil count is <500 and her platelet count is <50    #Risk for infertility:  - Previously discussed the damaging effect cancer treatment can have on the ovaries.   - Previously discussed natural age related decline in fertility and menopause that occurs as a result of ovarian aging, and that cancer treatments can accellerate that progression and diminish ovarian reserve.  - Individuals may experience varying degrees of short or long term ovarian dysfunction and amenorrhea, and that this is dependent upon type of cancer treatment, cumulative dose, and patients age.   - Loss of ovarian function may be permanent or temporary.  - Amenorrhea may be temporary or permanent -- temporary amenorrhea results from destruction of maturing follicles whereas permanent amenorrhea results from depletion of viable primordial follicles.  - Risk to fertility is LOW based on cancer treatment of Cytarabine/Idarubicin -- we did discuss if she does not respond as  anticipated or if stem cell transplant as part of treatment plan her risk for infertility would then be HIGH  - Baseline hormone testing 07/23/24 FSH 3.8, LH 4.5, E2 34, AMH 7.742 - of note these were drawn following Day 1 of chemotherapy  - Discussed menstrual suppression/ovarian suppression with lupron - recommend its use based on literature documenting decreased time to count recovery and decreased transfusion burden in leukemia patients - did not receive prior to induction because patient was already started on chemotherapy - plan to administer at least 7-10 days prior to next chemotherapy, this was communicated to the primary team     #Substance use: will address at follow up    Plan for follow up visit in one month                  Nessa Peña, LEDY-CNP

## 2024-08-27 NOTE — PATIENT INSTRUCTIONS
We will plan to start outpatient chemotherapy on 9/10-9/12.    You can hold levofloxacin and posaconazole currently.  Please restart on 9/14, the day of your Neulasta shot.    You will use eye drops starting the day of chemotherapy for 1 week.

## 2024-08-28 LAB
ELECTRONICALLY SIGNED BY: NORMAL
NPM1 RESULTS: NORMAL

## 2024-08-29 ENCOUNTER — HOME CARE VISIT (OUTPATIENT)
Dept: HOME HEALTH SERVICES | Facility: HOME HEALTH | Age: 24
End: 2024-08-29
Payer: COMMERCIAL

## 2024-08-29 VITALS
OXYGEN SATURATION: 99 % | DIASTOLIC BLOOD PRESSURE: 60 MMHG | SYSTOLIC BLOOD PRESSURE: 106 MMHG | RESPIRATION RATE: 18 BRPM | HEART RATE: 72 BPM | TEMPERATURE: 96.8 F

## 2024-08-29 LAB
CHROM ANALY OVERALL INTERP-IMP: NORMAL
CHROM ANALY OVERALL INTERP-IMP: NORMAL
ELECTRONICALLY SIGNED BY CYTOGENETICS: NORMAL
ELECTRONICALLY SIGNED BY CYTOGENETICS: NORMAL

## 2024-08-29 PROCEDURE — G0299 HHS/HOSPICE OF RN EA 15 MIN: HCPCS

## 2024-08-29 ASSESSMENT — ENCOUNTER SYMPTOMS
SUBJECTIVE PAIN PROGRESSION: RESOLVED
LOWEST PAIN SEVERITY IN PAST 24 HOURS: 0/10
CHILLS: 0
PERSON REPORTING PAIN: PATIENT
PAIN SEVERITY GOAL: 0/10
APPETITE LEVEL: FAIR
LOSS OF SENSATION IN FEET: 0
HIGHEST PAIN SEVERITY IN PAST 24 HOURS: 0/10
OCCASIONAL FEELINGS OF UNSTEADINESS: 0
APPETITE CHANGE: 1
DENIES PAIN: 1
LAST BOWEL MOVEMENT: 67081
SLEEP DISTURBANCE: 0
DEPRESSION: 0
FEVER: 0
DEPRESSION: 0
HOT FLASHES: 0
FATIGUE: 1
NERVOUS/ANXIOUS: 0
CHANGE IN APPETITE: UNCHANGED

## 2024-08-29 ASSESSMENT — PAIN SCALES - PAIN ASSESSMENT IN ADVANCED DEMENTIA (PAINAD)
TOTALSCORE: 0
BREATHING: 0
CONSOLABILITY: 0
NEGVOCALIZATION: 0 - NONE.
NEGVOCALIZATION: 0 - NONE.
CONSOLABILITY: 0 - NO NEED TO CONSOLE.
FACIALEXPRESSION: 0 - SMILING OR INEXPRESSIVE.
FACIALEXPRESSION: 0
CONSOLABILITY: 0 - NO NEED TO CONSOLE.
BODYLANGUAGE: 0 - RELAXED.
BODYLANGUAGE: 0
FACIALEXPRESSION: 0
BODYLANGUAGE: 0
BODYLANGUAGE: 0 - RELAXED.
NEGVOCALIZATION: 0
BREATHING: 0
TOTALSCORE: 0
FACIALEXPRESSION: 0 - SMILING OR INEXPRESSIVE.
CONSOLABILITY: 0
NEGVOCALIZATION: 0

## 2024-08-29 NOTE — HOME HEALTH
SKILLED NURSING VISIT FOR ASSESSMENT. PT REPORTS SHE IS GOING TO [facility] WEEKLY FOR CENTRAL LINE SITE CARE LABS AND TREATMENT AND IS REQUESTING DISCIPLINE DISCHARGE. SN SPOKE WITH [facility] REGARDING SN DISCHARGE.  PHARMACY TO REMAIN ACTIVE TO PROVIDE HIVT SUPPLIES. PT IN AGREEMENT WITH PLAN. MEDICATIONS RECONCILLED AND DISCIPLINE DISCHARGE PERFORMED.

## 2024-09-03 ENCOUNTER — HOME CARE VISIT (OUTPATIENT)
Dept: HOME HEALTH SERVICES | Facility: HOME HEALTH | Age: 24
End: 2024-09-03
Payer: COMMERCIAL

## 2024-09-03 PROCEDURE — G0299 HHS/HOSPICE OF RN EA 15 MIN: HCPCS

## 2024-09-04 ENCOUNTER — NURSE TRIAGE (OUTPATIENT)
Dept: HEMATOLOGY/ONCOLOGY | Facility: HOSPITAL | Age: 24
End: 2024-09-04
Payer: COMMERCIAL

## 2024-09-04 DIAGNOSIS — U07.1 COVID-19: Primary | ICD-10-CM

## 2024-09-04 DIAGNOSIS — D84.9 IMMUNOSUPPRESSED STATUS (MULTI): ICD-10-CM

## 2024-09-04 RX ORDER — NIRMATRELVIR AND RITONAVIR 300-100 MG
3 KIT ORAL 2 TIMES DAILY
Qty: 30 TABLET | Refills: 0 | Status: SHIPPED | OUTPATIENT
Start: 2024-09-04 | End: 2024-09-09

## 2024-09-04 NOTE — TELEPHONE ENCOUNTER
spoke w patient, holding posaconazole w/ count recovery. Will start paxlovid. May need to delay HiDAC by 1 week for appropriate recovery.     ALLAN MaotsC

## 2024-09-04 NOTE — TELEPHONE ENCOUNTER
Pt called to inform that she tested positive for COVID today.  On 9/2, she developed body aches, runny nose and cough. Cough is occasionally productive with clear sputum.  She has been afebrile and notes cough seems to be improving.  No chest pain or difficulty breathing present.     She asks if this will impact her plan for treatment next week.  Additional Information   Has coughing or amount of mucous changed? Describe in comments     Cough is getting better   Are you coughing up mucous?     occasionally   Commented on: How long have your problems been going on?     Started 9/2   Commented on: What helps these problems?     Tylenol yesterday for headache   Commented on: What else do you want to tell me about this problem?     Pt's mother tested positive today prompting pt to test also    Protocols used: Cough

## 2024-09-05 ENCOUNTER — APPOINTMENT (OUTPATIENT)
Dept: OPHTHALMOLOGY | Facility: CLINIC | Age: 24
End: 2024-09-05
Payer: COMMERCIAL

## 2024-09-05 VITALS
SYSTOLIC BLOOD PRESSURE: 106 MMHG | RESPIRATION RATE: 18 BRPM | HEART RATE: 72 BPM | DIASTOLIC BLOOD PRESSURE: 50 MMHG | TEMPERATURE: 96.8 F | OXYGEN SATURATION: 99 %

## 2024-09-05 ASSESSMENT — PAIN SCALES - PAIN ASSESSMENT IN ADVANCED DEMENTIA (PAINAD)
NEGVOCALIZATION: 0 - NONE.
CONSOLABILITY: 0 - NO NEED TO CONSOLE.
BODYLANGUAGE: 0
FACIALEXPRESSION: 0
NEGVOCALIZATION: 0
BREATHING: 0
BODYLANGUAGE: 0 - RELAXED.
FACIALEXPRESSION: 0 - SMILING OR INEXPRESSIVE.
CONSOLABILITY: 0 - NO NEED TO CONSOLE.
TOTALSCORE: 0
FACIALEXPRESSION: 0 - SMILING OR INEXPRESSIVE.
NEGVOCALIZATION: 0 - NONE.
BODYLANGUAGE: 0
FACIALEXPRESSION: 0
BREATHING: 0
CONSOLABILITY: 0
BODYLANGUAGE: 0 - RELAXED.
NEGVOCALIZATION: 0
TOTALSCORE: 0
CONSOLABILITY: 0

## 2024-09-05 ASSESSMENT — ENCOUNTER SYMPTOMS
LOSS OF SENSATION IN FEET: 0
APPETITE LEVEL: FAIR
CHANGE IN APPETITE: UNCHANGED
PERSON REPORTING PAIN: PATIENT
PAIN SEVERITY GOAL: 0/10
APPETITE LEVEL: FAIR
LAST BOWEL MOVEMENT: 67081
DENIES PAIN: 1
CHANGE IN APPETITE: UNCHANGED
DEPRESSION: 0
OCCASIONAL FEELINGS OF UNSTEADINESS: 0
HIGHEST PAIN SEVERITY IN PAST 24 HOURS: 0/10
PAIN SEVERITY GOAL: 0/10
PERSON REPORTING PAIN: PATIENT
DENIES PAIN: 1
LOWEST PAIN SEVERITY IN PAST 24 HOURS: 0/10
LOWEST PAIN SEVERITY IN PAST 24 HOURS: 0/10
LAST BOWEL MOVEMENT: 67086
OCCASIONAL FEELINGS OF UNSTEADINESS: 0
LOSS OF SENSATION IN FEET: 0
DEPRESSION: 0
SUBJECTIVE PAIN PROGRESSION: UNCHANGED
HIGHEST PAIN SEVERITY IN PAST 24 HOURS: 0/10

## 2024-09-05 NOTE — HOME HEALTH
SKILLED NURSING VISIT FOR ASSESSMENT AND \CENTRAL LINE DRESSING CHANGE. PT TOLERATED PROCEDURES WELL. PT REPORTS SHE WILL BE HAVING TREATMENT OVER NEXT COUPLE OF WEEKS AND WILL HAVE CENTRAL LINE DRESSING CHANGED AT THAT TIME. PT AGREEABLE TO HAVE NURSE VISIT IN NEXT COUPLE OF WEEKS AND PRN FOR IV COMPLICATIONS.

## 2024-09-05 NOTE — HOME HEALTH
SKILLED NURSING VISIT FOR ASSESSMENT AND TEACHING OF MEDICATIONS DISEASE PROCESS. PT REPORTS SHE HAD CENTRAL LINE DRESSING CHANGE PERFORMED EARLIER THIS WEEK AT [facility].

## 2024-09-06 DIAGNOSIS — C92.00 ACUTE MYELOID LEUKEMIA IN ADULT (MULTI): ICD-10-CM

## 2024-09-09 ENCOUNTER — INFUSION (OUTPATIENT)
Dept: HEMATOLOGY/ONCOLOGY | Facility: CLINIC | Age: 24
End: 2024-09-09
Payer: COMMERCIAL

## 2024-09-09 ENCOUNTER — APPOINTMENT (OUTPATIENT)
Dept: HEMATOLOGY/ONCOLOGY | Facility: HOSPITAL | Age: 24
End: 2024-09-09
Payer: COMMERCIAL

## 2024-09-09 DIAGNOSIS — D84.9 IMMUNOSUPPRESSED STATUS (MULTI): ICD-10-CM

## 2024-09-09 DIAGNOSIS — C92.00 ACUTE MYELOID LEUKEMIA NOT HAVING ACHIEVED REMISSION (MULTI): ICD-10-CM

## 2024-09-09 DIAGNOSIS — F32.A ANXIETY AND DEPRESSION: ICD-10-CM

## 2024-09-09 DIAGNOSIS — C92.00 ACUTE MYELOID LEUKEMIA IN ADULT (MULTI): ICD-10-CM

## 2024-09-09 DIAGNOSIS — C92.01 ACUTE MYELOID LEUKEMIA IN REMISSION (MULTI): ICD-10-CM

## 2024-09-09 DIAGNOSIS — F41.9 ANXIETY AND DEPRESSION: ICD-10-CM

## 2024-09-09 DIAGNOSIS — N94.89 MENSTRUAL SUPPRESSION: ICD-10-CM

## 2024-09-09 PROCEDURE — 96523 IRRIG DRUG DELIVERY DEVICE: CPT

## 2024-09-09 RX ORDER — HEPARIN 100 UNIT/ML
500 SYRINGE INTRAVENOUS AS NEEDED
OUTPATIENT
Start: 2024-09-09

## 2024-09-09 RX ORDER — HEPARIN SODIUM,PORCINE/PF 10 UNIT/ML
50 SYRINGE (ML) INTRAVENOUS AS NEEDED
OUTPATIENT
Start: 2024-09-09

## 2024-09-10 ENCOUNTER — APPOINTMENT (OUTPATIENT)
Dept: HEMATOLOGY/ONCOLOGY | Facility: HOSPITAL | Age: 24
End: 2024-09-10
Payer: COMMERCIAL

## 2024-09-11 ENCOUNTER — APPOINTMENT (OUTPATIENT)
Dept: HEMATOLOGY/ONCOLOGY | Facility: HOSPITAL | Age: 24
End: 2024-09-11
Payer: COMMERCIAL

## 2024-09-12 ENCOUNTER — APPOINTMENT (OUTPATIENT)
Dept: HEMATOLOGY/ONCOLOGY | Facility: HOSPITAL | Age: 24
End: 2024-09-12
Payer: COMMERCIAL

## 2024-09-14 ENCOUNTER — APPOINTMENT (OUTPATIENT)
Dept: HEMATOLOGY/ONCOLOGY | Facility: HOSPITAL | Age: 24
End: 2024-09-14
Payer: COMMERCIAL

## 2024-09-16 ENCOUNTER — LAB (OUTPATIENT)
Dept: HEMATOLOGY/ONCOLOGY | Facility: CLINIC | Age: 24
End: 2024-09-16
Payer: COMMERCIAL

## 2024-09-16 VITALS
HEART RATE: 70 BPM | DIASTOLIC BLOOD PRESSURE: 77 MMHG | SYSTOLIC BLOOD PRESSURE: 115 MMHG | BODY MASS INDEX: 24.21 KG/M2 | WEIGHT: 145.28 LBS | TEMPERATURE: 97 F | RESPIRATION RATE: 16 BRPM | OXYGEN SATURATION: 98 %

## 2024-09-16 DIAGNOSIS — C92.01 ACUTE MYELOID LEUKEMIA IN REMISSION (MULTI): ICD-10-CM

## 2024-09-16 DIAGNOSIS — C92.00 ACUTE MYELOID LEUKEMIA NOT HAVING ACHIEVED REMISSION (MULTI): ICD-10-CM

## 2024-09-16 LAB
ALBUMIN SERPL BCP-MCNC: 4.4 G/DL (ref 3.4–5)
ALP SERPL-CCNC: 72 U/L (ref 33–110)
ALT SERPL W P-5'-P-CCNC: 21 U/L (ref 7–45)
ANION GAP SERPL CALC-SCNC: 10 MMOL/L (ref 10–20)
AST SERPL W P-5'-P-CCNC: 17 U/L (ref 9–39)
BASOPHILS # BLD AUTO: 0.03 X10*3/UL (ref 0–0.1)
BASOPHILS NFR BLD AUTO: 0.4 %
BILIRUB SERPL-MCNC: 0.5 MG/DL (ref 0–1.2)
BUN SERPL-MCNC: 17 MG/DL (ref 6–23)
CALCIUM SERPL-MCNC: 9.6 MG/DL (ref 8.6–10.3)
CHLORIDE SERPL-SCNC: 105 MMOL/L (ref 98–107)
CO2 SERPL-SCNC: 30 MMOL/L (ref 21–32)
CREAT SERPL-MCNC: 0.8 MG/DL (ref 0.5–1.05)
EGFRCR SERPLBLD CKD-EPI 2021: >90 ML/MIN/1.73M*2
EOSINOPHIL # BLD AUTO: 0.36 X10*3/UL (ref 0–0.7)
EOSINOPHIL NFR BLD AUTO: 4.9 %
ERYTHROCYTE [DISTWIDTH] IN BLOOD BY AUTOMATED COUNT: 17.4 % (ref 11.5–14.5)
GLUCOSE SERPL-MCNC: 85 MG/DL (ref 74–99)
HCG UR QL IA.RAPID: NEGATIVE
HCT VFR BLD AUTO: 37.8 % (ref 36–46)
HGB BLD-MCNC: 12.6 G/DL (ref 12–16)
IMM GRANULOCYTES # BLD AUTO: 0.01 X10*3/UL (ref 0–0.7)
IMM GRANULOCYTES NFR BLD AUTO: 0.1 % (ref 0–0.9)
LYMPHOCYTES # BLD AUTO: 1.18 X10*3/UL (ref 1.2–4.8)
LYMPHOCYTES NFR BLD AUTO: 15.9 %
MCH RBC QN AUTO: 31.7 PG (ref 26–34)
MCHC RBC AUTO-ENTMCNC: 33.3 G/DL (ref 32–36)
MCV RBC AUTO: 95 FL (ref 80–100)
MONOCYTES # BLD AUTO: 0.38 X10*3/UL (ref 0.1–1)
MONOCYTES NFR BLD AUTO: 5.1 %
NEUTROPHILS # BLD AUTO: 5.45 X10*3/UL (ref 1.2–7.7)
NEUTROPHILS NFR BLD AUTO: 73.6 %
PLATELET # BLD AUTO: 145 X10*3/UL (ref 150–450)
POTASSIUM SERPL-SCNC: 3.9 MMOL/L (ref 3.5–5.3)
PROT SERPL-MCNC: 6.7 G/DL (ref 6.4–8.2)
RBC # BLD AUTO: 3.98 X10*6/UL (ref 4–5.2)
SODIUM SERPL-SCNC: 141 MMOL/L (ref 136–145)
WBC # BLD AUTO: 7.4 X10*3/UL (ref 4.4–11.3)

## 2024-09-16 PROCEDURE — 85025 COMPLETE CBC W/AUTO DIFF WBC: CPT

## 2024-09-16 PROCEDURE — 84075 ASSAY ALKALINE PHOSPHATASE: CPT

## 2024-09-16 PROCEDURE — 81025 URINE PREGNANCY TEST: CPT

## 2024-09-16 PROCEDURE — 36591 DRAW BLOOD OFF VENOUS DEVICE: CPT

## 2024-09-16 ASSESSMENT — PAIN SCALES - GENERAL: PAINLEVEL: 0-NO PAIN

## 2024-09-17 ENCOUNTER — OFFICE VISIT (OUTPATIENT)
Dept: HEMATOLOGY/ONCOLOGY | Facility: HOSPITAL | Age: 24
End: 2024-09-17
Payer: COMMERCIAL

## 2024-09-17 ENCOUNTER — INFUSION (OUTPATIENT)
Dept: HEMATOLOGY/ONCOLOGY | Facility: HOSPITAL | Age: 24
End: 2024-09-17
Payer: COMMERCIAL

## 2024-09-17 ENCOUNTER — APPOINTMENT (OUTPATIENT)
Dept: HEMATOLOGY/ONCOLOGY | Facility: HOSPITAL | Age: 24
End: 2024-09-17
Payer: COMMERCIAL

## 2024-09-17 VITALS
BODY MASS INDEX: 23.99 KG/M2 | SYSTOLIC BLOOD PRESSURE: 140 MMHG | WEIGHT: 143.96 LBS | TEMPERATURE: 96.1 F | HEART RATE: 80 BPM | OXYGEN SATURATION: 100 % | RESPIRATION RATE: 19 BRPM | DIASTOLIC BLOOD PRESSURE: 83 MMHG

## 2024-09-17 VITALS
TEMPERATURE: 97 F | HEART RATE: 72 BPM | SYSTOLIC BLOOD PRESSURE: 122 MMHG | RESPIRATION RATE: 18 BRPM | DIASTOLIC BLOOD PRESSURE: 59 MMHG

## 2024-09-17 DIAGNOSIS — C92.01 ACUTE MYELOID LEUKEMIA IN REMISSION (MULTI): ICD-10-CM

## 2024-09-17 DIAGNOSIS — D84.9 IMMUNOSUPPRESSED STATUS: Primary | ICD-10-CM

## 2024-09-17 LAB
ALBUMIN SERPL BCP-MCNC: 4.2 G/DL (ref 3.4–5)
ALP SERPL-CCNC: 71 U/L (ref 33–110)
ALT SERPL W P-5'-P-CCNC: 20 U/L (ref 7–45)
ANION GAP SERPL CALC-SCNC: 13 MMOL/L (ref 10–20)
AST SERPL W P-5'-P-CCNC: 16 U/L (ref 9–39)
BILIRUB SERPL-MCNC: 0.6 MG/DL (ref 0–1.2)
BUN SERPL-MCNC: 18 MG/DL (ref 6–23)
CALCIUM SERPL-MCNC: 9.5 MG/DL (ref 8.6–10.3)
CHLORIDE SERPL-SCNC: 105 MMOL/L (ref 98–107)
CO2 SERPL-SCNC: 27 MMOL/L (ref 21–32)
CREAT SERPL-MCNC: 0.78 MG/DL (ref 0.5–1.05)
EGFRCR SERPLBLD CKD-EPI 2021: >90 ML/MIN/1.73M*2
GLUCOSE SERPL-MCNC: 94 MG/DL (ref 74–99)
HOLD SPECIMEN: NORMAL
POTASSIUM SERPL-SCNC: 3.9 MMOL/L (ref 3.5–5.3)
PROT SERPL-MCNC: 6.7 G/DL (ref 6.4–8.2)
SODIUM SERPL-SCNC: 141 MMOL/L (ref 136–145)

## 2024-09-17 PROCEDURE — 99215 OFFICE O/P EST HI 40 MIN: CPT | Mod: 25 | Performed by: NURSE PRACTITIONER

## 2024-09-17 PROCEDURE — 96413 CHEMO IV INFUSION 1 HR: CPT

## 2024-09-17 PROCEDURE — 99215 OFFICE O/P EST HI 40 MIN: CPT | Performed by: NURSE PRACTITIONER

## 2024-09-17 PROCEDURE — 2500000004 HC RX 250 GENERAL PHARMACY W/ HCPCS (ALT 636 FOR OP/ED): Performed by: INTERNAL MEDICINE

## 2024-09-17 PROCEDURE — 80053 COMPREHEN METABOLIC PANEL: CPT

## 2024-09-17 PROCEDURE — 2500000004 HC RX 250 GENERAL PHARMACY W/ HCPCS (ALT 636 FOR OP/ED): Performed by: PHYSICIAN ASSISTANT

## 2024-09-17 PROCEDURE — 96415 CHEMO IV INFUSION ADDL HR: CPT

## 2024-09-17 PROCEDURE — 2500000005 HC RX 250 GENERAL PHARMACY W/O HCPCS: Performed by: INTERNAL MEDICINE

## 2024-09-17 PROCEDURE — 1036F TOBACCO NON-USER: CPT | Performed by: NURSE PRACTITIONER

## 2024-09-17 PROCEDURE — 2500000005 HC RX 250 GENERAL PHARMACY W/O HCPCS: Performed by: NURSE PRACTITIONER

## 2024-09-17 RX ORDER — FAMOTIDINE 10 MG/ML
20 INJECTION INTRAVENOUS ONCE AS NEEDED
Status: DISCONTINUED | OUTPATIENT
Start: 2024-09-17 | End: 2024-09-17 | Stop reason: HOSPADM

## 2024-09-17 RX ORDER — DIPHENHYDRAMINE HYDROCHLORIDE 50 MG/ML
50 INJECTION INTRAMUSCULAR; INTRAVENOUS AS NEEDED
Status: DISCONTINUED | OUTPATIENT
Start: 2024-09-17 | End: 2024-09-17 | Stop reason: HOSPADM

## 2024-09-17 RX ORDER — ALBUTEROL SULFATE 0.83 MG/ML
3 SOLUTION RESPIRATORY (INHALATION) AS NEEDED
Status: DISCONTINUED | OUTPATIENT
Start: 2024-09-17 | End: 2024-09-17 | Stop reason: HOSPADM

## 2024-09-17 RX ORDER — EPINEPHRINE 0.3 MG/.3ML
0.3 INJECTION SUBCUTANEOUS EVERY 5 MIN PRN
Status: DISCONTINUED | OUTPATIENT
Start: 2024-09-17 | End: 2024-09-17 | Stop reason: HOSPADM

## 2024-09-17 RX ORDER — HEPARIN 100 UNIT/ML
500 SYRINGE INTRAVENOUS AS NEEDED
Status: CANCELLED | OUTPATIENT
Start: 2024-09-17

## 2024-09-17 RX ORDER — PROCHLORPERAZINE MALEATE 10 MG
10 TABLET ORAL EVERY 6 HOURS PRN
Status: DISCONTINUED | OUTPATIENT
Start: 2024-09-17 | End: 2024-09-17 | Stop reason: HOSPADM

## 2024-09-17 RX ORDER — HEPARIN SODIUM,PORCINE/PF 10 UNIT/ML
50 SYRINGE (ML) INTRAVENOUS AS NEEDED
Status: CANCELLED | OUTPATIENT
Start: 2024-09-17

## 2024-09-17 RX ORDER — PREDNISOLONE ACETATE 10 MG/ML
2 SUSPENSION/ DROPS OPHTHALMIC ONCE
Status: COMPLETED | OUTPATIENT
Start: 2024-09-17 | End: 2024-09-17

## 2024-09-17 RX ORDER — PREDNISOLONE ACETATE 10 MG/ML
2 SUSPENSION/ DROPS OPHTHALMIC ONCE
Status: CANCELLED | OUTPATIENT
Start: 2024-09-17

## 2024-09-17 RX ORDER — PREDNISOLONE ACETATE 10 MG/ML
2 SUSPENSION/ DROPS OPHTHALMIC EVERY 6 HOURS
COMMUNITY

## 2024-09-17 RX ORDER — HEPARIN SODIUM,PORCINE/PF 10 UNIT/ML
50 SYRINGE (ML) INTRAVENOUS AS NEEDED
Status: DISCONTINUED | OUTPATIENT
Start: 2024-09-17 | End: 2024-09-17 | Stop reason: HOSPADM

## 2024-09-17 RX ORDER — DEXAMETHASONE 6 MG/1
12 TABLET ORAL ONCE
Status: COMPLETED | OUTPATIENT
Start: 2024-09-17 | End: 2024-09-17

## 2024-09-17 RX ORDER — ONDANSETRON HYDROCHLORIDE 8 MG/1
16 TABLET, FILM COATED ORAL ONCE
Status: COMPLETED | OUTPATIENT
Start: 2024-09-17 | End: 2024-09-17

## 2024-09-17 RX ORDER — PROCHLORPERAZINE EDISYLATE 5 MG/ML
10 INJECTION INTRAMUSCULAR; INTRAVENOUS EVERY 6 HOURS PRN
Status: DISCONTINUED | OUTPATIENT
Start: 2024-09-17 | End: 2024-09-17 | Stop reason: HOSPADM

## 2024-09-17 ASSESSMENT — ENCOUNTER SYMPTOMS
DIZZINESS: 0
LIGHT-HEADEDNESS: 0
CONSTIPATION: 0
FEVER: 0
DIARRHEA: 0
BLOOD IN STOOL: 0
UNEXPECTED WEIGHT CHANGE: 0
DYSURIA: 0
CHEST TIGHTNESS: 0
NAUSEA: 0
HEMATURIA: 0
VOMITING: 0
CHILLS: 0
ABDOMINAL PAIN: 0
HEADACHES: 0
NUMBNESS: 0
COUGH: 0
SHORTNESS OF BREATH: 0

## 2024-09-17 ASSESSMENT — PAIN SCALES - GENERAL: PAINLEVEL: 0-NO PAIN

## 2024-09-17 NOTE — PROGRESS NOTES
Pt. To [facility] for am Hydec dosing- Pt. Able to write name normally, also motor skills intact.  Pt. To return for pm dosing at 1630.

## 2024-09-17 NOTE — ASSESSMENT & PLAN NOTE
S/p induction and in CR1.  Reassess her MRD status after 1st consolidation to risk stratify for possible allogeneic stem cell transplant.  Ordered for 10/15/24    C1D1 HiDAC today.  Began prednisolone eye gtts this am. 2 x week count checks

## 2024-09-17 NOTE — SIGNIFICANT EVENT
09/17/24 0841   Prechemo Checklist   Has the patient been in the hospital, ED, or urgent care since last date of service No   Chemo/Immuno Consent Completed and Signed Yes   Protocol/Indications Verified Yes   Confirmed to previous date/time of medication Yes   Compared to previous dose Yes   All medications are dated accurately Yes   Pregnancy Test Negative Yes   Parameters Met Yes   BSA/Weight-Height Verified Yes   Dose Calculations Verified (current, total, cumulative) Yes

## 2024-09-17 NOTE — PROGRESS NOTES
Pt arrived to [facility] infusion for 2nd dose of HiDAC. Assessment unchanged from morning appointment. Neuro checks completed by RN. Romberg, Finger to Nose, and Signature tests completed. Pt tolerated infusion without incident, to return in am for next treatment. Pt dc safely from unit with .

## 2024-09-17 NOTE — PROGRESS NOTES
Patient ID: Ilda Peter is a 23 y.o. female.  Referring Physician: Brandee Fields MD  [address] [phone]  Primary Care Provider: Brandee Cross DO    Date of Service:  9/17/2024  Assessment & Plan  Acute myeloid leukemia in remission (Multi)  S/p induction and in CR1.  Reassess her MRD status after 1st consolidation to risk stratify for possible allogeneic stem cell transplant.  Ordered for 10/15/24    C1D1 HiDAC today.  Began prednisolone eye gtts this am. 2 x week count checks  Immunosuppressed status (Multi)  No active signs or symptoms of infection.  Will continue prophylaxis with acyclovir, levofloxacin, and posaconazole during periods of neutropenia.      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 (>=10% blasts)  [id number] AML Risk Stratification: FAVORABLE: Mutated NPM1 without FLT3-ITD  Presented with anemia and circulating blasts    BONE MARROW (07/05/2024)  Hypercellular with erythroid predominance, dyserythropoiesis, and dysmegakaryopoiesis  FISH: AML negative  KARYOTYPE:  46XX [20]  MYELOID NGS: NPM1 (54%), NRAS (25%), PTPN11 (15%), IDH1 (2%)       7/19/2024 -  Chemotherapy    Induction with 7+3 (AraC+tarun) -> CR1 MRDpos  - D14 BM (8/1/24):  ablated, ALCON  - Count recovery:  ANC D28, plts D23  - Post induction BM (8/21/24):  ALCON, MFC negative, NPM1 2.65e-05       9/17/2024 -  Chemotherapy    HiDAC-123 (High-Dose Cytarabine), 28 Day Cycles - Consolidation        SUBJECTIVE:  Patient presents today, unaccompanied, for follow up.  Reports feeling well.  She has some residual cough, but reports it is infrequent.  No other concerns or complaints today.       Review of Systems   Constitutional:  Negative for chills, fever and unexpected weight change.   HENT:  Negative for mouth sores and nosebleeds.    Respiratory:  Negative for cough, chest tightness and shortness of  breath.    Cardiovascular:  Negative for chest pain and leg swelling.   Gastrointestinal:  Negative for abdominal pain, blood in stool, constipation, diarrhea, nausea and vomiting.   Genitourinary:  Negative for dysuria and hematuria.   Skin:  Negative for rash.   Neurological:  Negative for dizziness, light-headedness, numbness and headaches.     OBJECTIVE:  KPS: Karnofsky Score: 100 - Fully active, able to carry on all pre-disease performed without restriction     Physical Exam  Constitutional:       Appearance: Normal appearance.   HENT:      Head: Normocephalic.   Eyes:      Pupils: Pupils are equal, round, and reactive to light.   Cardiovascular:      Rate and Rhythm: Normal rate and regular rhythm.   Pulmonary:      Effort: Pulmonary effort is normal.      Breath sounds: Normal breath sounds.   Abdominal:      General: Bowel sounds are normal.      Palpations: Abdomen is soft.   Musculoskeletal:         General: Normal range of motion.      Cervical back: Normal range of motion and neck supple.   Lymphadenopathy:      Comments: No lymphadenopathy   Skin:     General: Skin is warm and dry.      Findings: No lesion or rash.   Neurological:      General: No focal deficit present.      Mental Status: She is alert and oriented to person, place, and time. Mental status is at baseline.      Comments: No numbness or tingling   Psychiatric:         Mood and Affect: Mood normal.       Current Outpatient Medications   Medication Instructions    acyclovir (ZOVIRAX) 400 mg, oral, 2 times daily    erythromycin (Romycin) 5 mg/gram (0.5 %) ophthalmic ointment Both Eyes, Nightly, Apply Amount per Dose: 0.5 inch (~1 cm) per dose.    heparin flush 50 Units, intra-catheter, Daily, Per lumen    levoFLOXacin (LEVAQUIN) 500 mg, oral, Daily    lubricating eye drops ophthalmic solution 1 drop, Both Eyes, Daily    posaconazole (NOXAFIL) 300 mg, oral, Daily with breakfast, Do not crush, chew, or split.    posaconazole (NOXAFIL) 300 mg,  oral, Daily, Do not crush, chew, or split.    prochlorperazine (COMPAZINE) 10 mg, oral, Every 6 hours PRN    sertraline (ZOLOFT) 50 mg, oral, Daily    sodium chloride 0.9% (Normal Saline Flush) flush 10 mL, intravenous, Daily, Per lumen      Laboratory:  The pertinent laboratory results were reviewed and discussed with the patient.    Lab Results   Component Value Date    WBC 7.4 09/16/2024    HCT 37.8 09/16/2024    HGB 12.6 09/16/2024     (L) 09/16/2024    K 3.9 09/16/2024    CALCIUM 9.6 09/16/2024     09/16/2024    MG 1.79 08/21/2024    BILITOT 0.5 09/16/2024    ALT 21 09/16/2024    AST 17 09/16/2024    BUN 17 09/16/2024    CREATININE 0.80 09/16/2024    PHOS 3.6 08/15/2024      Note: for a comprehensive list of the patient's lab results, access the Results Review activity.    RTC:  2 x weekly count checks  10/8 Dr. Fields follow up    Hailee Lozano, APRN-CNP

## 2024-09-18 ENCOUNTER — INFUSION (OUTPATIENT)
Dept: HEMATOLOGY/ONCOLOGY | Facility: HOSPITAL | Age: 24
End: 2024-09-18
Payer: COMMERCIAL

## 2024-09-18 ENCOUNTER — APPOINTMENT (OUTPATIENT)
Dept: HEMATOLOGY/ONCOLOGY | Facility: HOSPITAL | Age: 24
End: 2024-09-18
Payer: COMMERCIAL

## 2024-09-18 ENCOUNTER — OFFICE VISIT (OUTPATIENT)
Dept: HEMATOLOGY/ONCOLOGY | Facility: HOSPITAL | Age: 24
End: 2024-09-18
Payer: COMMERCIAL

## 2024-09-18 VITALS
OXYGEN SATURATION: 100 % | RESPIRATION RATE: 18 BRPM | SYSTOLIC BLOOD PRESSURE: 117 MMHG | HEIGHT: 65 IN | BODY MASS INDEX: 24.13 KG/M2 | DIASTOLIC BLOOD PRESSURE: 64 MMHG | HEART RATE: 83 BPM | WEIGHT: 144.84 LBS | TEMPERATURE: 98.2 F

## 2024-09-18 VITALS
TEMPERATURE: 97.5 F | OXYGEN SATURATION: 100 % | RESPIRATION RATE: 18 BRPM | DIASTOLIC BLOOD PRESSURE: 72 MMHG | SYSTOLIC BLOOD PRESSURE: 126 MMHG | HEART RATE: 78 BPM | BODY MASS INDEX: 24.43 KG/M2 | WEIGHT: 146.61 LBS

## 2024-09-18 DIAGNOSIS — C92.01 ACUTE MYELOID LEUKEMIA IN REMISSION (MULTI): ICD-10-CM

## 2024-09-18 DIAGNOSIS — D84.9 IMMUNOSUPPRESSED STATUS: Primary | ICD-10-CM

## 2024-09-18 LAB
ANION GAP SERPL CALC-SCNC: 12 MMOL/L (ref 10–20)
BUN SERPL-MCNC: 16 MG/DL (ref 6–23)
CALCIUM SERPL-MCNC: 9.4 MG/DL (ref 8.6–10.3)
CHLORIDE SERPL-SCNC: 108 MMOL/L (ref 98–107)
CO2 SERPL-SCNC: 27 MMOL/L (ref 21–32)
CREAT SERPL-MCNC: 0.74 MG/DL (ref 0.5–1.05)
EGFRCR SERPLBLD CKD-EPI 2021: >90 ML/MIN/1.73M*2
GLUCOSE SERPL-MCNC: 93 MG/DL (ref 74–99)
POTASSIUM SERPL-SCNC: 3.7 MMOL/L (ref 3.5–5.3)
SODIUM SERPL-SCNC: 143 MMOL/L (ref 136–145)

## 2024-09-18 PROCEDURE — 2500000004 HC RX 250 GENERAL PHARMACY W/ HCPCS (ALT 636 FOR OP/ED): Performed by: INTERNAL MEDICINE

## 2024-09-18 PROCEDURE — 96415 CHEMO IV INFUSION ADDL HR: CPT

## 2024-09-18 PROCEDURE — 2500000004 HC RX 250 GENERAL PHARMACY W/ HCPCS (ALT 636 FOR OP/ED): Performed by: PHYSICIAN ASSISTANT

## 2024-09-18 PROCEDURE — 2500000005 HC RX 250 GENERAL PHARMACY W/O HCPCS: Performed by: INTERNAL MEDICINE

## 2024-09-18 PROCEDURE — 80048 BASIC METABOLIC PNL TOTAL CA: CPT

## 2024-09-18 PROCEDURE — 99214 OFFICE O/P EST MOD 30 MIN: CPT | Performed by: NURSE PRACTITIONER

## 2024-09-18 PROCEDURE — 99214 OFFICE O/P EST MOD 30 MIN: CPT | Mod: 25 | Performed by: NURSE PRACTITIONER

## 2024-09-18 PROCEDURE — 96413 CHEMO IV INFUSION 1 HR: CPT

## 2024-09-18 RX ORDER — ALBUTEROL SULFATE 0.83 MG/ML
3 SOLUTION RESPIRATORY (INHALATION) AS NEEDED
Status: DISCONTINUED | OUTPATIENT
Start: 2024-09-18 | End: 2024-09-18 | Stop reason: HOSPADM

## 2024-09-18 RX ORDER — PROCHLORPERAZINE EDISYLATE 5 MG/ML
10 INJECTION INTRAMUSCULAR; INTRAVENOUS EVERY 6 HOURS PRN
Status: DISCONTINUED | OUTPATIENT
Start: 2024-09-18 | End: 2024-09-18 | Stop reason: HOSPADM

## 2024-09-18 RX ORDER — DEXAMETHASONE 6 MG/1
12 TABLET ORAL ONCE
Status: COMPLETED | OUTPATIENT
Start: 2024-09-18 | End: 2024-09-18

## 2024-09-18 RX ORDER — EPINEPHRINE 0.3 MG/.3ML
0.3 INJECTION SUBCUTANEOUS EVERY 5 MIN PRN
Status: DISCONTINUED | OUTPATIENT
Start: 2024-09-18 | End: 2024-09-18 | Stop reason: HOSPADM

## 2024-09-18 RX ORDER — PROCHLORPERAZINE MALEATE 10 MG
10 TABLET ORAL EVERY 6 HOURS PRN
Status: DISCONTINUED | OUTPATIENT
Start: 2024-09-18 | End: 2024-09-18 | Stop reason: HOSPADM

## 2024-09-18 RX ORDER — FAMOTIDINE 10 MG/ML
20 INJECTION INTRAVENOUS ONCE AS NEEDED
Status: DISCONTINUED | OUTPATIENT
Start: 2024-09-18 | End: 2024-09-18 | Stop reason: HOSPADM

## 2024-09-18 RX ORDER — HEPARIN 100 UNIT/ML
500 SYRINGE INTRAVENOUS AS NEEDED
Status: CANCELLED | OUTPATIENT
Start: 2024-09-18

## 2024-09-18 RX ORDER — HEPARIN SODIUM,PORCINE/PF 10 UNIT/ML
50 SYRINGE (ML) INTRAVENOUS AS NEEDED
Status: CANCELLED | OUTPATIENT
Start: 2024-09-18

## 2024-09-18 RX ORDER — HEPARIN SODIUM,PORCINE/PF 10 UNIT/ML
50 SYRINGE (ML) INTRAVENOUS AS NEEDED
Status: DISCONTINUED | OUTPATIENT
Start: 2024-09-18 | End: 2024-09-18 | Stop reason: HOSPADM

## 2024-09-18 RX ORDER — ONDANSETRON HYDROCHLORIDE 8 MG/1
16 TABLET, FILM COATED ORAL ONCE
Status: COMPLETED | OUTPATIENT
Start: 2024-09-18 | End: 2024-09-18

## 2024-09-18 RX ORDER — DIPHENHYDRAMINE HYDROCHLORIDE 50 MG/ML
50 INJECTION INTRAMUSCULAR; INTRAVENOUS AS NEEDED
Status: DISCONTINUED | OUTPATIENT
Start: 2024-09-18 | End: 2024-09-18 | Stop reason: HOSPADM

## 2024-09-18 ASSESSMENT — ENCOUNTER SYMPTOMS
FEVER: 0
DIARRHEA: 0
NUMBNESS: 0
BLOOD IN STOOL: 0
COUGH: 0
CHILLS: 0
DIZZINESS: 0
VOMITING: 0
SHORTNESS OF BREATH: 0
LIGHT-HEADEDNESS: 0
DYSURIA: 0
NAUSEA: 0
HEADACHES: 0
ABDOMINAL PAIN: 0
UNEXPECTED WEIGHT CHANGE: 0
HEMATURIA: 0
CHEST TIGHTNESS: 0
CONSTIPATION: 0

## 2024-09-18 NOTE — ASSESSMENT & PLAN NOTE
S/p induction and in CR1.  Reassess her MRD status after 1st consolidation to risk stratify for possible allogeneic stem cell transplant.  Ordered for 10/15/24    C1D2 HiDAC today.  2 x week count checks

## 2024-09-18 NOTE — PROGRESS NOTES
Patient ID: Ilda Peter is a 23 y.o. female.  Referring Physician: Brandee Fields MD  [address] [place] [address]  Primary Care Provider: Brandee Cross DO    Date of Service:  9/18/2024  Assessment & Plan  Acute myeloid leukemia in remission (Multi)  S/p induction and in CR1.  Reassess her MRD status after 1st consolidation to risk stratify for possible allogeneic stem cell transplant.  Ordered for 10/15/24    C1D2 HiDAC today.  2 x week count checks  Immunosuppressed status (Multi)  No active signs or symptoms of infection.  Will continue prophylaxis with acyclovir, levofloxacin, and posaconazole during periods of neutropenia.      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 (>=10% blasts)  [id number] AML Risk Stratification: FAVORABLE: Mutated NPM1 without FLT3-ITD  Presented with anemia and circulating blasts    BONE MARROW (07/05/2024)  Hypercellular with erythroid predominance, dyserythropoiesis, and dysmegakaryopoiesis  FISH: AML negative  KARYOTYPE:  46XX [20]  MYELOID NGS: NPM1 (54%), NRAS (25%), PTPN11 (15%), IDH1 (2%)       7/19/2024 -  Chemotherapy    Induction with 7+3 (AraC+tarun) -> CR1 MRDpos  - D14 BM (8/1/24):  ablated, ALCON  - Count recovery:  ANC D28, plts D23  - Post induction BM (8/21/24):  ALCON, MFC negative, NPM1 2.65e-05       9/17/2024 -  Chemotherapy    HiDAC-123 (High-Dose Cytarabine), 28 Day Cycles - Consolidation        SUBJECTIVE:  Patient presents today, accompanied by her friend, for follow up.  Reports feeling well. No concerns or complaints today.      Review of Systems   Constitutional:  Negative for chills, fever and unexpected weight change.   HENT:  Negative for mouth sores and nosebleeds.    Respiratory:  Negative for cough, chest tightness and shortness of breath.    Cardiovascular:  Negative for chest pain and leg swelling.   Gastrointestinal:  Negative  for abdominal pain, blood in stool, constipation, diarrhea, nausea and vomiting.   Genitourinary:  Negative for dysuria and hematuria.   Skin:  Negative for rash.   Neurological:  Negative for dizziness, light-headedness, numbness and headaches.     OBJECTIVE:  KPS: Karnofsky Score: 100 - Fully active, able to carry on all pre-disease performed without restriction     Physical Exam  Constitutional:       Appearance: Normal appearance.   HENT:      Head: Normocephalic.   Eyes:      Pupils: Pupils are equal, round, and reactive to light.   Cardiovascular:      Rate and Rhythm: Normal rate and regular rhythm.   Pulmonary:      Effort: Pulmonary effort is normal.      Breath sounds: Normal breath sounds.   Abdominal:      General: Bowel sounds are normal.      Palpations: Abdomen is soft.   Musculoskeletal:         General: Normal range of motion.      Cervical back: Normal range of motion and neck supple.   Lymphadenopathy:      Comments: No lymphadenopathy   Skin:     General: Skin is warm and dry.      Findings: No lesion or rash.   Neurological:      General: No focal deficit present.      Mental Status: She is alert and oriented to person, place, and time. Mental status is at baseline.      Comments: No numbness or tingling   Psychiatric:         Mood and Affect: Mood normal.       Current Outpatient Medications   Medication Instructions    acyclovir (ZOVIRAX) 400 mg, oral, 2 times daily    erythromycin (Romycin) 5 mg/gram (0.5 %) ophthalmic ointment Both Eyes, Nightly, Apply Amount per Dose: 0.5 inch (~1 cm) per dose.    heparin flush 50 Units, intra-catheter, Daily, Per lumen    levoFLOXacin (LEVAQUIN) 500 mg, oral, Daily    lubricating eye drops ophthalmic solution 1 drop, Both Eyes, Daily    posaconazole (NOXAFIL) 300 mg, oral, Daily with breakfast, Do not crush, chew, or split.    prednisoLONE acetate (Pred-Forte) 1 % ophthalmic suspension 2 drops, Both Eyes, Every 6 hours, Administer 2 drops into both eyes  every 6 hours for 5 days. Beginning the day of Cytarabine and continuing for 5 days    prochlorperazine (COMPAZINE) 10 mg, oral, Every 6 hours PRN    sertraline (ZOLOFT) 50 mg, oral, Daily    sodium chloride 0.9% (Normal Saline Flush) flush 10 mL, intravenous, Daily, Per lumen      Laboratory:  The pertinent laboratory results were reviewed and discussed with the patient.    Lab Results   Component Value Date    WBC 7.4 09/16/2024    HCT 37.8 09/16/2024    HGB 12.6 09/16/2024     (L) 09/16/2024    K 3.9 09/17/2024    CALCIUM 9.5 09/17/2024     09/17/2024    MG 1.79 08/21/2024    BILITOT 0.6 09/17/2024    ALT 20 09/17/2024    AST 16 09/17/2024    BUN 18 09/17/2024    CREATININE 0.78 09/17/2024    PHOS 3.6 08/15/2024      Note: for a comprehensive list of the patient's lab results, access the Results Review activity.    RTC:  2 x weekly count checks  10/8 Dr. Fields follow up    Hailee Lozano, APRN-CNP

## 2024-09-18 NOTE — PROGRESS NOTES
Pt arrived ambulatory for HiDAC.  Denies any new or worsening symptoms.  Neuro assessment WNL. Pt tolerated infusion without issue.  Discharged in stable condition.  Pt will return for afternoon appt.

## 2024-09-18 NOTE — PROGRESS NOTES
Pt arrived for 2nd HiDAC.  Neuro assessment WNL.  Pt tolerated infusion without issue.  Discharged in stable condition.

## 2024-09-19 ENCOUNTER — APPOINTMENT (OUTPATIENT)
Dept: HEMATOLOGY/ONCOLOGY | Facility: HOSPITAL | Age: 24
End: 2024-09-19
Payer: COMMERCIAL

## 2024-09-19 ENCOUNTER — OFFICE VISIT (OUTPATIENT)
Dept: HEMATOLOGY/ONCOLOGY | Facility: HOSPITAL | Age: 24
End: 2024-09-19
Payer: COMMERCIAL

## 2024-09-19 ENCOUNTER — APPOINTMENT (OUTPATIENT)
Dept: HEMATOLOGY/ONCOLOGY | Facility: CLINIC | Age: 24
End: 2024-09-19
Payer: COMMERCIAL

## 2024-09-19 ENCOUNTER — INFUSION (OUTPATIENT)
Dept: HEMATOLOGY/ONCOLOGY | Facility: HOSPITAL | Age: 24
End: 2024-09-19
Payer: COMMERCIAL

## 2024-09-19 VITALS
TEMPERATURE: 97 F | RESPIRATION RATE: 18 BRPM | DIASTOLIC BLOOD PRESSURE: 64 MMHG | OXYGEN SATURATION: 99 % | HEART RATE: 80 BPM | SYSTOLIC BLOOD PRESSURE: 124 MMHG

## 2024-09-19 VITALS
OXYGEN SATURATION: 100 % | RESPIRATION RATE: 16 BRPM | SYSTOLIC BLOOD PRESSURE: 122 MMHG | DIASTOLIC BLOOD PRESSURE: 65 MMHG | BODY MASS INDEX: 23.99 KG/M2 | TEMPERATURE: 97 F | WEIGHT: 144 LBS | HEART RATE: 71 BPM

## 2024-09-19 DIAGNOSIS — C92.01 ACUTE MYELOID LEUKEMIA IN REMISSION (MULTI): Primary | ICD-10-CM

## 2024-09-19 DIAGNOSIS — D84.9 IMMUNOSUPPRESSED STATUS: ICD-10-CM

## 2024-09-19 DIAGNOSIS — C92.01 ACUTE MYELOID LEUKEMIA IN REMISSION (MULTI): ICD-10-CM

## 2024-09-19 LAB
ANION GAP SERPL CALC-SCNC: 12 MMOL/L (ref 10–20)
BUN SERPL-MCNC: 22 MG/DL (ref 6–23)
CALCIUM SERPL-MCNC: 9.5 MG/DL (ref 8.6–10.3)
CHLORIDE SERPL-SCNC: 108 MMOL/L (ref 98–107)
CO2 SERPL-SCNC: 26 MMOL/L (ref 21–32)
CREAT SERPL-MCNC: 0.83 MG/DL (ref 0.5–1.05)
EGFRCR SERPLBLD CKD-EPI 2021: >90 ML/MIN/1.73M*2
GLUCOSE SERPL-MCNC: 92 MG/DL (ref 74–99)
POTASSIUM SERPL-SCNC: 3.7 MMOL/L (ref 3.5–5.3)
SODIUM SERPL-SCNC: 142 MMOL/L (ref 136–145)

## 2024-09-19 PROCEDURE — 2500000004 HC RX 250 GENERAL PHARMACY W/ HCPCS (ALT 636 FOR OP/ED): Performed by: PHYSICIAN ASSISTANT

## 2024-09-19 PROCEDURE — 2500000004 HC RX 250 GENERAL PHARMACY W/ HCPCS (ALT 636 FOR OP/ED): Performed by: INTERNAL MEDICINE

## 2024-09-19 PROCEDURE — 99214 OFFICE O/P EST MOD 30 MIN: CPT | Performed by: PHYSICIAN ASSISTANT

## 2024-09-19 PROCEDURE — 2500000005 HC RX 250 GENERAL PHARMACY W/O HCPCS: Performed by: INTERNAL MEDICINE

## 2024-09-19 PROCEDURE — 80048 BASIC METABOLIC PNL TOTAL CA: CPT

## 2024-09-19 PROCEDURE — 96415 CHEMO IV INFUSION ADDL HR: CPT

## 2024-09-19 PROCEDURE — 96413 CHEMO IV INFUSION 1 HR: CPT

## 2024-09-19 RX ORDER — HEPARIN 100 UNIT/ML
500 SYRINGE INTRAVENOUS AS NEEDED
OUTPATIENT
Start: 2024-09-19

## 2024-09-19 RX ORDER — PROCHLORPERAZINE EDISYLATE 5 MG/ML
10 INJECTION INTRAMUSCULAR; INTRAVENOUS EVERY 6 HOURS PRN
Status: DISCONTINUED | OUTPATIENT
Start: 2024-09-19 | End: 2024-09-19 | Stop reason: HOSPADM

## 2024-09-19 RX ORDER — FAMOTIDINE 10 MG/ML
20 INJECTION INTRAVENOUS ONCE AS NEEDED
Status: DISCONTINUED | OUTPATIENT
Start: 2024-09-19 | End: 2024-09-19 | Stop reason: HOSPADM

## 2024-09-19 RX ORDER — PROCHLORPERAZINE MALEATE 10 MG
10 TABLET ORAL EVERY 6 HOURS PRN
Status: DISCONTINUED | OUTPATIENT
Start: 2024-09-19 | End: 2024-09-19 | Stop reason: HOSPADM

## 2024-09-19 RX ORDER — EPINEPHRINE 0.3 MG/.3ML
0.3 INJECTION SUBCUTANEOUS EVERY 5 MIN PRN
Status: DISCONTINUED | OUTPATIENT
Start: 2024-09-19 | End: 2024-09-19 | Stop reason: HOSPADM

## 2024-09-19 RX ORDER — HEPARIN SODIUM,PORCINE/PF 10 UNIT/ML
50 SYRINGE (ML) INTRAVENOUS AS NEEDED
Status: DISCONTINUED | OUTPATIENT
Start: 2024-09-19 | End: 2024-09-19 | Stop reason: HOSPADM

## 2024-09-19 RX ORDER — ONDANSETRON HYDROCHLORIDE 8 MG/1
16 TABLET, FILM COATED ORAL ONCE
Status: COMPLETED | OUTPATIENT
Start: 2024-09-19 | End: 2024-09-19

## 2024-09-19 RX ORDER — HEPARIN SODIUM,PORCINE/PF 10 UNIT/ML
50 SYRINGE (ML) INTRAVENOUS AS NEEDED
OUTPATIENT
Start: 2024-09-19

## 2024-09-19 RX ORDER — DEXAMETHASONE 6 MG/1
12 TABLET ORAL ONCE
Status: COMPLETED | OUTPATIENT
Start: 2024-09-19 | End: 2024-09-19

## 2024-09-19 RX ORDER — DIPHENHYDRAMINE HYDROCHLORIDE 50 MG/ML
50 INJECTION INTRAMUSCULAR; INTRAVENOUS AS NEEDED
Status: DISCONTINUED | OUTPATIENT
Start: 2024-09-19 | End: 2024-09-19 | Stop reason: HOSPADM

## 2024-09-19 RX ORDER — ALBUTEROL SULFATE 0.83 MG/ML
3 SOLUTION RESPIRATORY (INHALATION) AS NEEDED
Status: DISCONTINUED | OUTPATIENT
Start: 2024-09-19 | End: 2024-09-19 | Stop reason: HOSPADM

## 2024-09-19 ASSESSMENT — ENCOUNTER SYMPTOMS
COUGH: 0
LIGHT-HEADEDNESS: 0
CHEST TIGHTNESS: 0
LEG SWELLING: 0
FREQUENCY: 0
HEMATURIA: 0
EYE PROBLEMS: 0
CONFUSION: 0
APPETITE CHANGE: 0
ABDOMINAL PAIN: 0
ARTHRALGIAS: 0
BLOOD IN STOOL: 0
FEVER: 0
SHORTNESS OF BREATH: 0
DIARRHEA: 0
DIZZINESS: 0
NAUSEA: 1
DYSURIA: 0
CHILLS: 0
PALPITATIONS: 0
DIAPHORESIS: 0
SPEECH DIFFICULTY: 0
UNEXPECTED WEIGHT CHANGE: 0
NUMBNESS: 0
VOMITING: 0
CONSTIPATION: 0

## 2024-09-19 ASSESSMENT — PAIN SCALES - GENERAL: PAINLEVEL_OUTOF10: 0-NO PAIN

## 2024-09-19 NOTE — ASSESSMENT & PLAN NOTE
- Workup and treatment as above   - C1D3 HiDAC today (09/19/24)     Supportive care:  - Prednisolone eye gtts this am  - Support with pegfilgrastim on 9/21/24  - 2 x week count checks    Disease monitoring:  - Per Dr. Fields, the plan is to re-assess MRD status after 1st consolidation to risk stratify for possible allogeneic stem cell transplant: NPM1 PCR ordered for 10/15/24

## 2024-09-19 NOTE — PROGRESS NOTES
Patient seen in infusion today for Day 3 HiDAC, tolerated without complication. Neuro checks and signature check completed and WNL. Seen at chairside by NEVILLE Layne. Labs and schedule reviewed with patient. Both lumens on [place] positive for blood return and flushed with NS. Patient to return for evening dose. Discharged in stable condition.

## 2024-09-19 NOTE — PROGRESS NOTES
Patient ID: Ilda Peter is a 23 y.o. female.    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 (>=10% blasts)  [id number] AML Risk Stratification: FAVORABLE: Mutated NPM1 without FLT3-ITD  Presented with anemia and circulating blasts    BONE MARROW (07/05/2024)  Hypercellular with erythroid predominance, dyserythropoiesis, and dysmegakaryopoiesis  FISH: AML negative  KARYOTYPE:  46XX [20]  MYELOID NGS: NPM1 (54%), NRAS (25%), PTPN11 (15%), IDH1 (2%)       7/19/2024 -  Chemotherapy    Induction with 7+3 (AraC+tarun) -> CR1 MRDpos  - D14 BM (8/1/24):  ablated, ALCON  - Count recovery:  ANC D28, plts D23  - Post induction BM (8/21/24):  ALCON, MFC negative, NPM1 2.65e-05       9/17/2024 -  Chemotherapy    HiDAC-123 (High-Dose Cytarabine), 28 Day Cycles - Consolidation         Past Medical History:     Past Medical History:   Diagnosis Date    AML (acute myeloblastic leukemia) (Multi)     Anxiety     Cannabis dependence (Multi) 07/02/2024    Chronic atrophic rhinitis 07/02/2024    Depression     Hyperbilirubinemia     Tinea versicolor 07/02/2024       Surgical History:     Past Surgical History:   Procedure Laterality Date    WISDOM TOOTH EXTRACTION          Family History:     Family History   Problem Relation Name Age of Onset    No Known Problems Mother      No Known Problems Father      No Known Problems Brother      Other (Bicuspid Aortic Valve; VSD) Son Jorge        Social History:     Social History     Tobacco Use    Smoking status: Never    Smokeless tobacco: Never   Vaping Use    Vaping status: Never Used   Substance Use Topics    Alcohol use: Not Currently    Drug use: Yes     Types: Marijuana      -------------------------------------------------------------------------------------------------------  Subjective        The patient presents to the clinic today (09/19/24) for C1D3 HiDAC consolidation; overall, she is doing well.     She says she really has no complaints. She has a little nausea, worse in the morning. She says that nausea medication helps with this -- she's been taking it in the morning. No vomiting. Eating and drinking. No diarrhea or constipation.    No fevers or chills. Denies chest pain, dyspnea, cough, and productive cough. No mouth sores. No rashes. No issues with line insertion site.     No headaches, no visual changes. No balance difficulties.     Review of Systems   Constitutional:  Negative for appetite change, chills, diaphoresis, fever and unexpected weight change.   HENT:   Negative for nosebleeds.    Eyes:  Negative for eye problems.   Respiratory:  Negative for chest tightness, cough and shortness of breath.    Cardiovascular:  Negative for chest pain, leg swelling and palpitations.   Gastrointestinal:  Positive for nausea (mild, as above). Negative for abdominal pain, blood in stool, constipation, diarrhea and vomiting.   Genitourinary:  Negative for dysuria, frequency and hematuria.    Musculoskeletal:  Negative for arthralgias.   Skin:  Negative for itching and rash.   Neurological:  Negative for dizziness, light-headedness, numbness and speech difficulty.   Psychiatric/Behavioral:  Negative for confusion.    All other systems reviewed and are negative.     -------------------------------------------------------------------------------------------------------  Objective   BSA: There is no height or weight on file to calculate BSA.  There were no vitals taken for this visit.    Physical Exam  HENT:      Mouth/Throat:      Mouth: Mucous membranes are moist.      Pharynx: No posterior oropharyngeal erythema.   Eyes:      General: No scleral icterus.     Extraocular Movements: Extraocular movements intact.      Pupils: Pupils are equal, round, and reactive to light.   Cardiovascular:      Rate and  Rhythm: Normal rate and regular rhythm.      Heart sounds: No murmur heard.     No friction rub. No gallop.   Pulmonary:      Effort: No respiratory distress.      Breath sounds: No stridor. No wheezing, rhonchi or rales.   Abdominal:      General: There is no distension.      Palpations: There is no mass.      Tenderness: There is no abdominal tenderness. There is no guarding or rebound.   Musculoskeletal:         General: No swelling or deformity.      Right lower leg: No edema.      Left lower leg: No edema.   Lymphadenopathy:      Cervical: No cervical adenopathy.   Skin:     Findings: No lesion or rash.          Neurological:      Mental Status: She is alert.      Cranial Nerves: No cranial nerve deficit.      Motor: No weakness.   Psychiatric:         Mood and Affect: Mood normal.         Behavior: Behavior normal.       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 Fields, the plan is to re-assess MRD status after 1st consolidation to risk stratify for possible allogeneic stem cell transplant: NPM1 PCR ordered for 10/15/24    Immunosuppressed status (Multi)  - No active signs or symptoms of infection today (09/19/24)  - Continue prophylaxis with acyclovir, levofloxacin, and posaconazole during periods of neutropenia.     RTC:  - 9/21/24: Pegfilgrastim  - Twice weekly count checks @ [facility] through 10/14/24  - 10/8/24: Nessa Peña, CNP ([facility])  - 10/8/24: Labs and Dr. Fields  - 10/15/24: C2 HidAC (Planned)      -------------------------------------------------------------------------------------------------------  Tj Michael PA-C  [facility]

## 2024-09-19 NOTE — ASSESSMENT & PLAN NOTE
- No active signs or symptoms of infection today (09/19/24)  - Continue prophylaxis with acyclovir, levofloxacin, and posaconazole during periods of neutropenia.

## 2024-09-19 NOTE — PROGRESS NOTES
Patient seen for evening dose of Cytarabine, tolerated without complications. Neuro checks and signature test completed and WNL. Schedule reviewed with patient. Both lumens of [place] positive for blood return, flushed with NS and heparin per protocol. Discharged in stable condition.

## 2024-09-21 ENCOUNTER — INFUSION (OUTPATIENT)
Dept: HEMATOLOGY/ONCOLOGY | Facility: HOSPITAL | Age: 24
End: 2024-09-21
Payer: COMMERCIAL

## 2024-09-21 VITALS
TEMPERATURE: 97 F | HEART RATE: 66 BPM | BODY MASS INDEX: 23.66 KG/M2 | WEIGHT: 142 LBS | RESPIRATION RATE: 18 BRPM | SYSTOLIC BLOOD PRESSURE: 115 MMHG | OXYGEN SATURATION: 99 % | DIASTOLIC BLOOD PRESSURE: 64 MMHG

## 2024-09-21 DIAGNOSIS — C92.01 ACUTE MYELOID LEUKEMIA IN REMISSION (MULTI): ICD-10-CM

## 2024-09-21 PROCEDURE — 96372 THER/PROPH/DIAG INJ SC/IM: CPT

## 2024-09-21 PROCEDURE — 2500000004 HC RX 250 GENERAL PHARMACY W/ HCPCS (ALT 636 FOR OP/ED): Mod: JZ | Performed by: INTERNAL MEDICINE

## 2024-09-21 RX ORDER — ALBUTEROL SULFATE 0.83 MG/ML
3 SOLUTION RESPIRATORY (INHALATION) AS NEEDED
Status: DISCONTINUED | OUTPATIENT
Start: 2024-09-21 | End: 2024-09-21 | Stop reason: HOSPADM

## 2024-09-21 RX ORDER — DIPHENHYDRAMINE HYDROCHLORIDE 50 MG/ML
50 INJECTION INTRAMUSCULAR; INTRAVENOUS AS NEEDED
Status: DISCONTINUED | OUTPATIENT
Start: 2024-09-21 | End: 2024-09-21 | Stop reason: HOSPADM

## 2024-09-21 RX ORDER — FAMOTIDINE 10 MG/ML
20 INJECTION INTRAVENOUS ONCE AS NEEDED
Status: DISCONTINUED | OUTPATIENT
Start: 2024-09-21 | End: 2024-09-21 | Stop reason: HOSPADM

## 2024-09-21 RX ORDER — EPINEPHRINE 0.3 MG/.3ML
0.3 INJECTION SUBCUTANEOUS EVERY 5 MIN PRN
Status: DISCONTINUED | OUTPATIENT
Start: 2024-09-21 | End: 2024-09-21 | Stop reason: HOSPADM

## 2024-09-21 ASSESSMENT — PAIN SCALES - GENERAL: PAINLEVEL: 0-NO PAIN

## 2024-09-23 ENCOUNTER — LAB (OUTPATIENT)
Dept: HEMATOLOGY/ONCOLOGY | Facility: CLINIC | Age: 24
End: 2024-09-23
Payer: COMMERCIAL

## 2024-09-23 VITALS
RESPIRATION RATE: 16 BRPM | OXYGEN SATURATION: 96 % | SYSTOLIC BLOOD PRESSURE: 118 MMHG | WEIGHT: 145.94 LBS | HEART RATE: 101 BPM | BODY MASS INDEX: 24.32 KG/M2 | DIASTOLIC BLOOD PRESSURE: 82 MMHG | TEMPERATURE: 97.3 F

## 2024-09-23 DIAGNOSIS — C92.01 ACUTE MYELOID LEUKEMIA IN REMISSION (MULTI): ICD-10-CM

## 2024-09-23 DIAGNOSIS — C92.00 ACUTE MYELOID LEUKEMIA NOT HAVING ACHIEVED REMISSION (MULTI): ICD-10-CM

## 2024-09-23 LAB
ALBUMIN SERPL BCP-MCNC: 4.1 G/DL (ref 3.4–5)
ALP SERPL-CCNC: 68 U/L (ref 33–110)
ALT SERPL W P-5'-P-CCNC: 24 U/L (ref 7–45)
ANION GAP SERPL CALC-SCNC: 11 MMOL/L (ref 10–20)
AST SERPL W P-5'-P-CCNC: 17 U/L (ref 9–39)
BASOPHILS # BLD AUTO: 0.02 X10*3/UL (ref 0–0.1)
BASOPHILS NFR BLD AUTO: 0.6 %
BILIRUB SERPL-MCNC: 1.6 MG/DL (ref 0–1.2)
BUN SERPL-MCNC: 24 MG/DL (ref 6–23)
CALCIUM SERPL-MCNC: 9.2 MG/DL (ref 8.6–10.3)
CHLORIDE SERPL-SCNC: 105 MMOL/L (ref 98–107)
CO2 SERPL-SCNC: 27 MMOL/L (ref 21–32)
CREAT SERPL-MCNC: 0.7 MG/DL (ref 0.5–1.05)
EGFRCR SERPLBLD CKD-EPI 2021: >90 ML/MIN/1.73M*2
EOSINOPHIL # BLD AUTO: 0.06 X10*3/UL (ref 0–0.7)
EOSINOPHIL NFR BLD AUTO: 1.9 %
ERYTHROCYTE [DISTWIDTH] IN BLOOD BY AUTOMATED COUNT: 14.8 % (ref 11.5–14.5)
GLUCOSE SERPL-MCNC: 103 MG/DL (ref 74–99)
HCT VFR BLD AUTO: 31.3 % (ref 36–46)
HGB BLD-MCNC: 10.7 G/DL (ref 12–16)
IMM GRANULOCYTES # BLD AUTO: 0.16 X10*3/UL (ref 0–0.7)
IMM GRANULOCYTES NFR BLD AUTO: 5.1 % (ref 0–0.9)
LYMPHOCYTES # BLD AUTO: 0.25 X10*3/UL (ref 1.2–4.8)
LYMPHOCYTES NFR BLD AUTO: 8 %
MCH RBC QN AUTO: 32.3 PG (ref 26–34)
MCHC RBC AUTO-ENTMCNC: 34.2 G/DL (ref 32–36)
MCV RBC AUTO: 95 FL (ref 80–100)
MONOCYTES # BLD AUTO: 0.01 X10*3/UL (ref 0.1–1)
MONOCYTES NFR BLD AUTO: 0.3 %
NEUTROPHILS # BLD AUTO: 2.63 X10*3/UL (ref 1.2–7.7)
NEUTROPHILS NFR BLD AUTO: 84.1 %
PLATELET # BLD AUTO: 41 X10*3/UL (ref 150–450)
POTASSIUM SERPL-SCNC: 4.2 MMOL/L (ref 3.5–5.3)
PROT SERPL-MCNC: 6.4 G/DL (ref 6.4–8.2)
RBC # BLD AUTO: 3.31 X10*6/UL (ref 4–5.2)
SODIUM SERPL-SCNC: 139 MMOL/L (ref 136–145)
WBC # BLD AUTO: 3.1 X10*3/UL (ref 4.4–11.3)

## 2024-09-23 PROCEDURE — 84075 ASSAY ALKALINE PHOSPHATASE: CPT

## 2024-09-23 PROCEDURE — 86901 BLOOD TYPING SEROLOGIC RH(D): CPT

## 2024-09-23 PROCEDURE — 85025 COMPLETE CBC W/AUTO DIFF WBC: CPT

## 2024-09-23 PROCEDURE — 36591 DRAW BLOOD OFF VENOUS DEVICE: CPT

## 2024-09-23 RX ORDER — FAMOTIDINE 10 MG/ML
20 INJECTION INTRAVENOUS ONCE AS NEEDED
Status: CANCELLED | OUTPATIENT
Start: 2024-09-23

## 2024-09-23 RX ORDER — HEPARIN SODIUM,PORCINE/PF 10 UNIT/ML
50 SYRINGE (ML) INTRAVENOUS AS NEEDED
Status: DISCONTINUED | OUTPATIENT
Start: 2024-09-23 | End: 2024-09-23 | Stop reason: HOSPADM

## 2024-09-23 RX ORDER — ALBUTEROL SULFATE 0.83 MG/ML
3 SOLUTION RESPIRATORY (INHALATION) AS NEEDED
Status: CANCELLED | OUTPATIENT
Start: 2024-09-23

## 2024-09-23 RX ORDER — HEPARIN 100 UNIT/ML
500 SYRINGE INTRAVENOUS AS NEEDED
OUTPATIENT
Start: 2024-09-23

## 2024-09-23 RX ORDER — EPINEPHRINE 0.3 MG/.3ML
0.3 INJECTION SUBCUTANEOUS EVERY 5 MIN PRN
Status: CANCELLED | OUTPATIENT
Start: 2024-09-23

## 2024-09-23 RX ORDER — HEPARIN SODIUM,PORCINE/PF 10 UNIT/ML
50 SYRINGE (ML) INTRAVENOUS AS NEEDED
Status: CANCELLED | OUTPATIENT
Start: 2024-09-23

## 2024-09-23 RX ORDER — DIPHENHYDRAMINE HYDROCHLORIDE 50 MG/ML
50 INJECTION INTRAMUSCULAR; INTRAVENOUS AS NEEDED
Status: CANCELLED | OUTPATIENT
Start: 2024-09-23

## 2024-09-23 ASSESSMENT — PAIN SCALES - GENERAL: PAINLEVEL: 0-NO PAIN

## 2024-09-24 LAB
ABO GROUP (TYPE) IN BLOOD: NORMAL
ANTIBODY SCREEN: NORMAL
RH FACTOR (ANTIGEN D): NORMAL

## 2024-09-26 ENCOUNTER — DOCUMENTATION (OUTPATIENT)
Dept: HEMATOLOGY/ONCOLOGY | Facility: HOSPITAL | Age: 24
End: 2024-09-26
Payer: COMMERCIAL

## 2024-09-26 ENCOUNTER — LAB (OUTPATIENT)
Dept: HEMATOLOGY/ONCOLOGY | Facility: CLINIC | Age: 24
End: 2024-09-26
Payer: COMMERCIAL

## 2024-09-26 VITALS
SYSTOLIC BLOOD PRESSURE: 106 MMHG | RESPIRATION RATE: 16 BRPM | TEMPERATURE: 98.1 F | BODY MASS INDEX: 24.74 KG/M2 | WEIGHT: 148.48 LBS | DIASTOLIC BLOOD PRESSURE: 68 MMHG | HEART RATE: 100 BPM | OXYGEN SATURATION: 100 %

## 2024-09-26 DIAGNOSIS — C92.00 ACUTE MYELOID LEUKEMIA NOT HAVING ACHIEVED REMISSION (MULTI): ICD-10-CM

## 2024-09-26 DIAGNOSIS — C92.01 ACUTE MYELOID LEUKEMIA IN REMISSION (MULTI): ICD-10-CM

## 2024-09-26 LAB
BASOPHILS # BLD AUTO: 0 X10*3/UL (ref 0–0.1)
BASOPHILS NFR BLD AUTO: 0 %
EOSINOPHIL # BLD AUTO: 0.04 X10*3/UL (ref 0–0.7)
EOSINOPHIL NFR BLD AUTO: 19 %
ERYTHROCYTE [DISTWIDTH] IN BLOOD BY AUTOMATED COUNT: 13.5 % (ref 11.5–14.5)
HCT VFR BLD AUTO: 24.3 % (ref 36–46)
HGB BLD-MCNC: 8.4 G/DL (ref 12–16)
IMM GRANULOCYTES # BLD AUTO: 0 X10*3/UL (ref 0–0.7)
IMM GRANULOCYTES NFR BLD AUTO: 0 % (ref 0–0.9)
LYMPHOCYTES # BLD AUTO: 0.14 X10*3/UL (ref 1.2–4.8)
LYMPHOCYTES NFR BLD AUTO: 66.7 %
MCH RBC QN AUTO: 32.2 PG (ref 26–34)
MCHC RBC AUTO-ENTMCNC: 34.6 G/DL (ref 32–36)
MCV RBC AUTO: 93 FL (ref 80–100)
MONOCYTES # BLD AUTO: 0.01 X10*3/UL (ref 0.1–1)
MONOCYTES NFR BLD AUTO: 4.8 %
NEUTROPHILS # BLD AUTO: 0.02 X10*3/UL (ref 1.2–7.7)
NEUTROPHILS NFR BLD AUTO: 9.5 %
NRBC BLD-RTO: ABNORMAL /100{WBCS}
PLATELET # BLD AUTO: 6 X10*3/UL (ref 150–450)
RBC # BLD AUTO: 2.61 X10*6/UL (ref 4–5.2)
RBC MORPH BLD: NORMAL
WBC # BLD AUTO: 0.2 X10*3/UL (ref 4.4–11.3)

## 2024-09-26 PROCEDURE — 85025 COMPLETE CBC W/AUTO DIFF WBC: CPT

## 2024-09-26 PROCEDURE — 36430 TRANSFUSION BLD/BLD COMPNT: CPT

## 2024-09-26 RX ORDER — ALBUTEROL SULFATE 0.83 MG/ML
3 SOLUTION RESPIRATORY (INHALATION) AS NEEDED
Status: DISCONTINUED | OUTPATIENT
Start: 2024-09-26 | End: 2024-09-26 | Stop reason: HOSPADM

## 2024-09-26 RX ORDER — FAMOTIDINE 10 MG/ML
20 INJECTION INTRAVENOUS ONCE AS NEEDED
OUTPATIENT
Start: 2024-09-26

## 2024-09-26 RX ORDER — ALBUTEROL SULFATE 0.83 MG/ML
3 SOLUTION RESPIRATORY (INHALATION) AS NEEDED
OUTPATIENT
Start: 2024-09-26

## 2024-09-26 RX ORDER — HEPARIN SODIUM,PORCINE/PF 10 UNIT/ML
50 SYRINGE (ML) INTRAVENOUS AS NEEDED
Status: DISCONTINUED | OUTPATIENT
Start: 2024-09-26 | End: 2024-09-26 | Stop reason: HOSPADM

## 2024-09-26 RX ORDER — DIPHENHYDRAMINE HYDROCHLORIDE 50 MG/ML
50 INJECTION INTRAMUSCULAR; INTRAVENOUS AS NEEDED
Status: DISCONTINUED | OUTPATIENT
Start: 2024-09-26 | End: 2024-09-26 | Stop reason: HOSPADM

## 2024-09-26 RX ORDER — EPINEPHRINE 0.3 MG/.3ML
0.3 INJECTION SUBCUTANEOUS EVERY 5 MIN PRN
OUTPATIENT
Start: 2024-09-26

## 2024-09-26 RX ORDER — DIPHENHYDRAMINE HYDROCHLORIDE 50 MG/ML
50 INJECTION INTRAMUSCULAR; INTRAVENOUS AS NEEDED
OUTPATIENT
Start: 2024-09-26

## 2024-09-26 RX ORDER — EPINEPHRINE 0.3 MG/.3ML
0.3 INJECTION SUBCUTANEOUS EVERY 5 MIN PRN
Status: DISCONTINUED | OUTPATIENT
Start: 2024-09-26 | End: 2024-09-26 | Stop reason: HOSPADM

## 2024-09-26 RX ORDER — HEPARIN SODIUM,PORCINE/PF 10 UNIT/ML
50 SYRINGE (ML) INTRAVENOUS AS NEEDED
OUTPATIENT
Start: 2024-09-26

## 2024-09-26 RX ORDER — HEPARIN 100 UNIT/ML
500 SYRINGE INTRAVENOUS AS NEEDED
OUTPATIENT
Start: 2024-09-26

## 2024-09-26 RX ORDER — FAMOTIDINE 10 MG/ML
20 INJECTION INTRAVENOUS ONCE AS NEEDED
Status: DISCONTINUED | OUTPATIENT
Start: 2024-09-26 | End: 2024-09-26 | Stop reason: HOSPADM

## 2024-09-26 ASSESSMENT — PAIN SCALES - GENERAL: PAINLEVEL: 0-NO PAIN

## 2024-09-27 ENCOUNTER — APPOINTMENT (OUTPATIENT)
Dept: OPHTHALMOLOGY | Facility: CLINIC | Age: 24
End: 2024-09-27
Payer: COMMERCIAL

## 2024-09-27 LAB
BLOOD EXPIRATION DATE: NORMAL
DISPENSE STATUS: NORMAL
PRODUCT BLOOD TYPE: 5100
PRODUCT CODE: NORMAL
UNIT ABO: NORMAL
UNIT NUMBER: NORMAL
UNIT RH: NORMAL
UNIT VOLUME: 293

## 2024-09-30 ENCOUNTER — HOME CARE VISIT (OUTPATIENT)
Dept: HOME HEALTH SERVICES | Facility: HOME HEALTH | Age: 24
End: 2024-09-30
Payer: COMMERCIAL

## 2024-09-30 ENCOUNTER — LAB (OUTPATIENT)
Dept: HEMATOLOGY/ONCOLOGY | Facility: CLINIC | Age: 24
End: 2024-09-30
Payer: COMMERCIAL

## 2024-09-30 ENCOUNTER — DOCUMENTATION (OUTPATIENT)
Dept: HEMATOLOGY/ONCOLOGY | Facility: HOSPITAL | Age: 24
End: 2024-09-30
Payer: COMMERCIAL

## 2024-09-30 VITALS
WEIGHT: 141.98 LBS | SYSTOLIC BLOOD PRESSURE: 109 MMHG | HEART RATE: 86 BPM | TEMPERATURE: 97.3 F | BODY MASS INDEX: 23.65 KG/M2 | RESPIRATION RATE: 16 BRPM | OXYGEN SATURATION: 100 % | DIASTOLIC BLOOD PRESSURE: 63 MMHG

## 2024-09-30 DIAGNOSIS — C92.00 ACUTE MYELOID LEUKEMIA NOT HAVING ACHIEVED REMISSION (MULTI): ICD-10-CM

## 2024-09-30 DIAGNOSIS — C92.01 ACUTE MYELOID LEUKEMIA IN REMISSION (MULTI): ICD-10-CM

## 2024-09-30 LAB
ALBUMIN SERPL BCP-MCNC: 4 G/DL (ref 3.4–5)
ALP SERPL-CCNC: 73 U/L (ref 33–110)
ALT SERPL W P-5'-P-CCNC: 19 U/L (ref 7–45)
ANION GAP SERPL CALC-SCNC: 11 MMOL/L (ref 10–20)
AST SERPL W P-5'-P-CCNC: 11 U/L (ref 9–39)
BASOPHILS # BLD AUTO: 0 X10*3/UL (ref 0–0.1)
BASOPHILS NFR BLD AUTO: 0 %
BILIRUB SERPL-MCNC: 1.1 MG/DL (ref 0–1.2)
BLOOD EXPIRATION DATE: NORMAL
BUN SERPL-MCNC: 17 MG/DL (ref 6–23)
CALCIUM SERPL-MCNC: 9.6 MG/DL (ref 8.6–10.3)
CHLORIDE SERPL-SCNC: 105 MMOL/L (ref 98–107)
CO2 SERPL-SCNC: 26 MMOL/L (ref 21–32)
CREAT SERPL-MCNC: 0.91 MG/DL (ref 0.5–1.05)
DISPENSE STATUS: NORMAL
DOHLE BOD BLD QL SMEAR: PRESENT
EGFRCR SERPLBLD CKD-EPI 2021: >90 ML/MIN/1.73M*2
EOSINOPHIL # BLD AUTO: 0.07 X10*3/UL (ref 0–0.7)
EOSINOPHIL NFR BLD AUTO: 4.8 %
ERYTHROCYTE [DISTWIDTH] IN BLOOD BY AUTOMATED COUNT: 13 % (ref 11.5–14.5)
GLUCOSE SERPL-MCNC: 99 MG/DL (ref 74–99)
HCT VFR BLD AUTO: 17.6 % (ref 36–46)
HGB BLD-MCNC: 6.3 G/DL (ref 12–16)
IMM GRANULOCYTES # BLD AUTO: 0.01 X10*3/UL (ref 0–0.7)
IMM GRANULOCYTES NFR BLD AUTO: 0.7 % (ref 0–0.9)
LYMPHOCYTES # BLD AUTO: 0.48 X10*3/UL (ref 1.2–4.8)
LYMPHOCYTES NFR BLD AUTO: 32.9 %
MCH RBC QN AUTO: 32 PG (ref 26–34)
MCHC RBC AUTO-ENTMCNC: 35.8 G/DL (ref 32–36)
MCV RBC AUTO: 89 FL (ref 80–100)
MONOCYTES # BLD AUTO: 0.11 X10*3/UL (ref 0.1–1)
MONOCYTES NFR BLD AUTO: 7.5 %
NEUTROPHILS # BLD AUTO: 0.79 X10*3/UL (ref 1.2–7.7)
NEUTROPHILS NFR BLD AUTO: 54.1 %
NRBC BLD-RTO: ABNORMAL /100{WBCS}
PLATELET # BLD AUTO: 2 X10*3/UL (ref 150–450)
POTASSIUM SERPL-SCNC: 4 MMOL/L (ref 3.5–5.3)
PRODUCT BLOOD TYPE: 5100
PRODUCT CODE: NORMAL
PROT SERPL-MCNC: 6.4 G/DL (ref 6.4–8.2)
RBC # BLD AUTO: 1.97 X10*6/UL (ref 4–5.2)
RBC MORPH BLD: NORMAL
SODIUM SERPL-SCNC: 138 MMOL/L (ref 136–145)
UNIT ABO: NORMAL
UNIT NUMBER: NORMAL
UNIT RH: NORMAL
UNIT VOLUME: 280
WBC # BLD AUTO: 1.5 X10*3/UL (ref 4.4–11.3)

## 2024-09-30 PROCEDURE — 36430 TRANSFUSION BLD/BLD COMPNT: CPT

## 2024-09-30 PROCEDURE — 85025 COMPLETE CBC W/AUTO DIFF WBC: CPT

## 2024-09-30 PROCEDURE — P9037 PLATE PHERES LEUKOREDU IRRAD: HCPCS

## 2024-09-30 PROCEDURE — 86923 COMPATIBILITY TEST ELECTRIC: CPT

## 2024-09-30 PROCEDURE — 86901 BLOOD TYPING SEROLOGIC RH(D): CPT

## 2024-09-30 PROCEDURE — 2500000004 HC RX 250 GENERAL PHARMACY W/ HCPCS (ALT 636 FOR OP/ED): Performed by: PHYSICIAN ASSISTANT

## 2024-09-30 PROCEDURE — 84075 ASSAY ALKALINE PHOSPHATASE: CPT

## 2024-09-30 RX ORDER — HEPARIN SODIUM,PORCINE/PF 10 UNIT/ML
50 SYRINGE (ML) INTRAVENOUS AS NEEDED
Status: CANCELLED | OUTPATIENT
Start: 2024-09-30

## 2024-09-30 RX ORDER — ALBUTEROL SULFATE 0.83 MG/ML
3 SOLUTION RESPIRATORY (INHALATION) AS NEEDED
Status: CANCELLED | OUTPATIENT
Start: 2024-09-30

## 2024-09-30 RX ORDER — DIPHENHYDRAMINE HYDROCHLORIDE 50 MG/ML
50 INJECTION INTRAMUSCULAR; INTRAVENOUS AS NEEDED
Status: CANCELLED | OUTPATIENT
Start: 2024-09-30

## 2024-09-30 RX ORDER — FAMOTIDINE 10 MG/ML
20 INJECTION INTRAVENOUS ONCE AS NEEDED
Status: CANCELLED | OUTPATIENT
Start: 2024-09-30

## 2024-09-30 RX ORDER — HEPARIN SODIUM,PORCINE/PF 10 UNIT/ML
50 SYRINGE (ML) INTRAVENOUS AS NEEDED
Status: DISCONTINUED | OUTPATIENT
Start: 2024-09-30 | End: 2024-09-30 | Stop reason: HOSPADM

## 2024-09-30 RX ORDER — HEPARIN 100 UNIT/ML
500 SYRINGE INTRAVENOUS AS NEEDED
Status: DISCONTINUED | OUTPATIENT
Start: 2024-09-30 | End: 2024-09-30 | Stop reason: HOSPADM

## 2024-09-30 RX ORDER — HEPARIN 100 UNIT/ML
500 SYRINGE INTRAVENOUS AS NEEDED
Status: CANCELLED | OUTPATIENT
Start: 2024-09-30

## 2024-09-30 RX ORDER — EPINEPHRINE 0.3 MG/.3ML
0.3 INJECTION SUBCUTANEOUS EVERY 5 MIN PRN
Status: CANCELLED | OUTPATIENT
Start: 2024-09-30

## 2024-09-30 ASSESSMENT — PAIN SCALES - GENERAL: PAINLEVEL: 0-NO PAIN

## 2024-09-30 NOTE — PROGRESS NOTES
This RN took over care for this patient at approximately 1230 today 9/30/2024. Reviewed with previous RN that the patient was currently on her menstrual cycle, platelets count of 2 and hgb of 6.3 today and that the patient will receive platelets today and PRBC tomorrow. This RN noted that there was an order in EPIC for Lupron but wasn’t ever given. This RN discussed with the patient regarding her cycle; she reports it started last Tuesday and just the past two days has become heavy, changing soaked pads 5-6 times per day and clots that are 3-4 in. in diameter. Reports she is intermittently lightheaded and at times she can hear her heart beat in her head. Vital signs are stable. This RN noted that Dr. Koroma (covering for Dr. Fields) was messaged regarding the critical lab values. This RN noted in the past the patient was seen by Hailee Lozano NP and Tj Michael PA-C. T physician assistant who typically works with Dr. Fields was also signed out). This RN messaged Hailee Lozano NP and Tj Michael PA-C regarding the patient’s lab work (platelets of 2, hgb of 6.3), patient currently receiving platelets, will return tomorrow for PRBC as there was no active type/screen, actively bleeding on her menstrual cycle, passing clots, changing soaked pads 5-6 times per day and the Lupron order that was never given. Hailee Bentley NP states okay to wait until tomorrow to transfuse with PRBC. Hailee Lozano NP also initially requested for the patient to receive the depo shot, as she was previously on this medication, however, this RN notified Hailee Lundy NP that we don’t have that available here in clinic today, the patient previously would go to a clinic to receive the injection, hasn’t had an injection since May and is currently not on any birth control. Ultimately, Hailee Lozano NP states that Dr. Fields and NANDO Thurman will return tomorrow and they  need to make the decision regarding how to proceed but she does note that she spoke with our pharmacist here who will order the depo shot so that we have it available to give if needed tomorrow. This RN discharged the patient home with strict instructions; present to the ER should she develop worsening bleeding or large clots the size of a baseball, nose bleeding, blood in the urine/stool, worsening lightheadedness, chest pain, shortness of breath, heart racing, or a general feeling of feeling unwell. The patient will return tomorrow for PRBC. This RN will reach out to Dr. Fields and NANDO Thurman to notify of the above situation.

## 2024-09-30 NOTE — PROGRESS NOTES
Notified by Pennie Patel in Lab Services that HGB 6.3 and PLT 2. Pt currently at [facility]. Dr. Fields and Mike Bennett are both out today. Secure Chat sent to covering MD, Dr. Koroma.

## 2024-10-01 ENCOUNTER — APPOINTMENT (OUTPATIENT)
Dept: HEMATOLOGY/ONCOLOGY | Facility: HOSPITAL | Age: 24
End: 2024-10-01
Payer: COMMERCIAL

## 2024-10-01 ENCOUNTER — TELEPHONE (OUTPATIENT)
Dept: HEMATOLOGY/ONCOLOGY | Facility: HOSPITAL | Age: 24
End: 2024-10-01
Payer: COMMERCIAL

## 2024-10-01 ENCOUNTER — INFUSION (OUTPATIENT)
Dept: HEMATOLOGY/ONCOLOGY | Facility: CLINIC | Age: 24
End: 2024-10-01
Payer: COMMERCIAL

## 2024-10-01 VITALS
TEMPERATURE: 98.6 F | WEIGHT: 142.86 LBS | SYSTOLIC BLOOD PRESSURE: 102 MMHG | RESPIRATION RATE: 16 BRPM | BODY MASS INDEX: 23.8 KG/M2 | HEART RATE: 75 BPM | OXYGEN SATURATION: 100 % | DIASTOLIC BLOOD PRESSURE: 59 MMHG

## 2024-10-01 DIAGNOSIS — C92.01 ACUTE MYELOID LEUKEMIA IN REMISSION (MULTI): ICD-10-CM

## 2024-10-01 DIAGNOSIS — C92.00 ACUTE MYELOID LEUKEMIA NOT HAVING ACHIEVED REMISSION (MULTI): ICD-10-CM

## 2024-10-01 LAB
ABO GROUP (TYPE) IN BLOOD: NORMAL
ANTIBODY SCREEN: NORMAL
BASOPHILS # BLD AUTO: 0 X10*3/UL (ref 0–0.1)
BASOPHILS NFR BLD AUTO: 0 %
BLOOD EXPIRATION DATE: NORMAL
BLOOD EXPIRATION DATE: NORMAL
DISPENSE STATUS: NORMAL
DISPENSE STATUS: NORMAL
EOSINOPHIL # BLD AUTO: 0.07 X10*3/UL (ref 0–0.7)
EOSINOPHIL NFR BLD AUTO: 5.6 %
ERYTHROCYTE [DISTWIDTH] IN BLOOD BY AUTOMATED COUNT: 13 % (ref 11.5–14.5)
HCT VFR BLD AUTO: 16.5 % (ref 36–46)
HGB BLD-MCNC: 5.9 G/DL (ref 12–16)
IMM GRANULOCYTES # BLD AUTO: 0 X10*3/UL (ref 0–0.7)
IMM GRANULOCYTES NFR BLD AUTO: 0 % (ref 0–0.9)
LYMPHOCYTES # BLD AUTO: 0.53 X10*3/UL (ref 1.2–4.8)
LYMPHOCYTES NFR BLD AUTO: 42.4 %
MCH RBC QN AUTO: 31.7 PG (ref 26–34)
MCHC RBC AUTO-ENTMCNC: 35.8 G/DL (ref 32–36)
MCV RBC AUTO: 89 FL (ref 80–100)
MONOCYTES # BLD AUTO: 0.13 X10*3/UL (ref 0.1–1)
MONOCYTES NFR BLD AUTO: 10.4 %
NEUTROPHILS # BLD AUTO: 0.52 X10*3/UL (ref 1.2–7.7)
NEUTROPHILS NFR BLD AUTO: 41.6 %
PLATELET # BLD AUTO: 15 X10*3/UL (ref 150–450)
PRODUCT BLOOD TYPE: 5100
PRODUCT BLOOD TYPE: 5100
PRODUCT CODE: NORMAL
PRODUCT CODE: NORMAL
RBC # BLD AUTO: 1.86 X10*6/UL (ref 4–5.2)
RH FACTOR (ANTIGEN D): NORMAL
UNIT ABO: NORMAL
UNIT ABO: NORMAL
UNIT NUMBER: NORMAL
UNIT NUMBER: NORMAL
UNIT RH: NORMAL
UNIT RH: NORMAL
UNIT VOLUME: 280
UNIT VOLUME: 350
WBC # BLD AUTO: 1.3 X10*3/UL (ref 4.4–11.3)
XM INTEP: NORMAL

## 2024-10-01 PROCEDURE — 2500000004 HC RX 250 GENERAL PHARMACY W/ HCPCS (ALT 636 FOR OP/ED): Performed by: PHYSICIAN ASSISTANT

## 2024-10-01 PROCEDURE — 36430 TRANSFUSION BLD/BLD COMPNT: CPT

## 2024-10-01 PROCEDURE — P9037 PLATE PHERES LEUKOREDU IRRAD: HCPCS

## 2024-10-01 PROCEDURE — P9040 RBC LEUKOREDUCED IRRADIATED: HCPCS

## 2024-10-01 PROCEDURE — 85025 COMPLETE CBC W/AUTO DIFF WBC: CPT

## 2024-10-01 RX ORDER — EPINEPHRINE 0.3 MG/.3ML
0.3 INJECTION SUBCUTANEOUS EVERY 5 MIN PRN
Status: CANCELLED | OUTPATIENT
Start: 2024-10-01

## 2024-10-01 RX ORDER — HEPARIN SODIUM,PORCINE/PF 10 UNIT/ML
50 SYRINGE (ML) INTRAVENOUS AS NEEDED
Status: DISCONTINUED | OUTPATIENT
Start: 2024-10-01 | End: 2024-10-01 | Stop reason: HOSPADM

## 2024-10-01 RX ORDER — DIPHENHYDRAMINE HYDROCHLORIDE 50 MG/ML
50 INJECTION INTRAMUSCULAR; INTRAVENOUS AS NEEDED
Status: CANCELLED | OUTPATIENT
Start: 2024-10-01

## 2024-10-01 RX ORDER — FAMOTIDINE 10 MG/ML
20 INJECTION INTRAVENOUS ONCE AS NEEDED
Status: CANCELLED | OUTPATIENT
Start: 2024-10-01

## 2024-10-01 RX ORDER — HEPARIN 100 UNIT/ML
500 SYRINGE INTRAVENOUS AS NEEDED
OUTPATIENT
Start: 2024-10-01

## 2024-10-01 RX ORDER — HEPARIN SODIUM,PORCINE/PF 10 UNIT/ML
50 SYRINGE (ML) INTRAVENOUS AS NEEDED
Status: CANCELLED | OUTPATIENT
Start: 2024-10-01

## 2024-10-01 RX ORDER — ALBUTEROL SULFATE 0.83 MG/ML
3 SOLUTION RESPIRATORY (INHALATION) AS NEEDED
Status: CANCELLED | OUTPATIENT
Start: 2024-10-01

## 2024-10-01 RX ORDER — NORETHINDRONE 5 MG/1
10 TABLET ORAL DAILY
Qty: 60 TABLET | Refills: 11 | Status: SHIPPED | OUTPATIENT
Start: 2024-10-01 | End: 2025-10-01

## 2024-10-02 ENCOUNTER — CLINICAL SUPPORT (OUTPATIENT)
Dept: NUTRITION | Facility: HOSPITAL | Age: 24
End: 2024-10-02
Payer: COMMERCIAL

## 2024-10-02 ENCOUNTER — HOME CARE VISIT (OUTPATIENT)
Dept: HOME HEALTH SERVICES | Facility: HOME HEALTH | Age: 24
End: 2024-10-02
Payer: COMMERCIAL

## 2024-10-02 DIAGNOSIS — C92.01 ACUTE MYELOID LEUKEMIA IN REMISSION (MULTI): ICD-10-CM

## 2024-10-02 DIAGNOSIS — Z59.41 MILD FOOD INSECURITY: ICD-10-CM

## 2024-10-02 RX ORDER — EPINEPHRINE 0.3 MG/.3ML
0.3 INJECTION SUBCUTANEOUS EVERY 5 MIN PRN
Status: CANCELLED | OUTPATIENT
Start: 2024-10-02

## 2024-10-02 RX ORDER — ALBUTEROL SULFATE 0.83 MG/ML
3 SOLUTION RESPIRATORY (INHALATION) AS NEEDED
Status: CANCELLED | OUTPATIENT
Start: 2024-10-02

## 2024-10-02 RX ORDER — DIPHENHYDRAMINE HYDROCHLORIDE 50 MG/ML
50 INJECTION INTRAMUSCULAR; INTRAVENOUS AS NEEDED
Status: CANCELLED | OUTPATIENT
Start: 2024-10-02

## 2024-10-02 RX ORDER — FAMOTIDINE 10 MG/ML
20 INJECTION INTRAVENOUS ONCE AS NEEDED
Status: CANCELLED | OUTPATIENT
Start: 2024-10-02

## 2024-10-02 SDOH — ECONOMIC STABILITY - FOOD INSECURITY: FOOD INSECURITY: Z59.41

## 2024-10-02 NOTE — PROGRESS NOTES
[employer]  Diet Recommendation 1: Healthy Eating  Diet Recommendation 2: Neuropenic  Food Intolerance Avoidance: none  Household Size: 5 Members (4 Max/Household)  Interventions: Referral Number: 1st 6 Mo Referral 6 Mos  Interventions: Visit Number: 1 of 6 Visits - Max 6 Visits/Referral Each 6 Mo Period  Education Today: [employer]  Follow Up Notes for Future Visits: Pt stated currently undergoing CA tx (leukemia). On neutropenic diet per care team.  Grains: 50-75% Whole  Fruit: 25-50% Fresh  Vegetables: 50-75% Fresh  Proteins: 1-2 Plant-based Items  Dairy: 25-50% Lowfat  Originating Site of Referral Order: [facility]  Initials of RD Assisting Today: JERRI

## 2024-10-03 ENCOUNTER — DOCUMENTATION (OUTPATIENT)
Dept: HEMATOLOGY/ONCOLOGY | Facility: HOSPITAL | Age: 24
End: 2024-10-03
Payer: COMMERCIAL

## 2024-10-03 ENCOUNTER — LAB (OUTPATIENT)
Dept: HEMATOLOGY/ONCOLOGY | Facility: CLINIC | Age: 24
End: 2024-10-03
Payer: COMMERCIAL

## 2024-10-03 ENCOUNTER — TELEPHONE (OUTPATIENT)
Dept: HEMATOLOGY/ONCOLOGY | Facility: CLINIC | Age: 24
End: 2024-10-03
Payer: COMMERCIAL

## 2024-10-03 VITALS
BODY MASS INDEX: 23.93 KG/M2 | RESPIRATION RATE: 16 BRPM | OXYGEN SATURATION: 100 % | DIASTOLIC BLOOD PRESSURE: 77 MMHG | HEART RATE: 98 BPM | TEMPERATURE: 98.1 F | SYSTOLIC BLOOD PRESSURE: 137 MMHG | WEIGHT: 143.63 LBS

## 2024-10-03 DIAGNOSIS — C92.01 ACUTE MYELOID LEUKEMIA IN REMISSION (MULTI): ICD-10-CM

## 2024-10-03 DIAGNOSIS — C92.00 ACUTE MYELOID LEUKEMIA NOT HAVING ACHIEVED REMISSION (MULTI): ICD-10-CM

## 2024-10-03 LAB
ABO GROUP (TYPE) IN BLOOD: NORMAL
ALBUMIN SERPL BCP-MCNC: 4 G/DL (ref 3.4–5)
ALP SERPL-CCNC: 73 U/L (ref 33–110)
ALT SERPL W P-5'-P-CCNC: 16 U/L (ref 7–45)
ANION GAP SERPL CALC-SCNC: 13 MMOL/L (ref 10–20)
ANTIBODY SCREEN: NORMAL
AST SERPL W P-5'-P-CCNC: 12 U/L (ref 9–39)
BASOPHILS # BLD MANUAL: 0 X10*3/UL (ref 0–0.1)
BASOPHILS NFR BLD MANUAL: 0 %
BILIRUB SERPL-MCNC: 0.8 MG/DL (ref 0–1.2)
BLOOD EXPIRATION DATE: NORMAL
BUN SERPL-MCNC: 10 MG/DL (ref 6–23)
CALCIUM SERPL-MCNC: 9.7 MG/DL (ref 8.6–10.3)
CHLORIDE SERPL-SCNC: 107 MMOL/L (ref 98–107)
CO2 SERPL-SCNC: 25 MMOL/L (ref 21–32)
CREAT SERPL-MCNC: 0.97 MG/DL (ref 0.5–1.05)
DACRYOCYTES BLD QL SMEAR: ABNORMAL
DISPENSE STATUS: NORMAL
EGFRCR SERPLBLD CKD-EPI 2021: 84 ML/MIN/1.73M*2
EOSINOPHIL # BLD MANUAL: 0.09 X10*3/UL (ref 0–0.7)
EOSINOPHIL NFR BLD MANUAL: 6 %
ERYTHROCYTE [DISTWIDTH] IN BLOOD BY AUTOMATED COUNT: 14.1 % (ref 11.5–14.5)
GLUCOSE SERPL-MCNC: 80 MG/DL (ref 74–99)
HCT VFR BLD AUTO: 19.5 % (ref 36–46)
HGB BLD-MCNC: 6.9 G/DL (ref 12–16)
IMM GRANULOCYTES # BLD AUTO: 0 X10*3/UL (ref 0–0.7)
IMM GRANULOCYTES NFR BLD AUTO: 0 % (ref 0–0.9)
LYMPHOCYTES # BLD MANUAL: 0.5 X10*3/UL (ref 1.2–4.8)
LYMPHOCYTES NFR BLD MANUAL: 33 %
MCH RBC QN AUTO: 30.7 PG (ref 26–34)
MCHC RBC AUTO-ENTMCNC: 35.4 G/DL (ref 32–36)
MCV RBC AUTO: 87 FL (ref 80–100)
METAMYELOCYTES # BLD MANUAL: 0.06 X10*3/UL
METAMYELOCYTES NFR BLD MANUAL: 4 %
MONOCYTES # BLD MANUAL: 0.06 X10*3/UL (ref 0.1–1)
MONOCYTES NFR BLD MANUAL: 4 %
NEUTROPHILS # BLD MANUAL: 0.8 X10*3/UL (ref 1.2–7.7)
NEUTS BAND # BLD MANUAL: 0.08 X10*3/UL (ref 0–0.7)
NEUTS BAND NFR BLD MANUAL: 5 %
NEUTS SEG # BLD MANUAL: 0.72 X10*3/UL (ref 1.2–7)
NEUTS SEG NFR BLD MANUAL: 48 %
NRBC BLD MANUAL-RTO: 2 % (ref 0–0)
NRBC BLD-RTO: ABNORMAL /100{WBCS}
PLATELET # BLD AUTO: 31 X10*3/UL (ref 150–450)
POLYCHROMASIA BLD QL SMEAR: ABNORMAL
POTASSIUM SERPL-SCNC: 3.8 MMOL/L (ref 3.5–5.3)
PRODUCT BLOOD TYPE: 5100
PRODUCT CODE: NORMAL
PROT SERPL-MCNC: 6.2 G/DL (ref 6.4–8.2)
RBC # BLD AUTO: 2.25 X10*6/UL (ref 4–5.2)
RBC MORPH BLD: ABNORMAL
RH FACTOR (ANTIGEN D): NORMAL
SODIUM SERPL-SCNC: 141 MMOL/L (ref 136–145)
TOTAL CELLS COUNTED BLD: 100
UNIT ABO: NORMAL
UNIT NUMBER: NORMAL
UNIT RH: NORMAL
UNIT VOLUME: 282
WBC # BLD AUTO: 1.5 X10*3/UL (ref 4.4–11.3)
XM INTEP: NORMAL

## 2024-10-03 PROCEDURE — 86901 BLOOD TYPING SEROLOGIC RH(D): CPT

## 2024-10-03 PROCEDURE — 36430 TRANSFUSION BLD/BLD COMPNT: CPT

## 2024-10-03 PROCEDURE — 86920 COMPATIBILITY TEST SPIN: CPT

## 2024-10-03 PROCEDURE — 36591 DRAW BLOOD OFF VENOUS DEVICE: CPT

## 2024-10-03 PROCEDURE — 85027 COMPLETE CBC AUTOMATED: CPT

## 2024-10-03 PROCEDURE — 85007 BL SMEAR W/DIFF WBC COUNT: CPT

## 2024-10-03 PROCEDURE — 80053 COMPREHEN METABOLIC PANEL: CPT

## 2024-10-03 RX ORDER — FAMOTIDINE 10 MG/ML
20 INJECTION INTRAVENOUS ONCE AS NEEDED
OUTPATIENT
Start: 2024-10-03

## 2024-10-03 RX ORDER — EPINEPHRINE 0.3 MG/.3ML
0.3 INJECTION SUBCUTANEOUS EVERY 5 MIN PRN
OUTPATIENT
Start: 2024-10-03

## 2024-10-03 RX ORDER — ALBUTEROL SULFATE 0.83 MG/ML
3 SOLUTION RESPIRATORY (INHALATION) AS NEEDED
OUTPATIENT
Start: 2024-10-03

## 2024-10-03 RX ORDER — DIPHENHYDRAMINE HYDROCHLORIDE 50 MG/ML
50 INJECTION INTRAMUSCULAR; INTRAVENOUS AS NEEDED
OUTPATIENT
Start: 2024-10-03

## 2024-10-03 NOTE — TELEPHONE ENCOUNTER
Message left reminding patient to  oral contraceptives and begin to take them immediately. Patient was reminded that she has appointment tomorrow for blood at 1130am at [facility]

## 2024-10-04 ENCOUNTER — SOCIAL WORK (OUTPATIENT)
Dept: HEMATOLOGY/ONCOLOGY | Facility: CLINIC | Age: 24
End: 2024-10-04
Payer: COMMERCIAL

## 2024-10-04 ENCOUNTER — HOME CARE VISIT (OUTPATIENT)
Dept: HOME HEALTH SERVICES | Facility: HOME HEALTH | Age: 24
End: 2024-10-04
Payer: COMMERCIAL

## 2024-10-04 ENCOUNTER — INFUSION (OUTPATIENT)
Dept: HEMATOLOGY/ONCOLOGY | Facility: CLINIC | Age: 24
End: 2024-10-04
Payer: COMMERCIAL

## 2024-10-04 VITALS
SYSTOLIC BLOOD PRESSURE: 125 MMHG | DIASTOLIC BLOOD PRESSURE: 75 MMHG | BODY MASS INDEX: 23.95 KG/M2 | HEART RATE: 87 BPM | OXYGEN SATURATION: 100 % | WEIGHT: 143.74 LBS | TEMPERATURE: 98.2 F | RESPIRATION RATE: 16 BRPM

## 2024-10-04 DIAGNOSIS — C92.00 ACUTE MYELOID LEUKEMIA NOT HAVING ACHIEVED REMISSION (MULTI): ICD-10-CM

## 2024-10-04 DIAGNOSIS — C92.01 ACUTE MYELOID LEUKEMIA IN REMISSION (MULTI): ICD-10-CM

## 2024-10-04 LAB
BLOOD EXPIRATION DATE: NORMAL
DISPENSE STATUS: NORMAL
PRODUCT BLOOD TYPE: 5100
PRODUCT CODE: NORMAL
UNIT ABO: NORMAL
UNIT NUMBER: NORMAL
UNIT RH: NORMAL
UNIT VOLUME: 350
XM INTEP: NORMAL

## 2024-10-04 PROCEDURE — 36430 TRANSFUSION BLD/BLD COMPNT: CPT

## 2024-10-04 PROCEDURE — 2500000004 HC RX 250 GENERAL PHARMACY W/ HCPCS (ALT 636 FOR OP/ED): Performed by: PHYSICIAN ASSISTANT

## 2024-10-04 RX ORDER — HEPARIN SODIUM,PORCINE/PF 10 UNIT/ML
50 SYRINGE (ML) INTRAVENOUS AS NEEDED
Status: DISCONTINUED | OUTPATIENT
Start: 2024-10-04 | End: 2024-10-04 | Stop reason: HOSPADM

## 2024-10-04 RX ORDER — HEPARIN SODIUM,PORCINE/PF 10 UNIT/ML
SYRINGE (ML) INTRAVENOUS
Status: DISPENSED
Start: 2024-10-04 | End: 2024-10-05

## 2024-10-04 RX ORDER — HEPARIN 100 UNIT/ML
500 SYRINGE INTRAVENOUS AS NEEDED
OUTPATIENT
Start: 2024-10-04

## 2024-10-04 RX ORDER — HEPARIN SODIUM,PORCINE/PF 10 UNIT/ML
50 SYRINGE (ML) INTRAVENOUS AS NEEDED
OUTPATIENT
Start: 2024-10-04

## 2024-10-04 ASSESSMENT — PAIN SCALES - GENERAL: PAINLEVEL: 0-NO PAIN

## 2024-10-04 NOTE — PATIENT INSTRUCTIONS
Here is some information about the injection we talked about.  Leuprolide  What are some things I need to know or do while I take this drug?  For all patients taking this drug:  Tell all of your health care providers that you take this drug. This includes your doctors, nurses, pharmacists, and dentists.  This drug may raise some hormone levels in your body during the first few weeks of taking it. Disease signs may get worse before getting better. Tell your doctor if you have any new signs or if your disease signs are worse for longer than a few weeks after starting this drug.  This drug lowers some hormone levels in your body. This may cause some effects like change in breast size, breast soreness or tenderness, testicle changes, trouble getting or keeping an erection, lowered interest in sex, hot flashes, or sweating. Talk with your doctor.  Have your blood work and bone density checked as you have been told by your doctor.  High blood sugar has happened with this drug. This includes diabetes that is new or worse.  Check your blood sugar as you have been told by your doctor.  If you smoke, talk with your doctor.  High cholesterol has happened with this drug. If you have questions, talk with the doctor.  A very bad pituitary gland problem (pituitary apoplexy) has rarely happened with this drug. Most of the time, this has happened within 2 weeks after the first dose. Call your doctor right away if you have a sudden headache, throwing up, passing out, mood changes, eye weakness, not able to move your eyes, or change in eyesight.  This drug may cause weak bones. This may happen more often if used for a long time. This may raise the chance of broken bones. Call your doctor right away if you have bone pain.  A higher chance of heart attack, stroke, or sudden heart death has been noted when drugs like this were used in males. The chance is low, but get medical help right away if you have chest pain or pressure, weakness  on 1 side of the body, trouble speaking or thinking, change in balance, drooping on 1 side of the face, or change in eyesight.  Some products are not approved for use in people who are 65 or older or in children. Talk with your doctor.  This drug may affect being able to get pregnant. This effect goes back to normal when the drug is stopped. If you have questions, talk with the doctor.  Most of the time, this drug stops you from having a period (menstrual bleeding). This is not a method of birth control. Use a non-hormone type of birth control like condoms to prevent pregnancy while taking this drug.  If you miss doses of this drug, bleeding between cycles can happen. Talk with your doctor.  This drug may cause harm to the unborn baby if you take it while you are pregnant.  A pregnancy test will be done to show that you are NOT pregnant before starting this drug. If you get pregnant while taking this drug, call your doctor right away.  Tell your doctor if you are breast-feeding. You will need to talk about any risks to your baby.    What are some side effects that I need to call my doctor about right away?  WARNING/CAUTION: Even though it may be rare, some people may have very bad and sometimes deadly side effects when taking a drug. Tell your doctor or get medical help right away if you have any of the following signs or symptoms that may be related to a very bad side effect:  For all patients taking this drug:  Signs of an allergic reaction, like rash; hives; itching; red, swollen, blistered, or peeling skin with or without fever; wheezing; tightness in the chest or throat; trouble breathing, swallowing, or talking; unusual hoarseness; or swelling of the mouth, face, lips, tongue, or throat.  Signs of high blood sugar like confusion, feeling sleepy, unusual thirst or hunger, passing urine more often, flushing, fast breathing, or breath that smells like fruit.  Signs of infection like fever, chills, very bad sore  throat, ear or sinus pain, cough, more sputum or change in color of sputum, pain with passing urine, mouth sores, or wound that will not heal.  Signs of high blood pressure like very bad headache or dizziness, passing out, or change in eyesight.  Signs of dehydration like dry skin, mouth, or eyes; thirst; fast heartbeat; dizziness; fast breathing; or confusion.  Signs of liver problems like dark urine, tiredness, decreased appetite, upset stomach or stomach pain, light-colored stools, throwing up, or yellow skin or eyes.  Bone pain that is new or worse.  Not able to pass urine or change in how much urine is passed.  Very bad back pain.  Seizures.  Blood in the urine.  A burning, numbness, or tingling feeling that is not normal.  Severe dizziness or passing out.  Fast, slow, or abnormal heartbeat.  Shortness of breath, a big weight gain, or swelling in the arms or legs.  Not able to move.  Behavior and mood changes have happened with the use of drugs like this one in children. This includes acting aggressive, crying, depression, emotional ups and downs, restlessness, and feeling angry and irritable. Call your doctor right away if you have any new or worse behavior or mood changes.  A severe skin reaction (Ramos-Eladio syndrome/toxic epidermal necrolysis) may happen. It can cause severe health problems that may not go away, and sometimes death. Get medical help right away if you have signs like red, swollen, blistered, or peeling skin (with or without fever); red or irritated eyes; or sores in your mouth, throat, nose, or eyes.  Females:  Still having a menstrual period.  Vaginal itching or discharge.  Vaginal irritation.    What are some other side effects of this drug?  All drugs may cause side effects. However, many people have no side effects or only have minor side effects. Call your doctor or get medical help if any of these side effects or any other side effects bother you or do not go away:  For all  patients taking this drug:  Headache.  Feeling dizzy, tired, or weak.  Decreased appetite.  Constipation.  Upset stomach or throwing up.  Trouble sleeping.  Irritation where the shot is given.  Back, muscle, or joint pain.  Flu-like signs.  Signs of a common cold.  Females:  Pimples (acne).  Mood changes.  A change in weight without trying.  Feeling nervous and excitable.  These are not all of the side effects that may occur. If you have questions about side effects, call your doctor. Call your doctor for medical advice about side effects.  You may report side effects to your national health agency.

## 2024-10-04 NOTE — PROGRESS NOTES
PSYCHOSOCIAL ASSESSMENT     Demographic Information  Ilda Peter  2000  [id number]  Preferred Name: Ilda  Assessment Type:  Initial Assessment  Date of assessment: 10/04/24  Provider(s): Dr. Fields  Diagnosis: AML  Person(s) present during assessment: Patient  Primary language: English  Interpretive services used: None                  Living Environment/Support Systems  Partner Status: Single  Children: Two children  - 10 month old and 4 year old  Support systems: Mother, boyfried  Primary caregiver: Self  Current Living Situation: House Own  Resides with: Mother, two children and boyfriend  Concerns with Housing Environment: None       Safety  Patient safe at home? Yes  History of Domestic Violence: No history of domestic violence was disclosed at this time      Functional Status  Functional status: Independent  Patient currently ambulates: Independently  Patient has following equipment: None  Other physical health issues that the patient is experiencing: No other health concerns noted at this time  What supports are in place to assist the patient: Food Services, Approved for [employer] Program  Transportation:  Self and Family/Friends: Mother/boyfried  Comments:         Finances/Insurance  Insurance: [employer] and [facility]  Does the patient have any pending insurance applications: No   Hospital Financial Assistance: None  Patient's income source: Family Support  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 No   Applicable [place]: Yes, Application for the AYA Urgent need fund through [facility]  was submitted       Advance Directives  Advance Directives were not discussed at this time      Legal Involvement  Relevant current or previous legal concerns: No current legal concerns noted at this time     Jew or Spiritual Identity  Comments: Patient identifies as Advent    Mental Health  Active SI/HI: No  Mental Health Diagnosis, if applicable: Generalized Anxiety Disorder  Mood: Appropriate for the situation  Concerns relating to substance use (including alcohol/tobacco): No concerns noted for substance use at this time - it is noted that patient uses marijuana at times  Cognitive Comments: No cognitive deficits were noted      Assessment  Potential Barriers to Care:  None Identified  Patient Strengths:  Healthy Coping Skills, Motivated for Treatment, Self-Advocate, and Strong Support System    Plan  Referrals: Food Resources and [facility]  Applications: Disability and [place]  Other: N/A    Narrative: LSW met with patient today during her infusion appointment to introduce self and social work services.  Patient is a 23 year old female found to have AML back in July 2024.  Patient is open to meeting with  and was easily engaged in conversation.  Patient shared that she was diagnosed with AML in July after not feeling well for months.  She states that she had her second child at the start of 2024 and then about 2 months after having him started becoming nauseous, fatigued and experiencing weight loss.  She states that she became jaundice and then went to see her PCP - from there she was admitted to the hospital and found to have blasts and was then told she had AML.  Patient states that she has had two rounds of treatment and will receive a third round in a few weeks.  She anticipates that she will be moving towards a transplant but is unsure of when that is.      Patient appears to have good support from her mother and her boyfriend.  She has two children (10month old and a 4 year  old) both whom she state are adjusting to her illness.  She was provided with resources to the [facility] and feel comfortable talking with them about her cancer.  Patient did express some concern to the treatment RN regarding fertility preservation, especially if she is moving towards a transplant.  She would be interested in speaking with someone regarding her options as she is unsure if she would like to have more children in the future but would like to keep her options open.  OSIRIS sent a secure message to BRITTNEE Patterson to ask if she can address this with the patient at her appointment on 10/8/24.      In addition, patient stated that she has been concerned about financial matters.  She currently resides in a house (owed by her mother) but she has been responsible to pay for the mortgage and all other costs as her mother is not currently employed.  Since losing her job, she states that they are only living off her boyfriends income which is about $2500 a month.  Patient has applied for social security disability and was approved but payments will not start until February 2025, she did inquire about SSI but was told that she had too much in savings to qualify.  She shared that she received a steven through [employer] to help with the mortgage payments for 2 months and she has also been referred to the Food for Life program - which has been helpful.  Patient applied for food stamps but states that she was denied.  She is unsure why she was denied but believes it may be because she did not turn in necessary paperwork.  LSW encouraged her to apply again as she would likely qualify for food stamps and [facility] - patient stated she would.  NATALEEW also applied for the AYA urgent need steven through [facility] - which offers $500 towards non-medical costs.  Patient will plan to bring in her income documents next week so that can be uploaded to the steven application.     OSIRIS shared that I would continue to review if there is  any other financial assistance options available to help off set costs.  LSW provided my contact information and stated that I would follow up next week to touch [facility].  Social work will continue to follow.

## 2024-10-04 NOTE — PROGRESS NOTES
Patient expressed her concern about getting the Leuprolide injection and was questioning why she was not offered egg retrieval. Patient today was given education on Leuprolide and Nessa Peña CNP was notified of her concern.

## 2024-10-07 ENCOUNTER — INFUSION (OUTPATIENT)
Dept: HEMATOLOGY/ONCOLOGY | Facility: CLINIC | Age: 24
End: 2024-10-07
Payer: COMMERCIAL

## 2024-10-07 VITALS
HEART RATE: 80 BPM | RESPIRATION RATE: 16 BRPM | SYSTOLIC BLOOD PRESSURE: 118 MMHG | DIASTOLIC BLOOD PRESSURE: 77 MMHG | BODY MASS INDEX: 23.76 KG/M2 | OXYGEN SATURATION: 99 % | WEIGHT: 142.64 LBS | TEMPERATURE: 98.2 F

## 2024-10-07 DIAGNOSIS — C92.01 ACUTE MYELOID LEUKEMIA IN REMISSION (MULTI): ICD-10-CM

## 2024-10-07 DIAGNOSIS — C92.00 ACUTE MYELOID LEUKEMIA IN ADULT (MULTI): ICD-10-CM

## 2024-10-07 DIAGNOSIS — F32.A ANXIETY AND DEPRESSION: ICD-10-CM

## 2024-10-07 DIAGNOSIS — C92.00 ACUTE MYELOID LEUKEMIA NOT HAVING ACHIEVED REMISSION (MULTI): ICD-10-CM

## 2024-10-07 DIAGNOSIS — F41.9 ANXIETY AND DEPRESSION: ICD-10-CM

## 2024-10-07 DIAGNOSIS — D84.9 IMMUNOSUPPRESSED STATUS: ICD-10-CM

## 2024-10-07 DIAGNOSIS — N94.89 MENSTRUAL SUPPRESSION: ICD-10-CM

## 2024-10-07 LAB
ALBUMIN SERPL BCP-MCNC: 3.9 G/DL (ref 3.4–5)
ALP SERPL-CCNC: 59 U/L (ref 33–110)
ALT SERPL W P-5'-P-CCNC: 10 U/L (ref 7–45)
ANION GAP SERPL CALC-SCNC: 10 MMOL/L (ref 10–20)
AST SERPL W P-5'-P-CCNC: 11 U/L (ref 9–39)
BASOPHILS # BLD AUTO: 0 X10*3/UL (ref 0–0.1)
BASOPHILS NFR BLD AUTO: 0 %
BILIRUB SERPL-MCNC: 0.9 MG/DL (ref 0–1.2)
BUN SERPL-MCNC: 7 MG/DL (ref 6–23)
CALCIUM SERPL-MCNC: 9 MG/DL (ref 8.6–10.3)
CHLORIDE SERPL-SCNC: 107 MMOL/L (ref 98–107)
CO2 SERPL-SCNC: 27 MMOL/L (ref 21–32)
CREAT SERPL-MCNC: 0.98 MG/DL (ref 0.5–1.05)
EGFRCR SERPLBLD CKD-EPI 2021: 83 ML/MIN/1.73M*2
EOSINOPHIL # BLD AUTO: 0.02 X10*3/UL (ref 0–0.7)
EOSINOPHIL NFR BLD AUTO: 1.5 %
ERYTHROCYTE [DISTWIDTH] IN BLOOD BY AUTOMATED COUNT: 14.1 % (ref 11.5–14.5)
GLUCOSE SERPL-MCNC: 100 MG/DL (ref 74–99)
HCT VFR BLD AUTO: 22.8 % (ref 36–46)
HGB BLD-MCNC: 7.8 G/DL (ref 12–16)
IMM GRANULOCYTES # BLD AUTO: 0 X10*3/UL (ref 0–0.7)
IMM GRANULOCYTES NFR BLD AUTO: 0 % (ref 0–0.9)
LYMPHOCYTES # BLD AUTO: 0.4 X10*3/UL (ref 1.2–4.8)
LYMPHOCYTES NFR BLD AUTO: 30.8 %
MCH RBC QN AUTO: 30.2 PG (ref 26–34)
MCHC RBC AUTO-ENTMCNC: 34.2 G/DL (ref 32–36)
MCV RBC AUTO: 88 FL (ref 80–100)
MONOCYTES # BLD AUTO: 0.12 X10*3/UL (ref 0.1–1)
MONOCYTES NFR BLD AUTO: 9.2 %
NEUTROPHILS # BLD AUTO: 0.76 X10*3/UL (ref 1.2–7.7)
NEUTROPHILS NFR BLD AUTO: 58.5 %
PLATELET # BLD AUTO: 30 X10*3/UL (ref 150–450)
POTASSIUM SERPL-SCNC: 3.9 MMOL/L (ref 3.5–5.3)
PROT SERPL-MCNC: 6.3 G/DL (ref 6.4–8.2)
RBC # BLD AUTO: 2.58 X10*6/UL (ref 4–5.2)
SODIUM SERPL-SCNC: 140 MMOL/L (ref 136–145)
WBC # BLD AUTO: 1.3 X10*3/UL (ref 4.4–11.3)

## 2024-10-07 PROCEDURE — 80053 COMPREHEN METABOLIC PANEL: CPT

## 2024-10-07 PROCEDURE — 36591 DRAW BLOOD OFF VENOUS DEVICE: CPT

## 2024-10-07 PROCEDURE — 85025 COMPLETE CBC W/AUTO DIFF WBC: CPT

## 2024-10-07 PROCEDURE — 2500000004 HC RX 250 GENERAL PHARMACY W/ HCPCS (ALT 636 FOR OP/ED)

## 2024-10-07 RX ORDER — HEPARIN SODIUM,PORCINE/PF 10 UNIT/ML
SYRINGE (ML) INTRAVENOUS
Status: COMPLETED
Start: 2024-10-07 | End: 2024-10-07

## 2024-10-07 ASSESSMENT — PAIN SCALES - GENERAL: PAINLEVEL: 0-NO PAIN

## 2024-10-08 ENCOUNTER — APPOINTMENT (OUTPATIENT)
Dept: HEMATOLOGY/ONCOLOGY | Facility: HOSPITAL | Age: 24
End: 2024-10-08
Payer: COMMERCIAL

## 2024-10-08 ENCOUNTER — OFFICE VISIT (OUTPATIENT)
Dept: HEMATOLOGY/ONCOLOGY | Facility: HOSPITAL | Age: 24
End: 2024-10-08
Payer: COMMERCIAL

## 2024-10-08 ENCOUNTER — LAB (OUTPATIENT)
Dept: HEMATOLOGY/ONCOLOGY | Facility: HOSPITAL | Age: 24
End: 2024-10-08
Payer: COMMERCIAL

## 2024-10-08 VITALS
DIASTOLIC BLOOD PRESSURE: 63 MMHG | OXYGEN SATURATION: 100 % | RESPIRATION RATE: 16 BRPM | HEART RATE: 85 BPM | BODY MASS INDEX: 23.87 KG/M2 | WEIGHT: 143.3 LBS | SYSTOLIC BLOOD PRESSURE: 130 MMHG | TEMPERATURE: 96.6 F

## 2024-10-08 DIAGNOSIS — F41.1 GENERALIZED ANXIETY DISORDER: ICD-10-CM

## 2024-10-08 DIAGNOSIS — F41.9 ANXIETY AND DEPRESSION: ICD-10-CM

## 2024-10-08 DIAGNOSIS — F32.A ANXIETY AND DEPRESSION: ICD-10-CM

## 2024-10-08 DIAGNOSIS — C92.00 ACUTE MYELOID LEUKEMIA NOT HAVING ACHIEVED REMISSION (MULTI): ICD-10-CM

## 2024-10-08 DIAGNOSIS — C92.00 ACUTE MYELOID LEUKEMIA IN ADULT (MULTI): ICD-10-CM

## 2024-10-08 DIAGNOSIS — C92.01 ACUTE MYELOID LEUKEMIA IN REMISSION (MULTI): ICD-10-CM

## 2024-10-08 DIAGNOSIS — D84.9 IMMUNOSUPPRESSED STATUS: ICD-10-CM

## 2024-10-08 DIAGNOSIS — Z91.89 AT RISK FOR INFERTILITY: ICD-10-CM

## 2024-10-08 DIAGNOSIS — N94.89 MENSTRUAL SUPPRESSION: ICD-10-CM

## 2024-10-08 DIAGNOSIS — Z51.5 ENCOUNTER FOR PALLIATIVE CARE: ICD-10-CM

## 2024-10-08 DIAGNOSIS — N94.89 MENSTRUAL SUPPRESSION: Primary | ICD-10-CM

## 2024-10-08 LAB
ALBUMIN SERPL BCP-MCNC: 4.3 G/DL (ref 3.4–5)
ALP SERPL-CCNC: 69 U/L (ref 33–110)
ALT SERPL W P-5'-P-CCNC: 9 U/L (ref 7–45)
ANION GAP SERPL CALC-SCNC: 11 MMOL/L (ref 10–20)
AST SERPL W P-5'-P-CCNC: 12 U/L (ref 9–39)
BASOPHILS # BLD AUTO: 0.01 X10*3/UL (ref 0–0.1)
BASOPHILS NFR BLD AUTO: 0.6 %
BILIRUB SERPL-MCNC: 0.8 MG/DL (ref 0–1.2)
BUN SERPL-MCNC: 7 MG/DL (ref 6–23)
CALCIUM SERPL-MCNC: 9.3 MG/DL (ref 8.6–10.3)
CHLORIDE SERPL-SCNC: 107 MMOL/L (ref 98–107)
CO2 SERPL-SCNC: 26 MMOL/L (ref 21–32)
CREAT SERPL-MCNC: 0.93 MG/DL (ref 0.5–1.05)
DACRYOCYTES BLD QL SMEAR: NORMAL
EGFRCR SERPLBLD CKD-EPI 2021: 89 ML/MIN/1.73M*2
EOSINOPHIL # BLD AUTO: 0.01 X10*3/UL (ref 0–0.7)
EOSINOPHIL NFR BLD AUTO: 0.6 %
ERYTHROCYTE [DISTWIDTH] IN BLOOD BY AUTOMATED COUNT: 14.5 % (ref 11.5–14.5)
GLUCOSE SERPL-MCNC: 93 MG/DL (ref 74–99)
HCT VFR BLD AUTO: 24.4 % (ref 36–46)
HGB BLD-MCNC: 8.2 G/DL (ref 12–16)
HYPOCHROMIA BLD QL SMEAR: NORMAL
IMM GRANULOCYTES # BLD AUTO: 0.01 X10*3/UL (ref 0–0.7)
IMM GRANULOCYTES NFR BLD AUTO: 0.6 % (ref 0–0.9)
LYMPHOCYTES # BLD AUTO: 0.59 X10*3/UL (ref 1.2–4.8)
LYMPHOCYTES NFR BLD AUTO: 37.3 %
MCH RBC QN AUTO: 29.9 PG (ref 26–34)
MCHC RBC AUTO-ENTMCNC: 33.6 G/DL (ref 32–36)
MCV RBC AUTO: 89 FL (ref 80–100)
MONOCYTES # BLD AUTO: 0.13 X10*3/UL (ref 0.1–1)
MONOCYTES NFR BLD AUTO: 8.2 %
NEUTROPHILS # BLD AUTO: 0.83 X10*3/UL (ref 1.2–7.7)
NEUTROPHILS NFR BLD AUTO: 52.7 %
NRBC BLD-RTO: 0 /100 WBCS (ref 0–0)
PLATELET # BLD AUTO: 35 X10*3/UL (ref 150–450)
POLYCHROMASIA BLD QL SMEAR: NORMAL
POTASSIUM SERPL-SCNC: 3.7 MMOL/L (ref 3.5–5.3)
PROT SERPL-MCNC: 6.9 G/DL (ref 6.4–8.2)
RBC # BLD AUTO: 2.74 X10*6/UL (ref 4–5.2)
RBC MORPH BLD: NORMAL
SCHISTOCYTES BLD QL SMEAR: NORMAL
SODIUM SERPL-SCNC: 140 MMOL/L (ref 136–145)
WBC # BLD AUTO: 1.6 X10*3/UL (ref 4.4–11.3)

## 2024-10-08 PROCEDURE — 99215 OFFICE O/P EST HI 40 MIN: CPT | Performed by: NURSE PRACTITIONER

## 2024-10-08 PROCEDURE — 85025 COMPLETE CBC W/AUTO DIFF WBC: CPT

## 2024-10-08 PROCEDURE — 99215 OFFICE O/P EST HI 40 MIN: CPT | Performed by: INTERNAL MEDICINE

## 2024-10-08 PROCEDURE — 99417 PROLNG OP E/M EACH 15 MIN: CPT | Performed by: NURSE PRACTITIONER

## 2024-10-08 PROCEDURE — 36591 DRAW BLOOD OFF VENOUS DEVICE: CPT

## 2024-10-08 PROCEDURE — 1036F TOBACCO NON-USER: CPT | Performed by: NURSE PRACTITIONER

## 2024-10-08 PROCEDURE — 84132 ASSAY OF SERUM POTASSIUM: CPT

## 2024-10-08 PROCEDURE — 2500000004 HC RX 250 GENERAL PHARMACY W/ HCPCS (ALT 636 FOR OP/ED): Performed by: PHYSICIAN ASSISTANT

## 2024-10-08 RX ORDER — FAMOTIDINE 10 MG/ML
20 INJECTION INTRAVENOUS ONCE AS NEEDED
OUTPATIENT
Start: 2024-10-22

## 2024-10-08 RX ORDER — PROCHLORPERAZINE MALEATE 10 MG
10 TABLET ORAL EVERY 6 HOURS PRN
OUTPATIENT
Start: 2024-10-24

## 2024-10-08 RX ORDER — EPINEPHRINE 0.3 MG/.3ML
0.3 INJECTION SUBCUTANEOUS EVERY 5 MIN PRN
OUTPATIENT
Start: 2024-10-23

## 2024-10-08 RX ORDER — DIPHENHYDRAMINE HYDROCHLORIDE 50 MG/ML
50 INJECTION INTRAMUSCULAR; INTRAVENOUS AS NEEDED
OUTPATIENT
Start: 2024-10-23

## 2024-10-08 RX ORDER — DIPHENHYDRAMINE HYDROCHLORIDE 50 MG/ML
50 INJECTION INTRAMUSCULAR; INTRAVENOUS AS NEEDED
OUTPATIENT
Start: 2024-10-24

## 2024-10-08 RX ORDER — PROCHLORPERAZINE MALEATE 10 MG
10 TABLET ORAL EVERY 6 HOURS PRN
OUTPATIENT
Start: 2024-10-22

## 2024-10-08 RX ORDER — ALBUTEROL SULFATE 0.83 MG/ML
3 SOLUTION RESPIRATORY (INHALATION) AS NEEDED
OUTPATIENT
Start: 2024-10-14

## 2024-10-08 RX ORDER — PROCHLORPERAZINE EDISYLATE 5 MG/ML
10 INJECTION INTRAMUSCULAR; INTRAVENOUS EVERY 6 HOURS PRN
OUTPATIENT
Start: 2024-10-22

## 2024-10-08 RX ORDER — DIPHENHYDRAMINE HYDROCHLORIDE 50 MG/ML
50 INJECTION INTRAMUSCULAR; INTRAVENOUS AS NEEDED
OUTPATIENT
Start: 2024-10-22

## 2024-10-08 RX ORDER — FAMOTIDINE 10 MG/ML
20 INJECTION INTRAVENOUS ONCE AS NEEDED
OUTPATIENT
Start: 2024-10-26

## 2024-10-08 RX ORDER — FAMOTIDINE 10 MG/ML
20 INJECTION INTRAVENOUS ONCE AS NEEDED
OUTPATIENT
Start: 2024-10-23

## 2024-10-08 RX ORDER — DIPHENHYDRAMINE HYDROCHLORIDE 50 MG/ML
50 INJECTION INTRAMUSCULAR; INTRAVENOUS AS NEEDED
OUTPATIENT
Start: 2024-10-26

## 2024-10-08 RX ORDER — ALBUTEROL SULFATE 0.83 MG/ML
3 SOLUTION RESPIRATORY (INHALATION) AS NEEDED
OUTPATIENT
Start: 2024-10-24

## 2024-10-08 RX ORDER — HEPARIN 100 UNIT/ML
500 SYRINGE INTRAVENOUS AS NEEDED
Status: CANCELLED | OUTPATIENT
Start: 2024-10-08

## 2024-10-08 RX ORDER — FAMOTIDINE 10 MG/ML
20 INJECTION INTRAVENOUS ONCE AS NEEDED
OUTPATIENT
Start: 2024-10-14

## 2024-10-08 RX ORDER — PROCHLORPERAZINE MALEATE 10 MG
10 TABLET ORAL EVERY 6 HOURS PRN
OUTPATIENT
Start: 2024-10-23

## 2024-10-08 RX ORDER — DIPHENHYDRAMINE HYDROCHLORIDE 50 MG/ML
50 INJECTION INTRAMUSCULAR; INTRAVENOUS AS NEEDED
OUTPATIENT
Start: 2024-10-14

## 2024-10-08 RX ORDER — DEXAMETHASONE 4 MG/1
12 TABLET ORAL ONCE
OUTPATIENT
Start: 2024-10-23

## 2024-10-08 RX ORDER — ALBUTEROL SULFATE 0.83 MG/ML
3 SOLUTION RESPIRATORY (INHALATION) AS NEEDED
OUTPATIENT
Start: 2024-10-23

## 2024-10-08 RX ORDER — ONDANSETRON HYDROCHLORIDE 8 MG/1
16 TABLET, FILM COATED ORAL ONCE
OUTPATIENT
Start: 2024-10-23

## 2024-10-08 RX ORDER — HEPARIN SODIUM,PORCINE/PF 10 UNIT/ML
50 SYRINGE (ML) INTRAVENOUS AS NEEDED
Status: DISCONTINUED | OUTPATIENT
Start: 2024-10-08 | End: 2024-10-08 | Stop reason: HOSPADM

## 2024-10-08 RX ORDER — FAMOTIDINE 10 MG/ML
20 INJECTION INTRAVENOUS ONCE AS NEEDED
OUTPATIENT
Start: 2024-10-24

## 2024-10-08 RX ORDER — ONDANSETRON HYDROCHLORIDE 8 MG/1
16 TABLET, FILM COATED ORAL ONCE
OUTPATIENT
Start: 2024-10-22

## 2024-10-08 RX ORDER — EPINEPHRINE 0.3 MG/.3ML
0.3 INJECTION SUBCUTANEOUS EVERY 5 MIN PRN
OUTPATIENT
Start: 2024-10-22

## 2024-10-08 RX ORDER — EPINEPHRINE 0.3 MG/.3ML
0.3 INJECTION SUBCUTANEOUS EVERY 5 MIN PRN
OUTPATIENT
Start: 2024-10-24

## 2024-10-08 RX ORDER — ALBUTEROL SULFATE 0.83 MG/ML
3 SOLUTION RESPIRATORY (INHALATION) AS NEEDED
OUTPATIENT
Start: 2024-10-26

## 2024-10-08 RX ORDER — EPINEPHRINE 0.3 MG/.3ML
0.3 INJECTION SUBCUTANEOUS EVERY 5 MIN PRN
OUTPATIENT
Start: 2024-10-26

## 2024-10-08 RX ORDER — PROCHLORPERAZINE EDISYLATE 5 MG/ML
10 INJECTION INTRAMUSCULAR; INTRAVENOUS EVERY 6 HOURS PRN
OUTPATIENT
Start: 2024-10-24

## 2024-10-08 RX ORDER — DEXAMETHASONE 4 MG/1
12 TABLET ORAL ONCE
OUTPATIENT
Start: 2024-10-22

## 2024-10-08 RX ORDER — ALBUTEROL SULFATE 0.83 MG/ML
3 SOLUTION RESPIRATORY (INHALATION) AS NEEDED
OUTPATIENT
Start: 2024-10-22

## 2024-10-08 RX ORDER — HEPARIN SODIUM,PORCINE/PF 10 UNIT/ML
50 SYRINGE (ML) INTRAVENOUS AS NEEDED
Status: CANCELLED | OUTPATIENT
Start: 2024-10-08

## 2024-10-08 RX ORDER — DEXAMETHASONE 4 MG/1
12 TABLET ORAL ONCE
OUTPATIENT
Start: 2024-10-24

## 2024-10-08 RX ORDER — ONDANSETRON HYDROCHLORIDE 8 MG/1
16 TABLET, FILM COATED ORAL ONCE
OUTPATIENT
Start: 2024-10-24

## 2024-10-08 RX ORDER — EPINEPHRINE 0.3 MG/.3ML
0.3 INJECTION SUBCUTANEOUS EVERY 5 MIN PRN
OUTPATIENT
Start: 2024-10-14

## 2024-10-08 RX ORDER — PROCHLORPERAZINE EDISYLATE 5 MG/ML
10 INJECTION INTRAMUSCULAR; INTRAVENOUS EVERY 6 HOURS PRN
OUTPATIENT
Start: 2024-10-23

## 2024-10-08 ASSESSMENT — ENCOUNTER SYMPTOMS
SLEEP DISTURBANCE: 0
DEPRESSION: 0
HOT FLASHES: 0
FEVER: 0
FATIGUE: 1
NERVOUS/ANXIOUS: 0

## 2024-10-08 ASSESSMENT — PAIN SCALES - GENERAL: PAINLEVEL: 0-NO PAIN

## 2024-10-08 NOTE — PROGRESS NOTES
Patient ID: Ilda Peter is a 23 y.o. female.    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)  [id number] AML Risk Stratification: FAVORABLE: Mutated NPM1 without FLT3-ITD  Presented with anemia and circulating blasts    BONE MARROW (07/05/2024)  Hypercellular with erythroid predominance, dyserythropoiesis, and dysmegakaryopoiesis  FISH: AML negative  KARYOTYPE:  46XX [20]  MYELOID NGS: NPM1 (54%), NRAS (25%), PTPN11 (15%), IDH1 (2%)       7/19/2024 -  Chemotherapy    Induction with 7+3 (AraC+tarun) -> CR1 MRDpos  - D14 BM (8/1/24):  ablated, ALCON  - Count recovery:  ANC D28, plts D23  - Post induction BM (8/21/24):  ALCON, MFC negative, NPM1 2.65e-05       9/17/2024 -  Chemotherapy    HiDAC-123 (High-Dose Cytarabine), 28 Day Cycles - Consolidation          Assessment & Plan  Immunosuppressed status  No active signs or symptoms of infection.  Will continue prophylaxis with acyclovir, levofloxacin, and posaconazole during periods of neutropenia.   Acute myeloid leukemia in remission (Multi)  Now C1D29 HIDAC consolidation.  Counts not yet fully recovered.  Will delay C2 one week.  Post 1st consolidation NPM1 MRD testing pending today.  Will use results for further post remission care planning.    Menstrual suppression  Has not yet received leuprolide.  Menses stopped with norethindrone.  Will plan for leuprolide (monthly dosing) next week.       ______________________________________________________________________________________________________________________________________________    SUBJECTIVE     Here today for planned follow up.  Had heavy and prolonged last period, but resolved with norethindrone. No new health issues.  Denies fevers, chills, nausea, vomiting, diarrhea, dyspnea, rash, and pain.    OBJECTIVE       Physical Exam  Vitals reviewed.   Constitutional:        Appearance: Normal appearance.   Eyes:      Conjunctiva/sclera: Conjunctivae normal.      Pupils: Pupils are equal, round, and reactive to light.   Skin:     General: Skin is warm and dry.      Findings: No rash.   Psychiatric:         Mood and Affect: Mood normal.              Brandee Fields MD

## 2024-10-08 NOTE — ASSESSMENT & PLAN NOTE
Has not yet received leuprolide.  Menses stopped with norethindrone.  Will plan for leuprolide (monthly dosing) next week.

## 2024-10-08 NOTE — PROGRESS NOTES
Patient ID: Ilda Peter is a 23 y.o. female.  Referring Physician: Nessa Peña, APRN-CNP  [address] [place] [address]  Primary Care Provider: DO Fer Weiner    Ilda Peter is a 23 y.o. with a diagnosis of acute myeloid leukemia, returns today in follow up regarding issues unique to being a young adult with cancer.    S/p first cycle of consolidation chemotherapy with high dose cytarabine, due for C2/4 next week. Relays experiencing period/heavy vaginal bleeding 9/26 - did not get ovarian suppression medication. Currently on norethindrone for bleeding, currently experiencing spotting. Is interested in future family building, with questions regarding fertility preservation.     Relays confusion over future treatment plan, question of whether she will be proceeding to a stem cell transplant, confused over ordered testing.     Physically, she relays doing well. In the interim since our last visit she developed COVID, symptoms were mild, she took paxlovid, still with lingering am cough, using mucinex with relief.     Continues to relay that she is coping well, recently participated in our  monthly social event. Denies problems with sleep. Is coping well, denies problems with anxiety, depressed mood, or difficulty functioning. Continues on sertraline daily.     Practically, she continues to note financial difficulty. Applied for [facility] financial assistance with . She is not currently working, lives with mother, partner and their two sons 7 mo and 4 years. Only source of income is partner. Has applied to social security and approved, but will not receive income until February. Did receive groceries from [employer] - this was helpful.     Does not drink ETOH, does not vape or use cigarettes, does smoke marijuana daily from bowl. Uses marijuana for recreation, also to help with sleep and nausea.    Social History: Grew up in and lives in [place] with her two children.  Is close to parents. Works as a supervisor at [employer]. Is in a relationship, boyfriend is the father of her <1 year old son Tay, she also has a 4 year old son from a previous relationship, Sergio. She is a never smoker, she does not vape, she does not drink alcohol, she does use marijuana, smokes daily.      Past Medical History  She has a past medical history of AML (acute myeloblastic leukemia) (Multi), Anxiety, Cannabis dependence (Multi) (07/02/2024), Chronic atrophic rhinitis (07/02/2024), Depression, Hyperbilirubinemia, and Tinea versicolor (07/02/2024).     Surgical History  She has a past surgical history that includes [place] tooth extraction.  Review of Systems   Constitutional:  Positive for fatigue. Negative for fever.   Respiratory:  Positive for cough. Negative for chest tightness and shortness of breath.    Endocrine: Negative for hot flashes.   Genitourinary:  Negative for menstrual problem.    Psychiatric/Behavioral:  Negative for depression and sleep disturbance. The patient is not nervous/anxious.       Objective   BSA: 1.73 meters squared  /63 (BP Location: Left arm, Patient Position: Sitting, BP Cuff Size: Adult)   Pulse 85   Temp 35.9 °C (96.6 °F) (Temporal)   Resp 16   Wt 65 kg (143 lb 4.8 oz)   SpO2 100%   BMI 23.87 kg/m²  VS and weight reviewed in other visit encounter.    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 (>=10% blasts)  [id number] AML Risk Stratification: FAVORABLE: Mutated NPM1 without FLT3-ITD  Presented with anemia and circulating blasts    BONE MARROW (07/05/2024)  Hypercellular with erythroid predominance, dyserythropoiesis, and dysmegakaryopoiesis  FISH: AML  negative  KARYOTYPE:  46XX [20]  MYELOID NGS: NPM1 (54%), NRAS (25%), PTPN11 (15%), IDH1 (2%)       7/19/2024 -  Chemotherapy    Induction with 7+3 (AraC+tarun) -> CR1 MRDpos  - D14 BM (8/1/24):  ablated, ALCON  - Count recovery:  ANC D28, plts D23  - Post induction BM (8/21/24):  ALCON, MFC negative, NPM1 2.65e-05       9/17/2024 -  Chemotherapy    HiDAC-123 (High-Dose Cytarabine), 28 Day Cycles - Consolidation       Ilda Peter  reports that she has never smoked. She has never used smokeless tobacco.  She  reports that she does not currently use alcohol.  She  reports current drug use. Drug: Marijuana.    Physical Exam  Constitutional:       General: She is not in acute distress.     Appearance: Normal appearance. She is not ill-appearing.   Neurological:      General: No focal deficit present.      Mental Status: She is alert and oriented to person, place, and time.      Cranial Nerves: No cranial nerve deficit.   Psychiatric:         Mood and Affect: Mood normal.         Behavior: Behavior normal.         Thought Content: Thought content normal.         Judgment: Judgment normal.     Performance Status:  Asymptomatic    Assessment/Plan    Ilda Peter is a 23 y.o. F with a recent diagnosis of acute myeloid leukemia, s/p induction therapy in CR1, with future plans for consolidation chemotherapy with high dose cytarabine x 4 cycles.     #Psychosocial: hx of anxiety/depression, young adult with cancer, parent to young children  - Continues on zoloft 50mg/daily  - Connected with Child-Life specialists regarding two young children/adjustment to mother's illness  - Connected to young adult oncology program and receiving monthly social programming invitations  - Connected to [employer] at  d/t food insecurity  - Following with DEBRA Ruelas regarding financial concerns    #Risk for infertility:  - Previously discussed the damaging effect cancer treatment can have on the ovaries.   - Previously discussed natural  age related decline in fertility and menopause that occurs as a result of ovarian aging, and that cancer treatments can accellerate that progression and diminish ovarian reserve.  - Individuals may experience varying degrees of short or long term ovarian dysfunction and amenorrhea, and that this is dependent upon type of cancer treatment, cumulative dose, and patients age.   - Loss of ovarian function may be permanent or temporary.  - Amenorrhea may be temporary or permanent -- temporary amenorrhea results from destruction of maturing follicles whereas permanent amenorrhea results from depletion of viable primordial follicles.  - Risk to fertility is LOW based on cancer treatment of Cytarabine/Idarubicin -- we did discuss if she does not respond as anticipated or if stem cell transplant as part of treatment plan her risk for infertility would then be HIGH - discussed again today, having MRD testing performed which if positive may track her to HSCT, primary team will update me if treatment plan changes  - Baseline hormone testing 07/23/24 FSH 3.8, LH 4.5, E2 34, AMH 7.742 - of note these were drawn following Day 1 of chemotherapy  - Again discussed menstrual suppression/ovarian suppression with lupron - recommend its use based on literature documenting decreased time to count recovery and decreased transfusion burden in leukemia patients - did not receive prior to induction because patient was already started on chemotherapy - plan to administer at least 7-10 days prior to next chemotherapy, this is presently due - communicated to the primary team timing and consider 1 mo dose kit should she move to transplant and we want to preserve fertility     #Substance use: currently using marijuana inhaled daily  - Discussed risks for infection with mold/spores with inhaled marijuana - in the short term recommended patient change to alternative route such as edibles  - Discussed strong recommendation for cessation of  marijuana, specifically discussed newer research demonstrating increased cardiovascular disease in individual who use marijuana     #Fatigue: likely related to chemotherapy, and anemia  - We discussed gentle exercise is the only intervention documented to improve fatigue related to cancer treatments - encouraged continued walking daily to help manage fatigue    #Sexual Dysfunction/Contraception:  - Previously discussed the possibility for sexual side effects related to cancer treatment this may manifest as decreased interest, arousal, vaginal dryness, or pain with sex  - Normalized conversation regarding sex and intimacy and encouraged patient to notify myself or team if she experiences any sexual side effects that impact her QOL  - Previously discussed the seriousness of getting pregnant while receiving chemotherapy due to the harmful effects this could have on the  fetus, and on her cancer treatment given the decreased availability of medical  services.   - Currently on norethindrone progesterone for menstrual bleeding   - Discussed possibility for norethindrone for pregnancy prevention, should she want to continue may take daily at same time of day  - Patient plans to use barrier method for contraception management  - We discussed that small amounts of chemotherapy may be found in her body secretions including vaginal secretions and she should use a barrier form of contraception such as a male or female condom to protect her partner from chemotherapy exposure.  - We discussed that it is safe to engage in sex if she feels able to, but should take precautions to avoid penetrative sex when her neutrophil count is <500 and her platelet count is <50    Plan for follow up visit in one month                  LEDY Whitfield-CNP

## 2024-10-08 NOTE — ASSESSMENT & PLAN NOTE
Now C1D29 HIDAC consolidation.  Counts not yet fully recovered.  Will delay C2 one week.  Post 1st consolidation NPM1 MRD testing pending today.  Will use results for further post remission care planning.

## 2024-10-09 ENCOUNTER — APPOINTMENT (OUTPATIENT)
Dept: HEMATOLOGY/ONCOLOGY | Facility: HOSPITAL | Age: 24
End: 2024-10-09
Payer: COMMERCIAL

## 2024-10-09 ASSESSMENT — ENCOUNTER SYMPTOMS
CHEST TIGHTNESS: 0
COUGH: 1
SHORTNESS OF BREATH: 0

## 2024-10-10 ENCOUNTER — HOME CARE VISIT (OUTPATIENT)
Dept: HOME HEALTH SERVICES | Facility: HOME HEALTH | Age: 24
End: 2024-10-10
Payer: COMMERCIAL

## 2024-10-10 ENCOUNTER — APPOINTMENT (OUTPATIENT)
Dept: HEMATOLOGY/ONCOLOGY | Facility: HOSPITAL | Age: 24
End: 2024-10-10
Payer: COMMERCIAL

## 2024-10-10 ENCOUNTER — INFUSION (OUTPATIENT)
Dept: HEMATOLOGY/ONCOLOGY | Facility: CLINIC | Age: 24
End: 2024-10-10
Payer: COMMERCIAL

## 2024-10-10 ENCOUNTER — DOCUMENTATION (OUTPATIENT)
Dept: HEMATOLOGY/ONCOLOGY | Facility: HOSPITAL | Age: 24
End: 2024-10-10
Payer: COMMERCIAL

## 2024-10-10 ENCOUNTER — APPOINTMENT (OUTPATIENT)
Dept: HEMATOLOGY/ONCOLOGY | Facility: CLINIC | Age: 24
End: 2024-10-10
Payer: COMMERCIAL

## 2024-10-10 DIAGNOSIS — C92.01 ACUTE MYELOID LEUKEMIA IN REMISSION (MULTI): ICD-10-CM

## 2024-10-10 DIAGNOSIS — C92.00 ACUTE MYELOID LEUKEMIA NOT HAVING ACHIEVED REMISSION (MULTI): ICD-10-CM

## 2024-10-10 LAB
ALBUMIN SERPL BCP-MCNC: 4.1 G/DL (ref 3.4–5)
ALP SERPL-CCNC: 56 U/L (ref 33–110)
ALT SERPL W P-5'-P-CCNC: 8 U/L (ref 7–45)
ANION GAP SERPL CALC-SCNC: 10 MMOL/L (ref 10–20)
AST SERPL W P-5'-P-CCNC: 11 U/L (ref 9–39)
BASOPHILS # BLD AUTO: 0.01 X10*3/UL (ref 0–0.1)
BASOPHILS NFR BLD AUTO: 0.9 %
BILIRUB SERPL-MCNC: 0.6 MG/DL (ref 0–1.2)
BUN SERPL-MCNC: 9 MG/DL (ref 6–23)
CALCIUM SERPL-MCNC: 9.4 MG/DL (ref 8.6–10.3)
CHLORIDE SERPL-SCNC: 108 MMOL/L (ref 98–107)
CO2 SERPL-SCNC: 27 MMOL/L (ref 21–32)
CREAT SERPL-MCNC: 0.9 MG/DL (ref 0.5–1.05)
DACRYOCYTES BLD QL SMEAR: NORMAL
EGFRCR SERPLBLD CKD-EPI 2021: >90 ML/MIN/1.73M*2
EOSINOPHIL # BLD AUTO: 0.01 X10*3/UL (ref 0–0.7)
EOSINOPHIL NFR BLD AUTO: 0.9 %
ERYTHROCYTE [DISTWIDTH] IN BLOOD BY AUTOMATED COUNT: 16.6 % (ref 11.5–14.5)
GLUCOSE SERPL-MCNC: 88 MG/DL (ref 74–99)
HCT VFR BLD AUTO: 23.6 % (ref 36–46)
HGB BLD-MCNC: 8 G/DL (ref 12–16)
IMM GRANULOCYTES # BLD AUTO: 0 X10*3/UL (ref 0–0.7)
IMM GRANULOCYTES NFR BLD AUTO: 0 % (ref 0–0.9)
LYMPHOCYTES # BLD AUTO: 0.46 X10*3/UL (ref 1.2–4.8)
LYMPHOCYTES NFR BLD AUTO: 40.7 %
MCH RBC QN AUTO: 31 PG (ref 26–34)
MCHC RBC AUTO-ENTMCNC: 33.9 G/DL (ref 32–36)
MCV RBC AUTO: 92 FL (ref 80–100)
MONOCYTES # BLD AUTO: 0.14 X10*3/UL (ref 0.1–1)
MONOCYTES NFR BLD AUTO: 12.4 %
NEUTROPHILS # BLD AUTO: 0.51 X10*3/UL (ref 1.2–7.7)
NEUTROPHILS NFR BLD AUTO: 45.1 %
NRBC BLD-RTO: ABNORMAL /100{WBCS}
OVALOCYTES BLD QL SMEAR: NORMAL
PLATELET # BLD AUTO: 40 X10*3/UL (ref 150–450)
POLYCHROMASIA BLD QL SMEAR: NORMAL
POTASSIUM SERPL-SCNC: 3.9 MMOL/L (ref 3.5–5.3)
PROT SERPL-MCNC: 6.2 G/DL (ref 6.4–8.2)
RBC # BLD AUTO: 2.58 X10*6/UL (ref 4–5.2)
RBC MORPH BLD: NORMAL
SODIUM SERPL-SCNC: 141 MMOL/L (ref 136–145)
WBC # BLD AUTO: 1.1 X10*3/UL (ref 4.4–11.3)

## 2024-10-10 PROCEDURE — 85025 COMPLETE CBC W/AUTO DIFF WBC: CPT

## 2024-10-10 PROCEDURE — 80053 COMPREHEN METABOLIC PANEL: CPT

## 2024-10-10 PROCEDURE — 36591 DRAW BLOOD OFF VENOUS DEVICE: CPT

## 2024-10-10 PROCEDURE — 2500000004 HC RX 250 GENERAL PHARMACY W/ HCPCS (ALT 636 FOR OP/ED): Performed by: PHYSICIAN ASSISTANT

## 2024-10-10 RX ORDER — HEPARIN SODIUM,PORCINE/PF 10 UNIT/ML
50 SYRINGE (ML) INTRAVENOUS AS NEEDED
OUTPATIENT
Start: 2024-10-10

## 2024-10-10 RX ORDER — HEPARIN SODIUM,PORCINE/PF 10 UNIT/ML
50 SYRINGE (ML) INTRAVENOUS AS NEEDED
Status: DISCONTINUED | OUTPATIENT
Start: 2024-10-10 | End: 2024-10-10 | Stop reason: HOSPADM

## 2024-10-10 RX ORDER — HEPARIN 100 UNIT/ML
500 SYRINGE INTRAVENOUS AS NEEDED
OUTPATIENT
Start: 2024-10-10

## 2024-10-11 ENCOUNTER — APPOINTMENT (OUTPATIENT)
Dept: HEMATOLOGY/ONCOLOGY | Facility: HOSPITAL | Age: 24
End: 2024-10-11
Payer: COMMERCIAL

## 2024-10-12 ENCOUNTER — APPOINTMENT (OUTPATIENT)
Dept: HEMATOLOGY/ONCOLOGY | Facility: HOSPITAL | Age: 24
End: 2024-10-12
Payer: COMMERCIAL

## 2024-10-12 ASSESSMENT — PAIN SCALES - PAIN ASSESSMENT IN ADVANCED DEMENTIA (PAINAD)
CONSOLABILITY: 0 - NO NEED TO CONSOLE.
NEGVOCALIZATION: 0 - NONE.
NEGVOCALIZATION: 0
BODYLANGUAGE: 0 - RELAXED.
CONSOLABILITY: 0
FACIALEXPRESSION: 0
TOTALSCORE: 0
FACIALEXPRESSION: 0 - SMILING OR INEXPRESSIVE.
BODYLANGUAGE: 0
BREATHING: 0

## 2024-10-12 ASSESSMENT — ENCOUNTER SYMPTOMS: DENIES PAIN: 1

## 2024-10-12 ASSESSMENT — ACTIVITIES OF DAILY LIVING (ADL)
OASIS_M1830: 00
HOME_HEALTH_OASIS: 00

## 2024-10-14 ENCOUNTER — LAB (OUTPATIENT)
Dept: HEMATOLOGY/ONCOLOGY | Facility: CLINIC | Age: 24
End: 2024-10-14
Payer: COMMERCIAL

## 2024-10-14 VITALS
TEMPERATURE: 97.7 F | DIASTOLIC BLOOD PRESSURE: 69 MMHG | RESPIRATION RATE: 16 BRPM | HEART RATE: 70 BPM | OXYGEN SATURATION: 100 % | BODY MASS INDEX: 24.54 KG/M2 | SYSTOLIC BLOOD PRESSURE: 121 MMHG | WEIGHT: 147.27 LBS

## 2024-10-14 DIAGNOSIS — N94.89 MENSTRUAL SUPPRESSION: ICD-10-CM

## 2024-10-14 DIAGNOSIS — C92.00 ACUTE MYELOID LEUKEMIA IN ADULT (MULTI): ICD-10-CM

## 2024-10-14 DIAGNOSIS — F41.9 ANXIETY AND DEPRESSION: ICD-10-CM

## 2024-10-14 DIAGNOSIS — F32.A ANXIETY AND DEPRESSION: ICD-10-CM

## 2024-10-14 DIAGNOSIS — C92.01 ACUTE MYELOID LEUKEMIA IN REMISSION (MULTI): ICD-10-CM

## 2024-10-14 DIAGNOSIS — D84.9 IMMUNOSUPPRESSED STATUS: ICD-10-CM

## 2024-10-14 DIAGNOSIS — C92.00 ACUTE MYELOID LEUKEMIA NOT HAVING ACHIEVED REMISSION (MULTI): ICD-10-CM

## 2024-10-14 LAB
ALBUMIN SERPL BCP-MCNC: 4.1 G/DL (ref 3.4–5)
ALP SERPL-CCNC: 49 U/L (ref 33–110)
ALT SERPL W P-5'-P-CCNC: 7 U/L (ref 7–45)
ANION GAP SERPL CALC-SCNC: 10 MMOL/L (ref 10–20)
AST SERPL W P-5'-P-CCNC: 10 U/L (ref 9–39)
BASOPHILS # BLD AUTO: 0 X10*3/UL (ref 0–0.1)
BASOPHILS NFR BLD AUTO: 0 %
BILIRUB SERPL-MCNC: 0.7 MG/DL (ref 0–1.2)
BUN SERPL-MCNC: 9 MG/DL (ref 6–23)
CALCIUM SERPL-MCNC: 9.6 MG/DL (ref 8.6–10.3)
CHLORIDE SERPL-SCNC: 107 MMOL/L (ref 98–107)
CO2 SERPL-SCNC: 26 MMOL/L (ref 21–32)
CREAT SERPL-MCNC: 0.88 MG/DL (ref 0.5–1.05)
EGFRCR SERPLBLD CKD-EPI 2021: >90 ML/MIN/1.73M*2
EOSINOPHIL # BLD AUTO: 0.01 X10*3/UL (ref 0–0.7)
EOSINOPHIL NFR BLD AUTO: 0.8 %
ERYTHROCYTE [DISTWIDTH] IN BLOOD BY AUTOMATED COUNT: 20 % (ref 11.5–14.5)
GLUCOSE SERPL-MCNC: 116 MG/DL (ref 74–99)
HCT VFR BLD AUTO: 24.2 % (ref 36–46)
HGB BLD-MCNC: 8.1 G/DL (ref 12–16)
IMM GRANULOCYTES # BLD AUTO: 0.01 X10*3/UL (ref 0–0.7)
IMM GRANULOCYTES NFR BLD AUTO: 0.8 % (ref 0–0.9)
LYMPHOCYTES # BLD AUTO: 0.5 X10*3/UL (ref 1.2–4.8)
LYMPHOCYTES NFR BLD AUTO: 40.3 %
MCH RBC QN AUTO: 31.5 PG (ref 26–34)
MCHC RBC AUTO-ENTMCNC: 33.5 G/DL (ref 32–36)
MCV RBC AUTO: 94 FL (ref 80–100)
MONOCYTES # BLD AUTO: 0.19 X10*3/UL (ref 0.1–1)
MONOCYTES NFR BLD AUTO: 15.3 %
NEUTROPHILS # BLD AUTO: 0.53 X10*3/UL (ref 1.2–7.7)
NEUTROPHILS NFR BLD AUTO: 42.8 %
PLATELET # BLD AUTO: 36 X10*3/UL (ref 150–450)
POTASSIUM SERPL-SCNC: 3.9 MMOL/L (ref 3.5–5.3)
PROT SERPL-MCNC: 6.2 G/DL (ref 6.4–8.2)
RBC # BLD AUTO: 2.57 X10*6/UL (ref 4–5.2)
SODIUM SERPL-SCNC: 139 MMOL/L (ref 136–145)
WBC # BLD AUTO: 1.2 X10*3/UL (ref 4.4–11.3)

## 2024-10-14 PROCEDURE — 2500000004 HC RX 250 GENERAL PHARMACY W/ HCPCS (ALT 636 FOR OP/ED): Performed by: PHYSICIAN ASSISTANT

## 2024-10-14 PROCEDURE — 36591 DRAW BLOOD OFF VENOUS DEVICE: CPT

## 2024-10-14 PROCEDURE — 80053 COMPREHEN METABOLIC PANEL: CPT

## 2024-10-14 PROCEDURE — 85025 COMPLETE CBC W/AUTO DIFF WBC: CPT

## 2024-10-14 RX ORDER — EPINEPHRINE 0.3 MG/.3ML
0.3 INJECTION SUBCUTANEOUS EVERY 5 MIN PRN
OUTPATIENT
Start: 2024-10-14

## 2024-10-14 RX ORDER — HEPARIN 100 UNIT/ML
500 SYRINGE INTRAVENOUS AS NEEDED
Status: CANCELLED | OUTPATIENT
Start: 2024-10-14

## 2024-10-14 RX ORDER — ALBUTEROL SULFATE 0.83 MG/ML
3 SOLUTION RESPIRATORY (INHALATION) AS NEEDED
OUTPATIENT
Start: 2024-10-14

## 2024-10-14 RX ORDER — HEPARIN SODIUM,PORCINE/PF 10 UNIT/ML
50 SYRINGE (ML) INTRAVENOUS AS NEEDED
Status: CANCELLED | OUTPATIENT
Start: 2024-10-14

## 2024-10-14 RX ORDER — FAMOTIDINE 10 MG/ML
20 INJECTION INTRAVENOUS ONCE AS NEEDED
OUTPATIENT
Start: 2024-10-14

## 2024-10-14 RX ORDER — HEPARIN SODIUM,PORCINE/PF 10 UNIT/ML
50 SYRINGE (ML) INTRAVENOUS AS NEEDED
Status: DISCONTINUED | OUTPATIENT
Start: 2024-10-14 | End: 2024-10-14 | Stop reason: HOSPADM

## 2024-10-14 RX ORDER — DIPHENHYDRAMINE HYDROCHLORIDE 50 MG/ML
50 INJECTION INTRAMUSCULAR; INTRAVENOUS AS NEEDED
OUTPATIENT
Start: 2024-10-14

## 2024-10-14 ASSESSMENT — PAIN SCALES - GENERAL: PAINLEVEL: 0-NO PAIN

## 2024-10-15 ENCOUNTER — APPOINTMENT (OUTPATIENT)
Dept: HEMATOLOGY/ONCOLOGY | Facility: HOSPITAL | Age: 24
End: 2024-10-15
Payer: COMMERCIAL

## 2024-10-16 ENCOUNTER — APPOINTMENT (OUTPATIENT)
Dept: HEMATOLOGY/ONCOLOGY | Facility: HOSPITAL | Age: 24
End: 2024-10-16
Payer: COMMERCIAL

## 2024-10-17 ENCOUNTER — INFUSION (OUTPATIENT)
Dept: HEMATOLOGY/ONCOLOGY | Facility: HOSPITAL | Age: 24
End: 2024-10-17
Payer: COMMERCIAL

## 2024-10-17 ENCOUNTER — APPOINTMENT (OUTPATIENT)
Dept: HEMATOLOGY/ONCOLOGY | Facility: HOSPITAL | Age: 24
End: 2024-10-17
Payer: COMMERCIAL

## 2024-10-17 ENCOUNTER — APPOINTMENT (OUTPATIENT)
Dept: HEMATOLOGY/ONCOLOGY | Facility: CLINIC | Age: 24
End: 2024-10-17
Payer: COMMERCIAL

## 2024-10-17 VITALS
TEMPERATURE: 97.5 F | SYSTOLIC BLOOD PRESSURE: 128 MMHG | HEART RATE: 70 BPM | RESPIRATION RATE: 16 BRPM | WEIGHT: 148.8 LBS | OXYGEN SATURATION: 98 % | DIASTOLIC BLOOD PRESSURE: 53 MMHG | BODY MASS INDEX: 24.79 KG/M2

## 2024-10-17 DIAGNOSIS — C92.00 ACUTE MYELOID LEUKEMIA NOT HAVING ACHIEVED REMISSION (MULTI): ICD-10-CM

## 2024-10-17 DIAGNOSIS — N94.89 MENSTRUAL SUPPRESSION: ICD-10-CM

## 2024-10-17 DIAGNOSIS — C92.01 ACUTE MYELOID LEUKEMIA IN REMISSION (MULTI): ICD-10-CM

## 2024-10-17 LAB
ALBUMIN SERPL BCP-MCNC: 4.1 G/DL (ref 3.4–5)
ALP SERPL-CCNC: 47 U/L (ref 33–110)
ALT SERPL W P-5'-P-CCNC: 9 U/L (ref 7–45)
ANION GAP SERPL CALC-SCNC: 11 MMOL/L (ref 10–20)
AST SERPL W P-5'-P-CCNC: 11 U/L (ref 9–39)
BASOPHILS # BLD AUTO: 0 X10*3/UL (ref 0–0.1)
BASOPHILS NFR BLD AUTO: 0 %
BILIRUB SERPL-MCNC: 0.6 MG/DL (ref 0–1.2)
BUN SERPL-MCNC: 11 MG/DL (ref 6–23)
CALCIUM SERPL-MCNC: 9.2 MG/DL (ref 8.6–10.3)
CHLORIDE SERPL-SCNC: 108 MMOL/L (ref 98–107)
CO2 SERPL-SCNC: 27 MMOL/L (ref 21–32)
CREAT SERPL-MCNC: 0.86 MG/DL (ref 0.5–1.05)
DACRYOCYTES BLD QL SMEAR: NORMAL
EGFRCR SERPLBLD CKD-EPI 2021: >90 ML/MIN/1.73M*2
EOSINOPHIL # BLD AUTO: 0.02 X10*3/UL (ref 0–0.7)
EOSINOPHIL NFR BLD AUTO: 1.2 %
ERYTHROCYTE [DISTWIDTH] IN BLOOD BY AUTOMATED COUNT: 22.7 % (ref 11.5–14.5)
GLUCOSE SERPL-MCNC: 103 MG/DL (ref 74–99)
HCT VFR BLD AUTO: 26.5 % (ref 36–46)
HGB BLD-MCNC: 8.8 G/DL (ref 12–16)
IMM GRANULOCYTES # BLD AUTO: 0.01 X10*3/UL (ref 0–0.7)
IMM GRANULOCYTES NFR BLD AUTO: 0.6 % (ref 0–0.9)
LYMPHOCYTES # BLD AUTO: 0.5 X10*3/UL (ref 1.2–4.8)
LYMPHOCYTES NFR BLD AUTO: 29.1 %
MCH RBC QN AUTO: 32.2 PG (ref 26–34)
MCHC RBC AUTO-ENTMCNC: 33.2 G/DL (ref 32–36)
MCV RBC AUTO: 97 FL (ref 80–100)
MONOCYTES # BLD AUTO: 0.24 X10*3/UL (ref 0.1–1)
MONOCYTES NFR BLD AUTO: 14 %
NEUTROPHILS # BLD AUTO: 0.95 X10*3/UL (ref 1.2–7.7)
NEUTROPHILS NFR BLD AUTO: 55.1 %
NRBC BLD-RTO: 1.2 /100 WBCS (ref 0–0)
OVALOCYTES BLD QL SMEAR: NORMAL
PLATELET # BLD AUTO: 56 X10*3/UL (ref 150–450)
POLYCHROMASIA BLD QL SMEAR: NORMAL
POTASSIUM SERPL-SCNC: 4.1 MMOL/L (ref 3.5–5.3)
PROT SERPL-MCNC: 6.2 G/DL (ref 6.4–8.2)
RBC # BLD AUTO: 2.73 X10*6/UL (ref 4–5.2)
RBC MORPH BLD: NORMAL
SODIUM SERPL-SCNC: 142 MMOL/L (ref 136–145)
WBC # BLD AUTO: 1.7 X10*3/UL (ref 4.4–11.3)

## 2024-10-17 PROCEDURE — 2500000004 HC RX 250 GENERAL PHARMACY W/ HCPCS (ALT 636 FOR OP/ED): Mod: JZ | Performed by: INTERNAL MEDICINE

## 2024-10-17 PROCEDURE — 96402 CHEMO HORMON ANTINEOPL SQ/IM: CPT

## 2024-10-17 PROCEDURE — 85025 COMPLETE CBC W/AUTO DIFF WBC: CPT

## 2024-10-17 PROCEDURE — 2500000004 HC RX 250 GENERAL PHARMACY W/ HCPCS (ALT 636 FOR OP/ED): Performed by: PHYSICIAN ASSISTANT

## 2024-10-17 PROCEDURE — 84075 ASSAY ALKALINE PHOSPHATASE: CPT

## 2024-10-17 RX ORDER — HEPARIN SODIUM,PORCINE/PF 10 UNIT/ML
50 SYRINGE (ML) INTRAVENOUS AS NEEDED
Status: DISCONTINUED | OUTPATIENT
Start: 2024-10-17 | End: 2024-10-17 | Stop reason: HOSPADM

## 2024-10-17 RX ORDER — HEPARIN 100 UNIT/ML
500 SYRINGE INTRAVENOUS AS NEEDED
OUTPATIENT
Start: 2024-10-17

## 2024-10-17 RX ORDER — EPINEPHRINE 0.3 MG/.3ML
0.3 INJECTION SUBCUTANEOUS EVERY 5 MIN PRN
OUTPATIENT
Start: 2024-11-14

## 2024-10-17 RX ORDER — FAMOTIDINE 10 MG/ML
20 INJECTION INTRAVENOUS ONCE AS NEEDED
OUTPATIENT
Start: 2024-11-14

## 2024-10-17 RX ORDER — HEPARIN SODIUM,PORCINE/PF 10 UNIT/ML
50 SYRINGE (ML) INTRAVENOUS AS NEEDED
OUTPATIENT
Start: 2024-10-17

## 2024-10-17 RX ORDER — DIPHENHYDRAMINE HYDROCHLORIDE 50 MG/ML
50 INJECTION INTRAMUSCULAR; INTRAVENOUS AS NEEDED
OUTPATIENT
Start: 2024-11-14

## 2024-10-17 RX ORDER — ALBUTEROL SULFATE 0.83 MG/ML
3 SOLUTION RESPIRATORY (INHALATION) AS NEEDED
OUTPATIENT
Start: 2024-11-14

## 2024-10-17 ASSESSMENT — PAIN SCALES - GENERAL: PAINLEVEL_OUTOF10: 0-NO PAIN

## 2024-10-19 ENCOUNTER — APPOINTMENT (OUTPATIENT)
Dept: HEMATOLOGY/ONCOLOGY | Facility: HOSPITAL | Age: 24
End: 2024-10-19
Payer: COMMERCIAL

## 2024-10-21 ENCOUNTER — LAB (OUTPATIENT)
Dept: HEMATOLOGY/ONCOLOGY | Facility: CLINIC | Age: 24
End: 2024-10-21
Payer: COMMERCIAL

## 2024-10-21 ENCOUNTER — SOCIAL WORK (OUTPATIENT)
Dept: HEMATOLOGY/ONCOLOGY | Facility: CLINIC | Age: 24
End: 2024-10-21
Payer: COMMERCIAL

## 2024-10-21 VITALS
TEMPERATURE: 96.8 F | DIASTOLIC BLOOD PRESSURE: 60 MMHG | OXYGEN SATURATION: 99 % | SYSTOLIC BLOOD PRESSURE: 121 MMHG | WEIGHT: 148.81 LBS | BODY MASS INDEX: 24.79 KG/M2 | HEART RATE: 74 BPM | RESPIRATION RATE: 16 BRPM

## 2024-10-21 DIAGNOSIS — C92.00 ACUTE MYELOID LEUKEMIA NOT HAVING ACHIEVED REMISSION (MULTI): ICD-10-CM

## 2024-10-21 DIAGNOSIS — N94.89 MENSTRUAL SUPPRESSION: ICD-10-CM

## 2024-10-21 DIAGNOSIS — C92.01 ACUTE MYELOID LEUKEMIA IN REMISSION (MULTI): ICD-10-CM

## 2024-10-21 DIAGNOSIS — F32.A ANXIETY AND DEPRESSION: ICD-10-CM

## 2024-10-21 DIAGNOSIS — C92.01 ACUTE MYELOID LEUKEMIA IN REMISSION (MULTI): Primary | ICD-10-CM

## 2024-10-21 DIAGNOSIS — D84.9 IMMUNOSUPPRESSED STATUS: ICD-10-CM

## 2024-10-21 DIAGNOSIS — F41.9 ANXIETY AND DEPRESSION: ICD-10-CM

## 2024-10-21 DIAGNOSIS — C92.00 ACUTE MYELOID LEUKEMIA IN ADULT (MULTI): ICD-10-CM

## 2024-10-21 LAB
ALBUMIN SERPL BCP-MCNC: 4.2 G/DL (ref 3.4–5)
ALP SERPL-CCNC: 43 U/L (ref 33–110)
ALT SERPL W P-5'-P-CCNC: 24 U/L (ref 7–45)
ANION GAP SERPL CALC-SCNC: 12 MMOL/L (ref 10–20)
AST SERPL W P-5'-P-CCNC: 18 U/L (ref 9–39)
BASOPHILS # BLD AUTO: 0.01 X10*3/UL (ref 0–0.1)
BASOPHILS NFR BLD AUTO: 0.6 %
BILIRUB SERPL-MCNC: 0.6 MG/DL (ref 0–1.2)
BUN SERPL-MCNC: 12 MG/DL (ref 6–23)
CALCIUM SERPL-MCNC: 9.7 MG/DL (ref 8.6–10.3)
CHLORIDE SERPL-SCNC: 107 MMOL/L (ref 98–107)
CO2 SERPL-SCNC: 25 MMOL/L (ref 21–32)
CREAT SERPL-MCNC: 0.86 MG/DL (ref 0.5–1.05)
DACRYOCYTES BLD QL SMEAR: NORMAL
EGFRCR SERPLBLD CKD-EPI 2021: >90 ML/MIN/1.73M*2
ELECTRONICALLY SIGNED BY: NORMAL
EOSINOPHIL # BLD AUTO: 0.02 X10*3/UL (ref 0–0.7)
EOSINOPHIL NFR BLD AUTO: 1.2 %
ERYTHROCYTE [DISTWIDTH] IN BLOOD BY AUTOMATED COUNT: 24.2 % (ref 11.5–14.5)
GLUCOSE SERPL-MCNC: 99 MG/DL (ref 74–99)
HCT VFR BLD AUTO: 27.8 % (ref 36–46)
HGB BLD-MCNC: 9.2 G/DL (ref 12–16)
IMM GRANULOCYTES # BLD AUTO: 0.02 X10*3/UL (ref 0–0.7)
IMM GRANULOCYTES NFR BLD AUTO: 1.2 % (ref 0–0.9)
LYMPHOCYTES # BLD AUTO: 0.49 X10*3/UL (ref 1.2–4.8)
LYMPHOCYTES NFR BLD AUTO: 29.5 %
MCH RBC QN AUTO: 33.3 PG (ref 26–34)
MCHC RBC AUTO-ENTMCNC: 33.1 G/DL (ref 32–36)
MCV RBC AUTO: 101 FL (ref 80–100)
MONOCYTES # BLD AUTO: 0.16 X10*3/UL (ref 0.1–1)
MONOCYTES NFR BLD AUTO: 9.6 %
NEUTROPHILS # BLD AUTO: 0.96 X10*3/UL (ref 1.2–7.7)
NEUTROPHILS NFR BLD AUTO: 57.9 %
NPM1 RESULTS: NORMAL
NRBC BLD-RTO: ABNORMAL /100{WBCS}
OVALOCYTES BLD QL SMEAR: NORMAL
PLATELET # BLD AUTO: 70 X10*3/UL (ref 150–450)
POLYCHROMASIA BLD QL SMEAR: NORMAL
POTASSIUM SERPL-SCNC: 4.1 MMOL/L (ref 3.5–5.3)
PROT SERPL-MCNC: 6.6 G/DL (ref 6.4–8.2)
RBC # BLD AUTO: 2.76 X10*6/UL (ref 4–5.2)
RBC MORPH BLD: NORMAL
SODIUM SERPL-SCNC: 140 MMOL/L (ref 136–145)
WBC # BLD AUTO: 1.7 X10*3/UL (ref 4.4–11.3)

## 2024-10-21 PROCEDURE — 84075 ASSAY ALKALINE PHOSPHATASE: CPT

## 2024-10-21 PROCEDURE — 36591 DRAW BLOOD OFF VENOUS DEVICE: CPT

## 2024-10-21 PROCEDURE — 85025 COMPLETE CBC W/AUTO DIFF WBC: CPT

## 2024-10-21 ASSESSMENT — PAIN SCALES - GENERAL: PAINLEVEL_OUTOF10: 0-NO PAIN

## 2024-10-21 NOTE — PROGRESS NOTES
NATALEEW spoke with patient today during her infusion appointment to follow up on income documents needed for her [facility] Application - patient brought in her tax return for 2023 - document was uploaded to the [facility] portal for review.  Patient expressed appreciation for the assistance.  No other issues or concerns noted.  Social work will continue to follow.

## 2024-10-22 ENCOUNTER — APPOINTMENT (OUTPATIENT)
Dept: HEMATOLOGY/ONCOLOGY | Facility: HOSPITAL | Age: 24
End: 2024-10-22
Payer: COMMERCIAL

## 2024-10-23 ENCOUNTER — APPOINTMENT (OUTPATIENT)
Dept: HEMATOLOGY/ONCOLOGY | Facility: HOSPITAL | Age: 24
End: 2024-10-23
Payer: COMMERCIAL

## 2024-10-24 ENCOUNTER — APPOINTMENT (OUTPATIENT)
Dept: HEMATOLOGY/ONCOLOGY | Facility: HOSPITAL | Age: 24
End: 2024-10-24
Payer: COMMERCIAL

## 2024-10-24 ENCOUNTER — APPOINTMENT (OUTPATIENT)
Dept: HEMATOLOGY/ONCOLOGY | Facility: CLINIC | Age: 24
End: 2024-10-24
Payer: COMMERCIAL

## 2024-10-25 DIAGNOSIS — C92.00 ACUTE MYELOID LEUKEMIA IN ADULT (MULTI): Primary | ICD-10-CM

## 2024-10-28 ENCOUNTER — INFUSION (OUTPATIENT)
Dept: HEMATOLOGY/ONCOLOGY | Facility: CLINIC | Age: 24
End: 2024-10-28
Payer: COMMERCIAL

## 2024-10-28 ENCOUNTER — APPOINTMENT (OUTPATIENT)
Dept: HEMATOLOGY/ONCOLOGY | Facility: CLINIC | Age: 24
End: 2024-10-28
Payer: COMMERCIAL

## 2024-10-28 VITALS
TEMPERATURE: 97 F | DIASTOLIC BLOOD PRESSURE: 66 MMHG | OXYGEN SATURATION: 97 % | BODY MASS INDEX: 25.16 KG/M2 | WEIGHT: 151.01 LBS | RESPIRATION RATE: 16 BRPM | HEART RATE: 73 BPM | SYSTOLIC BLOOD PRESSURE: 109 MMHG

## 2024-10-28 DIAGNOSIS — D84.9 IMMUNOSUPPRESSED STATUS: ICD-10-CM

## 2024-10-28 DIAGNOSIS — C92.01 ACUTE MYELOID LEUKEMIA IN REMISSION (MULTI): ICD-10-CM

## 2024-10-28 DIAGNOSIS — N94.89 MENSTRUAL SUPPRESSION: ICD-10-CM

## 2024-10-28 DIAGNOSIS — C92.00 ACUTE MYELOID LEUKEMIA IN ADULT (MULTI): ICD-10-CM

## 2024-10-28 DIAGNOSIS — F32.A ANXIETY AND DEPRESSION: ICD-10-CM

## 2024-10-28 DIAGNOSIS — F41.9 ANXIETY AND DEPRESSION: ICD-10-CM

## 2024-10-28 DIAGNOSIS — C92.00 ACUTE MYELOID LEUKEMIA NOT HAVING ACHIEVED REMISSION (MULTI): ICD-10-CM

## 2024-10-28 LAB
ALBUMIN SERPL BCP-MCNC: 4.4 G/DL (ref 3.4–5)
ALP SERPL-CCNC: 52 U/L (ref 33–110)
ALT SERPL W P-5'-P-CCNC: 32 U/L (ref 7–45)
ANION GAP SERPL CALC-SCNC: 13 MMOL/L (ref 10–20)
AST SERPL W P-5'-P-CCNC: 19 U/L (ref 9–39)
BASOPHILS # BLD AUTO: 0.02 X10*3/UL (ref 0–0.1)
BASOPHILS NFR BLD AUTO: 0.9 %
BILIRUB SERPL-MCNC: 0.6 MG/DL (ref 0–1.2)
BUN SERPL-MCNC: 11 MG/DL (ref 6–23)
CALCIUM SERPL-MCNC: 9.7 MG/DL (ref 8.6–10.3)
CHLORIDE SERPL-SCNC: 107 MMOL/L (ref 98–107)
CO2 SERPL-SCNC: 26 MMOL/L (ref 21–32)
CREAT SERPL-MCNC: 0.93 MG/DL (ref 0.5–1.05)
DACRYOCYTES BLD QL SMEAR: NORMAL
EGFRCR SERPLBLD CKD-EPI 2021: 89 ML/MIN/1.73M*2
EOSINOPHIL # BLD AUTO: 0.04 X10*3/UL (ref 0–0.7)
EOSINOPHIL NFR BLD AUTO: 1.8 %
ERYTHROCYTE [DISTWIDTH] IN BLOOD BY AUTOMATED COUNT: 23.5 % (ref 11.5–14.5)
GLUCOSE SERPL-MCNC: 102 MG/DL (ref 74–99)
HCG UR QL IA.RAPID: NEGATIVE
HCT VFR BLD AUTO: 33.6 % (ref 36–46)
HGB BLD-MCNC: 11 G/DL (ref 12–16)
IMM GRANULOCYTES # BLD AUTO: 0.01 X10*3/UL (ref 0–0.7)
IMM GRANULOCYTES NFR BLD AUTO: 0.4 % (ref 0–0.9)
LYMPHOCYTES # BLD AUTO: 0.52 X10*3/UL (ref 1.2–4.8)
LYMPHOCYTES NFR BLD AUTO: 23.2 %
MCH RBC QN AUTO: 33.7 PG (ref 26–34)
MCHC RBC AUTO-ENTMCNC: 32.7 G/DL (ref 32–36)
MCV RBC AUTO: 103 FL (ref 80–100)
MONOCYTES # BLD AUTO: 0.23 X10*3/UL (ref 0.1–1)
MONOCYTES NFR BLD AUTO: 10.3 %
NEUTROPHILS # BLD AUTO: 1.42 X10*3/UL (ref 1.2–7.7)
NEUTROPHILS NFR BLD AUTO: 63.4 %
NRBC BLD-RTO: ABNORMAL /100{WBCS}
PLATELET # BLD AUTO: 116 X10*3/UL (ref 150–450)
POLYCHROMASIA BLD QL SMEAR: NORMAL
POTASSIUM SERPL-SCNC: 4.1 MMOL/L (ref 3.5–5.3)
PROT SERPL-MCNC: 6.8 G/DL (ref 6.4–8.2)
RBC # BLD AUTO: 3.26 X10*6/UL (ref 4–5.2)
RBC MORPH BLD: NORMAL
SODIUM SERPL-SCNC: 142 MMOL/L (ref 136–145)
WBC # BLD AUTO: 2.2 X10*3/UL (ref 4.4–11.3)

## 2024-10-28 PROCEDURE — 36591 DRAW BLOOD OFF VENOUS DEVICE: CPT

## 2024-10-28 PROCEDURE — 81025 URINE PREGNANCY TEST: CPT

## 2024-10-28 PROCEDURE — 84075 ASSAY ALKALINE PHOSPHATASE: CPT

## 2024-10-28 PROCEDURE — 85025 COMPLETE CBC W/AUTO DIFF WBC: CPT

## 2024-10-28 PROCEDURE — 2500000004 HC RX 250 GENERAL PHARMACY W/ HCPCS (ALT 636 FOR OP/ED): Performed by: PHYSICIAN ASSISTANT

## 2024-10-28 RX ORDER — HEPARIN SODIUM,PORCINE/PF 10 UNIT/ML
50 SYRINGE (ML) INTRAVENOUS AS NEEDED
Status: DISCONTINUED | OUTPATIENT
Start: 2024-10-28 | End: 2024-10-28 | Stop reason: HOSPADM

## 2024-10-28 RX ORDER — HEPARIN SODIUM,PORCINE/PF 10 UNIT/ML
50 SYRINGE (ML) INTRAVENOUS AS NEEDED
Status: CANCELLED | OUTPATIENT
Start: 2024-10-28

## 2024-10-28 RX ORDER — HEPARIN 100 UNIT/ML
500 SYRINGE INTRAVENOUS AS NEEDED
Status: CANCELLED | OUTPATIENT
Start: 2024-10-28

## 2024-10-28 ASSESSMENT — PAIN SCALES - GENERAL: PAINLEVEL_OUTOF10: 0-NO PAIN

## 2024-10-29 ENCOUNTER — APPOINTMENT (OUTPATIENT)
Dept: HEMATOLOGY/ONCOLOGY | Facility: HOSPITAL | Age: 24
End: 2024-10-29
Payer: COMMERCIAL

## 2024-10-29 ENCOUNTER — INFUSION (OUTPATIENT)
Dept: HEMATOLOGY/ONCOLOGY | Facility: HOSPITAL | Age: 24
End: 2024-10-29
Payer: COMMERCIAL

## 2024-10-29 ENCOUNTER — OFFICE VISIT (OUTPATIENT)
Dept: HEMATOLOGY/ONCOLOGY | Facility: HOSPITAL | Age: 24
End: 2024-10-29
Payer: COMMERCIAL

## 2024-10-29 VITALS
OXYGEN SATURATION: 100 % | WEIGHT: 150.57 LBS | BODY MASS INDEX: 25.09 KG/M2 | RESPIRATION RATE: 18 BRPM | TEMPERATURE: 97.3 F | DIASTOLIC BLOOD PRESSURE: 54 MMHG | HEART RATE: 60 BPM | SYSTOLIC BLOOD PRESSURE: 115 MMHG

## 2024-10-29 VITALS
HEART RATE: 77 BPM | RESPIRATION RATE: 18 BRPM | TEMPERATURE: 96.8 F | DIASTOLIC BLOOD PRESSURE: 48 MMHG | SYSTOLIC BLOOD PRESSURE: 129 MMHG | OXYGEN SATURATION: 99 %

## 2024-10-29 DIAGNOSIS — D84.9 IMMUNOSUPPRESSED STATUS: ICD-10-CM

## 2024-10-29 DIAGNOSIS — C92.01 ACUTE MYELOID LEUKEMIA IN REMISSION (MULTI): ICD-10-CM

## 2024-10-29 DIAGNOSIS — N94.89 MENSTRUAL SUPPRESSION: ICD-10-CM

## 2024-10-29 DIAGNOSIS — C92.01 ACUTE MYELOID LEUKEMIA IN REMISSION (MULTI): Primary | ICD-10-CM

## 2024-10-29 DIAGNOSIS — C92.00 ACUTE MYELOID LEUKEMIA IN ADULT (MULTI): ICD-10-CM

## 2024-10-29 LAB
ANION GAP SERPL CALC-SCNC: 11 MMOL/L (ref 10–20)
BUN SERPL-MCNC: 11 MG/DL (ref 6–23)
CALCIUM SERPL-MCNC: 9.2 MG/DL (ref 8.6–10.3)
CHLORIDE SERPL-SCNC: 106 MMOL/L (ref 98–107)
CO2 SERPL-SCNC: 25 MMOL/L (ref 21–32)
CREAT SERPL-MCNC: 0.92 MG/DL (ref 0.5–1.05)
EGFRCR SERPLBLD CKD-EPI 2021: 90 ML/MIN/1.73M*2
GLUCOSE SERPL-MCNC: 215 MG/DL (ref 74–99)
POTASSIUM SERPL-SCNC: 4.2 MMOL/L (ref 3.5–5.3)
SODIUM SERPL-SCNC: 138 MMOL/L (ref 136–145)

## 2024-10-29 PROCEDURE — 2500000004 HC RX 250 GENERAL PHARMACY W/ HCPCS (ALT 636 FOR OP/ED): Performed by: PHYSICIAN ASSISTANT

## 2024-10-29 PROCEDURE — 2500000004 HC RX 250 GENERAL PHARMACY W/ HCPCS (ALT 636 FOR OP/ED): Performed by: INTERNAL MEDICINE

## 2024-10-29 PROCEDURE — 99215 OFFICE O/P EST HI 40 MIN: CPT | Mod: 25 | Performed by: INTERNAL MEDICINE

## 2024-10-29 PROCEDURE — 99215 OFFICE O/P EST HI 40 MIN: CPT | Performed by: INTERNAL MEDICINE

## 2024-10-29 PROCEDURE — 96413 CHEMO IV INFUSION 1 HR: CPT

## 2024-10-29 PROCEDURE — 82374 ASSAY BLOOD CARBON DIOXIDE: CPT

## 2024-10-29 PROCEDURE — 96415 CHEMO IV INFUSION ADDL HR: CPT

## 2024-10-29 PROCEDURE — 2500000005 HC RX 250 GENERAL PHARMACY W/O HCPCS: Performed by: INTERNAL MEDICINE

## 2024-10-29 RX ORDER — HEPARIN 100 UNIT/ML
500 SYRINGE INTRAVENOUS AS NEEDED
Status: CANCELLED | OUTPATIENT
Start: 2024-10-29

## 2024-10-29 RX ORDER — EPINEPHRINE 0.3 MG/.3ML
0.3 INJECTION SUBCUTANEOUS EVERY 5 MIN PRN
OUTPATIENT
Start: 2024-12-04

## 2024-10-29 RX ORDER — ONDANSETRON HYDROCHLORIDE 8 MG/1
16 TABLET, FILM COATED ORAL ONCE
Status: COMPLETED | OUTPATIENT
Start: 2024-10-29 | End: 2024-10-29

## 2024-10-29 RX ORDER — HEPARIN SODIUM,PORCINE/PF 10 UNIT/ML
50 SYRINGE (ML) INTRAVENOUS AS NEEDED
Status: DISCONTINUED | OUTPATIENT
Start: 2024-10-29 | End: 2024-10-29 | Stop reason: HOSPADM

## 2024-10-29 RX ORDER — ALBUTEROL SULFATE 0.83 MG/ML
3 SOLUTION RESPIRATORY (INHALATION) AS NEEDED
OUTPATIENT
Start: 2024-12-03

## 2024-10-29 RX ORDER — PROCHLORPERAZINE EDISYLATE 5 MG/ML
10 INJECTION INTRAMUSCULAR; INTRAVENOUS EVERY 6 HOURS PRN
OUTPATIENT
Start: 2024-12-04

## 2024-10-29 RX ORDER — ONDANSETRON HYDROCHLORIDE 8 MG/1
16 TABLET, FILM COATED ORAL ONCE
OUTPATIENT
Start: 2024-12-04

## 2024-10-29 RX ORDER — HEPARIN SODIUM,PORCINE/PF 10 UNIT/ML
50 SYRINGE (ML) INTRAVENOUS AS NEEDED
Status: CANCELLED | OUTPATIENT
Start: 2024-10-29

## 2024-10-29 RX ORDER — FAMOTIDINE 10 MG/ML
20 INJECTION INTRAVENOUS ONCE AS NEEDED
OUTPATIENT
Start: 2024-12-05

## 2024-10-29 RX ORDER — ALBUTEROL SULFATE 0.83 MG/ML
3 SOLUTION RESPIRATORY (INHALATION) AS NEEDED
OUTPATIENT
Start: 2024-12-07

## 2024-10-29 RX ORDER — EPINEPHRINE 0.3 MG/.3ML
0.3 INJECTION SUBCUTANEOUS EVERY 5 MIN PRN
Status: DISCONTINUED | OUTPATIENT
Start: 2024-10-29 | End: 2024-10-29 | Stop reason: HOSPADM

## 2024-10-29 RX ORDER — DIPHENHYDRAMINE HYDROCHLORIDE 50 MG/ML
50 INJECTION INTRAMUSCULAR; INTRAVENOUS AS NEEDED
OUTPATIENT
Start: 2024-12-03

## 2024-10-29 RX ORDER — FAMOTIDINE 10 MG/ML
20 INJECTION INTRAVENOUS ONCE AS NEEDED
OUTPATIENT
Start: 2024-12-03

## 2024-10-29 RX ORDER — DIPHENHYDRAMINE HYDROCHLORIDE 50 MG/ML
50 INJECTION INTRAMUSCULAR; INTRAVENOUS AS NEEDED
OUTPATIENT
Start: 2024-12-05

## 2024-10-29 RX ORDER — PROCHLORPERAZINE MALEATE 10 MG
10 TABLET ORAL EVERY 6 HOURS PRN
OUTPATIENT
Start: 2024-12-03

## 2024-10-29 RX ORDER — ALBUTEROL SULFATE 0.83 MG/ML
3 SOLUTION RESPIRATORY (INHALATION) AS NEEDED
OUTPATIENT
Start: 2024-12-05

## 2024-10-29 RX ORDER — ALBUTEROL SULFATE 0.83 MG/ML
3 SOLUTION RESPIRATORY (INHALATION) AS NEEDED
OUTPATIENT
Start: 2024-12-04

## 2024-10-29 RX ORDER — PROCHLORPERAZINE MALEATE 10 MG
10 TABLET ORAL EVERY 6 HOURS PRN
Status: DISCONTINUED | OUTPATIENT
Start: 2024-10-29 | End: 2024-10-29 | Stop reason: HOSPADM

## 2024-10-29 RX ORDER — PROCHLORPERAZINE MALEATE 10 MG
10 TABLET ORAL EVERY 6 HOURS PRN
OUTPATIENT
Start: 2024-12-05

## 2024-10-29 RX ORDER — DEXAMETHASONE 4 MG/1
12 TABLET ORAL ONCE
OUTPATIENT
Start: 2024-12-04

## 2024-10-29 RX ORDER — PROCHLORPERAZINE EDISYLATE 5 MG/ML
10 INJECTION INTRAMUSCULAR; INTRAVENOUS EVERY 6 HOURS PRN
OUTPATIENT
Start: 2024-12-03

## 2024-10-29 RX ORDER — DEXAMETHASONE 6 MG/1
12 TABLET ORAL ONCE
Status: COMPLETED | OUTPATIENT
Start: 2024-10-29 | End: 2024-10-29

## 2024-10-29 RX ORDER — FAMOTIDINE 10 MG/ML
20 INJECTION INTRAVENOUS ONCE AS NEEDED
OUTPATIENT
Start: 2024-12-04

## 2024-10-29 RX ORDER — EPINEPHRINE 0.3 MG/.3ML
0.3 INJECTION SUBCUTANEOUS EVERY 5 MIN PRN
OUTPATIENT
Start: 2024-12-07

## 2024-10-29 RX ORDER — PROCHLORPERAZINE EDISYLATE 5 MG/ML
10 INJECTION INTRAMUSCULAR; INTRAVENOUS EVERY 6 HOURS PRN
OUTPATIENT
Start: 2024-12-05

## 2024-10-29 RX ORDER — EPINEPHRINE 0.3 MG/.3ML
0.3 INJECTION SUBCUTANEOUS EVERY 5 MIN PRN
OUTPATIENT
Start: 2024-12-05

## 2024-10-29 RX ORDER — FAMOTIDINE 10 MG/ML
20 INJECTION INTRAVENOUS ONCE AS NEEDED
OUTPATIENT
Start: 2024-12-07

## 2024-10-29 RX ORDER — ONDANSETRON HYDROCHLORIDE 8 MG/1
16 TABLET, FILM COATED ORAL ONCE
OUTPATIENT
Start: 2024-12-05

## 2024-10-29 RX ORDER — EPINEPHRINE 0.3 MG/.3ML
0.3 INJECTION SUBCUTANEOUS EVERY 5 MIN PRN
OUTPATIENT
Start: 2024-12-03

## 2024-10-29 RX ORDER — DEXAMETHASONE 4 MG/1
12 TABLET ORAL ONCE
OUTPATIENT
Start: 2024-12-05

## 2024-10-29 RX ORDER — DIPHENHYDRAMINE HYDROCHLORIDE 50 MG/ML
50 INJECTION INTRAMUSCULAR; INTRAVENOUS AS NEEDED
OUTPATIENT
Start: 2024-12-04

## 2024-10-29 RX ORDER — PROCHLORPERAZINE EDISYLATE 5 MG/ML
10 INJECTION INTRAMUSCULAR; INTRAVENOUS EVERY 6 HOURS PRN
Status: DISCONTINUED | OUTPATIENT
Start: 2024-10-29 | End: 2024-10-29 | Stop reason: HOSPADM

## 2024-10-29 RX ORDER — PROCHLORPERAZINE MALEATE 10 MG
10 TABLET ORAL EVERY 6 HOURS PRN
OUTPATIENT
Start: 2024-12-04

## 2024-10-29 RX ORDER — FAMOTIDINE 10 MG/ML
20 INJECTION INTRAVENOUS ONCE AS NEEDED
Status: DISCONTINUED | OUTPATIENT
Start: 2024-10-29 | End: 2024-10-29 | Stop reason: HOSPADM

## 2024-10-29 RX ORDER — ALBUTEROL SULFATE 0.83 MG/ML
3 SOLUTION RESPIRATORY (INHALATION) AS NEEDED
Status: DISCONTINUED | OUTPATIENT
Start: 2024-10-29 | End: 2024-10-29 | Stop reason: HOSPADM

## 2024-10-29 RX ORDER — DIPHENHYDRAMINE HYDROCHLORIDE 50 MG/ML
50 INJECTION INTRAMUSCULAR; INTRAVENOUS AS NEEDED
Status: DISCONTINUED | OUTPATIENT
Start: 2024-10-29 | End: 2024-10-29 | Stop reason: HOSPADM

## 2024-10-29 RX ORDER — ONDANSETRON HYDROCHLORIDE 8 MG/1
16 TABLET, FILM COATED ORAL ONCE
OUTPATIENT
Start: 2024-12-03

## 2024-10-29 RX ORDER — DEXAMETHASONE 4 MG/1
12 TABLET ORAL ONCE
OUTPATIENT
Start: 2024-12-03

## 2024-10-29 RX ORDER — DIPHENHYDRAMINE HYDROCHLORIDE 50 MG/ML
50 INJECTION INTRAMUSCULAR; INTRAVENOUS AS NEEDED
OUTPATIENT
Start: 2024-12-07

## 2024-10-29 ASSESSMENT — PAIN SCALES - GENERAL: PAINLEVEL_OUTOF10: 0-NO PAIN

## 2024-10-30 ENCOUNTER — INFUSION (OUTPATIENT)
Dept: HEMATOLOGY/ONCOLOGY | Facility: HOSPITAL | Age: 24
End: 2024-10-30
Payer: COMMERCIAL

## 2024-10-30 ENCOUNTER — OFFICE VISIT (OUTPATIENT)
Dept: HEMATOLOGY/ONCOLOGY | Facility: HOSPITAL | Age: 24
End: 2024-10-30
Payer: COMMERCIAL

## 2024-10-30 VITALS
HEART RATE: 60 BPM | TEMPERATURE: 97.2 F | SYSTOLIC BLOOD PRESSURE: 115 MMHG | RESPIRATION RATE: 18 BRPM | DIASTOLIC BLOOD PRESSURE: 53 MMHG

## 2024-10-30 VITALS
TEMPERATURE: 97 F | WEIGHT: 151.46 LBS | HEART RATE: 81 BPM | BODY MASS INDEX: 25.23 KG/M2 | RESPIRATION RATE: 18 BRPM | OXYGEN SATURATION: 100 % | SYSTOLIC BLOOD PRESSURE: 125 MMHG | DIASTOLIC BLOOD PRESSURE: 57 MMHG

## 2024-10-30 DIAGNOSIS — C92.01 ACUTE MYELOID LEUKEMIA IN REMISSION (MULTI): ICD-10-CM

## 2024-10-30 DIAGNOSIS — D84.9 IMMUNOSUPPRESSED STATUS: Primary | ICD-10-CM

## 2024-10-30 DIAGNOSIS — N94.89 MENSTRUAL SUPPRESSION: ICD-10-CM

## 2024-10-30 LAB
ANION GAP SERPL CALC-SCNC: 12 MMOL/L (ref 10–20)
BUN SERPL-MCNC: 11 MG/DL (ref 6–23)
CALCIUM SERPL-MCNC: 9.2 MG/DL (ref 8.6–10.3)
CHLORIDE SERPL-SCNC: 107 MMOL/L (ref 98–107)
CO2 SERPL-SCNC: 25 MMOL/L (ref 21–32)
CREAT SERPL-MCNC: 0.69 MG/DL (ref 0.5–1.05)
EGFRCR SERPLBLD CKD-EPI 2021: >90 ML/MIN/1.73M*2
GLUCOSE SERPL-MCNC: 156 MG/DL (ref 74–99)
POTASSIUM SERPL-SCNC: 4.2 MMOL/L (ref 3.5–5.3)
SODIUM SERPL-SCNC: 140 MMOL/L (ref 136–145)

## 2024-10-30 PROCEDURE — 99215 OFFICE O/P EST HI 40 MIN: CPT | Mod: 25 | Performed by: NURSE PRACTITIONER

## 2024-10-30 PROCEDURE — 96413 CHEMO IV INFUSION 1 HR: CPT

## 2024-10-30 PROCEDURE — 99215 OFFICE O/P EST HI 40 MIN: CPT | Performed by: NURSE PRACTITIONER

## 2024-10-30 PROCEDURE — 2500000004 HC RX 250 GENERAL PHARMACY W/ HCPCS (ALT 636 FOR OP/ED): Performed by: PHYSICIAN ASSISTANT

## 2024-10-30 PROCEDURE — 96415 CHEMO IV INFUSION ADDL HR: CPT

## 2024-10-30 PROCEDURE — 2500000004 HC RX 250 GENERAL PHARMACY W/ HCPCS (ALT 636 FOR OP/ED): Performed by: INTERNAL MEDICINE

## 2024-10-30 PROCEDURE — 80048 BASIC METABOLIC PNL TOTAL CA: CPT

## 2024-10-30 PROCEDURE — 2500000005 HC RX 250 GENERAL PHARMACY W/O HCPCS: Performed by: INTERNAL MEDICINE

## 2024-10-30 RX ORDER — ALBUTEROL SULFATE 0.83 MG/ML
3 SOLUTION RESPIRATORY (INHALATION) AS NEEDED
Status: DISCONTINUED | OUTPATIENT
Start: 2024-10-30 | End: 2024-10-30 | Stop reason: HOSPADM

## 2024-10-30 RX ORDER — HEPARIN 100 UNIT/ML
500 SYRINGE INTRAVENOUS AS NEEDED
Status: CANCELLED | OUTPATIENT
Start: 2024-10-30

## 2024-10-30 RX ORDER — HEPARIN SODIUM,PORCINE/PF 10 UNIT/ML
50 SYRINGE (ML) INTRAVENOUS AS NEEDED
Status: DISCONTINUED | OUTPATIENT
Start: 2024-10-30 | End: 2024-10-30 | Stop reason: HOSPADM

## 2024-10-30 RX ORDER — FAMOTIDINE 10 MG/ML
20 INJECTION INTRAVENOUS ONCE AS NEEDED
Status: DISCONTINUED | OUTPATIENT
Start: 2024-10-30 | End: 2024-10-30 | Stop reason: HOSPADM

## 2024-10-30 RX ORDER — DIPHENHYDRAMINE HYDROCHLORIDE 50 MG/ML
50 INJECTION INTRAMUSCULAR; INTRAVENOUS AS NEEDED
Status: DISCONTINUED | OUTPATIENT
Start: 2024-10-30 | End: 2024-10-30 | Stop reason: HOSPADM

## 2024-10-30 RX ORDER — HEPARIN SODIUM,PORCINE/PF 10 UNIT/ML
50 SYRINGE (ML) INTRAVENOUS AS NEEDED
Status: CANCELLED | OUTPATIENT
Start: 2024-10-30

## 2024-10-30 RX ORDER — DEXAMETHASONE 6 MG/1
12 TABLET ORAL ONCE
Status: COMPLETED | OUTPATIENT
Start: 2024-10-30 | End: 2024-10-30

## 2024-10-30 RX ORDER — EPINEPHRINE 0.3 MG/.3ML
0.3 INJECTION SUBCUTANEOUS EVERY 5 MIN PRN
Status: DISCONTINUED | OUTPATIENT
Start: 2024-10-30 | End: 2024-10-30 | Stop reason: HOSPADM

## 2024-10-30 RX ORDER — PROCHLORPERAZINE EDISYLATE 5 MG/ML
10 INJECTION INTRAMUSCULAR; INTRAVENOUS EVERY 6 HOURS PRN
Status: DISCONTINUED | OUTPATIENT
Start: 2024-10-30 | End: 2024-10-30 | Stop reason: HOSPADM

## 2024-10-30 RX ORDER — ONDANSETRON HYDROCHLORIDE 8 MG/1
16 TABLET, FILM COATED ORAL ONCE
Status: COMPLETED | OUTPATIENT
Start: 2024-10-30 | End: 2024-10-30

## 2024-10-30 RX ORDER — PROCHLORPERAZINE MALEATE 10 MG
10 TABLET ORAL EVERY 6 HOURS PRN
Status: DISCONTINUED | OUTPATIENT
Start: 2024-10-30 | End: 2024-10-30 | Stop reason: HOSPADM

## 2024-10-30 ASSESSMENT — ENCOUNTER SYMPTOMS
FEVER: 0
CHILLS: 0
HEMATURIA: 0
CHEST TIGHTNESS: 0
LIGHT-HEADEDNESS: 0
ABDOMINAL PAIN: 0
DIARRHEA: 0
DYSURIA: 0
NUMBNESS: 0
HEADACHES: 0
NAUSEA: 0
UNEXPECTED WEIGHT CHANGE: 0
DIZZINESS: 0
VOMITING: 0
COUGH: 0
CONSTIPATION: 0
SHORTNESS OF BREATH: 0
BLOOD IN STOOL: 0

## 2024-10-31 ENCOUNTER — APPOINTMENT (OUTPATIENT)
Dept: HEMATOLOGY/ONCOLOGY | Facility: CLINIC | Age: 24
End: 2024-10-31
Payer: COMMERCIAL

## 2024-10-31 ENCOUNTER — INFUSION (OUTPATIENT)
Dept: HEMATOLOGY/ONCOLOGY | Facility: HOSPITAL | Age: 24
End: 2024-10-31
Payer: COMMERCIAL

## 2024-10-31 ENCOUNTER — OFFICE VISIT (OUTPATIENT)
Dept: HEMATOLOGY/ONCOLOGY | Facility: HOSPITAL | Age: 24
End: 2024-10-31
Payer: COMMERCIAL

## 2024-10-31 VITALS
HEART RATE: 65 BPM | SYSTOLIC BLOOD PRESSURE: 121 MMHG | DIASTOLIC BLOOD PRESSURE: 61 MMHG | TEMPERATURE: 98.6 F | OXYGEN SATURATION: 100 % | RESPIRATION RATE: 16 BRPM

## 2024-10-31 VITALS
DIASTOLIC BLOOD PRESSURE: 48 MMHG | RESPIRATION RATE: 16 BRPM | HEART RATE: 77 BPM | TEMPERATURE: 98.6 F | BODY MASS INDEX: 25.14 KG/M2 | OXYGEN SATURATION: 100 % | SYSTOLIC BLOOD PRESSURE: 116 MMHG | WEIGHT: 150.9 LBS

## 2024-10-31 DIAGNOSIS — D84.9 IMMUNOSUPPRESSED STATUS: Primary | ICD-10-CM

## 2024-10-31 DIAGNOSIS — C92.01 ACUTE MYELOID LEUKEMIA IN REMISSION (MULTI): ICD-10-CM

## 2024-10-31 DIAGNOSIS — C92.00 ACUTE MYELOID LEUKEMIA NOT HAVING ACHIEVED REMISSION (MULTI): ICD-10-CM

## 2024-10-31 DIAGNOSIS — R74.8 ABNORMAL TRANSAMINASES: ICD-10-CM

## 2024-10-31 LAB
ALBUMIN SERPL BCP-MCNC: 4.2 G/DL (ref 3.4–5)
ALP SERPL-CCNC: 52 U/L (ref 33–110)
ALT SERPL W P-5'-P-CCNC: 146 U/L (ref 7–45)
ANION GAP SERPL CALC-SCNC: 13 MMOL/L (ref 10–20)
AST SERPL W P-5'-P-CCNC: 61 U/L (ref 9–39)
BILIRUB SERPL-MCNC: 1.7 MG/DL (ref 0–1.2)
BUN SERPL-MCNC: 14 MG/DL (ref 6–23)
CALCIUM SERPL-MCNC: 9.3 MG/DL (ref 8.6–10.3)
CHLORIDE SERPL-SCNC: 106 MMOL/L (ref 98–107)
CO2 SERPL-SCNC: 26 MMOL/L (ref 21–32)
CREAT SERPL-MCNC: 0.79 MG/DL (ref 0.5–1.05)
EGFRCR SERPLBLD CKD-EPI 2021: >90 ML/MIN/1.73M*2
GLUCOSE SERPL-MCNC: 177 MG/DL (ref 74–99)
POTASSIUM SERPL-SCNC: 4.1 MMOL/L (ref 3.5–5.3)
PROT SERPL-MCNC: 6.7 G/DL (ref 6.4–8.2)
SODIUM SERPL-SCNC: 141 MMOL/L (ref 136–145)

## 2024-10-31 PROCEDURE — 96413 CHEMO IV INFUSION 1 HR: CPT

## 2024-10-31 PROCEDURE — 80053 COMPREHEN METABOLIC PANEL: CPT

## 2024-10-31 PROCEDURE — 96415 CHEMO IV INFUSION ADDL HR: CPT

## 2024-10-31 PROCEDURE — 2500000005 HC RX 250 GENERAL PHARMACY W/O HCPCS: Performed by: INTERNAL MEDICINE

## 2024-10-31 PROCEDURE — 99215 OFFICE O/P EST HI 40 MIN: CPT | Mod: 25 | Performed by: NURSE PRACTITIONER

## 2024-10-31 PROCEDURE — 96417 CHEMO IV INFUS EACH ADDL SEQ: CPT

## 2024-10-31 PROCEDURE — 2500000004 HC RX 250 GENERAL PHARMACY W/ HCPCS (ALT 636 FOR OP/ED): Performed by: INTERNAL MEDICINE

## 2024-10-31 PROCEDURE — 99215 OFFICE O/P EST HI 40 MIN: CPT | Performed by: NURSE PRACTITIONER

## 2024-10-31 PROCEDURE — 2500000004 HC RX 250 GENERAL PHARMACY W/ HCPCS (ALT 636 FOR OP/ED): Performed by: PHYSICIAN ASSISTANT

## 2024-10-31 RX ORDER — HEPARIN 100 UNIT/ML
500 SYRINGE INTRAVENOUS AS NEEDED
OUTPATIENT
Start: 2024-10-31

## 2024-10-31 RX ORDER — HEPARIN SODIUM,PORCINE/PF 10 UNIT/ML
50 SYRINGE (ML) INTRAVENOUS AS NEEDED
OUTPATIENT
Start: 2024-10-31

## 2024-10-31 RX ORDER — EPINEPHRINE 0.3 MG/.3ML
0.3 INJECTION SUBCUTANEOUS EVERY 5 MIN PRN
Status: DISCONTINUED | OUTPATIENT
Start: 2024-10-31 | End: 2024-10-31 | Stop reason: HOSPADM

## 2024-10-31 RX ORDER — PROCHLORPERAZINE EDISYLATE 5 MG/ML
10 INJECTION INTRAMUSCULAR; INTRAVENOUS EVERY 6 HOURS PRN
Status: DISCONTINUED | OUTPATIENT
Start: 2024-10-31 | End: 2024-10-31 | Stop reason: HOSPADM

## 2024-10-31 RX ORDER — ALBUTEROL SULFATE 0.83 MG/ML
3 SOLUTION RESPIRATORY (INHALATION) AS NEEDED
Status: DISCONTINUED | OUTPATIENT
Start: 2024-10-31 | End: 2024-10-31 | Stop reason: HOSPADM

## 2024-10-31 RX ORDER — PROCHLORPERAZINE MALEATE 10 MG
10 TABLET ORAL EVERY 6 HOURS PRN
Status: DISCONTINUED | OUTPATIENT
Start: 2024-10-31 | End: 2024-10-31 | Stop reason: HOSPADM

## 2024-10-31 RX ORDER — DIPHENHYDRAMINE HYDROCHLORIDE 50 MG/ML
50 INJECTION INTRAMUSCULAR; INTRAVENOUS AS NEEDED
Status: DISCONTINUED | OUTPATIENT
Start: 2024-10-31 | End: 2024-10-31 | Stop reason: HOSPADM

## 2024-10-31 RX ORDER — FAMOTIDINE 10 MG/ML
20 INJECTION INTRAVENOUS ONCE AS NEEDED
Status: DISCONTINUED | OUTPATIENT
Start: 2024-10-31 | End: 2024-10-31 | Stop reason: HOSPADM

## 2024-10-31 RX ORDER — DEXAMETHASONE 6 MG/1
12 TABLET ORAL ONCE
Status: COMPLETED | OUTPATIENT
Start: 2024-10-31 | End: 2024-10-31

## 2024-10-31 RX ORDER — ONDANSETRON HYDROCHLORIDE 8 MG/1
16 TABLET, FILM COATED ORAL ONCE
Status: COMPLETED | OUTPATIENT
Start: 2024-10-31 | End: 2024-10-31

## 2024-10-31 RX ORDER — HEPARIN SODIUM,PORCINE/PF 10 UNIT/ML
50 SYRINGE (ML) INTRAVENOUS AS NEEDED
Status: DISCONTINUED | OUTPATIENT
Start: 2024-10-31 | End: 2024-10-31 | Stop reason: HOSPADM

## 2024-10-31 ASSESSMENT — ENCOUNTER SYMPTOMS
HEMATURIA: 0
SHORTNESS OF BREATH: 0
LIGHT-HEADEDNESS: 0
VOMITING: 0
FEVER: 0
BLOOD IN STOOL: 0
UNEXPECTED WEIGHT CHANGE: 0
ABDOMINAL PAIN: 0
DIARRHEA: 0
COUGH: 0
DIZZINESS: 0
CONSTIPATION: 0
CHILLS: 0
DYSURIA: 0
NUMBNESS: 0
HEADACHES: 0
NAUSEA: 0
CHEST TIGHTNESS: 0

## 2024-10-31 ASSESSMENT — PAIN SCALES - GENERAL: PAINLEVEL_OUTOF10: 0-NO PAIN

## 2024-11-01 ENCOUNTER — CLINICAL SUPPORT (OUTPATIENT)
Dept: NUTRITION | Facility: HOSPITAL | Age: 24
End: 2024-11-01
Payer: COMMERCIAL

## 2024-11-02 ENCOUNTER — INFUSION (OUTPATIENT)
Dept: HEMATOLOGY/ONCOLOGY | Facility: HOSPITAL | Age: 24
End: 2024-11-02
Payer: COMMERCIAL

## 2024-11-02 VITALS
OXYGEN SATURATION: 98 % | SYSTOLIC BLOOD PRESSURE: 99 MMHG | HEART RATE: 68 BPM | RESPIRATION RATE: 18 BRPM | WEIGHT: 144.8 LBS | TEMPERATURE: 97.5 F | DIASTOLIC BLOOD PRESSURE: 63 MMHG | BODY MASS INDEX: 24.12 KG/M2

## 2024-11-02 DIAGNOSIS — C92.01 ACUTE MYELOID LEUKEMIA IN REMISSION (MULTI): ICD-10-CM

## 2024-11-02 PROCEDURE — 2500000004 HC RX 250 GENERAL PHARMACY W/ HCPCS (ALT 636 FOR OP/ED): Mod: JZ | Performed by: INTERNAL MEDICINE

## 2024-11-02 PROCEDURE — 96372 THER/PROPH/DIAG INJ SC/IM: CPT

## 2024-11-02 ASSESSMENT — PAIN SCALES - GENERAL: PAINLEVEL_OUTOF10: 0-NO PAIN

## 2024-11-04 ENCOUNTER — APPOINTMENT (OUTPATIENT)
Dept: HEMATOLOGY/ONCOLOGY | Facility: HOSPITAL | Age: 24
End: 2024-11-04
Payer: COMMERCIAL

## 2024-11-04 ENCOUNTER — APPOINTMENT (OUTPATIENT)
Dept: HEMATOLOGY/ONCOLOGY | Facility: CLINIC | Age: 24
End: 2024-11-04
Payer: COMMERCIAL

## 2024-11-04 ENCOUNTER — INFUSION (OUTPATIENT)
Dept: HEMATOLOGY/ONCOLOGY | Facility: CLINIC | Age: 24
End: 2024-11-04
Payer: COMMERCIAL

## 2024-11-04 ENCOUNTER — DOCUMENTATION (OUTPATIENT)
Dept: HEMATOLOGY/ONCOLOGY | Facility: HOSPITAL | Age: 24
End: 2024-11-04
Payer: COMMERCIAL

## 2024-11-04 VITALS
HEART RATE: 88 BPM | RESPIRATION RATE: 16 BRPM | BODY MASS INDEX: 24.21 KG/M2 | SYSTOLIC BLOOD PRESSURE: 109 MMHG | TEMPERATURE: 97.3 F | DIASTOLIC BLOOD PRESSURE: 73 MMHG | WEIGHT: 145.28 LBS | OXYGEN SATURATION: 98 %

## 2024-11-04 DIAGNOSIS — C92.00 ACUTE MYELOID LEUKEMIA NOT HAVING ACHIEVED REMISSION (MULTI): ICD-10-CM

## 2024-11-04 DIAGNOSIS — C92.01 ACUTE MYELOID LEUKEMIA IN REMISSION (MULTI): ICD-10-CM

## 2024-11-04 DIAGNOSIS — C92.00 ACUTE MYELOID LEUKEMIA IN ADULT (MULTI): ICD-10-CM

## 2024-11-04 DIAGNOSIS — D84.9 IMMUNOSUPPRESSED STATUS: ICD-10-CM

## 2024-11-04 DIAGNOSIS — N94.89 MENSTRUAL SUPPRESSION: ICD-10-CM

## 2024-11-04 LAB
ALBUMIN SERPL BCP-MCNC: 4.3 G/DL (ref 3.4–5)
ALP SERPL-CCNC: 43 U/L (ref 33–110)
ALT SERPL W P-5'-P-CCNC: 57 U/L (ref 7–45)
ANION GAP SERPL CALC-SCNC: 12 MMOL/L (ref 10–20)
AST SERPL W P-5'-P-CCNC: 12 U/L (ref 9–39)
BASOPHILS # BLD AUTO: 0 X10*3/UL (ref 0–0.1)
BASOPHILS NFR BLD AUTO: ABNORMAL %
BILIRUB SERPL-MCNC: 2.5 MG/DL (ref 0–1.2)
BUN SERPL-MCNC: 21 MG/DL (ref 6–23)
CALCIUM SERPL-MCNC: 9.6 MG/DL (ref 8.6–10.3)
CHLORIDE SERPL-SCNC: 103 MMOL/L (ref 98–107)
CO2 SERPL-SCNC: 27 MMOL/L (ref 21–32)
CREAT SERPL-MCNC: 0.82 MG/DL (ref 0.5–1.05)
DACRYOCYTES BLD QL SMEAR: NORMAL
EGFRCR SERPLBLD CKD-EPI 2021: >90 ML/MIN/1.73M*2
EOSINOPHIL # BLD AUTO: 0 X10*3/UL (ref 0–0.7)
EOSINOPHIL NFR BLD AUTO: ABNORMAL %
ERYTHROCYTE [DISTWIDTH] IN BLOOD BY AUTOMATED COUNT: 18.6 % (ref 11.5–14.5)
GLUCOSE SERPL-MCNC: 89 MG/DL (ref 74–99)
HCT VFR BLD AUTO: 27.9 % (ref 36–46)
HGB BLD-MCNC: 9.6 G/DL (ref 12–16)
HYPOCHROMIA BLD QL SMEAR: NORMAL
IMM GRANULOCYTES # BLD AUTO: 0 X10*3/UL (ref 0–0.7)
IMM GRANULOCYTES NFR BLD AUTO: ABNORMAL %
LYMPHOCYTES # BLD AUTO: 0.12 X10*3/UL (ref 1.2–4.8)
LYMPHOCYTES NFR BLD AUTO: ABNORMAL %
MCH RBC QN AUTO: 33.8 PG (ref 26–34)
MCHC RBC AUTO-ENTMCNC: 34.4 G/DL (ref 32–36)
MCV RBC AUTO: 98 FL (ref 80–100)
MONOCYTES # BLD AUTO: 0 X10*3/UL (ref 0.1–1)
MONOCYTES NFR BLD AUTO: ABNORMAL %
NEUTROPHILS # BLD AUTO: 0.09 X10*3/UL (ref 1.2–7.7)
NEUTROPHILS NFR BLD AUTO: ABNORMAL %
NRBC BLD-RTO: ABNORMAL /100{WBCS}
PLATELET # BLD AUTO: 46 X10*3/UL (ref 150–450)
POTASSIUM SERPL-SCNC: 4.5 MMOL/L (ref 3.5–5.3)
PROT SERPL-MCNC: 6.5 G/DL (ref 6.4–8.2)
RBC # BLD AUTO: 2.84 X10*6/UL (ref 4–5.2)
RBC MORPH BLD: NORMAL
SODIUM SERPL-SCNC: 137 MMOL/L (ref 136–145)
WBC # BLD AUTO: 0.2 X10*3/UL (ref 4.4–11.3)

## 2024-11-04 PROCEDURE — 84075 ASSAY ALKALINE PHOSPHATASE: CPT

## 2024-11-04 PROCEDURE — 36591 DRAW BLOOD OFF VENOUS DEVICE: CPT

## 2024-11-04 PROCEDURE — 85025 COMPLETE CBC W/AUTO DIFF WBC: CPT

## 2024-11-04 ASSESSMENT — PAIN SCALES - GENERAL: PAINLEVEL_OUTOF10: 0-NO PAIN

## 2024-11-04 NOTE — PROGRESS NOTES
Notified by Tavo Valentino in Lab Services that WBC 0.2. Secure Chat sent to Dr. Fields and Infusion RN.

## 2024-11-05 ENCOUNTER — OFFICE VISIT (OUTPATIENT)
Dept: HEMATOLOGY/ONCOLOGY | Facility: HOSPITAL | Age: 24
End: 2024-11-05
Payer: COMMERCIAL

## 2024-11-05 VITALS
TEMPERATURE: 97 F | OXYGEN SATURATION: 100 % | SYSTOLIC BLOOD PRESSURE: 119 MMHG | DIASTOLIC BLOOD PRESSURE: 63 MMHG | HEART RATE: 70 BPM | WEIGHT: 140 LBS | BODY MASS INDEX: 23.33 KG/M2

## 2024-11-05 DIAGNOSIS — Z51.5 ENCOUNTER FOR PALLIATIVE CARE: ICD-10-CM

## 2024-11-05 DIAGNOSIS — F41.1 GENERALIZED ANXIETY DISORDER: ICD-10-CM

## 2024-11-05 DIAGNOSIS — Z91.89 AT RISK FOR INFERTILITY: ICD-10-CM

## 2024-11-05 DIAGNOSIS — C92.01 ACUTE MYELOID LEUKEMIA IN REMISSION (MULTI): ICD-10-CM

## 2024-11-05 LAB
ELECTRONICALLY SIGNED BY: NORMAL
NPM1 RESULTS: NORMAL

## 2024-11-05 PROCEDURE — 99215 OFFICE O/P EST HI 40 MIN: CPT | Performed by: NURSE PRACTITIONER

## 2024-11-05 ASSESSMENT — ENCOUNTER SYMPTOMS
NERVOUS/ANXIOUS: 0
FATIGUE: 1
SHORTNESS OF BREATH: 0
SLEEP DISTURBANCE: 0
CHEST TIGHTNESS: 0
FEVER: 0
DEPRESSION: 0
HOT FLASHES: 0

## 2024-11-05 ASSESSMENT — PAIN SCALES - GENERAL: PAINLEVEL_OUTOF10: 0-NO PAIN

## 2024-11-05 NOTE — PROGRESS NOTES
Patient ID: Ilda Peter is a 23 y.o. female.  Referring Physician: Nessa Peña, APRN-CNP  [address] [place] [address]  Primary Care Provider: DO Fer Weiner    Ilda Peter is a 23 y.o. with a diagnosis of acute myeloid leukemia, returns today in follow up regarding issues unique to being a young adult with cancer.    Returns today in follow up, s/p 2/4 cycles of HIDAC consolidation with high dose cytarabine. Repeat MRD testing after 1 cycle of consolidation was negative for disease and current plan is to proceed with chemotherapy alone.     Did receive first dose of 1mo depot lupron 3.75mcg on 10/17/24. Has had no further vaginal bleeding since. Is no longer using norethindrone. Using barrier method for pregnancy prevention and to protect partner from exposure.     Physically she reports change in energy in the week she receives chemotherapy and some mild GI upset - has zofran and compazine at home and using these sparingly. Does note that nausea occurs more with empty stomach.     Emotionally, does note some increase in irritability and anger, hard for her to get out of bed in the morning and get moving. Lack of structure and routine during the day is challenging to her. Does not express anxieties or worries about the future but admits that these are present in her mind and bothersome. Remains on zoloft, does also use marijuana to help with coping.    Social History: Grew up in and lives in [place] with her two children. Is close to parents. Works as a supervisor at [employer]. Is in a relationship, boyfriend is the father of her <1 year old son Tay, she also has a 4 year old son from a previous relationship, Sergio. She is a never smoker, she does not vape, she does not drink alcohol, she does use marijuana, smokes daily.      Past Medical History  She has a past medical history of AML (acute myeloblastic leukemia) (Multi), Anxiety, Cannabis dependence (Multi)  (07/02/2024), Chronic atrophic rhinitis (07/02/2024), Depression, Hyperbilirubinemia, and Tinea versicolor (07/02/2024).     Surgical History  She has a past surgical history that includes [place] tooth extraction.  Review of Systems   Constitutional:  Positive for fatigue. Negative for fever.   Respiratory:  Negative for chest tightness, cough and shortness of breath.    Gastrointestinal:  Positive for nausea.   Endocrine: Negative for hot flashes.   Genitourinary:  Negative for menstrual problem.    Psychiatric/Behavioral:  Negative for depression and sleep disturbance. The patient is not nervous/anxious.       Objective   BSA: 1.71 meters squared  /63 (BP Location: Left arm, Patient Position: Sitting, BP Cuff Size: Adult)   Pulse 70   Temp 36.1 °C (97 °F) (Temporal)   Wt 63.5 kg (140 lb)   SpO2 100%   BMI 23.33 kg/m²     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 (>=10% blasts)  [id number] AML Risk Stratification: FAVORABLE: Mutated NPM1 without FLT3-ITD  Presented with anemia and circulating blasts    BONE MARROW (07/05/2024)  Hypercellular with erythroid predominance, dyserythropoiesis, and dysmegakaryopoiesis  FISH: AML negative  KARYOTYPE:  46XX [20]  MYELOID NGS: NPM1 (54%), NRAS (25%), PTPN11 (15%), IDH1 (2%)       7/19/2024 -  Chemotherapy    Induction with 7+3 (AraC+tarun) -> CR1 MRDpos  - D14 BM (8/1/24):  ablated, ALCON  - Count recovery:  ANC D28, plts D23  - Post induction BM (8/21/24):  ALCON, MFC negative, NPM1 2.65e-05       9/17/2024 -  Chemotherapy    Consolidation with HIDAC  -C1D1 9/17/24:  complicated by menorrhagia    DATE [facility] MORPHOLOGY MRD by MFC NPM1 NOTES   10/8/2024 Post  C1 HIDAC PB   Not detectable                                                                        Ilda Peter  reports that she has never smoked. She has never used smokeless tobacco.  She  reports that she does not currently use alcohol.  She  reports current drug use. Drug: Marijuana.    Physical Exam  Constitutional:       General: She is not in acute distress.     Appearance: Normal appearance. She is not ill-appearing.   Neurological:      General: No focal deficit present.      Mental Status: She is alert and oriented to person, place, and time.      Cranial Nerves: No cranial nerve deficit.   Psychiatric:         Mood and Affect: Mood normal.         Behavior: Behavior normal.         Thought Content: Thought content normal.         Judgment: Judgment normal.     Performance Status:  Asymptomatic    Assessment/Plan    Ilda Peter is a 23 y.o. F with a recent diagnosis of acute myeloid leukemia, s/p induction therapy in CR1, with future plans for consolidation chemotherapy with high dose cytarabine x 4 cycles.     #Psychosocial: hx of anxiety/depression, young adult with cancer, parent to young children  - Continues on zoloft 50mg/daily  - Consider engaging in 1:1 therapy to help process experience - she will think about this  - Discussed and provided resources for mindfulness and meditation  - Connected with Child-Life specialists regarding two young children/adjustment to mother's illness  - Connected to young adult oncology program and receiving monthly social programming invitations  - Connected to food for life market at  d/t food insecurity  - Following with DEBRA Ruelas regarding financial concerns    #Risk for infertility:  - Previously discussed the damaging effect cancer treatment can have on the ovaries.   - Previously discussed natural age related decline in fertility and menopause that occurs as a result of ovarian aging, and that cancer treatments can accellerate that progression and  diminish ovarian reserve.  - Individuals may experience varying degrees of short or long term ovarian dysfunction and amenorrhea, and that this is dependent upon type of cancer treatment, cumulative dose, and patients age.   - Loss of ovarian function may be permanent or temporary.  - Amenorrhea may be temporary or permanent -- temporary amenorrhea results from destruction of maturing follicles whereas permanent amenorrhea results from depletion of viable primordial follicles.  - Risk to fertility is LOW based on cancer treatment of Cytarabine/Idarubicin -- we did discuss if she does not respond as anticipated or if stem cell transplant as part of treatment plan her risk for infertility would then be HIGH - discussed again today, having MRD testing performed which if positive may track her to HSCT, primary team will update me if treatment plan changes  - Baseline hormone testing 07/23/24 FSH 3.8, LH 4.5, E2 34, AMH 7.742 - of note these were drawn following Day 1 of chemotherapy  - Now on ovarian suppression with leuprolide - has achieved menstrual suppression     #Substance use: currently using marijuana inhaled daily  - Discussed risks for infection with mold/spores with inhaled marijuana - in the short term recommended patient change to alternative route such as edibles  - Discussed strong recommendation for cessation of marijuana, specifically discussed newer research demonstrating increased cardiovascular disease in individual who use marijuana     #Fatigue: likely related to chemotherapy, and anemia  - We discussed gentle exercise is the only intervention documented to improve fatigue related to cancer treatments - encouraged continued walking daily to help manage fatigue    #Sexual Dysfunction/Contraception:  - Previously discussed the possibility for sexual side effects related to cancer treatment this may manifest as decreased interest, arousal, vaginal dryness, or pain with sex  - Normalized conversation  regarding sex and intimacy and encouraged patient to notify myself or team if she experiences any sexual side effects that impact her QOL  - Previously discussed the seriousness of getting pregnant while receiving chemotherapy due to the harmful effects this could have on the  fetus, and on her cancer treatment given the decreased availability of medical  services.   - Not currently taking norethindrone for pregnancy prevention - reiterated need to use condoms  - We discussed that small amounts of chemotherapy may be found in her body secretions including vaginal secretions and she should use a barrier form of contraception such as a male or female condom to protect her partner from chemotherapy exposure.  - We discussed that it is safe to engage in sex if she feels able to, but should take precautions to avoid penetrative sex when her neutrophil count is <500 and her platelet count is <50    Plan for follow up visit in two months    The amount of time that I spent with the patient:  Prep:  Time spend directly with patient: 35  Coordinating care:  Documentation: 5  Other time:    Total time spent on encounter: 40                 Nessa Peña, LEDY-CNP

## 2024-11-06 ASSESSMENT — ENCOUNTER SYMPTOMS
COUGH: 0
NAUSEA: 1

## 2024-11-07 ENCOUNTER — LAB (OUTPATIENT)
Dept: HEMATOLOGY/ONCOLOGY | Facility: CLINIC | Age: 24
End: 2024-11-07
Payer: COMMERCIAL

## 2024-11-07 ENCOUNTER — DOCUMENTATION (OUTPATIENT)
Dept: HEMATOLOGY/ONCOLOGY | Facility: HOSPITAL | Age: 24
End: 2024-11-07
Payer: COMMERCIAL

## 2024-11-07 VITALS
RESPIRATION RATE: 16 BRPM | SYSTOLIC BLOOD PRESSURE: 109 MMHG | HEART RATE: 85 BPM | OXYGEN SATURATION: 100 % | DIASTOLIC BLOOD PRESSURE: 66 MMHG | WEIGHT: 144.4 LBS | TEMPERATURE: 97.2 F | BODY MASS INDEX: 24.06 KG/M2

## 2024-11-07 DIAGNOSIS — C92.00 ACUTE MYELOID LEUKEMIA NOT HAVING ACHIEVED REMISSION (MULTI): ICD-10-CM

## 2024-11-07 DIAGNOSIS — C92.01 ACUTE MYELOID LEUKEMIA IN REMISSION (MULTI): ICD-10-CM

## 2024-11-07 LAB
ALBUMIN SERPL BCP-MCNC: 4.3 G/DL (ref 3.4–5)
ALP SERPL-CCNC: 63 U/L (ref 33–110)
ALT SERPL W P-5'-P-CCNC: 32 U/L (ref 7–45)
ANION GAP SERPL CALC-SCNC: 11 MMOL/L (ref 10–20)
AST SERPL W P-5'-P-CCNC: 11 U/L (ref 9–39)
BASOPHILS # BLD AUTO: 0 X10*3/UL (ref 0–0.1)
BASOPHILS NFR BLD AUTO: ABNORMAL %
BILIRUB SERPL-MCNC: 2.9 MG/DL (ref 0–1.2)
BUN SERPL-MCNC: 20 MG/DL (ref 6–23)
CALCIUM SERPL-MCNC: 9.7 MG/DL (ref 8.6–10.3)
CHLORIDE SERPL-SCNC: 104 MMOL/L (ref 98–107)
CO2 SERPL-SCNC: 28 MMOL/L (ref 21–32)
CREAT SERPL-MCNC: 0.86 MG/DL (ref 0.5–1.05)
DACRYOCYTES BLD QL SMEAR: NORMAL
EGFRCR SERPLBLD CKD-EPI 2021: >90 ML/MIN/1.73M*2
EOSINOPHIL # BLD AUTO: 0 X10*3/UL (ref 0–0.7)
EOSINOPHIL NFR BLD AUTO: ABNORMAL %
ERYTHROCYTE [DISTWIDTH] IN BLOOD BY AUTOMATED COUNT: 16.9 % (ref 11.5–14.5)
GLUCOSE SERPL-MCNC: 101 MG/DL (ref 74–99)
HCT VFR BLD AUTO: 21.1 % (ref 36–46)
HGB BLD-MCNC: 7.4 G/DL (ref 12–16)
IMM GRANULOCYTES # BLD AUTO: 0 X10*3/UL (ref 0–0.7)
IMM GRANULOCYTES NFR BLD AUTO: ABNORMAL %
LYMPHOCYTES # BLD AUTO: 0.1 X10*3/UL (ref 1.2–4.8)
LYMPHOCYTES NFR BLD AUTO: ABNORMAL %
MCH RBC QN AUTO: 33.5 PG (ref 26–34)
MCHC RBC AUTO-ENTMCNC: 35.1 G/DL (ref 32–36)
MCV RBC AUTO: 96 FL (ref 80–100)
MONOCYTES # BLD AUTO: 0 X10*3/UL (ref 0.1–1)
MONOCYTES NFR BLD AUTO: ABNORMAL %
NEUTROPHILS # BLD AUTO: 0.01 X10*3/UL (ref 1.2–7.7)
NEUTROPHILS NFR BLD AUTO: ABNORMAL %
NRBC BLD-RTO: ABNORMAL /100{WBCS}
OVALOCYTES BLD QL SMEAR: NORMAL
PLATELET # BLD AUTO: 16 X10*3/UL (ref 150–450)
POTASSIUM SERPL-SCNC: 4.1 MMOL/L (ref 3.5–5.3)
PROT SERPL-MCNC: 6.4 G/DL (ref 6.4–8.2)
RBC # BLD AUTO: 2.21 X10*6/UL (ref 4–5.2)
RBC MORPH BLD: NORMAL
SODIUM SERPL-SCNC: 139 MMOL/L (ref 136–145)
WBC # BLD AUTO: 0.1 X10*3/UL (ref 4.4–11.3)

## 2024-11-07 PROCEDURE — 2500000004 HC RX 250 GENERAL PHARMACY W/ HCPCS (ALT 636 FOR OP/ED): Mod: SE | Performed by: PHYSICIAN ASSISTANT

## 2024-11-07 PROCEDURE — 80053 COMPREHEN METABOLIC PANEL: CPT

## 2024-11-07 PROCEDURE — 36591 DRAW BLOOD OFF VENOUS DEVICE: CPT

## 2024-11-07 PROCEDURE — 85025 COMPLETE CBC W/AUTO DIFF WBC: CPT

## 2024-11-07 RX ORDER — HEPARIN SODIUM,PORCINE/PF 10 UNIT/ML
50 SYRINGE (ML) INTRAVENOUS AS NEEDED
Status: DISCONTINUED | OUTPATIENT
Start: 2024-11-07 | End: 2024-11-07 | Stop reason: HOSPADM

## 2024-11-07 RX ORDER — DIPHENHYDRAMINE HYDROCHLORIDE 50 MG/ML
50 INJECTION INTRAMUSCULAR; INTRAVENOUS AS NEEDED
Status: CANCELLED | OUTPATIENT
Start: 2024-11-07

## 2024-11-07 RX ORDER — EPINEPHRINE 0.3 MG/.3ML
0.3 INJECTION SUBCUTANEOUS EVERY 5 MIN PRN
Status: CANCELLED | OUTPATIENT
Start: 2024-11-07

## 2024-11-07 RX ORDER — HEPARIN 100 UNIT/ML
500 SYRINGE INTRAVENOUS AS NEEDED
Status: CANCELLED | OUTPATIENT
Start: 2024-11-07

## 2024-11-07 RX ORDER — FAMOTIDINE 10 MG/ML
20 INJECTION INTRAVENOUS ONCE AS NEEDED
Status: CANCELLED | OUTPATIENT
Start: 2024-11-07

## 2024-11-07 RX ORDER — HEPARIN SODIUM,PORCINE/PF 10 UNIT/ML
50 SYRINGE (ML) INTRAVENOUS AS NEEDED
Status: CANCELLED | OUTPATIENT
Start: 2024-11-07

## 2024-11-07 RX ORDER — ALBUTEROL SULFATE 0.83 MG/ML
3 SOLUTION RESPIRATORY (INHALATION) AS NEEDED
Status: CANCELLED | OUTPATIENT
Start: 2024-11-07

## 2024-11-07 ASSESSMENT — PAIN SCALES - GENERAL: PAINLEVEL_OUTOF10: 0-NO PAIN

## 2024-11-08 ENCOUNTER — TELEPHONE (OUTPATIENT)
Dept: HEMATOLOGY/ONCOLOGY | Facility: HOSPITAL | Age: 24
End: 2024-11-08
Payer: COMMERCIAL

## 2024-11-08 ENCOUNTER — HOSPITAL ENCOUNTER (EMERGENCY)
Facility: HOSPITAL | Age: 24
Discharge: LEFT BEFORE DISCHARGE | End: 2024-11-08
Attending: STUDENT IN AN ORGANIZED HEALTH CARE EDUCATION/TRAINING PROGRAM
Payer: COMMERCIAL

## 2024-11-08 VITALS
DIASTOLIC BLOOD PRESSURE: 65 MMHG | BODY MASS INDEX: 23.66 KG/M2 | WEIGHT: 142 LBS | SYSTOLIC BLOOD PRESSURE: 117 MMHG | OXYGEN SATURATION: 100 % | HEART RATE: 84 BPM | TEMPERATURE: 97.9 F | RESPIRATION RATE: 20 BRPM | HEIGHT: 65 IN

## 2024-11-08 DIAGNOSIS — C92.01 ACUTE MYELOID LEUKEMIA IN REMISSION (MULTI): ICD-10-CM

## 2024-11-08 DIAGNOSIS — D61.818 PANCYTOPENIA: Primary | ICD-10-CM

## 2024-11-08 DIAGNOSIS — N93.9 VAGINAL BLEEDING: ICD-10-CM

## 2024-11-08 LAB
ABO GROUP (TYPE) IN BLOOD: NORMAL
ALBUMIN SERPL BCP-MCNC: 4.1 G/DL (ref 3.4–5)
ALP SERPL-CCNC: 62 U/L (ref 33–110)
ALT SERPL W P-5'-P-CCNC: 29 U/L (ref 7–45)
ANION GAP SERPL CALC-SCNC: 9 MMOL/L (ref 10–20)
ANTIBODY SCREEN: NORMAL
AST SERPL W P-5'-P-CCNC: 12 U/L (ref 9–39)
B-HCG SERPL-ACNC: <2 MIU/ML
BASOPHILS # BLD AUTO: 0 X10*3/UL (ref 0–0.1)
BASOPHILS NFR BLD AUTO: 0 %
BILIRUB SERPL-MCNC: 2.7 MG/DL (ref 0–1.2)
BLOOD EXPIRATION DATE: NORMAL
BLOOD EXPIRATION DATE: NORMAL
BUN SERPL-MCNC: 18 MG/DL (ref 6–23)
CALCIUM SERPL-MCNC: 9.3 MG/DL (ref 8.6–10.3)
CHLORIDE SERPL-SCNC: 105 MMOL/L (ref 98–107)
CO2 SERPL-SCNC: 29 MMOL/L (ref 21–32)
CREAT SERPL-MCNC: 0.87 MG/DL (ref 0.5–1.05)
DISPENSE STATUS: NORMAL
DISPENSE STATUS: NORMAL
EGFRCR SERPLBLD CKD-EPI 2021: >90 ML/MIN/1.73M*2
EOSINOPHIL # BLD AUTO: 0 X10*3/UL (ref 0–0.7)
EOSINOPHIL NFR BLD AUTO: 0 %
ERYTHROCYTE [DISTWIDTH] IN BLOOD BY AUTOMATED COUNT: 16 % (ref 11.5–14.5)
ERYTHROCYTE [DISTWIDTH] IN BLOOD BY AUTOMATED COUNT: 16.6 % (ref 11.5–14.5)
GLUCOSE SERPL-MCNC: 101 MG/DL (ref 74–99)
HCT VFR BLD AUTO: 18.7 % (ref 36–46)
HCT VFR BLD AUTO: 21.6 % (ref 36–46)
HGB BLD-MCNC: 6.4 G/DL (ref 12–16)
HGB BLD-MCNC: 7.2 G/DL (ref 12–16)
HGB RETIC QN: 36 PG (ref 28–38)
IMM GRANULOCYTES # BLD AUTO: 0 X10*3/UL (ref 0–0.7)
IMM GRANULOCYTES NFR BLD AUTO: 0 % (ref 0–0.9)
IMMATURE RETIC FRACTION: 0 %
INR PPP: 1.3 (ref 0.9–1.1)
LYMPHOCYTES # BLD AUTO: 0.09 X10*3/UL (ref 1.2–4.8)
LYMPHOCYTES NFR BLD AUTO: 100 %
MCH RBC QN AUTO: 31.6 PG (ref 26–34)
MCH RBC QN AUTO: 32.7 PG (ref 26–34)
MCHC RBC AUTO-ENTMCNC: 33.3 G/DL (ref 32–36)
MCHC RBC AUTO-ENTMCNC: 34.2 G/DL (ref 32–36)
MCV RBC AUTO: 95 FL (ref 80–100)
MCV RBC AUTO: 95 FL (ref 80–100)
MONOCYTES # BLD AUTO: 0 X10*3/UL (ref 0.1–1)
MONOCYTES NFR BLD AUTO: 0 %
NEUTROPHILS # BLD AUTO: 0 X10*3/UL (ref 1.2–7.7)
NEUTROPHILS NFR BLD AUTO: 0 %
NRBC BLD-RTO: 0 /100 WBCS (ref 0–0)
NRBC BLD-RTO: 0 /100 WBCS (ref 0–0)
PLATELET # BLD AUTO: 2 X10*3/UL (ref 150–450)
PLATELET # BLD AUTO: 21 X10*3/UL (ref 150–450)
POTASSIUM SERPL-SCNC: 4.3 MMOL/L (ref 3.5–5.3)
PRODUCT BLOOD TYPE: 5100
PRODUCT BLOOD TYPE: 9500
PRODUCT CODE: NORMAL
PRODUCT CODE: NORMAL
PROT SERPL-MCNC: 6.3 G/DL (ref 6.4–8.2)
PROTHROMBIN TIME: 15 SECONDS (ref 9.8–12.8)
RBC # BLD AUTO: 1.96 X10*6/UL (ref 4–5.2)
RBC # BLD AUTO: 2.28 X10*6/UL (ref 4–5.2)
RETICS #: 0.01 X10*6/UL (ref 0.02–0.08)
RETICS/RBC NFR AUTO: 0.3 % (ref 0.5–2)
RH FACTOR (ANTIGEN D): NORMAL
SODIUM SERPL-SCNC: 139 MMOL/L (ref 136–145)
UNIT ABO: NORMAL
UNIT ABO: NORMAL
UNIT NUMBER: NORMAL
UNIT NUMBER: NORMAL
UNIT RH: NORMAL
UNIT RH: NORMAL
UNIT VOLUME: 218
UNIT VOLUME: 500
WBC # BLD AUTO: 0.1 X10*3/UL (ref 4.4–11.3)
WBC # BLD AUTO: 0.1 X10*3/UL (ref 4.4–11.3)
XM INTEP: NORMAL

## 2024-11-08 PROCEDURE — 85610 PROTHROMBIN TIME: CPT | Performed by: STUDENT IN AN ORGANIZED HEALTH CARE EDUCATION/TRAINING PROGRAM

## 2024-11-08 PROCEDURE — P9040 RBC LEUKOREDUCED IRRADIATED: HCPCS

## 2024-11-08 PROCEDURE — 84075 ASSAY ALKALINE PHOSPHATASE: CPT | Performed by: STUDENT IN AN ORGANIZED HEALTH CARE EDUCATION/TRAINING PROGRAM

## 2024-11-08 PROCEDURE — 99285 EMERGENCY DEPT VISIT HI MDM: CPT | Performed by: STUDENT IN AN ORGANIZED HEALTH CARE EDUCATION/TRAINING PROGRAM

## 2024-11-08 PROCEDURE — 86901 BLOOD TYPING SEROLOGIC RH(D): CPT | Performed by: STUDENT IN AN ORGANIZED HEALTH CARE EDUCATION/TRAINING PROGRAM

## 2024-11-08 PROCEDURE — 99285 EMERGENCY DEPT VISIT HI MDM: CPT | Mod: 25

## 2024-11-08 PROCEDURE — P9037 PLATE PHERES LEUKOREDU IRRAD: HCPCS

## 2024-11-08 PROCEDURE — 85027 COMPLETE CBC AUTOMATED: CPT | Performed by: STUDENT IN AN ORGANIZED HEALTH CARE EDUCATION/TRAINING PROGRAM

## 2024-11-08 PROCEDURE — 86920 COMPATIBILITY TEST SPIN: CPT

## 2024-11-08 PROCEDURE — 36430 TRANSFUSION BLD/BLD COMPNT: CPT

## 2024-11-08 PROCEDURE — 84702 CHORIONIC GONADOTROPIN TEST: CPT | Performed by: STUDENT IN AN ORGANIZED HEALTH CARE EDUCATION/TRAINING PROGRAM

## 2024-11-08 PROCEDURE — 85045 AUTOMATED RETICULOCYTE COUNT: CPT | Performed by: STUDENT IN AN ORGANIZED HEALTH CARE EDUCATION/TRAINING PROGRAM

## 2024-11-08 PROCEDURE — 85025 COMPLETE CBC W/AUTO DIFF WBC: CPT | Performed by: STUDENT IN AN ORGANIZED HEALTH CARE EDUCATION/TRAINING PROGRAM

## 2024-11-08 RX ORDER — WATER
500 LIQUID (ML) MISCELLANEOUS ONCE
Status: COMPLETED | OUTPATIENT
Start: 2024-11-08 | End: 2024-11-08

## 2024-11-08 ASSESSMENT — PAIN - FUNCTIONAL ASSESSMENT: PAIN_FUNCTIONAL_ASSESSMENT: 0-10

## 2024-11-08 ASSESSMENT — LIFESTYLE VARIABLES
EVER HAD A DRINK FIRST THING IN THE MORNING TO STEADY YOUR NERVES TO GET RID OF A HANGOVER: NO
HAVE YOU EVER FELT YOU SHOULD CUT DOWN ON YOUR DRINKING: NO
TOTAL SCORE: 0
EVER FELT BAD OR GUILTY ABOUT YOUR DRINKING: NO
HAVE PEOPLE ANNOYED YOU BY CRITICIZING YOUR DRINKING: NO

## 2024-11-08 ASSESSMENT — PAIN DESCRIPTION - PAIN TYPE: TYPE: ACUTE PAIN

## 2024-11-08 ASSESSMENT — PAIN SCALES - GENERAL: PAINLEVEL_OUTOF10: 1

## 2024-11-08 ASSESSMENT — COLUMBIA-SUICIDE SEVERITY RATING SCALE - C-SSRS
6. HAVE YOU EVER DONE ANYTHING, STARTED TO DO ANYTHING, OR PREPARED TO DO ANYTHING TO END YOUR LIFE?: NO
1. IN THE PAST MONTH, HAVE YOU WISHED YOU WERE DEAD OR WISHED YOU COULD GO TO SLEEP AND NOT WAKE UP?: NO
2. HAVE YOU ACTUALLY HAD ANY THOUGHTS OF KILLING YOURSELF?: NO

## 2024-11-08 NOTE — ED NOTES
Patient was advised by the medical team in the ER that she should stay in the hospital overnight for serial H&H draws as she was actively bleeding and saturating a pad an hour; patient stated that this has happened before and that she has had a HGB of 5.9 and been discharged home while on her menses and it had been fine; patient stated that she an appointment at [facility] tomorrow at 0830; patient was given AMA form and signed.     Maria L Jenkins RN  11/08/24 [id number]

## 2024-11-08 NOTE — TELEPHONE ENCOUNTER
Provider aware.   Dr. Fields requested count check for patient tomorrow, order placed     Mariposa (parent) notified of infusion appt.

## 2024-11-08 NOTE — ED PROVIDER NOTES
EMERGENCY DEPARTMENT ENCOUNTER      Pt Name: Ilda Peter  MRN: [id number]  Birthdate 2000  Date of evaluation: 11/8/2024  Provider: Santos Chauhan MD    CHIEF COMPLAINT       Chief Complaint   Patient presents with    Vaginal Bleeding     Started 2 days ago. Low h/h.      HISTORY OF PRESENT ILLNESS    Ilda Peter is a 23 y.o. year old female who presents to the ER for vaginal bleeding for the past 2 days.  Patient reports that she has been changing 1 pad every 1-2 hours, and passing small clots.  Patient reports that she has had an episode like this 1 month ago these episodes are not like her normal periods due to the increased volume of blood loss.  The previous episode lasted for 2 weeks and had large amounts of clots.  She reports that the clots were at least 2 to 3 inches.  The patient is on Lupron shot, last 1 was done 1 month ago.  Patient denies flulike symptoms, changes in bowel or bladder habits, but she does endorse some dizziness.  Patient was notified by oncology team with plan for transfusion on Monday due to pancytopenia.    PMH is significant for AML, immunosuppression, JANEL.  Patient's last treatment for her AML was 10 days ago.     PAST MEDICAL HISTORY     Past Medical History:   Diagnosis Date    AML (acute myeloblastic leukemia) (Multi)     Anxiety     Cannabis dependence 07/02/2024    Chronic atrophic rhinitis 07/02/2024    Depression     Hyperbilirubinemia     Tinea versicolor 07/02/2024     CURRENT MEDICATIONS       Previous Medications    ACYCLOVIR (ZOVIRAX) 400 MG TABLET    Take 1 tablet (400 mg) by mouth 2 times a day.    HEPARIN FLUSH 10 UNIT/ML INJECTION    5 mL (50 Units) by intra-catheter route once daily. Per lumen    LEVOFLOXACIN (LEVAQUIN) 500 MG TABLET    Take 1 tablet (500 mg) by mouth once daily.    LUBRICATING EYE DROPS OPHTHALMIC SOLUTION    Administer 1 drop into both eyes once daily.    ONDANSETRON (ZOFRAN) 8 MG TABLET    Take 1 tablet (8 mg) by mouth every 8 hours if needed  for nausea or vomiting.    POSACONAZOLE (NOXAFIL) 100 MG DR TABLET    Take 3 tablets (300 mg) by mouth once daily with breakfast. Do not crush, chew, or split.    PREDNISOLONE ACETATE (PRED-FORTE) 1 % OPHTHALMIC SUSPENSION    Administer 2 drops into both eyes every 6 hours. Administer 2 drops into both eyes every 6 hours for 5 days. Beginning the day of Cytarabine and continuing for 5 days    SERTRALINE (ZOLOFT) 50 MG TABLET    Take 1 tablet (50 mg) by mouth once daily.    SODIUM CHLORIDE 0.9% (NORMAL SALINE FLUSH) FLUSH    Infuse 10 mL into a venous catheter once daily. Per lumen     SURGICAL HISTORY       Past Surgical History:   Procedure Laterality Date    WISDOM TOOTH EXTRACTION       ALLERGIES     Codeine  FAMILY HISTORY       Family History   Problem Relation Name Age of Onset    No Known Problems Mother      No Known Problems Father      No Known Problems Brother      Other (Bicuspid Aortic Valve; VSD) Son Jorge      SOCIAL HISTORY       Social History     Tobacco Use    Smoking status: Never    Smokeless tobacco: Never   Vaping Use    Vaping status: Never Used   Substance Use Topics    Alcohol use: Not Currently    Drug use: Yes     Types: Marijuana     PHYSICAL EXAM  (up to 7 for level 4, 8 or more for level 5)     ED Triage Vitals [11/08/24 1030]   Temperature Heart Rate Respirations BP   36.2 °C (97.2 °F) (!) 118 20 135/76      Pulse Ox Temp Source Heart Rate Source Patient Position   100 % Temporal Monitor Sitting      BP Location FiO2 (%)     Right arm --       Physical Exam  Exam conducted with a chaperone present.   Constitutional:       General: She is not in acute distress.     Appearance: She is normal weight.   HENT:      Head: Normocephalic.      Comments: Alopecia due to oncology treatment for her AML.     Right Ear: External ear normal.      Left Ear: External ear normal.      Nose: Nose normal. No congestion or rhinorrhea.      Mouth/Throat:      Mouth: Mucous membranes are moist.   Eyes:       Comments: Pale conjunctiva   Cardiovascular:      Rate and Rhythm: Normal rate and regular rhythm.      Pulses: Normal pulses.      Heart sounds: No murmur heard.  Pulmonary:      Effort: Pulmonary effort is normal.      Breath sounds: Normal breath sounds.   Abdominal:      General: Abdomen is flat. Bowel sounds are normal. There is no distension.      Palpations: Abdomen is soft. There is no mass.      Tenderness: There is no abdominal tenderness. There is no guarding or rebound.      Hernia: There is no hernia in the left inguinal area or right inguinal area.   Genitourinary:     General: Normal vulva.      Labia:         Right: No lesion or injury.         Left: No lesion or injury.       Vagina: Bleeding present.      Cervix: Normal. No cervical motion tenderness, discharge, friability, lesion or cervical bleeding.      Uterus: Normal.       Rectum: Normal.      Comments: Large 5 cc clot evacuated from the cervix.  No injury to the vaginal walls, slow bleeding.  Musculoskeletal:         General: Normal range of motion.      Cervical back: Normal range of motion and neck supple.   Lymphadenopathy:      Lower Body: No right inguinal adenopathy. No left inguinal adenopathy.   Skin:     General: Skin is warm and dry.      Capillary Refill: Capillary refill takes 2 to 3 seconds.      Coloration: Skin is pale.      Comments: Scattered petechiae   Neurological:      Mental Status: She is alert.        DIAGNOSTIC RESULTS   LABS:  Labs Reviewed   CBC WITH AUTO DIFFERENTIAL - Abnormal       Result Value    WBC 0.1 (*)     nRBC 0.0      RBC 1.96 (*)     Hemoglobin 6.4 (*)     Hematocrit 18.7 (*)     MCV 95      MCH 32.7      MCHC 34.2      RDW 16.6 (*)     Platelets 2 (*)     Neutrophils % 0.0      Immature Granulocytes %, Automated 0.0      Lymphocytes % 100.0      Monocytes % 0.0      Eosinophils % 0.0      Basophils % 0.0      Neutrophils Absolute 0.00 (*)     Immature Granulocytes Absolute, Automated 0.00       Lymphocytes Absolute 0.09 (*)     Monocytes Absolute 0.00 (*)     Eosinophils Absolute 0.00      Basophils Absolute 0.00     PROTIME-INR - Abnormal    Protime 15.0 (*)     INR 1.3 (*)    COMPREHENSIVE METABOLIC PANEL - Abnormal    Glucose 101 (*)     Sodium 139      Potassium 4.3      Chloride 105      Bicarbonate 29      Anion Gap 9 (*)     Urea Nitrogen 18      Creatinine 0.87      eGFR >90      Calcium 9.3      Albumin 4.1      Alkaline Phosphatase 62      Total Protein 6.3 (*)     AST 12      Bilirubin, Total 2.7 (*)     ALT 29     RETICULOCYTES - Abnormal    Retic % 0.3 (*)     Retic Absolute 0.006 (*)     Reticulocyte Hemoglobin 36      Immature Retic fraction 0.0     HUMAN CHORIONIC GONADOTROPIN, SERUM QUANTITATIVE - Normal    HCG, Beta-Quantitative <2      Narrative:      Total HCG measurement is performed using the [employer] Cross[employer] Access   Immunoassay which detects intact HCG and free beta HCG subunit.    This test is not indicated for use as a tumor marker.   HCG testing is performed using a different test methodology at [facility] than other [facility]. Direct result comparison   should only be made within the same method.       TYPE AND SCREEN    ABO TYPE O      Rh TYPE POS      ANTIBODY SCREEN NEG     CBC   PREPARE RBC    PRODUCT CODE Y3918N38      Unit Number [id number]      Unit ABO O      Unit RH NEG      XM INTEP COMP      Dispense Status TR      Blood Expiration Date 11/27/2024 11:59:00 PM EST      PRODUCT BLOOD TYPE 9500      UNIT VOLUME 500     PREPARE PLATELETS    PRODUCT CODE Y2636L02      Unit Number [id number]      Unit ABO O      Unit RH POS      Dispense Status TR      Blood Expiration Date 11/10/2024 11:59:00 PM EST      PRODUCT BLOOD TYPE 5100      UNIT VOLUME 218     PATH REVIEW-CBC DIFFERENTIAL     All other labs were within normal range or not returned as of this dictation.  Imaging  No orders to display      Procedure  Procedures  EMERGENCY DEPARTMENT  COURSE/MDM:   Medical Decision Making  The patient is a 23-year-old female who comes to the ED for 2-day history of vaginal bleeding.  Differential diagnoses include ectopic pregnancy, infection of the cervix or vaginal injuries.    The pelvic exam was done with a chaperone and the attending in the room.  External genitalia was within normal limits, no rash, no injuries, no lesions.  Vaginal canal was within normal limits, no lesions, no injuries.  Cervix was within normal limits, no discharge, no lesions, no injury, no friability.    Physical exam is significant for pale conjunctiva, pale complexion overall, scattered petechiae over the extremities and a few over the trunk.  Patient denies any abdominal tenderness to palpation, no masses were palpated.    CBC shows evidence for anemia, RBC of 1.96, hemoglobin 6.4, platelets 2.  Reticulocyte percentage was 0.3, absolute reticulocyte count was 0.006, CMP was within normal limits.  hCG was negative.  PT/INR was 15 and 1.3.    Her heme-onc team was consulted and agreed that the patient needed a blood transfusion and platelet transfusion.  They agreed that the patient should be admitted if the platelet levels do not improve and if she is still dizzy.  They requested for repeat CBC to make sure that her H&H was trending.  She will be seen by the [facility] tomorrow morning at 8:30 AM.    The patient was told that her treatment team advised for admission due to her active bleeding.  The patient did not want to be admitted.  I discussed results, reasoning, and the plan with the patient.  The patient understood and disagreed.  The patient reiterated that she would rather go home and see the heme-onc team tomorrow morning.  Risk and benefits of leaving was extensively discussed.  The patient understood and verbalized that she still wanted to go home.  The patient was told that she would have to leave AMA, questions were answered prior to leaving.  She was given return  precautions.  The patient understood the return precautions and agreed to return if she had any other concerns.      Vitals:    Vitals:    11/08/24 1445 11/08/24 1454 11/08/24 1500 11/08/24 1530   BP:  139/74  117/65   BP Location:       Patient Position:       Pulse: 82 82 82 84   Resp: 11 18 12 20   Temp:  36.6 °C (97.9 °F)     TempSrc:  Temporal     SpO2: 100% 100% 100% 100%   Weight:       Height:             Diagnoses as of 11/08/24 1602   Pancytopenia   Vaginal bleeding       Consultant discussions: Her heme-onc team was consulted and agreed that the patient needed a blood transfusion and platelet transfusion.  They requested for repeat CBC to make sure that her H&H was trending.  She will be seen by the [facility] clinic tomorrow morning at 8:30 AM.    Shared decision making for disposition  Patient and/or patient´s representative was counseled regarding labs, imaging, likely diagnosis. All questions were answered.     ED Medications administered this visit:    Medications   oral hydration solution 500 mL (500 mL oral Given 11/8/24 1204)       New Prescriptions from this visit:    New Prescriptions    No medications on file       Follow-up:  No follow-up provider specified.      Final Impression:   1. Pancytopenia    2. Vaginal bleeding          Please excuse any misspellings or unintended errors related to the Dragon speech recognition software used to dictate this note.    I reviewed the case with the attending ED physician. The attending ED physician agrees with the plan.      Santos Chauhan MD  Resident  11/08/24 1609    I performed a history and physical examination of the patient and discussed his management with the resident physician.  I agree with the history, physical, assessment, and plan of care, with the following exceptions:   Patient continued to bleed through approximate 1 pad per hour while in the emergency department.  Remained hemodynamically stable with no sign of hemorrhagic shock.  I  believe the most likely cause of her significant abnormal uterine bleeding at this time is from her significant thrombocytopenia.  This discussed with the patient.  She did not want to wait in the emergency department for repeat CBC which was recommended by the oncology team.  She stated that it would not change her mind regarding need for admission.  I advised her that with her significant thrombocytopenia it is highly likely that she will continue bleeding at this Attentionally worsened which can become life-threatening.  She expressed understanding and does have follow-up within approximately the next 14 hours with her oncology team.  Return precautions explained.  Patient electively left AMA.  Patient is deemed by provider by have capacity to make medical decisions for himself/herself. The patient is alert and oriented to person, place, time, and situation. The patient is able to communicate their wishes in  a clear and concise manner. The patient has insight into their current symptoms and situation including benefits, risks, and alternatives to recommended treatment. The patient is able to repeat back to provider that deciding to go against the recommendations of the provider could result in permanent disability, loss of function, permanent pain, or death. The patient expresses a clear thought process and is able to explain reasoning for their chosen decision.        I was present for key and critical portions of the following procedures: Pelvic exam  Time Spent in Critical Care of the patient: None  Time spent in discussions with the patient and family: 30    DO Samson Minor DO  11/09/24 0755

## 2024-11-09 ENCOUNTER — INFUSION (OUTPATIENT)
Dept: HEMATOLOGY/ONCOLOGY | Facility: HOSPITAL | Age: 24
End: 2024-11-09
Payer: COMMERCIAL

## 2024-11-09 VITALS
RESPIRATION RATE: 19 BRPM | TEMPERATURE: 98.1 F | DIASTOLIC BLOOD PRESSURE: 70 MMHG | OXYGEN SATURATION: 100 % | SYSTOLIC BLOOD PRESSURE: 140 MMHG | HEART RATE: 95 BPM

## 2024-11-09 DIAGNOSIS — C92.01 ACUTE MYELOID LEUKEMIA IN REMISSION (MULTI): ICD-10-CM

## 2024-11-09 DIAGNOSIS — C92.00 ACUTE MYELOID LEUKEMIA NOT HAVING ACHIEVED REMISSION (MULTI): ICD-10-CM

## 2024-11-09 LAB
ALBUMIN SERPL BCP-MCNC: 4.2 G/DL (ref 3.4–5)
ALP SERPL-CCNC: 74 U/L (ref 33–110)
ALT SERPL W P-5'-P-CCNC: 24 U/L (ref 7–45)
ANION GAP SERPL CALC-SCNC: 11 MMOL/L (ref 10–20)
AST SERPL W P-5'-P-CCNC: 9 U/L (ref 9–39)
BASOPHILS # BLD AUTO: 0 X10*3/UL (ref 0–0.1)
BASOPHILS NFR BLD AUTO: 0 %
BILIRUB SERPL-MCNC: 3.2 MG/DL (ref 0–1.2)
BLOOD EXPIRATION DATE: NORMAL
BUN SERPL-MCNC: 15 MG/DL (ref 6–23)
CALCIUM SERPL-MCNC: 9.5 MG/DL (ref 8.6–10.6)
CHLORIDE SERPL-SCNC: 104 MMOL/L (ref 98–107)
CO2 SERPL-SCNC: 26 MMOL/L (ref 21–32)
CREAT SERPL-MCNC: 0.78 MG/DL (ref 0.5–1.05)
DISPENSE STATUS: NORMAL
EGFRCR SERPLBLD CKD-EPI 2021: >90 ML/MIN/1.73M*2
EOSINOPHIL # BLD AUTO: 0 X10*3/UL (ref 0–0.7)
EOSINOPHIL NFR BLD AUTO: 0 %
ERYTHROCYTE [DISTWIDTH] IN BLOOD BY AUTOMATED COUNT: 16.4 % (ref 11.5–14.5)
GLUCOSE SERPL-MCNC: 105 MG/DL (ref 74–99)
HCT VFR BLD AUTO: 20.7 % (ref 36–46)
HGB BLD-MCNC: 7.5 G/DL (ref 12–16)
IMM GRANULOCYTES # BLD AUTO: 0 X10*3/UL (ref 0–0.7)
IMM GRANULOCYTES NFR BLD AUTO: 0 % (ref 0–0.9)
LYMPHOCYTES # BLD AUTO: 0.07 X10*3/UL (ref 1.2–4.8)
LYMPHOCYTES NFR BLD AUTO: 70 %
MCH RBC QN AUTO: 32.3 PG (ref 26–34)
MCHC RBC AUTO-ENTMCNC: 36.2 G/DL (ref 32–36)
MCV RBC AUTO: 89 FL (ref 80–100)
MONOCYTES # BLD AUTO: 0.02 X10*3/UL (ref 0.1–1)
MONOCYTES NFR BLD AUTO: 20 %
NEUTROPHILS # BLD AUTO: 0.01 X10*3/UL (ref 1.2–7.7)
NEUTROPHILS NFR BLD AUTO: 10 %
NRBC BLD-RTO: 0 /100 WBCS (ref 0–0)
PLATELET # BLD AUTO: 14 X10*3/UL (ref 150–450)
POTASSIUM SERPL-SCNC: 3.6 MMOL/L (ref 3.5–5.3)
PRODUCT BLOOD TYPE: 7300
PRODUCT CODE: NORMAL
PROT SERPL-MCNC: 6.7 G/DL (ref 6.4–8.2)
RBC # BLD AUTO: 2.32 X10*6/UL (ref 4–5.2)
SODIUM SERPL-SCNC: 137 MMOL/L (ref 136–145)
UNIT ABO: NORMAL
UNIT NUMBER: NORMAL
UNIT RH: NORMAL
UNIT VOLUME: 281
WBC # BLD AUTO: 0.1 X10*3/UL (ref 4.4–11.3)

## 2024-11-09 PROCEDURE — 2500000004 HC RX 250 GENERAL PHARMACY W/ HCPCS (ALT 636 FOR OP/ED): Mod: SE | Performed by: PHYSICIAN ASSISTANT

## 2024-11-09 PROCEDURE — 85025 COMPLETE CBC W/AUTO DIFF WBC: CPT

## 2024-11-09 PROCEDURE — 80053 COMPREHEN METABOLIC PANEL: CPT

## 2024-11-09 PROCEDURE — 36430 TRANSFUSION BLD/BLD COMPNT: CPT

## 2024-11-09 RX ORDER — HEPARIN 100 UNIT/ML
500 SYRINGE INTRAVENOUS AS NEEDED
OUTPATIENT
Start: 2024-11-09

## 2024-11-09 RX ORDER — HEPARIN 100 UNIT/ML
500 SYRINGE INTRAVENOUS AS NEEDED
Status: CANCELLED | OUTPATIENT
Start: 2024-11-09

## 2024-11-09 RX ORDER — FAMOTIDINE 10 MG/ML
20 INJECTION INTRAVENOUS ONCE AS NEEDED
OUTPATIENT
Start: 2024-11-09

## 2024-11-09 RX ORDER — EPINEPHRINE 0.3 MG/.3ML
0.3 INJECTION SUBCUTANEOUS EVERY 5 MIN PRN
OUTPATIENT
Start: 2024-11-09

## 2024-11-09 RX ORDER — DIPHENHYDRAMINE HYDROCHLORIDE 50 MG/ML
50 INJECTION INTRAMUSCULAR; INTRAVENOUS AS NEEDED
OUTPATIENT
Start: 2024-11-09

## 2024-11-09 RX ORDER — HEPARIN SODIUM,PORCINE/PF 10 UNIT/ML
50 SYRINGE (ML) INTRAVENOUS AS NEEDED
Status: DISCONTINUED | OUTPATIENT
Start: 2024-11-09 | End: 2024-11-09 | Stop reason: HOSPADM

## 2024-11-09 RX ORDER — ALBUTEROL SULFATE 0.83 MG/ML
3 SOLUTION RESPIRATORY (INHALATION) AS NEEDED
OUTPATIENT
Start: 2024-11-09

## 2024-11-09 RX ORDER — HEPARIN SODIUM,PORCINE/PF 10 UNIT/ML
50 SYRINGE (ML) INTRAVENOUS AS NEEDED
OUTPATIENT
Start: 2024-11-09

## 2024-11-09 NOTE — PROGRESS NOTES
Patient came in for count check. Labs drawn and results reviewed. NP made aware of the results; ordered to do platelet transfusion. Patient tolerated the transfusion very well. Refused to wait for CMP result. Discharged to home in stable condition.

## 2024-11-11 ENCOUNTER — DOCUMENTATION (OUTPATIENT)
Dept: HEMATOLOGY/ONCOLOGY | Facility: HOSPITAL | Age: 24
End: 2024-11-11
Payer: COMMERCIAL

## 2024-11-11 ENCOUNTER — APPOINTMENT (OUTPATIENT)
Dept: HEMATOLOGY/ONCOLOGY | Facility: CLINIC | Age: 24
End: 2024-11-11
Payer: COMMERCIAL

## 2024-11-11 ENCOUNTER — LAB (OUTPATIENT)
Dept: HEMATOLOGY/ONCOLOGY | Facility: CLINIC | Age: 24
End: 2024-11-11
Payer: COMMERCIAL

## 2024-11-11 VITALS
DIASTOLIC BLOOD PRESSURE: 68 MMHG | OXYGEN SATURATION: 99 % | SYSTOLIC BLOOD PRESSURE: 109 MMHG | TEMPERATURE: 96.8 F | BODY MASS INDEX: 23.26 KG/M2 | RESPIRATION RATE: 16 BRPM | HEART RATE: 89 BPM | WEIGHT: 139.77 LBS

## 2024-11-11 DIAGNOSIS — C92.01 ACUTE MYELOID LEUKEMIA IN REMISSION (MULTI): ICD-10-CM

## 2024-11-11 DIAGNOSIS — C92.00 ACUTE MYELOID LEUKEMIA NOT HAVING ACHIEVED REMISSION (MULTI): ICD-10-CM

## 2024-11-11 LAB
ABO GROUP (TYPE) IN BLOOD: NORMAL
ALBUMIN SERPL BCP-MCNC: 4.1 G/DL (ref 3.4–5)
ALP SERPL-CCNC: 74 U/L (ref 33–110)
ALT SERPL W P-5'-P-CCNC: 20 U/L (ref 7–45)
ANION GAP SERPL CALC-SCNC: 13 MMOL/L (ref 10–20)
ANTIBODY SCREEN: NORMAL
AST SERPL W P-5'-P-CCNC: 11 U/L (ref 9–39)
BASOPHILS # BLD AUTO: 0 X10*3/UL (ref 0–0.1)
BASOPHILS NFR BLD AUTO: 0 %
BILIRUB SERPL-MCNC: 1.6 MG/DL (ref 0–1.2)
BUN SERPL-MCNC: 20 MG/DL (ref 6–23)
CALCIUM SERPL-MCNC: 9.7 MG/DL (ref 8.6–10.3)
CHLORIDE SERPL-SCNC: 105 MMOL/L (ref 98–107)
CO2 SERPL-SCNC: 26 MMOL/L (ref 21–32)
CREAT SERPL-MCNC: 0.85 MG/DL (ref 0.5–1.05)
DACRYOCYTES BLD QL SMEAR: NORMAL
EGFRCR SERPLBLD CKD-EPI 2021: >90 ML/MIN/1.73M*2
EOSINOPHIL # BLD AUTO: 0.01 X10*3/UL (ref 0–0.7)
EOSINOPHIL NFR BLD AUTO: 0.7 %
ERYTHROCYTE [DISTWIDTH] IN BLOOD BY AUTOMATED COUNT: 15.6 % (ref 11.5–14.5)
GLUCOSE SERPL-MCNC: 108 MG/DL (ref 74–99)
HCT VFR BLD AUTO: 17.9 % (ref 36–46)
HGB BLD-MCNC: 6.4 G/DL (ref 12–16)
IMM GRANULOCYTES # BLD AUTO: 0.01 X10*3/UL (ref 0–0.7)
IMM GRANULOCYTES NFR BLD AUTO: 0.7 % (ref 0–0.9)
LYMPHOCYTES # BLD AUTO: 0.31 X10*3/UL (ref 1.2–4.8)
LYMPHOCYTES NFR BLD AUTO: 21.5 %
MCH RBC QN AUTO: 32.2 PG (ref 26–34)
MCHC RBC AUTO-ENTMCNC: 35.8 G/DL (ref 32–36)
MCV RBC AUTO: 90 FL (ref 80–100)
MONOCYTES # BLD AUTO: 0.34 X10*3/UL (ref 0.1–1)
MONOCYTES NFR BLD AUTO: 23.6 %
NEUTROPHILS # BLD AUTO: 0.77 X10*3/UL (ref 1.2–7.7)
NEUTROPHILS NFR BLD AUTO: 53.5 %
NRBC BLD-RTO: ABNORMAL /100{WBCS}
PATH REVIEW-CBC DIFFERENTIAL: NORMAL
PLATELET # BLD AUTO: 15 X10*3/UL (ref 150–450)
POLYCHROMASIA BLD QL SMEAR: NORMAL
POTASSIUM SERPL-SCNC: 4 MMOL/L (ref 3.5–5.3)
PROT SERPL-MCNC: 6.1 G/DL (ref 6.4–8.2)
RBC # BLD AUTO: 1.99 X10*6/UL (ref 4–5.2)
RBC MORPH BLD: NORMAL
RH FACTOR (ANTIGEN D): NORMAL
SODIUM SERPL-SCNC: 140 MMOL/L (ref 136–145)
WBC # BLD AUTO: 1.4 X10*3/UL (ref 4.4–11.3)

## 2024-11-11 PROCEDURE — 86923 COMPATIBILITY TEST ELECTRIC: CPT

## 2024-11-11 PROCEDURE — 36591 DRAW BLOOD OFF VENOUS DEVICE: CPT

## 2024-11-11 PROCEDURE — 2500000004 HC RX 250 GENERAL PHARMACY W/ HCPCS (ALT 636 FOR OP/ED): Mod: SE | Performed by: PHYSICIAN ASSISTANT

## 2024-11-11 PROCEDURE — 80053 COMPREHEN METABOLIC PANEL: CPT

## 2024-11-11 PROCEDURE — 85025 COMPLETE CBC W/AUTO DIFF WBC: CPT

## 2024-11-11 PROCEDURE — 86901 BLOOD TYPING SEROLOGIC RH(D): CPT

## 2024-11-11 RX ORDER — DIPHENHYDRAMINE HYDROCHLORIDE 50 MG/ML
50 INJECTION INTRAMUSCULAR; INTRAVENOUS AS NEEDED
Status: CANCELLED | OUTPATIENT
Start: 2024-11-11

## 2024-11-11 RX ORDER — EPINEPHRINE 0.3 MG/.3ML
0.3 INJECTION SUBCUTANEOUS EVERY 5 MIN PRN
Status: CANCELLED | OUTPATIENT
Start: 2024-11-11

## 2024-11-11 RX ORDER — HEPARIN SODIUM,PORCINE/PF 10 UNIT/ML
50 SYRINGE (ML) INTRAVENOUS AS NEEDED
Status: CANCELLED | OUTPATIENT
Start: 2024-11-11

## 2024-11-11 RX ORDER — HEPARIN SODIUM,PORCINE/PF 10 UNIT/ML
50 SYRINGE (ML) INTRAVENOUS AS NEEDED
Status: DISCONTINUED | OUTPATIENT
Start: 2024-11-11 | End: 2024-11-11 | Stop reason: HOSPADM

## 2024-11-11 RX ORDER — ALBUTEROL SULFATE 0.83 MG/ML
3 SOLUTION RESPIRATORY (INHALATION) AS NEEDED
Status: CANCELLED | OUTPATIENT
Start: 2024-11-11

## 2024-11-11 RX ORDER — FAMOTIDINE 10 MG/ML
20 INJECTION INTRAVENOUS ONCE AS NEEDED
Status: CANCELLED | OUTPATIENT
Start: 2024-11-11

## 2024-11-11 RX ORDER — HEPARIN 100 UNIT/ML
500 SYRINGE INTRAVENOUS AS NEEDED
Status: CANCELLED | OUTPATIENT
Start: 2024-11-11

## 2024-11-11 ASSESSMENT — PAIN SCALES - GENERAL: PAINLEVEL_OUTOF10: 0-NO PAIN

## 2024-11-11 NOTE — PROGRESS NOTES
Notified by Pennie Patel in Lab Services that HGB 6.4 PLT 15. Secure Chat sent to Dr. Fields. Pt currently at [facility] for count check appointment.

## 2024-11-12 ENCOUNTER — INFUSION (OUTPATIENT)
Dept: HEMATOLOGY/ONCOLOGY | Facility: CLINIC | Age: 24
End: 2024-11-12
Payer: COMMERCIAL

## 2024-11-12 ENCOUNTER — APPOINTMENT (OUTPATIENT)
Dept: HEMATOLOGY/ONCOLOGY | Facility: HOSPITAL | Age: 24
End: 2024-11-12
Payer: COMMERCIAL

## 2024-11-12 VITALS
TEMPERATURE: 97.5 F | RESPIRATION RATE: 16 BRPM | SYSTOLIC BLOOD PRESSURE: 116 MMHG | WEIGHT: 142.42 LBS | BODY MASS INDEX: 23.7 KG/M2 | OXYGEN SATURATION: 100 % | DIASTOLIC BLOOD PRESSURE: 60 MMHG | HEART RATE: 81 BPM

## 2024-11-12 DIAGNOSIS — C92.01 ACUTE MYELOID LEUKEMIA IN REMISSION (MULTI): ICD-10-CM

## 2024-11-12 LAB
BLOOD EXPIRATION DATE: NORMAL
BLOOD EXPIRATION DATE: NORMAL
DISPENSE STATUS: NORMAL
DISPENSE STATUS: NORMAL
PRODUCT BLOOD TYPE: 5100
PRODUCT BLOOD TYPE: 5100
PRODUCT CODE: NORMAL
PRODUCT CODE: NORMAL
UNIT ABO: NORMAL
UNIT ABO: NORMAL
UNIT NUMBER: NORMAL
UNIT NUMBER: NORMAL
UNIT RH: NORMAL
UNIT RH: NORMAL
UNIT VOLUME: 177
UNIT VOLUME: 350
XM INTEP: NORMAL

## 2024-11-12 PROCEDURE — 2500000004 HC RX 250 GENERAL PHARMACY W/ HCPCS (ALT 636 FOR OP/ED): Performed by: PHYSICIAN ASSISTANT

## 2024-11-12 PROCEDURE — P9037 PLATE PHERES LEUKOREDU IRRAD: HCPCS

## 2024-11-12 PROCEDURE — 36430 TRANSFUSION BLD/BLD COMPNT: CPT

## 2024-11-12 PROCEDURE — P9040 RBC LEUKOREDUCED IRRADIATED: HCPCS

## 2024-11-12 RX ORDER — HEPARIN 100 UNIT/ML
500 SYRINGE INTRAVENOUS AS NEEDED
Status: CANCELLED | OUTPATIENT
Start: 2024-11-12

## 2024-11-12 RX ORDER — HEPARIN SODIUM,PORCINE/PF 10 UNIT/ML
50 SYRINGE (ML) INTRAVENOUS AS NEEDED
Status: DISCONTINUED | OUTPATIENT
Start: 2024-11-12 | End: 2024-11-12 | Stop reason: HOSPADM

## 2024-11-12 RX ORDER — EPINEPHRINE 0.3 MG/.3ML
0.3 INJECTION SUBCUTANEOUS EVERY 5 MIN PRN
Status: CANCELLED | OUTPATIENT
Start: 2024-11-12

## 2024-11-12 RX ORDER — DIPHENHYDRAMINE HYDROCHLORIDE 50 MG/ML
50 INJECTION INTRAMUSCULAR; INTRAVENOUS AS NEEDED
Status: CANCELLED | OUTPATIENT
Start: 2024-11-12

## 2024-11-12 RX ORDER — HEPARIN SODIUM,PORCINE/PF 10 UNIT/ML
50 SYRINGE (ML) INTRAVENOUS AS NEEDED
Status: CANCELLED | OUTPATIENT
Start: 2024-11-12

## 2024-11-12 RX ORDER — FAMOTIDINE 10 MG/ML
20 INJECTION INTRAVENOUS ONCE AS NEEDED
Status: CANCELLED | OUTPATIENT
Start: 2024-11-12

## 2024-11-12 RX ORDER — ALBUTEROL SULFATE 0.83 MG/ML
3 SOLUTION RESPIRATORY (INHALATION) AS NEEDED
Status: CANCELLED | OUTPATIENT
Start: 2024-11-12

## 2024-11-12 ASSESSMENT — PAIN SCALES - GENERAL: PAINLEVEL_OUTOF10: 0-NO PAIN

## 2024-11-14 ENCOUNTER — INFUSION (OUTPATIENT)
Dept: HEMATOLOGY/ONCOLOGY | Facility: CLINIC | Age: 24
End: 2024-11-14
Payer: COMMERCIAL

## 2024-11-14 VITALS
BODY MASS INDEX: 24.36 KG/M2 | DIASTOLIC BLOOD PRESSURE: 79 MMHG | WEIGHT: 146.39 LBS | TEMPERATURE: 98.6 F | OXYGEN SATURATION: 97 % | SYSTOLIC BLOOD PRESSURE: 122 MMHG | RESPIRATION RATE: 16 BRPM | HEART RATE: 94 BPM

## 2024-11-14 DIAGNOSIS — N94.89 MENSTRUAL SUPPRESSION: ICD-10-CM

## 2024-11-14 DIAGNOSIS — C92.00 ACUTE MYELOID LEUKEMIA NOT HAVING ACHIEVED REMISSION (MULTI): ICD-10-CM

## 2024-11-14 DIAGNOSIS — D84.9 IMMUNOSUPPRESSED STATUS: ICD-10-CM

## 2024-11-14 DIAGNOSIS — C92.00 ACUTE MYELOID LEUKEMIA IN ADULT (MULTI): ICD-10-CM

## 2024-11-14 DIAGNOSIS — C92.01 ACUTE MYELOID LEUKEMIA IN REMISSION (MULTI): ICD-10-CM

## 2024-11-14 LAB
ABO GROUP (TYPE) IN BLOOD: NORMAL
ALBUMIN SERPL BCP-MCNC: 4 G/DL (ref 3.4–5)
ALP SERPL-CCNC: 73 U/L (ref 33–110)
ALT SERPL W P-5'-P-CCNC: 20 U/L (ref 7–45)
ANION GAP SERPL CALC-SCNC: 10 MMOL/L (ref 10–20)
ANTIBODY SCREEN: NORMAL
AST SERPL W P-5'-P-CCNC: 13 U/L (ref 9–39)
BASOPHILS # BLD AUTO: 0.01 X10*3/UL (ref 0–0.1)
BASOPHILS NFR BLD AUTO: 0.4 %
BILIRUB SERPL-MCNC: 0.5 MG/DL (ref 0–1.2)
BUN SERPL-MCNC: 15 MG/DL (ref 6–23)
CALCIUM SERPL-MCNC: 9.7 MG/DL (ref 8.6–10.3)
CHLORIDE SERPL-SCNC: 107 MMOL/L (ref 98–107)
CO2 SERPL-SCNC: 28 MMOL/L (ref 21–32)
CREAT SERPL-MCNC: 1.03 MG/DL (ref 0.5–1.05)
DACRYOCYTES BLD QL SMEAR: NORMAL
EGFRCR SERPLBLD CKD-EPI 2021: 79 ML/MIN/1.73M*2
EOSINOPHIL # BLD AUTO: 0.01 X10*3/UL (ref 0–0.7)
EOSINOPHIL NFR BLD AUTO: 0.4 %
ERYTHROCYTE [DISTWIDTH] IN BLOOD BY AUTOMATED COUNT: 15 % (ref 11.5–14.5)
GLUCOSE SERPL-MCNC: 98 MG/DL (ref 74–99)
HCT VFR BLD AUTO: 19.7 % (ref 36–46)
HGB BLD-MCNC: 7 G/DL (ref 12–16)
IMM GRANULOCYTES # BLD AUTO: 0.06 X10*3/UL (ref 0–0.7)
IMM GRANULOCYTES NFR BLD AUTO: 2.2 % (ref 0–0.9)
LYMPHOCYTES # BLD AUTO: 0.64 X10*3/UL (ref 1.2–4.8)
LYMPHOCYTES NFR BLD AUTO: 23.1 %
MCH RBC QN AUTO: 31.5 PG (ref 26–34)
MCHC RBC AUTO-ENTMCNC: 35.5 G/DL (ref 32–36)
MCV RBC AUTO: 89 FL (ref 80–100)
MONOCYTES # BLD AUTO: 0.63 X10*3/UL (ref 0.1–1)
MONOCYTES NFR BLD AUTO: 22.7 %
NEUTROPHILS # BLD AUTO: 1.42 X10*3/UL (ref 1.2–7.7)
NEUTROPHILS NFR BLD AUTO: 51.2 %
NRBC BLD-RTO: ABNORMAL /100{WBCS}
OVALOCYTES BLD QL SMEAR: NORMAL
PLATELET # BLD AUTO: 37 X10*3/UL (ref 150–450)
POTASSIUM SERPL-SCNC: 4.2 MMOL/L (ref 3.5–5.3)
PROT SERPL-MCNC: 6 G/DL (ref 6.4–8.2)
RBC # BLD AUTO: 2.22 X10*6/UL (ref 4–5.2)
RBC MORPH BLD: NORMAL
RH FACTOR (ANTIGEN D): NORMAL
SCHISTOCYTES BLD QL SMEAR: NORMAL
SODIUM SERPL-SCNC: 141 MMOL/L (ref 136–145)
WBC # BLD AUTO: 2.8 X10*3/UL (ref 4.4–11.3)

## 2024-11-14 PROCEDURE — 86901 BLOOD TYPING SEROLOGIC RH(D): CPT

## 2024-11-14 PROCEDURE — 2500000004 HC RX 250 GENERAL PHARMACY W/ HCPCS (ALT 636 FOR OP/ED): Performed by: PHYSICIAN ASSISTANT

## 2024-11-14 PROCEDURE — 36591 DRAW BLOOD OFF VENOUS DEVICE: CPT

## 2024-11-14 PROCEDURE — 85025 COMPLETE CBC W/AUTO DIFF WBC: CPT

## 2024-11-14 PROCEDURE — 80053 COMPREHEN METABOLIC PANEL: CPT

## 2024-11-14 RX ORDER — EPINEPHRINE 0.3 MG/.3ML
0.3 INJECTION SUBCUTANEOUS EVERY 5 MIN PRN
Status: CANCELLED | OUTPATIENT
Start: 2024-11-14

## 2024-11-14 RX ORDER — ALBUTEROL SULFATE 0.83 MG/ML
3 SOLUTION RESPIRATORY (INHALATION) AS NEEDED
Status: CANCELLED | OUTPATIENT
Start: 2024-11-14

## 2024-11-14 RX ORDER — HEPARIN 100 UNIT/ML
500 SYRINGE INTRAVENOUS AS NEEDED
Status: CANCELLED | OUTPATIENT
Start: 2024-11-14

## 2024-11-14 RX ORDER — FAMOTIDINE 10 MG/ML
20 INJECTION INTRAVENOUS ONCE AS NEEDED
Status: CANCELLED | OUTPATIENT
Start: 2024-11-14

## 2024-11-14 RX ORDER — HEPARIN SODIUM,PORCINE/PF 10 UNIT/ML
50 SYRINGE (ML) INTRAVENOUS AS NEEDED
Status: DISCONTINUED | OUTPATIENT
Start: 2024-11-14 | End: 2024-11-14 | Stop reason: HOSPADM

## 2024-11-14 RX ORDER — DIPHENHYDRAMINE HYDROCHLORIDE 50 MG/ML
50 INJECTION INTRAMUSCULAR; INTRAVENOUS AS NEEDED
Status: CANCELLED | OUTPATIENT
Start: 2024-11-14

## 2024-11-14 RX ORDER — HEPARIN SODIUM,PORCINE/PF 10 UNIT/ML
50 SYRINGE (ML) INTRAVENOUS AS NEEDED
Status: CANCELLED | OUTPATIENT
Start: 2024-11-14

## 2024-11-14 ASSESSMENT — PAIN SCALES - GENERAL: PAINLEVEL_OUTOF10: 0-NO PAIN

## 2024-11-15 ENCOUNTER — INFUSION (OUTPATIENT)
Dept: HEMATOLOGY/ONCOLOGY | Facility: CLINIC | Age: 24
End: 2024-11-15
Payer: COMMERCIAL

## 2024-11-15 VITALS
HEART RATE: 69 BPM | TEMPERATURE: 98.6 F | OXYGEN SATURATION: 100 % | SYSTOLIC BLOOD PRESSURE: 102 MMHG | WEIGHT: 145.17 LBS | DIASTOLIC BLOOD PRESSURE: 66 MMHG | RESPIRATION RATE: 16 BRPM | BODY MASS INDEX: 24.16 KG/M2

## 2024-11-15 DIAGNOSIS — N94.89 MENSTRUAL SUPPRESSION: ICD-10-CM

## 2024-11-15 DIAGNOSIS — C92.01 ACUTE MYELOID LEUKEMIA IN REMISSION (MULTI): ICD-10-CM

## 2024-11-15 LAB
BLOOD EXPIRATION DATE: NORMAL
DISPENSE STATUS: NORMAL
PRODUCT BLOOD TYPE: 5100
PRODUCT CODE: NORMAL
UNIT ABO: NORMAL
UNIT NUMBER: NORMAL
UNIT RH: NORMAL
UNIT VOLUME: 260
UNIT VOLUME: 350
UNIT VOLUME: 350
XM INTEP: NORMAL
XM INTEP: NORMAL

## 2024-11-15 PROCEDURE — 36430 TRANSFUSION BLD/BLD COMPNT: CPT

## 2024-11-15 PROCEDURE — 2500000004 HC RX 250 GENERAL PHARMACY W/ HCPCS (ALT 636 FOR OP/ED): Mod: JZ,JG | Performed by: INTERNAL MEDICINE

## 2024-11-15 PROCEDURE — 2500000004 HC RX 250 GENERAL PHARMACY W/ HCPCS (ALT 636 FOR OP/ED): Performed by: PHYSICIAN ASSISTANT

## 2024-11-15 PROCEDURE — 96402 CHEMO HORMON ANTINEOPL SQ/IM: CPT

## 2024-11-15 RX ORDER — FAMOTIDINE 10 MG/ML
20 INJECTION INTRAVENOUS ONCE AS NEEDED
OUTPATIENT
Start: 2024-11-15

## 2024-11-15 RX ORDER — FAMOTIDINE 10 MG/ML
20 INJECTION INTRAVENOUS ONCE AS NEEDED
OUTPATIENT
Start: 2024-11-18

## 2024-11-15 RX ORDER — EPINEPHRINE 0.3 MG/.3ML
0.3 INJECTION SUBCUTANEOUS EVERY 5 MIN PRN
OUTPATIENT
Start: 2024-11-15

## 2024-11-15 RX ORDER — DIPHENHYDRAMINE HYDROCHLORIDE 50 MG/ML
50 INJECTION INTRAMUSCULAR; INTRAVENOUS AS NEEDED
OUTPATIENT
Start: 2024-11-15

## 2024-11-15 RX ORDER — EPINEPHRINE 0.3 MG/.3ML
0.3 INJECTION SUBCUTANEOUS EVERY 5 MIN PRN
Status: DISCONTINUED | OUTPATIENT
Start: 2024-11-15 | End: 2024-11-15 | Stop reason: HOSPADM

## 2024-11-15 RX ORDER — HEPARIN 100 UNIT/ML
500 SYRINGE INTRAVENOUS AS NEEDED
OUTPATIENT
Start: 2024-11-15

## 2024-11-15 RX ORDER — ALBUTEROL SULFATE 0.83 MG/ML
3 SOLUTION RESPIRATORY (INHALATION) AS NEEDED
OUTPATIENT
Start: 2024-11-15

## 2024-11-15 RX ORDER — DIPHENHYDRAMINE HYDROCHLORIDE 50 MG/ML
50 INJECTION INTRAMUSCULAR; INTRAVENOUS AS NEEDED
OUTPATIENT
Start: 2024-11-18

## 2024-11-15 RX ORDER — ALBUTEROL SULFATE 0.83 MG/ML
3 SOLUTION RESPIRATORY (INHALATION) AS NEEDED
Status: DISCONTINUED | OUTPATIENT
Start: 2024-11-15 | End: 2024-11-15 | Stop reason: HOSPADM

## 2024-11-15 RX ORDER — FAMOTIDINE 10 MG/ML
20 INJECTION INTRAVENOUS ONCE AS NEEDED
Status: DISCONTINUED | OUTPATIENT
Start: 2024-11-15 | End: 2024-11-15 | Stop reason: HOSPADM

## 2024-11-15 RX ORDER — EPINEPHRINE 0.3 MG/.3ML
0.3 INJECTION SUBCUTANEOUS EVERY 5 MIN PRN
OUTPATIENT
Start: 2024-11-18

## 2024-11-15 RX ORDER — DIPHENHYDRAMINE HYDROCHLORIDE 50 MG/ML
50 INJECTION INTRAMUSCULAR; INTRAVENOUS AS NEEDED
Status: DISCONTINUED | OUTPATIENT
Start: 2024-11-15 | End: 2024-11-15 | Stop reason: HOSPADM

## 2024-11-15 RX ORDER — ALBUTEROL SULFATE 0.83 MG/ML
3 SOLUTION RESPIRATORY (INHALATION) AS NEEDED
OUTPATIENT
Start: 2024-11-18

## 2024-11-15 RX ORDER — HEPARIN SODIUM,PORCINE/PF 10 UNIT/ML
50 SYRINGE (ML) INTRAVENOUS AS NEEDED
OUTPATIENT
Start: 2024-11-15

## 2024-11-15 RX ORDER — HEPARIN SODIUM,PORCINE/PF 10 UNIT/ML
50 SYRINGE (ML) INTRAVENOUS AS NEEDED
Status: DISCONTINUED | OUTPATIENT
Start: 2024-11-15 | End: 2024-11-15 | Stop reason: HOSPADM

## 2024-11-15 ASSESSMENT — PAIN SCALES - GENERAL: PAINLEVEL_OUTOF10: 0-NO PAIN

## 2024-11-18 ENCOUNTER — LAB (OUTPATIENT)
Dept: LAB | Facility: CLINIC | Age: 24
End: 2024-11-18
Payer: COMMERCIAL

## 2024-11-18 ENCOUNTER — APPOINTMENT (OUTPATIENT)
Dept: HEMATOLOGY/ONCOLOGY | Facility: CLINIC | Age: 24
End: 2024-11-18
Payer: COMMERCIAL

## 2024-11-18 ENCOUNTER — INFUSION (OUTPATIENT)
Dept: HEMATOLOGY/ONCOLOGY | Facility: CLINIC | Age: 24
End: 2024-11-18
Payer: COMMERCIAL

## 2024-11-18 VITALS
DIASTOLIC BLOOD PRESSURE: 77 MMHG | SYSTOLIC BLOOD PRESSURE: 121 MMHG | OXYGEN SATURATION: 100 % | TEMPERATURE: 97.9 F | BODY MASS INDEX: 24.65 KG/M2 | RESPIRATION RATE: 16 BRPM | WEIGHT: 148.15 LBS | HEART RATE: 67 BPM

## 2024-11-18 DIAGNOSIS — C92.01 ACUTE MYELOID LEUKEMIA IN REMISSION (MULTI): ICD-10-CM

## 2024-11-18 DIAGNOSIS — C92.00 ACUTE MYELOID LEUKEMIA NOT HAVING ACHIEVED REMISSION (MULTI): ICD-10-CM

## 2024-11-18 DIAGNOSIS — N94.89 MENSTRUAL SUPPRESSION: ICD-10-CM

## 2024-11-18 LAB
ALBUMIN SERPL BCP-MCNC: 4.3 G/DL (ref 3.4–5)
ALP SERPL-CCNC: 67 U/L (ref 33–110)
ALT SERPL W P-5'-P-CCNC: 29 U/L (ref 7–45)
ANION GAP SERPL CALC-SCNC: 11 MMOL/L (ref 10–20)
AST SERPL W P-5'-P-CCNC: 22 U/L (ref 9–39)
BASOPHILS # BLD AUTO: 0.01 X10*3/UL (ref 0–0.1)
BASOPHILS NFR BLD AUTO: 0.3 %
BILIRUB SERPL-MCNC: 0.5 MG/DL (ref 0–1.2)
BUN SERPL-MCNC: 24 MG/DL (ref 6–23)
CALCIUM SERPL-MCNC: 10 MG/DL (ref 8.6–10.3)
CHLORIDE SERPL-SCNC: 105 MMOL/L (ref 98–107)
CO2 SERPL-SCNC: 29 MMOL/L (ref 21–32)
CREAT SERPL-MCNC: 1.16 MG/DL (ref 0.5–1.05)
EGFRCR SERPLBLD CKD-EPI 2021: 68 ML/MIN/1.73M*2
EOSINOPHIL # BLD AUTO: 0 X10*3/UL (ref 0–0.7)
EOSINOPHIL NFR BLD AUTO: 0 %
ERYTHROCYTE [DISTWIDTH] IN BLOOD BY AUTOMATED COUNT: 14.1 % (ref 11.5–14.5)
GLUCOSE SERPL-MCNC: 94 MG/DL (ref 74–99)
HCT VFR BLD AUTO: 27.4 % (ref 36–46)
HGB BLD-MCNC: 9.4 G/DL (ref 12–16)
IMM GRANULOCYTES # BLD AUTO: 0.03 X10*3/UL (ref 0–0.7)
IMM GRANULOCYTES NFR BLD AUTO: 0.9 % (ref 0–0.9)
LYMPHOCYTES # BLD AUTO: 0.54 X10*3/UL (ref 1.2–4.8)
LYMPHOCYTES NFR BLD AUTO: 16.5 %
MCH RBC QN AUTO: 31.3 PG (ref 26–34)
MCHC RBC AUTO-ENTMCNC: 34.3 G/DL (ref 32–36)
MCV RBC AUTO: 91 FL (ref 80–100)
MONOCYTES # BLD AUTO: 0.51 X10*3/UL (ref 0.1–1)
MONOCYTES NFR BLD AUTO: 15.5 %
NEUTROPHILS # BLD AUTO: 2.19 X10*3/UL (ref 1.2–7.7)
NEUTROPHILS NFR BLD AUTO: 66.8 %
PLATELET # BLD AUTO: 68 X10*3/UL (ref 150–450)
POTASSIUM SERPL-SCNC: 4.1 MMOL/L (ref 3.5–5.3)
PROT SERPL-MCNC: 6.7 G/DL (ref 6.4–8.2)
RBC # BLD AUTO: 3 X10*6/UL (ref 4–5.2)
SODIUM SERPL-SCNC: 141 MMOL/L (ref 136–145)
WBC # BLD AUTO: 3.3 X10*3/UL (ref 4.4–11.3)

## 2024-11-18 PROCEDURE — 85025 COMPLETE CBC W/AUTO DIFF WBC: CPT

## 2024-11-18 PROCEDURE — 86901 BLOOD TYPING SEROLOGIC RH(D): CPT

## 2024-11-18 PROCEDURE — 80053 COMPREHEN METABOLIC PANEL: CPT

## 2024-11-18 PROCEDURE — 2500000004 HC RX 250 GENERAL PHARMACY W/ HCPCS (ALT 636 FOR OP/ED): Performed by: PHYSICIAN ASSISTANT

## 2024-11-18 PROCEDURE — 96523 IRRIG DRUG DELIVERY DEVICE: CPT

## 2024-11-18 RX ORDER — HEPARIN SODIUM,PORCINE/PF 10 UNIT/ML
50 SYRINGE (ML) INTRAVENOUS AS NEEDED
Status: DISCONTINUED | OUTPATIENT
Start: 2024-11-18 | End: 2024-11-18 | Stop reason: HOSPADM

## 2024-11-18 RX ORDER — EPINEPHRINE 0.3 MG/.3ML
0.3 INJECTION SUBCUTANEOUS EVERY 5 MIN PRN
OUTPATIENT
Start: 2024-11-18

## 2024-11-18 RX ORDER — FAMOTIDINE 10 MG/ML
20 INJECTION INTRAVENOUS ONCE AS NEEDED
OUTPATIENT
Start: 2024-11-18

## 2024-11-18 RX ORDER — DIPHENHYDRAMINE HYDROCHLORIDE 50 MG/ML
50 INJECTION INTRAMUSCULAR; INTRAVENOUS AS NEEDED
OUTPATIENT
Start: 2024-11-18

## 2024-11-18 RX ORDER — ALBUTEROL SULFATE 0.83 MG/ML
3 SOLUTION RESPIRATORY (INHALATION) AS NEEDED
OUTPATIENT
Start: 2024-11-18

## 2024-11-18 RX ORDER — HEPARIN 100 UNIT/ML
500 SYRINGE INTRAVENOUS AS NEEDED
Status: CANCELLED | OUTPATIENT
Start: 2024-11-18

## 2024-11-18 RX ORDER — HEPARIN SODIUM,PORCINE/PF 10 UNIT/ML
50 SYRINGE (ML) INTRAVENOUS AS NEEDED
Status: CANCELLED | OUTPATIENT
Start: 2024-11-18

## 2024-11-18 ASSESSMENT — PAIN SCALES - GENERAL: PAINLEVEL_OUTOF10: 0-NO PAIN

## 2024-11-19 LAB
ABO GROUP (TYPE) IN BLOOD: NORMAL
ANTIBODY SCREEN: NORMAL
RH FACTOR (ANTIGEN D): NORMAL

## 2024-11-20 ENCOUNTER — TELEPHONE (OUTPATIENT)
Dept: INFUSION THERAPY | Age: 24
End: 2024-11-20
Payer: COMMERCIAL

## 2024-11-20 NOTE — TELEPHONE ENCOUNTER
Spoke with patient yesterday Tues 11/19. She is requesting a delivery for their [place] at this time.     Sending no supplies except alcohol pads per patient request &associated flushes for 1 month worth of daily flushing    Delivery will be tomorrow by 9 with the  to call on the way. Delivery to same address as last time as confirmed with patient.     No questions.

## 2024-11-21 ENCOUNTER — SOCIAL WORK (OUTPATIENT)
Dept: HEMATOLOGY/ONCOLOGY | Facility: CLINIC | Age: 24
End: 2024-11-21
Payer: COMMERCIAL

## 2024-11-21 ENCOUNTER — INFUSION (OUTPATIENT)
Dept: HEMATOLOGY/ONCOLOGY | Facility: CLINIC | Age: 24
End: 2024-11-21
Payer: COMMERCIAL

## 2024-11-21 VITALS
TEMPERATURE: 98.2 F | BODY MASS INDEX: 24.01 KG/M2 | RESPIRATION RATE: 16 BRPM | OXYGEN SATURATION: 97 % | HEART RATE: 83 BPM | WEIGHT: 144.29 LBS | DIASTOLIC BLOOD PRESSURE: 68 MMHG | SYSTOLIC BLOOD PRESSURE: 130 MMHG

## 2024-11-21 DIAGNOSIS — D84.9 IMMUNOSUPPRESSED STATUS: ICD-10-CM

## 2024-11-21 DIAGNOSIS — C92.00 ACUTE MYELOID LEUKEMIA IN ADULT (MULTI): ICD-10-CM

## 2024-11-21 DIAGNOSIS — N94.89 MENSTRUAL SUPPRESSION: ICD-10-CM

## 2024-11-21 DIAGNOSIS — C92.01 ACUTE MYELOID LEUKEMIA IN REMISSION (MULTI): ICD-10-CM

## 2024-11-21 DIAGNOSIS — C92.00 ACUTE MYELOID LEUKEMIA NOT HAVING ACHIEVED REMISSION (MULTI): ICD-10-CM

## 2024-11-21 LAB
ALBUMIN SERPL BCP-MCNC: 4 G/DL (ref 3.4–5)
ALP SERPL-CCNC: 72 U/L (ref 33–110)
ALT SERPL W P-5'-P-CCNC: 32 U/L (ref 7–45)
ANION GAP SERPL CALC-SCNC: 11 MMOL/L (ref 10–20)
AST SERPL W P-5'-P-CCNC: 22 U/L (ref 9–39)
BASOPHILS # BLD AUTO: 0.01 X10*3/UL (ref 0–0.1)
BASOPHILS NFR BLD AUTO: 0.3 %
BILIRUB SERPL-MCNC: 0.4 MG/DL (ref 0–1.2)
BUN SERPL-MCNC: 13 MG/DL (ref 6–23)
CALCIUM SERPL-MCNC: 9.5 MG/DL (ref 8.6–10.3)
CHLORIDE SERPL-SCNC: 106 MMOL/L (ref 98–107)
CO2 SERPL-SCNC: 27 MMOL/L (ref 21–32)
CREAT SERPL-MCNC: 1.05 MG/DL (ref 0.5–1.05)
EGFRCR SERPLBLD CKD-EPI 2021: 77 ML/MIN/1.73M*2
EOSINOPHIL # BLD AUTO: 0 X10*3/UL (ref 0–0.7)
EOSINOPHIL NFR BLD AUTO: 0 %
ERYTHROCYTE [DISTWIDTH] IN BLOOD BY AUTOMATED COUNT: 16.2 % (ref 11.5–14.5)
GLUCOSE SERPL-MCNC: 119 MG/DL (ref 74–99)
HCT VFR BLD AUTO: 25.5 % (ref 36–46)
HGB BLD-MCNC: 8.5 G/DL (ref 12–16)
IMM GRANULOCYTES # BLD AUTO: 0.01 X10*3/UL (ref 0–0.7)
IMM GRANULOCYTES NFR BLD AUTO: 0.3 % (ref 0–0.9)
LYMPHOCYTES # BLD AUTO: 0.51 X10*3/UL (ref 1.2–4.8)
LYMPHOCYTES NFR BLD AUTO: 17.1 %
MCH RBC QN AUTO: 31.7 PG (ref 26–34)
MCHC RBC AUTO-ENTMCNC: 33.3 G/DL (ref 32–36)
MCV RBC AUTO: 95 FL (ref 80–100)
MONOCYTES # BLD AUTO: 0.44 X10*3/UL (ref 0.1–1)
MONOCYTES NFR BLD AUTO: 14.7 %
NEUTROPHILS # BLD AUTO: 2.02 X10*3/UL (ref 1.2–7.7)
NEUTROPHILS NFR BLD AUTO: 67.6 %
PLATELET # BLD AUTO: 79 X10*3/UL (ref 150–450)
POTASSIUM SERPL-SCNC: 3.9 MMOL/L (ref 3.5–5.3)
PROT SERPL-MCNC: 6.2 G/DL (ref 6.4–8.2)
RBC # BLD AUTO: 2.68 X10*6/UL (ref 4–5.2)
SODIUM SERPL-SCNC: 140 MMOL/L (ref 136–145)
WBC # BLD AUTO: 3 X10*3/UL (ref 4.4–11.3)

## 2024-11-21 PROCEDURE — 36591 DRAW BLOOD OFF VENOUS DEVICE: CPT

## 2024-11-21 PROCEDURE — 80053 COMPREHEN METABOLIC PANEL: CPT

## 2024-11-21 PROCEDURE — 2500000004 HC RX 250 GENERAL PHARMACY W/ HCPCS (ALT 636 FOR OP/ED): Performed by: PHYSICIAN ASSISTANT

## 2024-11-21 PROCEDURE — 85025 COMPLETE CBC W/AUTO DIFF WBC: CPT

## 2024-11-21 RX ORDER — POSACONAZOLE 100 MG/1
300 TABLET, DELAYED RELEASE ORAL
Qty: 90 TABLET | Refills: 2 | Status: CANCELLED
Start: 2024-11-21

## 2024-11-21 RX ORDER — HEPARIN 100 UNIT/ML
500 SYRINGE INTRAVENOUS AS NEEDED
OUTPATIENT
Start: 2024-11-21

## 2024-11-21 RX ORDER — HEPARIN SODIUM,PORCINE/PF 10 UNIT/ML
50 SYRINGE (ML) INTRAVENOUS AS NEEDED
OUTPATIENT
Start: 2024-11-21

## 2024-11-21 RX ORDER — LEVOFLOXACIN 500 MG/1
500 TABLET, FILM COATED ORAL DAILY
Qty: 30 TABLET | Refills: 3 | Status: CANCELLED | OUTPATIENT
Start: 2024-11-21 | End: 2025-03-21

## 2024-11-21 RX ORDER — HEPARIN SODIUM,PORCINE/PF 10 UNIT/ML
50 SYRINGE (ML) INTRAVENOUS AS NEEDED
Status: DISCONTINUED | OUTPATIENT
Start: 2024-11-21 | End: 2024-11-22 | Stop reason: HOSPADM

## 2024-11-21 NOTE — PROGRESS NOTES
OSIRIS continues to follow patient for support and ongoing assessment.  Met with patient today during her infusion visit.  Patient is in good spirits and appears to be doing well.  She states treatments have been going well and she is maintaining her counts for the most part.  Discussed with patient the option of being referred for the Holiday Adopt a Family program as she and her family would be a good recipient for the program.  Patient was agreeable and expressed much gratitude for the referral.  She provided a list of items that would be ideal for her family.  OSIRIS will plan to follow up with the [facility] committee with the referral information.  No other issues or concerns noted at this time.  OSIRIS will continue to follow.

## 2024-11-24 ENCOUNTER — APPOINTMENT (OUTPATIENT)
Dept: HEMATOLOGY/ONCOLOGY | Facility: HOSPITAL | Age: 24
End: 2024-11-24
Payer: COMMERCIAL

## 2024-11-25 ENCOUNTER — INFUSION (OUTPATIENT)
Dept: HEMATOLOGY/ONCOLOGY | Facility: CLINIC | Age: 24
End: 2024-11-25
Payer: COMMERCIAL

## 2024-11-25 VITALS
RESPIRATION RATE: 16 BRPM | DIASTOLIC BLOOD PRESSURE: 82 MMHG | TEMPERATURE: 97.5 F | OXYGEN SATURATION: 98 % | BODY MASS INDEX: 25.09 KG/M2 | HEART RATE: 94 BPM | WEIGHT: 150.79 LBS | SYSTOLIC BLOOD PRESSURE: 123 MMHG

## 2024-11-25 DIAGNOSIS — C92.01 ACUTE MYELOID LEUKEMIA IN REMISSION (MULTI): ICD-10-CM

## 2024-11-25 DIAGNOSIS — D84.9 IMMUNOSUPPRESSED STATUS: ICD-10-CM

## 2024-11-25 DIAGNOSIS — C92.00 ACUTE MYELOID LEUKEMIA NOT HAVING ACHIEVED REMISSION (MULTI): ICD-10-CM

## 2024-11-25 DIAGNOSIS — N94.89 MENSTRUAL SUPPRESSION: ICD-10-CM

## 2024-11-25 DIAGNOSIS — C92.00 ACUTE MYELOID LEUKEMIA IN ADULT (MULTI): ICD-10-CM

## 2024-11-25 LAB
ALBUMIN SERPL BCP-MCNC: 4.1 G/DL (ref 3.4–5)
ALP SERPL-CCNC: 67 U/L (ref 33–110)
ALT SERPL W P-5'-P-CCNC: 26 U/L (ref 7–45)
ANION GAP SERPL CALC-SCNC: 10 MMOL/L (ref 10–20)
AST SERPL W P-5'-P-CCNC: 18 U/L (ref 9–39)
BASOPHILS # BLD AUTO: 0.01 X10*3/UL (ref 0–0.1)
BASOPHILS NFR BLD AUTO: 0.3 %
BILIRUB SERPL-MCNC: 0.5 MG/DL (ref 0–1.2)
BUN SERPL-MCNC: 13 MG/DL (ref 6–23)
CALCIUM SERPL-MCNC: 9.3 MG/DL (ref 8.6–10.3)
CHLORIDE SERPL-SCNC: 107 MMOL/L (ref 98–107)
CO2 SERPL-SCNC: 28 MMOL/L (ref 21–32)
CREAT SERPL-MCNC: 0.93 MG/DL (ref 0.5–1.05)
DACRYOCYTES BLD QL SMEAR: NORMAL
EGFRCR SERPLBLD CKD-EPI 2021: 89 ML/MIN/1.73M*2
EOSINOPHIL # BLD AUTO: 0 X10*3/UL (ref 0–0.7)
EOSINOPHIL NFR BLD AUTO: 0 %
ERYTHROCYTE [DISTWIDTH] IN BLOOD BY AUTOMATED COUNT: 20.6 % (ref 11.5–14.5)
GLUCOSE SERPL-MCNC: 101 MG/DL (ref 74–99)
HCT VFR BLD AUTO: 28.4 % (ref 36–46)
HGB BLD-MCNC: 9.5 G/DL (ref 12–16)
IMM GRANULOCYTES # BLD AUTO: 0.02 X10*3/UL (ref 0–0.7)
IMM GRANULOCYTES NFR BLD AUTO: 0.7 % (ref 0–0.9)
LYMPHOCYTES # BLD AUTO: 0.45 X10*3/UL (ref 1.2–4.8)
LYMPHOCYTES NFR BLD AUTO: 15.2 %
MCH RBC QN AUTO: 32.8 PG (ref 26–34)
MCHC RBC AUTO-ENTMCNC: 33.5 G/DL (ref 32–36)
MCV RBC AUTO: 98 FL (ref 80–100)
MONOCYTES # BLD AUTO: 0.45 X10*3/UL (ref 0.1–1)
MONOCYTES NFR BLD AUTO: 15.2 %
NEUTROPHILS # BLD AUTO: 2.04 X10*3/UL (ref 1.2–7.7)
NEUTROPHILS NFR BLD AUTO: 68.6 %
NRBC BLD-RTO: ABNORMAL /100{WBCS}
PLATELET # BLD AUTO: 103 X10*3/UL (ref 150–450)
POLYCHROMASIA BLD QL SMEAR: NORMAL
POTASSIUM SERPL-SCNC: 3.9 MMOL/L (ref 3.5–5.3)
PROT SERPL-MCNC: 6.4 G/DL (ref 6.4–8.2)
RBC # BLD AUTO: 2.9 X10*6/UL (ref 4–5.2)
RBC MORPH BLD: NORMAL
SODIUM SERPL-SCNC: 141 MMOL/L (ref 136–145)
WBC # BLD AUTO: 3 X10*3/UL (ref 4.4–11.3)

## 2024-11-25 PROCEDURE — 36591 DRAW BLOOD OFF VENOUS DEVICE: CPT

## 2024-11-25 PROCEDURE — 85025 COMPLETE CBC W/AUTO DIFF WBC: CPT

## 2024-11-25 PROCEDURE — 2500000004 HC RX 250 GENERAL PHARMACY W/ HCPCS (ALT 636 FOR OP/ED): Performed by: PHYSICIAN ASSISTANT

## 2024-11-25 PROCEDURE — 80053 COMPREHEN METABOLIC PANEL: CPT

## 2024-11-25 RX ORDER — HEPARIN SODIUM,PORCINE/PF 10 UNIT/ML
50 SYRINGE (ML) INTRAVENOUS AS NEEDED
Status: DISCONTINUED | OUTPATIENT
Start: 2024-11-25 | End: 2024-11-25 | Stop reason: HOSPADM

## 2024-11-25 RX ORDER — HEPARIN 100 UNIT/ML
500 SYRINGE INTRAVENOUS AS NEEDED
OUTPATIENT
Start: 2024-11-25

## 2024-11-25 RX ORDER — HEPARIN SODIUM,PORCINE/PF 10 UNIT/ML
50 SYRINGE (ML) INTRAVENOUS AS NEEDED
OUTPATIENT
Start: 2024-11-25

## 2024-11-25 ASSESSMENT — PAIN SCALES - GENERAL: PAINLEVEL_OUTOF10: 0-NO PAIN

## 2024-11-26 ENCOUNTER — SOCIAL WORK (OUTPATIENT)
Dept: CASE MANAGEMENT | Facility: HOSPITAL | Age: 24
End: 2024-11-26

## 2024-11-26 ENCOUNTER — OFFICE VISIT (OUTPATIENT)
Dept: HEMATOLOGY/ONCOLOGY | Facility: HOSPITAL | Age: 24
End: 2024-11-26
Payer: COMMERCIAL

## 2024-11-26 VITALS
HEIGHT: 65 IN | RESPIRATION RATE: 16 BRPM | TEMPERATURE: 97.2 F | DIASTOLIC BLOOD PRESSURE: 62 MMHG | SYSTOLIC BLOOD PRESSURE: 136 MMHG | BODY MASS INDEX: 25.46 KG/M2 | WEIGHT: 152.8 LBS | OXYGEN SATURATION: 100 % | HEART RATE: 66 BPM

## 2024-11-26 DIAGNOSIS — D84.9 IMMUNOSUPPRESSED STATUS: ICD-10-CM

## 2024-11-26 DIAGNOSIS — N94.89 MENSTRUAL SUPPRESSION: ICD-10-CM

## 2024-11-26 DIAGNOSIS — C92.01 ACUTE MYELOID LEUKEMIA IN REMISSION (MULTI): Primary | ICD-10-CM

## 2024-11-26 DIAGNOSIS — C92.00 ACUTE MYELOID LEUKEMIA IN ADULT (MULTI): ICD-10-CM

## 2024-11-26 PROBLEM — R74.8 ABNORMAL TRANSAMINASES: Status: RESOLVED | Noted: 2024-10-31 | Resolved: 2024-11-26

## 2024-11-26 PROCEDURE — 99215 OFFICE O/P EST HI 40 MIN: CPT | Performed by: INTERNAL MEDICINE

## 2024-11-26 PROCEDURE — 3008F BODY MASS INDEX DOCD: CPT | Performed by: INTERNAL MEDICINE

## 2024-11-26 RX ORDER — LEVOFLOXACIN 500 MG/1
500 TABLET, FILM COATED ORAL DAILY
Qty: 30 TABLET | Refills: 1 | Status: SHIPPED | OUTPATIENT
Start: 2024-11-26 | End: 2025-01-25

## 2024-11-26 RX ORDER — POSACONAZOLE 100 MG/1
300 TABLET, DELAYED RELEASE ORAL
Qty: 90 TABLET | Refills: 2 | Status: SHIPPED | OUTPATIENT
Start: 2024-11-26

## 2024-11-26 ASSESSMENT — PAIN SCALES - GENERAL: PAINLEVEL_OUTOF10: 0-NO PAIN

## 2024-11-26 NOTE — ASSESSMENT & PLAN NOTE
Last leuprolide 11/15.  Still with some break through bleeding during floridalma.  Will monitor closely next cycle.

## 2024-11-26 NOTE — ASSESSMENT & PLAN NOTE
Now C2D36 HIDAC consolidation.  Counts recovered, and ready for C3.  Post C1 NPM1 undetectable.  This will be 3/4 cycles of HIDAC as post remission therapy.  Will defer transplant.  Given 5 week recovery and holidays, scheduling subsequent HIDAC cycles every 5 weeks.  Her young son is having eye surgery on 12/5 so will move D3 to 12/6.

## 2024-11-26 NOTE — PROGRESS NOTES
Patient ID: Ilda Peter is a 23 y.o. female.    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)  [id number] AML Risk Stratification: FAVORABLE: Mutated NPM1 without FLT3-ITD  Presented with anemia and circulating blasts    BONE MARROW (07/05/2024)  Hypercellular with erythroid predominance, dyserythropoiesis, and dysmegakaryopoiesis  FISH: AML negative  KARYOTYPE:  46XX [20]  MYELOID NGS: NPM1 (54%), NRAS (25%), PTPN11 (15%), IDH1 (2%)       7/19/2024 -  Chemotherapy    Induction with 7+3 (AraC+tarun) -> CR1 MRDpos  - D14 BM (8/1/24):  ablated, ALCON  - Count recovery:  ANC D28, plts D23  - Post induction BM (8/21/24):  ALCON, MFC negative, NPM1 2.65e-05       9/17/2024 -  Chemotherapy    Consolidation with HIDAC  -C1D1 9/17/24:  complicated by menorrhagia    DATE TIMEPOINT SOURCE MORPHOLOGY MRD by MFC NPM1 NOTES   10/8/2024 Post C1 HIDAC PB   Not detectable    10/28/2024 Post C1 HIDAC PB   Not detectable                                                                  Assessment & Plan  Acute myeloid leukemia in remission (Multi)  Now C2D36 HIDAC consolidation.  Counts recovered, and ready for C3.  Post C1 NPM1 undetectable.  This will be 3/4 cycles of HIDAC as post remission therapy.  Will defer transplant.  Given 5 week recovery and holidays, scheduling subsequent HIDAC cycles every 5 weeks.  Her young son is having eye surgery on 12/5 so will move D3 to 12/6.  Immunosuppressed status  No active signs or symptoms of infection.  Will continue prophylaxis with acyclovir, levofloxacin, and posaconazole during periods of neutropenia.   Menstrual suppression  Last leuprolide 11/15.  Still with some break through bleeding during floridalma.  Will monitor closely next cycle.    "    ______________________________________________________________________________________________________________________________________________    SUBJECTIVE     Here today for planned follow up.  Has been doing well.  Had breakthrough menorrhagia again last cycle, required ED visit.   No new health issues.  Denies fevers, chills, nausea, vomiting, diarrhea, dyspnea, rash, and pain.    OBJECTIVE     /62 (BP Location: Right arm, Patient Position: Sitting)   Pulse 66   Temp 36.2 °C (97.2 °F) (Temporal)   Resp 16   Ht 1.651 m (5' 5\")   Wt 69.3 kg (152 lb 12.8 oz)   SpO2 100%   BMI 25.43 kg/m²      Physical Exam  Vitals reviewed.   Constitutional:       Appearance: Normal appearance.   Eyes:      Conjunctiva/sclera: Conjunctivae normal.      Pupils: Pupils are equal, round, and reactive to light.   Skin:     General: Skin is warm and dry.      Findings: No rash.   Psychiatric:         Mood and Affect: Mood normal.              Brandee Fields MD          "

## 2024-11-26 NOTE — PROGRESS NOTES
Social Work Note    Referral Source: Dr. Brandee Fields   Meeting Location: In-Person  Person(s) Present: Patient   Identified Needs: Med Cert for Light Bill  Impression and Plan: LSW met with patient during follow up visit. Pt explained she lives with her mother, boyfriend, and two children. The family has received a shut off notice. The patient usually pays the utilities bills in the home but is currently not working and the family has fallen behind. The utilities in the home are in pt's mother's name. Pt has called [employer] and requested information on medical certifications along with the code needed to complete the form online. LSW reviewed the link provided and was able to submit information needed. LSW provided pt with confirmation page from [employer].  Interventions Provided: Referral to Community Resource  Estimated Time Spent: 30 minutes     SW will continue to follow as needed.

## 2024-11-29 ENCOUNTER — APPOINTMENT (OUTPATIENT)
Dept: HEMATOLOGY/ONCOLOGY | Facility: CLINIC | Age: 24
End: 2024-11-29
Payer: COMMERCIAL

## 2024-12-02 ENCOUNTER — APPOINTMENT (OUTPATIENT)
Dept: HEMATOLOGY/ONCOLOGY | Facility: HOSPITAL | Age: 24
End: 2024-12-02
Payer: COMMERCIAL

## 2024-12-02 ENCOUNTER — INFUSION (OUTPATIENT)
Dept: HEMATOLOGY/ONCOLOGY | Facility: CLINIC | Age: 24
End: 2024-12-02
Payer: COMMERCIAL

## 2024-12-02 VITALS
WEIGHT: 153.88 LBS | RESPIRATION RATE: 16 BRPM | OXYGEN SATURATION: 98 % | TEMPERATURE: 97.2 F | HEART RATE: 80 BPM | DIASTOLIC BLOOD PRESSURE: 72 MMHG | SYSTOLIC BLOOD PRESSURE: 117 MMHG | BODY MASS INDEX: 25.61 KG/M2

## 2024-12-02 DIAGNOSIS — C92.01 ACUTE MYELOID LEUKEMIA IN REMISSION (MULTI): ICD-10-CM

## 2024-12-02 LAB
ALBUMIN SERPL BCP-MCNC: 4.5 G/DL (ref 3.4–5)
ALP SERPL-CCNC: 68 U/L (ref 33–110)
ALT SERPL W P-5'-P-CCNC: 23 U/L (ref 7–45)
ANION GAP SERPL CALC-SCNC: 11 MMOL/L (ref 10–20)
AST SERPL W P-5'-P-CCNC: 17 U/L (ref 9–39)
BASOPHILS # BLD AUTO: 0.01 X10*3/UL (ref 0–0.1)
BASOPHILS NFR BLD AUTO: 0.3 %
BILIRUB SERPL-MCNC: 0.7 MG/DL (ref 0–1.2)
BUN SERPL-MCNC: 11 MG/DL (ref 6–23)
CALCIUM SERPL-MCNC: 9.9 MG/DL (ref 8.6–10.3)
CHLORIDE SERPL-SCNC: 105 MMOL/L (ref 98–107)
CO2 SERPL-SCNC: 31 MMOL/L (ref 21–32)
CREAT SERPL-MCNC: 0.85 MG/DL (ref 0.5–1.05)
EGFRCR SERPLBLD CKD-EPI 2021: >90 ML/MIN/1.73M*2
EOSINOPHIL # BLD AUTO: 0.01 X10*3/UL (ref 0–0.7)
EOSINOPHIL NFR BLD AUTO: 0.3 %
ERYTHROCYTE [DISTWIDTH] IN BLOOD BY AUTOMATED COUNT: 22.4 % (ref 11.5–14.5)
GLUCOSE SERPL-MCNC: 95 MG/DL (ref 74–99)
HCG UR QL IA.RAPID: NEGATIVE
HCT VFR BLD AUTO: 32.1 % (ref 36–46)
HGB BLD-MCNC: 10.5 G/DL (ref 12–16)
IMM GRANULOCYTES # BLD AUTO: 0.02 X10*3/UL (ref 0–0.7)
IMM GRANULOCYTES NFR BLD AUTO: 0.6 % (ref 0–0.9)
LYMPHOCYTES # BLD AUTO: 0.47 X10*3/UL (ref 1.2–4.8)
LYMPHOCYTES NFR BLD AUTO: 13 %
MCH RBC QN AUTO: 33.2 PG (ref 26–34)
MCHC RBC AUTO-ENTMCNC: 32.7 G/DL (ref 32–36)
MCV RBC AUTO: 102 FL (ref 80–100)
MONOCYTES # BLD AUTO: 0.44 X10*3/UL (ref 0.1–1)
MONOCYTES NFR BLD AUTO: 12.2 %
NEUTROPHILS # BLD AUTO: 2.66 X10*3/UL (ref 1.2–7.7)
NEUTROPHILS NFR BLD AUTO: 73.6 %
NRBC BLD-RTO: ABNORMAL /100{WBCS}
PLATELET # BLD AUTO: 125 X10*3/UL (ref 150–450)
POTASSIUM SERPL-SCNC: 4 MMOL/L (ref 3.5–5.3)
PROT SERPL-MCNC: 6.6 G/DL (ref 6.4–8.2)
RBC # BLD AUTO: 3.16 X10*6/UL (ref 4–5.2)
SODIUM SERPL-SCNC: 143 MMOL/L (ref 136–145)
WBC # BLD AUTO: 3.6 X10*3/UL (ref 4.4–11.3)

## 2024-12-02 PROCEDURE — 86901 BLOOD TYPING SEROLOGIC RH(D): CPT

## 2024-12-02 PROCEDURE — 36591 DRAW BLOOD OFF VENOUS DEVICE: CPT

## 2024-12-02 PROCEDURE — 81025 URINE PREGNANCY TEST: CPT

## 2024-12-02 PROCEDURE — 85025 COMPLETE CBC W/AUTO DIFF WBC: CPT

## 2024-12-02 PROCEDURE — 80053 COMPREHEN METABOLIC PANEL: CPT

## 2024-12-02 ASSESSMENT — PAIN SCALES - GENERAL: PAINLEVEL_OUTOF10: 0-NO PAIN

## 2024-12-03 ENCOUNTER — INFUSION (OUTPATIENT)
Dept: HEMATOLOGY/ONCOLOGY | Facility: HOSPITAL | Age: 24
End: 2024-12-03
Payer: COMMERCIAL

## 2024-12-03 ENCOUNTER — OFFICE VISIT (OUTPATIENT)
Dept: HEMATOLOGY/ONCOLOGY | Facility: HOSPITAL | Age: 24
End: 2024-12-03
Payer: COMMERCIAL

## 2024-12-03 VITALS
DIASTOLIC BLOOD PRESSURE: 64 MMHG | SYSTOLIC BLOOD PRESSURE: 120 MMHG | HEART RATE: 63 BPM | TEMPERATURE: 97 F | BODY MASS INDEX: 25.61 KG/M2 | OXYGEN SATURATION: 100 % | WEIGHT: 153.88 LBS | RESPIRATION RATE: 18 BRPM

## 2024-12-03 VITALS
OXYGEN SATURATION: 98 % | RESPIRATION RATE: 18 BRPM | TEMPERATURE: 98.6 F | SYSTOLIC BLOOD PRESSURE: 121 MMHG | HEART RATE: 68 BPM | DIASTOLIC BLOOD PRESSURE: 52 MMHG

## 2024-12-03 DIAGNOSIS — D84.9 IMMUNOSUPPRESSED STATUS: Primary | ICD-10-CM

## 2024-12-03 DIAGNOSIS — C92.01 ACUTE MYELOID LEUKEMIA IN REMISSION (MULTI): ICD-10-CM

## 2024-12-03 DIAGNOSIS — N94.89 MENSTRUAL SUPPRESSION: ICD-10-CM

## 2024-12-03 LAB
ABO GROUP (TYPE) IN BLOOD: NORMAL
ANION GAP SERPL CALC-SCNC: 13 MMOL/L (ref 10–20)
ANTIBODY SCREEN: NORMAL
BUN SERPL-MCNC: 14 MG/DL (ref 6–23)
CALCIUM SERPL-MCNC: 9.6 MG/DL (ref 8.6–10.6)
CHLORIDE SERPL-SCNC: 104 MMOL/L (ref 98–107)
CO2 SERPL-SCNC: 28 MMOL/L (ref 21–32)
CREAT SERPL-MCNC: 1.06 MG/DL (ref 0.5–1.05)
EGFRCR SERPLBLD CKD-EPI 2021: 76 ML/MIN/1.73M*2
GLUCOSE SERPL-MCNC: 153 MG/DL (ref 74–99)
POTASSIUM SERPL-SCNC: 3.8 MMOL/L (ref 3.5–5.3)
RH FACTOR (ANTIGEN D): NORMAL
SODIUM SERPL-SCNC: 141 MMOL/L (ref 136–145)

## 2024-12-03 PROCEDURE — 99215 OFFICE O/P EST HI 40 MIN: CPT | Mod: 25 | Performed by: NURSE PRACTITIONER

## 2024-12-03 PROCEDURE — 2500000004 HC RX 250 GENERAL PHARMACY W/ HCPCS (ALT 636 FOR OP/ED): Performed by: PHYSICIAN ASSISTANT

## 2024-12-03 PROCEDURE — 96413 CHEMO IV INFUSION 1 HR: CPT

## 2024-12-03 PROCEDURE — 96415 CHEMO IV INFUSION ADDL HR: CPT

## 2024-12-03 PROCEDURE — 2500000004 HC RX 250 GENERAL PHARMACY W/ HCPCS (ALT 636 FOR OP/ED): Performed by: INTERNAL MEDICINE

## 2024-12-03 PROCEDURE — 80048 BASIC METABOLIC PNL TOTAL CA: CPT

## 2024-12-03 PROCEDURE — 1036F TOBACCO NON-USER: CPT | Performed by: NURSE PRACTITIONER

## 2024-12-03 PROCEDURE — 2500000005 HC RX 250 GENERAL PHARMACY W/O HCPCS: Performed by: INTERNAL MEDICINE

## 2024-12-03 PROCEDURE — 99215 OFFICE O/P EST HI 40 MIN: CPT | Performed by: NURSE PRACTITIONER

## 2024-12-03 RX ORDER — DEXAMETHASONE 6 MG/1
12 TABLET ORAL ONCE
Status: COMPLETED | OUTPATIENT
Start: 2024-12-03 | End: 2024-12-03

## 2024-12-03 RX ORDER — FAMOTIDINE 10 MG/ML
20 INJECTION INTRAVENOUS ONCE AS NEEDED
Status: DISCONTINUED | OUTPATIENT
Start: 2024-12-03 | End: 2024-12-03 | Stop reason: HOSPADM

## 2024-12-03 RX ORDER — EPINEPHRINE 0.3 MG/.3ML
0.3 INJECTION SUBCUTANEOUS EVERY 5 MIN PRN
Status: DISCONTINUED | OUTPATIENT
Start: 2024-12-03 | End: 2024-12-03 | Stop reason: HOSPADM

## 2024-12-03 RX ORDER — PROCHLORPERAZINE EDISYLATE 5 MG/ML
10 INJECTION INTRAMUSCULAR; INTRAVENOUS EVERY 6 HOURS PRN
Status: DISCONTINUED | OUTPATIENT
Start: 2024-12-03 | End: 2024-12-03 | Stop reason: HOSPADM

## 2024-12-03 RX ORDER — ALBUTEROL SULFATE 0.83 MG/ML
3 SOLUTION RESPIRATORY (INHALATION) AS NEEDED
Status: DISCONTINUED | OUTPATIENT
Start: 2024-12-03 | End: 2024-12-03 | Stop reason: HOSPADM

## 2024-12-03 RX ORDER — ONDANSETRON HYDROCHLORIDE 8 MG/1
16 TABLET, FILM COATED ORAL ONCE
Status: COMPLETED | OUTPATIENT
Start: 2024-12-03 | End: 2024-12-03

## 2024-12-03 RX ORDER — DIPHENHYDRAMINE HYDROCHLORIDE 50 MG/ML
50 INJECTION INTRAMUSCULAR; INTRAVENOUS AS NEEDED
Status: DISCONTINUED | OUTPATIENT
Start: 2024-12-03 | End: 2024-12-03 | Stop reason: HOSPADM

## 2024-12-03 RX ORDER — PROCHLORPERAZINE MALEATE 10 MG
10 TABLET ORAL EVERY 6 HOURS PRN
Status: DISCONTINUED | OUTPATIENT
Start: 2024-12-03 | End: 2024-12-03 | Stop reason: HOSPADM

## 2024-12-03 RX ORDER — HEPARIN SODIUM,PORCINE/PF 10 UNIT/ML
50 SYRINGE (ML) INTRAVENOUS AS NEEDED
Status: DISCONTINUED | OUTPATIENT
Start: 2024-12-03 | End: 2024-12-03 | Stop reason: HOSPADM

## 2024-12-03 RX ORDER — HEPARIN SODIUM,PORCINE/PF 10 UNIT/ML
50 SYRINGE (ML) INTRAVENOUS AS NEEDED
Status: CANCELLED | OUTPATIENT
Start: 2024-12-03

## 2024-12-03 RX ORDER — HEPARIN 100 UNIT/ML
500 SYRINGE INTRAVENOUS AS NEEDED
Status: CANCELLED | OUTPATIENT
Start: 2024-12-03

## 2024-12-03 ASSESSMENT — PAIN SCALES - GENERAL
PAINLEVEL_OUTOF10: 0-NO PAIN
PAINLEVEL_OUTOF10: 0-NO PAIN

## 2024-12-03 NOTE — ASSESSMENT & PLAN NOTE
Last leuprolide 11/15.  Still with some break through bleeding during floridalma.  Will add third count check each week this cycle.

## 2024-12-03 NOTE — ASSESSMENT & PLAN NOTE
C3D1 HiDAC. All treatment parameters met.  Her young son is having eye surgery on 12/5 so will move D3 to 12/6.  Will add a third count check on Saturdays for this cycle given thrombocytopenia & breakthrough menorrhagia.  Post C1 NPM1 undetectable.  This will be 3/4 cycles of HIDAC as post remission therapy.  Will defer transplant.  Given 5 week recovery and holidays, scheduling subsequent HIDAC cycles every 5 weeks

## 2024-12-03 NOTE — PROGRESS NOTES
Patient ID: Ilda Peter is a 23 y.o. female.    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)  [id number] AML Risk Stratification: FAVORABLE: Mutated NPM1 without FLT3-ITD  Presented with anemia and circulating blasts    BONE MARROW (07/05/2024)  Hypercellular with erythroid predominance, dyserythropoiesis, and dysmegakaryopoiesis  FISH: AML negative  KARYOTYPE:  46XX [20]  MYELOID NGS: NPM1 (54%), NRAS (25%), PTPN11 (15%), IDH1 (2%)       7/19/2024 -  Chemotherapy    Induction with 7+3 (AraC+tarun) -> CR1 MRDpos  - D14 BM (8/1/24):  ablated, ALCON  - Count recovery:  ANC D28, plts D23  - Post induction BM (8/21/24):  ALCON, MFC negative, NPM1 2.65e-05       9/17/2024 -  Chemotherapy    Consolidation with HIDAC  -C1D1 9/17/24:  complicated by menorrhagia    DATE TIMEPOINT SOURCE MORPHOLOGY MRD by MFC NPM1 NOTES   10/8/2024 Post C1 HIDAC PB   Not detectable    10/28/2024 Post C1 HIDAC PB   Not detectable                                                                  Assessment & Plan  Acute myeloid leukemia in remission (Multi)  C3D1 HiDAC. All treatment parameters met.  Her young son is having eye surgery on 12/5 so will move D3 to 12/6.  Will add a third count check on Saturdays for this cycle given thrombocytopenia & breakthrough menorrhagia.  Post C1 NPM1 undetectable.  This will be 3/4 cycles of HIDAC as post remission therapy.  Will defer transplant.  Given 5 week recovery and holidays, scheduling subsequent HIDAC cycles every 5 weeks     Immunosuppressed status  No active signs or symptoms of infection.  Will continue prophylaxis with acyclovir, levofloxacin, and posaconazole during periods of neutropenia.     Menstrual suppression  Last leuprolide 11/15.  Still with some break through bleeding during floridalma.  Will add third count check each week this cycle.        ______________________________________________________________________________________________________________________________________________    SUBJECTIVE     Here today for evaluation in [facility] on C3D1 HiDAC. She is doing well overall. Had breakthrough menorrhagia again last cycle, required ED visit.  No new health issues.  Denies fevers, chills, nausea, vomiting, diarrhea, dyspnea, rash, and pain.    OBJECTIVE     There were no vitals taken for this visit.     Physical Exam  Vitals reviewed.   Constitutional:       Appearance: Normal appearance.   Eyes:      Conjunctiva/sclera: Conjunctivae normal.      Pupils: Pupils are equal, round, and reactive to light.   Skin:     General: Skin is warm and dry.      Findings: No rash.   Neurological:      General: No focal deficit present.      Mental Status: She is oriented to person, place, and time. Mental status is at baseline.      Comments: No s/sx cerebellar toxicity   Psychiatric:         Mood and Affect: Mood normal.       RTC  12/4 C3D2 + [facility]  12/6 C3D3 + [facility]  12/8 C3D5 Neulasta  Mon/Thurs count checks ([facility])  Added Sat 12/14 & 12/21 (Main)  12/31 Follow up with Mike Silva, APRN-CNP  [facility]

## 2024-12-04 ENCOUNTER — OFFICE VISIT (OUTPATIENT)
Dept: HEMATOLOGY/ONCOLOGY | Facility: HOSPITAL | Age: 24
End: 2024-12-04
Payer: COMMERCIAL

## 2024-12-04 ENCOUNTER — INFUSION (OUTPATIENT)
Dept: HEMATOLOGY/ONCOLOGY | Facility: HOSPITAL | Age: 24
End: 2024-12-04
Payer: COMMERCIAL

## 2024-12-04 ENCOUNTER — SOCIAL WORK (OUTPATIENT)
Dept: HEMATOLOGY/ONCOLOGY | Facility: CLINIC | Age: 24
End: 2024-12-04

## 2024-12-04 VITALS
BODY MASS INDEX: 25.17 KG/M2 | TEMPERATURE: 97.3 F | DIASTOLIC BLOOD PRESSURE: 64 MMHG | HEART RATE: 74 BPM | SYSTOLIC BLOOD PRESSURE: 129 MMHG | OXYGEN SATURATION: 99 % | WEIGHT: 151.24 LBS | RESPIRATION RATE: 18 BRPM

## 2024-12-04 VITALS
HEART RATE: 80 BPM | TEMPERATURE: 96.8 F | DIASTOLIC BLOOD PRESSURE: 65 MMHG | SYSTOLIC BLOOD PRESSURE: 124 MMHG | RESPIRATION RATE: 18 BRPM | OXYGEN SATURATION: 99 %

## 2024-12-04 DIAGNOSIS — C92.00 ACUTE MYELOID LEUKEMIA IN ADULT (MULTI): ICD-10-CM

## 2024-12-04 DIAGNOSIS — D84.9 IMMUNOSUPPRESSED STATUS: Primary | ICD-10-CM

## 2024-12-04 DIAGNOSIS — C92.01 ACUTE MYELOID LEUKEMIA IN REMISSION (MULTI): ICD-10-CM

## 2024-12-04 LAB
ANION GAP SERPL CALC-SCNC: 11 MMOL/L (ref 10–20)
BUN SERPL-MCNC: 16 MG/DL (ref 6–23)
CALCIUM SERPL-MCNC: 9.6 MG/DL (ref 8.6–10.3)
CHLORIDE SERPL-SCNC: 106 MMOL/L (ref 98–107)
CO2 SERPL-SCNC: 28 MMOL/L (ref 21–32)
CREAT SERPL-MCNC: 0.75 MG/DL (ref 0.5–1.05)
EGFRCR SERPLBLD CKD-EPI 2021: >90 ML/MIN/1.73M*2
GLUCOSE SERPL-MCNC: 127 MG/DL (ref 74–99)
POTASSIUM SERPL-SCNC: 4.1 MMOL/L (ref 3.5–5.3)
SODIUM SERPL-SCNC: 141 MMOL/L (ref 136–145)

## 2024-12-04 PROCEDURE — 96415 CHEMO IV INFUSION ADDL HR: CPT

## 2024-12-04 PROCEDURE — 99215 OFFICE O/P EST HI 40 MIN: CPT | Mod: 25 | Performed by: STUDENT IN AN ORGANIZED HEALTH CARE EDUCATION/TRAINING PROGRAM

## 2024-12-04 PROCEDURE — 2500000004 HC RX 250 GENERAL PHARMACY W/ HCPCS (ALT 636 FOR OP/ED): Performed by: INTERNAL MEDICINE

## 2024-12-04 PROCEDURE — 99215 OFFICE O/P EST HI 40 MIN: CPT | Performed by: STUDENT IN AN ORGANIZED HEALTH CARE EDUCATION/TRAINING PROGRAM

## 2024-12-04 PROCEDURE — 96413 CHEMO IV INFUSION 1 HR: CPT

## 2024-12-04 PROCEDURE — 80048 BASIC METABOLIC PNL TOTAL CA: CPT

## 2024-12-04 PROCEDURE — 2500000004 HC RX 250 GENERAL PHARMACY W/ HCPCS (ALT 636 FOR OP/ED): Performed by: PHYSICIAN ASSISTANT

## 2024-12-04 PROCEDURE — 2500000005 HC RX 250 GENERAL PHARMACY W/O HCPCS: Performed by: INTERNAL MEDICINE

## 2024-12-04 RX ORDER — DEXAMETHASONE 6 MG/1
12 TABLET ORAL ONCE
Status: COMPLETED | OUTPATIENT
Start: 2024-12-04 | End: 2024-12-04

## 2024-12-04 RX ORDER — ACYCLOVIR 400 MG/1
400 TABLET ORAL 2 TIMES DAILY
Qty: 180 TABLET | Refills: 3 | Status: SHIPPED | OUTPATIENT
Start: 2024-12-04 | End: 2025-11-29

## 2024-12-04 RX ORDER — PROCHLORPERAZINE EDISYLATE 5 MG/ML
10 INJECTION INTRAMUSCULAR; INTRAVENOUS EVERY 6 HOURS PRN
Status: DISCONTINUED | OUTPATIENT
Start: 2024-12-04 | End: 2024-12-04 | Stop reason: HOSPADM

## 2024-12-04 RX ORDER — EPINEPHRINE 0.3 MG/.3ML
0.3 INJECTION SUBCUTANEOUS EVERY 5 MIN PRN
Status: DISCONTINUED | OUTPATIENT
Start: 2024-12-04 | End: 2024-12-04 | Stop reason: HOSPADM

## 2024-12-04 RX ORDER — DIPHENHYDRAMINE HYDROCHLORIDE 50 MG/ML
50 INJECTION INTRAMUSCULAR; INTRAVENOUS AS NEEDED
Status: DISCONTINUED | OUTPATIENT
Start: 2024-12-04 | End: 2024-12-04 | Stop reason: HOSPADM

## 2024-12-04 RX ORDER — HEPARIN 100 UNIT/ML
500 SYRINGE INTRAVENOUS AS NEEDED
Status: CANCELLED | OUTPATIENT
Start: 2024-12-04

## 2024-12-04 RX ORDER — ALBUTEROL SULFATE 0.83 MG/ML
3 SOLUTION RESPIRATORY (INHALATION) AS NEEDED
Status: DISCONTINUED | OUTPATIENT
Start: 2024-12-04 | End: 2024-12-04 | Stop reason: HOSPADM

## 2024-12-04 RX ORDER — FAMOTIDINE 10 MG/ML
20 INJECTION INTRAVENOUS ONCE AS NEEDED
Status: DISCONTINUED | OUTPATIENT
Start: 2024-12-04 | End: 2024-12-04 | Stop reason: HOSPADM

## 2024-12-04 RX ORDER — HEPARIN SODIUM,PORCINE/PF 10 UNIT/ML
50 SYRINGE (ML) INTRAVENOUS AS NEEDED
Status: DISCONTINUED | OUTPATIENT
Start: 2024-12-04 | End: 2024-12-04 | Stop reason: HOSPADM

## 2024-12-04 RX ORDER — PROCHLORPERAZINE MALEATE 10 MG
10 TABLET ORAL EVERY 6 HOURS PRN
Status: DISCONTINUED | OUTPATIENT
Start: 2024-12-04 | End: 2024-12-04 | Stop reason: HOSPADM

## 2024-12-04 RX ORDER — HEPARIN SODIUM,PORCINE/PF 10 UNIT/ML
50 SYRINGE (ML) INTRAVENOUS AS NEEDED
Status: CANCELLED | OUTPATIENT
Start: 2024-12-04

## 2024-12-04 RX ORDER — ONDANSETRON HYDROCHLORIDE 8 MG/1
16 TABLET, FILM COATED ORAL ONCE
Status: COMPLETED | OUTPATIENT
Start: 2024-12-04 | End: 2024-12-04

## 2024-12-04 ASSESSMENT — PAIN SCALES - GENERAL: PAINLEVEL_OUTOF10: 0-NO PAIN

## 2024-12-04 NOTE — ASSESSMENT & PLAN NOTE
No active signs or symptoms of infection.  Will continue prophylaxis with acyclovir, levofloxacin, and posaconazole during periods of neutropenia. Aware to start 12/9.

## 2024-12-04 NOTE — PROGRESS NOTES
Pt arrived to [facility] infusion for day 1 dose 2 HiDAC. Prior assessment unchanged from am appointment. Completed neuro checks (romberg, finger to nose and signature test) with no issues noted. Pt received infusion without incident and dc home in safe condition. Pt aware to return tomorrow for day 2

## 2024-12-04 NOTE — PROGRESS NOTES
Patient ID: Ilda Peter is a 23 y.o. female.    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 (>=10% blasts)  [id number] AML Risk Stratification: FAVORABLE: Mutated NPM1 without FLT3-ITD  Presented with anemia and circulating blasts    BONE MARROW (07/05/2024)  Hypercellular with erythroid predominance, dyserythropoiesis, and dysmegakaryopoiesis  FISH: AML negative  KARYOTYPE:  46XX [20]  MYELOID NGS: NPM1 (54%), NRAS (25%), PTPN11 (15%), IDH1 (2%)       7/19/2024 -  Chemotherapy    Induction with 7+3 (AraC+tarun) -> CR1 MRDpos  - D14 BM (8/1/24):  ablated, ALCON  - Count recovery:  ANC D28, plts D23  - Post induction BM (8/21/24):  ALCON, MFC negative, NPM1 2.65e-05       9/17/2024 -  Chemotherapy    Consolidation with HIDAC  -C1D1 9/17/24:  complicated by menorrhagia    DATE TIMEPOINT SOURCE MORPHOLOGY MRD by [facility] NPM1 NOTES   10/8/2024 Post C1 HIDAC PB   Not detectable    10/28/2024 Post C1 HIDAC PB   Not detectable                                                               Assessment & Plan  Acute myeloid leukemia in remission (Multi)  C3D2 HiDAC. Son is having eye surgery on 12/5 so will have D3 on 12/6.  Will add a third count check on Saturdays for this cycle given thrombocytopenia & breakthrough menorrhagia. Post C1 NPM1 undetectable, post C2 is pending.  This will be 3/4 cycles of HIDAC as post remission therapy.  Will defer transplant.  Given 5 week recovery and holidays, scheduling subsequent HIDAC cycles every 5 weeks.    Immunosuppressed status  No active signs or symptoms of infection.  Will continue prophylaxis with acyclovir, levofloxacin, and posaconazole during periods of neutropenia. Aware to start 12/9.        ______________________________________________________________________________________________________________________________________________    SUBJECTIVE      Here today for [facility] on C3D1 HiDAC. She is doing well overall. Had breakthrough menorrhagia again last cycle, required ED visit.  No new health issues.  Denies fevers, chills, nausea, vomiting, diarrhea, dyspnea, rash, and pain.    OBJECTIVE     There were no vitals taken for this visit.     Physical Exam  Vitals reviewed.   Constitutional:       Appearance: Normal appearance.   Eyes:      Conjunctiva/sclera: Conjunctivae normal.      Pupils: Pupils are equal, round, and reactive to light.   Skin:     General: Skin is warm and dry.      Findings: No rash.   Neurological:      General: No focal deficit present.      Mental Status: She is oriented to person, place, and time. Mental status is at baseline.      Comments: No s/sx cerebellar toxicity   Psychiatric:         Mood and Affect: Mood normal.       RTC  12/6 C3D3 + [facility]  12/8 C3D5 Neulasta  Mon/Thurs count checks ([facility])  Added Sat 12/14 & 12/21 (Main)  12/31 Follow up with Mike Bennett PA-C  [facility]

## 2024-12-04 NOTE — PROGRESS NOTES
Pt arrived to infusion for cytarbine. Completed neuro checks (romberg, finger to nose, and signature) with no issues noted.  Received PM dose with no adverse events, aware of return appointment on Friday

## 2024-12-04 NOTE — PROGRESS NOTES
Pt arrived to [facility] infusion for day 1 dose 1 HiDAC. Completed neuro checks (romberg, finger to nose and signature test) with no issues noted. Fely Silva NP at chairside. Pt aware to return to [facility] infusion for dose 2 at 4:30 pm. Pt dc safely from unit with family member.

## 2024-12-04 NOTE — PROGRESS NOTES
Pt arrived to infusion for cytarbine. Completed neuro checks (romberg, finger to nose, and signature) with no issues noted.  Received AM dose with no adverse events, aware of return appointment time this afternoon.

## 2024-12-04 NOTE — PROGRESS NOTES
LSW spoke with patient today as funding for [employer] opened up.  Program is for 18-29 year olds who are diagnosed with cancer.  They will provide monetary assistance to help with non-medical expenses.  Patient was interested in applying for assistance - LSW submitted an application via their portal.  Will continue to monitor the status of application.  Patient expressed appreciation.  Social work will continue to follow.

## 2024-12-05 ENCOUNTER — APPOINTMENT (OUTPATIENT)
Dept: HEMATOLOGY/ONCOLOGY | Facility: HOSPITAL | Age: 24
End: 2024-12-05
Payer: COMMERCIAL

## 2024-12-05 RX ORDER — NORETHINDRONE 5 MG/1
5 TABLET ORAL DAILY
Qty: 30 TABLET | Refills: 0 | Status: SHIPPED | OUTPATIENT
Start: 2024-12-05 | End: 2025-01-04

## 2024-12-06 ENCOUNTER — CLINICAL SUPPORT (OUTPATIENT)
Dept: NUTRITION | Facility: HOSPITAL | Age: 24
End: 2024-12-06
Payer: COMMERCIAL

## 2024-12-06 ENCOUNTER — INFUSION (OUTPATIENT)
Dept: HEMATOLOGY/ONCOLOGY | Facility: HOSPITAL | Age: 24
End: 2024-12-06
Payer: COMMERCIAL

## 2024-12-06 ENCOUNTER — OFFICE VISIT (OUTPATIENT)
Dept: HEMATOLOGY/ONCOLOGY | Facility: HOSPITAL | Age: 24
End: 2024-12-06
Payer: COMMERCIAL

## 2024-12-06 VITALS
WEIGHT: 150.5 LBS | DIASTOLIC BLOOD PRESSURE: 64 MMHG | SYSTOLIC BLOOD PRESSURE: 126 MMHG | OXYGEN SATURATION: 100 % | RESPIRATION RATE: 18 BRPM | BODY MASS INDEX: 25.04 KG/M2 | HEART RATE: 79 BPM | TEMPERATURE: 98.4 F

## 2024-12-06 VITALS
OXYGEN SATURATION: 100 % | DIASTOLIC BLOOD PRESSURE: 51 MMHG | HEART RATE: 78 BPM | RESPIRATION RATE: 16 BRPM | SYSTOLIC BLOOD PRESSURE: 126 MMHG | TEMPERATURE: 97.7 F

## 2024-12-06 DIAGNOSIS — D84.9 IMMUNOSUPPRESSED STATUS: ICD-10-CM

## 2024-12-06 DIAGNOSIS — C92.01 ACUTE MYELOID LEUKEMIA IN REMISSION (MULTI): Primary | ICD-10-CM

## 2024-12-06 DIAGNOSIS — N94.89 MENSTRUAL SUPPRESSION: ICD-10-CM

## 2024-12-06 DIAGNOSIS — C92.01 ACUTE MYELOID LEUKEMIA IN REMISSION (MULTI): ICD-10-CM

## 2024-12-06 PROCEDURE — 2500000004 HC RX 250 GENERAL PHARMACY W/ HCPCS (ALT 636 FOR OP/ED): Performed by: INTERNAL MEDICINE

## 2024-12-06 PROCEDURE — 2500000005 HC RX 250 GENERAL PHARMACY W/O HCPCS: Performed by: INTERNAL MEDICINE

## 2024-12-06 PROCEDURE — 96413 CHEMO IV INFUSION 1 HR: CPT

## 2024-12-06 PROCEDURE — 99215 OFFICE O/P EST HI 40 MIN: CPT | Performed by: NURSE PRACTITIONER

## 2024-12-06 PROCEDURE — 96415 CHEMO IV INFUSION ADDL HR: CPT

## 2024-12-06 PROCEDURE — 2500000004 HC RX 250 GENERAL PHARMACY W/ HCPCS (ALT 636 FOR OP/ED): Performed by: PHYSICIAN ASSISTANT

## 2024-12-06 RX ORDER — PROCHLORPERAZINE EDISYLATE 5 MG/ML
10 INJECTION INTRAMUSCULAR; INTRAVENOUS EVERY 6 HOURS PRN
Status: DISCONTINUED | OUTPATIENT
Start: 2024-12-06 | End: 2024-12-06 | Stop reason: HOSPADM

## 2024-12-06 RX ORDER — DIPHENHYDRAMINE HYDROCHLORIDE 50 MG/ML
50 INJECTION INTRAMUSCULAR; INTRAVENOUS AS NEEDED
Status: DISCONTINUED | OUTPATIENT
Start: 2024-12-06 | End: 2024-12-06 | Stop reason: HOSPADM

## 2024-12-06 RX ORDER — HEPARIN SODIUM,PORCINE/PF 10 UNIT/ML
50 SYRINGE (ML) INTRAVENOUS AS NEEDED
Status: DISCONTINUED | OUTPATIENT
Start: 2024-12-06 | End: 2024-12-06 | Stop reason: HOSPADM

## 2024-12-06 RX ORDER — ONDANSETRON HYDROCHLORIDE 8 MG/1
16 TABLET, FILM COATED ORAL ONCE
Status: COMPLETED | OUTPATIENT
Start: 2024-12-06 | End: 2024-12-06

## 2024-12-06 RX ORDER — EPINEPHRINE 0.3 MG/.3ML
0.3 INJECTION SUBCUTANEOUS EVERY 5 MIN PRN
Status: DISCONTINUED | OUTPATIENT
Start: 2024-12-06 | End: 2024-12-06 | Stop reason: HOSPADM

## 2024-12-06 RX ORDER — PROCHLORPERAZINE MALEATE 10 MG
10 TABLET ORAL EVERY 6 HOURS PRN
Status: DISCONTINUED | OUTPATIENT
Start: 2024-12-06 | End: 2024-12-06 | Stop reason: HOSPADM

## 2024-12-06 RX ORDER — ALBUTEROL SULFATE 0.83 MG/ML
3 SOLUTION RESPIRATORY (INHALATION) AS NEEDED
Status: DISCONTINUED | OUTPATIENT
Start: 2024-12-06 | End: 2024-12-06 | Stop reason: HOSPADM

## 2024-12-06 RX ORDER — HEPARIN SODIUM,PORCINE/PF 10 UNIT/ML
50 SYRINGE (ML) INTRAVENOUS AS NEEDED
Status: CANCELLED | OUTPATIENT
Start: 2024-12-06

## 2024-12-06 RX ORDER — DEXAMETHASONE 6 MG/1
12 TABLET ORAL ONCE
Status: COMPLETED | OUTPATIENT
Start: 2024-12-06 | End: 2024-12-06

## 2024-12-06 RX ORDER — HEPARIN 100 UNIT/ML
500 SYRINGE INTRAVENOUS AS NEEDED
Status: CANCELLED | OUTPATIENT
Start: 2024-12-06

## 2024-12-06 RX ORDER — FAMOTIDINE 10 MG/ML
20 INJECTION INTRAVENOUS ONCE AS NEEDED
Status: DISCONTINUED | OUTPATIENT
Start: 2024-12-06 | End: 2024-12-06 | Stop reason: HOSPADM

## 2024-12-06 ASSESSMENT — PAIN SCALES - GENERAL
PAINLEVEL_OUTOF10: 0-NO PAIN
PAINLEVEL_OUTOF10: 0-NO PAIN

## 2024-12-06 NOTE — ASSESSMENT & PLAN NOTE
C3D3 HiDAC. A third count check on Saturdays was added for this cycle given thrombocytopenia & breakthrough menorrhagia. Post C1 NPM1 undetectable, post C2 is pending.  This will be 3/4 cycles of HIDAC as post remission therapy.  Will defer transplant.  Given 5 week recovery and holidays, scheduling subsequent HIDAC cycles every 5 weeks.

## 2024-12-06 NOTE — PROGRESS NOTES
[employer]  Diet Recommendation 1: Healthy Eating  Diet Recommendation 2: Neuropenic  Food Intolerance Avoidance: none  Household Size: 5 Members (4 Max/Household)  Interventions: Referral Number: 1st 6 Mo Referral 6 Mos  Interventions: Visit Number: 3 of 6 Visits - Max 6 Visits/Referral Each 6 Mo Period  Education Today: Healthy Eating on a Budget  Follow Up Notes for Future Visits: Pt stated currently undergoing CA tx (leukemia). On neutropenic diet per care team.  Grains: 25-50% Whole  Fruit: % Fresh  Vegetables: 25-50% Fresh  Proteins: 1-2 Plant-based Items  Dairy: 25-50% Lowfat  Originating Site of Referral Order:  [facility]  Initials of RD Assisting Today: JERRI

## 2024-12-06 NOTE — PROGRESS NOTES
Patient ID: Ilda Peter is a 23 y.o. female.  Referring Physician: Brandee Fields MD  [address] [place] [address]  Primary Care Provider: Brandee Cross DO    Date of Service:  12/6/2024    SUBJECTIVE:  Patient presents today, unaccompanied, for follow up.  Reports feeling well.  She has non concerns or complaints today.      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 (>=10% blasts)  [id number] AML Risk Stratification: FAVORABLE: Mutated NPM1 without FLT3-ITD  Presented with anemia and circulating blasts    BONE MARROW (07/05/2024)  Hypercellular with erythroid predominance, dyserythropoiesis, and dysmegakaryopoiesis  FISH: AML negative  KARYOTYPE:  46XX [20]  MYELOID NGS: NPM1 (54%), NRAS (25%), PTPN11 (15%), IDH1 (2%)       7/19/2024 -  Chemotherapy    Induction with 7+3 (AraC+tarun) -> CR1 MRDpos  - D14 BM (8/1/24):  ablated, ALCON  - Count recovery:  ANC D28, plts D23  - Post induction BM (8/21/24):  ALCON, MFC negative, NPM1 2.65e-05       9/17/2024 -  Chemotherapy    Consolidation with HIDAC  -C1D1 9/17/24:  complicated by menorrhagia    DATE TIMEPOINT SOURCE MORPHOLOGY MRD by [facility] NPM1 NOTES   10/8/2024 Post C1 HIDAC PB   Not detectable    10/28/2024 Post C1 HIDAC PB   Not detectable                                                                  OBJECTIVE:  KPS: Karnofsky Score: 100 - Fully active, able to carry on all pre-disease performed without restriction     Physical Exam  Constitutional:       Appearance: Normal appearance.   HENT:      Head: Normocephalic.   Eyes:      Pupils: Pupils are equal, round, and reactive to light.   Cardiovascular:      Rate and Rhythm: Normal rate and regular rhythm.   Pulmonary:      Effort: Pulmonary effort is normal.      Breath sounds: Normal breath sounds.   Abdominal:      General: Bowel sounds are normal.      Palpations: Abdomen is soft.    Musculoskeletal:         General: Normal range of motion.      Cervical back: Normal range of motion and neck supple.   Lymphadenopathy:      Comments: No lymphadenopathy   Skin:     General: Skin is warm and dry.      Findings: No lesion or rash.   Neurological:      General: No focal deficit present.      Mental Status: She is alert and oriented to person, place, and time. Mental status is at baseline.      Comments: No numbness or tingling   Psychiatric:         Mood and Affect: Mood normal.       Assessment & Plan  Acute myeloid leukemia in remission (Multi)  C3D3 HiDAC. A third count check on Saturdays was added for this cycle given thrombocytopenia & breakthrough menorrhagia. Post C1 NPM1 undetectable, post C2 is pending.  This will be 3/4 cycles of HIDAC as post remission therapy.  Will defer transplant.  Given 5 week recovery and holidays, scheduling subsequent HIDAC cycles every 5 weeks.    Menstrual suppression  Last leuprolide 11/15.  Still with some break through bleeding during floridalma.  Will add third count check each week this cycle.  Immunosuppressed status  No active signs or symptoms of infection.  Will continue prophylaxis with acyclovir, levofloxacin, and posaconazole during periods of neutropenia. Aware to start 12/9.     Current Outpatient Medications   Medication Instructions    acyclovir (ZOVIRAX) 400 mg, oral, 2 times daily    heparin flush 50 Units, intra-catheter, Daily, Per lumen    levoFLOXacin (LEVAQUIN) 500 mg, oral, Daily    lubricating eye drops ophthalmic solution 1 drop, Both Eyes, Daily    norethindrone (AYGESTIN) 5 mg, oral, Daily, Start when instructed    ondansetron (ZOFRAN) 8 mg, oral, Every 8 hours PRN    posaconazole (NOXAFIL) 300 mg, oral, Daily with breakfast, Do not crush, chew, or split.    prednisoLONE acetate (Pred-Forte) 1 % ophthalmic suspension 2 drops, Every 6 hours    sertraline (ZOLOFT) 50 mg, oral, Daily    sodium chloride 0.9% (Normal Saline Flush) flush 10 mL,  intravenous, Daily, Per lumen      Laboratory:  The pertinent laboratory results were reviewed and discussed with the patient.    Lab Results   Component Value Date    WBC 3.6 (L) 12/02/2024    HCT 32.1 (L) 12/02/2024    HGB 10.5 (L) 12/02/2024     (L) 12/02/2024    K 4.1 12/04/2024    CALCIUM 9.6 12/04/2024     12/04/2024    MG 1.79 08/21/2024    BILITOT 0.7 12/02/2024    ALT 23 12/02/2024    AST 17 12/02/2024    BUN 16 12/04/2024    CREATININE 0.75 12/04/2024    PHOS 3.6 08/15/2024      Note: for a comprehensive list of the patient's lab results, access the Results Review activity.    RTC:  Neulasta 12/8  Count checks: 12/10, 12/14, 12/16, 12/21, 12/21  SYDNI follow up 12/31    Hailee Lozano, APRN-CNP

## 2024-12-06 NOTE — PROGRESS NOTES
Patient seen in infusion for morning dose of HiDAC, tolerated without complications. Neuro checks and signature test completed and WNL. Bailon flushed and positive for blood return x2 lumens. Dressing C/D/I and not due for dressing change. Labs and schedule reviewed with patient. Discharged in stable condition. Patient to return for afternoon dose.

## 2024-12-06 NOTE — PROGRESS NOTES
Patient seen for evening dose of HiDAC, tolerated without complications. Neuro checks and signature test completed and WNL. Schedule reviewed with patient. Both lumens of Bailon positive for blood return, flushed with NS and heparin per protocol. Discharged in stable condition.

## 2024-12-07 ENCOUNTER — APPOINTMENT (OUTPATIENT)
Dept: HEMATOLOGY/ONCOLOGY | Facility: HOSPITAL | Age: 24
End: 2024-12-07
Payer: COMMERCIAL

## 2024-12-08 ENCOUNTER — INFUSION (OUTPATIENT)
Dept: HEMATOLOGY/ONCOLOGY | Facility: HOSPITAL | Age: 24
End: 2024-12-08
Payer: COMMERCIAL

## 2024-12-08 VITALS
BODY MASS INDEX: 24.63 KG/M2 | DIASTOLIC BLOOD PRESSURE: 86 MMHG | SYSTOLIC BLOOD PRESSURE: 140 MMHG | OXYGEN SATURATION: 100 % | RESPIRATION RATE: 16 BRPM | HEART RATE: 85 BPM | WEIGHT: 148 LBS | TEMPERATURE: 97.2 F

## 2024-12-08 DIAGNOSIS — C92.01 ACUTE MYELOID LEUKEMIA IN REMISSION (MULTI): ICD-10-CM

## 2024-12-08 PROCEDURE — 2500000004 HC RX 250 GENERAL PHARMACY W/ HCPCS (ALT 636 FOR OP/ED): Performed by: PHYSICIAN ASSISTANT

## 2024-12-08 PROCEDURE — 96372 THER/PROPH/DIAG INJ SC/IM: CPT

## 2024-12-08 PROCEDURE — 2500000004 HC RX 250 GENERAL PHARMACY W/ HCPCS (ALT 636 FOR OP/ED): Mod: JZ,JG | Performed by: INTERNAL MEDICINE

## 2024-12-08 RX ORDER — HEPARIN SODIUM,PORCINE/PF 10 UNIT/ML
50 SYRINGE (ML) INTRAVENOUS AS NEEDED
Status: DISCONTINUED | OUTPATIENT
Start: 2024-12-08 | End: 2024-12-08 | Stop reason: HOSPADM

## 2024-12-08 RX ORDER — HEPARIN SODIUM,PORCINE/PF 10 UNIT/ML
50 SYRINGE (ML) INTRAVENOUS AS NEEDED
OUTPATIENT
Start: 2024-12-08

## 2024-12-08 RX ORDER — HEPARIN 100 UNIT/ML
500 SYRINGE INTRAVENOUS AS NEEDED
OUTPATIENT
Start: 2024-12-08

## 2024-12-08 RX ADMIN — PEGFILGRASTIM-CBQV 6 MG: 6 INJECTION, SOLUTION SUBCUTANEOUS at 09:57

## 2024-12-08 RX ADMIN — HEPARIN, PORCINE (PF) 10 UNIT/ML INTRAVENOUS SYRINGE 50 UNITS: at 09:58

## 2024-12-08 RX ADMIN — HEPARIN, PORCINE (PF) 10 UNIT/ML INTRAVENOUS SYRINGE 50 UNITS: at 09:57

## 2024-12-08 ASSESSMENT — PAIN SCALES - GENERAL: PAINLEVEL_OUTOF10: 0-NO PAIN

## 2024-12-08 NOTE — PROGRESS NOTES
Patient seen in infusion for Udenyca inj to R arm, tolerated without complications. Bailon flushed and positive for blood return x2 lumens. Bailon dressing changed and caps changed x2. Schedule reviewed with patient. Discharged in stable condition.

## 2024-12-10 ENCOUNTER — SOCIAL WORK (OUTPATIENT)
Dept: HEMATOLOGY/ONCOLOGY | Facility: CLINIC | Age: 24
End: 2024-12-10

## 2024-12-10 ENCOUNTER — LAB (OUTPATIENT)
Dept: HEMATOLOGY/ONCOLOGY | Facility: CLINIC | Age: 24
End: 2024-12-10
Payer: COMMERCIAL

## 2024-12-10 VITALS
DIASTOLIC BLOOD PRESSURE: 87 MMHG | OXYGEN SATURATION: 98 % | HEART RATE: 98 BPM | RESPIRATION RATE: 16 BRPM | SYSTOLIC BLOOD PRESSURE: 144 MMHG | BODY MASS INDEX: 24.87 KG/M2 | TEMPERATURE: 98.2 F | WEIGHT: 149.47 LBS

## 2024-12-10 DIAGNOSIS — C92.01 ACUTE MYELOID LEUKEMIA IN REMISSION (MULTI): ICD-10-CM

## 2024-12-10 DIAGNOSIS — C92.00 ACUTE MYELOID LEUKEMIA NOT HAVING ACHIEVED REMISSION (MULTI): ICD-10-CM

## 2024-12-10 LAB
ALBUMIN SERPL BCP-MCNC: 4.4 G/DL (ref 3.4–5)
ALP SERPL-CCNC: 58 U/L (ref 33–110)
ALT SERPL W P-5'-P-CCNC: 14 U/L (ref 7–45)
ANION GAP SERPL CALC-SCNC: 10 MMOL/L (ref 10–20)
ANION GAP SERPL CALC-SCNC: 10 MMOL/L (ref 10–20)
AST SERPL W P-5'-P-CCNC: 12 U/L (ref 9–39)
BASOPHILS # BLD AUTO: 0.01 X10*3/UL (ref 0–0.1)
BASOPHILS NFR BLD AUTO: 1.6 %
BILIRUB SERPL-MCNC: 2.3 MG/DL (ref 0–1.2)
BUN SERPL-MCNC: 21 MG/DL (ref 6–23)
BUN SERPL-MCNC: 21 MG/DL (ref 6–23)
CALCIUM SERPL-MCNC: 9.8 MG/DL (ref 8.6–10.3)
CALCIUM SERPL-MCNC: 9.8 MG/DL (ref 8.6–10.3)
CHLORIDE SERPL-SCNC: 105 MMOL/L (ref 98–107)
CHLORIDE SERPL-SCNC: 105 MMOL/L (ref 98–107)
CO2 SERPL-SCNC: 28 MMOL/L (ref 21–32)
CO2 SERPL-SCNC: 28 MMOL/L (ref 21–32)
CREAT SERPL-MCNC: 0.82 MG/DL (ref 0.5–1.05)
CREAT SERPL-MCNC: 0.82 MG/DL (ref 0.5–1.05)
DACRYOCYTES BLD QL SMEAR: NORMAL
EGFRCR SERPLBLD CKD-EPI 2021: >90 ML/MIN/1.73M*2
EGFRCR SERPLBLD CKD-EPI 2021: >90 ML/MIN/1.73M*2
ELECTRONICALLY SIGNED BY: NORMAL
EOSINOPHIL # BLD AUTO: 0 X10*3/UL (ref 0–0.7)
EOSINOPHIL NFR BLD AUTO: 0 %
ERYTHROCYTE [DISTWIDTH] IN BLOOD BY AUTOMATED COUNT: 17 % (ref 11.5–14.5)
GLUCOSE SERPL-MCNC: 110 MG/DL (ref 74–99)
GLUCOSE SERPL-MCNC: 110 MG/DL (ref 74–99)
HCT VFR BLD AUTO: 23.6 % (ref 36–46)
HGB BLD-MCNC: 8 G/DL (ref 12–16)
IMM GRANULOCYTES # BLD AUTO: 0.05 X10*3/UL (ref 0–0.7)
IMM GRANULOCYTES NFR BLD AUTO: 8.1 % (ref 0–0.9)
LYMPHOCYTES # BLD AUTO: 0.16 X10*3/UL (ref 1.2–4.8)
LYMPHOCYTES NFR BLD AUTO: 25.8 %
MCH RBC QN AUTO: 33.2 PG (ref 26–34)
MCHC RBC AUTO-ENTMCNC: 33.9 G/DL (ref 32–36)
MCV RBC AUTO: 98 FL (ref 80–100)
MONOCYTES # BLD AUTO: 0.01 X10*3/UL (ref 0.1–1)
MONOCYTES NFR BLD AUTO: 1.6 %
NEUTROPHILS # BLD AUTO: 0.39 X10*3/UL (ref 1.2–7.7)
NEUTROPHILS NFR BLD AUTO: 62.9 %
NPM1 RESULTS: NORMAL
NRBC BLD-RTO: ABNORMAL /100{WBCS}
PLATELET # BLD AUTO: 53 X10*3/UL (ref 150–450)
POTASSIUM SERPL-SCNC: 4 MMOL/L (ref 3.5–5.3)
POTASSIUM SERPL-SCNC: 4 MMOL/L (ref 3.5–5.3)
PROT SERPL-MCNC: 6.2 G/DL (ref 6.4–8.2)
RBC # BLD AUTO: 2.41 X10*6/UL (ref 4–5.2)
RBC MORPH BLD: NORMAL
SCHISTOCYTES BLD QL SMEAR: NORMAL
SODIUM SERPL-SCNC: 139 MMOL/L (ref 136–145)
SODIUM SERPL-SCNC: 139 MMOL/L (ref 136–145)
WBC # BLD AUTO: 0.6 X10*3/UL (ref 4.4–11.3)

## 2024-12-10 PROCEDURE — 82565 ASSAY OF CREATININE: CPT

## 2024-12-10 PROCEDURE — 80053 COMPREHEN METABOLIC PANEL: CPT

## 2024-12-10 PROCEDURE — 36591 DRAW BLOOD OFF VENOUS DEVICE: CPT

## 2024-12-10 PROCEDURE — 85025 COMPLETE CBC W/AUTO DIFF WBC: CPT

## 2024-12-10 NOTE — PROGRESS NOTES
NATALEEW received update from [employer] stating that patient's application for financial assistance was approved.  NATALEEW sent message to the patient to inform her that her application was approved.  Will remain available if any other questions arise.

## 2024-12-12 ENCOUNTER — DOCUMENTATION (OUTPATIENT)
Dept: HEMATOLOGY/ONCOLOGY | Facility: HOSPITAL | Age: 24
End: 2024-12-12
Payer: COMMERCIAL

## 2024-12-12 ENCOUNTER — LAB (OUTPATIENT)
Dept: HEMATOLOGY/ONCOLOGY | Facility: CLINIC | Age: 24
End: 2024-12-12
Payer: COMMERCIAL

## 2024-12-12 VITALS
TEMPERATURE: 98.8 F | RESPIRATION RATE: 16 BRPM | SYSTOLIC BLOOD PRESSURE: 115 MMHG | DIASTOLIC BLOOD PRESSURE: 72 MMHG | HEART RATE: 98 BPM | WEIGHT: 152.56 LBS | BODY MASS INDEX: 25.39 KG/M2 | OXYGEN SATURATION: 100 %

## 2024-12-12 DIAGNOSIS — C92.01 ACUTE MYELOID LEUKEMIA IN REMISSION (MULTI): ICD-10-CM

## 2024-12-12 DIAGNOSIS — C92.00 ACUTE MYELOID LEUKEMIA NOT HAVING ACHIEVED REMISSION (MULTI): ICD-10-CM

## 2024-12-12 DIAGNOSIS — N94.89 MENSTRUAL SUPPRESSION: ICD-10-CM

## 2024-12-12 LAB
ALBUMIN SERPL BCP-MCNC: 4.3 G/DL (ref 3.4–5)
ALP SERPL-CCNC: 61 U/L (ref 33–110)
ALT SERPL W P-5'-P-CCNC: 17 U/L (ref 7–45)
ANION GAP SERPL CALC-SCNC: 11 MMOL/L (ref 10–20)
AST SERPL W P-5'-P-CCNC: 11 U/L (ref 9–39)
BASOPHILS # BLD AUTO: 0 X10*3/UL (ref 0–0.1)
BASOPHILS NFR BLD AUTO: 0 %
BILIRUB SERPL-MCNC: 1.6 MG/DL (ref 0–1.2)
BUN SERPL-MCNC: 21 MG/DL (ref 6–23)
CALCIUM SERPL-MCNC: 9.3 MG/DL (ref 8.6–10.3)
CHLORIDE SERPL-SCNC: 105 MMOL/L (ref 98–107)
CO2 SERPL-SCNC: 27 MMOL/L (ref 21–32)
CREAT SERPL-MCNC: 0.88 MG/DL (ref 0.5–1.05)
EGFRCR SERPLBLD CKD-EPI 2021: >90 ML/MIN/1.73M*2
EOSINOPHIL # BLD AUTO: 0 X10*3/UL (ref 0–0.7)
EOSINOPHIL NFR BLD AUTO: 0 %
ERYTHROCYTE [DISTWIDTH] IN BLOOD BY AUTOMATED COUNT: 16.6 % (ref 11.5–14.5)
GLUCOSE SERPL-MCNC: 111 MG/DL (ref 74–99)
HCT VFR BLD AUTO: 20.3 % (ref 36–46)
HGB BLD-MCNC: 7 G/DL (ref 12–16)
IMM GRANULOCYTES # BLD AUTO: 0 X10*3/UL (ref 0–0.7)
IMM GRANULOCYTES NFR BLD AUTO: 0 % (ref 0–0.9)
LYMPHOCYTES # BLD AUTO: 0.1 X10*3/UL (ref 1.2–4.8)
LYMPHOCYTES NFR BLD AUTO: 83.3 %
MCH RBC QN AUTO: 33.2 PG (ref 26–34)
MCHC RBC AUTO-ENTMCNC: 34.5 G/DL (ref 32–36)
MCV RBC AUTO: 96 FL (ref 80–100)
MONOCYTES # BLD AUTO: 0.01 X10*3/UL (ref 0.1–1)
MONOCYTES NFR BLD AUTO: 8.3 %
NEUTROPHILS # BLD AUTO: 0.01 X10*3/UL (ref 1.2–7.7)
NEUTROPHILS NFR BLD AUTO: 8.4 %
NRBC BLD-RTO: ABNORMAL /100{WBCS}
PLATELET # BLD AUTO: 14 X10*3/UL (ref 150–450)
POTASSIUM SERPL-SCNC: 3.6 MMOL/L (ref 3.5–5.3)
PROT SERPL-MCNC: 6 G/DL (ref 6.4–8.2)
RBC # BLD AUTO: 2.11 X10*6/UL (ref 4–5.2)
SODIUM SERPL-SCNC: 139 MMOL/L (ref 136–145)
WBC # BLD AUTO: 0.1 X10*3/UL (ref 4.4–11.3)

## 2024-12-12 PROCEDURE — 80053 COMPREHEN METABOLIC PANEL: CPT

## 2024-12-12 PROCEDURE — 85025 COMPLETE CBC W/AUTO DIFF WBC: CPT

## 2024-12-12 PROCEDURE — 2500000004 HC RX 250 GENERAL PHARMACY W/ HCPCS (ALT 636 FOR OP/ED): Mod: JZ,JG | Performed by: INTERNAL MEDICINE

## 2024-12-12 PROCEDURE — 96402 CHEMO HORMON ANTINEOPL SQ/IM: CPT

## 2024-12-12 PROCEDURE — 86850 RBC ANTIBODY SCREEN: CPT

## 2024-12-12 RX ORDER — DIPHENHYDRAMINE HYDROCHLORIDE 50 MG/ML
50 INJECTION INTRAMUSCULAR; INTRAVENOUS AS NEEDED
Status: CANCELLED | OUTPATIENT
Start: 2024-12-12

## 2024-12-12 RX ORDER — EPINEPHRINE 0.3 MG/.3ML
0.3 INJECTION SUBCUTANEOUS EVERY 5 MIN PRN
Status: DISCONTINUED | OUTPATIENT
Start: 2024-12-12 | End: 2024-12-12 | Stop reason: HOSPADM

## 2024-12-12 RX ORDER — ALBUTEROL SULFATE 0.83 MG/ML
3 SOLUTION RESPIRATORY (INHALATION) AS NEEDED
OUTPATIENT
Start: 2024-12-13

## 2024-12-12 RX ORDER — EPINEPHRINE 0.3 MG/.3ML
0.3 INJECTION SUBCUTANEOUS EVERY 5 MIN PRN
Status: CANCELLED | OUTPATIENT
Start: 2024-12-12

## 2024-12-12 RX ORDER — EPINEPHRINE 0.3 MG/.3ML
0.3 INJECTION SUBCUTANEOUS EVERY 5 MIN PRN
OUTPATIENT
Start: 2024-12-13

## 2024-12-12 RX ORDER — DIPHENHYDRAMINE HYDROCHLORIDE 50 MG/ML
50 INJECTION INTRAMUSCULAR; INTRAVENOUS AS NEEDED
OUTPATIENT
Start: 2024-12-13

## 2024-12-12 RX ORDER — FAMOTIDINE 10 MG/ML
20 INJECTION INTRAVENOUS ONCE AS NEEDED
Status: DISCONTINUED | OUTPATIENT
Start: 2024-12-12 | End: 2024-12-12 | Stop reason: HOSPADM

## 2024-12-12 RX ORDER — FAMOTIDINE 10 MG/ML
20 INJECTION INTRAVENOUS ONCE AS NEEDED
Status: CANCELLED | OUTPATIENT
Start: 2024-12-12

## 2024-12-12 RX ORDER — ALBUTEROL SULFATE 0.83 MG/ML
3 SOLUTION RESPIRATORY (INHALATION) AS NEEDED
Status: CANCELLED | OUTPATIENT
Start: 2024-12-12

## 2024-12-12 RX ORDER — ALBUTEROL SULFATE 0.83 MG/ML
3 SOLUTION RESPIRATORY (INHALATION) AS NEEDED
Status: DISCONTINUED | OUTPATIENT
Start: 2024-12-12 | End: 2024-12-12 | Stop reason: HOSPADM

## 2024-12-12 RX ORDER — FAMOTIDINE 10 MG/ML
20 INJECTION INTRAVENOUS ONCE AS NEEDED
OUTPATIENT
Start: 2024-12-13

## 2024-12-12 RX ORDER — DIPHENHYDRAMINE HYDROCHLORIDE 50 MG/ML
50 INJECTION INTRAMUSCULAR; INTRAVENOUS AS NEEDED
Status: DISCONTINUED | OUTPATIENT
Start: 2024-12-12 | End: 2024-12-12 | Stop reason: HOSPADM

## 2024-12-12 ASSESSMENT — PAIN SCALES - GENERAL: PAINLEVEL_OUTOF10: 0-NO PAIN

## 2024-12-13 LAB
ABO GROUP (TYPE) IN BLOOD: NORMAL
ANTIBODY SCREEN: NORMAL
RH FACTOR (ANTIGEN D): NORMAL

## 2024-12-14 ENCOUNTER — LAB (OUTPATIENT)
Dept: HEMATOLOGY/ONCOLOGY | Facility: HOSPITAL | Age: 24
End: 2024-12-14
Payer: COMMERCIAL

## 2024-12-14 VITALS
RESPIRATION RATE: 18 BRPM | TEMPERATURE: 98.4 F | BODY MASS INDEX: 25.61 KG/M2 | DIASTOLIC BLOOD PRESSURE: 60 MMHG | WEIGHT: 153.88 LBS | OXYGEN SATURATION: 99 % | SYSTOLIC BLOOD PRESSURE: 128 MMHG | HEART RATE: 87 BPM

## 2024-12-14 DIAGNOSIS — C92.01 ACUTE MYELOID LEUKEMIA IN REMISSION (MULTI): ICD-10-CM

## 2024-12-14 LAB
ABO GROUP (TYPE) IN BLOOD: NORMAL
ALBUMIN SERPL BCP-MCNC: 4.2 G/DL (ref 3.4–5)
ALP SERPL-CCNC: 87 U/L (ref 33–110)
ALT SERPL W P-5'-P-CCNC: 20 U/L (ref 7–45)
ANION GAP SERPL CALC-SCNC: 12 MMOL/L (ref 10–20)
ANTIBODY SCREEN: NORMAL
AST SERPL W P-5'-P-CCNC: 13 U/L (ref 9–39)
BASOPHILS # BLD AUTO: 0 X10*3/UL (ref 0–0.1)
BASOPHILS NFR BLD AUTO: 0 %
BILIRUB SERPL-MCNC: 1.8 MG/DL (ref 0–1.2)
BLOOD EXPIRATION DATE: NORMAL
BLOOD EXPIRATION DATE: NORMAL
BUN SERPL-MCNC: 14 MG/DL (ref 6–23)
CALCIUM SERPL-MCNC: 9.4 MG/DL (ref 8.6–10.6)
CHLORIDE SERPL-SCNC: 106 MMOL/L (ref 98–107)
CO2 SERPL-SCNC: 26 MMOL/L (ref 21–32)
CREAT SERPL-MCNC: 0.86 MG/DL (ref 0.5–1.05)
DISPENSE STATUS: NORMAL
DISPENSE STATUS: NORMAL
EGFRCR SERPLBLD CKD-EPI 2021: >90 ML/MIN/1.73M*2
EOSINOPHIL # BLD AUTO: 0 X10*3/UL (ref 0–0.7)
EOSINOPHIL NFR BLD AUTO: 0 %
ERYTHROCYTE [DISTWIDTH] IN BLOOD BY AUTOMATED COUNT: 15.6 % (ref 11.5–14.5)
GLUCOSE SERPL-MCNC: 125 MG/DL (ref 74–99)
HCT VFR BLD AUTO: 17.4 % (ref 36–46)
HGB BLD-MCNC: 6 G/DL (ref 12–16)
IMM GRANULOCYTES # BLD AUTO: 0 X10*3/UL (ref 0–0.7)
IMM GRANULOCYTES NFR BLD AUTO: 0 % (ref 0–0.9)
LYMPHOCYTES # BLD AUTO: 0.08 X10*3/UL (ref 1.2–4.8)
LYMPHOCYTES NFR BLD AUTO: 88.9 %
MCH RBC QN AUTO: 31.9 PG (ref 26–34)
MCHC RBC AUTO-ENTMCNC: 34.5 G/DL (ref 32–36)
MCV RBC AUTO: 93 FL (ref 80–100)
MONOCYTES # BLD AUTO: 0 X10*3/UL (ref 0.1–1)
MONOCYTES NFR BLD AUTO: 0 %
NEUTROPHILS # BLD AUTO: 0.01 X10*3/UL (ref 1.2–7.7)
NEUTROPHILS NFR BLD AUTO: 11.1 %
NRBC BLD-RTO: 0 /100 WBCS (ref 0–0)
PLATELET # BLD AUTO: 3 X10*3/UL (ref 150–450)
POTASSIUM SERPL-SCNC: 3.6 MMOL/L (ref 3.5–5.3)
PRODUCT BLOOD TYPE: 5100
PRODUCT BLOOD TYPE: 5100
PRODUCT CODE: NORMAL
PRODUCT CODE: NORMAL
PROT SERPL-MCNC: 6.4 G/DL (ref 6.4–8.2)
RBC # BLD AUTO: 1.88 X10*6/UL (ref 4–5.2)
RH FACTOR (ANTIGEN D): NORMAL
SODIUM SERPL-SCNC: 140 MMOL/L (ref 136–145)
UNIT ABO: NORMAL
UNIT ABO: NORMAL
UNIT NUMBER: NORMAL
UNIT NUMBER: NORMAL
UNIT RH: NORMAL
UNIT RH: NORMAL
UNIT VOLUME: 189
UNIT VOLUME: 350
WBC # BLD AUTO: 0.1 X10*3/UL (ref 4.4–11.3)
XM INTEP: NORMAL

## 2024-12-14 PROCEDURE — 86923 COMPATIBILITY TEST ELECTRIC: CPT

## 2024-12-14 PROCEDURE — 85025 COMPLETE CBC W/AUTO DIFF WBC: CPT | Performed by: INTERNAL MEDICINE

## 2024-12-14 PROCEDURE — 84075 ASSAY ALKALINE PHOSPHATASE: CPT | Performed by: INTERNAL MEDICINE

## 2024-12-14 PROCEDURE — 36430 TRANSFUSION BLD/BLD COMPNT: CPT

## 2024-12-14 PROCEDURE — 86900 BLOOD TYPING SEROLOGIC ABO: CPT

## 2024-12-14 RX ORDER — HEPARIN SODIUM,PORCINE/PF 10 UNIT/ML
50 SYRINGE (ML) INTRAVENOUS AS NEEDED
Status: DISCONTINUED | OUTPATIENT
Start: 2024-12-14 | End: 2024-12-14 | Stop reason: HOSPADM

## 2024-12-14 RX ORDER — FAMOTIDINE 10 MG/ML
20 INJECTION INTRAVENOUS ONCE AS NEEDED
Status: CANCELLED | OUTPATIENT
Start: 2024-12-14

## 2024-12-14 RX ORDER — EPINEPHRINE 0.3 MG/.3ML
0.3 INJECTION SUBCUTANEOUS EVERY 5 MIN PRN
Status: CANCELLED | OUTPATIENT
Start: 2024-12-14

## 2024-12-14 RX ORDER — HEPARIN SODIUM,PORCINE/PF 10 UNIT/ML
50 SYRINGE (ML) INTRAVENOUS AS NEEDED
OUTPATIENT
Start: 2024-12-14

## 2024-12-14 RX ORDER — FAMOTIDINE 10 MG/ML
20 INJECTION INTRAVENOUS ONCE AS NEEDED
OUTPATIENT
Start: 2024-12-14

## 2024-12-14 RX ORDER — ALBUTEROL SULFATE 0.83 MG/ML
3 SOLUTION RESPIRATORY (INHALATION) AS NEEDED
OUTPATIENT
Start: 2024-12-14

## 2024-12-14 RX ORDER — DIPHENHYDRAMINE HYDROCHLORIDE 50 MG/ML
50 INJECTION INTRAMUSCULAR; INTRAVENOUS AS NEEDED
OUTPATIENT
Start: 2024-12-14

## 2024-12-14 RX ORDER — DIPHENHYDRAMINE HYDROCHLORIDE 50 MG/ML
50 INJECTION INTRAMUSCULAR; INTRAVENOUS AS NEEDED
Status: CANCELLED | OUTPATIENT
Start: 2024-12-14

## 2024-12-14 RX ORDER — HEPARIN 100 UNIT/ML
500 SYRINGE INTRAVENOUS AS NEEDED
Status: DISCONTINUED | OUTPATIENT
Start: 2024-12-14 | End: 2024-12-14 | Stop reason: HOSPADM

## 2024-12-14 RX ORDER — EPINEPHRINE 0.3 MG/.3ML
0.3 INJECTION SUBCUTANEOUS EVERY 5 MIN PRN
OUTPATIENT
Start: 2024-12-14

## 2024-12-14 RX ORDER — ALBUTEROL SULFATE 0.83 MG/ML
3 SOLUTION RESPIRATORY (INHALATION) AS NEEDED
Status: CANCELLED | OUTPATIENT
Start: 2024-12-14

## 2024-12-14 RX ORDER — HEPARIN 100 UNIT/ML
500 SYRINGE INTRAVENOUS AS NEEDED
OUTPATIENT
Start: 2024-12-14

## 2024-12-14 ASSESSMENT — PAIN SCALES - GENERAL: PAINLEVEL_OUTOF10: 0-NO PAIN

## 2024-12-16 ENCOUNTER — INFUSION (OUTPATIENT)
Dept: HEMATOLOGY/ONCOLOGY | Facility: CLINIC | Age: 24
End: 2024-12-16
Payer: COMMERCIAL

## 2024-12-16 ENCOUNTER — DOCUMENTATION (OUTPATIENT)
Dept: HEMATOLOGY/ONCOLOGY | Facility: HOSPITAL | Age: 24
End: 2024-12-16
Payer: COMMERCIAL

## 2024-12-16 VITALS
TEMPERATURE: 99.1 F | RESPIRATION RATE: 16 BRPM | BODY MASS INDEX: 24.89 KG/M2 | WEIGHT: 149.58 LBS | HEART RATE: 86 BPM | SYSTOLIC BLOOD PRESSURE: 115 MMHG | OXYGEN SATURATION: 98 % | DIASTOLIC BLOOD PRESSURE: 58 MMHG

## 2024-12-16 DIAGNOSIS — C92.00 ACUTE MYELOID LEUKEMIA NOT HAVING ACHIEVED REMISSION (MULTI): ICD-10-CM

## 2024-12-16 DIAGNOSIS — C92.01 ACUTE MYELOID LEUKEMIA IN REMISSION (MULTI): ICD-10-CM

## 2024-12-16 DIAGNOSIS — N94.89 MENSTRUAL SUPPRESSION: ICD-10-CM

## 2024-12-16 LAB
BASOPHILS # BLD AUTO: 0 X10*3/UL (ref 0–0.1)
BASOPHILS NFR BLD AUTO: 0 %
BLOOD EXPIRATION DATE: NORMAL
DISPENSE STATUS: NORMAL
EOSINOPHIL # BLD AUTO: 0.01 X10*3/UL (ref 0–0.7)
EOSINOPHIL NFR BLD AUTO: 4.3 %
ERYTHROCYTE [DISTWIDTH] IN BLOOD BY AUTOMATED COUNT: 14.7 % (ref 11.5–14.5)
HCT VFR BLD AUTO: 18.3 % (ref 36–46)
HGB BLD-MCNC: 6.4 G/DL (ref 12–16)
IMM GRANULOCYTES # BLD AUTO: 0.02 X10*3/UL (ref 0–0.7)
IMM GRANULOCYTES NFR BLD AUTO: 8.7 % (ref 0–0.9)
LYMPHOCYTES # BLD AUTO: 0.12 X10*3/UL (ref 1.2–4.8)
LYMPHOCYTES NFR BLD AUTO: 52.2 %
MCH RBC QN AUTO: 32.3 PG (ref 26–34)
MCHC RBC AUTO-ENTMCNC: 35 G/DL (ref 32–36)
MCV RBC AUTO: 92 FL (ref 80–100)
MONOCYTES # BLD AUTO: 0.05 X10*3/UL (ref 0.1–1)
MONOCYTES NFR BLD AUTO: 21.7 %
NEUTROPHILS # BLD AUTO: 0.03 X10*3/UL (ref 1.2–7.7)
NEUTROPHILS NFR BLD AUTO: 13.1 %
NRBC BLD-RTO: ABNORMAL /100{WBCS}
PLATELET # BLD AUTO: 13 X10*3/UL (ref 150–450)
PRODUCT BLOOD TYPE: 5100
PRODUCT CODE: NORMAL
RBC # BLD AUTO: 1.98 X10*6/UL (ref 4–5.2)
UNIT ABO: NORMAL
UNIT NUMBER: NORMAL
UNIT RH: NORMAL
UNIT VOLUME: 350
WBC # BLD AUTO: 0.2 X10*3/UL (ref 4.4–11.3)
XM INTEP: NORMAL

## 2024-12-16 PROCEDURE — 85025 COMPLETE CBC W/AUTO DIFF WBC: CPT

## 2024-12-16 PROCEDURE — P9040 RBC LEUKOREDUCED IRRADIATED: HCPCS

## 2024-12-16 PROCEDURE — 36430 TRANSFUSION BLD/BLD COMPNT: CPT

## 2024-12-16 PROCEDURE — 2500000004 HC RX 250 GENERAL PHARMACY W/ HCPCS (ALT 636 FOR OP/ED)

## 2024-12-16 RX ORDER — EPINEPHRINE 0.3 MG/.3ML
0.3 INJECTION SUBCUTANEOUS EVERY 5 MIN PRN
Status: CANCELLED | OUTPATIENT
Start: 2024-12-16

## 2024-12-16 RX ORDER — FAMOTIDINE 10 MG/ML
20 INJECTION INTRAVENOUS ONCE AS NEEDED
Status: CANCELLED | OUTPATIENT
Start: 2024-12-16

## 2024-12-16 RX ORDER — ALBUTEROL SULFATE 0.83 MG/ML
3 SOLUTION RESPIRATORY (INHALATION) AS NEEDED
Status: CANCELLED | OUTPATIENT
Start: 2024-12-16

## 2024-12-16 RX ORDER — DIPHENHYDRAMINE HYDROCHLORIDE 50 MG/ML
50 INJECTION INTRAMUSCULAR; INTRAVENOUS AS NEEDED
Status: CANCELLED | OUTPATIENT
Start: 2024-12-16

## 2024-12-16 RX ORDER — HEPARIN SODIUM,PORCINE/PF 10 UNIT/ML
SYRINGE (ML) INTRAVENOUS
Status: COMPLETED
Start: 2024-12-16 | End: 2024-12-16

## 2024-12-16 ASSESSMENT — PAIN SCALES - GENERAL: PAINLEVEL_OUTOF10: 0-NO PAIN

## 2024-12-16 NOTE — PROGRESS NOTES
Notified by Tavo Valentino in Lab Services that WBC 0.2 HGB 6.4 PLT 13. Secure Chat sent to Mike Bennett PA-C and Infusion RN.

## 2024-12-17 ENCOUNTER — TELEPHONE (OUTPATIENT)
Dept: INFUSION THERAPY | Age: 24
End: 2024-12-17
Payer: COMMERCIAL

## 2024-12-19 ENCOUNTER — DOCUMENTATION (OUTPATIENT)
Dept: HEMATOLOGY/ONCOLOGY | Facility: HOSPITAL | Age: 24
End: 2024-12-19
Payer: COMMERCIAL

## 2024-12-19 ENCOUNTER — INFUSION (OUTPATIENT)
Dept: HEMATOLOGY/ONCOLOGY | Facility: CLINIC | Age: 24
End: 2024-12-19
Payer: COMMERCIAL

## 2024-12-19 VITALS
OXYGEN SATURATION: 100 % | TEMPERATURE: 99 F | BODY MASS INDEX: 25.02 KG/M2 | RESPIRATION RATE: 16 BRPM | WEIGHT: 150.35 LBS | HEART RATE: 82 BPM | DIASTOLIC BLOOD PRESSURE: 62 MMHG | SYSTOLIC BLOOD PRESSURE: 126 MMHG

## 2024-12-19 DIAGNOSIS — C92.01 ACUTE MYELOID LEUKEMIA IN REMISSION (MULTI): ICD-10-CM

## 2024-12-19 DIAGNOSIS — C92.00 ACUTE MYELOID LEUKEMIA NOT HAVING ACHIEVED REMISSION (MULTI): ICD-10-CM

## 2024-12-19 LAB
BASOPHILS # BLD AUTO: 0 X10*3/UL (ref 0–0.1)
BASOPHILS NFR BLD AUTO: 0 %
BLOOD EXPIRATION DATE: NORMAL
DISPENSE STATUS: NORMAL
EOSINOPHIL # BLD AUTO: 0.03 X10*3/UL (ref 0–0.7)
EOSINOPHIL NFR BLD AUTO: 1 %
ERYTHROCYTE [DISTWIDTH] IN BLOOD BY AUTOMATED COUNT: 14.3 % (ref 11.5–14.5)
HCT VFR BLD AUTO: 20.7 % (ref 36–46)
HGB BLD-MCNC: 7.1 G/DL (ref 12–16)
HYPOCHROMIA BLD QL SMEAR: NORMAL
IMM GRANULOCYTES # BLD AUTO: 0.05 X10*3/UL (ref 0–0.7)
IMM GRANULOCYTES NFR BLD AUTO: 1.6 % (ref 0–0.9)
LYMPHOCYTES # BLD AUTO: 0.59 X10*3/UL (ref 1.2–4.8)
LYMPHOCYTES NFR BLD AUTO: 19.2 %
MCH RBC QN AUTO: 31.7 PG (ref 26–34)
MCHC RBC AUTO-ENTMCNC: 34.3 G/DL (ref 32–36)
MCV RBC AUTO: 92 FL (ref 80–100)
MONOCYTES # BLD AUTO: 0.47 X10*3/UL (ref 0.1–1)
MONOCYTES NFR BLD AUTO: 15.3 %
NEUTROPHILS # BLD AUTO: 1.94 X10*3/UL (ref 1.2–7.7)
NEUTROPHILS NFR BLD AUTO: 62.9 %
NRBC BLD-RTO: ABNORMAL /100{WBCS}
PLATELET # BLD AUTO: 9 X10*3/UL (ref 150–450)
POLYCHROMASIA BLD QL SMEAR: NORMAL
PRODUCT BLOOD TYPE: 7300
PRODUCT CODE: NORMAL
RBC # BLD AUTO: 2.24 X10*6/UL (ref 4–5.2)
RBC MORPH BLD: NORMAL
UNIT ABO: NORMAL
UNIT NUMBER: NORMAL
UNIT RH: NORMAL
UNIT VOLUME: 282
WBC # BLD AUTO: 3.1 X10*3/UL (ref 4.4–11.3)

## 2024-12-19 PROCEDURE — 36430 TRANSFUSION BLD/BLD COMPNT: CPT

## 2024-12-19 PROCEDURE — 86901 BLOOD TYPING SEROLOGIC RH(D): CPT

## 2024-12-19 PROCEDURE — 2500000004 HC RX 250 GENERAL PHARMACY W/ HCPCS (ALT 636 FOR OP/ED): Performed by: PHYSICIAN ASSISTANT

## 2024-12-19 PROCEDURE — P9073 PLATELETS PHERESIS PATH REDU: HCPCS

## 2024-12-19 PROCEDURE — 85025 COMPLETE CBC W/AUTO DIFF WBC: CPT

## 2024-12-19 RX ORDER — DIPHENHYDRAMINE HYDROCHLORIDE 50 MG/ML
50 INJECTION INTRAMUSCULAR; INTRAVENOUS AS NEEDED
Status: CANCELLED | OUTPATIENT
Start: 2024-12-19

## 2024-12-19 RX ORDER — ALBUTEROL SULFATE 0.83 MG/ML
3 SOLUTION RESPIRATORY (INHALATION) AS NEEDED
Status: CANCELLED | OUTPATIENT
Start: 2024-12-19

## 2024-12-19 RX ORDER — FAMOTIDINE 10 MG/ML
20 INJECTION INTRAVENOUS ONCE AS NEEDED
Status: CANCELLED | OUTPATIENT
Start: 2024-12-19

## 2024-12-19 RX ORDER — HEPARIN 100 UNIT/ML
500 SYRINGE INTRAVENOUS AS NEEDED
Status: CANCELLED | OUTPATIENT
Start: 2024-12-19

## 2024-12-19 RX ORDER — HEPARIN SODIUM,PORCINE/PF 10 UNIT/ML
50 SYRINGE (ML) INTRAVENOUS AS NEEDED
Status: DISCONTINUED | OUTPATIENT
Start: 2024-12-19 | End: 2024-12-19 | Stop reason: HOSPADM

## 2024-12-19 RX ORDER — HEPARIN SODIUM,PORCINE/PF 10 UNIT/ML
50 SYRINGE (ML) INTRAVENOUS AS NEEDED
Status: CANCELLED | OUTPATIENT
Start: 2024-12-19

## 2024-12-19 RX ORDER — EPINEPHRINE 0.3 MG/.3ML
0.3 INJECTION SUBCUTANEOUS EVERY 5 MIN PRN
Status: CANCELLED | OUTPATIENT
Start: 2024-12-19

## 2024-12-19 ASSESSMENT — PAIN SCALES - GENERAL: PAINLEVEL_OUTOF10: 0-NO PAIN

## 2024-12-19 NOTE — TELEPHONE ENCOUNTER
Spoke with patient who stated they had sufficient cath care supplies for now for their DL Bailon and requested a call back in 2-3 weeks. Follow up on ~1/8.

## 2024-12-20 LAB
ABO GROUP (TYPE) IN BLOOD: NORMAL
ANTIBODY SCREEN: NORMAL
RH FACTOR (ANTIGEN D): NORMAL

## 2024-12-21 ENCOUNTER — LAB (OUTPATIENT)
Dept: HEMATOLOGY/ONCOLOGY | Facility: HOSPITAL | Age: 24
End: 2024-12-21
Payer: COMMERCIAL

## 2024-12-21 VITALS
WEIGHT: 153.1 LBS | HEART RATE: 71 BPM | SYSTOLIC BLOOD PRESSURE: 122 MMHG | RESPIRATION RATE: 18 BRPM | OXYGEN SATURATION: 100 % | DIASTOLIC BLOOD PRESSURE: 63 MMHG | TEMPERATURE: 99.1 F | BODY MASS INDEX: 25.48 KG/M2

## 2024-12-21 DIAGNOSIS — C92.01 ACUTE MYELOID LEUKEMIA IN REMISSION (MULTI): ICD-10-CM

## 2024-12-21 DIAGNOSIS — C92.00 ACUTE MYELOID LEUKEMIA NOT HAVING ACHIEVED REMISSION (MULTI): ICD-10-CM

## 2024-12-21 LAB
ABO GROUP (TYPE) IN BLOOD: NORMAL
ALBUMIN SERPL BCP-MCNC: 4 G/DL (ref 3.4–5)
ALP SERPL-CCNC: 94 U/L (ref 33–110)
ALT SERPL W P-5'-P-CCNC: 21 U/L (ref 7–45)
ANION GAP SERPL CALC-SCNC: 9 MMOL/L (ref 10–20)
ANION GAP SERPL CALC-SCNC: 9 MMOL/L (ref 10–20)
ANTIBODY SCREEN: NORMAL
AST SERPL W P-5'-P-CCNC: 14 U/L (ref 9–39)
BASOPHILS # BLD AUTO: 0 X10*3/UL (ref 0–0.1)
BASOPHILS NFR BLD AUTO: 0 %
BILIRUB SERPL-MCNC: 0.4 MG/DL (ref 0–1.2)
BUN SERPL-MCNC: 12 MG/DL (ref 6–23)
BUN SERPL-MCNC: 12 MG/DL (ref 6–23)
CALCIUM SERPL-MCNC: 9 MG/DL (ref 8.6–10.6)
CALCIUM SERPL-MCNC: 9 MG/DL (ref 8.6–10.6)
CHLORIDE SERPL-SCNC: 108 MMOL/L (ref 98–107)
CHLORIDE SERPL-SCNC: 108 MMOL/L (ref 98–107)
CO2 SERPL-SCNC: 27 MMOL/L (ref 21–32)
CO2 SERPL-SCNC: 27 MMOL/L (ref 21–32)
CREAT SERPL-MCNC: 0.93 MG/DL (ref 0.5–1.05)
CREAT SERPL-MCNC: 0.93 MG/DL (ref 0.5–1.05)
EGFRCR SERPLBLD CKD-EPI 2021: 89 ML/MIN/1.73M*2
EGFRCR SERPLBLD CKD-EPI 2021: 89 ML/MIN/1.73M*2
EOSINOPHIL # BLD AUTO: 0.02 X10*3/UL (ref 0–0.7)
EOSINOPHIL NFR BLD AUTO: 0.6 %
ERYTHROCYTE [DISTWIDTH] IN BLOOD BY AUTOMATED COUNT: 14 % (ref 11.5–14.5)
GLUCOSE SERPL-MCNC: 92 MG/DL (ref 74–99)
GLUCOSE SERPL-MCNC: 92 MG/DL (ref 74–99)
HCT VFR BLD AUTO: 20.2 % (ref 36–46)
HGB BLD-MCNC: 7.2 G/DL (ref 12–16)
IMM GRANULOCYTES # BLD AUTO: 0.06 X10*3/UL (ref 0–0.7)
IMM GRANULOCYTES NFR BLD AUTO: 1.8 % (ref 0–0.9)
LYMPHOCYTES # BLD AUTO: 0.58 X10*3/UL (ref 1.2–4.8)
LYMPHOCYTES NFR BLD AUTO: 17.2 %
MCH RBC QN AUTO: 31.7 PG (ref 26–34)
MCHC RBC AUTO-ENTMCNC: 35.6 G/DL (ref 32–36)
MCV RBC AUTO: 89 FL (ref 80–100)
MONOCYTES # BLD AUTO: 0.57 X10*3/UL (ref 0.1–1)
MONOCYTES NFR BLD AUTO: 16.9 %
NEUTROPHILS # BLD AUTO: 2.15 X10*3/UL (ref 1.2–7.7)
NEUTROPHILS NFR BLD AUTO: 63.5 %
NRBC BLD-RTO: 0 /100 WBCS (ref 0–0)
PLATELET # BLD AUTO: 28 X10*3/UL (ref 150–450)
POLYCHROMASIA BLD QL SMEAR: NORMAL
POTASSIUM SERPL-SCNC: 4 MMOL/L (ref 3.5–5.3)
POTASSIUM SERPL-SCNC: 4 MMOL/L (ref 3.5–5.3)
PROT SERPL-MCNC: 5.9 G/DL (ref 6.4–8.2)
RBC # BLD AUTO: 2.27 X10*6/UL (ref 4–5.2)
RBC MORPH BLD: NORMAL
RH FACTOR (ANTIGEN D): NORMAL
SCHISTOCYTES BLD QL SMEAR: NORMAL
SODIUM SERPL-SCNC: 140 MMOL/L (ref 136–145)
SODIUM SERPL-SCNC: 140 MMOL/L (ref 136–145)
WBC # BLD AUTO: 3.4 X10*3/UL (ref 4.4–11.3)

## 2024-12-21 PROCEDURE — 80053 COMPREHEN METABOLIC PANEL: CPT

## 2024-12-21 PROCEDURE — 2500000004 HC RX 250 GENERAL PHARMACY W/ HCPCS (ALT 636 FOR OP/ED): Performed by: PHYSICIAN ASSISTANT

## 2024-12-21 PROCEDURE — 36591 DRAW BLOOD OFF VENOUS DEVICE: CPT

## 2024-12-21 PROCEDURE — 86850 RBC ANTIBODY SCREEN: CPT

## 2024-12-21 PROCEDURE — 85025 COMPLETE CBC W/AUTO DIFF WBC: CPT

## 2024-12-21 RX ORDER — DIPHENHYDRAMINE HYDROCHLORIDE 50 MG/ML
50 INJECTION INTRAMUSCULAR; INTRAVENOUS AS NEEDED
OUTPATIENT
Start: 2024-12-21

## 2024-12-21 RX ORDER — HEPARIN SODIUM,PORCINE/PF 10 UNIT/ML
50 SYRINGE (ML) INTRAVENOUS AS NEEDED
OUTPATIENT
Start: 2024-12-21

## 2024-12-21 RX ORDER — FAMOTIDINE 10 MG/ML
20 INJECTION INTRAVENOUS ONCE AS NEEDED
OUTPATIENT
Start: 2024-12-21

## 2024-12-21 RX ORDER — HEPARIN SODIUM,PORCINE/PF 10 UNIT/ML
50 SYRINGE (ML) INTRAVENOUS AS NEEDED
Status: DISCONTINUED | OUTPATIENT
Start: 2024-12-21 | End: 2024-12-21 | Stop reason: HOSPADM

## 2024-12-21 RX ORDER — EPINEPHRINE 0.3 MG/.3ML
0.3 INJECTION SUBCUTANEOUS EVERY 5 MIN PRN
OUTPATIENT
Start: 2024-12-21

## 2024-12-21 RX ORDER — HEPARIN 100 UNIT/ML
500 SYRINGE INTRAVENOUS AS NEEDED
OUTPATIENT
Start: 2024-12-21

## 2024-12-21 RX ORDER — ALBUTEROL SULFATE 0.83 MG/ML
3 SOLUTION RESPIRATORY (INHALATION) AS NEEDED
OUTPATIENT
Start: 2024-12-21

## 2024-12-21 NOTE — PROGRESS NOTES
Pt here for count check. Labs drawn and sent. T&S sent. No need for transfusion. Dressing changed and heparin locked per protocol. Given follow up and d/c'd home.

## 2024-12-23 ENCOUNTER — LAB (OUTPATIENT)
Dept: HEMATOLOGY/ONCOLOGY | Facility: CLINIC | Age: 24
End: 2024-12-23
Payer: COMMERCIAL

## 2024-12-23 VITALS
TEMPERATURE: 97.7 F | RESPIRATION RATE: 16 BRPM | SYSTOLIC BLOOD PRESSURE: 122 MMHG | WEIGHT: 152.56 LBS | OXYGEN SATURATION: 99 % | HEART RATE: 94 BPM | BODY MASS INDEX: 25.39 KG/M2 | DIASTOLIC BLOOD PRESSURE: 73 MMHG

## 2024-12-23 DIAGNOSIS — C92.01 ACUTE MYELOID LEUKEMIA IN REMISSION (MULTI): ICD-10-CM

## 2024-12-23 DIAGNOSIS — C92.00 ACUTE MYELOID LEUKEMIA NOT HAVING ACHIEVED REMISSION (MULTI): ICD-10-CM

## 2024-12-23 LAB
BASOPHILS # BLD AUTO: 0.01 X10*3/UL (ref 0–0.1)
BASOPHILS NFR BLD AUTO: 0.3 %
EOSINOPHIL # BLD AUTO: 0.01 X10*3/UL (ref 0–0.7)
EOSINOPHIL NFR BLD AUTO: 0.3 %
ERYTHROCYTE [DISTWIDTH] IN BLOOD BY AUTOMATED COUNT: 14 % (ref 11.5–14.5)
HCT VFR BLD AUTO: 21.8 % (ref 36–46)
HGB BLD-MCNC: 7.5 G/DL (ref 12–16)
IMM GRANULOCYTES # BLD AUTO: 0.06 X10*3/UL (ref 0–0.7)
IMM GRANULOCYTES NFR BLD AUTO: 1.5 % (ref 0–0.9)
LYMPHOCYTES # BLD AUTO: 0.7 X10*3/UL (ref 1.2–4.8)
LYMPHOCYTES NFR BLD AUTO: 18 %
MCH RBC QN AUTO: 31.6 PG (ref 26–34)
MCHC RBC AUTO-ENTMCNC: 34.4 G/DL (ref 32–36)
MCV RBC AUTO: 92 FL (ref 80–100)
MONOCYTES # BLD AUTO: 0.61 X10*3/UL (ref 0.1–1)
MONOCYTES NFR BLD AUTO: 15.7 %
NEUTROPHILS # BLD AUTO: 2.5 X10*3/UL (ref 1.2–7.7)
NEUTROPHILS NFR BLD AUTO: 64.2 %
NRBC BLD-RTO: ABNORMAL /100{WBCS}
PLATELET # BLD AUTO: 50 X10*3/UL (ref 150–450)
POLYCHROMASIA BLD QL SMEAR: NORMAL
RBC # BLD AUTO: 2.37 X10*6/UL (ref 4–5.2)
RBC MORPH BLD: NORMAL
WBC # BLD AUTO: 3.9 X10*3/UL (ref 4.4–11.3)

## 2024-12-23 PROCEDURE — 85025 COMPLETE CBC W/AUTO DIFF WBC: CPT

## 2024-12-23 PROCEDURE — 36591 DRAW BLOOD OFF VENOUS DEVICE: CPT

## 2024-12-23 ASSESSMENT — PAIN SCALES - GENERAL: PAINLEVEL_OUTOF10: 0-NO PAIN

## 2024-12-26 ENCOUNTER — APPOINTMENT (OUTPATIENT)
Dept: HEMATOLOGY/ONCOLOGY | Facility: CLINIC | Age: 24
End: 2024-12-26
Payer: COMMERCIAL

## 2024-12-27 ENCOUNTER — DOCUMENTATION (OUTPATIENT)
Dept: HEMATOLOGY/ONCOLOGY | Facility: HOSPITAL | Age: 24
End: 2024-12-27
Payer: COMMERCIAL

## 2024-12-27 NOTE — PROGRESS NOTES
30 day medical certification  [employer] [place]  Completed and faxed, with successful receipt 12/27/2024.    Called patient phone not taking calls. Call made to patients mother left message with receipts and fax completed.

## 2024-12-28 ENCOUNTER — INFUSION (OUTPATIENT)
Dept: HEMATOLOGY/ONCOLOGY | Facility: HOSPITAL | Age: 24
End: 2024-12-28
Payer: COMMERCIAL

## 2024-12-28 DIAGNOSIS — C92.01 ACUTE MYELOID LEUKEMIA IN REMISSION (MULTI): ICD-10-CM

## 2024-12-28 PROCEDURE — 2500000004 HC RX 250 GENERAL PHARMACY W/ HCPCS (ALT 636 FOR OP/ED): Performed by: PHYSICIAN ASSISTANT

## 2024-12-28 PROCEDURE — 96523 IRRIG DRUG DELIVERY DEVICE: CPT

## 2024-12-28 RX ORDER — HEPARIN SODIUM,PORCINE/PF 10 UNIT/ML
50 SYRINGE (ML) INTRAVENOUS AS NEEDED
Status: DISCONTINUED | OUTPATIENT
Start: 2024-12-28 | End: 2024-12-28 | Stop reason: HOSPADM

## 2024-12-28 RX ORDER — HEPARIN SODIUM,PORCINE/PF 10 UNIT/ML
50 SYRINGE (ML) INTRAVENOUS AS NEEDED
OUTPATIENT
Start: 2024-12-28

## 2024-12-28 RX ORDER — HEPARIN 100 UNIT/ML
500 SYRINGE INTRAVENOUS AS NEEDED
OUTPATIENT
Start: 2024-12-28

## 2024-12-28 NOTE — PROGRESS NOTES
Pt arrived to [facility] infusion for dressing change. Patient reported dressing and insertion site got wet, so patient applied tape around site for time being. Upon removal of dressing mild redness around insertion. However looks more like typical insertion site related redness. Patient denies any pain, warmth, or itchiness. RN notified team. RN educated patient to reach out to team if any signs and symptoms of infection occur. Pt verbalized understanding and dc from unit in safe condition.

## 2024-12-31 ENCOUNTER — APPOINTMENT (OUTPATIENT)
Dept: HEMATOLOGY/ONCOLOGY | Facility: HOSPITAL | Age: 24
End: 2024-12-31
Payer: MEDICAID

## 2024-12-31 ENCOUNTER — LAB (OUTPATIENT)
Dept: HEMATOLOGY/ONCOLOGY | Facility: HOSPITAL | Age: 24
End: 2024-12-31
Payer: MEDICAID

## 2024-12-31 ENCOUNTER — OFFICE VISIT (OUTPATIENT)
Dept: HEMATOLOGY/ONCOLOGY | Facility: HOSPITAL | Age: 24
End: 2024-12-31
Payer: MEDICAID

## 2024-12-31 VITALS
OXYGEN SATURATION: 100 % | WEIGHT: 149.25 LBS | DIASTOLIC BLOOD PRESSURE: 73 MMHG | BODY MASS INDEX: 24.84 KG/M2 | RESPIRATION RATE: 16 BRPM | HEART RATE: 97 BPM | SYSTOLIC BLOOD PRESSURE: 133 MMHG | TEMPERATURE: 97 F

## 2024-12-31 DIAGNOSIS — C92.00 ACUTE MYELOID LEUKEMIA NOT HAVING ACHIEVED REMISSION (MULTI): ICD-10-CM

## 2024-12-31 DIAGNOSIS — D84.9 IMMUNOSUPPRESSED STATUS: ICD-10-CM

## 2024-12-31 DIAGNOSIS — C92.01 ACUTE MYELOID LEUKEMIA IN REMISSION (MULTI): ICD-10-CM

## 2024-12-31 DIAGNOSIS — R09.81 NASAL CONGESTION: Primary | ICD-10-CM

## 2024-12-31 LAB
ALBUMIN SERPL BCP-MCNC: 4.6 G/DL (ref 3.4–5)
ALP SERPL-CCNC: 89 U/L (ref 33–110)
ALT SERPL W P-5'-P-CCNC: 19 U/L (ref 7–45)
ANION GAP SERPL CALC-SCNC: 13 MMOL/L (ref 10–20)
AST SERPL W P-5'-P-CCNC: 17 U/L (ref 9–39)
BASOPHILS # BLD AUTO: 0.01 X10*3/UL (ref 0–0.1)
BASOPHILS NFR BLD AUTO: 0.3 %
BILIRUB SERPL-MCNC: 1 MG/DL (ref 0–1.2)
BUN SERPL-MCNC: 10 MG/DL (ref 6–23)
CALCIUM SERPL-MCNC: 9.6 MG/DL (ref 8.6–10.3)
CHLORIDE SERPL-SCNC: 106 MMOL/L (ref 98–107)
CO2 SERPL-SCNC: 27 MMOL/L (ref 21–32)
CREAT SERPL-MCNC: 0.73 MG/DL (ref 0.5–1.05)
DACRYOCYTES BLD QL SMEAR: NORMAL
EGFRCR SERPLBLD CKD-EPI 2021: >90 ML/MIN/1.73M*2
EOSINOPHIL # BLD AUTO: 0 X10*3/UL (ref 0–0.7)
EOSINOPHIL NFR BLD AUTO: 0 %
ERYTHROCYTE [DISTWIDTH] IN BLOOD BY AUTOMATED COUNT: 22.7 % (ref 11.5–14.5)
FLUAV RNA RESP QL NAA+PROBE: NOT DETECTED
FLUBV RNA RESP QL NAA+PROBE: NOT DETECTED
GLUCOSE SERPL-MCNC: 96 MG/DL (ref 74–99)
HADV DNA SPEC QL NAA+PROBE: NOT DETECTED
HCT VFR BLD AUTO: 25.2 % (ref 36–46)
HGB BLD-MCNC: 8.5 G/DL (ref 12–16)
HMPV RNA SPEC QL NAA+PROBE: NOT DETECTED
HPIV1 RNA SPEC QL NAA+PROBE: NOT DETECTED
HPIV2 RNA SPEC QL NAA+PROBE: NOT DETECTED
HPIV3 RNA SPEC QL NAA+PROBE: NOT DETECTED
HPIV4 RNA SPEC QL NAA+PROBE: NOT DETECTED
HYPOCHROMIA BLD QL SMEAR: NORMAL
IMM GRANULOCYTES # BLD AUTO: 0.01 X10*3/UL (ref 0–0.7)
IMM GRANULOCYTES NFR BLD AUTO: 0.3 % (ref 0–0.9)
LYMPHOCYTES # BLD AUTO: 0.5 X10*3/UL (ref 1.2–4.8)
LYMPHOCYTES NFR BLD AUTO: 14.4 %
MCH RBC QN AUTO: 33.2 PG (ref 26–34)
MCHC RBC AUTO-ENTMCNC: 33.7 G/DL (ref 32–36)
MCV RBC AUTO: 98 FL (ref 80–100)
MONOCYTES # BLD AUTO: 0.4 X10*3/UL (ref 0.1–1)
MONOCYTES NFR BLD AUTO: 11.5 %
NEUTROPHILS # BLD AUTO: 2.55 X10*3/UL (ref 1.2–7.7)
NEUTROPHILS NFR BLD AUTO: 73.5 %
NRBC BLD-RTO: 0 /100 WBCS (ref 0–0)
PLATELET # BLD AUTO: 109 X10*3/UL (ref 150–450)
PLATELET CLUMP BLD QL SMEAR: PRESENT
POLYCHROMASIA BLD QL SMEAR: NORMAL
POTASSIUM SERPL-SCNC: 3.7 MMOL/L (ref 3.5–5.3)
PROT SERPL-MCNC: 6.9 G/DL (ref 6.4–8.2)
RBC # BLD AUTO: 2.56 X10*6/UL (ref 4–5.2)
RBC MORPH BLD: NORMAL
RHINOVIRUS RNA UPPER RESP QL NAA+PROBE: NOT DETECTED
RSV RNA RESP QL NAA+PROBE: DETECTED
SARS-COV-2 RNA RESP QL NAA+PROBE: NOT DETECTED
SCHISTOCYTES BLD QL SMEAR: NORMAL
SODIUM SERPL-SCNC: 142 MMOL/L (ref 136–145)
WBC # BLD AUTO: 3.5 X10*3/UL (ref 4.4–11.3)

## 2024-12-31 PROCEDURE — 80053 COMPREHEN METABOLIC PANEL: CPT

## 2024-12-31 PROCEDURE — 87631 RESP VIRUS 3-5 TARGETS: CPT | Performed by: STUDENT IN AN ORGANIZED HEALTH CARE EDUCATION/TRAINING PROGRAM

## 2024-12-31 PROCEDURE — 87634 RSV DNA/RNA AMP PROBE: CPT | Performed by: STUDENT IN AN ORGANIZED HEALTH CARE EDUCATION/TRAINING PROGRAM

## 2024-12-31 PROCEDURE — 87486 CHLMYD PNEUM DNA AMP PROBE: CPT | Performed by: STUDENT IN AN ORGANIZED HEALTH CARE EDUCATION/TRAINING PROGRAM

## 2024-12-31 PROCEDURE — 99215 OFFICE O/P EST HI 40 MIN: CPT | Performed by: STUDENT IN AN ORGANIZED HEALTH CARE EDUCATION/TRAINING PROGRAM

## 2024-12-31 PROCEDURE — 2500000004 HC RX 250 GENERAL PHARMACY W/ HCPCS (ALT 636 FOR OP/ED): Mod: SE | Performed by: PHYSICIAN ASSISTANT

## 2024-12-31 PROCEDURE — 36591 DRAW BLOOD OFF VENOUS DEVICE: CPT

## 2024-12-31 PROCEDURE — 85025 COMPLETE CBC W/AUTO DIFF WBC: CPT

## 2024-12-31 PROCEDURE — 87636 SARSCOV2 & INF A&B AMP PRB: CPT | Performed by: STUDENT IN AN ORGANIZED HEALTH CARE EDUCATION/TRAINING PROGRAM

## 2024-12-31 RX ORDER — HEPARIN SODIUM,PORCINE/PF 10 UNIT/ML
50 SYRINGE (ML) INTRAVENOUS AS NEEDED
OUTPATIENT
Start: 2024-12-31

## 2024-12-31 RX ORDER — HEPARIN SODIUM,PORCINE/PF 10 UNIT/ML
50 SYRINGE (ML) INTRAVENOUS AS NEEDED
Status: DISCONTINUED | OUTPATIENT
Start: 2024-12-31 | End: 2024-12-31 | Stop reason: HOSPADM

## 2024-12-31 RX ORDER — HEPARIN 100 UNIT/ML
500 SYRINGE INTRAVENOUS AS NEEDED
OUTPATIENT
Start: 2024-12-31

## 2024-12-31 RX ADMIN — HEPARIN, PORCINE (PF) 10 UNIT/ML INTRAVENOUS SYRINGE 50 UNITS: at 09:36

## 2024-12-31 RX ADMIN — HEPARIN, PORCINE (PF) 10 UNIT/ML INTRAVENOUS SYRINGE 50 UNITS: at 09:41

## 2024-12-31 ASSESSMENT — PAIN SCALES - GENERAL: PAINLEVEL_OUTOF10: 0-NO PAIN

## 2025-01-02 LAB
CHLAMYDIA.PNEUMONIAE PCR, VIRC: NOT DETECTED
MYCOPLASMA.PNEUMONIAE PCR, VIRC: NOT DETECTED

## 2025-01-06 ENCOUNTER — LAB (OUTPATIENT)
Dept: HEMATOLOGY/ONCOLOGY | Facility: HOSPITAL | Age: 25
End: 2025-01-06
Payer: COMMERCIAL

## 2025-01-06 ENCOUNTER — OFFICE VISIT (OUTPATIENT)
Dept: HEMATOLOGY/ONCOLOGY | Facility: HOSPITAL | Age: 25
End: 2025-01-06
Payer: COMMERCIAL

## 2025-01-06 VITALS
HEART RATE: 68 BPM | DIASTOLIC BLOOD PRESSURE: 64 MMHG | TEMPERATURE: 98.1 F | RESPIRATION RATE: 18 BRPM | WEIGHT: 155.2 LBS | SYSTOLIC BLOOD PRESSURE: 125 MMHG | OXYGEN SATURATION: 100 % | BODY MASS INDEX: 25.83 KG/M2

## 2025-01-06 DIAGNOSIS — C92.01 ACUTE MYELOID LEUKEMIA IN REMISSION (MULTI): Primary | ICD-10-CM

## 2025-01-06 DIAGNOSIS — C92.00 ACUTE MYELOID LEUKEMIA NOT HAVING ACHIEVED REMISSION (MULTI): ICD-10-CM

## 2025-01-06 DIAGNOSIS — Z51.5 ENCOUNTER FOR PALLIATIVE CARE: ICD-10-CM

## 2025-01-06 DIAGNOSIS — Z91.89 AT RISK FOR INFERTILITY: ICD-10-CM

## 2025-01-06 DIAGNOSIS — C92.01 ACUTE MYELOID LEUKEMIA IN REMISSION (MULTI): ICD-10-CM

## 2025-01-06 DIAGNOSIS — F41.1 GENERALIZED ANXIETY DISORDER: ICD-10-CM

## 2025-01-06 LAB
ALBUMIN SERPL BCP-MCNC: 4.5 G/DL (ref 3.4–5)
ALP SERPL-CCNC: 79 U/L (ref 33–110)
ALT SERPL W P-5'-P-CCNC: 17 U/L (ref 7–45)
ANION GAP SERPL CALC-SCNC: 11 MMOL/L (ref 10–20)
AST SERPL W P-5'-P-CCNC: 17 U/L (ref 9–39)
BASOPHILS # BLD AUTO: 0.02 X10*3/UL (ref 0–0.1)
BASOPHILS NFR BLD AUTO: 0.5 %
BILIRUB SERPL-MCNC: 0.6 MG/DL (ref 0–1.2)
BUN SERPL-MCNC: 10 MG/DL (ref 6–23)
CALCIUM SERPL-MCNC: 9.8 MG/DL (ref 8.6–10.3)
CHLORIDE SERPL-SCNC: 104 MMOL/L (ref 98–107)
CO2 SERPL-SCNC: 31 MMOL/L (ref 21–32)
CREAT SERPL-MCNC: 0.84 MG/DL (ref 0.5–1.05)
DACRYOCYTES BLD QL SMEAR: NORMAL
EGFRCR SERPLBLD CKD-EPI 2021: >90 ML/MIN/1.73M*2
EOSINOPHIL # BLD AUTO: 0.01 X10*3/UL (ref 0–0.7)
EOSINOPHIL NFR BLD AUTO: 0.2 %
ERYTHROCYTE [DISTWIDTH] IN BLOOD BY AUTOMATED COUNT: 23.4 % (ref 11.5–14.5)
GLUCOSE SERPL-MCNC: 83 MG/DL (ref 74–99)
HCT VFR BLD AUTO: 28.9 % (ref 36–46)
HGB BLD-MCNC: 9.8 G/DL (ref 12–16)
HYPOCHROMIA BLD QL SMEAR: NORMAL
IMM GRANULOCYTES # BLD AUTO: 0.02 X10*3/UL (ref 0–0.7)
IMM GRANULOCYTES NFR BLD AUTO: 0.5 % (ref 0–0.9)
LYMPHOCYTES # BLD AUTO: 0.9 X10*3/UL (ref 1.2–4.8)
LYMPHOCYTES NFR BLD AUTO: 21.6 %
MCH RBC QN AUTO: 33.3 PG (ref 26–34)
MCHC RBC AUTO-ENTMCNC: 33.9 G/DL (ref 32–36)
MCV RBC AUTO: 98 FL (ref 80–100)
MONOCYTES # BLD AUTO: 0.46 X10*3/UL (ref 0.1–1)
MONOCYTES NFR BLD AUTO: 11.1 %
NEUTROPHILS # BLD AUTO: 2.75 X10*3/UL (ref 1.2–7.7)
NEUTROPHILS NFR BLD AUTO: 66.1 %
NRBC BLD-RTO: 0 /100 WBCS (ref 0–0)
PLATELET # BLD AUTO: 146 X10*3/UL (ref 150–450)
PLATELET CLUMP BLD QL SMEAR: PRESENT
POLYCHROMASIA BLD QL SMEAR: NORMAL
POTASSIUM SERPL-SCNC: 3.9 MMOL/L (ref 3.5–5.3)
PROT SERPL-MCNC: 6.2 G/DL (ref 6.4–8.2)
RBC # BLD AUTO: 2.94 X10*6/UL (ref 4–5.2)
RBC MORPH BLD: NORMAL
SCHISTOCYTES BLD QL SMEAR: NORMAL
SODIUM SERPL-SCNC: 142 MMOL/L (ref 136–145)
WBC # BLD AUTO: 4.2 X10*3/UL (ref 4.4–11.3)

## 2025-01-06 PROCEDURE — 99215 OFFICE O/P EST HI 40 MIN: CPT | Performed by: NURSE PRACTITIONER

## 2025-01-06 PROCEDURE — 2500000004 HC RX 250 GENERAL PHARMACY W/ HCPCS (ALT 636 FOR OP/ED): Performed by: PHYSICIAN ASSISTANT

## 2025-01-06 PROCEDURE — 36591 DRAW BLOOD OFF VENOUS DEVICE: CPT

## 2025-01-06 PROCEDURE — 85025 COMPLETE CBC W/AUTO DIFF WBC: CPT

## 2025-01-06 PROCEDURE — 1036F TOBACCO NON-USER: CPT | Performed by: NURSE PRACTITIONER

## 2025-01-06 PROCEDURE — 80053 COMPREHEN METABOLIC PANEL: CPT

## 2025-01-06 RX ORDER — HEPARIN SODIUM,PORCINE/PF 10 UNIT/ML
50 SYRINGE (ML) INTRAVENOUS AS NEEDED
Status: DISCONTINUED | OUTPATIENT
Start: 2025-01-06 | End: 2025-01-06 | Stop reason: HOSPADM

## 2025-01-06 RX ORDER — HEPARIN SODIUM,PORCINE/PF 10 UNIT/ML
50 SYRINGE (ML) INTRAVENOUS AS NEEDED
Status: CANCELLED | OUTPATIENT
Start: 2025-01-06

## 2025-01-06 RX ORDER — HEPARIN 100 UNIT/ML
500 SYRINGE INTRAVENOUS AS NEEDED
Status: CANCELLED | OUTPATIENT
Start: 2025-01-06

## 2025-01-06 RX ADMIN — HEPARIN, PORCINE (PF) 10 UNIT/ML INTRAVENOUS SYRINGE 50 UNITS: at 13:46

## 2025-01-06 RX ADMIN — HEPARIN, PORCINE (PF) 10 UNIT/ML INTRAVENOUS SYRINGE 50 UNITS: at 13:50

## 2025-01-06 ASSESSMENT — ENCOUNTER SYMPTOMS
CHEST TIGHTNESS: 0
DEPRESSION: 0
NAUSEA: 0
SLEEP DISTURBANCE: 0
NERVOUS/ANXIOUS: 0
FATIGUE: 1
SHORTNESS OF BREATH: 0
COUGH: 1
HOT FLASHES: 0
FEVER: 0

## 2025-01-06 ASSESSMENT — PAIN SCALES - GENERAL: PAINLEVEL_OUTOF10: 0-NO PAIN

## 2025-01-06 NOTE — PROGRESS NOTES
Patient ID: Ilda Peter is a 24 y.o. female.  Referring Physician: Nessa Peña, APRN-CNP  [address] [place] [address]  Primary Care Provider: DO Fer Weiner    Ilda Peter is a 23 y.o. with a diagnosis of acute myeloid leukemia, returns today in follow up regarding issues unique to being a young adult with cancer.    Returns today in follow up, s/p 3/4 cycles of HIDAC consolidation with high dose cytarabine. Repeat MRD testing after 1 cycle of consolidation was negative for disease and current plan is to proceed with chemotherapy alone. Will receive C4 next week D1 01/14/25.     Overall she relays feeling fair. Notes some decrease in energy related to son's hospitalization for RSV and poor sleep. She is eating/drinking well, weight is stable. Continues to receive monthly lupron/eligard for ovarian suppression/menstrual suppression - next dose due later this week. No further menses - last bleeding early Nov. Using barrier method for pregnancy prevention and to protect partner from exposure. Notes some sexual dysfunction from lupron but not impacting her QOL.     Emotionally, relays persistent increase in irritability and anger, hard for her to get out of bed in the morning and get moving. Just trying to get through the day to day of parenting/cancer. Remains on zoloft, does also use marijuana to help with coping - is smoking less/trying to transition to another form of marijuana to avoid smoking. Uncertain if/when she'll feel ready to process emotions.     Social History: Grew up in and lives in [place] with her two children. Is close to parents. Works as a supervisor at [employer]. Is in a relationship, boyfriend is the father of her 1 year old son Tay, she also has a 4 1/2 year old son from a previous relationship, Sergio. She is a never smoker, she does not vape, she does not drink alcohol, she does use marijuana, smokes daily.      Past Medical History  She has a past  medical history of AML (acute myeloblastic leukemia) (Multi), Anxiety, Cannabis dependence (Multi) (07/02/2024), Chronic atrophic rhinitis (07/02/2024), Depression, Hyperbilirubinemia, and Tinea versicolor (07/02/2024).     Surgical History  She has a past surgical history that includes [place] tooth extraction.  Review of Systems   Constitutional:  Positive for fatigue. Negative for fever.   Respiratory:  Positive for cough. Negative for chest tightness and shortness of breath.    Gastrointestinal:  Negative for nausea.   Endocrine: Negative for hot flashes.   Genitourinary:  Negative for menstrual problem.    Psychiatric/Behavioral:  Negative for depression and sleep disturbance. The patient is not nervous/anxious.       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 (>=10% blasts)  [id number] AML Risk Stratification: FAVORABLE: Mutated NPM1 without FLT3-ITD  Presented with anemia and circulating blasts    BONE MARROW (07/05/2024)  Hypercellular with erythroid predominance, dyserythropoiesis, and dysmegakaryopoiesis  FISH: AML negative  KARYOTYPE:  46XX [20]  MYELOID NGS: NPM1 (54%), NRAS (25%), PTPN11 (15%), IDH1 (2%)       7/19/2024 -  Chemotherapy    Induction with 7+3 (AraC+tarun) -> CR1 MRDpos  - D14 BM (8/1/24):  ablated, ALCON  - Count recovery:  ANC D28, plts D23  - Post induction BM (8/21/24):  ALCON, MFC negative, NPM1 2.65e-05       9/17/2024 -  Chemotherapy    Consolidation  with HIDAC  -C1D1 9/17/24:  complicated by menorrhagia    DATE TIMEPOINT SOURCE MORPHOLOGY MRD by [facility] NPM1 NOTES   10/8/2024 Post C1 HIDAC PB   Not detectable    10/28/2024 Post C1 HIDAC PB   Not detectable                                                               Ilda Peter  reports that she has never smoked. She has never used smokeless tobacco.  She  reports that she does not currently use alcohol.  She  reports current drug use. Drug: Marijuana.    Physical Exam  Constitutional:       General: She is not in acute distress.     Appearance: Normal appearance. She is not ill-appearing.   Neurological:      General: No focal deficit present.      Mental Status: She is alert and oriented to person, place, and time.      Cranial Nerves: No cranial nerve deficit.   Psychiatric:         Mood and Affect: Mood normal.         Behavior: Behavior normal.         Thought Content: Thought content normal.         Judgment: Judgment normal.   Performance Status:  Asymptomatic    Assessment/Plan    Ilda Peter is a 23 y.o. F with a recent diagnosis of acute myeloid leukemia, s/p induction therapy in CR1, with future plans for consolidation chemotherapy with high dose cytarabine x 4 cycles.     #Psychosocial: hx of anxiety/depression, young adult with cancer, parent to young children  - Continues on zoloft 50mg/daily  - Consider engaging in 1:1 therapy to help process experience - she will think about this  - Connected with [facility] specialists regarding two young children/adjustment to mother's illness  - Connected to young adult oncology program and receiving monthly social programming invitations  - Connected to food [employer] market at  d/t food insecurity  - Following with DEBRA Ruelas regarding financial concerns    #Risk for infertility/Family Building:  - Previously discussed the damaging effect cancer treatment can have on the ovaries.   - Previously discussed natural age related decline in fertility and  menopause that occurs as a result of ovarian aging, and that cancer treatments can accellerate that progression and diminish ovarian reserve.  - Individuals may experience varying degrees of short or long term ovarian dysfunction and amenorrhea, and that this is dependent upon type of cancer treatment, cumulative dose, and patients age.   - Loss of ovarian function may be permanent or temporary.  - Amenorrhea may be temporary or permanent -- temporary amenorrhea results from destruction of maturing follicles whereas permanent amenorrhea results from depletion of viable primordial follicles.  - Risk to fertility is LOW based on cancer treatment of Cytarabine/Idarubicin   - Baseline hormone testing 07/23/24 FSH 3.8, LH 4.5, E2 34, AMH 7.742 - of note these were drawn following Day 1 of chemotherapy  - Now on ovarian suppression with leuprolide - has achieved menstrual suppression  - Discussed plan for repeating ovarian function testing one year out from treatment  - Discussed recommendation to wait to try and conceive until at least 2 years out from chemotherapy     #Substance use: currently using marijuana inhaled daily  - Discussed risks for infection with mold/spores with inhaled marijuana - in the short term recommended patient change to alternative route such as edibles  - Discussed strong recommendation for cessation of marijuana, specifically discussed newer research demonstrating increased cardiovascular disease in individual who use marijuana     #Fatigue: likely related to chemotherapy, and anemia, recent RSV and child's hospitalization  - Discussed structured periods of rest and activity, to re-engage with walking/gentle activity when she feels able   - She is interested in [facility] [employer] our exercise training group - will provide with flier when available to sign up    #Sexual Dysfunction/Contraception:  - Previously discussed the possibility for sexual side effects related to cancer treatment this may  manifest as decreased interest, arousal, vaginal dryness, or pain with sex  - Normalized conversation regarding sex and intimacy and encouraged patient to notify myself or team if she experiences any sexual side effects that impact her QOL  - Previously discussed the seriousness of getting pregnant while receiving chemotherapy due to the harmful effects this could have on the  fetus, and on her cancer treatment given the decreased availability of medical  services.   - Not currently taking norethindrone for pregnancy prevention - reiterated need to use condoms  - We discussed that small amounts of chemotherapy may be found in her body secretions including vaginal secretions and she should use a barrier form of contraception such as a male or female condom to protect her partner from chemotherapy exposure.  - We discussed that it is safe to engage in sex if she feels able to, but should take precautions to avoid penetrative sex when her neutrophil count is <500 and her platelet count is <50    Plan for follow up visit in two months    The amount of time that I spent with the patient:  Prep:  Time spend directly with patient: 35  Coordinating care:  Documentation: 5  Other time:    Total time spent on encounter: 40                 Nessa Peña, LEDY-CNP

## 2025-01-07 ENCOUNTER — APPOINTMENT (OUTPATIENT)
Dept: HEMATOLOGY/ONCOLOGY | Facility: HOSPITAL | Age: 25
End: 2025-01-07
Payer: COMMERCIAL

## 2025-01-07 RX ORDER — FAMOTIDINE 10 MG/ML
20 INJECTION INTRAVENOUS ONCE AS NEEDED
Status: CANCELLED | OUTPATIENT
Start: 2025-01-15

## 2025-01-07 RX ORDER — PROCHLORPERAZINE EDISYLATE 5 MG/ML
10 INJECTION INTRAMUSCULAR; INTRAVENOUS EVERY 6 HOURS PRN
Status: CANCELLED | OUTPATIENT
Start: 2025-01-16

## 2025-01-07 RX ORDER — DEXAMETHASONE 4 MG/1
12 TABLET ORAL ONCE
Status: CANCELLED | OUTPATIENT
Start: 2025-01-15

## 2025-01-07 RX ORDER — PROCHLORPERAZINE MALEATE 10 MG
10 TABLET ORAL EVERY 6 HOURS PRN
Status: CANCELLED | OUTPATIENT
Start: 2025-01-16

## 2025-01-07 RX ORDER — DIPHENHYDRAMINE HYDROCHLORIDE 50 MG/ML
50 INJECTION INTRAMUSCULAR; INTRAVENOUS AS NEEDED
Status: CANCELLED | OUTPATIENT
Start: 2025-01-14

## 2025-01-07 RX ORDER — EPINEPHRINE 0.3 MG/.3ML
0.3 INJECTION SUBCUTANEOUS EVERY 5 MIN PRN
Status: CANCELLED | OUTPATIENT
Start: 2025-01-15

## 2025-01-07 RX ORDER — EPINEPHRINE 0.3 MG/.3ML
0.3 INJECTION SUBCUTANEOUS EVERY 5 MIN PRN
Status: CANCELLED | OUTPATIENT
Start: 2025-01-16

## 2025-01-07 RX ORDER — ALBUTEROL SULFATE 0.83 MG/ML
3 SOLUTION RESPIRATORY (INHALATION) AS NEEDED
Status: CANCELLED | OUTPATIENT
Start: 2025-01-18

## 2025-01-07 RX ORDER — DEXAMETHASONE 4 MG/1
12 TABLET ORAL ONCE
Status: CANCELLED | OUTPATIENT
Start: 2025-01-16

## 2025-01-07 RX ORDER — FAMOTIDINE 10 MG/ML
20 INJECTION INTRAVENOUS ONCE AS NEEDED
Status: CANCELLED | OUTPATIENT
Start: 2025-01-16

## 2025-01-07 RX ORDER — PROCHLORPERAZINE MALEATE 10 MG
10 TABLET ORAL EVERY 6 HOURS PRN
Status: CANCELLED | OUTPATIENT
Start: 2025-01-14

## 2025-01-07 RX ORDER — FAMOTIDINE 10 MG/ML
20 INJECTION INTRAVENOUS ONCE AS NEEDED
Status: CANCELLED | OUTPATIENT
Start: 2025-01-14

## 2025-01-07 RX ORDER — ALBUTEROL SULFATE 0.83 MG/ML
3 SOLUTION RESPIRATORY (INHALATION) AS NEEDED
Status: CANCELLED | OUTPATIENT
Start: 2025-01-14

## 2025-01-07 RX ORDER — DIPHENHYDRAMINE HYDROCHLORIDE 50 MG/ML
50 INJECTION INTRAMUSCULAR; INTRAVENOUS AS NEEDED
Status: CANCELLED | OUTPATIENT
Start: 2025-01-18

## 2025-01-07 RX ORDER — DEXAMETHASONE 4 MG/1
12 TABLET ORAL ONCE
Status: CANCELLED | OUTPATIENT
Start: 2025-01-14

## 2025-01-07 RX ORDER — ALBUTEROL SULFATE 0.83 MG/ML
3 SOLUTION RESPIRATORY (INHALATION) AS NEEDED
Status: CANCELLED | OUTPATIENT
Start: 2025-01-15

## 2025-01-07 RX ORDER — DIPHENHYDRAMINE HYDROCHLORIDE 50 MG/ML
50 INJECTION INTRAMUSCULAR; INTRAVENOUS AS NEEDED
Status: CANCELLED | OUTPATIENT
Start: 2025-01-16

## 2025-01-07 RX ORDER — PROCHLORPERAZINE MALEATE 10 MG
10 TABLET ORAL EVERY 6 HOURS PRN
Status: CANCELLED | OUTPATIENT
Start: 2025-01-15

## 2025-01-07 RX ORDER — ONDANSETRON HYDROCHLORIDE 8 MG/1
16 TABLET, FILM COATED ORAL ONCE
Status: CANCELLED | OUTPATIENT
Start: 2025-01-15

## 2025-01-07 RX ORDER — FAMOTIDINE 10 MG/ML
20 INJECTION INTRAVENOUS ONCE AS NEEDED
Status: CANCELLED | OUTPATIENT
Start: 2025-01-18

## 2025-01-07 RX ORDER — DIPHENHYDRAMINE HYDROCHLORIDE 50 MG/ML
50 INJECTION INTRAMUSCULAR; INTRAVENOUS AS NEEDED
Status: CANCELLED | OUTPATIENT
Start: 2025-01-15

## 2025-01-07 RX ORDER — EPINEPHRINE 0.3 MG/.3ML
0.3 INJECTION SUBCUTANEOUS EVERY 5 MIN PRN
Status: CANCELLED | OUTPATIENT
Start: 2025-01-14

## 2025-01-07 RX ORDER — ALBUTEROL SULFATE 0.83 MG/ML
3 SOLUTION RESPIRATORY (INHALATION) AS NEEDED
Status: CANCELLED | OUTPATIENT
Start: 2025-01-16

## 2025-01-07 RX ORDER — ONDANSETRON HYDROCHLORIDE 8 MG/1
16 TABLET, FILM COATED ORAL ONCE
Status: CANCELLED | OUTPATIENT
Start: 2025-01-14

## 2025-01-07 RX ORDER — EPINEPHRINE 0.3 MG/.3ML
0.3 INJECTION SUBCUTANEOUS EVERY 5 MIN PRN
Status: CANCELLED | OUTPATIENT
Start: 2025-01-18

## 2025-01-07 RX ORDER — PROCHLORPERAZINE EDISYLATE 5 MG/ML
10 INJECTION INTRAMUSCULAR; INTRAVENOUS EVERY 6 HOURS PRN
Status: CANCELLED | OUTPATIENT
Start: 2025-01-15

## 2025-01-07 RX ORDER — ONDANSETRON HYDROCHLORIDE 8 MG/1
16 TABLET, FILM COATED ORAL ONCE
Status: CANCELLED | OUTPATIENT
Start: 2025-01-16

## 2025-01-07 RX ORDER — PROCHLORPERAZINE EDISYLATE 5 MG/ML
10 INJECTION INTRAMUSCULAR; INTRAVENOUS EVERY 6 HOURS PRN
Status: CANCELLED | OUTPATIENT
Start: 2025-01-14

## 2025-01-07 NOTE — ASSESSMENT & PLAN NOTE
Ongoing consolidation with HiDAC, due for C4 (final cycle) this week however currently with new respiratory symptoms and son recently diagnosed with RSV. He is currently at admitted at [place] for supportive management. Otherwise counts recovered. Given immunocompromised state, will obtain respiratory viral testing and likely delay 1-2 weeks to allow for full recovery prior to final cycle of HiDAC.  Will defer transplant at this time.

## 2025-01-07 NOTE — PROGRESS NOTES
Patient ID: Ilda Peter is a 24 y.o. female.    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 (>=10% blasts)  [id number] AML Risk Stratification: FAVORABLE: Mutated NPM1 without FLT3-ITD  Presented with anemia and circulating blasts    BONE MARROW (07/05/2024)  Hypercellular with erythroid predominance, dyserythropoiesis, and dysmegakaryopoiesis  FISH: AML negative  KARYOTYPE:  46XX [20]  MYELOID NGS: NPM1 (54%), NRAS (25%), PTPN11 (15%), IDH1 (2%)       7/19/2024 -  Chemotherapy    Induction with 7+3 (AraC+tarun) -> CR1 MRDpos  - D14 BM (8/1/24):  ablated, ALCON  - Count recovery:  ANC D28, plts D23  - Post induction BM (8/21/24):  ALCON, MFC negative, NPM1 2.65e-05       9/17/2024 -  Chemotherapy    Consolidation with HIDAC  -C1D1 9/17/24:  complicated by menorrhagia    DATE TIMEPOINT SOURCE MORPHOLOGY MRD by MFC NPM1 NOTES   10/8/2024 Post C1 HIDAC PB   Not detectable    10/28/2024 Post C1 HIDAC PB   Not detectable                                                               Assessment & Plan  Acute myeloid leukemia in remission (Multi)  Ongoing consolidation with HiDAC, due for C4 (final cycle) this week however currently with new respiratory symptoms and son recently diagnosed with RSV. He is currently at admitted at [facility] for supportive management. Otherwise counts recovered. Given immunocompromised state, will obtain respiratory viral testing and likely delay 1-2 weeks to allow for full recovery prior to final cycle of HiDAC.  Will defer transplant at this time.   Nasal congestion  Respiratory viral panel pending, suspect likely RSV.        ______________________________________________________________________________________________________________________________________________    SUBJECTIVE     Presents today for pre-chemotherapy clearance visit. Currently on consolidative  therapy with HiDAC and due for final cycle this week. Had been feeling well over the past few weeks and no episodes of vaginal bleeding this cycle; however, over the past 4-5 days has developed nasal congestion, nonproductive cough, and rhinorrhea. Sputum production has been clear. No fevers, chills, body aches. Son recently diagnosed with RSV and admitted to [facility]. Other son recovering at home.     OBJECTIVE     /73   Pulse 97   Temp 36.1 °C (97 °F)   Resp 16   Wt 67.7 kg (149 lb 4 oz)   SpO2 100%   BMI 24.84 kg/m²      Physical Exam  Vitals reviewed.   Constitutional:       Appearance: Normal appearance.   Eyes:      Conjunctiva/sclera: Conjunctivae normal.      Pupils: Pupils are equal, round, and reactive to light.   Skin:     General: Skin is warm and dry.      Findings: No rash.   Neurological:      General: No focal deficit present.      Mental Status: She is oriented to person, place, and time. Mental status is at baseline.      Comments: No s/sx cerebellar toxicity   Psychiatric:         Mood and Affect: Mood normal.       RTC  Dressing change/Lupron 1/9  Clinic 1/14  C4D1 tentative 1/15      Mike Bennett PA-C

## 2025-01-07 NOTE — ASSESSMENT & PLAN NOTE
Active signs of likely viral infection-- suspect RSV. Respiratory viral swabs pending. Vitals stable and otherwise managing well with supportive care. Will continue prophylaxis with acyclovir, levofloxacin, and posaconazole during periods of neutropenia.

## 2025-01-08 ENCOUNTER — TELEPHONE (OUTPATIENT)
Dept: INFUSION THERAPY | Age: 25
End: 2025-01-08
Payer: COMMERCIAL

## 2025-01-08 ENCOUNTER — APPOINTMENT (OUTPATIENT)
Dept: HEMATOLOGY/ONCOLOGY | Facility: HOSPITAL | Age: 25
End: 2025-01-08
Payer: COMMERCIAL

## 2025-01-09 ENCOUNTER — APPOINTMENT (OUTPATIENT)
Dept: HEMATOLOGY/ONCOLOGY | Facility: HOSPITAL | Age: 25
End: 2025-01-09
Payer: COMMERCIAL

## 2025-01-09 ENCOUNTER — INFUSION (OUTPATIENT)
Dept: HEMATOLOGY/ONCOLOGY | Facility: CLINIC | Age: 25
End: 2025-01-09
Payer: COMMERCIAL

## 2025-01-09 VITALS
TEMPERATURE: 97.9 F | WEIGHT: 153.44 LBS | OXYGEN SATURATION: 97 % | DIASTOLIC BLOOD PRESSURE: 75 MMHG | RESPIRATION RATE: 16 BRPM | HEART RATE: 85 BPM | SYSTOLIC BLOOD PRESSURE: 119 MMHG | BODY MASS INDEX: 25.53 KG/M2

## 2025-01-09 DIAGNOSIS — N94.89 MENSTRUAL SUPPRESSION: ICD-10-CM

## 2025-01-09 DIAGNOSIS — C92.01 ACUTE MYELOID LEUKEMIA IN REMISSION (MULTI): ICD-10-CM

## 2025-01-09 DIAGNOSIS — C92.00 ACUTE MYELOID LEUKEMIA NOT HAVING ACHIEVED REMISSION (MULTI): ICD-10-CM

## 2025-01-09 LAB
ALBUMIN SERPL BCP-MCNC: 4.4 G/DL (ref 3.4–5)
ALP SERPL-CCNC: 68 U/L (ref 33–110)
ALT SERPL W P-5'-P-CCNC: 18 U/L (ref 7–45)
ANION GAP SERPL CALC-SCNC: 13 MMOL/L (ref 10–20)
AST SERPL W P-5'-P-CCNC: 16 U/L (ref 9–39)
BASOPHILS # BLD AUTO: 0.02 X10*3/UL (ref 0–0.1)
BASOPHILS NFR BLD AUTO: 0.5 %
BILIRUB SERPL-MCNC: 0.6 MG/DL (ref 0–1.2)
BUN SERPL-MCNC: 18 MG/DL (ref 6–23)
CALCIUM SERPL-MCNC: 9.6 MG/DL (ref 8.6–10.3)
CHLORIDE SERPL-SCNC: 104 MMOL/L (ref 98–107)
CO2 SERPL-SCNC: 28 MMOL/L (ref 21–32)
CREAT SERPL-MCNC: 0.86 MG/DL (ref 0.5–1.05)
EGFRCR SERPLBLD CKD-EPI 2021: >90 ML/MIN/1.73M*2
EOSINOPHIL # BLD AUTO: 0.02 X10*3/UL (ref 0–0.7)
EOSINOPHIL NFR BLD AUTO: 0.5 %
ERYTHROCYTE [DISTWIDTH] IN BLOOD BY AUTOMATED COUNT: 22.4 % (ref 11.5–14.5)
GLUCOSE SERPL-MCNC: 105 MG/DL (ref 74–99)
HCT VFR BLD AUTO: 33.2 % (ref 36–46)
HGB BLD-MCNC: 10.9 G/DL (ref 12–16)
IMM GRANULOCYTES # BLD AUTO: 0.01 X10*3/UL (ref 0–0.7)
IMM GRANULOCYTES NFR BLD AUTO: 0.3 % (ref 0–0.9)
LYMPHOCYTES # BLD AUTO: 0.72 X10*3/UL (ref 1.2–4.8)
LYMPHOCYTES NFR BLD AUTO: 18.3 %
MCH RBC QN AUTO: 34.7 PG (ref 26–34)
MCHC RBC AUTO-ENTMCNC: 32.8 G/DL (ref 32–36)
MCV RBC AUTO: 106 FL (ref 80–100)
MONOCYTES # BLD AUTO: 0.33 X10*3/UL (ref 0.1–1)
MONOCYTES NFR BLD AUTO: 8.4 %
NEUTROPHILS # BLD AUTO: 2.84 X10*3/UL (ref 1.2–7.7)
NEUTROPHILS NFR BLD AUTO: 72 %
NRBC BLD-RTO: ABNORMAL /100{WBCS}
PLATELET # BLD AUTO: 139 X10*3/UL (ref 150–450)
POTASSIUM SERPL-SCNC: 3.7 MMOL/L (ref 3.5–5.3)
PROT SERPL-MCNC: 6.5 G/DL (ref 6.4–8.2)
RBC # BLD AUTO: 3.14 X10*6/UL (ref 4–5.2)
SODIUM SERPL-SCNC: 141 MMOL/L (ref 136–145)
WBC # BLD AUTO: 3.9 X10*3/UL (ref 4.4–11.3)

## 2025-01-09 PROCEDURE — 2500000004 HC RX 250 GENERAL PHARMACY W/ HCPCS (ALT 636 FOR OP/ED): Performed by: PHYSICIAN ASSISTANT

## 2025-01-09 PROCEDURE — 85025 COMPLETE CBC W/AUTO DIFF WBC: CPT

## 2025-01-09 PROCEDURE — 2500000004 HC RX 250 GENERAL PHARMACY W/ HCPCS (ALT 636 FOR OP/ED): Mod: JZ,JG,TB | Performed by: INTERNAL MEDICINE

## 2025-01-09 PROCEDURE — 80053 COMPREHEN METABOLIC PANEL: CPT

## 2025-01-09 PROCEDURE — 96402 CHEMO HORMON ANTINEOPL SQ/IM: CPT

## 2025-01-09 RX ORDER — HEPARIN SODIUM,PORCINE/PF 10 UNIT/ML
50 SYRINGE (ML) INTRAVENOUS AS NEEDED
Status: DISCONTINUED | OUTPATIENT
Start: 2025-01-09 | End: 2025-01-09 | Stop reason: HOSPADM

## 2025-01-09 RX ORDER — FAMOTIDINE 10 MG/ML
20 INJECTION INTRAVENOUS ONCE AS NEEDED
OUTPATIENT
Start: 2025-01-13

## 2025-01-09 RX ORDER — EPINEPHRINE 0.3 MG/.3ML
0.3 INJECTION SUBCUTANEOUS EVERY 5 MIN PRN
OUTPATIENT
Start: 2025-01-13

## 2025-01-09 RX ORDER — HEPARIN SODIUM,PORCINE/PF 10 UNIT/ML
50 SYRINGE (ML) INTRAVENOUS AS NEEDED
OUTPATIENT
Start: 2025-01-09

## 2025-01-09 RX ORDER — DIPHENHYDRAMINE HYDROCHLORIDE 50 MG/ML
50 INJECTION INTRAMUSCULAR; INTRAVENOUS AS NEEDED
OUTPATIENT
Start: 2025-01-13

## 2025-01-09 RX ORDER — HEPARIN 100 UNIT/ML
500 SYRINGE INTRAVENOUS AS NEEDED
OUTPATIENT
Start: 2025-01-09

## 2025-01-09 RX ORDER — ALBUTEROL SULFATE 0.83 MG/ML
3 SOLUTION RESPIRATORY (INHALATION) AS NEEDED
OUTPATIENT
Start: 2025-01-13

## 2025-01-09 RX ADMIN — HEPARIN, PORCINE (PF) 10 UNIT/ML INTRAVENOUS SYRINGE 50 UNITS: at 08:59

## 2025-01-09 RX ADMIN — HEPARIN, PORCINE (PF) 10 UNIT/ML INTRAVENOUS SYRINGE 50 UNITS: at 09:00

## 2025-01-09 RX ADMIN — LEUPROLIDE ACETATE 7.5 MG: 7.5 INJECTION, SUSPENSION, EXTENDED RELEASE SUBCUTANEOUS at 09:10

## 2025-01-09 ASSESSMENT — PAIN SCALES - GENERAL: PAINLEVEL_OUTOF10: 0-NO PAIN

## 2025-01-10 NOTE — TELEPHONE ENCOUNTER
Spoke with patient. They are requesting a delivery for their DL Bailon at this time.     Sending flushes for 1 month worth of daily flushing plus 200 Alc pads.     Delivery will be next week Tuesday 1/14 between 10-6 pm  with the  to call on the way. Delivery to same address as last time. [id number] ok.     No questions.

## 2025-01-11 ENCOUNTER — APPOINTMENT (OUTPATIENT)
Dept: HEMATOLOGY/ONCOLOGY | Facility: HOSPITAL | Age: 25
End: 2025-01-11
Payer: COMMERCIAL

## 2025-01-13 ENCOUNTER — CLINICAL SUPPORT (OUTPATIENT)
Dept: NUTRITION | Facility: HOSPITAL | Age: 25
End: 2025-01-13
Payer: COMMERCIAL

## 2025-01-13 ENCOUNTER — INFUSION (OUTPATIENT)
Dept: HEMATOLOGY/ONCOLOGY | Facility: HOSPITAL | Age: 25
End: 2025-01-13
Payer: COMMERCIAL

## 2025-01-13 VITALS
OXYGEN SATURATION: 99 % | TEMPERATURE: 98.1 F | WEIGHT: 160.05 LBS | HEART RATE: 84 BPM | SYSTOLIC BLOOD PRESSURE: 113 MMHG | RESPIRATION RATE: 18 BRPM | DIASTOLIC BLOOD PRESSURE: 61 MMHG | HEIGHT: 65 IN | BODY MASS INDEX: 26.67 KG/M2

## 2025-01-13 DIAGNOSIS — C92.01 ACUTE MYELOID LEUKEMIA IN REMISSION (MULTI): ICD-10-CM

## 2025-01-13 DIAGNOSIS — N94.89 MENSTRUAL SUPPRESSION: ICD-10-CM

## 2025-01-13 LAB
ALBUMIN SERPL BCP-MCNC: 4.1 G/DL (ref 3.4–5)
ALP SERPL-CCNC: 60 U/L (ref 33–110)
ALT SERPL W P-5'-P-CCNC: 29 U/L (ref 7–45)
ANION GAP SERPL CALC-SCNC: 10 MMOL/L (ref 10–20)
AST SERPL W P-5'-P-CCNC: 24 U/L (ref 9–39)
BASOPHILS # BLD AUTO: 0.04 X10*3/UL (ref 0–0.1)
BASOPHILS NFR BLD AUTO: 0.8 %
BILIRUB SERPL-MCNC: 0.5 MG/DL (ref 0–1.2)
BUN SERPL-MCNC: 11 MG/DL (ref 6–23)
CALCIUM SERPL-MCNC: 9.4 MG/DL (ref 8.6–10.3)
CHLORIDE SERPL-SCNC: 104 MMOL/L (ref 98–107)
CO2 SERPL-SCNC: 32 MMOL/L (ref 21–32)
CREAT SERPL-MCNC: 0.73 MG/DL (ref 0.5–1.05)
EGFRCR SERPLBLD CKD-EPI 2021: >90 ML/MIN/1.73M*2
EOSINOPHIL # BLD AUTO: 0.05 X10*3/UL (ref 0–0.7)
EOSINOPHIL NFR BLD AUTO: 1 %
ERYTHROCYTE [DISTWIDTH] IN BLOOD BY AUTOMATED COUNT: 19.8 % (ref 11.5–14.5)
GLUCOSE SERPL-MCNC: 89 MG/DL (ref 74–99)
HCG UR QL IA.RAPID: NEGATIVE
HCT VFR BLD AUTO: 33 % (ref 36–46)
HGB BLD-MCNC: 11.6 G/DL (ref 12–16)
IMM GRANULOCYTES # BLD AUTO: 0.01 X10*3/UL (ref 0–0.7)
IMM GRANULOCYTES NFR BLD AUTO: 0.2 % (ref 0–0.9)
LYMPHOCYTES # BLD AUTO: 0.66 X10*3/UL (ref 1.2–4.8)
LYMPHOCYTES NFR BLD AUTO: 13.2 %
MCH RBC QN AUTO: 35.3 PG (ref 26–34)
MCHC RBC AUTO-ENTMCNC: 35.2 G/DL (ref 32–36)
MCV RBC AUTO: 100 FL (ref 80–100)
MONOCYTES # BLD AUTO: 0.36 X10*3/UL (ref 0.1–1)
MONOCYTES NFR BLD AUTO: 7.2 %
NEUTROPHILS # BLD AUTO: 3.88 X10*3/UL (ref 1.2–7.7)
NEUTROPHILS NFR BLD AUTO: 77.6 %
NRBC BLD-RTO: 0 /100 WBCS (ref 0–0)
PLATELET # BLD AUTO: 162 X10*3/UL (ref 150–450)
POTASSIUM SERPL-SCNC: 3.9 MMOL/L (ref 3.5–5.3)
PROT SERPL-MCNC: 6.1 G/DL (ref 6.4–8.2)
RBC # BLD AUTO: 3.29 X10*6/UL (ref 4–5.2)
SODIUM SERPL-SCNC: 142 MMOL/L (ref 136–145)
WBC # BLD AUTO: 5 X10*3/UL (ref 4.4–11.3)

## 2025-01-13 PROCEDURE — 85025 COMPLETE CBC W/AUTO DIFF WBC: CPT

## 2025-01-13 PROCEDURE — 81025 URINE PREGNANCY TEST: CPT

## 2025-01-13 PROCEDURE — 80053 COMPREHEN METABOLIC PANEL: CPT

## 2025-01-13 PROCEDURE — 36591 DRAW BLOOD OFF VENOUS DEVICE: CPT

## 2025-01-13 RX ORDER — HEPARIN 100 UNIT/ML
500 SYRINGE INTRAVENOUS AS NEEDED
Status: CANCELLED | OUTPATIENT
Start: 2025-01-13

## 2025-01-13 RX ORDER — HEPARIN SODIUM,PORCINE/PF 10 UNIT/ML
50 SYRINGE (ML) INTRAVENOUS AS NEEDED
Status: CANCELLED | OUTPATIENT
Start: 2025-01-13

## 2025-01-13 NOTE — PROGRESS NOTES
[facility]  Diet Recommendation 1: Healthy Eating  Food Intolerance Avoidance: NKFA  Household Size: 5 Members (4 Max/Household)  Interventions: Referral Number: 1st 6 Mo Referral 6 Mos  Interventions: Visit Number: 4 of 6 Visits - Max 6 Visits/Referral Each 6 Mo Period  Education Today: [employer]  Follow Up Notes for Future Visits: Pt states she continues on a neutropenic diet but that she occasionally eats f/v if washed thoroughly/multiple times. Says appetite dips week during and after chemo, but feels it could be worse.  Grains: 25-50% Whole  Fruit: 25-50% Fresh  Vegetables: % Fresh  Proteins: 0 Plant-based Items  Dairy: Lowfat - 100%  Originating Site of Referral Order: [facility]  Initials of RD Assisting Today: TRACIE

## 2025-01-13 NOTE — PROGRESS NOTES
Ilda Peter presents to OP Infusion for armstrong dressing change and lab draw.  She was discharged home in stable condition.

## 2025-01-14 ENCOUNTER — INFUSION (OUTPATIENT)
Dept: HEMATOLOGY/ONCOLOGY | Facility: HOSPITAL | Age: 25
End: 2025-01-14
Payer: COMMERCIAL

## 2025-01-14 ENCOUNTER — OFFICE VISIT (OUTPATIENT)
Dept: HEMATOLOGY/ONCOLOGY | Facility: HOSPITAL | Age: 25
End: 2025-01-14
Payer: COMMERCIAL

## 2025-01-14 VITALS
HEART RATE: 97 BPM | DIASTOLIC BLOOD PRESSURE: 64 MMHG | SYSTOLIC BLOOD PRESSURE: 118 MMHG | RESPIRATION RATE: 18 BRPM | OXYGEN SATURATION: 99 % | TEMPERATURE: 98.1 F

## 2025-01-14 VITALS
HEART RATE: 83 BPM | DIASTOLIC BLOOD PRESSURE: 71 MMHG | SYSTOLIC BLOOD PRESSURE: 129 MMHG | BODY MASS INDEX: 26.97 KG/M2 | OXYGEN SATURATION: 98 % | WEIGHT: 162.1 LBS | RESPIRATION RATE: 16 BRPM | TEMPERATURE: 98.1 F

## 2025-01-14 DIAGNOSIS — C92.01 ACUTE MYELOID LEUKEMIA IN REMISSION (MULTI): Primary | ICD-10-CM

## 2025-01-14 DIAGNOSIS — C92.01 ACUTE MYELOID LEUKEMIA IN REMISSION (MULTI): ICD-10-CM

## 2025-01-14 DIAGNOSIS — D84.9 IMMUNOSUPPRESSED STATUS: ICD-10-CM

## 2025-01-14 DIAGNOSIS — N94.89 MENSTRUAL SUPPRESSION: ICD-10-CM

## 2025-01-14 DIAGNOSIS — C92.00 ACUTE MYELOID LEUKEMIA NOT HAVING ACHIEVED REMISSION (MULTI): ICD-10-CM

## 2025-01-14 LAB
ALBUMIN SERPL BCP-MCNC: 4.6 G/DL (ref 3.4–5)
ALP SERPL-CCNC: 66 U/L (ref 33–110)
ALT SERPL W P-5'-P-CCNC: 44 U/L (ref 7–45)
ANION GAP SERPL CALC-SCNC: 13 MMOL/L (ref 10–20)
AST SERPL W P-5'-P-CCNC: 30 U/L (ref 9–39)
BILIRUB SERPL-MCNC: 0.8 MG/DL (ref 0–1.2)
BUN SERPL-MCNC: 15 MG/DL (ref 6–23)
CALCIUM SERPL-MCNC: 9.6 MG/DL (ref 8.6–10.6)
CHLORIDE SERPL-SCNC: 105 MMOL/L (ref 98–107)
CO2 SERPL-SCNC: 28 MMOL/L (ref 21–32)
CREAT SERPL-MCNC: 0.82 MG/DL (ref 0.5–1.05)
EGFRCR SERPLBLD CKD-EPI 2021: >90 ML/MIN/1.73M*2
GLUCOSE SERPL-MCNC: 139 MG/DL (ref 74–99)
POTASSIUM SERPL-SCNC: 3.8 MMOL/L (ref 3.5–5.3)
PROT SERPL-MCNC: 6.9 G/DL (ref 6.4–8.2)
SODIUM SERPL-SCNC: 142 MMOL/L (ref 136–145)

## 2025-01-14 PROCEDURE — 96415 CHEMO IV INFUSION ADDL HR: CPT

## 2025-01-14 PROCEDURE — 96413 CHEMO IV INFUSION 1 HR: CPT

## 2025-01-14 PROCEDURE — 80053 COMPREHEN METABOLIC PANEL: CPT

## 2025-01-14 PROCEDURE — 99214 OFFICE O/P EST MOD 30 MIN: CPT | Performed by: PHYSICIAN ASSISTANT

## 2025-01-14 PROCEDURE — 2500000004 HC RX 250 GENERAL PHARMACY W/ HCPCS (ALT 636 FOR OP/ED): Performed by: PHYSICIAN ASSISTANT

## 2025-01-14 PROCEDURE — 2500000004 HC RX 250 GENERAL PHARMACY W/ HCPCS (ALT 636 FOR OP/ED): Performed by: INTERNAL MEDICINE

## 2025-01-14 RX ORDER — HEPARIN SODIUM,PORCINE/PF 10 UNIT/ML
50 SYRINGE (ML) INTRAVENOUS AS NEEDED
Status: DISCONTINUED | OUTPATIENT
Start: 2025-01-14 | End: 2025-01-14 | Stop reason: HOSPADM

## 2025-01-14 RX ORDER — DEXAMETHASONE 6 MG/1
12 TABLET ORAL ONCE
Status: COMPLETED | OUTPATIENT
Start: 2025-01-14 | End: 2025-01-14

## 2025-01-14 RX ORDER — PROCHLORPERAZINE MALEATE 10 MG
10 TABLET ORAL EVERY 6 HOURS PRN
Status: DISCONTINUED | OUTPATIENT
Start: 2025-01-14 | End: 2025-01-14 | Stop reason: HOSPADM

## 2025-01-14 RX ORDER — ONDANSETRON HYDROCHLORIDE 8 MG/1
16 TABLET, FILM COATED ORAL ONCE
Status: COMPLETED | OUTPATIENT
Start: 2025-01-14 | End: 2025-01-14

## 2025-01-14 RX ORDER — PROCHLORPERAZINE EDISYLATE 5 MG/ML
10 INJECTION INTRAMUSCULAR; INTRAVENOUS EVERY 6 HOURS PRN
Status: DISCONTINUED | OUTPATIENT
Start: 2025-01-14 | End: 2025-01-14 | Stop reason: HOSPADM

## 2025-01-14 RX ORDER — HEPARIN 100 UNIT/ML
500 SYRINGE INTRAVENOUS AS NEEDED
Status: CANCELLED | OUTPATIENT
Start: 2025-01-14

## 2025-01-14 RX ORDER — FAMOTIDINE 10 MG/ML
20 INJECTION INTRAVENOUS ONCE AS NEEDED
Status: DISCONTINUED | OUTPATIENT
Start: 2025-01-14 | End: 2025-01-14 | Stop reason: HOSPADM

## 2025-01-14 RX ORDER — HEPARIN SODIUM,PORCINE/PF 10 UNIT/ML
50 SYRINGE (ML) INTRAVENOUS AS NEEDED
Status: CANCELLED | OUTPATIENT
Start: 2025-01-14

## 2025-01-14 RX ORDER — ALBUTEROL SULFATE 0.83 MG/ML
3 SOLUTION RESPIRATORY (INHALATION) AS NEEDED
Status: DISCONTINUED | OUTPATIENT
Start: 2025-01-14 | End: 2025-01-14 | Stop reason: HOSPADM

## 2025-01-14 RX ORDER — DIPHENHYDRAMINE HYDROCHLORIDE 50 MG/ML
50 INJECTION INTRAMUSCULAR; INTRAVENOUS AS NEEDED
Status: DISCONTINUED | OUTPATIENT
Start: 2025-01-14 | End: 2025-01-14 | Stop reason: HOSPADM

## 2025-01-14 RX ORDER — EPINEPHRINE 0.3 MG/.3ML
0.3 INJECTION SUBCUTANEOUS EVERY 5 MIN PRN
Status: DISCONTINUED | OUTPATIENT
Start: 2025-01-14 | End: 2025-01-14 | Stop reason: HOSPADM

## 2025-01-14 RX ADMIN — HEPARIN, PORCINE (PF) 10 UNIT/ML INTRAVENOUS SYRINGE 50 UNITS: at 18:53

## 2025-01-14 RX ADMIN — CYTARABINE 5100 MG: 100 INJECTION, SOLUTION INTRATHECAL; INTRAVENOUS; SUBCUTANEOUS at 08:22

## 2025-01-14 RX ADMIN — DEXAMETHASONE 12 MG: 6 TABLET ORAL at 08:14

## 2025-01-14 RX ADMIN — HEPARIN, PORCINE (PF) 10 UNIT/ML INTRAVENOUS SYRINGE 50 UNITS: at 18:54

## 2025-01-14 RX ADMIN — CYTARABINE 5100 MG: 100 INJECTION, SOLUTION INTRATHECAL; INTRAVENOUS; SUBCUTANEOUS at 16:25

## 2025-01-14 RX ADMIN — ONDANSETRON HYDROCHLORIDE 16 MG: 8 TABLET, FILM COATED ORAL at 08:14

## 2025-01-14 ASSESSMENT — ENCOUNTER SYMPTOMS
HEMATURIA: 0
FATIGUE: 1
DIARRHEA: 0
UNEXPECTED WEIGHT CHANGE: 0
BLOOD IN STOOL: 0
FEVER: 0
TROUBLE SWALLOWING: 0
SORE THROAT: 0
ABDOMINAL PAIN: 0
HEMOPTYSIS: 0
CHILLS: 0
ABDOMINAL DISTENTION: 0
CONSTIPATION: 0
COUGH: 0
NAUSEA: 0
DYSURIA: 0
CHEST TIGHTNESS: 0
HEADACHES: 0
VOMITING: 0
DIFFICULTY URINATING: 0
BRUISES/BLEEDS EASILY: 0
SHORTNESS OF BREATH: 0
WHEEZING: 0
LEG SWELLING: 0
NERVOUS/ANXIOUS: 0
PALPITATIONS: 0
APPETITE CHANGE: 0
DIZZINESS: 0

## 2025-01-14 ASSESSMENT — PAIN SCALES - GENERAL
PAINLEVEL_OUTOF10: 0-NO PAIN
PAINLEVEL_OUTOF10: 0-NO PAIN

## 2025-01-14 NOTE — ASSESSMENT & PLAN NOTE
Continue prophylaxis with acyclovir; she takes levofloxacin and posaconazole during periods of neutropenia (starts with chemotherapy and discontinues at count recovery).      Recent RSV infection  RSV positive (12/31/24), all symptoms resolved at this time (01/14/25).

## 2025-01-14 NOTE — ASSESSMENT & PLAN NOTE
Ongoing consolidation with HiDAC, begin C4 (final cycle) today, delayed 14 days due to hers (and son's) recent RSV infections. Labs from yesterday (1/13/25) show blood counts and renal/hepatic function are appropriate to begin C4 HiDAC.     She has remained MRD (NPM1) negative in the peripheral blood and most recent peripheral-blood MRD testing (1/13/25) is pending. Will defer transplant at this time. She will have twice-weekly count checks after HiDAC and an end-of-treatment bone marrow biposy on 2/17/25 followed by a visit with Dr. Fields on 2/21/25.

## 2025-01-14 NOTE — PROGRESS NOTES
Patient seen in infusion for afternoon dose of HiDac, tolerated without complications. Neuro checks and signature test WDL. Labs drawn for treatment tomorrow. Bailon flushed with NS and heparin x2 lumens, both positive for blood return. Discharged ambulatory in stable condition.

## 2025-01-14 NOTE — PROGRESS NOTES
Patient ID: Ilda Peter is a 24 y.o. female.    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 (>=10% blasts)  [id number] AML Risk Stratification: FAVORABLE: Mutated NPM1 without FLT3-ITD  Presented with anemia and circulating blasts    BONE MARROW (07/05/2024)  Hypercellular with erythroid predominance, dyserythropoiesis, and dysmegakaryopoiesis  FISH: AML negative  KARYOTYPE:  46XX [20]  MYELOID NGS: NPM1 (54%), NRAS (25%), PTPN11 (15%), IDH1 (2%)       7/19/2024 -  Chemotherapy    Induction with 7+3 (AraC+tarun) -> CR1 MRDpos  - D14 BM (8/1/24):  ablated, ALCON  - Count recovery:  ANC D28, plts D23  - Post induction BM (8/21/24):  ALCON, MFC negative, NPM1 2.65e-05       9/17/2024 -  Chemotherapy    Consolidation with HIDAC  -C1D1 9/17/24:  complicated by menorrhagia  -C2D1 10/21/24: complicated by menorrhagia  -C3D1 12/3/24: complicated by RSV infection  -C4D1 1/14/25    DATE TIMEPOINT SOURCE MORPHOLOGY MRD by MFC NPM1 NOTES   10/8/2024 Post C1 HIDAC PB   Not detectable    10/28/2024 Post C1 HIDAC PB   Not detectable    12/2/24 Post C2 HiDAC PB   Not detected    1/13/25 Post C3 HiDAC PB   Pending                                               Past Medical History:     Past Medical History:   Diagnosis Date    AML (acute myeloblastic leukemia) (Multi)     Anxiety     Cannabis dependence 07/02/2024    Chronic atrophic rhinitis 07/02/2024    Depression     Hyperbilirubinemia     Tinea versicolor 07/02/2024       Surgical History:     Past Surgical History:   Procedure Laterality Date    WISDOM TOOTH EXTRACTION          Family History:     Family History   Problem Relation Name Age of Onset    No Known Problems Mother      No Known Problems Father      No Known Problems Brother      Other (Bicuspid Aortic Valve; VSD) Son Jorge         Social History:     Social History     Tobacco Use    Smoking status: Never    Smokeless tobacco: Never   Vaping Use    Vaping status: Never Used   Substance Use Topics    Alcohol use: Not Currently    Drug use: Yes     Types: Marijuana      -------------------------------------------------------------------------------------------------------  Subjective       The patient presents to the clinic today (01/14/25) for routine follow-up and C4D1 outpatient HiDAC consolidation.    Of note, she recently (12/31/24) had treatment delayed due to RSV infection, an infection which also hospitalized her youngest son.     Today (01/14/25), she's feeling generally okay. She says she's 100% better from the RSV. No more coughing or congestion. No fevers or chills. Denies chest pain, dyspnea, cough, and productive cough. Denies nausea/vomiting/diarrhea. Denies constipation. She's eating drinking okay. No mouth sores. No rashes.    She says that she had significantly less bleeding with C3, since started leuprolide.     Review of Systems   Constitutional:  Positive for fatigue. Negative for appetite change, chills, fever and unexpected weight change.   HENT:   Negative for mouth sores, nosebleeds, sore throat and trouble swallowing.    Respiratory:  Negative for chest tightness, cough, hemoptysis, shortness of breath and wheezing.    Cardiovascular:  Negative for chest pain, leg swelling and palpitations.   Gastrointestinal:  Negative for abdominal distention, abdominal pain, blood in stool, constipation, diarrhea, nausea and vomiting.   Genitourinary:  Negative for bladder incontinence, difficulty urinating, dysuria and hematuria.    Skin:  Negative for rash.   Neurological:  Negative for dizziness and headaches.   Hematological:  Does not bruise/bleed easily.   Psychiatric/Behavioral:  The patient is not nervous/anxious.    All other systems reviewed and are negative.      -------------------------------------------------------------------------------------------------------  Objective   BSA: There is no height or weight on file to calculate BSA.  There were no vitals taken for this visit.    Physical Exam  HENT:      Mouth/Throat:      Mouth: Mucous membranes are moist.      Pharynx: No posterior oropharyngeal erythema.   Eyes:      General: No scleral icterus.     Extraocular Movements: Extraocular movements intact.      Pupils: Pupils are equal, round, and reactive to light.   Cardiovascular:      Rate and Rhythm: Normal rate and regular rhythm.      Heart sounds: No murmur heard.     No friction rub. No gallop.   Pulmonary:      Effort: No respiratory distress.      Breath sounds: No stridor. No wheezing, rhonchi or rales.   Abdominal:      General: There is no distension.      Palpations: There is no mass.      Tenderness: There is no abdominal tenderness. There is no guarding or rebound.   Musculoskeletal:         General: No swelling or deformity.      Right lower leg: No edema.      Left lower leg: No edema.   Lymphadenopathy:      Cervical: No cervical adenopathy.   Skin:     Findings: No lesion or rash.          Neurological:      Mental Status: She is alert.      Cranial Nerves: No cranial nerve deficit.      Motor: No weakness.   Psychiatric:         Mood and Affect: Mood normal.         Behavior: Behavior normal.       Performance Status:  Symptomatic; fully ambulatory  -------------------------------------------------------------------------------------------------------  Assessment/Plan    Assessment & Plan  Acute myeloid leukemia in remission (Multi)  Ongoing consolidation with HiDAC, begin C4 (final cycle) today, delayed 14 days due to hers (and son's) recent RSV infections. Labs from yesterday (1/13/25) show blood counts and renal/hepatic function are appropriate to begin C4 HiDAC.     She has remained MRD (NPM1) negative in the peripheral blood and most recent  peripheral-blood MRD testing (1/13/25) is pending. Will defer transplant at this time. She will have twice-weekly count checks after HiDAC and an end-of-treatment bone marrow biposy on 2/17/25 followed by a visit with Dr. Fields on 2/21/25.     Immunosuppressed status  Continue prophylaxis with acyclovir; she takes levofloxacin and posaconazole during periods of neutropenia (starts with chemotherapy and discontinues at count recovery).      Recent RSV infection  RSV positive (12/31/24), all symptoms resolved at this time (01/14/25).      Menstrual suppression  This was much improved with C3 after starting leuprolide. She had last leuprolide (Q28 days) on 1/9/25.       RTC:  - 1/15, 1/16/25: C4D2-3 HiDAC + [facility]  - 1/21, 1/24, 1/28, 1/31, 2/4,  2/7, 2/10, 2/14/25: twice-weekly count checks @ [facility]  - 2/17/25: End-of-treatment bone marrow biopsy  - 2/25/25: [facility] Dr. Fields  - 3/7/25: [facility] (Nette Peña CNP)    -------------------------------------------------------------------------------------------------------  Tj Michael PA-C  [facility]

## 2025-01-14 NOTE — ASSESSMENT & PLAN NOTE
This was much improved with C3 after starting leuprolide. She had last leuprolide (Q28 days) on 1/9/25.

## 2025-01-14 NOTE — PROGRESS NOTES
Patient seen in infusion for HiDac, tolerated without complications. Seen at chairside by NEVILLE Layne. Neuro checks and signature check all WDL. Labs and schedule reviewed with patient. Bailon flushed and positive for blood return x2 lumens. Discharged in stable condition and to return for afternoon dose.

## 2025-01-15 ENCOUNTER — INFUSION (OUTPATIENT)
Dept: HEMATOLOGY/ONCOLOGY | Facility: HOSPITAL | Age: 25
End: 2025-01-15
Payer: COMMERCIAL

## 2025-01-15 ENCOUNTER — APPOINTMENT (OUTPATIENT)
Dept: HEMATOLOGY/ONCOLOGY | Facility: HOSPITAL | Age: 25
End: 2025-01-15
Payer: COMMERCIAL

## 2025-01-15 ENCOUNTER — OFFICE VISIT (OUTPATIENT)
Dept: HEMATOLOGY/ONCOLOGY | Facility: HOSPITAL | Age: 25
End: 2025-01-15
Payer: COMMERCIAL

## 2025-01-15 VITALS
BODY MASS INDEX: 26.82 KG/M2 | HEART RATE: 100 BPM | DIASTOLIC BLOOD PRESSURE: 57 MMHG | RESPIRATION RATE: 16 BRPM | TEMPERATURE: 97.5 F | WEIGHT: 161.16 LBS | SYSTOLIC BLOOD PRESSURE: 114 MMHG | OXYGEN SATURATION: 100 %

## 2025-01-15 VITALS
HEART RATE: 90 BPM | OXYGEN SATURATION: 99 % | SYSTOLIC BLOOD PRESSURE: 120 MMHG | RESPIRATION RATE: 18 BRPM | DIASTOLIC BLOOD PRESSURE: 52 MMHG

## 2025-01-15 DIAGNOSIS — D84.9 IMMUNOSUPPRESSED STATUS: ICD-10-CM

## 2025-01-15 DIAGNOSIS — C92.01 ACUTE MYELOID LEUKEMIA IN REMISSION (MULTI): ICD-10-CM

## 2025-01-15 DIAGNOSIS — N94.89 MENSTRUAL SUPPRESSION: ICD-10-CM

## 2025-01-15 DIAGNOSIS — C92.00 ACUTE MYELOID LEUKEMIA NOT HAVING ACHIEVED REMISSION (MULTI): ICD-10-CM

## 2025-01-15 DIAGNOSIS — C92.01 ACUTE MYELOID LEUKEMIA IN REMISSION (MULTI): Primary | ICD-10-CM

## 2025-01-15 LAB
ALBUMIN SERPL BCP-MCNC: 4.2 G/DL (ref 3.4–5)
ALP SERPL-CCNC: 58 U/L (ref 33–110)
ALT SERPL W P-5'-P-CCNC: 56 U/L (ref 7–45)
ANION GAP SERPL CALC-SCNC: 13 MMOL/L (ref 10–20)
AST SERPL W P-5'-P-CCNC: 35 U/L (ref 9–39)
BILIRUB SERPL-MCNC: 1.2 MG/DL (ref 0–1.2)
BUN SERPL-MCNC: 15 MG/DL (ref 6–23)
CALCIUM SERPL-MCNC: 9.5 MG/DL (ref 8.6–10.3)
CHLORIDE SERPL-SCNC: 104 MMOL/L (ref 98–107)
CO2 SERPL-SCNC: 27 MMOL/L (ref 21–32)
CREAT SERPL-MCNC: 0.69 MG/DL (ref 0.5–1.05)
EGFRCR SERPLBLD CKD-EPI 2021: >90 ML/MIN/1.73M*2
GLUCOSE SERPL-MCNC: 159 MG/DL (ref 74–99)
POTASSIUM SERPL-SCNC: 4 MMOL/L (ref 3.5–5.3)
PROT SERPL-MCNC: 6 G/DL (ref 6.4–8.2)
SODIUM SERPL-SCNC: 140 MMOL/L (ref 136–145)

## 2025-01-15 PROCEDURE — 96415 CHEMO IV INFUSION ADDL HR: CPT

## 2025-01-15 PROCEDURE — 2500000004 HC RX 250 GENERAL PHARMACY W/ HCPCS (ALT 636 FOR OP/ED): Performed by: INTERNAL MEDICINE

## 2025-01-15 PROCEDURE — 99214 OFFICE O/P EST MOD 30 MIN: CPT | Performed by: PHYSICIAN ASSISTANT

## 2025-01-15 PROCEDURE — 82565 ASSAY OF CREATININE: CPT

## 2025-01-15 PROCEDURE — 96413 CHEMO IV INFUSION 1 HR: CPT

## 2025-01-15 PROCEDURE — 99214 OFFICE O/P EST MOD 30 MIN: CPT | Mod: 25 | Performed by: PHYSICIAN ASSISTANT

## 2025-01-15 PROCEDURE — 2500000004 HC RX 250 GENERAL PHARMACY W/ HCPCS (ALT 636 FOR OP/ED): Performed by: PHYSICIAN ASSISTANT

## 2025-01-15 RX ORDER — DIPHENHYDRAMINE HYDROCHLORIDE 50 MG/ML
50 INJECTION INTRAMUSCULAR; INTRAVENOUS AS NEEDED
Status: DISCONTINUED | OUTPATIENT
Start: 2025-01-15 | End: 2025-01-15 | Stop reason: HOSPADM

## 2025-01-15 RX ORDER — DEXAMETHASONE 6 MG/1
12 TABLET ORAL ONCE
Status: COMPLETED | OUTPATIENT
Start: 2025-01-15 | End: 2025-01-15

## 2025-01-15 RX ORDER — HEPARIN SODIUM,PORCINE/PF 10 UNIT/ML
50 SYRINGE (ML) INTRAVENOUS AS NEEDED
Status: DISCONTINUED | OUTPATIENT
Start: 2025-01-15 | End: 2025-01-15 | Stop reason: HOSPADM

## 2025-01-15 RX ORDER — HEPARIN 100 UNIT/ML
500 SYRINGE INTRAVENOUS AS NEEDED
OUTPATIENT
Start: 2025-01-15

## 2025-01-15 RX ORDER — EPINEPHRINE 0.3 MG/.3ML
0.3 INJECTION SUBCUTANEOUS EVERY 5 MIN PRN
Status: DISCONTINUED | OUTPATIENT
Start: 2025-01-15 | End: 2025-01-15 | Stop reason: HOSPADM

## 2025-01-15 RX ORDER — FAMOTIDINE 10 MG/ML
20 INJECTION INTRAVENOUS ONCE AS NEEDED
Status: DISCONTINUED | OUTPATIENT
Start: 2025-01-15 | End: 2025-01-15 | Stop reason: HOSPADM

## 2025-01-15 RX ORDER — PROCHLORPERAZINE EDISYLATE 5 MG/ML
10 INJECTION INTRAMUSCULAR; INTRAVENOUS EVERY 6 HOURS PRN
Status: DISCONTINUED | OUTPATIENT
Start: 2025-01-15 | End: 2025-01-15 | Stop reason: HOSPADM

## 2025-01-15 RX ORDER — ALBUTEROL SULFATE 0.83 MG/ML
3 SOLUTION RESPIRATORY (INHALATION) AS NEEDED
Status: DISCONTINUED | OUTPATIENT
Start: 2025-01-15 | End: 2025-01-15 | Stop reason: HOSPADM

## 2025-01-15 RX ORDER — PROCHLORPERAZINE MALEATE 10 MG
10 TABLET ORAL EVERY 6 HOURS PRN
Status: DISCONTINUED | OUTPATIENT
Start: 2025-01-15 | End: 2025-01-15 | Stop reason: HOSPADM

## 2025-01-15 RX ORDER — HEPARIN SODIUM,PORCINE/PF 10 UNIT/ML
50 SYRINGE (ML) INTRAVENOUS AS NEEDED
OUTPATIENT
Start: 2025-01-15

## 2025-01-15 RX ORDER — ONDANSETRON HYDROCHLORIDE 8 MG/1
16 TABLET, FILM COATED ORAL ONCE
Status: COMPLETED | OUTPATIENT
Start: 2025-01-15 | End: 2025-01-15

## 2025-01-15 RX ADMIN — HEPARIN, PORCINE (PF) 10 UNIT/ML INTRAVENOUS SYRINGE 50 UNITS: at 18:35

## 2025-01-15 RX ADMIN — ONDANSETRON HYDROCHLORIDE 16 MG: 8 TABLET, FILM COATED ORAL at 08:23

## 2025-01-15 RX ADMIN — CYTARABINE 5100 MG: 100 INJECTION, SOLUTION INTRATHECAL; INTRAVENOUS; SUBCUTANEOUS at 16:23

## 2025-01-15 RX ADMIN — CYTARABINE 5100 MG: 100 INJECTION, SOLUTION INTRATHECAL; INTRAVENOUS; SUBCUTANEOUS at 08:27

## 2025-01-15 RX ADMIN — DEXAMETHASONE 12 MG: 6 TABLET ORAL at 08:23

## 2025-01-15 ASSESSMENT — ENCOUNTER SYMPTOMS
HEMATURIA: 0
NERVOUS/ANXIOUS: 0
WHEEZING: 0
VOMITING: 0
HEADACHES: 0
DIZZINESS: 0
ABDOMINAL DISTENTION: 0
DIARRHEA: 0
TROUBLE SWALLOWING: 0
CONSTIPATION: 0
PALPITATIONS: 0
DYSURIA: 0
SORE THROAT: 0
UNEXPECTED WEIGHT CHANGE: 0
CHILLS: 0
SHORTNESS OF BREATH: 0
HEMOPTYSIS: 0
APPETITE CHANGE: 0
COUGH: 0
NAUSEA: 1
LEG SWELLING: 0
DIFFICULTY URINATING: 0
BLOOD IN STOOL: 0
CHEST TIGHTNESS: 0
FATIGUE: 1
BRUISES/BLEEDS EASILY: 0
ABDOMINAL PAIN: 0
FEVER: 0

## 2025-01-15 NOTE — ASSESSMENT & PLAN NOTE
Continue prophylaxis with acyclovir; she takes levofloxacin and posaconazole during periods of neutropenia (starts with chemotherapy and discontinues at count recovery).      Recent RSV infection  RSV positive (12/31/24), all symptoms resolved at this time (01/15/25).

## 2025-01-15 NOTE — PROGRESS NOTES
Patient ID: Ilda Peter is a 24 y.o. female.    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 (>=10% blasts)  [id number] AML Risk Stratification: FAVORABLE: Mutated NPM1 without FLT3-ITD  Presented with anemia and circulating blasts    BONE MARROW (07/05/2024)  Hypercellular with erythroid predominance, dyserythropoiesis, and dysmegakaryopoiesis  FISH: AML negative  KARYOTYPE:  46XX [20]  MYELOID NGS: NPM1 (54%), NRAS (25%), PTPN11 (15%), IDH1 (2%)       7/19/2024 -  Chemotherapy    Induction with 7+3 (AraC+tarun) -> CR1 MRDpos  - D14 BM (8/1/24):  ablated, ALCON  - Count recovery:  ANC D28, plts D23  - Post induction BM (8/21/24):  ALCON, MFC negative, NPM1 2.65e-05       9/17/2024 -  Chemotherapy    Consolidation with HIDAC  -C1D1 9/17/24:  complicated by menorrhagia  -C2D1 10/21/24: complicated by menorrhagia  -C3D1 12/3/24: complicated by RSV infection  -C4D1 1/14/25    DATE TIMEPOINT SOURCE MORPHOLOGY MRD by MFC NPM1 NOTES   10/8/2024 Post C1 HIDAC PB   Not detectable    10/28/2024 Post C1 HIDAC PB   Not detectable    12/2/24 Post C2 HiDAC PB   Not detected    1/13/25 Post C3 HiDAC PB   Pending                                               Past Medical History:     Past Medical History:   Diagnosis Date    AML (acute myeloblastic leukemia) (Multi)     Anxiety     Cannabis dependence 07/02/2024    Chronic atrophic rhinitis 07/02/2024    Depression     Hyperbilirubinemia     Tinea versicolor 07/02/2024       Surgical History:     Past Surgical History:   Procedure Laterality Date    WISDOM TOOTH EXTRACTION          Family History:     Family History   Problem Relation Name Age of Onset    No Known Problems Mother      No Known Problems Father      No Known Problems Brother      Other (Bicuspid Aortic Valve; VSD) Son Jorge         Social History:     Social History     Tobacco Use    Smoking status: Never    Smokeless tobacco: Never   Vaping Use    Vaping status: Never Used   Substance Use Topics    Alcohol use: Not Currently    Drug use: Yes     Types: Marijuana      -------------------------------------------------------------------------------------------------------  Subjective       The patient presents to the clinic today (01/15/25) for routine follow-up and C4D2 HiDAC consolidation; overall, she is doing okay, but she is starting to have fatigue and nausea.     This is normal for her at this point in her treatment, she says. During this period, she does not typically have vomiting, and she is able to eat and drink. She will occasionally use her PRN antiemetics.     She denies fevers and chills. Denies chest pain, dyspnea, cough, and productive cough. No difficulty with thinking or coordination. No visual changes or balance problems.     Adamaris is still doing okay. No further respiratory symptoms.     Review of Systems   Constitutional:  Positive for fatigue. Negative for appetite change, chills, fever and unexpected weight change.   HENT:   Negative for mouth sores, nosebleeds, sore throat and trouble swallowing.    Respiratory:  Negative for chest tightness, cough, hemoptysis, shortness of breath and wheezing.    Cardiovascular:  Negative for chest pain, leg swelling and palpitations.   Gastrointestinal:  Positive for nausea. Negative for abdominal distention, abdominal pain, blood in stool, constipation, diarrhea and vomiting.   Genitourinary:  Negative for bladder incontinence, difficulty urinating, dysuria and hematuria.    Skin:  Negative for rash.   Neurological:  Negative for dizziness and headaches.   Hematological:  Does not bruise/bleed easily.   Psychiatric/Behavioral:  The patient is not nervous/anxious.    All other systems reviewed and are negative.      -------------------------------------------------------------------------------------------------------  Objective   BSA: There is no height or weight on file to calculate BSA.  There were no vitals taken for this visit.    Physical Exam  HENT:      Mouth/Throat:      Mouth: Mucous membranes are moist.      Pharynx: No posterior oropharyngeal erythema.   Eyes:      General: No scleral icterus.     Extraocular Movements: Extraocular movements intact.      Pupils: Pupils are equal, round, and reactive to light.   Cardiovascular:      Rate and Rhythm: Normal rate and regular rhythm.      Heart sounds: No murmur heard.     No friction rub. No gallop.   Pulmonary:      Effort: No respiratory distress.      Breath sounds: No stridor. No wheezing, rhonchi or rales.   Abdominal:      General: There is no distension.      Palpations: There is no mass.      Tenderness: There is no abdominal tenderness. There is no guarding or rebound.   Musculoskeletal:         General: No swelling or deformity.      Right lower leg: No edema.      Left lower leg: No edema.   Lymphadenopathy:      Cervical: No cervical adenopathy.   Skin:     Findings: No lesion or rash.          Neurological:      Mental Status: She is alert and oriented to person, place, and time.      Cranial Nerves: No cranial nerve deficit.      Motor: No weakness.      Coordination: Finger-Nose-Finger Test and Heel to Shin Test normal. Rapid alternating movements normal.   Psychiatric:         Mood and Affect: Mood normal.         Behavior: Behavior normal.       Performance Status:  Symptomatic; fully ambulatory  -------------------------------------------------------------------------------------------------------  Assessment/Plan    Assessment & Plan  Acute myeloid leukemia in remission (Multi)  Ongoing consolidation with HiDAC, C4D2 (final cycle) today (01/15/25), delayed 14 days due to hers (and son's) recent RSV infections. Labs from yesterday (1/14/25) show  renal/hepatic function are appropriate to continue C4 HiDAC.     She has remained MRD (NPM1) negative in the peripheral blood and most recent peripheral-blood MRD testing (1/13/25) is pending. Will defer transplant at this time. She will have twice-weekly count checks after HiDAC and an end-of-treatment bone marrow biposy on 2/17/25 followed by a visit with Dr. Fields on 2/21/25.     Immunosuppressed status  Continue prophylaxis with acyclovir; she takes levofloxacin and posaconazole during periods of neutropenia (starts with chemotherapy and discontinues at count recovery).      Recent RSV infection  RSV positive (12/31/24), all symptoms resolved at this time (01/15/25).      Menstrual suppression  This was much improved with C3 after starting leuprolide. She had last leuprolide (Q28 days) on 1/9/25.       RTC:  - 1/16/25: C4D3 HiDAC + [facility]  - 1/21, 1/24, 1/28, 1/31, 2/4,  2/7, 2/10, 2/14/25: twice-weekly count checks @ [facility]  - 2/17/25: End-of-treatment bone marrow biopsy  - 2/25/25: [facility] + Dr. Fields  - 3/7/25: [facility] (Nette Peña CNP)    -------------------------------------------------------------------------------------------------------  Tj Michael PA-C  [facility]

## 2025-01-15 NOTE — ASSESSMENT & PLAN NOTE
Ongoing consolidation with HiDAC, C4D2 (final cycle) today (01/15/25), delayed 14 days due to hers (and son's) recent RSV infections. Labs from yesterday (1/14/25) show renal/hepatic function are appropriate to continue C4 HiDAC.     She has remained MRD (NPM1) negative in the peripheral blood and most recent peripheral-blood MRD testing (1/13/25) is pending. Will defer transplant at this time. She will have twice-weekly count checks after HiDAC and an end-of-treatment bone marrow biposy on 2/17/25 followed by a visit with Dr. Fields on 2/21/25.

## 2025-01-16 ENCOUNTER — OFFICE VISIT (OUTPATIENT)
Dept: HEMATOLOGY/ONCOLOGY | Facility: HOSPITAL | Age: 25
End: 2025-01-16
Payer: COMMERCIAL

## 2025-01-16 ENCOUNTER — INFUSION (OUTPATIENT)
Dept: HEMATOLOGY/ONCOLOGY | Facility: HOSPITAL | Age: 25
End: 2025-01-16
Payer: COMMERCIAL

## 2025-01-16 VITALS
OXYGEN SATURATION: 99 % | BODY MASS INDEX: 26.96 KG/M2 | RESPIRATION RATE: 18 BRPM | TEMPERATURE: 97.3 F | SYSTOLIC BLOOD PRESSURE: 111 MMHG | DIASTOLIC BLOOD PRESSURE: 58 MMHG | WEIGHT: 162.04 LBS | HEART RATE: 80 BPM

## 2025-01-16 VITALS
TEMPERATURE: 97.7 F | OXYGEN SATURATION: 99 % | HEART RATE: 65 BPM | RESPIRATION RATE: 18 BRPM | SYSTOLIC BLOOD PRESSURE: 112 MMHG | DIASTOLIC BLOOD PRESSURE: 61 MMHG

## 2025-01-16 DIAGNOSIS — N94.89 MENSTRUAL SUPPRESSION: ICD-10-CM

## 2025-01-16 DIAGNOSIS — R11.2 CHEMOTHERAPY INDUCED NAUSEA AND VOMITING: ICD-10-CM

## 2025-01-16 DIAGNOSIS — C92.01 ACUTE MYELOID LEUKEMIA IN REMISSION (MULTI): Primary | ICD-10-CM

## 2025-01-16 DIAGNOSIS — C92.00 ACUTE MYELOID LEUKEMIA NOT HAVING ACHIEVED REMISSION (MULTI): ICD-10-CM

## 2025-01-16 DIAGNOSIS — C92.01 ACUTE MYELOID LEUKEMIA IN REMISSION (MULTI): ICD-10-CM

## 2025-01-16 DIAGNOSIS — D84.9 IMMUNOCOMPROMISED: ICD-10-CM

## 2025-01-16 DIAGNOSIS — T45.1X5A CHEMOTHERAPY INDUCED NAUSEA AND VOMITING: ICD-10-CM

## 2025-01-16 LAB
ALBUMIN SERPL BCP-MCNC: 4.2 G/DL (ref 3.4–5)
ALP SERPL-CCNC: 57 U/L (ref 33–110)
ALT SERPL W P-5'-P-CCNC: 74 U/L (ref 7–45)
ANION GAP SERPL CALC-SCNC: 12 MMOL/L (ref 10–20)
AST SERPL W P-5'-P-CCNC: 39 U/L (ref 9–39)
BASOPHILS # BLD AUTO: 0 X10*3/UL (ref 0–0.1)
BASOPHILS NFR BLD AUTO: 0 %
BILIRUB SERPL-MCNC: 1.5 MG/DL (ref 0–1.2)
BUN SERPL-MCNC: 18 MG/DL (ref 6–23)
CALCIUM SERPL-MCNC: 9.1 MG/DL (ref 8.6–10.3)
CHLORIDE SERPL-SCNC: 106 MMOL/L (ref 98–107)
CO2 SERPL-SCNC: 27 MMOL/L (ref 21–32)
CREAT SERPL-MCNC: 0.75 MG/DL (ref 0.5–1.05)
EGFRCR SERPLBLD CKD-EPI 2021: >90 ML/MIN/1.73M*2
EOSINOPHIL # BLD AUTO: 0 X10*3/UL (ref 0–0.7)
EOSINOPHIL NFR BLD AUTO: 0 %
ERYTHROCYTE [DISTWIDTH] IN BLOOD BY AUTOMATED COUNT: 17.8 % (ref 11.5–14.5)
GLUCOSE SERPL-MCNC: 122 MG/DL (ref 74–99)
HCT VFR BLD AUTO: 32.3 % (ref 36–46)
HGB BLD-MCNC: 10.8 G/DL (ref 12–16)
IMM GRANULOCYTES # BLD AUTO: 0.03 X10*3/UL (ref 0–0.7)
IMM GRANULOCYTES NFR BLD AUTO: 0.4 % (ref 0–0.9)
LYMPHOCYTES # BLD AUTO: 0.03 X10*3/UL (ref 1.2–4.8)
LYMPHOCYTES NFR BLD AUTO: 0.4 %
MCH RBC QN AUTO: 34.7 PG (ref 26–34)
MCHC RBC AUTO-ENTMCNC: 33.4 G/DL (ref 32–36)
MCV RBC AUTO: 104 FL (ref 80–100)
MONOCYTES # BLD AUTO: 0.16 X10*3/UL (ref 0.1–1)
MONOCYTES NFR BLD AUTO: 2.1 %
NEUTROPHILS # BLD AUTO: 7.56 X10*3/UL (ref 1.2–7.7)
NEUTROPHILS NFR BLD AUTO: 97.1 %
NRBC BLD-RTO: 0 /100 WBCS (ref 0–0)
PLATELET # BLD AUTO: 143 X10*3/UL (ref 150–450)
POTASSIUM SERPL-SCNC: 4 MMOL/L (ref 3.5–5.3)
PROT SERPL-MCNC: 6.2 G/DL (ref 6.4–8.2)
RBC # BLD AUTO: 3.11 X10*6/UL (ref 4–5.2)
SODIUM SERPL-SCNC: 141 MMOL/L (ref 136–145)
WBC # BLD AUTO: 7.8 X10*3/UL (ref 4.4–11.3)

## 2025-01-16 PROCEDURE — 96413 CHEMO IV INFUSION 1 HR: CPT

## 2025-01-16 PROCEDURE — 96415 CHEMO IV INFUSION ADDL HR: CPT

## 2025-01-16 PROCEDURE — 99214 OFFICE O/P EST MOD 30 MIN: CPT

## 2025-01-16 PROCEDURE — 2500000004 HC RX 250 GENERAL PHARMACY W/ HCPCS (ALT 636 FOR OP/ED): Performed by: INTERNAL MEDICINE

## 2025-01-16 PROCEDURE — 80053 COMPREHEN METABOLIC PANEL: CPT

## 2025-01-16 PROCEDURE — 85025 COMPLETE CBC W/AUTO DIFF WBC: CPT

## 2025-01-16 PROCEDURE — 99214 OFFICE O/P EST MOD 30 MIN: CPT | Mod: 25

## 2025-01-16 RX ORDER — FAMOTIDINE 10 MG/ML
20 INJECTION INTRAVENOUS ONCE AS NEEDED
Status: DISCONTINUED | OUTPATIENT
Start: 2025-01-16 | End: 2025-01-16 | Stop reason: HOSPADM

## 2025-01-16 RX ORDER — ALBUTEROL SULFATE 0.83 MG/ML
3 SOLUTION RESPIRATORY (INHALATION) AS NEEDED
Status: DISCONTINUED | OUTPATIENT
Start: 2025-01-16 | End: 2025-01-16 | Stop reason: HOSPADM

## 2025-01-16 RX ORDER — EPINEPHRINE 0.3 MG/.3ML
0.3 INJECTION SUBCUTANEOUS EVERY 5 MIN PRN
Status: DISCONTINUED | OUTPATIENT
Start: 2025-01-16 | End: 2025-01-16 | Stop reason: HOSPADM

## 2025-01-16 RX ORDER — DEXAMETHASONE 6 MG/1
12 TABLET ORAL ONCE
Status: COMPLETED | OUTPATIENT
Start: 2025-01-16 | End: 2025-01-16

## 2025-01-16 RX ORDER — PROCHLORPERAZINE EDISYLATE 5 MG/ML
10 INJECTION INTRAMUSCULAR; INTRAVENOUS EVERY 6 HOURS PRN
Status: DISCONTINUED | OUTPATIENT
Start: 2025-01-16 | End: 2025-01-16 | Stop reason: HOSPADM

## 2025-01-16 RX ORDER — ONDANSETRON HYDROCHLORIDE 8 MG/1
16 TABLET, FILM COATED ORAL ONCE
Status: COMPLETED | OUTPATIENT
Start: 2025-01-16 | End: 2025-01-16

## 2025-01-16 RX ORDER — DIPHENHYDRAMINE HYDROCHLORIDE 50 MG/ML
50 INJECTION INTRAMUSCULAR; INTRAVENOUS AS NEEDED
Status: DISCONTINUED | OUTPATIENT
Start: 2025-01-16 | End: 2025-01-16 | Stop reason: HOSPADM

## 2025-01-16 RX ORDER — PROCHLORPERAZINE MALEATE 10 MG
10 TABLET ORAL EVERY 6 HOURS PRN
Status: DISCONTINUED | OUTPATIENT
Start: 2025-01-16 | End: 2025-01-16 | Stop reason: HOSPADM

## 2025-01-16 RX ADMIN — CYTARABINE 5100 MG: 100 INJECTION, SOLUTION INTRATHECAL; INTRAVENOUS; SUBCUTANEOUS at 16:37

## 2025-01-16 RX ADMIN — ONDANSETRON HYDROCHLORIDE 16 MG: 8 TABLET, FILM COATED ORAL at 08:42

## 2025-01-16 RX ADMIN — CYTARABINE 5100 MG: 100 INJECTION, SOLUTION INTRATHECAL; INTRAVENOUS; SUBCUTANEOUS at 08:46

## 2025-01-16 RX ADMIN — DEXAMETHASONE 12 MG: 6 TABLET ORAL at 08:42

## 2025-01-16 ASSESSMENT — ENCOUNTER SYMPTOMS
VOMITING: 0
CHILLS: 0
LEG SWELLING: 0
EYES NEGATIVE: 1
PALPITATIONS: 0
EYE PROBLEMS: 0
DIARRHEA: 0
HEMATOLOGIC/LYMPHATIC NEGATIVE: 1
FEVER: 0
CONSTIPATION: 0
BLOOD IN STOOL: 0
NAUSEA: 1
HEADACHES: 0
NUMBNESS: 0
CARDIOVASCULAR NEGATIVE: 1
MUSCULOSKELETAL NEGATIVE: 1
FATIGUE: 1
LIGHT-HEADEDNESS: 0
APPETITE CHANGE: 0
PSYCHIATRIC NEGATIVE: 1
ADENOPATHY: 0
DIZZINESS: 0
DIAPHORESIS: 0
BRUISES/BLEEDS EASILY: 0
CHEST TIGHTNESS: 0
NEUROLOGICAL NEGATIVE: 1
RESPIRATORY NEGATIVE: 1
SHORTNESS OF BREATH: 0
COUGH: 0

## 2025-01-16 ASSESSMENT — PAIN SCALES - GENERAL
PAINLEVEL_OUTOF10: 0-NO PAIN
PAINLEVEL_OUTOF10: 0-NO PAIN

## 2025-01-16 NOTE — PROGRESS NOTES
Patient seen in infusion for HiDac, tolerated without complications. Neuro checks and signature check all WDL. Labs and schedule reviewed with patient. Bailon flushed and positive for blood return x2 lumens. Discharged in stable condition.

## 2025-01-16 NOTE — PROGRESS NOTES
Patient seen in infusion for HiDac, tolerated without complications. Seen at chairside by Fede Thomas NP. Neuro checks and signature check all WDL. Labs and schedule reviewed with patient. Bialon flushed and positive for blood return x2 lumens. Discharged in stable condition and to return for afternoon dose.

## 2025-01-16 NOTE — PROGRESS NOTES
Patient ID: Ilda Peter is a 24 y.o. female.  Referring Physician: Brandee Fields MD  [id number] [place] [address]  Primary Care Provider: Brandee Cross DO    Date of Service:  1/16/2025    SUBJECTIVE:  Ilda Peter presents today for follow up and C4D3 of HiDAC. Doing pretty good overall. Has some mild fatigue and slight nausea. No breakthrough menstrual bleeding. Appetite is good. Denies B symptoms and current infection. No SOB, chest pain or tightness. No difficulty thinking, with coordination or visual changes.        Review of Systems   Constitutional:  Positive for fatigue. Negative for appetite change, chills, diaphoresis and fever.   HENT:  Negative.     Eyes: Negative.  Negative for eye problems.   Respiratory: Negative.  Negative for chest tightness, cough and shortness of breath.    Cardiovascular: Negative.  Negative for chest pain, leg swelling and palpitations.   Gastrointestinal:  Positive for nausea. Negative for blood in stool, constipation, diarrhea and vomiting.   Genitourinary: Negative.     Musculoskeletal: Negative.    Skin: Negative.    Neurological: Negative.  Negative for dizziness, headaches, light-headedness and numbness.   Hematological: Negative.  Negative for adenopathy. Does not bruise/bleed easily.   Psychiatric/Behavioral: Negative.           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 (>=10% blasts)  [id number] AML Risk Stratification: FAVORABLE: Mutated NPM1 without FLT3-ITD  Presented with anemia and circulating blasts    BONE MARROW (07/05/2024)  Hypercellular with erythroid predominance, dyserythropoiesis, and dysmegakaryopoiesis  FISH: AML negative  KARYOTYPE:  46XX [20]  MYELOID NGS: NPM1 (54%), NRAS (25%), PTPN11 (15%), IDH1 (2%)       7/19/2024 -  Chemotherapy    Induction with 7+3 (AraC+tarun) -> CR1 MRDpos  - D14 BM (8/1/24):  ablated, ALCON  -  Count recovery:  ANC D28, plts D23  - Post induction BM (8/21/24):  ALCON, MFC negative, NPM1 2.65e-05       9/17/2024 -  Chemotherapy    Consolidation with HIDAC  -C1D1 9/17/24:  complicated by menorrhagia  -C2D1 10/21/24: complicated by menorrhagia  -C3D1 12/3/24: complicated by RSV infection  -C4D1 1/14/25    DATE TIMEPOINT SOURCE MORPHOLOGY MRD by [facility] NPM1 NOTES   10/8/2024 Post C1 HIDAC PB   Not detectable    10/28/2024 Post C1 HIDAC PB   Not detectable    12/2/24 Post C2 HiDAC PB   Not detected    1/13/25 Post C3 HiDAC PB   Pending                                                OBJECTIVE:  KPS: Karnofsky Score: 90 - Able to carry on normal activity; minor signs or symptoms of disease    VS:  There were no vitals taken for this visit.  BSA: There is no height or weight on file to calculate BSA.    Physical Exam  Vitals reviewed.   Constitutional:       Appearance: Normal appearance.   HENT:      Head: Normocephalic and atraumatic.      Mouth/Throat:      Mouth: Mucous membranes are moist.      Pharynx: Oropharynx is clear.   Eyes:      Extraocular Movements: Extraocular movements intact.      Conjunctiva/sclera: Conjunctivae normal.      Pupils: Pupils are equal, round, and reactive to light.   Cardiovascular:      Rate and Rhythm: Normal rate and regular rhythm.      Pulses: Normal pulses.      Heart sounds: Normal heart sounds.   Pulmonary:      Effort: Pulmonary effort is normal.      Breath sounds: Normal breath sounds.   Abdominal:      General: Bowel sounds are normal.      Palpations: Abdomen is soft.   Musculoskeletal:         General: Normal range of motion.      Cervical back: Normal range of motion and neck supple.      Right lower leg: No edema.      Left lower leg: No edema.   Skin:     General: Skin is warm and dry.      Capillary Refill: Capillary refill takes less than 2 seconds.   Neurological:      General: No focal deficit present.      Mental Status: She is alert and oriented to person, place,  and time.   Psychiatric:         Mood and Affect: Mood normal.         Behavior: Behavior normal.         Thought Content: Thought content normal.         Judgment: Judgment normal.         Assessment & Plan  Acute myeloid leukemia in remission (Multi)  -Acute myeloid leukemia in remission (Multi), Clinical: NPM1+, FLT3-    -Ongoing consolidation with HiDAC, C4D3 (final cycle) today (01/16/25), delayed 14 days due to hers (and son's) recent RSV infections.   -AST and bilirubin elevated. Will continue to monitor as trend upward started after cycle 4 start. Trended upward as well after the start of each cycle but quickly normalized.     -She has remained MRD (NPM1) negative in the peripheral blood and most recent peripheral-blood MRD testing (1/13/25) is pending. Will defer transplant at this time. She will have twice-weekly count checks after HiDAC and an end-of-treatment bone marrow biposy on 2/17/25 followed by a visit with Dr. Fields on 2/21/25.   Immunocompromised  Continue prophylaxis with acyclovir; she takes levofloxacin and posaconazole during periods of neutropenia (starts with chemotherapy and discontinues at count recovery).       Recent RSV infection  RSV positive (12/31/24), all symptoms resolved at this time (01/15/25).    Menstrual suppression  This was much improved with C3 after starting leuprolide. She had last leuprolide (Q28 days) on 1/9/25.   Chemotherapy induced nausea and vomiting  -Does have complaint of slight nausea without vomiting. Does not interfere with PO intake at this time  -Does not use antiemetics at this time  -Has Zofran on board PRN     Current Outpatient Medications   Medication Instructions    acyclovir (ZOVIRAX) 400 mg, oral, 2 times daily    heparin flush 50 Units, intra-catheter, Daily, Per lumen    levoFLOXacin (LEVAQUIN) 500 mg, oral, Daily    norethindrone (AYGESTIN) 5 mg, oral, Daily, Start when instructed    ondansetron (ZOFRAN) 8 mg, oral, Every 8 hours PRN     posaconazole (NOXAFIL) 300 mg, oral, Daily with breakfast, Do not crush, chew, or split.    prednisoLONE acetate (Pred-Forte) 1 % ophthalmic suspension 2 drops, Every 6 hours    sertraline (ZOLOFT) 50 mg, oral, Daily    sodium chloride 0.9% (Normal Saline Flush) flush 10 mL, intravenous, Daily, Per lumen        Laboratory:  The pertinent laboratory results were reviewed and discussed with the patient.    Lab Results   Component Value Date    WBC 7.8 01/16/2025    HCT 32.3 (L) 01/16/2025    HGB 10.8 (L) 01/16/2025     (L) 01/16/2025    K 4.0 01/16/2025    CALCIUM 9.1 01/16/2025     01/16/2025    MG 1.79 08/21/2024    BILITOT 1.5 (H) 01/16/2025    ALT 74 (H) 01/16/2025    AST 39 01/16/2025    BUN 18 01/16/2025    CREATININE 0.75 01/16/2025    PHOS 3.6 08/15/2024      Note: for a comprehensive list of the patient's lab results, access the Results Review activity.    RTC:   - 1/21, 1/24, 1/28, 1/31, 2/4,  2/7, 2/10, 2/14/25: twice-weekly count checks @ [facility]  - 2/17/25: End-of-treatment bone marrow biopsy  - 2/25/25: [facility] + Dr. Fields  - 3/7/25: [facility] (Nette Peña, ML)    LEDY Haley-CNP

## 2025-01-16 NOTE — ASSESSMENT & PLAN NOTE
-Acute myeloid leukemia in remission (Multi), Clinical: NPM1+, FLT3-    -Ongoing consolidation with HiDAC, C4D3 (final cycle) today (01/16/25), delayed 14 days due to hers (and son's) recent RSV infections.   -AST and bilirubin elevated. Will continue to monitor as trend upward started after cycle 4 start. Trended upward as well after the start of each cycle but quickly normalized.     -She has remained MRD (NPM1) negative in the peripheral blood and most recent peripheral-blood MRD testing (1/13/25) is pending. Will defer transplant at this time. She will have twice-weekly count checks after HiDAC and an end-of-treatment bone marrow biposy on 2/17/25 followed by a visit with Dr. Fields on 2/21/25.

## 2025-01-16 NOTE — PROGRESS NOTES
Pt arrived to infusion for cytarbine. Completed neuro checks (romberg, finger to nose, and signature) with no issues noted.  Received PM dose with no adverse events, aware of return appointment time tomorrow.

## 2025-01-18 ENCOUNTER — INFUSION (OUTPATIENT)
Dept: HEMATOLOGY/ONCOLOGY | Facility: HOSPITAL | Age: 25
End: 2025-01-18
Payer: COMMERCIAL

## 2025-01-18 VITALS
WEIGHT: 156.1 LBS | OXYGEN SATURATION: 99 % | BODY MASS INDEX: 25.98 KG/M2 | HEART RATE: 91 BPM | DIASTOLIC BLOOD PRESSURE: 55 MMHG | TEMPERATURE: 96.1 F | SYSTOLIC BLOOD PRESSURE: 107 MMHG | RESPIRATION RATE: 18 BRPM

## 2025-01-18 DIAGNOSIS — C92.01 ACUTE MYELOID LEUKEMIA IN REMISSION (MULTI): ICD-10-CM

## 2025-01-18 PROCEDURE — 2500000004 HC RX 250 GENERAL PHARMACY W/ HCPCS (ALT 636 FOR OP/ED): Mod: JZ,JG,TB | Performed by: INTERNAL MEDICINE

## 2025-01-18 PROCEDURE — 96372 THER/PROPH/DIAG INJ SC/IM: CPT

## 2025-01-18 RX ORDER — FAMOTIDINE 10 MG/ML
20 INJECTION INTRAVENOUS ONCE AS NEEDED
Status: DISCONTINUED | OUTPATIENT
Start: 2025-01-18 | End: 2025-01-18 | Stop reason: HOSPADM

## 2025-01-18 RX ORDER — EPINEPHRINE 0.3 MG/.3ML
0.3 INJECTION SUBCUTANEOUS EVERY 5 MIN PRN
Status: DISCONTINUED | OUTPATIENT
Start: 2025-01-18 | End: 2025-01-18 | Stop reason: HOSPADM

## 2025-01-18 RX ORDER — ALBUTEROL SULFATE 0.83 MG/ML
3 SOLUTION RESPIRATORY (INHALATION) AS NEEDED
Status: DISCONTINUED | OUTPATIENT
Start: 2025-01-18 | End: 2025-01-18 | Stop reason: HOSPADM

## 2025-01-18 RX ORDER — DIPHENHYDRAMINE HYDROCHLORIDE 50 MG/ML
50 INJECTION INTRAMUSCULAR; INTRAVENOUS AS NEEDED
Status: DISCONTINUED | OUTPATIENT
Start: 2025-01-18 | End: 2025-01-18 | Stop reason: HOSPADM

## 2025-01-18 RX ADMIN — PEGFILGRASTIM-CBQV 6 MG: 6 INJECTION, SOLUTION SUBCUTANEOUS at 11:05

## 2025-01-18 ASSESSMENT — PAIN SCALES - GENERAL: PAINLEVEL_OUTOF10: 0-NO PAIN

## 2025-01-18 NOTE — PROGRESS NOTES
Pt arrived to [facility] infusion for scheduled injection. Pt received injection in right upper arm without incident. Pt aware of future appointments and agreeable.

## 2025-01-21 ENCOUNTER — INFUSION (OUTPATIENT)
Dept: HEMATOLOGY/ONCOLOGY | Facility: CLINIC | Age: 25
End: 2025-01-21
Payer: COMMERCIAL

## 2025-01-21 ENCOUNTER — DOCUMENTATION (OUTPATIENT)
Dept: HEMATOLOGY/ONCOLOGY | Facility: HOSPITAL | Age: 25
End: 2025-01-21

## 2025-01-21 VITALS
OXYGEN SATURATION: 98 % | SYSTOLIC BLOOD PRESSURE: 117 MMHG | DIASTOLIC BLOOD PRESSURE: 72 MMHG | BODY MASS INDEX: 26.41 KG/M2 | WEIGHT: 158.73 LBS | HEART RATE: 71 BPM | RESPIRATION RATE: 16 BRPM | TEMPERATURE: 97.9 F

## 2025-01-21 DIAGNOSIS — C92.00 ACUTE MYELOID LEUKEMIA NOT HAVING ACHIEVED REMISSION (MULTI): ICD-10-CM

## 2025-01-21 DIAGNOSIS — C92.01 ACUTE MYELOID LEUKEMIA IN REMISSION (MULTI): ICD-10-CM

## 2025-01-21 LAB
ALBUMIN SERPL BCP-MCNC: 4.2 G/DL (ref 3.4–5)
ALP SERPL-CCNC: 62 U/L (ref 33–110)
ALT SERPL W P-5'-P-CCNC: 44 U/L (ref 7–45)
ANION GAP SERPL CALC-SCNC: 9 MMOL/L (ref 10–20)
AST SERPL W P-5'-P-CCNC: 16 U/L (ref 9–39)
BASOPHILS # BLD AUTO: 0 X10*3/UL (ref 0–0.1)
BASOPHILS NFR BLD AUTO: ABNORMAL %
BILIRUB SERPL-MCNC: 2.1 MG/DL (ref 0–1.2)
BUN SERPL-MCNC: 21 MG/DL (ref 6–23)
CALCIUM SERPL-MCNC: 9.2 MG/DL (ref 8.6–10.3)
CHLORIDE SERPL-SCNC: 104 MMOL/L (ref 98–107)
CO2 SERPL-SCNC: 30 MMOL/L (ref 21–32)
CREAT SERPL-MCNC: 0.69 MG/DL (ref 0.5–1.05)
EGFRCR SERPLBLD CKD-EPI 2021: >90 ML/MIN/1.73M*2
ELECTRONICALLY SIGNED BY: NORMAL
EOSINOPHIL # BLD AUTO: 0 X10*3/UL (ref 0–0.7)
EOSINOPHIL NFR BLD AUTO: ABNORMAL %
ERYTHROCYTE [DISTWIDTH] IN BLOOD BY AUTOMATED COUNT: 15.2 % (ref 11.5–14.5)
GLUCOSE SERPL-MCNC: 117 MG/DL (ref 74–99)
HCT VFR BLD AUTO: 27.8 % (ref 36–46)
HGB BLD-MCNC: 9.5 G/DL (ref 12–16)
IMM GRANULOCYTES # BLD AUTO: 0.01 X10*3/UL (ref 0–0.7)
IMM GRANULOCYTES NFR BLD AUTO: ABNORMAL %
LYMPHOCYTES # BLD AUTO: 0.08 X10*3/UL (ref 1.2–4.8)
LYMPHOCYTES NFR BLD AUTO: ABNORMAL %
MCH RBC QN AUTO: 34.5 PG (ref 26–34)
MCHC RBC AUTO-ENTMCNC: 34.2 G/DL (ref 32–36)
MCV RBC AUTO: 101 FL (ref 80–100)
MONOCYTES # BLD AUTO: 0 X10*3/UL (ref 0.1–1)
MONOCYTES NFR BLD AUTO: ABNORMAL %
NEUTROPHILS # BLD AUTO: 0.05 X10*3/UL (ref 1.2–7.7)
NEUTROPHILS NFR BLD AUTO: ABNORMAL %
NPM1 RESULTS: NORMAL
NRBC BLD-RTO: ABNORMAL /100{WBCS}
PLATELET # BLD AUTO: 43 X10*3/UL (ref 150–450)
POTASSIUM SERPL-SCNC: 4 MMOL/L (ref 3.5–5.3)
PROT SERPL-MCNC: 6.1 G/DL (ref 6.4–8.2)
RBC # BLD AUTO: 2.75 X10*6/UL (ref 4–5.2)
SODIUM SERPL-SCNC: 139 MMOL/L (ref 136–145)
WBC # BLD AUTO: 0.1 X10*3/UL (ref 4.4–11.3)

## 2025-01-21 PROCEDURE — 36591 DRAW BLOOD OFF VENOUS DEVICE: CPT

## 2025-01-21 PROCEDURE — 85027 COMPLETE CBC AUTOMATED: CPT

## 2025-01-21 PROCEDURE — 80053 COMPREHEN METABOLIC PANEL: CPT

## 2025-01-21 RX ORDER — HEPARIN 100 UNIT/ML
500 SYRINGE INTRAVENOUS AS NEEDED
Status: DISCONTINUED | OUTPATIENT
Start: 2025-01-21 | End: 2025-01-21 | Stop reason: HOSPADM

## 2025-01-21 RX ORDER — HEPARIN SODIUM,PORCINE/PF 10 UNIT/ML
50 SYRINGE (ML) INTRAVENOUS AS NEEDED
Status: CANCELLED | OUTPATIENT
Start: 2025-01-21

## 2025-01-21 RX ORDER — HEPARIN SODIUM,PORCINE/PF 10 UNIT/ML
50 SYRINGE (ML) INTRAVENOUS AS NEEDED
Status: DISCONTINUED | OUTPATIENT
Start: 2025-01-21 | End: 2025-01-21 | Stop reason: HOSPADM

## 2025-01-21 RX ORDER — HEPARIN 100 UNIT/ML
500 SYRINGE INTRAVENOUS AS NEEDED
Status: CANCELLED | OUTPATIENT
Start: 2025-01-21

## 2025-01-21 ASSESSMENT — PAIN SCALES - GENERAL: PAINLEVEL_OUTOF10: 0-NO PAIN

## 2025-01-24 ENCOUNTER — DOCUMENTATION (OUTPATIENT)
Dept: HEMATOLOGY/ONCOLOGY | Facility: HOSPITAL | Age: 25
End: 2025-01-24
Payer: COMMERCIAL

## 2025-01-24 ENCOUNTER — APPOINTMENT (OUTPATIENT)
Dept: HEMATOLOGY/ONCOLOGY | Facility: CLINIC | Age: 25
End: 2025-01-24
Payer: COMMERCIAL

## 2025-01-24 ENCOUNTER — INFUSION (OUTPATIENT)
Dept: HEMATOLOGY/ONCOLOGY | Facility: CLINIC | Age: 25
End: 2025-01-24
Payer: COMMERCIAL

## 2025-01-24 VITALS
TEMPERATURE: 98.4 F | BODY MASS INDEX: 26.27 KG/M2 | WEIGHT: 157.85 LBS | RESPIRATION RATE: 16 BRPM | SYSTOLIC BLOOD PRESSURE: 118 MMHG | DIASTOLIC BLOOD PRESSURE: 66 MMHG | HEART RATE: 78 BPM | OXYGEN SATURATION: 99 %

## 2025-01-24 DIAGNOSIS — C92.00 ACUTE MYELOID LEUKEMIA NOT HAVING ACHIEVED REMISSION (MULTI): ICD-10-CM

## 2025-01-24 DIAGNOSIS — C92.01 ACUTE MYELOID LEUKEMIA IN REMISSION (MULTI): ICD-10-CM

## 2025-01-24 LAB
BASOPHILS # BLD AUTO: 0 X10*3/UL (ref 0–0.1)
BASOPHILS NFR BLD AUTO: ABNORMAL %
BLOOD EXPIRATION DATE: NORMAL
DISPENSE STATUS: NORMAL
EOSINOPHIL # BLD AUTO: 0.02 X10*3/UL (ref 0–0.7)
EOSINOPHIL NFR BLD AUTO: ABNORMAL %
ERYTHROCYTE [DISTWIDTH] IN BLOOD BY AUTOMATED COUNT: 14.1 % (ref 11.5–14.5)
HCT VFR BLD AUTO: 24.5 % (ref 36–46)
HGB BLD-MCNC: 8.5 G/DL (ref 12–16)
IMM GRANULOCYTES # BLD AUTO: 0 X10*3/UL (ref 0–0.7)
IMM GRANULOCYTES NFR BLD AUTO: 0 % (ref 0–0.9)
LYMPHOCYTES # BLD AUTO: 0.1 X10*3/UL (ref 1.2–4.8)
LYMPHOCYTES NFR BLD AUTO: ABNORMAL %
MCH RBC QN AUTO: 34.7 PG (ref 26–34)
MCHC RBC AUTO-ENTMCNC: 34.7 G/DL (ref 32–36)
MCV RBC AUTO: 100 FL (ref 80–100)
MONOCYTES # BLD AUTO: 0.02 X10*3/UL (ref 0.1–1)
MONOCYTES NFR BLD AUTO: ABNORMAL %
NEUTROPHILS # BLD AUTO: 0.01 X10*3/UL (ref 1.2–7.7)
NEUTROPHILS NFR BLD AUTO: ABNORMAL %
NRBC BLD-RTO: ABNORMAL /100{WBCS}
PLATELET # BLD AUTO: 6 X10*3/UL (ref 150–450)
PRODUCT BLOOD TYPE: 8400
PRODUCT CODE: NORMAL
RBC # BLD AUTO: 2.45 X10*6/UL (ref 4–5.2)
UNIT ABO: NORMAL
UNIT NUMBER: NORMAL
UNIT RH: NORMAL
UNIT VOLUME: 199
WBC # BLD AUTO: 0.2 X10*3/UL (ref 4.4–11.3)

## 2025-01-24 PROCEDURE — 2500000004 HC RX 250 GENERAL PHARMACY W/ HCPCS (ALT 636 FOR OP/ED): Performed by: PHYSICIAN ASSISTANT

## 2025-01-24 PROCEDURE — 86850 RBC ANTIBODY SCREEN: CPT

## 2025-01-24 PROCEDURE — 36430 TRANSFUSION BLD/BLD COMPNT: CPT

## 2025-01-24 PROCEDURE — 85027 COMPLETE CBC AUTOMATED: CPT

## 2025-01-24 RX ORDER — EPINEPHRINE 0.3 MG/.3ML
0.3 INJECTION SUBCUTANEOUS EVERY 5 MIN PRN
Status: CANCELLED | OUTPATIENT
Start: 2025-01-24

## 2025-01-24 RX ORDER — FAMOTIDINE 10 MG/ML
20 INJECTION INTRAVENOUS ONCE AS NEEDED
Status: CANCELLED | OUTPATIENT
Start: 2025-01-24

## 2025-01-24 RX ORDER — DIPHENHYDRAMINE HYDROCHLORIDE 50 MG/ML
50 INJECTION INTRAMUSCULAR; INTRAVENOUS AS NEEDED
Status: CANCELLED | OUTPATIENT
Start: 2025-01-24

## 2025-01-24 RX ORDER — HEPARIN 100 UNIT/ML
500 SYRINGE INTRAVENOUS AS NEEDED
Status: DISCONTINUED | OUTPATIENT
Start: 2025-01-24 | End: 2025-01-24 | Stop reason: HOSPADM

## 2025-01-24 RX ORDER — HEPARIN 100 UNIT/ML
500 SYRINGE INTRAVENOUS AS NEEDED
Status: CANCELLED | OUTPATIENT
Start: 2025-01-24

## 2025-01-24 RX ORDER — HEPARIN SODIUM,PORCINE/PF 10 UNIT/ML
50 SYRINGE (ML) INTRAVENOUS AS NEEDED
Status: CANCELLED | OUTPATIENT
Start: 2025-01-24

## 2025-01-24 RX ORDER — ALBUTEROL SULFATE 0.83 MG/ML
3 SOLUTION RESPIRATORY (INHALATION) AS NEEDED
Status: CANCELLED | OUTPATIENT
Start: 2025-01-24

## 2025-01-24 RX ORDER — HEPARIN SODIUM,PORCINE/PF 10 UNIT/ML
50 SYRINGE (ML) INTRAVENOUS AS NEEDED
Status: DISCONTINUED | OUTPATIENT
Start: 2025-01-24 | End: 2025-01-24 | Stop reason: HOSPADM

## 2025-01-24 RX ADMIN — HEPARIN, PORCINE (PF) 10 UNIT/ML INTRAVENOUS SYRINGE 50 UNITS: at 13:10

## 2025-01-24 ASSESSMENT — PAIN SCALES - GENERAL: PAINLEVEL_OUTOF10: 0-NO PAIN

## 2025-01-25 LAB
ABO GROUP (TYPE) IN BLOOD: NORMAL
ANTIBODY SCREEN: NORMAL
RH FACTOR (ANTIGEN D): NORMAL

## 2025-01-26 ENCOUNTER — INFUSION (OUTPATIENT)
Dept: HEMATOLOGY/ONCOLOGY | Facility: HOSPITAL | Age: 25
End: 2025-01-26
Payer: COMMERCIAL

## 2025-01-26 VITALS
HEART RATE: 85 BPM | TEMPERATURE: 97.2 F | SYSTOLIC BLOOD PRESSURE: 118 MMHG | DIASTOLIC BLOOD PRESSURE: 53 MMHG | RESPIRATION RATE: 18 BRPM | BODY MASS INDEX: 26.29 KG/M2 | WEIGHT: 158 LBS | OXYGEN SATURATION: 99 %

## 2025-01-26 DIAGNOSIS — C92.01 ACUTE MYELOID LEUKEMIA IN REMISSION (MULTI): ICD-10-CM

## 2025-01-26 DIAGNOSIS — C92.00 ACUTE MYELOID LEUKEMIA NOT HAVING ACHIEVED REMISSION (MULTI): ICD-10-CM

## 2025-01-26 LAB
ABO GROUP (TYPE) IN BLOOD: NORMAL
ANTIBODY SCREEN: NORMAL
BASOPHILS # BLD MANUAL: 0 X10*3/UL (ref 0–0.1)
BASOPHILS NFR BLD MANUAL: 0 %
BLOOD EXPIRATION DATE: NORMAL
DISPENSE STATUS: NORMAL
EOSINOPHIL # BLD MANUAL: 0.1 X10*3/UL (ref 0–0.7)
EOSINOPHIL NFR BLD MANUAL: 3.7 %
ERYTHROCYTE [DISTWIDTH] IN BLOOD BY AUTOMATED COUNT: 13.9 % (ref 11.5–14.5)
HCT VFR BLD AUTO: 21.8 % (ref 36–46)
HGB BLD-MCNC: 7.6 G/DL (ref 12–16)
IMM GRANULOCYTES # BLD AUTO: 0.29 X10*3/UL (ref 0–0.7)
IMM GRANULOCYTES NFR BLD AUTO: 10.7 % (ref 0–0.9)
LYMPHOCYTES # BLD MANUAL: 0.3 X10*3/UL (ref 1.2–4.8)
LYMPHOCYTES NFR BLD MANUAL: 11 %
MCH RBC QN AUTO: 33.3 PG (ref 26–34)
MCHC RBC AUTO-ENTMCNC: 34.9 G/DL (ref 32–36)
MCV RBC AUTO: 96 FL (ref 80–100)
MONOCYTES # BLD MANUAL: 0.1 X10*3/UL (ref 0.1–1)
MONOCYTES NFR BLD MANUAL: 3.7 %
NEUTROPHILS # BLD MANUAL: 2.21 X10*3/UL (ref 1.2–7.7)
NEUTS BAND # BLD MANUAL: 0.14 X10*3/UL (ref 0–0.7)
NEUTS BAND NFR BLD MANUAL: 5.1 %
NEUTS SEG # BLD MANUAL: 2.07 X10*3/UL (ref 1.2–7)
NEUTS SEG NFR BLD MANUAL: 76.5 %
NRBC BLD-RTO: 0 /100 WBCS (ref 0–0)
PLATELET # BLD AUTO: 5 X10*3/UL (ref 150–450)
PRODUCT BLOOD TYPE: 6200
PRODUCT CODE: NORMAL
RBC # BLD AUTO: 2.28 X10*6/UL (ref 4–5.2)
RBC MORPH BLD: ABNORMAL
RH FACTOR (ANTIGEN D): NORMAL
TOTAL CELLS COUNTED BLD: 136
UNIT ABO: NORMAL
UNIT NUMBER: NORMAL
UNIT RH: NORMAL
UNIT VOLUME: 220
WBC # BLD AUTO: 2.7 X10*3/UL (ref 4.4–11.3)

## 2025-01-26 PROCEDURE — 85027 COMPLETE CBC AUTOMATED: CPT

## 2025-01-26 PROCEDURE — 86920 COMPATIBILITY TEST SPIN: CPT

## 2025-01-26 PROCEDURE — 36430 TRANSFUSION BLD/BLD COMPNT: CPT

## 2025-01-26 PROCEDURE — 2500000004 HC RX 250 GENERAL PHARMACY W/ HCPCS (ALT 636 FOR OP/ED): Performed by: PHYSICIAN ASSISTANT

## 2025-01-26 PROCEDURE — P9037 PLATE PHERES LEUKOREDU IRRAD: HCPCS

## 2025-01-26 PROCEDURE — 86901 BLOOD TYPING SEROLOGIC RH(D): CPT

## 2025-01-26 PROCEDURE — 85007 BL SMEAR W/DIFF WBC COUNT: CPT

## 2025-01-26 RX ORDER — HEPARIN SODIUM,PORCINE/PF 10 UNIT/ML
50 SYRINGE (ML) INTRAVENOUS AS NEEDED
Status: CANCELLED | OUTPATIENT
Start: 2025-01-26

## 2025-01-26 RX ORDER — HEPARIN 100 UNIT/ML
500 SYRINGE INTRAVENOUS AS NEEDED
Status: DISCONTINUED | OUTPATIENT
Start: 2025-01-26 | End: 2025-01-26 | Stop reason: HOSPADM

## 2025-01-26 RX ORDER — EPINEPHRINE 0.3 MG/.3ML
0.3 INJECTION SUBCUTANEOUS EVERY 5 MIN PRN
Status: DISCONTINUED | OUTPATIENT
Start: 2025-01-26 | End: 2025-01-26 | Stop reason: HOSPADM

## 2025-01-26 RX ORDER — HEPARIN SODIUM,PORCINE/PF 10 UNIT/ML
50 SYRINGE (ML) INTRAVENOUS AS NEEDED
Status: DISCONTINUED | OUTPATIENT
Start: 2025-01-26 | End: 2025-01-26 | Stop reason: HOSPADM

## 2025-01-26 RX ORDER — FAMOTIDINE 10 MG/ML
20 INJECTION INTRAVENOUS ONCE AS NEEDED
Status: DISCONTINUED | OUTPATIENT
Start: 2025-01-26 | End: 2025-01-26 | Stop reason: HOSPADM

## 2025-01-26 RX ORDER — ALBUTEROL SULFATE 0.83 MG/ML
3 SOLUTION RESPIRATORY (INHALATION) AS NEEDED
Status: CANCELLED | OUTPATIENT
Start: 2025-01-26

## 2025-01-26 RX ORDER — DIPHENHYDRAMINE HYDROCHLORIDE 50 MG/ML
50 INJECTION INTRAMUSCULAR; INTRAVENOUS AS NEEDED
Status: CANCELLED | OUTPATIENT
Start: 2025-01-26

## 2025-01-26 RX ORDER — DIPHENHYDRAMINE HYDROCHLORIDE 50 MG/ML
50 INJECTION INTRAMUSCULAR; INTRAVENOUS AS NEEDED
Status: DISCONTINUED | OUTPATIENT
Start: 2025-01-26 | End: 2025-01-26 | Stop reason: HOSPADM

## 2025-01-26 RX ORDER — HEPARIN 100 UNIT/ML
500 SYRINGE INTRAVENOUS AS NEEDED
Status: CANCELLED | OUTPATIENT
Start: 2025-01-26

## 2025-01-26 RX ORDER — FAMOTIDINE 10 MG/ML
20 INJECTION INTRAVENOUS ONCE AS NEEDED
Status: CANCELLED | OUTPATIENT
Start: 2025-01-26

## 2025-01-26 RX ORDER — ALBUTEROL SULFATE 0.83 MG/ML
3 SOLUTION RESPIRATORY (INHALATION) AS NEEDED
Status: DISCONTINUED | OUTPATIENT
Start: 2025-01-26 | End: 2025-01-26 | Stop reason: HOSPADM

## 2025-01-26 RX ORDER — EPINEPHRINE 0.3 MG/.3ML
0.3 INJECTION SUBCUTANEOUS EVERY 5 MIN PRN
Status: CANCELLED | OUTPATIENT
Start: 2025-01-26

## 2025-01-26 RX ADMIN — HEPARIN, PORCINE (PF) 10 UNIT/ML INTRAVENOUS SYRINGE 50 UNITS: at 11:52

## 2025-01-27 RX ORDER — EPINEPHRINE 0.3 MG/.3ML
0.3 INJECTION SUBCUTANEOUS EVERY 5 MIN PRN
OUTPATIENT
Start: 2025-01-27

## 2025-01-27 RX ORDER — FAMOTIDINE 10 MG/ML
20 INJECTION INTRAVENOUS ONCE AS NEEDED
OUTPATIENT
Start: 2025-01-27

## 2025-01-27 RX ORDER — ALBUTEROL SULFATE 0.83 MG/ML
3 SOLUTION RESPIRATORY (INHALATION) AS NEEDED
OUTPATIENT
Start: 2025-01-27

## 2025-01-27 RX ORDER — DIPHENHYDRAMINE HYDROCHLORIDE 50 MG/ML
50 INJECTION INTRAMUSCULAR; INTRAVENOUS AS NEEDED
OUTPATIENT
Start: 2025-01-27

## 2025-01-28 ENCOUNTER — INFUSION (OUTPATIENT)
Dept: HEMATOLOGY/ONCOLOGY | Facility: CLINIC | Age: 25
End: 2025-01-28
Payer: COMMERCIAL

## 2025-01-28 ENCOUNTER — APPOINTMENT (OUTPATIENT)
Dept: HEMATOLOGY/ONCOLOGY | Facility: CLINIC | Age: 25
End: 2025-01-28
Payer: COMMERCIAL

## 2025-01-28 ENCOUNTER — DOCUMENTATION (OUTPATIENT)
Dept: HEMATOLOGY/ONCOLOGY | Facility: HOSPITAL | Age: 25
End: 2025-01-28
Payer: COMMERCIAL

## 2025-01-28 VITALS
WEIGHT: 156.31 LBS | OXYGEN SATURATION: 100 % | RESPIRATION RATE: 16 BRPM | SYSTOLIC BLOOD PRESSURE: 128 MMHG | HEART RATE: 88 BPM | BODY MASS INDEX: 26.01 KG/M2 | DIASTOLIC BLOOD PRESSURE: 69 MMHG | TEMPERATURE: 98.4 F

## 2025-01-28 DIAGNOSIS — C92.01 ACUTE MYELOID LEUKEMIA IN REMISSION (MULTI): ICD-10-CM

## 2025-01-28 DIAGNOSIS — C92.00 ACUTE MYELOID LEUKEMIA NOT HAVING ACHIEVED REMISSION (MULTI): ICD-10-CM

## 2025-01-28 LAB
ALBUMIN SERPL BCP-MCNC: 4.2 G/DL (ref 3.4–5)
ALP SERPL-CCNC: 86 U/L (ref 33–110)
ALT SERPL W P-5'-P-CCNC: 21 U/L (ref 7–45)
ANION GAP SERPL CALC-SCNC: 12 MMOL/L (ref 10–20)
AST SERPL W P-5'-P-CCNC: 12 U/L (ref 9–39)
BASOPHILS # BLD MANUAL: 0 X10*3/UL (ref 0–0.1)
BASOPHILS NFR BLD MANUAL: 0 %
BILIRUB SERPL-MCNC: 0.6 MG/DL (ref 0–1.2)
BLOOD EXPIRATION DATE: NORMAL
BLOOD EXPIRATION DATE: NORMAL
BUN SERPL-MCNC: 13 MG/DL (ref 6–23)
CALCIUM SERPL-MCNC: 9.3 MG/DL (ref 8.6–10.3)
CHLORIDE SERPL-SCNC: 104 MMOL/L (ref 98–107)
CO2 SERPL-SCNC: 26 MMOL/L (ref 21–32)
CREAT SERPL-MCNC: 0.9 MG/DL (ref 0.5–1.05)
DACRYOCYTES BLD QL SMEAR: ABNORMAL
DISPENSE STATUS: NORMAL
DISPENSE STATUS: NORMAL
EGFRCR SERPLBLD CKD-EPI 2021: >90 ML/MIN/1.73M*2
EOSINOPHIL # BLD MANUAL: 0.02 X10*3/UL (ref 0–0.7)
EOSINOPHIL NFR BLD MANUAL: 1 %
ERYTHROCYTE [DISTWIDTH] IN BLOOD BY AUTOMATED COUNT: 14.1 % (ref 11.5–14.5)
GLUCOSE SERPL-MCNC: 114 MG/DL (ref 74–99)
HCT VFR BLD AUTO: 20.1 % (ref 36–46)
HGB BLD-MCNC: 6.8 G/DL (ref 12–16)
IMM GRANULOCYTES # BLD AUTO: 0.13 X10*3/UL (ref 0–0.7)
IMM GRANULOCYTES NFR BLD AUTO: 5.5 % (ref 0–0.9)
LYMPHOCYTES # BLD MANUAL: 0.1 X10*3/UL (ref 1.2–4.8)
LYMPHOCYTES NFR BLD MANUAL: 4 %
MCH RBC QN AUTO: 34 PG (ref 26–34)
MCHC RBC AUTO-ENTMCNC: 33.8 G/DL (ref 32–36)
MCV RBC AUTO: 101 FL (ref 80–100)
METAMYELOCYTES # BLD MANUAL: 0.02 X10*3/UL
METAMYELOCYTES NFR BLD MANUAL: 1 %
MONOCYTES # BLD MANUAL: 0.24 X10*3/UL (ref 0.1–1)
MONOCYTES NFR BLD MANUAL: 10 %
MYELOCYTES # BLD MANUAL: 0.05 X10*3/UL
MYELOCYTES NFR BLD MANUAL: 2 %
NEUTROPHILS # BLD MANUAL: 1.96 X10*3/UL (ref 1.2–7.7)
NEUTS BAND # BLD MANUAL: 0.62 X10*3/UL (ref 0–0.7)
NEUTS BAND NFR BLD MANUAL: 26 %
NEUTS SEG # BLD MANUAL: 1.34 X10*3/UL (ref 1.2–7)
NEUTS SEG NFR BLD MANUAL: 56 %
NRBC BLD-RTO: ABNORMAL /100{WBCS}
OVALOCYTES BLD QL SMEAR: ABNORMAL
PLATELET # BLD AUTO: 5 X10*3/UL (ref 150–450)
POTASSIUM SERPL-SCNC: 3.8 MMOL/L (ref 3.5–5.3)
PRODUCT BLOOD TYPE: 5100
PRODUCT BLOOD TYPE: 5100
PRODUCT CODE: NORMAL
PRODUCT CODE: NORMAL
PROT SERPL-MCNC: 6.3 G/DL (ref 6.4–8.2)
RBC # BLD AUTO: 2 X10*6/UL (ref 4–5.2)
RBC MORPH BLD: ABNORMAL
SODIUM SERPL-SCNC: 138 MMOL/L (ref 136–145)
TOTAL CELLS COUNTED BLD: 100
TOXIC GRANULES BLD QL SMEAR: PRESENT
UNIT ABO: NORMAL
UNIT ABO: NORMAL
UNIT NUMBER: NORMAL
UNIT NUMBER: NORMAL
UNIT RH: NORMAL
UNIT RH: NORMAL
UNIT VOLUME: 247
UNIT VOLUME: 350
WBC # BLD AUTO: 2.4 X10*3/UL (ref 4.4–11.3)
XM INTEP: NORMAL

## 2025-01-28 PROCEDURE — 86900 BLOOD TYPING SEROLOGIC ABO: CPT

## 2025-01-28 PROCEDURE — 85027 COMPLETE CBC AUTOMATED: CPT

## 2025-01-28 PROCEDURE — 85007 BL SMEAR W/DIFF WBC COUNT: CPT

## 2025-01-28 PROCEDURE — 86900 BLOOD TYPING SEROLOGIC ABO: CPT | Mod: OUT

## 2025-01-28 PROCEDURE — 84075 ASSAY ALKALINE PHOSPHATASE: CPT

## 2025-01-28 PROCEDURE — P9073 PLATELETS PHERESIS PATH REDU: HCPCS

## 2025-01-28 PROCEDURE — 86850 RBC ANTIBODY SCREEN: CPT

## 2025-01-28 PROCEDURE — 86901 BLOOD TYPING SEROLOGIC RH(D): CPT

## 2025-01-28 PROCEDURE — P9040 RBC LEUKOREDUCED IRRADIATED: HCPCS

## 2025-01-28 PROCEDURE — 2500000004 HC RX 250 GENERAL PHARMACY W/ HCPCS (ALT 636 FOR OP/ED): Performed by: PHYSICIAN ASSISTANT

## 2025-01-28 PROCEDURE — 36430 TRANSFUSION BLD/BLD COMPNT: CPT

## 2025-01-28 RX ORDER — HEPARIN SODIUM,PORCINE/PF 10 UNIT/ML
50 SYRINGE (ML) INTRAVENOUS AS NEEDED
Status: CANCELLED | OUTPATIENT
Start: 2025-01-28

## 2025-01-28 RX ORDER — HEPARIN 100 UNIT/ML
500 SYRINGE INTRAVENOUS AS NEEDED
Status: DISCONTINUED | OUTPATIENT
Start: 2025-01-28 | End: 2025-01-28 | Stop reason: HOSPADM

## 2025-01-28 RX ORDER — HEPARIN 100 UNIT/ML
500 SYRINGE INTRAVENOUS AS NEEDED
Status: CANCELLED | OUTPATIENT
Start: 2025-01-28

## 2025-01-28 RX ORDER — HEPARIN SODIUM,PORCINE/PF 10 UNIT/ML
50 SYRINGE (ML) INTRAVENOUS AS NEEDED
Status: DISCONTINUED | OUTPATIENT
Start: 2025-01-28 | End: 2025-01-28 | Stop reason: HOSPADM

## 2025-01-28 RX ADMIN — HEPARIN, PORCINE (PF) 10 UNIT/ML INTRAVENOUS SYRINGE 50 UNITS: at 11:16

## 2025-01-28 ASSESSMENT — PAIN SCALES - GENERAL: PAINLEVEL_OUTOF10: 0-NO PAIN

## 2025-01-29 ENCOUNTER — LAB REQUISITION (OUTPATIENT)
Dept: LAB | Facility: HOSPITAL | Age: 25
End: 2025-01-29
Payer: COMMERCIAL

## 2025-01-29 DIAGNOSIS — C92.01 ACUTE MYELOBLASTIC LEUKEMIA, IN REMISSION (MULTI): ICD-10-CM

## 2025-01-29 DIAGNOSIS — C92.00 ACUTE MYELOBLASTIC LEUKEMIA, NOT HAVING ACHIEVED REMISSION (MULTI): ICD-10-CM

## 2025-01-29 LAB
ABO GROUP (TYPE) IN BLOOD: NORMAL
ANTIBODY SCREEN: NORMAL
RH FACTOR (ANTIGEN D): NORMAL

## 2025-01-31 ENCOUNTER — INFUSION (OUTPATIENT)
Dept: HEMATOLOGY/ONCOLOGY | Facility: CLINIC | Age: 25
End: 2025-01-31
Payer: COMMERCIAL

## 2025-01-31 ENCOUNTER — APPOINTMENT (OUTPATIENT)
Dept: HEMATOLOGY/ONCOLOGY | Facility: CLINIC | Age: 25
End: 2025-01-31
Payer: COMMERCIAL

## 2025-01-31 ENCOUNTER — DOCUMENTATION (OUTPATIENT)
Dept: HEMATOLOGY/ONCOLOGY | Facility: HOSPITAL | Age: 25
End: 2025-01-31
Payer: COMMERCIAL

## 2025-01-31 VITALS
OXYGEN SATURATION: 97 % | TEMPERATURE: 98.1 F | BODY MASS INDEX: 26.01 KG/M2 | SYSTOLIC BLOOD PRESSURE: 119 MMHG | RESPIRATION RATE: 16 BRPM | HEART RATE: 79 BPM | WEIGHT: 156.31 LBS | DIASTOLIC BLOOD PRESSURE: 59 MMHG

## 2025-01-31 DIAGNOSIS — C92.01 ACUTE MYELOID LEUKEMIA IN REMISSION (MULTI): ICD-10-CM

## 2025-01-31 DIAGNOSIS — C92.00 ACUTE MYELOID LEUKEMIA NOT HAVING ACHIEVED REMISSION (MULTI): ICD-10-CM

## 2025-01-31 LAB
BASOPHILS # BLD MANUAL: 0 X10*3/UL (ref 0–0.1)
BASOPHILS NFR BLD MANUAL: 0 %
DACRYOCYTES BLD QL SMEAR: ABNORMAL
EOSINOPHIL # BLD MANUAL: 0.08 X10*3/UL (ref 0–0.7)
EOSINOPHIL NFR BLD MANUAL: 2 %
ERYTHROCYTE [DISTWIDTH] IN BLOOD BY AUTOMATED COUNT: 14.5 % (ref 11.5–14.5)
HCT VFR BLD AUTO: 23.5 % (ref 36–46)
HGB BLD-MCNC: 8.1 G/DL (ref 12–16)
IMM GRANULOCYTES # BLD AUTO: 0.07 X10*3/UL (ref 0–0.7)
IMM GRANULOCYTES NFR BLD AUTO: 1.7 % (ref 0–0.9)
LYMPHOCYTES # BLD MANUAL: 0.57 X10*3/UL (ref 1.2–4.8)
LYMPHOCYTES NFR BLD MANUAL: 14 %
MCH RBC QN AUTO: 33.5 PG (ref 26–34)
MCHC RBC AUTO-ENTMCNC: 34.5 G/DL (ref 32–36)
MCV RBC AUTO: 97 FL (ref 80–100)
METAMYELOCYTES # BLD MANUAL: 0.04 X10*3/UL
METAMYELOCYTES NFR BLD MANUAL: 1 %
MONOCYTES # BLD MANUAL: 0.29 X10*3/UL (ref 0.1–1)
MONOCYTES NFR BLD MANUAL: 7 %
NEUTROPHILS # BLD MANUAL: 3.12 X10*3/UL (ref 1.2–7.7)
NEUTS BAND # BLD MANUAL: 1.44 X10*3/UL (ref 0–0.7)
NEUTS BAND NFR BLD MANUAL: 35 %
NEUTS SEG # BLD MANUAL: 1.68 X10*3/UL (ref 1.2–7)
NEUTS SEG NFR BLD MANUAL: 41 %
NRBC BLD-RTO: ABNORMAL /100{WBCS}
OVALOCYTES BLD QL SMEAR: ABNORMAL
PLATELET # BLD AUTO: 29 X10*3/UL (ref 150–450)
POLYCHROMASIA BLD QL SMEAR: ABNORMAL
RBC # BLD AUTO: 2.42 X10*6/UL (ref 4–5.2)
RBC MORPH BLD: ABNORMAL
TOTAL CELLS COUNTED BLD: 100
TOXIC GRANULES BLD QL SMEAR: PRESENT
WBC # BLD AUTO: 4.1 X10*3/UL (ref 4.4–11.3)

## 2025-01-31 PROCEDURE — 86901 BLOOD TYPING SEROLOGIC RH(D): CPT

## 2025-01-31 PROCEDURE — 85027 COMPLETE CBC AUTOMATED: CPT

## 2025-01-31 PROCEDURE — 85007 BL SMEAR W/DIFF WBC COUNT: CPT

## 2025-01-31 RX ORDER — HEPARIN SODIUM,PORCINE/PF 10 UNIT/ML
50 SYRINGE (ML) INTRAVENOUS AS NEEDED
Status: DISCONTINUED | OUTPATIENT
Start: 2025-01-31 | End: 2025-01-31 | Stop reason: HOSPADM

## 2025-01-31 RX ORDER — HEPARIN 100 UNIT/ML
500 SYRINGE INTRAVENOUS AS NEEDED
Status: DISCONTINUED | OUTPATIENT
Start: 2025-01-31 | End: 2025-01-31 | Stop reason: HOSPADM

## 2025-01-31 RX ORDER — HEPARIN 100 UNIT/ML
500 SYRINGE INTRAVENOUS AS NEEDED
OUTPATIENT
Start: 2025-01-31

## 2025-01-31 RX ORDER — HEPARIN SODIUM,PORCINE/PF 10 UNIT/ML
50 SYRINGE (ML) INTRAVENOUS AS NEEDED
OUTPATIENT
Start: 2025-01-31

## 2025-01-31 ASSESSMENT — PAIN SCALES - GENERAL: PAINLEVEL_OUTOF10: 0-NO PAIN

## 2025-02-01 LAB
ABO GROUP (TYPE) IN BLOOD: NORMAL
ANTIBODY SCREEN: NORMAL
RH FACTOR (ANTIGEN D): NORMAL

## 2025-02-04 ENCOUNTER — APPOINTMENT (OUTPATIENT)
Dept: HEMATOLOGY/ONCOLOGY | Facility: CLINIC | Age: 25
End: 2025-02-04
Payer: COMMERCIAL

## 2025-02-04 ENCOUNTER — INFUSION (OUTPATIENT)
Dept: HEMATOLOGY/ONCOLOGY | Facility: CLINIC | Age: 25
End: 2025-02-04
Payer: COMMERCIAL

## 2025-02-04 VITALS
OXYGEN SATURATION: 97 % | TEMPERATURE: 98.1 F | HEART RATE: 72 BPM | SYSTOLIC BLOOD PRESSURE: 102 MMHG | BODY MASS INDEX: 26.25 KG/M2 | RESPIRATION RATE: 18 BRPM | WEIGHT: 157.74 LBS | DIASTOLIC BLOOD PRESSURE: 54 MMHG

## 2025-02-04 DIAGNOSIS — C92.01 ACUTE MYELOID LEUKEMIA IN REMISSION (MULTI): ICD-10-CM

## 2025-02-04 DIAGNOSIS — C92.00 ACUTE MYELOID LEUKEMIA NOT HAVING ACHIEVED REMISSION (MULTI): ICD-10-CM

## 2025-02-04 LAB
BASOPHILS # BLD AUTO: 0.03 X10*3/UL (ref 0–0.1)
BASOPHILS NFR BLD AUTO: 0.9 %
DACRYOCYTES BLD QL SMEAR: NORMAL
EOSINOPHIL # BLD AUTO: 0.01 X10*3/UL (ref 0–0.7)
EOSINOPHIL NFR BLD AUTO: 0.3 %
ERYTHROCYTE [DISTWIDTH] IN BLOOD BY AUTOMATED COUNT: 16 % (ref 11.5–14.5)
HCT VFR BLD AUTO: 25.2 % (ref 36–46)
HGB BLD-MCNC: 8.5 G/DL (ref 12–16)
IMM GRANULOCYTES # BLD AUTO: 0.04 X10*3/UL (ref 0–0.7)
IMM GRANULOCYTES NFR BLD AUTO: 1.2 % (ref 0–0.9)
LYMPHOCYTES # BLD AUTO: 0.91 X10*3/UL (ref 1.2–4.8)
LYMPHOCYTES NFR BLD AUTO: 28 %
MCH RBC QN AUTO: 34.1 PG (ref 26–34)
MCHC RBC AUTO-ENTMCNC: 33.7 G/DL (ref 32–36)
MCV RBC AUTO: 101 FL (ref 80–100)
MONOCYTES # BLD AUTO: 0.32 X10*3/UL (ref 0.1–1)
MONOCYTES NFR BLD AUTO: 9.8 %
NEUTROPHILS # BLD AUTO: 1.94 X10*3/UL (ref 1.2–7.7)
NEUTROPHILS NFR BLD AUTO: 59.8 %
NRBC BLD-RTO: ABNORMAL /100{WBCS}
OVALOCYTES BLD QL SMEAR: NORMAL
PLATELET # BLD AUTO: 90 X10*3/UL (ref 150–450)
POLYCHROMASIA BLD QL SMEAR: NORMAL
RBC # BLD AUTO: 2.49 X10*6/UL (ref 4–5.2)
RBC MORPH BLD: NORMAL
WBC # BLD AUTO: 3.3 X10*3/UL (ref 4.4–11.3)

## 2025-02-04 PROCEDURE — 2500000004 HC RX 250 GENERAL PHARMACY W/ HCPCS (ALT 636 FOR OP/ED): Performed by: PHYSICIAN ASSISTANT

## 2025-02-04 PROCEDURE — 36591 DRAW BLOOD OFF VENOUS DEVICE: CPT

## 2025-02-04 PROCEDURE — 85025 COMPLETE CBC W/AUTO DIFF WBC: CPT

## 2025-02-04 RX ORDER — HEPARIN 100 UNIT/ML
500 SYRINGE INTRAVENOUS AS NEEDED
OUTPATIENT
Start: 2025-02-04

## 2025-02-04 RX ORDER — HEPARIN 100 UNIT/ML
500 SYRINGE INTRAVENOUS AS NEEDED
Status: DISCONTINUED | OUTPATIENT
Start: 2025-02-04 | End: 2025-02-04 | Stop reason: HOSPADM

## 2025-02-04 RX ORDER — HEPARIN SODIUM,PORCINE/PF 10 UNIT/ML
50 SYRINGE (ML) INTRAVENOUS AS NEEDED
OUTPATIENT
Start: 2025-02-04

## 2025-02-04 RX ORDER — HEPARIN SODIUM,PORCINE/PF 10 UNIT/ML
50 SYRINGE (ML) INTRAVENOUS AS NEEDED
Status: DISCONTINUED | OUTPATIENT
Start: 2025-02-04 | End: 2025-02-04 | Stop reason: HOSPADM

## 2025-02-04 RX ADMIN — HEPARIN, PORCINE (PF) 10 UNIT/ML INTRAVENOUS SYRINGE 50 UNITS: at 10:05

## 2025-02-04 ASSESSMENT — PAIN SCALES - GENERAL: PAINLEVEL_OUTOF10: 0-NO PAIN

## 2025-02-05 ENCOUNTER — TELEPHONE (OUTPATIENT)
Dept: INFUSION THERAPY | Age: 25
End: 2025-02-05
Payer: COMMERCIAL

## 2025-02-07 NOTE — TELEPHONE ENCOUNTER
Left generic message for patient stating that although she is due for her injection today, she no longer needs to be on it.    I reached out to Dr Rg via secure chat to confirm treatment of Leuprolide. Patient is not currently on treatment and has completed count recovery. Per Dr Winchester, patient no longer needs Leuprolide.

## 2025-02-10 ENCOUNTER — APPOINTMENT (OUTPATIENT)
Dept: HEMATOLOGY/ONCOLOGY | Facility: CLINIC | Age: 25
End: 2025-02-10
Payer: COMMERCIAL

## 2025-02-10 ENCOUNTER — INFUSION (OUTPATIENT)
Dept: HEMATOLOGY/ONCOLOGY | Facility: CLINIC | Age: 25
End: 2025-02-10
Payer: COMMERCIAL

## 2025-02-10 VITALS
RESPIRATION RATE: 16 BRPM | SYSTOLIC BLOOD PRESSURE: 107 MMHG | OXYGEN SATURATION: 96 % | WEIGHT: 163.36 LBS | BODY MASS INDEX: 27.18 KG/M2 | DIASTOLIC BLOOD PRESSURE: 58 MMHG | HEART RATE: 67 BPM | TEMPERATURE: 98.4 F

## 2025-02-10 DIAGNOSIS — C92.01 ACUTE MYELOID LEUKEMIA IN REMISSION (MULTI): ICD-10-CM

## 2025-02-10 DIAGNOSIS — C92.00 ACUTE MYELOID LEUKEMIA NOT HAVING ACHIEVED REMISSION (MULTI): ICD-10-CM

## 2025-02-10 DIAGNOSIS — N94.89 MENSTRUAL SUPPRESSION: ICD-10-CM

## 2025-02-10 LAB
ALBUMIN SERPL BCP-MCNC: 4.1 G/DL (ref 3.4–5)
ALP SERPL-CCNC: 61 U/L (ref 33–110)
ALT SERPL W P-5'-P-CCNC: 19 U/L (ref 7–45)
ANION GAP SERPL CALC-SCNC: 11 MMOL/L (ref 10–20)
AST SERPL W P-5'-P-CCNC: 15 U/L (ref 9–39)
BASOPHILS # BLD AUTO: 0.02 X10*3/UL (ref 0–0.1)
BASOPHILS NFR BLD AUTO: 0.9 %
BILIRUB SERPL-MCNC: 0.6 MG/DL (ref 0–1.2)
BUN SERPL-MCNC: 15 MG/DL (ref 6–23)
CALCIUM SERPL-MCNC: 9.2 MG/DL (ref 8.6–10.3)
CHLORIDE SERPL-SCNC: 104 MMOL/L (ref 98–107)
CO2 SERPL-SCNC: 29 MMOL/L (ref 21–32)
CREAT SERPL-MCNC: 0.91 MG/DL (ref 0.5–1.05)
EGFRCR SERPLBLD CKD-EPI 2021: >90 ML/MIN/1.73M*2
EOSINOPHIL # BLD AUTO: 0.01 X10*3/UL (ref 0–0.7)
EOSINOPHIL NFR BLD AUTO: 0.5 %
ERYTHROCYTE [DISTWIDTH] IN BLOOD BY AUTOMATED COUNT: 19.9 % (ref 11.5–14.5)
GLUCOSE SERPL-MCNC: 89 MG/DL (ref 74–99)
HCT VFR BLD AUTO: 29.5 % (ref 36–46)
HGB BLD-MCNC: 9.6 G/DL (ref 12–16)
IMM GRANULOCYTES # BLD AUTO: 0 X10*3/UL (ref 0–0.7)
IMM GRANULOCYTES NFR BLD AUTO: 0 % (ref 0–0.9)
LYMPHOCYTES # BLD AUTO: 0.56 X10*3/UL (ref 1.2–4.8)
LYMPHOCYTES NFR BLD AUTO: 26.2 %
MCH RBC QN AUTO: 34.7 PG (ref 26–34)
MCHC RBC AUTO-ENTMCNC: 32.5 G/DL (ref 32–36)
MCV RBC AUTO: 107 FL (ref 80–100)
MONOCYTES # BLD AUTO: 0.3 X10*3/UL (ref 0.1–1)
MONOCYTES NFR BLD AUTO: 14 %
NEUTROPHILS # BLD AUTO: 1.25 X10*3/UL (ref 1.2–7.7)
NEUTROPHILS NFR BLD AUTO: 58.4 %
PLATELET # BLD AUTO: 91 X10*3/UL (ref 150–450)
POTASSIUM SERPL-SCNC: 4 MMOL/L (ref 3.5–5.3)
PROT SERPL-MCNC: 5.8 G/DL (ref 6.4–8.2)
RBC # BLD AUTO: 2.77 X10*6/UL (ref 4–5.2)
SODIUM SERPL-SCNC: 140 MMOL/L (ref 136–145)
WBC # BLD AUTO: 2.1 X10*3/UL (ref 4.4–11.3)

## 2025-02-10 PROCEDURE — 96523 IRRIG DRUG DELIVERY DEVICE: CPT

## 2025-02-10 PROCEDURE — 84075 ASSAY ALKALINE PHOSPHATASE: CPT

## 2025-02-10 PROCEDURE — 36591 DRAW BLOOD OFF VENOUS DEVICE: CPT

## 2025-02-10 PROCEDURE — 85025 COMPLETE CBC W/AUTO DIFF WBC: CPT

## 2025-02-10 RX ORDER — HEPARIN SODIUM,PORCINE/PF 10 UNIT/ML
50 SYRINGE (ML) INTRAVENOUS AS NEEDED
Status: DISCONTINUED | OUTPATIENT
Start: 2025-02-10 | End: 2025-02-10 | Stop reason: HOSPADM

## 2025-02-10 RX ORDER — HEPARIN SODIUM,PORCINE/PF 10 UNIT/ML
50 SYRINGE (ML) INTRAVENOUS AS NEEDED
OUTPATIENT
Start: 2025-02-10

## 2025-02-10 RX ORDER — HEPARIN 100 UNIT/ML
500 SYRINGE INTRAVENOUS AS NEEDED
Status: DISCONTINUED | OUTPATIENT
Start: 2025-02-10 | End: 2025-02-10 | Stop reason: HOSPADM

## 2025-02-10 RX ORDER — HEPARIN 100 UNIT/ML
500 SYRINGE INTRAVENOUS AS NEEDED
OUTPATIENT
Start: 2025-02-10

## 2025-02-10 ASSESSMENT — PAIN SCALES - GENERAL: PAINLEVEL_OUTOF10: 0-NO PAIN

## 2025-02-14 ENCOUNTER — APPOINTMENT (OUTPATIENT)
Dept: HEMATOLOGY/ONCOLOGY | Facility: CLINIC | Age: 25
End: 2025-02-14
Payer: COMMERCIAL

## 2025-02-17 ENCOUNTER — PROCEDURE VISIT (OUTPATIENT)
Dept: HEMATOLOGY/ONCOLOGY | Facility: HOSPITAL | Age: 25
End: 2025-02-17
Payer: COMMERCIAL

## 2025-02-17 VITALS
HEART RATE: 59 BPM | OXYGEN SATURATION: 100 % | DIASTOLIC BLOOD PRESSURE: 63 MMHG | SYSTOLIC BLOOD PRESSURE: 127 MMHG | WEIGHT: 163 LBS | TEMPERATURE: 97.3 F | BODY MASS INDEX: 27.12 KG/M2 | RESPIRATION RATE: 17 BRPM

## 2025-02-17 DIAGNOSIS — C92.00 ACUTE MYELOID LEUKEMIA NOT HAVING ACHIEVED REMISSION (MULTI): ICD-10-CM

## 2025-02-17 DIAGNOSIS — C92.01 ACUTE MYELOID LEUKEMIA IN REMISSION (MULTI): ICD-10-CM

## 2025-02-17 LAB
ALBUMIN SERPL BCP-MCNC: 4.3 G/DL (ref 3.4–5)
ALP SERPL-CCNC: 59 U/L (ref 33–110)
ALT SERPL W P-5'-P-CCNC: 24 U/L (ref 7–45)
ANION GAP SERPL CALC-SCNC: 10 MMOL/L (ref 10–20)
AST SERPL W P-5'-P-CCNC: 20 U/L (ref 9–39)
BASOPHILS # BLD AUTO: 0.02 X10*3/UL (ref 0–0.1)
BASOPHILS NFR BLD AUTO: 0.7 %
BILIRUB SERPL-MCNC: 0.9 MG/DL (ref 0–1.2)
BUN SERPL-MCNC: 13 MG/DL (ref 6–23)
CALCIUM SERPL-MCNC: 9.7 MG/DL (ref 8.6–10.3)
CHLORIDE SERPL-SCNC: 104 MMOL/L (ref 98–107)
CO2 SERPL-SCNC: 32 MMOL/L (ref 21–32)
CREAT SERPL-MCNC: 0.86 MG/DL (ref 0.5–1.05)
EGFRCR SERPLBLD CKD-EPI 2021: >90 ML/MIN/1.73M*2
EOSINOPHIL # BLD AUTO: 0.01 X10*3/UL (ref 0–0.7)
EOSINOPHIL NFR BLD AUTO: 0.4 %
ERYTHROCYTE [DISTWIDTH] IN BLOOD BY AUTOMATED COUNT: 17.3 % (ref 11.5–14.5)
GLUCOSE SERPL-MCNC: 91 MG/DL (ref 74–99)
HCT VFR BLD AUTO: 34.7 % (ref 36–46)
HGB BLD-MCNC: 11.6 G/DL (ref 12–16)
IMM GRANULOCYTES # BLD AUTO: 0.01 X10*3/UL (ref 0–0.7)
IMM GRANULOCYTES NFR BLD AUTO: 0.4 % (ref 0–0.9)
LYMPHOCYTES # BLD AUTO: 0.95 X10*3/UL (ref 1.2–4.8)
LYMPHOCYTES NFR BLD AUTO: 34.3 %
MCH RBC QN AUTO: 34.4 PG (ref 26–34)
MCHC RBC AUTO-ENTMCNC: 33.4 G/DL (ref 32–36)
MCV RBC AUTO: 103 FL (ref 80–100)
MONOCYTES # BLD AUTO: 0.31 X10*3/UL (ref 0.1–1)
MONOCYTES NFR BLD AUTO: 11.2 %
NEUTROPHILS # BLD AUTO: 1.47 X10*3/UL (ref 1.2–7.7)
NEUTROPHILS NFR BLD AUTO: 53 %
NRBC BLD-RTO: 0 /100 WBCS (ref 0–0)
PLATELET # BLD AUTO: 107 X10*3/UL (ref 150–450)
POTASSIUM SERPL-SCNC: 4.1 MMOL/L (ref 3.5–5.3)
PROT SERPL-MCNC: 6.3 G/DL (ref 6.4–8.2)
RBC # BLD AUTO: 3.37 X10*6/UL (ref 4–5.2)
SODIUM SERPL-SCNC: 142 MMOL/L (ref 136–145)
WBC # BLD AUTO: 2.8 X10*3/UL (ref 4.4–11.3)

## 2025-02-17 PROCEDURE — 2500000004 HC RX 250 GENERAL PHARMACY W/ HCPCS (ALT 636 FOR OP/ED): Performed by: NURSE PRACTITIONER

## 2025-02-17 PROCEDURE — 88237 TISSUE CULTURE BONE MARROW: CPT

## 2025-02-17 PROCEDURE — 96374 THER/PROPH/DIAG INJ IV PUSH: CPT | Mod: INF

## 2025-02-17 PROCEDURE — 88305 TISSUE EXAM BY PATHOLOGIST: CPT | Mod: TC,SUR

## 2025-02-17 PROCEDURE — 38222 DX BONE MARROW BX & ASPIR: CPT | Mod: LT | Performed by: NURSE PRACTITIONER

## 2025-02-17 PROCEDURE — 85097 BONE MARROW INTERPRETATION: CPT | Mod: 59

## 2025-02-17 PROCEDURE — 88184 FLOWCYTOMETRY/ TC 1 MARKER: CPT | Mod: TC

## 2025-02-17 PROCEDURE — 38222 DX BONE MARROW BX & ASPIR: CPT | Performed by: NURSE PRACTITIONER

## 2025-02-17 PROCEDURE — 85025 COMPLETE CBC W/AUTO DIFF WBC: CPT

## 2025-02-17 PROCEDURE — 2500000004 HC RX 250 GENERAL PHARMACY W/ HCPCS (ALT 636 FOR OP/ED): Performed by: PHYSICIAN ASSISTANT

## 2025-02-17 PROCEDURE — 84075 ASSAY ALKALINE PHOSPHATASE: CPT

## 2025-02-17 RX ORDER — HEPARIN 100 UNIT/ML
500 SYRINGE INTRAVENOUS AS NEEDED
Status: CANCELLED | OUTPATIENT
Start: 2025-02-17

## 2025-02-17 RX ORDER — HEPARIN SODIUM,PORCINE/PF 10 UNIT/ML
50 SYRINGE (ML) INTRAVENOUS AS NEEDED
Status: DISCONTINUED | OUTPATIENT
Start: 2025-02-17 | End: 2025-02-17 | Stop reason: HOSPADM

## 2025-02-17 RX ORDER — LORAZEPAM 2 MG/ML
0.5 INJECTION INTRAMUSCULAR ONCE
Status: COMPLETED | OUTPATIENT
Start: 2025-02-17 | End: 2025-02-17

## 2025-02-17 RX ORDER — HEPARIN SODIUM,PORCINE/PF 10 UNIT/ML
50 SYRINGE (ML) INTRAVENOUS AS NEEDED
Status: CANCELLED | OUTPATIENT
Start: 2025-02-17

## 2025-02-17 RX ADMIN — HEPARIN, PORCINE (PF) 10 UNIT/ML INTRAVENOUS SYRINGE 50 UNITS: at 12:10

## 2025-02-17 RX ADMIN — LORAZEPAM 0.5 MG: 2 INJECTION INTRAMUSCULAR; INTRAVENOUS at 11:56

## 2025-02-17 ASSESSMENT — PAIN SCALES - GENERAL: PAINLEVEL_OUTOF10: 0-NO PAIN

## 2025-02-17 NOTE — PROGRESS NOTES
Patient ID: Ilda Peter is a 24 y.o. female.    Biopsy bone marrow    Date/Time: 2/17/2025 1:45 PM    Performed by: BRITTNEE Marino  Authorized by: BRITTNEE Marino    Consent:     Consent obtained:  Written    Consent given by:  Patient    Risks discussed:  Bleeding, infection and pain  Universal protocol:     Procedure explained and questions answered to patient or proxy's satisfaction: yes      Relevant documents present and verified: yes      Test results available: yes      Imaging studies available: no      Required blood products, implants, devices, and special equipment available: yes      Site/side marked: yes      Immediately prior to procedure, a time out was called: yes      Patient identity confirmed:  Verbally with patient and arm band  Indications:     Indications:  AML  Pre-procedure details:     Skin preparation:  Chlorhexidine    Preparation: Patient was prepped and draped in the usual sterile fashion    Sedation:     Sedation type:  None  Anesthesia:     Anesthesia method:  Local infiltration    Local anesthetic:  Lidocaine 1% w/o epi  Procedure specific details:      The patient was explained the risks and benefits of the bone marrow biopsy procedure.  Their questions were answered and consent was obtained.  Patient's most recent history and physical reviewed and relevant findings were noted.  Medications and allergies reviewed.  The patient was premedicated with 0.5mg lorazepam IV.  Prior to the procedure the patient's identity was confirmed using their name, medical record number, and date of birth.  The patient lay in the prone position and their left iliac crest was cleansed and draped in sterile fashion.  15ml of 1% plain lidocaine was injected locally.  Using a Jamshidi the core and aspirate samples were obtained without difficulty and sent for pathology, flow cytometry, testing per heme/path, cytogenetics.  A sterile dressing was applied once hemostasis achieved.  The patient  tolerated the procedure well with no immediate complications and less than 5ml of blood loss.  The patient was educated to leave the dressing on for 24hrs and they verbalized understanding.   Patient also instructed on the signs and symptoms of infection and to call the office if any are noted.    Post-procedure details:     Procedure completion:  Tolerated well, no immediate complications

## 2025-02-17 NOTE — PROGRESS NOTES
Ilda Peter presents to [facility] for bmbx.  Her labs were drawn from her Bailon, and her dressing was changed.  She was given .5mg iv ativan.  Her ports were flushed with heparin and she was discharged home in stable condition accompanied by family.

## 2025-02-19 ENCOUNTER — CLINICAL SUPPORT (OUTPATIENT)
Facility: HOSPITAL | Age: 25
End: 2025-02-19
Payer: COMMERCIAL

## 2025-02-19 LAB
CELL COUNT (BLOOD): 4.27 X10*3/UL
CELL POPULATIONS: NORMAL
DIAGNOSIS: NORMAL
FLOW DIFFERENTIAL: NORMAL
FLOW TEST ORDERED: NORMAL
LAB TEST METHOD: NORMAL
NUMBER OF CELLS COLLECTED: NORMAL
PATH REPORT.COMMENTS IMP SPEC: NORMAL
PATH REPORT.FINAL DX SPEC: NORMAL
PATH REPORT.GROSS SPEC: NORMAL
PATH REPORT.MICROSCOPIC SPEC OTHER STN: NORMAL
PATH REPORT.RELEVANT HX SPEC: NORMAL
PATH REPORT.TOTAL CANCER: NORMAL
PATH REPORT.TOTAL CANCER: NORMAL
SIGNATURE COMMENT: NORMAL
SPECIMEN VIABILITY: NORMAL

## 2025-02-19 NOTE — PROGRESS NOTES
[employer]  Diet Recommendation 1: Healthy Eating  Food Intolerance Avoidance: NKFA  Household Size: 5 Members (4 Max/Household)  Interventions: Referral Number: 1st 6 Mo Referral 6 Mos  Interventions: Visit Number: 5 of 6 Visits - Max 6 Visits/Referral Each 6 Mo Period  Grains: 0-25% Whole  Fruit: 25-50% Fresh  Vegetables: 50-75% Fresh  Proteins: 0 Plant-based Items  Dairy: Lowfat - 100%  Originating Site of Referral Order: [facility]  Initials of RD Assisting Today: SB

## 2025-02-20 LAB
CHROM ANALY OVERALL INTERP-IMP: NORMAL
ELECTRONICALLY SIGNED BY CYTOGENETICS: NORMAL

## 2025-02-21 ENCOUNTER — APPOINTMENT (OUTPATIENT)
Dept: HEMATOLOGY/ONCOLOGY | Facility: CLINIC | Age: 25
End: 2025-02-21
Payer: COMMERCIAL

## 2025-02-21 ENCOUNTER — INFUSION (OUTPATIENT)
Dept: HEMATOLOGY/ONCOLOGY | Facility: CLINIC | Age: 25
End: 2025-02-21
Payer: COMMERCIAL

## 2025-02-21 VITALS
SYSTOLIC BLOOD PRESSURE: 124 MMHG | OXYGEN SATURATION: 99 % | RESPIRATION RATE: 16 BRPM | TEMPERATURE: 98.1 F | WEIGHT: 160.72 LBS | HEART RATE: 73 BPM | BODY MASS INDEX: 26.74 KG/M2 | DIASTOLIC BLOOD PRESSURE: 73 MMHG

## 2025-02-21 DIAGNOSIS — C92.00 ACUTE MYELOID LEUKEMIA NOT HAVING ACHIEVED REMISSION (MULTI): ICD-10-CM

## 2025-02-21 DIAGNOSIS — C92.01 ACUTE MYELOID LEUKEMIA IN REMISSION (MULTI): ICD-10-CM

## 2025-02-21 LAB
ALBUMIN SERPL BCP-MCNC: 4.9 G/DL (ref 3.4–5)
ALP SERPL-CCNC: 63 U/L (ref 33–110)
ALT SERPL W P-5'-P-CCNC: 20 U/L (ref 7–45)
ANION GAP SERPL CALC-SCNC: 12 MMOL/L (ref 10–20)
AST SERPL W P-5'-P-CCNC: 18 U/L (ref 9–39)
BASOPHILS # BLD AUTO: 0.02 X10*3/UL (ref 0–0.1)
BASOPHILS NFR BLD AUTO: 0.4 %
BILIRUB SERPL-MCNC: 1 MG/DL (ref 0–1.2)
BUN SERPL-MCNC: 15 MG/DL (ref 6–23)
CALCIUM SERPL-MCNC: 10 MG/DL (ref 8.6–10.3)
CHLORIDE SERPL-SCNC: 103 MMOL/L (ref 98–107)
CO2 SERPL-SCNC: 30 MMOL/L (ref 21–32)
CREAT SERPL-MCNC: 0.83 MG/DL (ref 0.5–1.05)
EGFRCR SERPLBLD CKD-EPI 2021: >90 ML/MIN/1.73M*2
EOSINOPHIL # BLD AUTO: 0.02 X10*3/UL (ref 0–0.7)
EOSINOPHIL NFR BLD AUTO: 0.4 %
ERYTHROCYTE [DISTWIDTH] IN BLOOD BY AUTOMATED COUNT: 15.6 % (ref 11.5–14.5)
GLUCOSE SERPL-MCNC: 94 MG/DL (ref 74–99)
HCT VFR BLD AUTO: 38.2 % (ref 36–46)
HGB BLD-MCNC: 12.8 G/DL (ref 12–16)
IMM GRANULOCYTES # BLD AUTO: 0.01 X10*3/UL (ref 0–0.7)
IMM GRANULOCYTES NFR BLD AUTO: 0.2 % (ref 0–0.9)
LYMPHOCYTES # BLD AUTO: 1.23 X10*3/UL (ref 1.2–4.8)
LYMPHOCYTES NFR BLD AUTO: 27.5 %
MCH RBC QN AUTO: 34.4 PG (ref 26–34)
MCHC RBC AUTO-ENTMCNC: 33.5 G/DL (ref 32–36)
MCV RBC AUTO: 103 FL (ref 80–100)
MONOCYTES # BLD AUTO: 0.36 X10*3/UL (ref 0.1–1)
MONOCYTES NFR BLD AUTO: 8.1 %
NEUTROPHILS # BLD AUTO: 2.83 X10*3/UL (ref 1.2–7.7)
NEUTROPHILS NFR BLD AUTO: 63.4 %
PLATELET # BLD AUTO: 140 X10*3/UL (ref 150–450)
POTASSIUM SERPL-SCNC: 3.9 MMOL/L (ref 3.5–5.3)
PROT SERPL-MCNC: 7 G/DL (ref 6.4–8.2)
RBC # BLD AUTO: 3.72 X10*6/UL (ref 4–5.2)
SODIUM SERPL-SCNC: 141 MMOL/L (ref 136–145)
WBC # BLD AUTO: 4.5 X10*3/UL (ref 4.4–11.3)

## 2025-02-21 PROCEDURE — 85025 COMPLETE CBC W/AUTO DIFF WBC: CPT

## 2025-02-21 PROCEDURE — 36591 DRAW BLOOD OFF VENOUS DEVICE: CPT

## 2025-02-21 PROCEDURE — 80053 COMPREHEN METABOLIC PANEL: CPT

## 2025-02-21 RX ORDER — HEPARIN SODIUM,PORCINE/PF 10 UNIT/ML
50 SYRINGE (ML) INTRAVENOUS AS NEEDED
OUTPATIENT
Start: 2025-02-21

## 2025-02-21 RX ORDER — HEPARIN SODIUM,PORCINE/PF 10 UNIT/ML
50 SYRINGE (ML) INTRAVENOUS AS NEEDED
Status: DISCONTINUED | OUTPATIENT
Start: 2025-02-21 | End: 2025-02-21 | Stop reason: HOSPADM

## 2025-02-21 RX ORDER — HEPARIN 100 UNIT/ML
500 SYRINGE INTRAVENOUS AS NEEDED
OUTPATIENT
Start: 2025-02-21

## 2025-02-21 RX ORDER — HEPARIN 100 UNIT/ML
500 SYRINGE INTRAVENOUS AS NEEDED
Status: DISCONTINUED | OUTPATIENT
Start: 2025-02-21 | End: 2025-02-21 | Stop reason: HOSPADM

## 2025-02-21 ASSESSMENT — PAIN SCALES - GENERAL: PAINLEVEL_OUTOF10: 0-NO PAIN

## 2025-02-24 NOTE — ASSESSMENT & PLAN NOTE
Counts remain stable.  No evidence of relapse.  Now in CR1 for 7 months.  Will plan for Every other month follow up with labs at each clinical visit including NPM1 for molecular monitoring.  Line removal later this week.

## 2025-02-24 NOTE — ASSESSMENT & PLAN NOTE
Has completed chemo.  OK to DC levofloxacin and posaconazole.  Recommend another 6 months acyclovir for HSV prophylaxis.

## 2025-02-24 NOTE — ASSESSMENT & PLAN NOTE
Has completed chemo, so leuprolide no longer indicated.  Will defer further longer term contraceptive discussions to her gynecologist.

## 2025-02-25 ENCOUNTER — OFFICE VISIT (OUTPATIENT)
Dept: HEMATOLOGY/ONCOLOGY | Facility: HOSPITAL | Age: 25
End: 2025-02-25
Payer: COMMERCIAL

## 2025-02-25 ENCOUNTER — LAB (OUTPATIENT)
Dept: HEMATOLOGY/ONCOLOGY | Facility: HOSPITAL | Age: 25
End: 2025-02-25
Payer: COMMERCIAL

## 2025-02-25 VITALS
WEIGHT: 160.72 LBS | HEART RATE: 63 BPM | RESPIRATION RATE: 16 BRPM | OXYGEN SATURATION: 100 % | SYSTOLIC BLOOD PRESSURE: 111 MMHG | DIASTOLIC BLOOD PRESSURE: 62 MMHG | BODY MASS INDEX: 26.74 KG/M2 | TEMPERATURE: 97 F

## 2025-02-25 DIAGNOSIS — C92.01 ACUTE MYELOID LEUKEMIA IN REMISSION (MULTI): ICD-10-CM

## 2025-02-25 DIAGNOSIS — C92.00 ACUTE MYELOID LEUKEMIA NOT HAVING ACHIEVED REMISSION (MULTI): ICD-10-CM

## 2025-02-25 DIAGNOSIS — N94.89 MENSTRUAL SUPPRESSION: ICD-10-CM

## 2025-02-25 DIAGNOSIS — D84.9 IMMUNOSUPPRESSED STATUS: Primary | ICD-10-CM

## 2025-02-25 LAB
ALBUMIN SERPL BCP-MCNC: 4.6 G/DL (ref 3.4–5)
ALP SERPL-CCNC: 68 U/L (ref 33–110)
ALT SERPL W P-5'-P-CCNC: 16 U/L (ref 7–45)
ANION GAP SERPL CALC-SCNC: 12 MMOL/L (ref 10–20)
AST SERPL W P-5'-P-CCNC: 18 U/L (ref 9–39)
BASOPHILS # BLD AUTO: 0.03 X10*3/UL (ref 0–0.1)
BASOPHILS NFR BLD AUTO: 0.6 %
BILIRUB SERPL-MCNC: 0.9 MG/DL (ref 0–1.2)
BUN SERPL-MCNC: 14 MG/DL (ref 6–23)
CALCIUM SERPL-MCNC: 10 MG/DL (ref 8.6–10.3)
CHLORIDE SERPL-SCNC: 104 MMOL/L (ref 98–107)
CO2 SERPL-SCNC: 31 MMOL/L (ref 21–32)
CREAT SERPL-MCNC: 0.81 MG/DL (ref 0.5–1.05)
EGFRCR SERPLBLD CKD-EPI 2021: >90 ML/MIN/1.73M*2
ELECTRONICALLY SIGNED BY: NORMAL
EOSINOPHIL # BLD AUTO: 0.03 X10*3/UL (ref 0–0.7)
EOSINOPHIL NFR BLD AUTO: 0.6 %
ERYTHROCYTE [DISTWIDTH] IN BLOOD BY AUTOMATED COUNT: 14.6 % (ref 11.5–14.5)
GLUCOSE SERPL-MCNC: 91 MG/DL (ref 74–99)
HCT VFR BLD AUTO: 38.4 % (ref 36–46)
HGB BLD-MCNC: 13.3 G/DL (ref 12–16)
IMM GRANULOCYTES # BLD AUTO: 0.02 X10*3/UL (ref 0–0.7)
IMM GRANULOCYTES NFR BLD AUTO: 0.4 % (ref 0–0.9)
INR PPP: 1.1 (ref 0.9–1.1)
LYMPHOCYTES # BLD AUTO: 0.87 X10*3/UL (ref 1.2–4.8)
LYMPHOCYTES NFR BLD AUTO: 16.4 %
MCH RBC QN AUTO: 34.5 PG (ref 26–34)
MCHC RBC AUTO-ENTMCNC: 34.6 G/DL (ref 32–36)
MCV RBC AUTO: 100 FL (ref 80–100)
MONOCYTES # BLD AUTO: 0.3 X10*3/UL (ref 0.1–1)
MONOCYTES NFR BLD AUTO: 5.7 %
NEUTROPHILS # BLD AUTO: 4.05 X10*3/UL (ref 1.2–7.7)
NEUTROPHILS NFR BLD AUTO: 76.3 %
NPM1 RESULTS: NORMAL
NRBC BLD-RTO: 0 /100 WBCS (ref 0–0)
PLATELET # BLD AUTO: 152 X10*3/UL (ref 150–450)
POTASSIUM SERPL-SCNC: 3.8 MMOL/L (ref 3.5–5.3)
PROT SERPL-MCNC: 6.9 G/DL (ref 6.4–8.2)
PROTHROMBIN TIME: 12 SECONDS (ref 9.8–12.4)
RBC # BLD AUTO: 3.86 X10*6/UL (ref 4–5.2)
SODIUM SERPL-SCNC: 143 MMOL/L (ref 136–145)
WBC # BLD AUTO: 5.3 X10*3/UL (ref 4.4–11.3)

## 2025-02-25 PROCEDURE — 1036F TOBACCO NON-USER: CPT | Performed by: INTERNAL MEDICINE

## 2025-02-25 PROCEDURE — 80053 COMPREHEN METABOLIC PANEL: CPT

## 2025-02-25 PROCEDURE — 99214 OFFICE O/P EST MOD 30 MIN: CPT | Performed by: INTERNAL MEDICINE

## 2025-02-25 PROCEDURE — 36591 DRAW BLOOD OFF VENOUS DEVICE: CPT

## 2025-02-25 PROCEDURE — 85025 COMPLETE CBC W/AUTO DIFF WBC: CPT

## 2025-02-25 PROCEDURE — 85610 PROTHROMBIN TIME: CPT

## 2025-02-25 PROCEDURE — 2500000004 HC RX 250 GENERAL PHARMACY W/ HCPCS (ALT 636 FOR OP/ED): Performed by: PHYSICIAN ASSISTANT

## 2025-02-25 RX ORDER — HEPARIN SODIUM,PORCINE/PF 10 UNIT/ML
50 SYRINGE (ML) INTRAVENOUS AS NEEDED
OUTPATIENT
Start: 2025-02-25

## 2025-02-25 RX ORDER — HEPARIN SODIUM,PORCINE/PF 10 UNIT/ML
50 SYRINGE (ML) INTRAVENOUS AS NEEDED
Status: DISCONTINUED | OUTPATIENT
Start: 2025-02-25 | End: 2025-02-25 | Stop reason: HOSPADM

## 2025-02-25 RX ORDER — HEPARIN 100 UNIT/ML
500 SYRINGE INTRAVENOUS AS NEEDED
OUTPATIENT
Start: 2025-02-25

## 2025-02-25 RX ADMIN — HEPARIN, PORCINE (PF) 10 UNIT/ML INTRAVENOUS SYRINGE 50 UNITS: at 10:09

## 2025-02-25 RX ADMIN — HEPARIN, PORCINE (PF) 10 UNIT/ML INTRAVENOUS SYRINGE 50 UNITS: at 10:05

## 2025-02-25 ASSESSMENT — PAIN SCALES - GENERAL: PAINLEVEL_OUTOF10: 0-NO PAIN

## 2025-02-25 NOTE — PROGRESS NOTES
Patient ID: Ilda Peter is a 24 y.o. female.    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)  [id number] AML Risk Stratification: FAVORABLE: Mutated NPM1 without FLT3-ITD  Presented with anemia and circulating blasts    BONE MARROW (07/05/2024)  Hypercellular with erythroid predominance, dyserythropoiesis, and dysmegakaryopoiesis  FISH: AML negative  KARYOTYPE:  46XX [20]  MYELOID NGS: NPM1 (54%), NRAS (25%), PTPN11 (15%), IDH1 (2%)       7/19/2024 -  Chemotherapy    Induction with 7+3 (AraC+tarun) -> CR1 MRDpos  - D14 BM (8/1/24):  ablated, ALCON  - Count recovery:  ANC D28, plts D23  - Post induction BM (8/21/24):  ALCON, MFC negative, NPM1 2.65e-05       9/17/2024 -  Chemotherapy    Consolidation with HIDAC  -C1D1 9/17/24:  complicated by menorrhagia  -C2D1 10/21/24: complicated by menorrhagia  -C3D1 12/3/24: complicated by RSV infection  -C4D1 1/14/25    DATE TIMEPOINT SOURCE MORPHOLOGY MRD by [facility] NPM1 NOTES   10/8/2024 Post C1 HIDAC PB   Not detectable    10/28/2024 Post C1 HIDAC PB   Not detectable    12/2/24 Post C2 HiDAC PB   Not detected    1/13/25 Post C3 HiDAC PB   Not detected                                                Assessment & Plan  Acute myeloid leukemia in remission (Multi)  Counts remain stable.  No evidence of relapse.  Now in CR1 for 7 months.  Will plan for Every other month follow up with labs at each clinical visit including NPM1 for molecular monitoring.  Line removal later this week.    Immunosuppressed status  Has completed chemo.  OK to DC levofloxacin and posaconazole.  Recommend another 6 months acyclovir for HSV prophylaxis.  Menstrual suppression  Has completed chemo, so leuprolide no longer indicated.  Will defer further longer term contraceptive discussions to her gynecologist.        ______________________________________________________________________________________________________________________________________________    SUBJECTIVE     Here today for planned follow up.  Has been doing well.  Still gets tired some, but feels like she is doing better after completing chemo.  No new health issues.  Denies fevers, chills, nausea, vomiting, diarrhea, dyspnea, rash, and pain.    OBJECTIVE     /62   Pulse 63   Temp 36.1 °C (97 °F)   Resp 16   Wt 72.9 kg (160 lb 11.5 oz)   SpO2 100%   BMI 26.74 kg/m²      Physical Exam  Vitals reviewed.   Constitutional:       Appearance: Normal appearance.   Eyes:      Conjunctiva/sclera: Conjunctivae normal.      Pupils: Pupils are equal, round, and reactive to light.   Skin:     General: Skin is warm and dry.      Findings: No rash.   Psychiatric:         Mood and Affect: Mood normal.              Brandee Fields MD

## 2025-02-27 ENCOUNTER — HOSPITAL ENCOUNTER (OUTPATIENT)
Dept: RADIOLOGY | Facility: HOSPITAL | Age: 25
Discharge: HOME | End: 2025-02-27
Payer: COMMERCIAL

## 2025-02-27 VITALS
RESPIRATION RATE: 18 BRPM | HEART RATE: 80 BPM | SYSTOLIC BLOOD PRESSURE: 117 MMHG | DIASTOLIC BLOOD PRESSURE: 67 MMHG | OXYGEN SATURATION: 99 %

## 2025-02-27 DIAGNOSIS — C92.01 ACUTE MYELOID LEUKEMIA IN REMISSION (MULTI): ICD-10-CM

## 2025-02-27 PROCEDURE — 2500000004 HC RX 250 GENERAL PHARMACY W/ HCPCS (ALT 636 FOR OP/ED): Performed by: NURSE PRACTITIONER

## 2025-02-27 PROCEDURE — 36589 REMOVAL TUNNELED CV CATH: CPT | Mod: RT | Performed by: NURSE PRACTITIONER

## 2025-02-27 RX ORDER — LIDOCAINE HYDROCHLORIDE AND EPINEPHRINE 10; 10 UG/ML; MG/ML
INJECTION, SOLUTION INFILTRATION; PERINEURAL
Status: COMPLETED | OUTPATIENT
Start: 2025-02-27 | End: 2025-02-27

## 2025-02-27 RX ADMIN — LIDOCAINE HYDROCHLORIDE,EPINEPHRINE BITARTRATE 4 ML: 10; .01 INJECTION, SOLUTION INFILTRATION; PERINEURAL at 13:21

## 2025-02-27 ASSESSMENT — PAIN SCALES - GENERAL
PAINLEVEL_OUTOF10: 0 - NO PAIN

## 2025-02-27 NOTE — Clinical Note
Patient denies dizziness/pain. Site free from bleeding/hematoma. Patient verbalizes understanding of discharge instructions. Patient walked to waiting room for discharge, Mother waiting there for patient.

## 2025-02-27 NOTE — POST-PROCEDURE NOTE
Interventional Radiology Brief Postprocedure Note    Procedure: Right tunneled CVC removal    Provider: LEDY Kruse-CNP    Assistant: None    Diagnosis: AML    Description of procedure: The risks of the procedure were discussed with the patient including but not limited to bleeding, damage to blood vessel, infection, air embolism, and retained cuff. The patient consented to the procedure. A time out was performed. The patient was placed in the supine position. The patient's skin was prepped with chlorhexidine and draped in sterile manner. Lidocaine 1% with epinephrine was given at the site of catheter insertion. Using blunt dissection, the catheter cuff was externalized. Patient was instructed to hum continuously and the catheter was removed while pressure was held at the insertion site and the internal jugular vein. Hemostasis was achieved and no hematoma was appreciated at the neck. Gauze and tegaderm were applied. Sterile technique was used throughout entirety of procedure.         Medications (Filter: Administrations occurring from 1306 to 1330 on 02/27/25) As of 02/27/25 1330      lidocaine-epinephrine (Xylocaine W/EPI) 1 %-1:100,000 injection (mL) Total volume:  4 mL      Date/Time Rate/Dose/Volume Action       02/27/25  1321 4 mL Given                     Complications: None    Estimated Blood Loss: none        See detailed result report with images in PACS.    The patient tolerated the procedure well without incident or complication and is in stable condition.

## 2025-02-27 NOTE — Clinical Note
Right chest wall Bailon removed intact, site dressed with gauze and tegaderm, site free of bleeding or hematoma. Patient denies nausea/dizziness, tolerated procedure well.

## 2025-03-07 ENCOUNTER — OFFICE VISIT (OUTPATIENT)
Dept: HEMATOLOGY/ONCOLOGY | Facility: HOSPITAL | Age: 25
End: 2025-03-07
Payer: COMMERCIAL

## 2025-03-07 VITALS
SYSTOLIC BLOOD PRESSURE: 116 MMHG | WEIGHT: 162.26 LBS | HEART RATE: 76 BPM | DIASTOLIC BLOOD PRESSURE: 65 MMHG | RESPIRATION RATE: 18 BRPM | OXYGEN SATURATION: 98 % | TEMPERATURE: 97 F | BODY MASS INDEX: 27 KG/M2

## 2025-03-07 DIAGNOSIS — C92.01 ACUTE MYELOID LEUKEMIA IN REMISSION (MULTI): ICD-10-CM

## 2025-03-07 DIAGNOSIS — Z51.5 ENCOUNTER FOR PALLIATIVE CARE: ICD-10-CM

## 2025-03-07 DIAGNOSIS — F41.1 GENERALIZED ANXIETY DISORDER: ICD-10-CM

## 2025-03-07 DIAGNOSIS — Z91.89 AT RISK FOR INFERTILITY: ICD-10-CM

## 2025-03-07 PROCEDURE — 1036F TOBACCO NON-USER: CPT | Performed by: NURSE PRACTITIONER

## 2025-03-07 PROCEDURE — 99215 OFFICE O/P EST HI 40 MIN: CPT | Performed by: NURSE PRACTITIONER

## 2025-03-07 ASSESSMENT — PAIN SCALES - GENERAL: PAINLEVEL_OUTOF10: 0-NO PAIN

## 2025-03-07 ASSESSMENT — ENCOUNTER SYMPTOMS
NERVOUS/ANXIOUS: 0
HOT FLASHES: 0
SLEEP DISTURBANCE: 0
DEPRESSION: 1
FATIGUE: 1

## 2025-03-07 NOTE — PROGRESS NOTES
Patient ID: Ilda Peter is a 24 y.o. female.  Referring Physician: Nessa Peña, APRN-CNP  [address] [place] [address]  Primary Care Provider: DO Fer Weiner    Ilda Peter is a 24 y.o. with a diagnosis of acute myeloid leukemia, returns today in follow up regarding issues unique to being a young adult with cancer.    Returns today in follow up, now s/p 4 cycles of HIDAC, last 01/2025, MRD testing continues to be negative for disease.     Physically, she reports feeling well. Energy is improved. She is eating and drinking well. Has begun exercising - weekly kickboxing and yoga. Joined our exercise training group cancer to 5k.     Is sexually active, is preventing pregnancy with condoms. Denies problems with sexual function. LMP 01/22-02/10 - occurred with last cycle of HIDAC - had prolonged vaginal bleeding.     Emotionally, she relays still not doing as well as she would like. Decreased motivation, depression, and anxiety over future. Feels that she doesn't enjoy things as fully as she previously did. Continues on zoloft 50mg as prescribed by PCP. Still using marijuana smoking most days.     Social History: Grew up in and lives in [place] with her two children. Is close to parents. Works as a supervisor at [employer]. Is in a relationship, boyfriend is the father of her 1 year old son Tay, she also has a 4 1/2 year old son from a previous relationship, Sergio. She is a never smoker, she does not vape, she does not drink alcohol, she does use marijuana, smokes daily.      Past Medical History  She has a past medical history of AML (acute myeloblastic leukemia) (Multi), Anxiety, Cannabis dependence (Multi) (07/02/2024), Chronic atrophic rhinitis (07/02/2024), Depression, Hyperbilirubinemia, and Tinea versicolor (07/02/2024).     Surgical History  She has a past surgical history that includes [place] tooth extraction.  Review of Systems   Constitutional:  Positive for fatigue.    Endocrine: Negative for hot flashes.   Genitourinary:  Negative for menstrual problem.    Psychiatric/Behavioral:  Positive for depression. Negative for sleep disturbance. The patient is not nervous/anxious.       Objective   BSA: 1.84 meters squared  /65 (BP Location: Left arm, Patient Position: Sitting, BP Cuff Size: Adult)   Pulse 76   Temp 36.1 °C (97 °F) (Temporal)   Resp 18   Wt 73.6 kg (162 lb 4.1 oz)   SpO2 98%   BMI 27.00 kg/m²     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 (>=10% blasts)  [id number] AML Risk Stratification: FAVORABLE: Mutated NPM1 without FLT3-ITD  Presented with anemia and circulating blasts    BONE MARROW (07/05/2024)  Hypercellular with erythroid predominance, dyserythropoiesis, and dysmegakaryopoiesis  FISH: AML negative  KARYOTYPE:  46XX [20]  MYELOID NGS: NPM1 (54%), NRAS (25%), PTPN11 (15%), IDH1 (2%)       7/19/2024 -  Chemotherapy    Induction with 7+3 (AraC+tarun) -> CR1 MRDpos  - D14 BM (8/1/24):  ablated, ALCON  - Count recovery:  ANC D28, plts D23  - Post induction BM (8/21/24):  ALCON, MFC negative, NPM1 2.65e-05       9/17/2024 -  Chemotherapy    Consolidation with HIDAC  -C1D1 9/17/24:  complicated by menorrhagia  -C2D1 10/21/24: complicated by menorrhagia  -C3D1 12/3/24: complicated by RSV infection  -C4D1 1/14/25    DATE TIMEPOINT SOURCE MORPHOLOGY MRD by [facility] NPM1 NOTES   10/8/2024 Post C1 HIDAC PB   Not detectable    10/28/2024 Post C1 HIDAC PB   Not detectable    12/2/24 Post C2 HiDAC PB   Not detected    1/13/25 Post C3 HiDAC PB   Not detected                                             Ilda Peter  reports that she has never smoked. She has never used smokeless  tobacco.  She  reports that she does not currently use alcohol.  She  reports current drug use. Drug: Marijuana.    Physical Exam  Constitutional:       General: She is not in acute distress.     Appearance: Normal appearance. She is not ill-appearing.   Neurological:      General: No focal deficit present.      Mental Status: She is alert and oriented to person, place, and time.      Cranial Nerves: No cranial nerve deficit.   Psychiatric:         Mood and Affect: Mood normal.         Behavior: Behavior normal.         Thought Content: Thought content normal.         Judgment: Judgment normal.   Performance Status:  Asymptomatic    Assessment/Plan    Ilda Peter is a 23 y.o. F with a recent diagnosis of acute myeloid leukemia, s/p induction therapy in CR1, with future plans for consolidation chemotherapy with high dose cytarabine x 4 cycles.     #Psychosocial: hx of anxiety/depression, young adult with cancer, parent to young children  - Increase zoloft to 100mg/daily - monitor for GI side effects, call for new prescription   - Consider engaging in 1:1 therapy to help process experience - she will think about this  - Connected with [facility]Life specialists regarding two young children/adjustment to mother's illness  - Connected to young adult oncology program and receiving monthly social programming invitations  - Continue weekly exercise - kickboxing, yoga and cancer to 5k  - Connected to food for life market at  d/t food insecurity  - Following with DEBRA Ruelas regarding financial concerns    #Risk for infertility/Family Building:  - Previously discussed the damaging effect cancer treatment can have on the ovaries.   - Previously discussed natural age related decline in fertility and menopause that occurs as a result of ovarian aging, and that cancer treatments can accellerate that progression and diminish ovarian reserve.  - Individuals may experience varying degrees of short or long term ovarian  dysfunction and amenorrhea, and that this is dependent upon type of cancer treatment, cumulative dose, and patients age.   - Loss of ovarian function may be permanent or temporary.  - Amenorrhea may be temporary or permanent -- temporary amenorrhea results from destruction of maturing follicles whereas permanent amenorrhea results from depletion of viable primordial follicles.  - Risk to fertility is LOW based on cancer treatment of Cytarabine/Idarubicin   - Baseline hormone testing 07/23/24 FSH 3.8, LH 4.5, E2 34, AMH 7.742 - of note these were drawn following Day 1 of chemotherapy  - Did receive ovarian suppression with 3/4 cycles of HIDAC consolidation  - Discussed plan for repeating ovarian function testing one year out from treatment - 01/2026     #Substance use: currently using marijuana inhaled daily  - Discussed risks for infection with mold/spores with inhaled marijuana - in the short term recommended patient change to alternative route such as edibles  - Discussed strong recommendation for cessation of marijuana, specifically discussed newer research demonstrating increased cardiovascular disease in individual who use marijuana     #Sexual Dysfunction/Contraception:  - Previously discussed the possibility for sexual side effects related to cancer treatment this may manifest as decreased interest, arousal, vaginal dryness, or pain with sex  - Normalized conversation regarding sex and intimacy and encouraged patient to notify myself or team if she experiences any sexual side effects that impact her QOL  - Not currently taking norethindrone for pregnancy prevention - reiterated need to use condoms  - Discussed recommendation to wait to try and conceive until at least 2 years out from chemotherapy    #Late Effect Screening:  - Cumulative doses: idarubicin 12mg/m2 x 3 days =36mg/mg2, cytarabine 100mg/m2 D1-7 =700mg/m2, 6000mg/m2 D1-5 x 4 cycles = 120,000 mg/m2    Virtual visit in one month to assess mood/zoloft  dosing, in person visit in 4 months     The amount of time that I spent with the patient:  Prep: 5  Time spend directly with patient: 35  Coordinating care:  Documentation: 5  Other time:    Total time spent on encounter: 45                 LEDY Whitfield-CNP

## 2025-03-14 ENCOUNTER — PATIENT MESSAGE (OUTPATIENT)
Dept: HEMATOLOGY/ONCOLOGY | Facility: HOSPITAL | Age: 25
End: 2025-03-14
Payer: COMMERCIAL

## 2025-03-14 DIAGNOSIS — F41.9 ANXIETY AND DEPRESSION: ICD-10-CM

## 2025-03-14 DIAGNOSIS — F32.A ANXIETY AND DEPRESSION: ICD-10-CM

## 2025-03-14 DIAGNOSIS — F41.1 GENERALIZED ANXIETY DISORDER: ICD-10-CM

## 2025-03-14 DIAGNOSIS — C92.01 ACUTE MYELOID LEUKEMIA IN REMISSION (MULTI): Primary | ICD-10-CM

## 2025-03-17 RX ORDER — SERTRALINE HYDROCHLORIDE 100 MG/1
100 TABLET, FILM COATED ORAL DAILY
Qty: 90 TABLET | Refills: 1 | Status: SHIPPED | OUTPATIENT
Start: 2025-03-17 | End: 2025-12-12

## 2025-03-18 DIAGNOSIS — C92.00 ACUTE MYELOID LEUKEMIA IN ADULT (MULTI): ICD-10-CM

## 2025-03-18 RX ORDER — ACYCLOVIR 400 MG/1
400 TABLET ORAL 2 TIMES DAILY
Qty: 180 TABLET | Refills: 3 | Status: SHIPPED | OUTPATIENT
Start: 2025-03-18 | End: 2026-03-13

## 2025-03-28 ENCOUNTER — CLINICAL SUPPORT (OUTPATIENT)
Facility: HOSPITAL | Age: 25
End: 2025-03-28
Payer: COMMERCIAL

## 2025-03-28 NOTE — PROGRESS NOTES
[facility] [employer]  Diet Recommendation 1: Healthy Eating  Food Intolerance Avoidance: NKFA  Household Size: 5 Members (4 Max/Household)  Interventions: Referral Number: 1st 6 Mo Referral 6 Mos  Interventions: Visit Number: 6 of 6 Visits - Max 6 Visits/Referral Each 6 Mo Period  Grains: 0-25% Whole  Fruit: % Fresh  Vegetables: % Fresh  Proteins: 1-2 Plant-based Items  Dairy: 25-50% Lowfat  Originating Site of Referral Order: [facility]  Initials of RD Assisting Today: ADORE

## 2025-04-04 ENCOUNTER — TELEMEDICINE (OUTPATIENT)
Dept: HEMATOLOGY/ONCOLOGY | Facility: HOSPITAL | Age: 25
End: 2025-04-04
Payer: COMMERCIAL

## 2025-04-04 DIAGNOSIS — Z91.89 AT RISK FOR INFERTILITY: ICD-10-CM

## 2025-04-04 DIAGNOSIS — C92.01 ACUTE MYELOID LEUKEMIA IN REMISSION (MULTI): Primary | ICD-10-CM

## 2025-04-04 DIAGNOSIS — F41.1 GENERALIZED ANXIETY DISORDER: ICD-10-CM

## 2025-04-04 DIAGNOSIS — Z51.5 ENCOUNTER FOR PALLIATIVE CARE: ICD-10-CM

## 2025-04-04 PROCEDURE — 99214 OFFICE O/P EST MOD 30 MIN: CPT | Performed by: NURSE PRACTITIONER

## 2025-04-04 ASSESSMENT — ENCOUNTER SYMPTOMS
SLEEP DISTURBANCE: 0
HOT FLASHES: 0
NERVOUS/ANXIOUS: 0
APPETITE CHANGE: 0
FATIGUE: 0
DEPRESSION: 0
UNEXPECTED WEIGHT CHANGE: 0

## 2025-04-04 NOTE — PROGRESS NOTES
Patient ID: Ilda Peter is a 24 y.o. female.  Referring Physician: Nessa Peña, APRN-CNP  [address] [place] [address]  Primary Care Provider: Brandee Cross, DO    Virtual or Telephone Consent  An interactive audio and video telecommunication system which permits real time communications between the patient (HOME) and provider at [facility] was utilized to provide this telehealth service.   Verbal consent was requested and obtained from patient on this date, 04.04.2025 for a telehealth visit.        Subjective    Ilda Peter is a 24 y.o. with a diagnosis of acute myeloid leukemia, returns today in follow up regarding issues unique to being a young adult with cancer.    Returns today in follow up, now s/p 4 cycles of HIDAC, last 01/2025, MRD testing continues to be negative for disease.     Overall she reports feeling physically well. Energy continues to improve, she is doing kick boxing twice weekly, and cancer to 5k weekly. Eating well balanced diet, being more mindful about what she puts into her body.     At our last visit we increased zoloft to 100mg/daily - she notes improvement with dose adjustment. Decreased volatility in emotions, feeling less irritable and angry. Did have some increase in GI upset but this has subsided. Denies other adverse effects.     Sexually active with monogamous partner, using condoms for pregnancy prevention. LMP 01/22-02/10 - absence of menses these past two months. Denies problems with sexual function.     Implementing more structure and routine to her days and finding enjoyment in this. Mother returns to work this week. Ilda is contemplating what she may want to do with her future/work. 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 Has been interested in obtaining skills for a career in the healthcare sector.    Social History: Grew up in and lives in [place]  with her two children, mother, and boyfriend. Is close to parents. Previously worked as a supervisor at [employer]. Is in a relationship, boyfriend is the father of her 1 year old son Tay, she also has a 4 1/2 year old son from a previous relationship, Sergio. She is a never smoker, she does not vape, she does not drink alcohol, she does use marijuana, smokes daily.      Past Medical History  She has a past medical history of AML (acute myeloblastic leukemia) (Multi), Anxiety, Cannabis dependence (Multi) (07/02/2024), Chronic atrophic rhinitis (07/02/2024), Depression, Hyperbilirubinemia, and Tinea versicolor (07/02/2024).     Surgical History  She has a past surgical history that includes [place] tooth extraction.  Review of Systems   Constitutional:  Negative for appetite change, fatigue and unexpected weight change.   Endocrine: Negative for hot flashes.   Genitourinary:  Negative for menstrual problem and vaginal bleeding.    Psychiatric/Behavioral:  Negative for depression and sleep disturbance. The patient is not nervous/anxious.       Objective   BSA: There is no height or weight on file to calculate BSA.  There were no vitals taken for this visit. No VS or weight recorded d/t telehealth nature of this visit.     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 (>=10% blasts)  [id number] AML Risk Stratification: FAVORABLE: Mutated NPM1 without FLT3-ITD  Presented with anemia and circulating blasts    BONE MARROW (07/05/2024)  Hypercellular with erythroid predominance, dyserythropoiesis, and dysmegakaryopoiesis  FISH: AML negative  KARYOTYPE:  46XX [20]  MYELOID NGS: NPM1 (54%), NRAS (25%), PTPN11 (15%), IDH1 (2%)        7/19/2024 -  Chemotherapy    Induction with 7+3 (AraC+tarun) -> CR1 MRDpos  - D14 BM (8/1/24):  ablated, ALCON  - Count recovery:  ANC D28, plts D23  - Post induction BM (8/21/24):  ALCON, MFC negative, NPM1 2.65e-05       9/17/2024 -  Chemotherapy    Consolidation with HIDAC  -C1D1 9/17/24:  complicated by menorrhagia  -C2D1 10/21/24: complicated by menorrhagia  -C3D1 12/3/24: complicated by RSV infection  -C4D1 1/14/25    DATE TIMEPOINT SOURCE MORPHOLOGY MRD by [facility] NPM1 NOTES   10/8/2024 Post C1 HIDAC PB   Not detectable    10/28/2024 Post C1 HIDAC PB   Not detectable    12/2/24 Post C2 HiDAC PB   Not detected    1/13/25 Post C3 HiDAC PB   Not detected                                             Ilda Peter  reports that she has never smoked. She has never used smokeless tobacco.  She  reports that she does not currently use alcohol.  She  reports current drug use. Drug: Marijuana.    Physical Exam  Constitutional:       General: She is not in acute distress.     Appearance: Normal appearance. She is not ill-appearing.   Neurological:      General: No focal deficit present.      Mental Status: She is alert and oriented to person, place, and time.      Cranial Nerves: No cranial nerve deficit.   Psychiatric:         Mood and Affect: Mood normal.         Behavior: Behavior normal.         Thought Content: Thought content normal.         Judgment: Judgment normal.   Performance Status:  Asymptomatic    Assessment/Plan    Ilda Peter is a 23 y.o. F with a recent diagnosis of acute myeloid leukemia, s/p induction therapy in CR1, with future plans for consolidation chemotherapy with high dose cytarabine x 4 cycles.     #Psychosocial: hx of anxiety/depression, young adult with cancer, parent to young children  - Continue zoloft 100mg/daily - call for new prescription  - Connected with Child-Life specialists regarding two young children/adjustment to mother's illness  - Connected to young adult oncology program and receiving  monthly social programming invitations  - Continue weekly exercise - kickboxing, yoga and cancer to 5k  - Connected to food for life market at  d/t food insecurity  - Following with DEBRA Ruelas regarding financial concerns  - Will look into possible career training programs/further schooling, we discussed consideration of scholarships for college degree if she is interested     #Risk for infertility/Family Building:  - Previously discussed the damaging effect cancer treatment can have on the ovaries.   - Previously discussed natural age related decline in fertility and menopause that occurs as a result of ovarian aging, and that cancer treatments can accellerate that progression and diminish ovarian reserve.  - Individuals may experience varying degrees of short or long term ovarian dysfunction and amenorrhea, and that this is dependent upon type of cancer treatment, cumulative dose, and patients age.   - Loss of ovarian function may be permanent or temporary.  - Amenorrhea may be temporary or permanent -- temporary amenorrhea results from destruction of maturing follicles whereas permanent amenorrhea results from depletion of viable primordial follicles.  - Risk to fertility is LOW based on cancer treatment of Cytarabine/Idarubicin   - Baseline hormone testing 07/23/24 FSH 3.8, LH 4.5, E2 34, AMH 7.742 - of note these were drawn following Day 1 of chemotherapy  - Did receive ovarian suppression with 3/4 cycles of HIDAC consolidation  - Discussed plan for repeating ovarian function testing one year out from treatment - 01/2026     #Substance use: currently using marijuana inhaled daily  - Discussed risks for infection with mold/spores with inhaled marijuana - in the short term recommended patient change to alternative route such as edibles  - Discussed strong recommendation for cessation of marijuana, specifically discussed newer research demonstrating increased cardiovascular disease in individual who use  marijuana     #Sexual Dysfunction/Contraception:  - Previously discussed the possibility for sexual side effects related to cancer treatment this may manifest as decreased interest, arousal, vaginal dryness, or pain with sex  - Normalized conversation regarding sex and intimacy and encouraged patient to notify myself or team if she experiences any sexual side effects that impact her QOL  - Not currently taking norethindrone for pregnancy prevention - reiterated need to use condoms  - Discussed recommendation to wait to try and conceive until at least 2 years out from chemotherapy    #Late Effect Screening:  - Cumulative doses: idarubicin 12mg/m2 x 3 days =36mg/mg2, cytarabine 100mg/m2 D1-7 =700mg/m2, 6000mg/m2 D1-5 x 4 cycles = 120,000 mg/m2    Follow up in two months     The amount of time that I spent with the patient:  Prep: 5  Time spend directly with patient: 25  Coordinating care:  Documentation: 5  Other time:    Total time spent on encounter: 35                 Nessa Peña, LEDY-CNP

## 2025-04-22 ENCOUNTER — LAB (OUTPATIENT)
Dept: LAB | Facility: HOSPITAL | Age: 25
End: 2025-04-22
Payer: COMMERCIAL

## 2025-04-22 ENCOUNTER — OFFICE VISIT (OUTPATIENT)
Dept: HEMATOLOGY/ONCOLOGY | Facility: HOSPITAL | Age: 25
End: 2025-04-22
Payer: COMMERCIAL

## 2025-04-22 VITALS
WEIGHT: 160.05 LBS | SYSTOLIC BLOOD PRESSURE: 129 MMHG | DIASTOLIC BLOOD PRESSURE: 77 MMHG | BODY MASS INDEX: 26.63 KG/M2 | HEART RATE: 78 BPM | OXYGEN SATURATION: 98 % | TEMPERATURE: 97.7 F | RESPIRATION RATE: 17 BRPM

## 2025-04-22 DIAGNOSIS — D84.9 IMMUNOSUPPRESSED STATUS: ICD-10-CM

## 2025-04-22 DIAGNOSIS — C92.01 ACUTE MYELOID LEUKEMIA IN REMISSION (MULTI): Primary | ICD-10-CM

## 2025-04-22 DIAGNOSIS — C92.00 ACUTE MYELOID LEUKEMIA NOT HAVING ACHIEVED REMISSION (MULTI): ICD-10-CM

## 2025-04-22 DIAGNOSIS — C92.01 ACUTE MYELOID LEUKEMIA IN REMISSION (MULTI): ICD-10-CM

## 2025-04-22 LAB
ALBUMIN SERPL BCP-MCNC: 5 G/DL (ref 3.4–5)
ALP SERPL-CCNC: 89 U/L (ref 33–110)
ALT SERPL W P-5'-P-CCNC: 20 U/L (ref 7–45)
ANION GAP SERPL CALC-SCNC: 11 MMOL/L (ref 10–20)
AST SERPL W P-5'-P-CCNC: 20 U/L (ref 9–39)
BASOPHILS # BLD AUTO: 0.03 X10*3/UL (ref 0–0.1)
BASOPHILS NFR BLD AUTO: 0.5 %
BILIRUB SERPL-MCNC: 0.6 MG/DL (ref 0–1.2)
BUN SERPL-MCNC: 14 MG/DL (ref 6–23)
CALCIUM SERPL-MCNC: 10.4 MG/DL (ref 8.6–10.3)
CHLORIDE SERPL-SCNC: 101 MMOL/L (ref 98–107)
CO2 SERPL-SCNC: 33 MMOL/L (ref 21–32)
CREAT SERPL-MCNC: 0.82 MG/DL (ref 0.5–1.05)
EGFRCR SERPLBLD CKD-EPI 2021: >90 ML/MIN/1.73M*2
EOSINOPHIL # BLD AUTO: 0.16 X10*3/UL (ref 0–0.7)
EOSINOPHIL NFR BLD AUTO: 2.6 %
ERYTHROCYTE [DISTWIDTH] IN BLOOD BY AUTOMATED COUNT: 12 % (ref 11.5–14.5)
GLUCOSE SERPL-MCNC: 104 MG/DL (ref 74–99)
HCT VFR BLD AUTO: 43.9 % (ref 36–46)
HGB BLD-MCNC: 14.9 G/DL (ref 12–16)
IMM GRANULOCYTES # BLD AUTO: 0.01 X10*3/UL (ref 0–0.7)
IMM GRANULOCYTES NFR BLD AUTO: 0.2 % (ref 0–0.9)
LYMPHOCYTES # BLD AUTO: 0.67 X10*3/UL (ref 1.2–4.8)
LYMPHOCYTES NFR BLD AUTO: 10.8 %
MCH RBC QN AUTO: 31.8 PG (ref 26–34)
MCHC RBC AUTO-ENTMCNC: 33.9 G/DL (ref 32–36)
MCV RBC AUTO: 94 FL (ref 80–100)
MONOCYTES # BLD AUTO: 0.32 X10*3/UL (ref 0.1–1)
MONOCYTES NFR BLD AUTO: 5.2 %
NEUTROPHILS # BLD AUTO: 4.99 X10*3/UL (ref 1.2–7.7)
NEUTROPHILS NFR BLD AUTO: 80.7 %
NRBC BLD-RTO: 0 /100 WBCS (ref 0–0)
PLATELET # BLD AUTO: 152 X10*3/UL (ref 150–450)
POTASSIUM SERPL-SCNC: 4.3 MMOL/L (ref 3.5–5.3)
PROT SERPL-MCNC: 7.7 G/DL (ref 6.4–8.2)
RBC # BLD AUTO: 4.69 X10*6/UL (ref 4–5.2)
SODIUM SERPL-SCNC: 141 MMOL/L (ref 136–145)
WBC # BLD AUTO: 6.2 X10*3/UL (ref 4.4–11.3)

## 2025-04-22 PROCEDURE — 85025 COMPLETE CBC W/AUTO DIFF WBC: CPT

## 2025-04-22 PROCEDURE — 1036F TOBACCO NON-USER: CPT | Performed by: STUDENT IN AN ORGANIZED HEALTH CARE EDUCATION/TRAINING PROGRAM

## 2025-04-22 PROCEDURE — 36415 COLL VENOUS BLD VENIPUNCTURE: CPT

## 2025-04-22 PROCEDURE — 99215 OFFICE O/P EST HI 40 MIN: CPT | Performed by: STUDENT IN AN ORGANIZED HEALTH CARE EDUCATION/TRAINING PROGRAM

## 2025-04-22 PROCEDURE — 84075 ASSAY ALKALINE PHOSPHATASE: CPT

## 2025-04-22 ASSESSMENT — PAIN SCALES - GENERAL: PAINLEVEL_OUTOF10: 0-NO PAIN

## 2025-04-22 NOTE — PROGRESS NOTES
Patient ID: Ilda Peter is a 24 y.o. female.    ASSESSMENT & PLAN         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 (>=10% blasts)  [id number] AML Risk Stratification: FAVORABLE: Mutated NPM1 without FLT3-ITD  Presented with anemia and circulating blasts    BONE MARROW (07/05/2024)  Hypercellular with erythroid predominance, dyserythropoiesis, and dysmegakaryopoiesis  FISH: AML negative  KARYOTYPE:  46XX [20]  MYELOID NGS: NPM1 (54%), NRAS (25%), PTPN11 (15%), IDH1 (2%)       7/19/2024 -  Chemotherapy    Induction with 7+3 (AraC+tarun) -> CR1 MRDpos  - D14 BM (8/1/24):  ablated, ACLON  - Count recovery:  ANC D28, plts D23  - Post induction BM (8/21/24):  ALCON, MFC negative, NPM1 2.65e-05       9/17/2024 -  Chemotherapy    Consolidation with HIDAC  -C1D1 9/17/24:  complicated by menorrhagia  -C2D1 10/21/24: complicated by menorrhagia  -C3D1 12/3/24: complicated by RSV infection  -C4D1 1/14/25: tolerated well    DATE TIMEPOINT SOURCE MORPHOLOGY MRD by MFC NPM1 NOTES   10/8/2024 Post C1 HIDAC PB   Not detectable    10/28/2024 Post C1 HIDAC PB   Not detectable    12/2/24 Post C2 HiDAC PB   Not detected    1/13/25 Post C3 HiDAC PB   Not detected    2/17/25 7 MO CR1 PB   Not detected    4/22/25 9 MO CR1 PB                                 Assessment & Plan  Acute myeloid leukemia in remission (Multi)  Counts remain stable.  No evidence of relapse.  Now in CR1 for 9 months.  Continue lab and clinical monitoring Every other month follow up with labs at each clinical visit including NPM1 for molecular monitoring.   Immunosuppressed status  Recommend continuing acyclovir for HSV prophylaxis until end of August 2025.        ______________________________________________________________________________________________________________________________________________    SUBJECTIVE     Here today for planned follow up,  accompanied by mother.  Has been doing well.  Still gets tired some, but feels like she is doing better after completing chemo. Energy levels mostly returned, requires a nap only occasionally. No new health issues.  Denies fevers, chills, nausea, vomiting, diarrhea, dyspnea, rash, and pain.    OBJECTIVE     /77   Pulse 78   Temp 36.5 °C (97.7 °F)   Resp 17   Wt 72.6 kg (160 lb 0.9 oz)   SpO2 98%   BMI 26.63 kg/m²      Physical Exam  Vitals reviewed.   Constitutional:       Appearance: Normal appearance.   Eyes:      Conjunctiva/sclera: Conjunctivae normal.      Pupils: Pupils are equal, round, and reactive to light.   Cardiovascular:      Rate and Rhythm: Normal rate and regular rhythm.   Pulmonary:      Effort: Pulmonary effort is normal.      Breath sounds: Normal breath sounds.   Skin:     General: Skin is warm and dry.      Findings: No rash.   Psychiatric:         Mood and Affect: Mood normal.              Mike Bennett PA-C

## 2025-04-22 NOTE — ASSESSMENT & PLAN NOTE
Counts remain stable.  No evidence of relapse.  Now in CR1 for 9 months.  Continue lab and clinical monitoring Every other month follow up with labs at each clinical visit including NPM1 for molecular monitoring.

## 2025-04-29 ENCOUNTER — PATIENT MESSAGE (OUTPATIENT)
Dept: HEMATOLOGY/ONCOLOGY | Facility: HOSPITAL | Age: 25
End: 2025-04-29

## 2025-04-29 ENCOUNTER — CLINICAL SUPPORT (OUTPATIENT)
Facility: HOSPITAL | Age: 25
End: 2025-04-29
Payer: COMMERCIAL

## 2025-04-29 DIAGNOSIS — C92.01 ACUTE MYELOID LEUKEMIA IN REMISSION (MULTI): Primary | ICD-10-CM

## 2025-04-29 LAB
ELECTRONICALLY SIGNED BY: NORMAL
NPM1 RESULTS: NORMAL

## 2025-04-29 NOTE — PROGRESS NOTES
[facility] [employer]  Diet Recommendation 1: Healthy Eating  Food Intolerance Avoidance: NKFA  Household Size: 5 Members (4 Max/Household)  Interventions: Referral Number: 2nd 6 Mo Referral 1 yr (Referrals may not be consecutive)  Interventions: Visit Number: 1 of 6 Visits - Max 6 Visits/Referral Each 6 Mo Period  Grains: Whole - 100%  Fruit: 0-25% Fresh  Vegetables: 50-75% Fresh  Proteins: 0 Plant-based Items  Dairy: 50-75% Lowfat  Originating Site of Referral Order: [facility]  Initials of RD Assisting Today: ADORE

## 2025-05-19 ENCOUNTER — LAB (OUTPATIENT)
Dept: LAB | Facility: CLINIC | Age: 25
End: 2025-05-19
Payer: COMMERCIAL

## 2025-05-19 DIAGNOSIS — C92.00 ACUTE MYELOID LEUKEMIA NOT HAVING ACHIEVED REMISSION (MULTI): ICD-10-CM

## 2025-05-19 DIAGNOSIS — C92.01 ACUTE MYELOID LEUKEMIA IN REMISSION (MULTI): ICD-10-CM

## 2025-05-19 LAB
ALBUMIN SERPL BCP-MCNC: 4.5 G/DL (ref 3.4–5)
ALP SERPL-CCNC: 73 U/L (ref 33–110)
ALT SERPL W P-5'-P-CCNC: 27 U/L (ref 7–45)
ANION GAP SERPL CALC-SCNC: 11 MMOL/L (ref 10–20)
AST SERPL W P-5'-P-CCNC: 20 U/L (ref 9–39)
BASOPHILS # BLD AUTO: 0.02 X10*3/UL (ref 0–0.1)
BASOPHILS NFR BLD AUTO: 0.4 %
BILIRUB SERPL-MCNC: 0.8 MG/DL (ref 0–1.2)
BUN SERPL-MCNC: 12 MG/DL (ref 6–23)
CALCIUM SERPL-MCNC: 9.7 MG/DL (ref 8.6–10.3)
CHLORIDE SERPL-SCNC: 102 MMOL/L (ref 98–107)
CO2 SERPL-SCNC: 32 MMOL/L (ref 21–32)
CREAT SERPL-MCNC: 0.77 MG/DL (ref 0.5–1.05)
EGFRCR SERPLBLD CKD-EPI 2021: >90 ML/MIN/1.73M*2
EOSINOPHIL # BLD AUTO: 0.23 X10*3/UL (ref 0–0.7)
EOSINOPHIL NFR BLD AUTO: 5 %
ERYTHROCYTE [DISTWIDTH] IN BLOOD BY AUTOMATED COUNT: 12 % (ref 11.5–14.5)
GLUCOSE SERPL-MCNC: 97 MG/DL (ref 74–99)
HCT VFR BLD AUTO: 42 % (ref 36–46)
HGB BLD-MCNC: 14.1 G/DL (ref 12–16)
IMM GRANULOCYTES # BLD AUTO: 0.01 X10*3/UL (ref 0–0.7)
IMM GRANULOCYTES NFR BLD AUTO: 0.2 % (ref 0–0.9)
LYMPHOCYTES # BLD AUTO: 0.64 X10*3/UL (ref 1.2–4.8)
LYMPHOCYTES NFR BLD AUTO: 14 %
MCH RBC QN AUTO: 31.2 PG (ref 26–34)
MCHC RBC AUTO-ENTMCNC: 33.6 G/DL (ref 32–36)
MCV RBC AUTO: 93 FL (ref 80–100)
MONOCYTES # BLD AUTO: 0.27 X10*3/UL (ref 0.1–1)
MONOCYTES NFR BLD AUTO: 5.9 %
NEUTROPHILS # BLD AUTO: 3.4 X10*3/UL (ref 1.2–7.7)
NEUTROPHILS NFR BLD AUTO: 74.5 %
PLATELET # BLD AUTO: 136 X10*3/UL (ref 150–450)
POTASSIUM SERPL-SCNC: 4 MMOL/L (ref 3.5–5.3)
PROT SERPL-MCNC: 6.9 G/DL (ref 6.4–8.2)
RBC # BLD AUTO: 4.52 X10*6/UL (ref 4–5.2)
SODIUM SERPL-SCNC: 141 MMOL/L (ref 136–145)
WBC # BLD AUTO: 4.6 X10*3/UL (ref 4.4–11.3)

## 2025-05-19 PROCEDURE — 80053 COMPREHEN METABOLIC PANEL: CPT

## 2025-05-19 PROCEDURE — 36415 COLL VENOUS BLD VENIPUNCTURE: CPT

## 2025-05-19 PROCEDURE — 85025 COMPLETE CBC W/AUTO DIFF WBC: CPT

## 2025-05-19 PROCEDURE — 81310 NPM1 GENE: CPT

## 2025-05-19 PROCEDURE — G0452 MOLECULAR PATHOLOGY INTERPR: HCPCS | Performed by: PATHOLOGY

## 2025-05-28 LAB
ELECTRONICALLY SIGNED BY: NORMAL
NPM1 RESULTS: NORMAL

## 2025-05-29 ENCOUNTER — CLINICAL SUPPORT (OUTPATIENT)
Facility: HOSPITAL | Age: 25
End: 2025-05-29
Payer: COMMERCIAL

## 2025-05-29 NOTE — PROGRESS NOTES
[employer] [facility] [employer]  Diet Recommendation 1: Healthy Eating  Diet Recommendation 2: Neuropenic  Food Intolerance Avoidance: NKFA  Household Size: 5 Members (4 Max/Household)  Interventions: Referral Number: 2nd 6 Mo Referral 1 yr (Referrals may not be consecutive)  Interventions: Visit Number: 2 of 6 Visits - Max 6 Visits/Referral Each 6 Mo Period  Education Today: [employer]  Follow Up Notes for Future Visits: Pt states she continues on a neutropenic diet but that she occasionally eats f/v if washed thoroughly/multiple times. Says appetite dips week during and after chemo, but feels it could be worse.  Grains: Whole - 100%  Fruit: % Fresh  Vegetables: % Fresh  Proteins: 0 Plant-based Items  Dairy: 50-75% Lowfat  Originating Site of Referral Order: [facility]ren [facility] Assisting Today: [email or web address]

## 2025-06-03 ENCOUNTER — APPOINTMENT (OUTPATIENT)
Dept: HEMATOLOGY/ONCOLOGY | Facility: HOSPITAL | Age: 25
End: 2025-06-03
Payer: COMMERCIAL

## 2025-06-17 ENCOUNTER — OFFICE VISIT (OUTPATIENT)
Dept: HEMATOLOGY/ONCOLOGY | Facility: HOSPITAL | Age: 25
End: 2025-06-17
Payer: COMMERCIAL

## 2025-06-17 ENCOUNTER — LAB (OUTPATIENT)
Dept: LAB | Facility: HOSPITAL | Age: 25
End: 2025-06-17
Payer: COMMERCIAL

## 2025-06-17 VITALS
DIASTOLIC BLOOD PRESSURE: 87 MMHG | BODY MASS INDEX: 27.88 KG/M2 | SYSTOLIC BLOOD PRESSURE: 114 MMHG | OXYGEN SATURATION: 100 % | WEIGHT: 167.55 LBS | RESPIRATION RATE: 18 BRPM | HEART RATE: 83 BPM | TEMPERATURE: 98.1 F

## 2025-06-17 DIAGNOSIS — C92.01 ACUTE MYELOID LEUKEMIA IN REMISSION (MULTI): ICD-10-CM

## 2025-06-17 DIAGNOSIS — Z91.89 AT RISK FOR INFERTILITY: ICD-10-CM

## 2025-06-17 DIAGNOSIS — C92.01 ACUTE MYELOID LEUKEMIA IN REMISSION (MULTI): Primary | ICD-10-CM

## 2025-06-17 DIAGNOSIS — Z51.5 ENCOUNTER FOR PALLIATIVE CARE: ICD-10-CM

## 2025-06-17 DIAGNOSIS — C92.00 ACUTE MYELOID LEUKEMIA NOT HAVING ACHIEVED REMISSION (MULTI): ICD-10-CM

## 2025-06-17 DIAGNOSIS — F41.1 GENERALIZED ANXIETY DISORDER: ICD-10-CM

## 2025-06-17 LAB
ALBUMIN SERPL BCP-MCNC: 4.6 G/DL (ref 3.4–5)
ALP SERPL-CCNC: 85 U/L (ref 33–110)
ALT SERPL W P-5'-P-CCNC: 18 U/L (ref 7–45)
ANION GAP SERPL CALC-SCNC: 12 MMOL/L (ref 10–20)
AST SERPL W P-5'-P-CCNC: 19 U/L (ref 9–39)
BASOPHILS # BLD AUTO: 0.03 X10*3/UL (ref 0–0.1)
BASOPHILS NFR BLD AUTO: 0.6 %
BILIRUB SERPL-MCNC: 0.7 MG/DL (ref 0–1.2)
BUN SERPL-MCNC: 13 MG/DL (ref 6–23)
CALCIUM SERPL-MCNC: 10 MG/DL (ref 8.6–10.3)
CHLORIDE SERPL-SCNC: 102 MMOL/L (ref 98–107)
CO2 SERPL-SCNC: 31 MMOL/L (ref 21–32)
CREAT SERPL-MCNC: 0.82 MG/DL (ref 0.5–1.05)
EGFRCR SERPLBLD CKD-EPI 2021: >90 ML/MIN/1.73M*2
EOSINOPHIL # BLD AUTO: 0.33 X10*3/UL (ref 0–0.7)
EOSINOPHIL NFR BLD AUTO: 6.7 %
ERYTHROCYTE [DISTWIDTH] IN BLOOD BY AUTOMATED COUNT: 12.1 % (ref 11.5–14.5)
GLUCOSE SERPL-MCNC: 89 MG/DL (ref 74–99)
HCT VFR BLD AUTO: 43.2 % (ref 36–46)
HGB BLD-MCNC: 14.4 G/DL (ref 12–16)
IMM GRANULOCYTES # BLD AUTO: 0.02 X10*3/UL (ref 0–0.7)
IMM GRANULOCYTES NFR BLD AUTO: 0.4 % (ref 0–0.9)
LYMPHOCYTES # BLD AUTO: 0.69 X10*3/UL (ref 1.2–4.8)
LYMPHOCYTES NFR BLD AUTO: 14 %
MCH RBC QN AUTO: 30.6 PG (ref 26–34)
MCHC RBC AUTO-ENTMCNC: 33.3 G/DL (ref 32–36)
MCV RBC AUTO: 92 FL (ref 80–100)
MONOCYTES # BLD AUTO: 0.22 X10*3/UL (ref 0.1–1)
MONOCYTES NFR BLD AUTO: 4.5 %
NEUTROPHILS # BLD AUTO: 3.64 X10*3/UL (ref 1.2–7.7)
NEUTROPHILS NFR BLD AUTO: 73.8 %
NRBC BLD-RTO: 0 /100 WBCS (ref 0–0)
PLATELET # BLD AUTO: 151 X10*3/UL (ref 150–450)
POTASSIUM SERPL-SCNC: 3.8 MMOL/L (ref 3.5–5.3)
PROT SERPL-MCNC: 7.1 G/DL (ref 6.4–8.2)
RBC # BLD AUTO: 4.71 X10*6/UL (ref 4–5.2)
SODIUM SERPL-SCNC: 141 MMOL/L (ref 136–145)
WBC # BLD AUTO: 4.9 X10*3/UL (ref 4.4–11.3)

## 2025-06-17 PROCEDURE — 99214 OFFICE O/P EST MOD 30 MIN: CPT | Performed by: NURSE PRACTITIONER

## 2025-06-17 PROCEDURE — 99214 OFFICE O/P EST MOD 30 MIN: CPT | Performed by: STUDENT IN AN ORGANIZED HEALTH CARE EDUCATION/TRAINING PROGRAM

## 2025-06-17 PROCEDURE — 36415 COLL VENOUS BLD VENIPUNCTURE: CPT

## 2025-06-17 PROCEDURE — 80053 COMPREHEN METABOLIC PANEL: CPT

## 2025-06-17 PROCEDURE — 86832 HLA CLASS I HIGH DEFIN QUAL: CPT

## 2025-06-17 PROCEDURE — 85025 COMPLETE CBC W/AUTO DIFF WBC: CPT

## 2025-06-17 ASSESSMENT — ENCOUNTER SYMPTOMS
APPETITE CHANGE: 0
FATIGUE: 0
HOT FLASHES: 0
SLEEP DISTURBANCE: 0
DEPRESSION: 0
UNEXPECTED WEIGHT CHANGE: 0
NERVOUS/ANXIOUS: 0

## 2025-06-17 ASSESSMENT — PAIN SCALES - GENERAL: PAINLEVEL_OUTOF10: 0-NO PAIN

## 2025-06-17 NOTE — PROGRESS NOTES
Patient ID: Ilda Peter is a 24 y.o. female.  Referring Physician: Nessa Peña, APRN-CNP  [id number] [place] [address]  Primary Care Provider: DO Fer Weiner    Ilda Peter is a 24 y.o. with a diagnosis of acute myeloid leukemia, returns today in follow up regarding issues unique to being a young adult with cancer.    Returns today in follow up, now s/p 4 cycles of HIDAC, last 01/2025, MRD testing now low level detectable and being monitored.     Physically feeling well, notes good energy - continues to kick box twice weekly, completed cancer to 5k 12 week exercise training and ran goal race early June. Focusing on diet modification incorporating fruits/vegetables and trying to eat more cleanly, avoiding processed foods, added sugars. Cutting back on marijuana - now not smoking daily. Sexually active with monogamous partner, using condoms for pregnancy prevention. LMP 01/22-02/10 - no resumption of menses. Denies problems with sexual function.     Emotionally, she relays feeling well. Decreased anxiety/irritability and mood swings. Continues on zoloft 100mg/daily, some dry mouth but no other side effects noted.     Caring full-time for two children - both are doing well. Youngest may need to have another ocular surgery. Ilda is considering enrolling at [employer] for some courses in gardening.     Social History: Grew up in and lives in [place] with her two children, mother, and boyfriend. Is close to parents. Previously worked as a supervisor at [employer]. Is in a relationship, boyfriend is the father of her 1 year old son Tay, she also has a 4 1/2 year old son from a previous relationship, Sergio. She is a never smoker, she does not vape, she does not drink alcohol, she does use marijuana, smokes daily.      Past Medical History  She has a past medical history of AML (acute myeloblastic leukemia) (Multi), Anxiety, Cannabis dependence (Multi) (07/02/2024), Chronic atrophic  rhinitis (07/02/2024), Depression, Hyperbilirubinemia, and Tinea versicolor (07/02/2024).     Surgical History  She has a past surgical history that includes [place] tooth extraction.  Review of Systems   Constitutional:  Negative for appetite change, fatigue and unexpected weight change.   Endocrine: Negative for hot flashes.   Genitourinary:  Negative for menstrual problem and vaginal bleeding.    Psychiatric/Behavioral:  Negative for depression and sleep disturbance. The patient is not nervous/anxious.       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 (>=10% blasts)  [id number] AML Risk Stratification: FAVORABLE: Mutated NPM1 without FLT3-ITD  Presented with anemia and circulating blasts    BONE MARROW (07/05/2024)  Hypercellular with erythroid predominance, dyserythropoiesis, and dysmegakaryopoiesis  FISH: AML negative  KARYOTYPE:  46XX [20]  MYELOID NGS: NPM1 (54%), NRAS (25%), PTPN11 (15%), IDH1 (2%)       7/19/2024 -  Chemotherapy    Induction with 7+3 (AraC+tarun) -> CR1 MRDpos  - D14 BM (8/1/24):  ablated, ALCON  - Count recovery:  ANC D28, plts D23  - Post induction BM (8/21/24):  ALCON, MFC negative, NPM1 2.65e-05       9/17/2024 -  Chemotherapy    Consolidation with HIDAC  -C1D1 9/17/24:  complicated by menorrhagia  -C2D1 10/21/24: complicated by menorrhagia  -C3D1 12/3/24: complicated by RSV infection  -C4D1 1/14/25: tolerated well    DATE TIMEPOINT SOURCE MORPHOLOGY MRD by  [facility] NPM1 NOTES   10/8/2024 Post C1 HIDAC PB   Not detectable    10/28/2024 Post C1 HIDAC PB   Not detectable    12/2/24 Post C2 HiDAC PB   Not detected    1/13/25 Post C3 HiDAC PB   Not detected    2/17/25 7 MO CR1 PB   Not detected    4/22/25 9 MO CR1 PB   6.9E-05                           Ilda Peter  reports that she has never smoked. She has never used smokeless tobacco.  She  reports that she does not currently use alcohol.  She  reports current drug use. Drug: Marijuana.    Physical Exam  Constitutional:       General: She is not in acute distress.     Appearance: Normal appearance. She is not ill-appearing.   Neurological:      General: No focal deficit present.      Mental Status: She is alert and oriented to person, place, and time.      Cranial Nerves: No cranial nerve deficit.   Psychiatric:         Mood and Affect: Mood normal.         Behavior: Behavior normal.         Thought Content: Thought content normal.         Judgment: Judgment normal.   Performance Status:  Asymptomatic    Assessment/Plan    Ilda Peter is a 23 y.o. F with a recent diagnosis of acute myeloid leukemia, s/p induction therapy in CR1, with future plans for consolidation chemotherapy with high dose cytarabine x 4 cycles.     #Psychosocial: hx of anxiety/depression, young adult with cancer, parent to young children  - Continue zoloft 100mg/daily - call for new prescription  - Connected with [facility] regarding two young children/adjustment to mother's illness  - Connected to young adult oncology program and receiving monthly social programming invitations  - Continue weekly exercise - kickboxing, yoga  - Connected to [employer] market at  d/t food insecurity  - Following with DEBRA Ruelas regarding financial concerns  - Will look into possible career training programs/further schooling, we discussed consideration of scholarships for college degree if she is interested     #Diet/Exercise/Healthy Lifestyle:  -  Discussed research demonstrating consumption of a healthy, plant based diet not only decreases cancer risk, but also has been found to improve survival in cancer patients who partake in a healthy diet.   - We reviewed the American Cancer Society guidelines for a healthy diet including mostly plant based foods  with incorporation of plant/lean animal proteins and healthy fats such as the mediterranean diet.   - Patient provided with a copy of ACS guidelines for healthy diet  - Discussed additional reading - Anti-Cancer Living  - Discussed research that demonstrates decreased cancer risk and improved survival in cancer patients who exercise and stay active throughout diagnosis and treatment.  - Encouraged patient to continue exercise as tolerated with low impact activities such as walking, bicycling, or lifting light weights  - Encouraged patient to continue to abstain from alcohol, tobacco, and recreational drugs    #Risk for infertility/Family Building:  - Previously discussed the damaging effect cancer treatment can have on the ovaries.   - Previously discussed natural age related decline in fertility and menopause that occurs as a result of ovarian aging, and that cancer treatments can accellerate that progression and diminish ovarian reserve.  - Individuals may experience varying degrees of short or long term ovarian dysfunction and amenorrhea, and that this is dependent upon type of cancer treatment, cumulative dose, and patients age.   - Loss of ovarian function may be permanent or temporary.  - Amenorrhea may be temporary or permanent -- temporary amenorrhea results from destruction of maturing follicles whereas permanent amenorrhea results from depletion of viable primordial follicles.  - Risk to fertility is LOW based on cancer treatment of Cytarabine/Idarubicin   - Baseline hormone testing 07/23/24 FSH 3.8, LH 4.5, E2 34, AMH 7.742 - of note these were drawn following Day 1 of chemotherapy  - Did receive  ovarian suppression with 3/4 cycles of HIDAC consolidation  - Discussed plan for repeating ovarian function testing one year out from treatment - 01/2026     #Substance use: currently using marijuana inhaled daily  - Decreasing use - encouraged continued cutting back with eventual cessation  - Discussed risks for infection with mold/spores with inhaled marijuana - in the short term recommended patient change to alternative route such as edibles  - Discussed strong recommendation for cessation of marijuana, specifically discussed newer research demonstrating increased cardiovascular disease in individual who use marijuana     #Sexual Dysfunction/Contraception:  - Previously discussed the possibility for sexual side effects related to cancer treatment this may manifest as decreased interest, arousal, vaginal dryness, or pain with sex  - Normalized conversation regarding sex and intimacy and encouraged patient to notify myself or team if she experiences any sexual side effects that impact her QOL  - Not currently taking norethindrone for pregnancy prevention - reiterated need to use condoms  - Discussed recommendation to wait to try and conceive until at least 2 years out from chemotherapy    #Late Effect Screening:  - Cumulative doses: idarubicin 12mg/m2 x 3 days =36mg/mg2, cytarabine 100mg/m2 D1-7 =700mg/m2, 6000mg/m2 D1-5 x 4 cycles = 120,000 mg/m2    Follow up in three months     The amount of time that I spent with the patient:  Prep: 5  Time spend directly with patient: 25  Coordinating care:  Documentation: 5  Other time:    Total time spent on encounter: 35                 LEDY Whitfield-CNP

## 2025-06-17 NOTE — PROGRESS NOTES
Patient ID: Ilda Peter is a 24 y.o. female.    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 (>=10% blasts)  [id number] AML Risk Stratification: FAVORABLE: Mutated NPM1 without FLT3-ITD  Presented with anemia and circulating blasts    BONE MARROW (07/05/2024)  Hypercellular with erythroid predominance, dyserythropoiesis, and dysmegakaryopoiesis  FISH: AML negative  KARYOTYPE:  46XX [20]  MYELOID NGS: NPM1 (54%), NRAS (25%), PTPN11 (15%), IDH1 (2%)       7/19/2024 -  Chemotherapy    Induction with 7+3 (AraC+tarun) -> CR1 MRDpos  - D14 BM (8/1/24):  ablated, ALCON  - Count recovery:  ANC D28, plts D23  - Post induction BM (8/21/24):  ALCON, MFC negative, NPM1 2.65e-05       9/17/2024 -  Chemotherapy    Consolidation with HIDAC  -C1D1 9/17/24:  complicated by menorrhagia  -C2D1 10/21/24: complicated by menorrhagia  -C3D1 12/3/24: complicated by RSV infection  -C4D1 1/14/25: tolerated well    DATE TIMEPOINT SOURCE MORPHOLOGY MRD by [facility] NPM1 NOTES   10/8/2024 Post C1 HIDAC PB   Not detectable    10/28/2024 Post C1 HIDAC PB   Not detectable    12/2/24 Post C2 HiDAC PB   Not detected    1/13/25 Post C3 HiDAC PB   Not detected    2/17/25 7 MO CR1 PB   Not detected    4/22/25 9 MO CR1 PB   6.9E-05                              Assessment & Plan         ______________________________________________________________________________________________________________________________________________    SUBJECTIVE     Here today for planned follow up, accompanied by mother.  Has been doing well.  Still gets tired some, but feels like she is doing better after completing chemo. Energy levels mostly returned, requires a nap only occasionally. No new health issues.  Denies fevers, chills, nausea, vomiting, diarrhea, dyspnea, rash, and pain.    OBJECTIVE     There were no vitals taken for this visit.      Physical Exam  Vitals reviewed.   Constitutional:       Appearance: Normal appearance.   Eyes:      Conjunctiva/sclera: Conjunctivae normal.      Pupils: Pupils are equal, round, and reactive to light.   Cardiovascular:      Rate and Rhythm: Normal rate and regular rhythm.   Pulmonary:      Effort: Pulmonary effort is normal.      Breath sounds: Normal breath sounds.   Skin:     General: Skin is warm and dry.      Findings: No rash.   Psychiatric:         Mood and Affect: Mood normal.            Mike Bennett PA-C

## 2025-06-20 LAB
HLA RESULTS: NORMAL
HLA-A+B+C AB NFR SER: NORMAL %
HLA-DP+DQ+DR AB NFR SER: NORMAL %

## 2025-06-24 LAB
ELECTRONICALLY SIGNED BY: NORMAL
NPM1 RESULTS: NORMAL

## 2025-06-25 ENCOUNTER — PROCEDURE VISIT (OUTPATIENT)
Dept: HEMATOLOGY/ONCOLOGY | Facility: HOSPITAL | Age: 25
End: 2025-06-25
Payer: COMMERCIAL

## 2025-06-25 ENCOUNTER — LAB (OUTPATIENT)
Dept: LAB | Facility: HOSPITAL | Age: 25
End: 2025-06-25
Payer: COMMERCIAL

## 2025-06-25 VITALS
SYSTOLIC BLOOD PRESSURE: 141 MMHG | BODY MASS INDEX: 27.91 KG/M2 | WEIGHT: 167.7 LBS | DIASTOLIC BLOOD PRESSURE: 82 MMHG | OXYGEN SATURATION: 98 % | TEMPERATURE: 98.1 F | RESPIRATION RATE: 20 BRPM | HEART RATE: 89 BPM

## 2025-06-25 DIAGNOSIS — C92.00 ACUTE MYELOID LEUKEMIA NOT HAVING ACHIEVED REMISSION (MULTI): ICD-10-CM

## 2025-06-25 DIAGNOSIS — C92.01 ACUTE MYELOID LEUKEMIA IN REMISSION (MULTI): ICD-10-CM

## 2025-06-25 LAB
ALBUMIN SERPL BCP-MCNC: 4.5 G/DL (ref 3.4–5)
ALP SERPL-CCNC: 75 U/L (ref 33–110)
ALT SERPL W P-5'-P-CCNC: 15 U/L (ref 7–45)
ANION GAP SERPL CALC-SCNC: 11 MMOL/L (ref 10–20)
AST SERPL W P-5'-P-CCNC: 17 U/L (ref 9–39)
BASOPHILS # BLD AUTO: 0.03 X10*3/UL (ref 0–0.1)
BASOPHILS NFR BLD AUTO: 0.7 %
BILIRUB SERPL-MCNC: 0.9 MG/DL (ref 0–1.2)
BUN SERPL-MCNC: 13 MG/DL (ref 6–23)
CALCIUM SERPL-MCNC: 9.9 MG/DL (ref 8.6–10.3)
CHLORIDE SERPL-SCNC: 102 MMOL/L (ref 98–107)
CO2 SERPL-SCNC: 31 MMOL/L (ref 21–32)
CREAT SERPL-MCNC: 0.83 MG/DL (ref 0.5–1.05)
EGFRCR SERPLBLD CKD-EPI 2021: >90 ML/MIN/1.73M*2
EOSINOPHIL # BLD AUTO: 0.28 X10*3/UL (ref 0–0.7)
EOSINOPHIL NFR BLD AUTO: 6.6 %
ERYTHROCYTE [DISTWIDTH] IN BLOOD BY AUTOMATED COUNT: 12.4 % (ref 11.5–14.5)
GLUCOSE SERPL-MCNC: 72 MG/DL (ref 74–99)
HCT VFR BLD AUTO: 41.9 % (ref 36–46)
HGB BLD-MCNC: 14.1 G/DL (ref 12–16)
IMM GRANULOCYTES # BLD AUTO: 0.02 X10*3/UL (ref 0–0.7)
IMM GRANULOCYTES NFR BLD AUTO: 0.5 % (ref 0–0.9)
LYMPHOCYTES # BLD AUTO: 0.72 X10*3/UL (ref 1.2–4.8)
LYMPHOCYTES NFR BLD AUTO: 16.9 %
MCH RBC QN AUTO: 30.8 PG (ref 26–34)
MCHC RBC AUTO-ENTMCNC: 33.7 G/DL (ref 32–36)
MCV RBC AUTO: 92 FL (ref 80–100)
MONOCYTES # BLD AUTO: 0.27 X10*3/UL (ref 0.1–1)
MONOCYTES NFR BLD AUTO: 6.3 %
NEUTROPHILS # BLD AUTO: 2.95 X10*3/UL (ref 1.2–7.7)
NEUTROPHILS NFR BLD AUTO: 69 %
NRBC BLD-RTO: 0 /100 WBCS (ref 0–0)
PLATELET # BLD AUTO: 134 X10*3/UL (ref 150–450)
POTASSIUM SERPL-SCNC: 3.9 MMOL/L (ref 3.5–5.3)
PROT SERPL-MCNC: 6.9 G/DL (ref 6.4–8.2)
RBC # BLD AUTO: 4.58 X10*6/UL (ref 4–5.2)
SODIUM SERPL-SCNC: 140 MMOL/L (ref 136–145)
WBC # BLD AUTO: 4.3 X10*3/UL (ref 4.4–11.3)

## 2025-06-25 PROCEDURE — 38220 DX BONE MARROW ASPIRATIONS: CPT | Performed by: NURSE PRACTITIONER

## 2025-06-25 PROCEDURE — 36415 COLL VENOUS BLD VENIPUNCTURE: CPT | Performed by: NURSE PRACTITIONER

## 2025-06-25 PROCEDURE — 2500000001 HC RX 250 WO HCPCS SELF ADMINISTERED DRUGS (ALT 637 FOR MEDICARE OP): Performed by: NURSE PRACTITIONER

## 2025-06-25 PROCEDURE — 38222 DX BONE MARROW BX & ASPIR: CPT | Mod: LT | Performed by: NURSE PRACTITIONER

## 2025-06-25 PROCEDURE — 36415 COLL VENOUS BLD VENIPUNCTURE: CPT

## 2025-06-25 PROCEDURE — 85025 COMPLETE CBC W/AUTO DIFF WBC: CPT

## 2025-06-25 PROCEDURE — 84075 ASSAY ALKALINE PHOSPHATASE: CPT

## 2025-06-25 RX ORDER — LORAZEPAM 1 MG/1
1 TABLET ORAL ONCE
Status: COMPLETED | OUTPATIENT
Start: 2025-06-25 | End: 2025-06-25

## 2025-06-25 RX ADMIN — LORAZEPAM 1 MG: 1 TABLET ORAL at 13:17

## 2025-06-25 ASSESSMENT — PAIN SCALES - GENERAL: PAINLEVEL_OUTOF10: 0-NO PAIN

## 2025-06-25 NOTE — PROGRESS NOTES
Patient presents to appointment in stable condition for BMBx. Platelets 134, M Marta, CNP notified. PO ativan given prior as ordered. BMBx performed by provider. Site dressing clean/ dry/ intact, no s/s bleeding. Discharged in stable condition.

## 2025-06-25 NOTE — PROGRESS NOTES
Patient ID: Ilda Peter is a 24 y.o. female.    Biopsy bone marrow    Date/Time: 6/25/2025 2:12 PM    Performed by: BRITTNEE Ko  Authorized by: BRITTNEE Ko    Consent:     Consent obtained:  Verbal    Consent given by:  Patient    Risks discussed:  Bleeding, infection and pain    Alternatives discussed:  No treatment  Universal protocol:     Procedure explained and questions answered to patient or proxy's satisfaction: yes      Test results available: yes      Site/side marked: yes      Immediately prior to procedure, a time out was called: yes      Patient identity confirmed:  Verbally with patient and provided demographic data  Indications:     Indications:  AML  Pre-procedure details:     Skin preparation:  Povidone-iodine  Sedation:     Sedation type:  None  Anesthesia:     Anesthesia method:  Local infiltration    Local anesthetic:  Lidocaine 2% w/o epi  Procedure specific details:      Patient pre-medicated with 1 mg lorazepam.     The left posterior iliac crest was prepped with povidone-iodine.     20 ml of lidocaine 2% local anesthesia infiltrated into the subcutaneous tissue.    Left bone marrow aspirate was obtained.  Unable to obtain biopsy due to patients tolerance.    The procedure was tolerated well and there were no complications.    Specimens sent for: Heme path protocol    Post-procedure details:     Procedure completion:  Tolerated

## 2025-06-30 LAB
CELL COUNT (BLOOD): 13.21 X10*3/UL
CELL POPULATIONS: NORMAL
DIAGNOSIS: NORMAL
FLOW DIFFERENTIAL: NORMAL
FLOW TEST ORDERED: NORMAL
LAB TEST METHOD: NORMAL
NUMBER OF CELLS COLLECTED: NORMAL
PATH REPORT.COMMENTS IMP SPEC: NORMAL
PATH REPORT.FINAL DX SPEC: NORMAL
PATH REPORT.GROSS SPEC: NORMAL
PATH REPORT.MICROSCOPIC SPEC OTHER STN: NORMAL
PATH REPORT.RELEVANT HX SPEC: NORMAL
PATH REPORT.TOTAL CANCER: NORMAL
PATH REPORT.TOTAL CANCER: NORMAL
SIGNATURE COMMENT: NORMAL
SPECIMEN VIABILITY: NORMAL

## 2025-06-30 NOTE — ASSESSMENT & PLAN NOTE
NPM1 mutated AML, previously treated with 7+3 induction, followed by four cycles of HiDAC consolidation. Has been in MRD neg CR1 since February until April when she was initially noted to have very low level MRD positivity. Repeat MRD testing a few weeks ago unfortunately showing persistent MRD positive disease. Today we had a detailed discussion regarding implications of persistent MRD positivity and risk of disease relapse. We introduced the role of allogeneic stem cell transplant for achieving deepest possible remission. Family has been typed, will discuss further at tumor board and tentatively plan for follow up with Dr. Fields in the next 2-3 weeks to discuss further.

## 2025-07-01 ENCOUNTER — APPOINTMENT (OUTPATIENT)
Dept: HEMATOLOGY/ONCOLOGY | Facility: HOSPITAL | Age: 25
End: 2025-07-01
Payer: COMMERCIAL

## 2025-07-01 ENCOUNTER — CLINICAL SUPPORT (OUTPATIENT)
Facility: HOSPITAL | Age: 25
End: 2025-07-01
Payer: COMMERCIAL

## 2025-07-01 DIAGNOSIS — Z59.41 MILD FOOD INSECURITY: ICD-10-CM

## 2025-07-01 DIAGNOSIS — C92.00 ACUTE MYELOID LEUKEMIA NOT HAVING ACHIEVED REMISSION (MULTI): Primary | ICD-10-CM

## 2025-07-01 LAB
ELECTRONICALLY SIGNED BY: NORMAL
NPM1 RESULTS: NORMAL

## 2025-07-01 SDOH — ECONOMIC STABILITY - FOOD INSECURITY: FOOD INSECURITY: Z59.41

## 2025-07-01 NOTE — PROGRESS NOTES
[employer]  Diet Recommendation 1: Healthy Eating  Diet Recommendation 2: Neuropenic  Diet Recommendation 3: MyPlate  Food Intolerance Avoidance: NKFA  Household Size: 5 Members (4 Max/Household)  Interventions: Referral Number: 2nd 6 Mo Referral 1 yr (Referrals may not be consecutive)  Interventions: Visit Number: 3 of 6 Visits - Max 6 Visits/Referral Each 6 Mo Period  Education Today: MyPlate Meals  Grains: 50-75% Whole  Fruit: % Fresh  Vegetables: 50-75% Fresh  Proteins: 1-2 Plant-based Items  Dairy: 25-50% Lowfat  Relevant [facility] Inpatient Discharge Items: Messaged provider for new referral  Originating Site of Referral Order: Nessa Peña  Initials of RD Assisting Today: JANEL

## 2025-07-02 LAB
CHROM ANALY OVERALL INTERP-IMP: NORMAL
ELECTRONICALLY SIGNED BY CYTOGENETICS: NORMAL

## 2025-07-02 PROCEDURE — 81379 HLA I TYPING COMPLETE HR: CPT | Mod: OUT | Performed by: INTERNAL MEDICINE

## 2025-07-07 ENCOUNTER — LAB (OUTPATIENT)
Dept: LAB | Facility: CLINIC | Age: 25
End: 2025-07-07
Payer: COMMERCIAL

## 2025-07-07 DIAGNOSIS — C92.00 ACUTE MYELOID LEUKEMIA NOT HAVING ACHIEVED REMISSION (MULTI): ICD-10-CM

## 2025-07-07 LAB
ALBUMIN SERPL BCP-MCNC: 4.7 G/DL (ref 3.4–5)
ALP SERPL-CCNC: 74 U/L (ref 33–110)
ALT SERPL W P-5'-P-CCNC: 15 U/L (ref 7–45)
ANION GAP SERPL CALC-SCNC: 12 MMOL/L (ref 10–20)
AST SERPL W P-5'-P-CCNC: 15 U/L (ref 9–39)
BASOPHILS # BLD AUTO: 0.02 X10*3/UL (ref 0–0.1)
BASOPHILS NFR BLD AUTO: 0.6 %
BILIRUB SERPL-MCNC: 0.5 MG/DL (ref 0–1.2)
BUN SERPL-MCNC: 18 MG/DL (ref 6–23)
CALCIUM SERPL-MCNC: 9.7 MG/DL (ref 8.6–10.3)
CHLORIDE SERPL-SCNC: 107 MMOL/L (ref 98–107)
CO2 SERPL-SCNC: 30 MMOL/L (ref 21–32)
CREAT SERPL-MCNC: 0.87 MG/DL (ref 0.5–1.05)
EGFRCR SERPLBLD CKD-EPI 2021: >90 ML/MIN/1.73M*2
EOSINOPHIL # BLD AUTO: 0.24 X10*3/UL (ref 0–0.7)
EOSINOPHIL NFR BLD AUTO: 6.9 %
ERYTHROCYTE [DISTWIDTH] IN BLOOD BY AUTOMATED COUNT: 12.4 % (ref 11.5–14.5)
GLUCOSE SERPL-MCNC: 92 MG/DL (ref 74–99)
HCT VFR BLD AUTO: 41.7 % (ref 36–46)
HGB BLD-MCNC: 13.9 G/DL (ref 12–16)
IMM GRANULOCYTES # BLD AUTO: 0 X10*3/UL (ref 0–0.7)
IMM GRANULOCYTES NFR BLD AUTO: 0 % (ref 0–0.9)
LYMPHOCYTES # BLD AUTO: 0.59 X10*3/UL (ref 1.2–4.8)
LYMPHOCYTES NFR BLD AUTO: 16.9 %
MCH RBC QN AUTO: 31 PG (ref 26–34)
MCHC RBC AUTO-ENTMCNC: 33.3 G/DL (ref 32–36)
MCV RBC AUTO: 93 FL (ref 80–100)
MONOCYTES # BLD AUTO: 0.19 X10*3/UL (ref 0.1–1)
MONOCYTES NFR BLD AUTO: 5.4 %
NEUTROPHILS # BLD AUTO: 2.45 X10*3/UL (ref 1.2–7.7)
NEUTROPHILS NFR BLD AUTO: 70.2 %
PLATELET # BLD AUTO: 125 X10*3/UL (ref 150–450)
POTASSIUM SERPL-SCNC: 4.2 MMOL/L (ref 3.5–5.3)
PROT SERPL-MCNC: 6.9 G/DL (ref 6.4–8.2)
RBC # BLD AUTO: 4.48 X10*6/UL (ref 4–5.2)
SODIUM SERPL-SCNC: 145 MMOL/L (ref 136–145)
WBC # BLD AUTO: 3.5 X10*3/UL (ref 4.4–11.3)

## 2025-07-07 PROCEDURE — 85025 COMPLETE CBC W/AUTO DIFF WBC: CPT

## 2025-07-07 PROCEDURE — 36415 COLL VENOUS BLD VENIPUNCTURE: CPT

## 2025-07-07 PROCEDURE — 80053 COMPREHEN METABOLIC PANEL: CPT

## 2025-07-08 ENCOUNTER — TELEPHONE (OUTPATIENT)
Dept: HEMATOLOGY/ONCOLOGY | Facility: HOSPITAL | Age: 25
End: 2025-07-08

## 2025-07-08 ENCOUNTER — TELEMEDICINE (OUTPATIENT)
Dept: HEMATOLOGY/ONCOLOGY | Facility: HOSPITAL | Age: 25
End: 2025-07-08
Payer: COMMERCIAL

## 2025-07-08 DIAGNOSIS — N94.89 MENSTRUAL SUPPRESSION: ICD-10-CM

## 2025-07-08 DIAGNOSIS — C92.01 ACUTE MYELOID LEUKEMIA IN REMISSION (MULTI): ICD-10-CM

## 2025-07-08 DIAGNOSIS — D84.9 IMMUNOSUPPRESSED STATUS: Primary | ICD-10-CM

## 2025-07-08 PROCEDURE — 99215 OFFICE O/P EST HI 40 MIN: CPT | Performed by: INTERNAL MEDICINE

## 2025-07-08 PROCEDURE — 1036F TOBACCO NON-USER: CPT | Performed by: INTERNAL MEDICINE

## 2025-07-08 RX ORDER — PROCHLORPERAZINE MALEATE 10 MG
10 TABLET ORAL EVERY 6 HOURS PRN
Qty: 60 TABLET | Refills: 5 | Status: SHIPPED | OUTPATIENT
Start: 2025-07-08

## 2025-07-08 RX ORDER — ONDANSETRON 8 MG/1
8 TABLET, FILM COATED ORAL EVERY 8 HOURS PRN
Qty: 60 TABLET | Refills: 5 | Status: SHIPPED | OUTPATIENT
Start: 2025-07-08

## 2025-07-08 NOTE — PROGRESS NOTES
Patient ID: Ilda Peter is a 24 y.o. female.    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)  [id number] AML Risk Stratification: FAVORABLE: Mutated NPM1 without FLT3-ITD  Presented with anemia and circulating blasts    BONE MARROW (07/05/2024)  Hypercellular with erythroid predominance, dyserythropoiesis, and dysmegakaryopoiesis  FISH: AML negative  KARYOTYPE:  46XX [20]  MYELOID NGS: NPM1 (54%), NRAS (25%), PTPN11 (15%), IDH1 (2%)       7/19/2024 -  Chemotherapy    Induction with 7+3 (AraC+tarun) -> CR1 MRDpos  - D14 BM (8/1/24):  ablated, ALCON  - Count recovery:  ANC D28, plts D23  - Post induction BM (8/21/24):  ALCON, MFC negative, NPM1 2.65e-05       9/17/2024 -  Chemotherapy    Consolidation with HIDAC  -C1D1 9/17/24:  complicated by menorrhagia  -C2D1 10/21/24: complicated by menorrhagia  -C3D1 12/3/24: complicated by RSV infection  -C4D1 1/14/25: tolerated well    DATE TIMEPOINT SOURCE MORPHOLOGY MRD by [facility] NPM1 NOTES   10/8/2024 Post C1 HIDAC PB   Not detectable    10/28/2024 Post C1 HIDAC PB   Not detectable    12/2/24 Post C2 HiDAC PB   Not detected    1/13/25 Post C3 HiDAC PB   Not detected    2/17/25 7 MO CR1 PB   Not detected    4/22/25 9 MO CR1 PB   6.9E-05    5/19/2025  PB   2.4E-05                7/8/2025 -  Chemotherapy    AzaCITIDine Oral, 84 Day Cycles          Assessment & Plan  Acute myeloid leukemia in remission (Multi)  Recently with molecular relapse.  Planning for rapid sibling allogeneic stem cell transplant (brother is 11/12 DP permissive).  Currently no data to suggest benefit to obtaining MRD negativity prior to transplant.  Will tentatively plan for Onureg maintenance cycle in interim while we await transplant.  If she decides to pursue ovarian stimulation prior to transplant, would have to hold.  Will have [facility] team reach out to  her.  Beginning transplant evaluation.    In the event we move forward with maintenance, reviewed treatment with Onureq including dosing with instructions for pre-medication for nausea and breakthrough medications.  Will send Rx,  Immunosuppressed status  Recommend continuing acyclovir for HSV prophylaxis.   Menstrual suppression  Will need to address in preparation for planned allogeneic stem cell transplant.  To discuss with [facility].    ______________________________________________________________________________________________________________________________________________    SUBJECTIVE     History of Present Illness  The patient presents for a follow-up visit.    She reports feeling well overall. She has not reviewed her bone marrow results. Menstrual cycles are regular. Considering egg retrieval. Previously on progesterone pills.    Denies fevers, chills, nausea, vomiting, diarrhea, dyspnea, rash, and pain.    OBJECTIVE      Physical Exam  Constitutional:       General: She is not in acute distress.     Appearance: She is not ill-appearing.   Eyes:      General:         Right eye: No discharge.         Left eye: No discharge.      Conjunctiva/sclera: Conjunctivae normal.   Skin:     Findings: No rash.   Neurological:      Mental Status: She is alert.   Psychiatric:         Mood and Affect: Mood normal.         Virtual or Telephone Consent    An interactive audio and video telecommunication system which permits real time communications between the patient (at the originating site) and provider (at the distant site) was utilized to provide this telehealth service.   Verbal consent was requested and obtained from Ilda Peter on this date, 07/08/25 for a telehealth visit and the patient's location was confirmed at the time of the visit.    Time Spent  Prep time on day of patient encounter: 10 minutes  Time spent directly with patient, family or caregiver: 22 minutes  Additional Time Spent on Patient Care  Activities: 8 minutes  Documentation Time: 6 minutes  Other Time Spent: 0 minutes  Total: 46 minutes      This medical note was created with the assistance of artificial intelligence (AI) for documentation purposes. The content has been reviewed and confirmed by the healthcare provider for accuracy and completeness. Patient consented to the use of audio recording and use of AI during their visit.        Brandee Fields MD           Detail Level: Simple Additional Notes: Patient consent was obtained to proceed with the visit and recommended plan of care after discussion of all risks and benefits, including the risks of COVID-19 exposure.

## 2025-07-08 NOTE — ASSESSMENT & PLAN NOTE
Recently with molecular relapse.  Planning for rapid sibling allogeneic stem cell transplant (brother is 11/12 DP permissive).  Currently no data to suggest benefit to obtaining MRD negativity prior to transplant.  Will tentatively plan for Onureg maintenance cycle in interim while we await transplant.  If she decides to pursue ovarian stimulation prior to transplant, would have to hold.  Will have [facility] team reach out to her.  Beginning transplant evaluation.    In the event we move forward with maintenance, reviewed treatment with Onureq including dosing with instructions for pre-medication for nausea and breakthrough medications.  Will send Rx,

## 2025-07-08 NOTE — TELEPHONE ENCOUNTER
Contacted patient by phone in follow up regarding molecular relapse and future plan for treatment. Patient requested more information about fertility preservation. Reached voicemail - left detailed message regarding nature of my call and discussed plan to connect either in person or virtually for a follow up visit.

## 2025-07-08 NOTE — ASSESSMENT & PLAN NOTE
Will need to address in preparation for planned allogeneic stem cell transplant.  To discuss with [facility].

## 2025-07-09 ENCOUNTER — SPECIALTY PHARMACY (OUTPATIENT)
Dept: PHARMACY | Facility: CLINIC | Age: 25
End: 2025-07-09

## 2025-07-10 ENCOUNTER — DOCUMENTATION (OUTPATIENT)
Dept: OTHER | Facility: HOSPITAL | Age: 25
End: 2025-07-10
Payer: COMMERCIAL

## 2025-07-10 DIAGNOSIS — Z76.82 STEM CELL TRANSPLANT CANDIDATE: ICD-10-CM

## 2025-07-10 DIAGNOSIS — C92.01 ACUTE MYELOID LEUKEMIA IN REMISSION (MULTI): ICD-10-CM

## 2025-07-10 NOTE — PROGRESS NOTES
Patient Education  Learner: family and patient  Educated on: allo transplant  Readiness: acceptance  Preferred learning method: explanation  Method used: explanation  Response: demonstrated understanding  Barriers: none  Preferred language: English    I met with Ilda and Mom, Mariposa, today for allogenic stem cell transplant education. We discussed the general concept of transplant including chemotherapy administration and cell infusion. We reviewed the workup process including organ function testing and laboratory testing, required for MD and financial clearance to proceed with stem cell collection and transplant. We discussed hospital admission for transplant and that Ilda will remain in the hospital for 4-6 weeks. Ilda will be receiving fludarabine, melphalan and TBI as the preparative regimen prior to transplant. Administration and potential side effects of this treatment were reviewed and patient was/will be provided Lexicomp material specific to this medication. We discussed neutropenia, anemia, thrombocytopenia and the potential complications associated with these events. Infection prevention measures such as strict hand washing, low pathogen diet, daily showering/CHG bathing and frequent walking were reviewed.  Patient was provided with patient information sheets on transplant related topics and an allo transplant binder was provided. 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 We discussed the goals of discharge and the need for a caregiver and someone to accompany her to post transplant visits in the [facility], which will occur 2-3 times per week, at minimum.  We discussed the importance of having a reliable caregiver post-transplant to drive patient to appointments, help with medication adherence, and assist with ADLS such as cooking and cleaning. Patient was given copies of all applicable consents related to upcoming therapy and transplant so they can review prior to signing with  Dr. Fields.

## 2025-07-14 ENCOUNTER — OFFICE VISIT (OUTPATIENT)
Dept: HEMATOLOGY/ONCOLOGY | Facility: HOSPITAL | Age: 25
End: 2025-07-14
Payer: COMMERCIAL

## 2025-07-14 ENCOUNTER — LAB (OUTPATIENT)
Dept: LAB | Facility: HOSPITAL | Age: 25
End: 2025-07-14
Payer: COMMERCIAL

## 2025-07-14 VITALS
RESPIRATION RATE: 18 BRPM | OXYGEN SATURATION: 98 % | DIASTOLIC BLOOD PRESSURE: 64 MMHG | HEART RATE: 84 BPM | BODY MASS INDEX: 27.44 KG/M2 | SYSTOLIC BLOOD PRESSURE: 124 MMHG | WEIGHT: 164.9 LBS | TEMPERATURE: 97.9 F

## 2025-07-14 DIAGNOSIS — C92.00 ACUTE MYELOID LEUKEMIA NOT HAVING ACHIEVED REMISSION (MULTI): Primary | ICD-10-CM

## 2025-07-14 DIAGNOSIS — Z91.89 AT RISK FOR INFERTILITY: ICD-10-CM

## 2025-07-14 DIAGNOSIS — Z31.62 ENCOUNTER FOR FERTILITY PRESERVATION COUNSELING: ICD-10-CM

## 2025-07-14 DIAGNOSIS — F41.9 ANXIETY AND DEPRESSION: ICD-10-CM

## 2025-07-14 DIAGNOSIS — Z76.82 STEM CELL TRANSPLANT CANDIDATE: ICD-10-CM

## 2025-07-14 DIAGNOSIS — F32.A ANXIETY AND DEPRESSION: ICD-10-CM

## 2025-07-14 DIAGNOSIS — C92.01 ACUTE MYELOID LEUKEMIA IN REMISSION (MULTI): ICD-10-CM

## 2025-07-14 DIAGNOSIS — N94.89 MENSTRUAL SUPPRESSION: ICD-10-CM

## 2025-07-14 DIAGNOSIS — F41.1 GENERALIZED ANXIETY DISORDER: ICD-10-CM

## 2025-07-14 DIAGNOSIS — C92.00 ACUTE MYELOID LEUKEMIA NOT HAVING ACHIEVED REMISSION (MULTI): ICD-10-CM

## 2025-07-14 DIAGNOSIS — Z51.5 ENCOUNTER FOR PALLIATIVE CARE: ICD-10-CM

## 2025-07-14 LAB
25(OH)D3 SERPL-MCNC: 30 NG/ML (ref 30–100)
ABO GROUP (TYPE) IN BLOOD: NORMAL
ALBUMIN SERPL BCP-MCNC: 4.9 G/DL (ref 3.4–5)
ALP SERPL-CCNC: 83 U/L (ref 33–110)
ALT SERPL W P-5'-P-CCNC: 15 U/L (ref 7–45)
ANION GAP SERPL CALC-SCNC: 11 MMOL/L (ref 10–20)
ANTIBODY SCREEN: NORMAL
APTT PPP: 33 SECONDS (ref 26–36)
AST SERPL W P-5'-P-CCNC: 18 U/L (ref 9–39)
BASOPHILS # BLD AUTO: 0.02 X10*3/UL (ref 0–0.1)
BASOPHILS NFR BLD AUTO: 0.5 %
BILIRUB SERPL-MCNC: 0.8 MG/DL (ref 0–1.2)
BUN SERPL-MCNC: 19 MG/DL (ref 6–23)
CALCIUM SERPL-MCNC: 10 MG/DL (ref 8.6–10.3)
CARDIAC TROPONIN I PNL SERPL HS: 3 NG/L (ref 0–34)
CHLORIDE SERPL-SCNC: 101 MMOL/L (ref 98–107)
CMV IGG AVIDITY SERPL IA-RTO: NONREACTIVE %
CO2 SERPL-SCNC: 30 MMOL/L (ref 21–32)
CREAT SERPL-MCNC: 0.89 MG/DL (ref 0.5–1.05)
EBV EA IGG SER QL: POSITIVE
EBV NA AB SER QL: POSITIVE
EBV VCA IGG SER IA-ACNC: NEGATIVE
EBV VCA IGM SER IA-ACNC: NEGATIVE
EGFRCR SERPLBLD CKD-EPI 2021: >90 ML/MIN/1.73M*2
EOSINOPHIL # BLD AUTO: 0.23 X10*3/UL (ref 0–0.7)
EOSINOPHIL NFR BLD AUTO: 6.2 %
ERYTHROCYTE [DISTWIDTH] IN BLOOD BY AUTOMATED COUNT: 12.3 % (ref 11.5–14.5)
ESTRADIOL SERPL-MCNC: 187 PG/ML
FSH SERPL-ACNC: 4.2 IU/L
GLUCOSE SERPL-MCNC: 91 MG/DL (ref 74–99)
HBV CORE AB SER QL: NONREACTIVE
HBV CORE IGM SER QL: NONREACTIVE
HBV SURFACE AB SER-ACNC: <3.1 MIU/ML
HBV SURFACE AG SERPL QL IA: NONREACTIVE
HCT VFR BLD AUTO: 42.7 % (ref 36–46)
HCV AB SER QL: NONREACTIVE
HERPES SIMPLEX VIRUS 1 IGG: <0.2 INDEX
HERPES SIMPLEX VIRUS 2 IGG: <0.2 INDEX
HGB BLD-MCNC: 14.4 G/DL (ref 12–16)
HIV 1+2 AB+HIV1 P24 AG SERPL QL IA: NONREACTIVE
IMM GRANULOCYTES # BLD AUTO: 0.01 X10*3/UL (ref 0–0.7)
IMM GRANULOCYTES NFR BLD AUTO: 0.3 % (ref 0–0.9)
INR PPP: 1 (ref 0.9–1.1)
LH SERPL-ACNC: 4 IU/L
LYMPHOCYTES # BLD AUTO: 0.73 X10*3/UL (ref 1.2–4.8)
LYMPHOCYTES NFR BLD AUTO: 19.6 %
MCH RBC QN AUTO: 30.4 PG (ref 26–34)
MCHC RBC AUTO-ENTMCNC: 33.7 G/DL (ref 32–36)
MCV RBC AUTO: 90 FL (ref 80–100)
MONOCYTES # BLD AUTO: 0.19 X10*3/UL (ref 0.1–1)
MONOCYTES NFR BLD AUTO: 5.1 %
NEUTROPHILS # BLD AUTO: 2.54 X10*3/UL (ref 1.2–7.7)
NEUTROPHILS NFR BLD AUTO: 68.3 %
NRBC BLD-RTO: 0 /100 WBCS (ref 0–0)
PLATELET # BLD AUTO: 144 X10*3/UL (ref 150–450)
POTASSIUM SERPL-SCNC: 3.7 MMOL/L (ref 3.5–5.3)
PROT SERPL-MCNC: 7.4 G/DL (ref 6.4–8.2)
PROTHROMBIN TIME: 11.3 SECONDS (ref 9.8–12.4)
RBC # BLD AUTO: 4.73 X10*6/UL (ref 4–5.2)
RH FACTOR (ANTIGEN D): NORMAL
SODIUM SERPL-SCNC: 138 MMOL/L (ref 136–145)
T GONDII IGG SER-ACNC: NONREACTIVE
TREPONEMA PALLIDUM IGG+IGM AB [PRESENCE] IN SERUM OR PLASMA BY IMMUNOASSAY: NONREACTIVE
VARICELLA ZOSTER IGG INDEX: 1.9 AI
VZV IGG SER QL IA: POSITIVE
WBC # BLD AUTO: 3.7 X10*3/UL (ref 4.4–11.3)

## 2025-07-14 PROCEDURE — 86803 HEPATITIS C AB TEST: CPT

## 2025-07-14 PROCEDURE — 86704 HEP B CORE ANTIBODY TOTAL: CPT

## 2025-07-14 PROCEDURE — 87389 HIV-1 AG W/HIV-1&-2 AB AG IA: CPT

## 2025-07-14 PROCEDURE — 86645 CMV ANTIBODY IGM: CPT

## 2025-07-14 PROCEDURE — 86706 HEP B SURFACE ANTIBODY: CPT

## 2025-07-14 PROCEDURE — 85730 THROMBOPLASTIN TIME PARTIAL: CPT

## 2025-07-14 PROCEDURE — 1036F TOBACCO NON-USER: CPT | Performed by: NURSE PRACTITIONER

## 2025-07-14 PROCEDURE — 99215 OFFICE O/P EST HI 40 MIN: CPT | Performed by: NURSE PRACTITIONER

## 2025-07-14 PROCEDURE — 83001 ASSAY OF GONADOTROPIN (FSH): CPT

## 2025-07-14 PROCEDURE — 87340 HEPATITIS B SURFACE AG IA: CPT

## 2025-07-14 PROCEDURE — 86695 HERPES SIMPLEX TYPE 1 TEST: CPT

## 2025-07-14 PROCEDURE — 82670 ASSAY OF TOTAL ESTRADIOL: CPT

## 2025-07-14 PROCEDURE — 86790 VIRUS ANTIBODY NOS: CPT

## 2025-07-14 PROCEDURE — 84484 ASSAY OF TROPONIN QUANT: CPT

## 2025-07-14 PROCEDURE — 86901 BLOOD TYPING SEROLOGIC RH(D): CPT

## 2025-07-14 PROCEDURE — 80053 COMPREHEN METABOLIC PANEL: CPT

## 2025-07-14 PROCEDURE — 85025 COMPLETE CBC W/AUTO DIFF WBC: CPT

## 2025-07-14 PROCEDURE — 99417 PROLNG OP E/M EACH 15 MIN: CPT | Performed by: NURSE PRACTITIONER

## 2025-07-14 PROCEDURE — 86644 CMV ANTIBODY: CPT

## 2025-07-14 PROCEDURE — 83880 ASSAY OF NATRIURETIC PEPTIDE: CPT

## 2025-07-14 PROCEDURE — 86780 TREPONEMA PALLIDUM: CPT

## 2025-07-14 PROCEDURE — 86665 EPSTEIN-BARR CAPSID VCA: CPT

## 2025-07-14 PROCEDURE — 86787 VARICELLA-ZOSTER ANTIBODY: CPT

## 2025-07-14 PROCEDURE — 86777 TOXOPLASMA ANTIBODY: CPT

## 2025-07-14 PROCEDURE — 85610 PROTHROMBIN TIME: CPT

## 2025-07-14 PROCEDURE — 82306 VITAMIN D 25 HYDROXY: CPT

## 2025-07-14 PROCEDURE — 83520 IMMUNOASSAY QUANT NOS NONAB: CPT

## 2025-07-14 PROCEDURE — 83002 ASSAY OF GONADOTROPIN (LH): CPT

## 2025-07-14 PROCEDURE — 36415 COLL VENOUS BLD VENIPUNCTURE: CPT

## 2025-07-14 PROCEDURE — 86705 HEP B CORE ANTIBODY IGM: CPT

## 2025-07-14 ASSESSMENT — ENCOUNTER SYMPTOMS
APPETITE CHANGE: 0
HOT FLASHES: 0
FATIGUE: 0
SLEEP DISTURBANCE: 0
UNEXPECTED WEIGHT CHANGE: 0
DEPRESSION: 0
NERVOUS/ANXIOUS: 0

## 2025-07-14 ASSESSMENT — PAIN SCALES - GENERAL: PAINLEVEL_OUTOF10: 0-NO PAIN

## 2025-07-14 NOTE — PROGRESS NOTES
Patient ID: Ilda Peter is a 24 y.o. female.  Referring Physician: No referring provider defined for this encounter.  Primary Care Provider: DO Fer Weiner    Ilda Peter is a 24 y.o. with a diagnosis of acute myeloid leukemia, returns today in follow up regarding issues unique to being a young adult with cancer.    Returns today in follow up, now s/p 4 cycles of HIDAC, last 01/2025, MRD testing persistently positive and now plan for future stem cell transplant using her brothers cells.      Here today to discuss fertility preservation prior to transplant.     Resumption of menses since May, occurring every 30 days. LMP 07/03/25. Denies excessive pain or heavy bleeding. Not currently on any form of contraception.     Physically she notes some mild change in energy and stomach upset. Emotionally she relays increased anxiety/feeling overwhelmed about what to expect and the uncertainty over the future. Is journaling about emotions, continues on zoloft 100mg/daily.    Social History: Grew up in and lives in [place] with her two children, mother, and boyfriend. Is close to parents. Previously worked as a supervisor at [employer]. Is in a relationship, boyfriend is the father of her 1 year old son Tay, she also has a 4 1/2 year old son from a previous relationship, Sergio. She is a never smoker, she does not vape, she does not drink alcohol, she does use marijuana, smokes daily.      Past Medical History  She has a past medical history of AML (acute myeloblastic leukemia) (Multi), Anxiety, Cannabis dependence (Multi) (07/02/2024), Chronic atrophic rhinitis (07/02/2024), Depression, Hyperbilirubinemia, and Tinea versicolor (07/02/2024).     Surgical History  She has a past surgical history that includes [place] tooth extraction.  Review of Systems   Constitutional:  Negative for appetite change, fatigue and unexpected weight change.   Endocrine: Negative for hot flashes.   Genitourinary:  Negative for  menstrual problem and vaginal bleeding.    Psychiatric/Behavioral:  Negative for depression and sleep disturbance. The patient is not nervous/anxious.       Objective   BSA: There is no height or weight on file to calculate BSA.  There were no vitals taken for this visit.    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 (>=10% blasts)  [id number] AML Risk Stratification: FAVORABLE: Mutated NPM1 without FLT3-ITD  Presented with anemia and circulating blasts    BONE MARROW (07/05/2024)  Hypercellular with erythroid predominance, dyserythropoiesis, and dysmegakaryopoiesis  FISH: AML negative  KARYOTYPE:  46XX [20]  MYELOID NGS: NPM1 (54%), NRAS (25%), PTPN11 (15%), IDH1 (2%)       7/19/2024 -  Chemotherapy    Induction with 7+3 (AraC+tarun) -> CR1 MRDpos  - D14 BM (8/1/24):  ablated, ALCON  - Count recovery:  ANC D28, plts D23  - Post induction BM (8/21/24):  ALCON, MFC negative, NPM1 2.65e-05       9/17/2024 -  Chemotherapy    Consolidation with HIDAC  -C1D1 9/17/24:  complicated by menorrhagia  -C2D1 10/21/24: complicated by menorrhagia  -C3D1 12/3/24: complicated by RSV infection  -C4D1 1/14/25: tolerated well    DATE TIMEPOINT SOURCE MORPHOLOGY MRD by [facility] NPM1 NOTES   10/8/2024 Post C1 HIDAC PB   Not detectable    10/28/2024 Post C1 HIDAC PB   Not detectable    12/2/24 Post C2 HiDAC PB   Not detected    1/13/25 Post C3 HiDAC PB   Not detected    2/17/25 7 MO CR1 PB   Not detected    4/22/25 9 MO CR1 PB   6.9E-05    5/19/2025  PB   2.4E-05                7/8/2025 -  Chemotherapy    AzaCITIDine Oral, 84 Day Cycles       Ilda Peter  reports that she has never smoked. She has never used smokeless tobacco.  She  reports that she  does not currently use alcohol.  She  reports that she does not currently use drugs after having used the following drugs: Marijuana.    Physical Exam  Constitutional:       General: She is not in acute distress.     Appearance: Normal appearance. She is not ill-appearing.   Neurological:      General: No focal deficit present.      Mental Status: She is alert and oriented to person, place, and time.      Cranial Nerves: No cranial nerve deficit.   Psychiatric:         Mood and Affect: Mood normal.         Behavior: Behavior normal.         Thought Content: Thought content normal.         Judgment: Judgment normal.   Performance Status:  Asymptomatic    Assessment/Plan    Ilda Peter is a 23 y.o. F with a recent diagnosis of acute myeloid leukemia, s/p induction therapy in CR1, with future plans for consolidation chemotherapy with high dose cytarabine x 4 cycles.     #Psychosocial: hx of anxiety/depression, young adult with cancer, parent to young children, now with change in disease and treatment status  - Validated emotions and experience - discussed process for cell collection in brother as well as what to expect with hospitalization and acute period after transplant  - Encouraged continued exploration of emotions through journaling  - Continue zoloft 100mg/daily - call for new prescription  - Connected with [facility] regarding two young children/adjustment to mother's illness  - Connected to young adult oncology program and receiving monthly social programming invitations  - Continue weekly exercise - kickboxing, yoga  - Connected to food for life market at  d/t food insecurity  - Following with DEBRA Ruelas regarding financial concerns - reached out to her today re assistance for mother with taking time off work as primary caregiver    #Diet/Exercise/Healthy Lifestyle:  - Encouraged continued well-balanced diet  - Encouraged patient to continue exercise as tolerated with low impact  activities such as walking, bicycling, or lifting light weights  - Encouraged patient to continue to abstain from alcohol, tobacco, and recreational drugs    #Risk for infertility/Family Building:  - Discussed the damaging effect cancer treatment can have on the ovaries.   - Discussed natural age related decline in fertility and menopause that occurs as a result of ovarian aging, and that cancer treatments can accellerate that progression and diminish ovarian reserve.  - Individuals may experience varying degrees of short or long term ovarian dysfunction and amenorrhea, and that this is dependent upon type of cancer treatment, cumulative dose, and patients age.   - Loss of ovarian function may be permanent or temporary.  - Discussed high likelihood of premature menopause and amenorrhea after transplant   - Risk to fertility is HIGH based on cancer treatment of Stem Cell Preparative regimen  - Baseline hormone testing 07/23/24 FSH 3.8, LH 4.5, E2 34, AMH 7.742 - of note these were drawn following Day 1 of chemotherapy - repeat levels sent today  - Did receive ovarian suppression with 3/4 cycles of HIDAC consolidation  - Discussed use of menstrual suppression with stem cell transplant - recommended lupron 11.25mg 3 mo dose kit in next 1-2 weeks (is in luteal phase currently) - with goal to reduce/eliminate menstruation and reduce transfusion burden, she is in agreement  - Discussed process for ovarian stimulation and egg retrieval in detail  - Will have financial counselors review benefits, discussed out of pocket costs and financial assistance     #Substance use: currently using marijuana inhaled daily  - Decreasing use - encouraged continued cutting back with eventual cessation  - Discussed risks for infection with mold/spores with inhaled marijuana - in the short term recommended patient change to alternative route such as edibles  - Discussed strong recommendation for cessation of marijuana, specifically discussed  newer research demonstrating increased cardiovascular disease in individual who use marijuana     #Sexual Dysfunction/Contraception:  - Previously discussed the possibility for sexual side effects related to cancer treatment this may manifest as decreased interest, arousal, vaginal dryness, or pain with sex  - Normalized conversation regarding sex and intimacy and encouraged patient to notify myself or team if she experiences any sexual side effects that impact her QOL  - Not currently taking norethindrone for pregnancy prevention - reiterated need to use condoms  - Discussed recommendation to wait to try and conceive until at least 2 years out from chemotherapy    #Late Effect Screening:  - Cumulative doses: idarubicin 12mg/m2 x 3 days =36mg/mg2, cytarabine 100mg/m2 D1-7 =700mg/m2, 6000mg/m2 D1-5 x 4 cycles = 120,000 mg/m2    Follow up in three weeks, labs today, phone call in 24 hours to determine how she might want to proceed    The amount of time that I spent with the patient:  Prep:   Time spend directly with patient: 60  Coordinating care:  Documentation: 10  Other time:    Total time spent on encounter: 70                 LEDY Whitfield-CNP

## 2025-07-15 DIAGNOSIS — N94.89 MENSTRUAL SUPPRESSION: Primary | ICD-10-CM

## 2025-07-15 DIAGNOSIS — C92.01 ACUTE MYELOID LEUKEMIA IN REMISSION (MULTI): ICD-10-CM

## 2025-07-15 LAB
BNP SERPL-MCNC: 21 PG/ML (ref 0–99)
CMV IGM SERPL-ACNC: <8 AU/ML
HTLV I+II AB SER QL IA: NEGATIVE

## 2025-07-15 RX ORDER — FAMOTIDINE 10 MG/ML
20 INJECTION, SOLUTION INTRAVENOUS ONCE AS NEEDED
Status: CANCELLED | OUTPATIENT
Start: 2025-07-18

## 2025-07-15 RX ORDER — DIPHENHYDRAMINE HYDROCHLORIDE 50 MG/ML
50 INJECTION, SOLUTION INTRAMUSCULAR; INTRAVENOUS AS NEEDED
Status: CANCELLED | OUTPATIENT
Start: 2025-07-18

## 2025-07-15 RX ORDER — ALBUTEROL SULFATE 0.83 MG/ML
3 SOLUTION RESPIRATORY (INHALATION) AS NEEDED
Status: CANCELLED | OUTPATIENT
Start: 2025-07-18

## 2025-07-15 RX ORDER — EPINEPHRINE 0.3 MG/.3ML
0.3 INJECTION SUBCUTANEOUS EVERY 5 MIN PRN
Status: CANCELLED | OUTPATIENT
Start: 2025-07-18

## 2025-07-17 LAB — MIS SERPL-MCNC: 9.81 NG/ML (ref 0.4–16.02)

## 2025-07-18 ENCOUNTER — DOCUMENTATION (OUTPATIENT)
Dept: OTHER | Facility: HOSPITAL | Age: 25
End: 2025-07-18

## 2025-07-18 ENCOUNTER — INFUSION (OUTPATIENT)
Dept: HEMATOLOGY/ONCOLOGY | Facility: CLINIC | Age: 25
End: 2025-07-18
Payer: COMMERCIAL

## 2025-07-18 VITALS
RESPIRATION RATE: 18 BRPM | HEART RATE: 89 BPM | SYSTOLIC BLOOD PRESSURE: 134 MMHG | TEMPERATURE: 98.2 F | BODY MASS INDEX: 27.62 KG/M2 | OXYGEN SATURATION: 97 % | DIASTOLIC BLOOD PRESSURE: 84 MMHG | WEIGHT: 166.01 LBS

## 2025-07-18 DIAGNOSIS — C92.01 ACUTE MYELOID LEUKEMIA IN REMISSION (MULTI): ICD-10-CM

## 2025-07-18 DIAGNOSIS — N94.89 MENSTRUAL SUPPRESSION: ICD-10-CM

## 2025-07-18 PROCEDURE — 2500000004 HC RX 250 GENERAL PHARMACY W/ HCPCS (ALT 636 FOR OP/ED): Mod: JZ,TB | Performed by: STUDENT IN AN ORGANIZED HEALTH CARE EDUCATION/TRAINING PROGRAM

## 2025-07-18 PROCEDURE — 96402 CHEMO HORMON ANTINEOPL SQ/IM: CPT

## 2025-07-18 RX ORDER — EPINEPHRINE 0.3 MG/.3ML
0.3 INJECTION SUBCUTANEOUS EVERY 5 MIN PRN
OUTPATIENT
Start: 2025-10-10

## 2025-07-18 RX ORDER — FAMOTIDINE 10 MG/ML
20 INJECTION, SOLUTION INTRAVENOUS ONCE AS NEEDED
OUTPATIENT
Start: 2025-10-10

## 2025-07-18 RX ORDER — DIPHENHYDRAMINE HYDROCHLORIDE 50 MG/ML
50 INJECTION, SOLUTION INTRAMUSCULAR; INTRAVENOUS AS NEEDED
OUTPATIENT
Start: 2025-10-10

## 2025-07-18 RX ORDER — ALBUTEROL SULFATE 0.83 MG/ML
3 SOLUTION RESPIRATORY (INHALATION) AS NEEDED
OUTPATIENT
Start: 2025-10-10

## 2025-07-18 RX ADMIN — LEUPROLIDE ACETATE 11.25 MG: KIT at 11:47

## 2025-07-18 ASSESSMENT — PAIN SCALES - GENERAL: PAINLEVEL_OUTOF10: 0-NO PAIN

## 2025-07-23 ENCOUNTER — LAB REQUISITION (OUTPATIENT)
Dept: LAB | Facility: CLINIC | Age: 25
End: 2025-07-23
Payer: COMMERCIAL

## 2025-07-23 DIAGNOSIS — D61.818 OTHER PANCYTOPENIA (MULTI): ICD-10-CM

## 2025-07-23 LAB
HLA CLS I TYP PNL BLD/T DONR HIGH RES: NORMAL
HLA CLS I TYP PNL BLD/T DONR HIGH RES: NORMAL
HLA RESULTS: NORMAL
HLA RESULTS: NORMAL
HLA-DP2 QL: NORMAL
HLA-DP2 QL: NORMAL
HLA-DQB1 HIGH RES: NORMAL
HLA-DQB1 HIGH RES: NORMAL
HLA-DRB1 HIGH RES: NORMAL
HLA-DRB1 HIGH RES: NORMAL

## 2025-07-28 ENCOUNTER — SOCIAL WORK (OUTPATIENT)
Dept: CASE MANAGEMENT | Facility: HOSPITAL | Age: 25
End: 2025-07-28
Payer: COMMERCIAL

## 2025-07-28 NOTE — PROGRESS NOTES
PSYCHOSOCIAL ASSESSMENT     Demographic Information  Ilda Peter  2000  [id number]  Transplant Type: Autologous  Assessment Type:  Pre-Transplant   Date of assessment: 07/28/25  Provider(s): [facility]   Diagnosis: Acute Myeloid Leukemia   Person(s) present during assessment: Patient   Primary language: English   Interpretive services used: None   County: [facility]                   Living Environment/Support Systems  Partner Status: Single  Children: 2  Support systems: children's father, brother, and father   24/7 Primary caregiver: Mariposa Peter  Secondary caregiver: Too Hannaha  Employment Status Caregiver: Unemployed   Caregiver concerns: none   Current Living Situation: House Own  Resides with: Mother (Mariposa)  Concerns with Housing Environment: None  Comments:    Lodging  Distance from transplant center: 25 minutes   Lodging Needed? : No  Comments: Patient transports herself to most appointment. If she is unable, patient's mother will bring her.     Safety  Safety at Home: Reports feeling safe in home   History of Domestic Violence: None reported      Functional Status   Functional status: Independent  Patient currently ambulates: Independently  Patient has following equipment: None  Other physical health issues that the patient is experiencing: None  What supports are in place to assist the patient: None  Transportation:  Self  Comments:         Finances/Insurance  Insurance: [place] and [facility] Plan  Does the patient have any pending insurance applications: No   Hospital Financial Assistance: None  Patient's income source:  SSI/SSDI  Work History: [employer]   History: No  Background  Food Insecurity: No   Patient financial plans during transplant: SSDI  Does the patient have any financial concerns: No   Any difficulties affording medications? No   Applicable [place]: No   Comments: Patient lives with mother who quit her full time job to be patient's care partner. Family is  inquiring about assistance with housing costs. Patient has WIC, SNAP, and Medicaid. Patient applied for [employer] on 7/16/25. LSW provided patient with information on DFine. LSW looked into grants for patient but was unsuccessful in finding an open genaro. Patient was educated on Med Cert and was agreeable to looking into DFine.      Advance Directives  No Advance Directives- Additional information provided  Health Care Agent Status:Not Activated  Health Care Agent, When applicable: n/a  Comments: LSW provided education and agreed to meet with patient on 7/29/25 to provide HPOA for review before signature.     Legal Involvement  Relevant current or previous legal concerns: None     Temple or Spiritual Identity  Comments: [facility]     Mental Health  Active SI/HI: No  Mental Health Diagnosis, if applicable: Patient denied mental health diagnosis. Medical history reports JANEL diagnosis  on 7/2/24  Family History of Mental Health? No   Mood: Euthymic   Distress Level over past week (0-10)? 5; due to parenting and uncertainty   Hobbies/Interests? Walks  Patient identified coping skills: Spending time alone to regroup  Patient identified coping skills related to admission: TBD  Motivation for treatment? Being available to your children.   Learning Preferences: TBD  Understanding of Diagnosis and Transplant? Yes   Cognitive Comments: None  Relevant Providers: None  Comments:     Substance Use  Use of Tobacco Products? No  Alcohol Use? No  Other Substance Use? No  Concerns being able to stop any substance use for treatment? None  Family History of Substance Use? No   Comments:         Assessment  SIPAT Total Score: 5  Patient is a Excellent Candidate for transplant from a psychosocial perspective.  Additional Comments: Score impacted by documented history of JANEL diagnosis. Also, impacted by good vs. great understanding of diagnosis and transplant process     Potential Barriers  to Care: None Identified  Patient Strengths: Healthy Coping Skills, Motivated for Treatment, Self-Advocate, and Strong Support System    Plan   Referrals:N/A  Applications:[place] will continue to search for grants specific to patient's need.   Other: N/A

## 2025-07-29 ENCOUNTER — HOSPITAL ENCOUNTER (OUTPATIENT)
Dept: CARDIOLOGY | Facility: HOSPITAL | Age: 25
Discharge: HOME | End: 2025-07-29
Payer: COMMERCIAL

## 2025-07-29 ENCOUNTER — DOCUMENTATION (OUTPATIENT)
Dept: OTHER | Facility: HOSPITAL | Age: 25
End: 2025-07-29

## 2025-07-29 ENCOUNTER — HOSPITAL ENCOUNTER (OUTPATIENT)
Dept: RADIOLOGY | Facility: HOSPITAL | Age: 25
Discharge: HOME | End: 2025-07-29
Payer: COMMERCIAL

## 2025-07-29 ENCOUNTER — OFFICE VISIT (OUTPATIENT)
Dept: CARDIOLOGY | Facility: HOSPITAL | Age: 25
End: 2025-07-29
Payer: COMMERCIAL

## 2025-07-29 ENCOUNTER — HOSPITAL ENCOUNTER (OUTPATIENT)
Dept: RESPIRATORY THERAPY | Facility: HOSPITAL | Age: 25
Discharge: HOME | End: 2025-07-29
Payer: COMMERCIAL

## 2025-07-29 ENCOUNTER — TREATMENT (OUTPATIENT)
Dept: OTHER | Facility: HOSPITAL | Age: 25
End: 2025-07-29
Payer: COMMERCIAL

## 2025-07-29 ENCOUNTER — APPOINTMENT (OUTPATIENT)
Dept: CARDIOLOGY | Facility: HOSPITAL | Age: 25
End: 2025-07-29
Payer: COMMERCIAL

## 2025-07-29 VITALS
TEMPERATURE: 96.8 F | OXYGEN SATURATION: 99 % | SYSTOLIC BLOOD PRESSURE: 129 MMHG | HEART RATE: 71 BPM | DIASTOLIC BLOOD PRESSURE: 66 MMHG | BODY MASS INDEX: 27.59 KG/M2 | WEIGHT: 165.8 LBS

## 2025-07-29 DIAGNOSIS — R76.8 EBV SEROPOSITIVITY: Primary | ICD-10-CM

## 2025-07-29 DIAGNOSIS — Z76.82 STEM CELL TRANSPLANT CANDIDATE: ICD-10-CM

## 2025-07-29 DIAGNOSIS — C92.01 ACUTE MYELOID LEUKEMIA IN REMISSION (MULTI): ICD-10-CM

## 2025-07-29 DIAGNOSIS — Z51.81 ENCOUNTER FOR THERAPEUTIC DRUG LEVEL MONITORING: ICD-10-CM

## 2025-07-29 DIAGNOSIS — Z76.82 STEM CELL TRANSPLANT CANDIDATE: Primary | ICD-10-CM

## 2025-07-29 DIAGNOSIS — C92.01 ACUTE MYELOID LEUKEMIA IN REMISSION (MULTI): Primary | ICD-10-CM

## 2025-07-29 LAB
AORTIC VALVE PEAK VELOCITY: 1.25 M/S
ATRIAL RATE: 70 BPM
AV PEAK GRADIENT: 6 MMHG
AVA (PEAK VEL): 2.54 CM2
EJECTION FRACTION APICAL 4 CHAMBER: 54.9
EJECTION FRACTION: 54 %
LEFT ATRIUM VOLUME AREA LENGTH INDEX BSA: 24.9 ML/M2
LEFT VENTRICLE INTERNAL DIMENSION DIASTOLE: 3.87 CM (ref 3.5–6)
LEFT VENTRICULAR OUTFLOW TRACT DIAMETER: 1.94 CM
MGC ASCENT PFT - FEV1 - POST: 3.91
MGC ASCENT PFT - FEV1 - PRE: 3.81
MGC ASCENT PFT - FEV1 - PREDICTED: 3.21
MGC ASCENT PFT - FVC - POST: 4.92
MGC ASCENT PFT - FVC - PRE: 5.04
MGC ASCENT PFT - FVC - PREDICTED: 3.69
MITRAL VALVE E/A RATIO: 1.17
P AXIS: 28 DEGREES
P OFFSET: 173 MS
P ONSET: 126 MS
PR INTERVAL: 190 MS
Q ONSET: 221 MS
QRS COUNT: 12 BEATS
QRS DURATION: 96 MS
QT INTERVAL: 368 MS
QTC CALCULATION(BAZETT): 397 MS
QTC FREDERICIA: 387 MS
R AXIS: 75 DEGREES
RIGHT VENTRICLE FREE WALL PEAK S': 11 CM/S
T AXIS: 60 DEGREES
T OFFSET: 405 MS
TRICUSPID ANNULAR PLANE SYSTOLIC EXCURSION: 2 CM
VENTRICULAR RATE: 70 BPM

## 2025-07-29 PROCEDURE — 94060 EVALUATION OF WHEEZING: CPT | Performed by: INTERNAL MEDICINE

## 2025-07-29 PROCEDURE — 99213 OFFICE O/P EST LOW 20 MIN: CPT | Mod: 25 | Performed by: NURSE PRACTITIONER

## 2025-07-29 PROCEDURE — 93005 ELECTROCARDIOGRAM TRACING: CPT

## 2025-07-29 PROCEDURE — 99203 OFFICE O/P NEW LOW 30 MIN: CPT | Performed by: NURSE PRACTITIONER

## 2025-07-29 PROCEDURE — 94729 DIFFUSING CAPACITY: CPT | Performed by: INTERNAL MEDICINE

## 2025-07-29 PROCEDURE — 2500000001 HC RX 250 WO HCPCS SELF ADMINISTERED DRUGS (ALT 637 FOR MEDICARE OP): Performed by: INTERNAL MEDICINE

## 2025-07-29 PROCEDURE — 93306 TTE W/DOPPLER COMPLETE: CPT

## 2025-07-29 PROCEDURE — 71250 CT THORAX DX C-: CPT | Performed by: RADIOLOGY

## 2025-07-29 PROCEDURE — 94726 PLETHYSMOGRAPHY LUNG VOLUMES: CPT

## 2025-07-29 PROCEDURE — 71250 CT THORAX DX C-: CPT

## 2025-07-29 PROCEDURE — 94726 PLETHYSMOGRAPHY LUNG VOLUMES: CPT | Performed by: INTERNAL MEDICINE

## 2025-07-29 PROCEDURE — 93306 TTE W/DOPPLER COMPLETE: CPT | Performed by: INTERNAL MEDICINE

## 2025-07-29 PROCEDURE — 93010 ELECTROCARDIOGRAM REPORT: CPT | Performed by: INTERNAL MEDICINE

## 2025-07-29 RX ORDER — DIPHENHYDRAMINE HCL 50 MG
50 CAPSULE ORAL ONCE
OUTPATIENT
Start: 2025-08-27

## 2025-07-29 RX ORDER — ALBUTEROL SULFATE 0.83 MG/ML
3 SOLUTION RESPIRATORY (INHALATION) AS NEEDED
OUTPATIENT
Start: 2025-08-27

## 2025-07-29 RX ORDER — DIPHENHYDRAMINE HYDROCHLORIDE 50 MG/ML
50 INJECTION, SOLUTION INTRAMUSCULAR; INTRAVENOUS AS NEEDED
OUTPATIENT
Start: 2025-08-27

## 2025-07-29 RX ORDER — EPINEPHRINE 0.3 MG/.3ML
0.3 INJECTION SUBCUTANEOUS EVERY 5 MIN PRN
OUTPATIENT
Start: 2025-08-27

## 2025-07-29 RX ORDER — ACETAMINOPHEN 325 MG/1
650 TABLET ORAL ONCE
OUTPATIENT
Start: 2025-08-27

## 2025-07-29 RX ORDER — FAMOTIDINE 10 MG/ML
20 INJECTION, SOLUTION INTRAVENOUS ONCE AS NEEDED
OUTPATIENT
Start: 2025-08-27

## 2025-07-29 RX ORDER — ALBUTEROL SULFATE 90 UG/1
4 INHALANT RESPIRATORY (INHALATION) ONCE
Status: COMPLETED | OUTPATIENT
Start: 2025-07-29 | End: 2025-07-29

## 2025-07-29 RX ADMIN — ALBUTEROL SULFATE 4 PUFF: 90 AEROSOL, METERED RESPIRATORY (INHALATION) at 12:26

## 2025-07-29 ASSESSMENT — PAIN SCALES - GENERAL: PAINLEVEL_OUTOF10: 0-NO PAIN

## 2025-07-29 NOTE — PROGRESS NOTES
I visited patient after cardiology visit. I provided patient with a copy of calendar for transplant. I also provided patient with lexicomp sheets and reviewed these with patient.

## 2025-07-29 NOTE — Clinical Note
Thank you for this consultation.  Patient at low risk for CV complications in vasyl-transplant period.  No CV contraindications to proceeding with alloBMT.    LEDY Rojas-CNP Department of Cardio-Oncology

## 2025-07-29 NOTE — PROGRESS NOTES
CARDIO-ONCOLOGY PROGRESS NOTE  Name: Ilda Peter   MRN: [id number]  : 2000  Date of Service: 25  Medical Oncologist: Brandee Fields MD    CHIEF COMPLAINT:  Ilda Peter is a 24 y.o. female who presents to clinic for CV risk evaluation prior to planned alloBMT for AML    HPI:  Oncology History:  -24:  Induction chemotherapy with 7+3 for AML.  NGS: NPM1 (54%), NRAS (25%), PTPN11 (15%), IDH1 (2%)   -24-25:  4 cycles HiDAC consolidation, MRD-  -25:  MRD+  -25-present:  PO azacitidine    Cardiology History:  -24: Baseline pre-chemo echo with LVEF 55%, GLS -18.9%, no significant valvular disease  -25:  Echo with LVEF 54%, strain not performed.  EKG with NSR    Risk Factors:  -No personal or family hx cardiomyopathy, sudden death, CAD, arrhythmias, HTN  -No tobacco history  -Occasionally smokes MJ.  Otherwise no recreational drug use      -No CP or palpitations  -No dyspnea, orthopnea, PND  -No syncope, presyncope  -No edema  -In process of searching for URD through registry    ROS:  -Negative except as described in HPI    PROBLEM LIST:  Problem List[1]     PAST SURGICAL HISTORY:  Surgical History[2]    MEDICATIONS:  Medications Ordered Prior to Encounter[3]    ALLERGIES:  Allergies[4]    SOCIAL HISTORY:  Social History[5]    FAMILY HISTORY:  Family History[6]    VITAL SIGNS:  /66 (BP Location: Right arm, Patient Position: Sitting, BP Cuff Size: Adult)   Pulse 71   Temp 36 °C (96.8 °F) (Temporal)   Wt 75.2 kg (165 lb 12.8 oz)   LMP 2025   SpO2 99%   BMI 27.59 kg/m²    Body mass index is 27.59 kg/m².     LABS:  Lab Results   Component Value Date    GLUCOSE 91 2025    CALCIUM 10.0 2025     2025    K 3.7 2025    CO2 30 2025     2025    BUN 19 2025    CREATININE 0.89 2025     Lab Results   Component Value Date    WBC 3.7 (L) 2025    HGB 14.4 2025    HCT 42.7 2025    MCV 90 2025      (L) 07/14/2025       PHYSICAL EXAM:  General: Well-developed, well-nourished, NAD  Skin: No rash, jaundice, or lesions  HEENT: Normocephalic, atraumatic.  Sclerae anicteric. MMMI   Cardiovascular:  HRRR.  No murmur, gallop, or rub.  No cyanosis or clubbing.  No JVD.  No edema  Lungs:  CTAB  Musculoskeletal: Full ROM grossly intact  Neurological: A&O x 4  Psychiatric: Mood and affect appropriate    ASSESSMENT/PLAN:  #Stem cell transplant candidate:  -Patient at low risk for CV complications in vasyl-transplant period.  No CV contraindications to proceeding with alloBMT  -Will see patient again about 1 month post-hospital discharge with repeat echocardiogram      30 minutes spent on face-to-face and non-face-to-face patient care      BRITTNEE Rojas           [1]   Patient Active Problem List  Diagnosis    Generalized anxiety disorder    Acute myeloid leukemia in remission (Multi)    Immunosuppressed status    Menstrual suppression    EBV seropositivity   [2]   Past Surgical History:  Procedure Laterality Date    WISDOM TOOTH EXTRACTION     [3]   Current Outpatient Medications on File Prior to Visit   Medication Sig Dispense Refill    acyclovir (Zovirax) 400 mg tablet Take 1 tablet (400 mg) by mouth 2 times a day. 180 tablet 3    sertraline (Zoloft) 100 mg tablet Take 1 tablet (100 mg) by mouth once daily. 90 tablet 1    azaCITIDine (Onureg) 300 mg tablet Take 1 tablet (300 mg total) by mouth once daily.  Take on Days 1-14 of each 28 day treatment cycle. (Patient not taking: Reported on 7/29/2025) 14 tablet 2    ondansetron (Zofran) 8 mg tablet Take 1 tablet (8 mg) by mouth every 8 hours if needed for nausea or vomiting. Take one hour prior to each chemotherapy dose in addition to every 8 hours as needed for nausea/vomiting (Patient not taking: Reported on 7/29/2025) 60 tablet 5    prochlorperazine (Compazine) 10 mg tablet Take 1 tablet (10 mg) by mouth every 6 hours if needed for nausea or  vomiting. (Patient not taking: Reported on 7/29/2025) 60 tablet 5     No current facility-administered medications on file prior to visit.   [4]   Allergies  Allergen Reactions    Codeine Nausea/vomiting   [5]   Social History  Tobacco Use    Smoking status: Never    Smokeless tobacco: Never   Vaping Use    Vaping status: Never Used   Substance Use Topics    Alcohol use: Not Currently    Drug use: Not Currently     Types: Marijuana   [6]   Family History  Problem Relation Name Age of Onset    No Known Problems Mother      No Known Problems Father      No Known Problems Brother      Other (Bicuspid Aortic Valve; VSD) Son Jorge

## 2025-07-29 NOTE — PROGRESS NOTES
Pt arrived to [facility] via self ambulation with mom. Pt alert and oriented. EKG was completed. Pt required no further interventions. Pt ambulated off unit with mom and all belongings.

## 2025-07-30 ENCOUNTER — DOCUMENTATION (OUTPATIENT)
Dept: OTHER | Facility: HOSPITAL | Age: 25
End: 2025-07-30
Payer: COMMERCIAL

## 2025-07-31 ENCOUNTER — TELEPHONE (OUTPATIENT)
Dept: RADIATION ONCOLOGY | Facility: CLINIC | Age: 25
End: 2025-07-31
Payer: COMMERCIAL

## 2025-07-31 ENCOUNTER — APPOINTMENT (OUTPATIENT)
Dept: HEMATOLOGY/ONCOLOGY | Facility: HOSPITAL | Age: 25
End: 2025-07-31
Payer: COMMERCIAL

## 2025-07-31 NOTE — TELEPHONE ENCOUNTER
Called pt to remind of NPV appointment on 08/07/25 at 12:30. Pt's phone went to voicemail left number if needs to reschedule.

## 2025-08-04 ENCOUNTER — CLINICAL SUPPORT (OUTPATIENT)
Facility: HOSPITAL | Age: 25
End: 2025-08-04
Payer: COMMERCIAL

## 2025-08-04 PROCEDURE — 81379 HLA I TYPING COMPLETE HR: CPT | Mod: OUT | Performed by: INTERNAL MEDICINE

## 2025-08-04 NOTE — PROGRESS NOTES
[employer]  Diet Recommendation 1: Healthy Eating  Diet Recommendation 2: Neuropenic  Diet Recommendation 3: MyPlate  Food Intolerance Avoidance: NKFA, son has soy, nut, sesame, and fish allergy.  Household Size: 5 Members (4 Max/Household)  Interventions: Referral Number: 2nd 6 Mo Referral 1 yr (Referrals may not be consecutive)  Interventions: Visit Number: 4 of 6 Visits - Max 6 Visits/Referral Each 6 Mo Period  Education Today: MyPlate Meals  Grains: 50-75% Whole  Fruit: 50-75% Fresh  Vegetables: 50-75% Fresh  Proteins: 0 Plant-based Items  Dairy: Lowfat - 100%  Originating Site of Referral Order: Nessa Pe[facility]  Initials of RD Assisting Today: JANEL

## 2025-08-05 ENCOUNTER — RESULTS FOLLOW-UP (OUTPATIENT)
Dept: HEMATOLOGY/ONCOLOGY | Facility: HOSPITAL | Age: 25
End: 2025-08-05

## 2025-08-05 ENCOUNTER — OFFICE VISIT (OUTPATIENT)
Dept: HEMATOLOGY/ONCOLOGY | Facility: HOSPITAL | Age: 25
End: 2025-08-05
Payer: COMMERCIAL

## 2025-08-05 VITALS
DIASTOLIC BLOOD PRESSURE: 75 MMHG | RESPIRATION RATE: 16 BRPM | SYSTOLIC BLOOD PRESSURE: 128 MMHG | BODY MASS INDEX: 27.69 KG/M2 | WEIGHT: 166.4 LBS | HEART RATE: 110 BPM | TEMPERATURE: 97.2 F | OXYGEN SATURATION: 100 %

## 2025-08-05 DIAGNOSIS — F41.1 GENERALIZED ANXIETY DISORDER: ICD-10-CM

## 2025-08-05 DIAGNOSIS — Z51.5 ENCOUNTER FOR PALLIATIVE CARE: ICD-10-CM

## 2025-08-05 DIAGNOSIS — J01.90 ACUTE SINUSITIS WITH SYMPTOMS > 10 DAYS: Primary | ICD-10-CM

## 2025-08-05 DIAGNOSIS — C92.01 ACUTE MYELOID LEUKEMIA IN REMISSION (MULTI): ICD-10-CM

## 2025-08-05 DIAGNOSIS — J06.9 UPPER RESPIRATORY TRACT INFECTION, UNSPECIFIED TYPE: Primary | ICD-10-CM

## 2025-08-05 DIAGNOSIS — Z91.89 AT RISK FOR INFERTILITY: ICD-10-CM

## 2025-08-05 LAB
FLUAV RNA RESP QL NAA+PROBE: NOT DETECTED
FLUBV RNA RESP QL NAA+PROBE: NOT DETECTED
RSV RNA RESP QL NAA+PROBE: NOT DETECTED
SARS-COV-2 RNA RESP QL NAA+PROBE: NOT DETECTED

## 2025-08-05 PROCEDURE — 99215 OFFICE O/P EST HI 40 MIN: CPT | Performed by: NURSE PRACTITIONER

## 2025-08-05 PROCEDURE — 99417 PROLNG OP E/M EACH 15 MIN: CPT | Performed by: NURSE PRACTITIONER

## 2025-08-05 PROCEDURE — 87635 SARS-COV-2 COVID-19 AMP PRB: CPT | Performed by: NURSE PRACTITIONER

## 2025-08-05 ASSESSMENT — ENCOUNTER SYMPTOMS
SLEEP DISTURBANCE: 0
NERVOUS/ANXIOUS: 0
HOT FLASHES: 0
DEPRESSION: 0
APPETITE CHANGE: 0
UNEXPECTED WEIGHT CHANGE: 0

## 2025-08-05 ASSESSMENT — PAIN SCALES - GENERAL: PAINLEVEL_OUTOF10: 0-NO PAIN

## 2025-08-05 NOTE — PROGRESS NOTES
Patient ID: lIda Peter is a 24 y.o. female.  Referring Physician: Nessa Peña, APRN-CNP  [id number] [place] [address]  Primary Care Provider: Brandee Cross DO    Subjective    Ilda Peter is a 24 y.o. with a diagnosis of acute myeloid leukemia, returns today in follow up regarding issues unique to being a young adult with cancer.    Returns today in follow up, now s/p 4 cycles of HIDAC, last 01/2025, MRD testing persistently positive and now plan for future stem cell transplant using unrelated donor.    Physically, she relays new onset URI symptoms, non-productive cough, sore throat not impacting PO intake, rhinorrhea and post nasal drip. Denies fevers/chills/sweats. No sinus pressure, no difficulty breathing. Currently using throat lozenges for symptoms. Did attend large wedding over weekend.     Otherwise, energy has been good, she has been cooking and taking care of children. Continuing [employer] boxing classes.     Did receive leuprolide injection 7/18 - experienced period 08/01/25 lighter bleeding noted. Denies hot flashes or sexual dysfunction.    Emotionally, she relays feeling improved, preparing for hospitalization feels she has better idea of what to expect. Is journaling about emotions, continues on zoloft 100mg/daily.    Practically, has disability, mom able to take time away with some pay. Applying for mortgage assistance through their bank.      Social History: Grew up in and lives in [place] with her two children, mother, and boyfriend. Is close to parents. Previously worked as a supervisor at [employer]. Is in a relationship, boyfriend is the father of her 1 year old son Tay, she also has a 4 1/2 year old son from a previous relationship, Sergio. She is a never smoker, she does not vape, she does not drink alcohol, she does use marijuana, smokes daily.      Past Medical History  She has a past medical history of AML (acute myeloblastic leukemia) (Multi), Anxiety, Cannabis  dependence (Multi) (07/02/2024), Chronic atrophic rhinitis (07/02/2024), Depression, Hyperbilirubinemia, and Tinea versicolor (07/02/2024).     Surgical History  She has a past surgical history that includes Dixon tooth extraction.  Review of Systems   Constitutional:  Positive for fatigue. Negative for appetite change, chills, fever and unexpected weight change.   HENT:   Positive for sore throat. Negative for trouble swallowing.    Respiratory:  Positive for cough. Negative for chest tightness and shortness of breath.    Endocrine: Negative for hot flashes.   Genitourinary:  Positive for vaginal bleeding. Negative for menstrual problem.    Psychiatric/Behavioral:  Negative for depression and sleep disturbance. The patient is not nervous/anxious.       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 (>=10% blasts)  [id number] AML Risk Stratification: FAVORABLE: Mutated NPM1 without FLT3-ITD  Presented with anemia and circulating blasts    BONE MARROW (07/05/2024)  Hypercellular with erythroid predominance, dyserythropoiesis, and dysmegakaryopoiesis  FISH: AML negative  KARYOTYPE:  46XX [20]  MYELOID NGS: NPM1 (54%), NRAS (25%), PTPN11 (15%), IDH1 (2%)       7/19/2024 -  Chemotherapy    Induction with 7+3 (AraC+tarun) -> CR1 MRDpos  - D14 BM (8/1/24):  ablated, ALCON  - Count recovery:  ANC D28, plts D23  - Post induction BM (8/21/24):  ALCON, MFC negative, NPM1 2.65e-05        9/17/2024 -  Chemotherapy    Consolidation with HIDAC  -C1D1 9/17/24:  complicated by menorrhagia  -C2D1 10/21/24: complicated by menorrhagia  -C3D1 12/3/24: complicated by RSV infection  -C4D1 1/14/25: tolerated well    DATE TIMEPOINT SOURCE MORPHOLOGY MRD by [facility] NPM1 NOTES   10/8/2024 Post C1 HIDAC PB   Not detectable    10/28/2024 Post C1 HIDAC PB   Not detectable    12/2/24 Post C2 HiDAC PB   Not detected    1/13/25 Post C3 HiDAC PB   Not detected    2/17/25 7 MO CR1 PB   Not detected    4/22/25 9 MO CR1 PB   6.9E-05    5/19/2025  PB   2.4E-05                7/8/2025 -  Chemotherapy    AzaCITIDine Oral, 84 Day Cycles       Ilda Peter  reports that she has never smoked. She has never used smokeless tobacco.  She  reports that she does not currently use alcohol.  She  reports that she does not currently use drugs after having used the following drugs: Marijuana.    Physical Exam  Constitutional:       General: She is not in acute distress.     Appearance: Normal appearance. She is not ill-appearing.   HENT:      Head: Normocephalic.      Right Ear: Tympanic membrane and ear canal normal.      Left Ear: Tympanic membrane and ear canal normal.      Nose: Rhinorrhea present.      Mouth/Throat:      Mouth: Mucous membranes are moist.      Pharynx: Oropharynx is clear. No oropharyngeal exudate or posterior oropharyngeal erythema.     Eyes:      Pupils: Pupils are equal, round, and reactive to light.       Cardiovascular:      Rate and Rhythm: Normal rate and regular rhythm.   Pulmonary:      Effort: Pulmonary effort is normal. No respiratory distress.      Breath sounds: Normal breath sounds. No wheezing or rales.     Musculoskeletal:      Cervical back: Normal range of motion and neck supple. No rigidity or tenderness.   Lymphadenopathy:      Cervical: No cervical adenopathy.     Skin:     General: Skin is warm and dry.     Neurological:      General: No focal deficit present.      Mental Status: She is alert and  oriented to person, place, and time.      Cranial Nerves: No cranial nerve deficit.     Psychiatric:         Mood and Affect: Mood normal.         Behavior: Behavior normal.         Thought Content: Thought content normal.         Judgment: Judgment normal.     Performance Status:  Asymptomatic    Assessment/Plan    Ilda Peter is a 23 y.o. F with a diagnosis of acute myeloid leukemia 07/2024, s/p induction therapy in CR1, followed by high dose cytarabine x 4 cycles, MRD+ first noted in 04/2025 progressively positive, currently receiving oral aza to be followed by MUD HSCT    #URI:  - Swab for respiratory viruses including COVID today - negative at time of writing note  - Discussed likely viral in etiology - if symptoms worsen or progress later in the week we would consider course of abx  - Discussed supportive care including rest, increased fluids, ibuprofen and throat lozenges for pain, guaifenesin for cough, and anti-histamine at bedtime for post nasal drip/rhinorrhea contributing to cough     #Psychosocial: hx of anxiety/depression, young adult with cancer, parent to young children, now with change in disease and treatment status  - Validated emotions and experience - feeling more adjusted to future plan/hospitalization  - Encouraged continued exploration of emotions through journaling  - Continue zoloft 100mg/daily - call for new prescription  - Connected with [facility] regarding two young children/adjustment to mother's illness  - Connected to young adult oncology program and receiving monthly social programming invitations  - Continue weekly exercise - kickboxing, yoga  - Connected to food [employer] life [employer] at  d/t food insecurity  - Following with DEBRA Ruelas regarding financial concerns - working on mortgage assistance, has disability, mom taking paid leave    #Diet/Exercise/Healthy Lifestyle:  - Encouraged continued well-balanced diet  - Encouraged patient to continue exercise as  tolerated with low impact activities such as walking, bicycling, or lifting light weights  - Encouraged patient to continue to abstain from alcohol, tobacco, and recreational drugs    #Risk for infertility/Family Building:  - Previously discussed the damaging effect cancer treatment can have on the ovaries.   - Previously discussed natural age related decline in fertility and menopause that occurs as a result of ovarian aging, and that cancer treatments can accellerate that progression and diminish ovarian reserve.  - Individuals may experience varying degrees of short or long term ovarian dysfunction and amenorrhea, and that this is dependent upon type of cancer treatment, cumulative dose, and patients age.   - Loss of ovarian function may be permanent or temporary.  - Discussed high likelihood of premature menopause and amenorrhea after transplant   - Risk to fertility is HIGH based on cancer treatment of Stem Cell Preparative regimen  - Baseline hormone testing 07/23/24 FSH 3.8, LH 4.5, E2 34, AMH 7.742 - of note these were drawn following Day 1 of chemotherapy - repeat levels sent today; repeat AMH 07/14 9.813  - Did receive ovarian suppression with 3/4 cycles of HIDAC consolidation  - Received lupron 11.25mg 3 mo dose kit 07/18/25 - LMP 08/01/25 - lighter, monitor for amenorrhea/vaginal bleeding  - Declined fertility preservation prior to HSCT d/t uncertain future family building goals, costs, and process of stimulation too great      #Substance use: currently using marijuana inhaled daily  - Decreasing use - encouraged continued cutting back with eventual cessation  - Discussed risks for infection with mold/spores with inhaled marijuana - in the short term recommended patient change to alternative route such as edibles  - Discussed strong recommendation for cessation of marijuana, specifically discussed newer research demonstrating increased cardiovascular disease in individual who use marijuana     #Sexual  Dysfunction/Contraception:  - Previously discussed the possibility for sexual side effects related to cancer treatment this may manifest as decreased interest, arousal, vaginal dryness, or pain with sex  - Normalized conversation regarding sex and intimacy and encouraged patient to notify myself or team if she experiences any sexual side effects that impact her QOL  - Not currently taking norethindrone for pregnancy prevention - reiterated need to use condoms  - Discussed recommendation to wait to try and conceive until at least 2 years out from chemotherapy    #Late Effect Screening:  - Cumulative doses: idarubicin 12mg/m2 x 3 days =36mg/mg2, cytarabine 100mg/m2 D1-7 =700mg/m2, 6000mg/m2 D1-5 x 4 cycles = 120,000 mg/m2    Follow up in 6-8 weeks    The amount of time that I spent with the patient:  Prep:   Time spend directly with patient: 45  Coordinating care: 5  Documentation: 10  Other time:    Total time spent on encounter: 60                 Nessa Peña, LEDY-CNP

## 2025-08-06 ASSESSMENT — ENCOUNTER SYMPTOMS
FATIGUE: 1
FEVER: 0
CHEST TIGHTNESS: 0
COUGH: 1
SHORTNESS OF BREATH: 0
SORE THROAT: 1
TROUBLE SWALLOWING: 0
CHILLS: 0

## 2025-08-06 NOTE — PROGRESS NOTES
Radiation Oncology Outpatient Consult    Patient Name:  Ilda Peter  MRN:  [id number]  :  2000    Referring Provider: Brandee Fields MD  Primary Care Provider: Brandee Cross DO  Care Team: Patient Care Team:  Brandee Cross DO as PCP - General  Nessa Peña, APRN-CNP as PCP - [facility] PCP  Brandee Fields MD as BMT Oncologist  (Hematology and Oncology)  Shayla Pinto RN as Registered Nurse (Hematology and Oncology)  Mike Bennett PA-C as Physician Assistant (Hematology and Oncology)    Date of Service: 2025     History of Present Illness:  Ilda Peter is a 24 y.o. female who was referred by Brandee Fields MD, for a consultation to the [facility] Department of Radiation Oncology.  She is presenting for evaluation and management of Acute myeloid leukemia in remission (Multi), Clinical: NPM1+, FLT3- diagnosed 2024, s/p induction therapy in CR1, followed by high dose cytarabine x 4 cycles, MRD+ first noted in 2025 progressively positive, currently receiving oral aza to be followed by MUD HSCT.    Patient is scheduled for allogenic PBSC transplant and is referred for consultation of TBI.     Her oncological work up and treatment history is as below:  2024 initial diagnosis, acute myeloid leukemia with mutated NPM1 more than 10% blast. [id number] AML Risk Stratification: FAVORABLE: Mutated NPM1 without FLT3-ITD  Presented with anemia and circulating blasts  BONE MARROW (2024)  Hypercellular with erythroid predominance, dyserythropoiesis, and dysmegakaryopoiesis  FISH: AML negative  KARYOTYPE:  46XX [20]  MYELOID NGS: NPM1 (54%), NRAS (25%), PTPN11 (15%), IDH1 (2%)    2024 started chemotherapy. Induction with 7+3 (AraC+tarun)->CR1 MRD pos    2024 consolidation with HIDAC.    2025 Molecular relapse. Planning for allogeneic stem cell transplant. Started on azacitidine     Today, she reports she is doing well. Reports  fatigue, no change from the past. Denies any recent sickness, fever/chills, cough, or abdominal pain. Denies any new urinary /bowel symptoms. Reports that she has had discussion with BMT nurse. She does not want egg preservation service. She has two young kids and currently live with her mom.      Pathology Review:  The pertinent pathology results were reviewed from EMR and discussed with the patient.     6/25/2025     Diagnosis   BONE MARROW, FLOW CYTOMETRY:  -- Small population of atypical + events (0.5%), see note.     Note: This population is negative for CD34 with partial expression of HLA-DR, and is suspicious for residual leukemic blasts, however, a population of early promyelocytes cannot be entirely excluded. Correlate with separate bone marrow pathology report.   Amendment electronically signed by Trevor Leo MD on 6/30/2025 at 1147 EDT  Electronically signed by Trevor Leo MD on 6/30/2025 at 1143 EDT        By the signature on this report, the individual or group listed as making the Final Interpretation/Diagnosis certifies that they have reviewed this case and the staining reactivity of the antibodies and reagents in the analysis were determined to be acceptable. Diagnostic interpretation performed at [facility]   Cell Populations VC   Abnormal Cell Population: Possible blasts     Percentage:  0.5 %         Phenotype   Marker Interpretation      CD2 Negative   CD5 Negative   CD7 Negative   CD33 Positive moderate-bright   CD34 Negative   CD45 Positive moderate   CD56 Negative    Positive moderate   HLA-DR Positive dim                    Comment: Corrected result: Previously reported on 6/30/2025 at 1143 EDT.   Flow Differential    Lymphocyte: 6 %      Monocyte: 3 %     No immunophenotypic abnormality detected.     Granulocyte: 65 %           Flow Test Ordered  not established AML MRD Panel   Specimen Viability    Cell Count  not established x10*3/uL 13.21   Number of Cells  Collected    Comment:   100,000 - 500,000     NPM1 Results    RESULTS: Positive for NPM1 variant.     NPM1 p.I802Krx*12 ([id number]) variant is DETECTED at mutant/wild-type ratio = 4.3e-03.        FINAL DIAGNOSIS  A AND B. BONE MARROW CLOT, AND ASPIRATE, LEFT ILIAC CREST:   -- NORMOCELLULAR BONE MARROW (60%) WITH MATURING TRILINEAGE HEMATOPOIESIS.  -- SMALL POPULATION OF ATYPICAL MYELOBLASTS DETECTED BY FLOW CYTOMETRY, SEE NOTE.     NOTE: Peripheral blood high-sensitivity NPM1 molecular assay ([id number] from 6/17/2025) was positive for NPM1 p.Z255Lkm*12 variant with a mutant/wild-type ratio of 1.8e-04. By flow cytometry, a small population of atypical +/CD34- events was detected (0.5% of total events). Blasts are not increased by morphology. The overall findings are most suggestive of persistent low-level disease beneath the morphologic level of detection. Correlation with repeat NPM1 MRD assay is recommended.    Imaging:  The pertinent imaging results were reviewed from EMR/PACS with key results discussed with the patient.    CT CHEST WO IV CONTRAST; 7/29/2025   1.  No evidence of acute pathology. No suspicious pulmonary nodules.  2. Cholelithiasis.  3. Nonspecific renal parenchymal calcifications which are suggestive  of medullary nephrocalcinosis which has a broad differential to  include etiologies such as medullary sponge kidney,  hyperparathyroidism, and renal tubular acidosis among others.    Review of Systems: See separately signed RN note.    RADIATION SCREENING QUESTIONS:  Prior radiation therapy: No  Pacemaker: No  Other implantable devices: No  Connective tissue disease: No    Current Systemic Treatment:  Yes, describe: Azacitidine oral, 84 day cycles.     Past Medical History:  Medical History[1]  Past Surgical History:  Surgical History[2]   Family History:  Cancer-related family history is not on file.  Social History:  Social History[3]  Allergies:   "Allergies[4]  Medications:  Current Medications[5]      Performance Status:  The Karnofsky performance scale today is 90, Able to carry on normal activity; minor signs or symptoms of disease (ECOG equivalent 0).     OBJECTIVE  Physical Exam:  /73   Pulse 74   Temp 36.5 °C (97.7 °F) (Temporal)   Resp 18   Ht 1.651 m (5' 5\")   Wt 74.6 kg (164 lb 8 oz)   SpO2 98%   BMI 27.37 kg/m²    Physical Exam  Constitutional:       Appearance: Normal appearance.   HENT:      Head: Normocephalic and atraumatic.   Pulmonary:      Effort: Pulmonary effort is normal.     Neurological:      Mental Status: She is alert and oriented to person, place, and time.     Psychiatric:         Mood and Affect: Mood normal.         Behavior: Behavior normal.          Laboratory Review:  The pertinent lab results were reviewed and discussed with the patient.    Lab Results   Component Value Date    WBC 4.0 (L) 08/07/2025    HGB 13.8 08/07/2025    HCT 40.2 08/07/2025    MCV 91 08/07/2025     (L) 08/07/2025     Lab Results   Component Value Date    GLUCOSE 91 07/14/2025    CALCIUM 10.0 07/14/2025     07/14/2025    K 3.7 07/14/2025    CO2 30 07/14/2025     07/14/2025    BUN 19 07/14/2025    CREATININE 0.89 07/14/2025       ASSESSMENT:  Ilda Peter is a 23 y.o. F with a diagnosis of acute myeloid leukemia 07/2024, s/p induction therapy in CR1, followed by high dose cytarabine x 4 cycles, MRD+ first noted in 04/2025 progressively positive, currently receiving oral aza to be followed by MUD HSCT.    The patient has been recommended Allogenic Stem Cell Transplant and will receive total body irradiation 200 cGy/ single fraction as part of the conditioning regimen and presents to our clinic for evaluation and planning.  Details as below:    PLAN:      Testing 7/29 8/27 Rituxan/2-day draw  9/2 consent/covid test  9/5 admit  9/9 TBI (200cGy)  9/10 T=0    Treatment recommendations/Alternatives:  NCCN Guidelines were applicable " to guide this patients treatment plan.      Today, we reviewed the role of total body irradiation as part of the conditioning regimen with the goal to eradicate circulating tumor cells. Per the treatment schema communicated to us by the transplant team, radiation will be given as a single treatment to a total dose of 200 cGy delivered. Because of the low dose, no Lung and Kidney blocks will be used.     Radiation treatment logistics:  We did review the logistics for planning TBI including the need for a measurement session. We reviewed our TBI technique which involves treatment in supine position with lateral fields.     We then reviewed possible side effects (Acute and long-term). Common and uncommon side effects with risks as relevant for her case were discussed. Given her young age, we specifically discussed possible long-term impact on fertility, hormonal functions and second malignancy. She has had discussions regarding these with BMT team as well. No plan for egg harvesting.     Following this discussion, the patient elected to undergo TBI in our department. Informed consent was updated and measurements for TBI treatment planning were obtained.      TBI treatments will be coordinated with the floor.     Post-TBI, we will not schedule follow up with the patient as she will be following closely with the transplant team and medical oncologist, however, we encouraged her to contact us should she develop any concerns or questions regarding the radiation treatment.     Following this, any and all questions were answered to the patient's satisfaction.      Thank you again for referring this patient to our care.     The patient was provided my contact information.     Orders placed:  1) Radonc intent to treat: TBI     Network location: The patient will be treated at the [facility] location. Simulation will be done at the [facility] location.    The patient was provided my contact information.     Discussed with Dr. Gill.    Socorro Rodriguez MD PhD,   PGY-3, Radiation Oncology  For  Teenanicol MD Jairo, MMM  Senior Attending Physician, [facility]  Professor, [employer]   Our [place]: “To Heal, To Teach, To Discover.”  Phone (after hours): [phone]  RN partner: Ivette Willis  Radiation Oncology (scheduling): Dede Chiu  Proton Therapy (scheduling): Marcia Lee  Brachytherapy (scheduling): Yael Ring    ATTENDING ADDENDUM:  I saw and evaluated the patient with the resident. I personally obtained the key and critical portions of the history and physical exam and directly counseled the patient of the treatment plan. I reviewed the resident documentation and discussed the patient with the resident. I agree with the resident's medical decision making as documented in the note.               [1]   Past Medical History:  Diagnosis Date    AML (acute myeloblastic leukemia) (Multi)     Anemia     Anxiety     Cannabis dependence 07/02/2024    Chronic atrophic rhinitis 07/02/2024    Depression     Hyperbilirubinemia     Tinea versicolor 07/02/2024   [2]   Past Surgical History:  Procedure Laterality Date    WISDOM TOOTH EXTRACTION     [3]   Social History  Tobacco Use    Smoking status: Never    Smokeless tobacco: Never   Vaping Use    Vaping status: Never Used   Substance Use Topics    Alcohol use: Not Currently    Drug use: Not Currently     Types: Marijuana   [4]   Allergies  Allergen Reactions    Codeine Nausea/vomiting   [5]   Current Outpatient Medications:     acyclovir (Zovirax) 400 mg tablet, Take 1 tablet (400 mg) by mouth 2 times a day., Disp: 180 tablet, Rfl: 3    azaCITIDine (Onureg) 300 mg tablet, Take 1 tablet (300 mg total) by mouth once daily.  Take on Days 1-14 of each 28 day treatment cycle. (Patient not taking: Reported on 7/29/2025), Disp: 14 tablet, Rfl: 2    ondansetron (Zofran) 8 mg tablet, Take 1 tablet (8 mg) by mouth every 8 hours if needed for nausea or  vomiting. Take one hour prior to each chemotherapy dose in addition to every 8 hours as needed for nausea/vomiting (Patient not taking: Reported on 7/29/2025), Disp: 60 tablet, Rfl: 5    prochlorperazine (Compazine) 10 mg tablet, Take 1 tablet (10 mg) by mouth every 6 hours if needed for nausea or vomiting. (Patient not taking: Reported on 7/29/2025), Disp: 60 tablet, Rfl: 5    sertraline (Zoloft) 100 mg tablet, Take 1 tablet (100 mg) by mouth once daily., Disp: 90 tablet, Rfl: 1

## 2025-08-07 ENCOUNTER — PROCEDURE VISIT (OUTPATIENT)
Dept: HEMATOLOGY/ONCOLOGY | Facility: HOSPITAL | Age: 25
End: 2025-08-07
Payer: COMMERCIAL

## 2025-08-07 ENCOUNTER — HOSPITAL ENCOUNTER (OUTPATIENT)
Dept: RADIATION ONCOLOGY | Facility: HOSPITAL | Age: 25
Setting detail: RADIATION/ONCOLOGY SERIES
Discharge: HOME | End: 2025-08-07
Payer: COMMERCIAL

## 2025-08-07 ENCOUNTER — LAB (OUTPATIENT)
Dept: LAB | Facility: HOSPITAL | Age: 25
End: 2025-08-07
Payer: COMMERCIAL

## 2025-08-07 VITALS
OXYGEN SATURATION: 98 % | HEIGHT: 65 IN | BODY MASS INDEX: 27.41 KG/M2 | TEMPERATURE: 97.7 F | RESPIRATION RATE: 18 BRPM | WEIGHT: 164.5 LBS | SYSTOLIC BLOOD PRESSURE: 122 MMHG | DIASTOLIC BLOOD PRESSURE: 73 MMHG | HEART RATE: 74 BPM

## 2025-08-07 VITALS
WEIGHT: 164 LBS | SYSTOLIC BLOOD PRESSURE: 121 MMHG | HEART RATE: 79 BPM | DIASTOLIC BLOOD PRESSURE: 82 MMHG | RESPIRATION RATE: 18 BRPM | OXYGEN SATURATION: 100 % | BODY MASS INDEX: 27.29 KG/M2 | TEMPERATURE: 97 F

## 2025-08-07 DIAGNOSIS — C92.01 ACUTE MYELOID LEUKEMIA IN REMISSION (MULTI): ICD-10-CM

## 2025-08-07 DIAGNOSIS — Z76.82 STEM CELL TRANSPLANT CANDIDATE: ICD-10-CM

## 2025-08-07 DIAGNOSIS — C92.01 ACUTE MYELOID LEUKEMIA IN REMISSION (MULTI): Primary | ICD-10-CM

## 2025-08-07 LAB
BASOPHILS # BLD AUTO: 0.01 X10*3/UL (ref 0–0.1)
BASOPHILS NFR BLD AUTO: 0.3 %
EOSINOPHIL # BLD AUTO: 0.38 X10*3/UL (ref 0–0.7)
EOSINOPHIL NFR BLD AUTO: 9.5 %
ERYTHROCYTE [DISTWIDTH] IN BLOOD BY AUTOMATED COUNT: 12.5 % (ref 11.5–14.5)
HCT VFR BLD AUTO: 40.2 % (ref 36–46)
HGB BLD-MCNC: 13.8 G/DL (ref 12–16)
IMM GRANULOCYTES # BLD AUTO: 0 X10*3/UL (ref 0–0.7)
IMM GRANULOCYTES NFR BLD AUTO: 0 % (ref 0–0.9)
LYMPHOCYTES # BLD AUTO: 0.49 X10*3/UL (ref 1.2–4.8)
LYMPHOCYTES NFR BLD AUTO: 12.3 %
MCH RBC QN AUTO: 31.4 PG (ref 26–34)
MCHC RBC AUTO-ENTMCNC: 34.3 G/DL (ref 32–36)
MCV RBC AUTO: 91 FL (ref 80–100)
MONOCYTES # BLD AUTO: 0.18 X10*3/UL (ref 0.1–1)
MONOCYTES NFR BLD AUTO: 4.5 %
NEUTROPHILS # BLD AUTO: 2.94 X10*3/UL (ref 1.2–7.7)
NEUTROPHILS NFR BLD AUTO: 73.4 %
NRBC BLD-RTO: 0 /100 WBCS (ref 0–0)
PLATELET # BLD AUTO: 110 X10*3/UL (ref 150–450)
RBC # BLD AUTO: 4.4 X10*6/UL (ref 4–5.2)
WBC # BLD AUTO: 4 X10*3/UL (ref 4.4–11.3)

## 2025-08-07 PROCEDURE — 99205 OFFICE O/P NEW HI 60 MIN: CPT | Performed by: RADIOLOGY

## 2025-08-07 PROCEDURE — 99215 OFFICE O/P EST HI 40 MIN: CPT | Performed by: RADIOLOGY

## 2025-08-07 PROCEDURE — 88185 FLOWCYTOMETRY/TC ADD-ON: CPT | Mod: TC

## 2025-08-07 PROCEDURE — 2500000001 HC RX 250 WO HCPCS SELF ADMINISTERED DRUGS (ALT 637 FOR MEDICARE OP): Performed by: PHYSICIAN ASSISTANT

## 2025-08-07 PROCEDURE — 36415 COLL VENOUS BLD VENIPUNCTURE: CPT

## 2025-08-07 PROCEDURE — 85025 COMPLETE CBC W/AUTO DIFF WBC: CPT

## 2025-08-07 RX ORDER — LORAZEPAM 1 MG/1
1 TABLET ORAL ONCE
Status: COMPLETED | OUTPATIENT
Start: 2025-08-07 | End: 2025-08-07

## 2025-08-07 RX ADMIN — LORAZEPAM 1 MG: 1 TABLET ORAL at 12:08

## 2025-08-07 ASSESSMENT — PATIENT HEALTH QUESTIONNAIRE - PHQ9
1. LITTLE INTEREST OR PLEASURE IN DOING THINGS: NOT AT ALL
SUM OF ALL RESPONSES TO PHQ9 QUESTIONS 1 AND 2: 0
2. FEELING DOWN, DEPRESSED OR HOPELESS: NOT AT ALL

## 2025-08-07 ASSESSMENT — ENCOUNTER SYMPTOMS
CARDIOVASCULAR NEGATIVE: 1
APPETITE CHANGE: 1
PSYCHIATRIC NEGATIVE: 1
CHILLS: 1
HEMATOLOGIC/LYMPHATIC NEGATIVE: 1
MUSCULOSKELETAL NEGATIVE: 1
EYES NEGATIVE: 1
COUGH: 1
NEUROLOGICAL NEGATIVE: 1
ENDOCRINE NEGATIVE: 1
GASTROINTESTINAL NEGATIVE: 1

## 2025-08-07 ASSESSMENT — EJECTION FRACTION: LAST EJECTION FRACTION (EF) PRIOR TO CONDITIONING (%): NO

## 2025-08-07 ASSESSMENT — PAIN SCALES - GENERAL
PAINLEVEL_OUTOF10: 0-NO PAIN
PAINLEVEL_OUTOF10: 0-NO PAIN

## 2025-08-07 ASSESSMENT — PULMONARY FUNCTION TESTS: FEV1 (%PREDICTED): 118

## 2025-08-07 NOTE — PROGRESS NOTES
Patient ID: Ilda Peter is a 24 y.o. female.    Bone marrow aspirate & biopsy    Date/Time: 8/7/2025 12:35 PM    Performed by: Tj Vasquez PA-C  Authorized by: Tj Vasquez PA-C    Consent:     Consent obtained:  Verbal and written    Consent given by:  Patient    Risks, benefits, and alternatives were discussed: yes      Risks discussed:  Bleeding, infection and pain  Universal protocol:     Relevant documents present and verified: yes      Test results available: yes      Imaging studies available: yes      Required blood products, implants, devices, and special equipment available: yes      Site/side marked: yes      Immediately prior to procedure, a time out was called: yes      Patient identity confirmed:  Verbally with patient  Indications:     Indications:  Disease assessment  Pre-procedure details:     Skin preparation:  Chlorhexidine    Preparation: Patient was prepped and draped in the usual sterile fashion    Sedation:     Sedation type:  Anxiolysis (Ativan 1 mg oral given 30 min prior to procedure)  Procedure specific details:      The procedure was explained & potential complications reviewed with the patient including the risks for bleeding, infection, & discomfort at the bone marrow biopsy site. The patient was given time for questions regarding the procedure. The patient agreed to proceed & electronic signed consent was obtained.  The patient's most recent history & physical exam were reviewed & relevant findings were noted.  The patient's allergy history was reviewed.  Next, a pre-procedure time out was performed to properly identify the patient & the procedure to be performed.  The patient was placed in the prone position & the right posterior iliac crest bone marrow biopsy site was identified & marked.  Next, the skin at the marked bone marrow biopsy site was cleansed with Chlorhexidine solution & sterile drapes were placed.  The skin, subcutaneous tissue, & periosteum below the marked  bone marrow biopsy site were anesthetized using 13 ml of 1% Lidocaine solution.  Next, a Jamshidi needle was inserted at the marked biopsy site & slowly advanced into the posterior iliac crest.  Marrow aspirate was obtained & sent to Pathology for review & testing.  Unsuccessful biopsy attempts x 3. The needle was removed & sterile dressing was applied to the biopsy site.  The patient tolerated the procedure well without incident.  Prior to discharge, the biopsy site was inspected & the patient was given written post procedure care instructions.   Post-procedure details:     Procedure completion:  Tolerated well, no immediate complications

## 2025-08-07 NOTE — PROGRESS NOTES
Radiation Oncology Nursing Note    Prior Radiotherapy:  No  No radiation treatments to show. (Treatments may have been administered in another system.)     Current Systemic Treatment: No     Presence of Pacemaker or ICD:  No    History of Autoimmune or Connective Tissue Disorders:  No    Pain: The patient's current pain level was assessed.  They report currently having a pain of 0 out of 10.  They feel their pain is under control without the use of pain medications.    Review of Systems:  Review of Systems   Constitutional:  Positive for appetite change (decreased appetite since starting Azacitidine on Monday) and chills (occasionally with slight headache).   HENT:  Negative.     Eyes: Negative.    Respiratory:  Positive for cough (recently with sinus congestion).    Cardiovascular: Negative.    Gastrointestinal: Negative.    Endocrine: Negative.    Genitourinary: Negative.     Musculoskeletal: Negative.    Skin: Negative.    Neurological: Negative.    Hematological: Negative.    Psychiatric/Behavioral: Negative.          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      Learner: family and patient  Educated on:   Readiness: acceptance  Preferred learning method: preferred: reading and listening  Method used: explanation and handout  Response: demonstrated understanding  Barriers: None  Preferred language: English  Resources given: I gave the patient the following patient education materials:    What to expect - external beam radiation  Fatigue PI sheet  Nausea and vomiting PI sheet  Total body irradiation PI sheet  Skin care during radiation therapy

## 2025-08-08 LAB
CELL COUNT (BLOOD): 16.81 X10*3/UL
CELL POPULATIONS: NORMAL
DIAGNOSIS: NORMAL
FLOW DIFFERENTIAL: NORMAL
FLOW TEST ORDERED: NORMAL
LAB TEST METHOD: NORMAL
NUMBER OF CELLS COLLECTED: NORMAL
PATH REPORT.TOTAL CANCER: NORMAL
SIGNATURE COMMENT: NORMAL
SPECIMEN VIABILITY: NORMAL

## 2025-08-08 RX ORDER — LEVOFLOXACIN 500 MG/1
500 TABLET, FILM COATED ORAL EVERY 24 HOURS
Qty: 5 TABLET | Refills: 0 | Status: SHIPPED | OUTPATIENT
Start: 2025-08-08 | End: 2025-08-13

## 2025-08-12 ENCOUNTER — APPOINTMENT (OUTPATIENT)
Dept: HEMATOLOGY/ONCOLOGY | Facility: HOSPITAL | Age: 25
End: 2025-08-12
Payer: COMMERCIAL

## 2025-08-12 LAB
ELECTRONICALLY SIGNED BY: NORMAL
NPM1 RESULTS: NORMAL

## 2025-08-13 LAB
PATH REPORT.COMMENTS IMP SPEC: NORMAL
PATH REPORT.FINAL DX SPEC: NORMAL
PATH REPORT.GROSS SPEC: NORMAL
PATH REPORT.MICROSCOPIC SPEC OTHER STN: NORMAL
PATH REPORT.RELEVANT HX SPEC: NORMAL
PATH REPORT.TOTAL CANCER: NORMAL

## 2025-08-15 ENCOUNTER — NURSE TRIAGE (OUTPATIENT)
Dept: HEMATOLOGY/ONCOLOGY | Facility: HOSPITAL | Age: 25
End: 2025-08-15
Payer: COMMERCIAL

## 2025-08-18 ENCOUNTER — APPOINTMENT (OUTPATIENT)
Dept: RADIATION ONCOLOGY | Facility: HOSPITAL | Age: 25
End: 2025-08-18
Payer: COMMERCIAL

## 2025-08-19 ENCOUNTER — SOCIAL WORK (OUTPATIENT)
Dept: CASE MANAGEMENT | Facility: HOSPITAL | Age: 25
End: 2025-08-19
Payer: COMMERCIAL

## 2025-08-20 ENCOUNTER — SOCIAL WORK (OUTPATIENT)
Dept: CASE MANAGEMENT | Facility: HOSPITAL | Age: 25
End: 2025-08-20
Payer: COMMERCIAL

## 2025-08-22 DIAGNOSIS — Z76.82 STEM CELL TRANSPLANT CANDIDATE: ICD-10-CM

## 2025-08-22 DIAGNOSIS — C92.01 ACUTE MYELOID LEUKEMIA IN REMISSION (MULTI): ICD-10-CM

## 2025-08-22 LAB
CHROM ANALY OVERALL INTERP-IMP: NORMAL
ELECTRONICALLY SIGNED BY CYTOGENETICS: NORMAL

## 2025-08-27 ENCOUNTER — INFUSION (OUTPATIENT)
Dept: HEMATOLOGY/ONCOLOGY | Facility: HOSPITAL | Age: 25
End: 2025-08-27
Payer: COMMERCIAL

## 2025-08-27 ENCOUNTER — OFFICE VISIT (OUTPATIENT)
Dept: HEMATOLOGY/ONCOLOGY | Facility: HOSPITAL | Age: 25
End: 2025-08-27
Payer: COMMERCIAL

## 2025-08-27 ENCOUNTER — DOCUMENTATION (OUTPATIENT)
Dept: OTHER | Facility: HOSPITAL | Age: 25
End: 2025-08-27

## 2025-08-27 ENCOUNTER — LAB REQUISITION (OUTPATIENT)
Dept: LAB | Facility: CLINIC | Age: 25
End: 2025-08-27
Payer: COMMERCIAL

## 2025-08-27 VITALS
HEART RATE: 82 BPM | HEIGHT: 65 IN | SYSTOLIC BLOOD PRESSURE: 137 MMHG | BODY MASS INDEX: 26.73 KG/M2 | WEIGHT: 160.4 LBS | RESPIRATION RATE: 16 BRPM | TEMPERATURE: 98.6 F | DIASTOLIC BLOOD PRESSURE: 62 MMHG | OXYGEN SATURATION: 100 %

## 2025-08-27 DIAGNOSIS — R76.8 EBV SEROPOSITIVITY: ICD-10-CM

## 2025-08-27 DIAGNOSIS — C92.01 ACUTE MYELOID LEUKEMIA IN REMISSION (MULTI): ICD-10-CM

## 2025-08-27 DIAGNOSIS — Z76.82 STEM CELL TRANSPLANT CANDIDATE: ICD-10-CM

## 2025-08-27 DIAGNOSIS — D61.818 OTHER PANCYTOPENIA (MULTI): ICD-10-CM

## 2025-08-27 DIAGNOSIS — C92.01 ACUTE MYELOID LEUKEMIA IN REMISSION (MULTI): Primary | ICD-10-CM

## 2025-08-27 LAB
ABO GROUP (TYPE) IN BLOOD: NORMAL
ANTIBODY SCREEN: NORMAL
B-HCG SERPL-ACNC: <3 MIU/ML
CMV IGG AVIDITY SERPL IA-RTO: NONREACTIVE %
EBV EA IGG SER QL: POSITIVE
EBV NA AB SER QL: POSITIVE
EBV VCA IGG SER IA-ACNC: NEGATIVE
EBV VCA IGM SER IA-ACNC: NEGATIVE
HBV CORE AB SER QL: NONREACTIVE
HBV CORE IGM SER QL: NONREACTIVE
HBV SURFACE AB SER-ACNC: <3.1 MIU/ML
HBV SURFACE AG SERPL QL IA: NONREACTIVE
HCG UR QL IA.RAPID: NEGATIVE
HCV AB SER QL: NONREACTIVE
HERPES SIMPLEX VIRUS 1 IGG: <0.2 INDEX
HERPES SIMPLEX VIRUS 2 IGG: <0.2 INDEX
HIV 1+2 AB+HIV1 P24 AG SERPL QL IA: NONREACTIVE
HLA CLS I TYP PNL BLD/T DONR HIGH RES: NORMAL
HLA RESULTS: NORMAL
HLA-DP2 QL: NORMAL
HLA-DQB1 HIGH RES: NORMAL
HLA-DRB1 HIGH RES: NORMAL
RH FACTOR (ANTIGEN D): NORMAL
T GONDII IGG SER-ACNC: NONREACTIVE
TREPONEMA PALLIDUM IGG+IGM AB [PRESENCE] IN SERUM OR PLASMA BY IMMUNOASSAY: NONREACTIVE
VARICELLA ZOSTER IGG INDEX: 1.8 AI
VZV IGG SER QL IA: POSITIVE

## 2025-08-27 PROCEDURE — 86695 HERPES SIMPLEX TYPE 1 TEST: CPT

## 2025-08-27 PROCEDURE — 96415 CHEMO IV INFUSION ADDL HR: CPT

## 2025-08-27 PROCEDURE — 86777 TOXOPLASMA ANTIBODY: CPT

## 2025-08-27 PROCEDURE — 86645 CMV ANTIBODY IGM: CPT

## 2025-08-27 PROCEDURE — 2500000004 HC RX 250 GENERAL PHARMACY W/ HCPCS (ALT 636 FOR OP/ED): Performed by: INTERNAL MEDICINE

## 2025-08-27 PROCEDURE — 86704 HEP B CORE ANTIBODY TOTAL: CPT

## 2025-08-27 PROCEDURE — 86644 CMV ANTIBODY: CPT

## 2025-08-27 PROCEDURE — 84702 CHORIONIC GONADOTROPIN TEST: CPT

## 2025-08-27 PROCEDURE — 86901 BLOOD TYPING SEROLOGIC RH(D): CPT

## 2025-08-27 PROCEDURE — 87389 HIV-1 AG W/HIV-1&-2 AB AG IA: CPT

## 2025-08-27 PROCEDURE — 86790 VIRUS ANTIBODY NOS: CPT

## 2025-08-27 PROCEDURE — 87340 HEPATITIS B SURFACE AG IA: CPT

## 2025-08-27 PROCEDURE — 86803 HEPATITIS C AB TEST: CPT

## 2025-08-27 PROCEDURE — 86780 TREPONEMA PALLIDUM: CPT

## 2025-08-27 PROCEDURE — 86832 HLA CLASS I HIGH DEFIN QUAL: CPT

## 2025-08-27 PROCEDURE — 86663 EPSTEIN-BARR ANTIBODY: CPT

## 2025-08-27 PROCEDURE — 86705 HEP B CORE ANTIBODY IGM: CPT

## 2025-08-27 PROCEDURE — 86706 HEP B SURFACE ANTIBODY: CPT

## 2025-08-27 PROCEDURE — 96413 CHEMO IV INFUSION 1 HR: CPT

## 2025-08-27 PROCEDURE — 81025 URINE PREGNANCY TEST: CPT

## 2025-08-27 PROCEDURE — 96375 TX/PRO/DX INJ NEW DRUG ADDON: CPT | Mod: INF

## 2025-08-27 PROCEDURE — 86787 VARICELLA-ZOSTER ANTIBODY: CPT

## 2025-08-27 PROCEDURE — 2500000001 HC RX 250 WO HCPCS SELF ADMINISTERED DRUGS (ALT 637 FOR MEDICARE OP): Performed by: INTERNAL MEDICINE

## 2025-08-27 RX ORDER — FAMOTIDINE 10 MG/ML
20 INJECTION, SOLUTION INTRAVENOUS ONCE AS NEEDED
Status: DISCONTINUED | OUTPATIENT
Start: 2025-08-27 | End: 2025-08-27 | Stop reason: HOSPADM

## 2025-08-27 RX ORDER — DIPHENHYDRAMINE HCL 50 MG
50 CAPSULE ORAL ONCE
Status: CANCELLED | OUTPATIENT
Start: 2025-08-27

## 2025-08-27 RX ORDER — ACETAMINOPHEN 325 MG/1
650 TABLET ORAL ONCE
Status: COMPLETED | OUTPATIENT
Start: 2025-08-27 | End: 2025-08-27

## 2025-08-27 RX ORDER — ACETAMINOPHEN 325 MG/1
650 TABLET ORAL ONCE
Status: CANCELLED | OUTPATIENT
Start: 2025-08-27

## 2025-08-27 RX ORDER — ALBUTEROL SULFATE 0.83 MG/ML
3 SOLUTION RESPIRATORY (INHALATION) AS NEEDED
Status: DISCONTINUED | OUTPATIENT
Start: 2025-08-27 | End: 2025-08-27 | Stop reason: HOSPADM

## 2025-08-27 RX ORDER — DIPHENHYDRAMINE HCL 50 MG
50 CAPSULE ORAL ONCE
Status: COMPLETED | OUTPATIENT
Start: 2025-08-27 | End: 2025-08-27

## 2025-08-27 RX ORDER — ACYCLOVIR 400 MG/1
400 TABLET ORAL EVERY 12 HOURS
Qty: 60 TABLET | Refills: 2 | Status: CANCELLED | OUTPATIENT
Start: 2025-08-27

## 2025-08-27 RX ORDER — FAMOTIDINE 10 MG/ML
20 INJECTION, SOLUTION INTRAVENOUS ONCE AS NEEDED
OUTPATIENT
Start: 2025-08-27

## 2025-08-27 RX ORDER — ONDANSETRON 8 MG/1
8 TABLET, FILM COATED ORAL EVERY 8 HOURS PRN
Qty: 90 TABLET | Refills: 0 | Status: CANCELLED | OUTPATIENT
Start: 2025-08-27

## 2025-08-27 RX ORDER — PROCHLORPERAZINE MALEATE 10 MG
10 TABLET ORAL EVERY 6 HOURS PRN
Qty: 60 TABLET | Refills: 0 | Status: CANCELLED | OUTPATIENT
Start: 2025-08-27

## 2025-08-27 RX ORDER — DIPHENHYDRAMINE HYDROCHLORIDE 50 MG/ML
50 INJECTION, SOLUTION INTRAMUSCULAR; INTRAVENOUS AS NEEDED
Status: DISCONTINUED | OUTPATIENT
Start: 2025-08-27 | End: 2025-08-27 | Stop reason: HOSPADM

## 2025-08-27 RX ORDER — EPINEPHRINE 0.3 MG/.3ML
0.3 INJECTION SUBCUTANEOUS EVERY 5 MIN PRN
OUTPATIENT
Start: 2025-08-27

## 2025-08-27 RX ORDER — EPINEPHRINE 0.3 MG/.3ML
0.3 INJECTION SUBCUTANEOUS EVERY 5 MIN PRN
Status: DISCONTINUED | OUTPATIENT
Start: 2025-08-27 | End: 2025-08-27 | Stop reason: HOSPADM

## 2025-08-27 RX ORDER — ALBUTEROL SULFATE 0.83 MG/ML
3 SOLUTION RESPIRATORY (INHALATION) AS NEEDED
OUTPATIENT
Start: 2025-08-27

## 2025-08-27 RX ORDER — DIPHENHYDRAMINE HYDROCHLORIDE 50 MG/ML
50 INJECTION, SOLUTION INTRAMUSCULAR; INTRAVENOUS AS NEEDED
OUTPATIENT
Start: 2025-08-27

## 2025-08-27 RX ADMIN — DIPHENHYDRAMINE HYDROCHLORIDE 50 MG: 50 CAPSULE ORAL at 09:26

## 2025-08-27 RX ADMIN — RITUXIMAB 700 MG: 10 INJECTION, SOLUTION INTRAVENOUS at 10:00

## 2025-08-27 RX ADMIN — ACETAMINOPHEN 650 MG: 325 TABLET ORAL at 09:26

## 2025-08-27 RX ADMIN — HYDROCORTISONE SODIUM SUCCINATE 100 MG: 100 INJECTION, POWDER, FOR SOLUTION INTRAMUSCULAR; INTRAVENOUS at 09:26

## 2025-08-27 ASSESSMENT — PAIN SCALES - GENERAL: PAINLEVEL_OUTOF10: 0-NO PAIN

## 2025-08-28 LAB — HTLV I+II AB SER QL IA: NEGATIVE

## 2025-08-29 LAB
CMV IGM SERPL-ACNC: <8 AU/ML
HLA RESULTS: NORMAL
HLA-A+B+C AB NFR SER: NORMAL %
HLA-DP+DQ+DR AB NFR SER: NORMAL %

## 2025-08-29 RX ORDER — SULFAMETHOXAZOLE AND TRIMETHOPRIM 800; 160 MG/1; MG/1
1 TABLET ORAL 3 TIMES WEEKLY
Qty: 12 TABLET | Refills: 2 | Status: SHIPPED | OUTPATIENT
Start: 2025-08-29

## 2025-08-29 RX ORDER — TACROLIMUS 1 MG/1
2 CAPSULE ORAL EVERY 12 HOURS
Qty: 120 CAPSULE | Refills: 2 | Status: SHIPPED | OUTPATIENT
Start: 2025-08-29

## 2025-08-29 RX ORDER — MYCOPHENOLATE MOFETIL 250 MG/1
1000 CAPSULE ORAL 3 TIMES DAILY
Qty: 300 CAPSULE | Refills: 0 | Status: SHIPPED | OUTPATIENT
Start: 2025-08-29

## 2025-08-29 RX ORDER — TACROLIMUS 0.5 MG/1
0.5 CAPSULE ORAL EVERY 12 HOURS
Qty: 60 CAPSULE | Refills: 2 | Status: SHIPPED | OUTPATIENT
Start: 2025-08-29

## 2025-08-29 RX ORDER — URSODIOL 300 MG/1
300 CAPSULE ORAL 3 TIMES DAILY
Qty: 90 CAPSULE | Refills: 2 | Status: SHIPPED | OUTPATIENT
Start: 2025-08-29

## 2025-08-29 RX ORDER — POSACONAZOLE 100 MG/1
300 TABLET, DELAYED RELEASE ORAL DAILY
Qty: 90 TABLET | Refills: 5 | Status: SHIPPED | OUTPATIENT
Start: 2025-08-29

## 2025-09-02 ENCOUNTER — LAB (OUTPATIENT)
Dept: LAB | Facility: HOSPITAL | Age: 25
End: 2025-09-02
Payer: COMMERCIAL

## 2025-09-02 ENCOUNTER — OFFICE VISIT (OUTPATIENT)
Dept: HEMATOLOGY/ONCOLOGY | Facility: HOSPITAL | Age: 25
End: 2025-09-02
Payer: COMMERCIAL

## 2025-09-02 VITALS
RESPIRATION RATE: 17 BRPM | TEMPERATURE: 97.2 F | WEIGHT: 161.82 LBS | DIASTOLIC BLOOD PRESSURE: 72 MMHG | HEIGHT: 65 IN | BODY MASS INDEX: 26.96 KG/M2 | HEART RATE: 73 BPM | OXYGEN SATURATION: 100 % | SYSTOLIC BLOOD PRESSURE: 127 MMHG

## 2025-09-02 DIAGNOSIS — Z76.82 STEM CELL TRANSPLANT CANDIDATE: ICD-10-CM

## 2025-09-02 DIAGNOSIS — C92.01 ACUTE MYELOID LEUKEMIA IN REMISSION (MULTI): ICD-10-CM

## 2025-09-02 DIAGNOSIS — R76.8 EBV SEROPOSITIVITY: ICD-10-CM

## 2025-09-02 DIAGNOSIS — C92.01 ACUTE MYELOID LEUKEMIA IN REMISSION (MULTI): Primary | ICD-10-CM

## 2025-09-02 DIAGNOSIS — D84.9 IMMUNOSUPPRESSED STATUS: ICD-10-CM

## 2025-09-02 LAB
BASOPHILS # BLD AUTO: 0.01 X10*3/UL (ref 0–0.1)
BASOPHILS NFR BLD AUTO: 0.8 %
EOSINOPHIL # BLD AUTO: 0.08 X10*3/UL (ref 0–0.7)
EOSINOPHIL NFR BLD AUTO: 6.7 %
ERYTHROCYTE [DISTWIDTH] IN BLOOD BY AUTOMATED COUNT: 13.6 % (ref 11.5–14.5)
HCG UR QL IA.RAPID: NEGATIVE
HCT VFR BLD AUTO: 36.8 % (ref 36–46)
HGB BLD-MCNC: 13.2 G/DL (ref 12–16)
IMM GRANULOCYTES # BLD AUTO: 0 X10*3/UL (ref 0–0.7)
IMM GRANULOCYTES NFR BLD AUTO: 0 % (ref 0–0.9)
LYMPHOCYTES # BLD AUTO: 0.5 X10*3/UL (ref 1.2–4.8)
LYMPHOCYTES NFR BLD AUTO: 42 %
MCH RBC QN AUTO: 31.6 PG (ref 26–34)
MCHC RBC AUTO-ENTMCNC: 35.9 G/DL (ref 32–36)
MCV RBC AUTO: 88 FL (ref 80–100)
MONOCYTES # BLD AUTO: 0.02 X10*3/UL (ref 0.1–1)
MONOCYTES NFR BLD AUTO: 1.7 %
NEUTROPHILS # BLD AUTO: 0.58 X10*3/UL (ref 1.2–7.7)
NEUTROPHILS NFR BLD AUTO: 48.8 %
NRBC BLD-RTO: 0 /100 WBCS (ref 0–0)
OVALOCYTES BLD QL SMEAR: NORMAL
PLATELET # BLD AUTO: 102 X10*3/UL (ref 150–450)
POLYCHROMASIA BLD QL SMEAR: NORMAL
RBC # BLD AUTO: 4.18 X10*6/UL (ref 4–5.2)
RBC MORPH BLD: NORMAL
SARS-COV-2 RNA RESP QL NAA+PROBE: NOT DETECTED
WBC # BLD AUTO: 1.2 X10*3/UL (ref 4.4–11.3)

## 2025-09-02 PROCEDURE — 81025 URINE PREGNANCY TEST: CPT

## 2025-09-02 PROCEDURE — 1036F TOBACCO NON-USER: CPT | Performed by: INTERNAL MEDICINE

## 2025-09-02 PROCEDURE — 87799 DETECT AGENT NOS DNA QUANT: CPT

## 2025-09-02 PROCEDURE — 85025 COMPLETE CBC W/AUTO DIFF WBC: CPT

## 2025-09-02 PROCEDURE — 99215 OFFICE O/P EST HI 40 MIN: CPT | Performed by: INTERNAL MEDICINE

## 2025-09-02 PROCEDURE — 36415 COLL VENOUS BLD VENIPUNCTURE: CPT

## 2025-09-02 PROCEDURE — 87635 SARS-COV-2 COVID-19 AMP PRB: CPT | Performed by: INTERNAL MEDICINE

## 2025-09-02 PROCEDURE — 3008F BODY MASS INDEX DOCD: CPT | Performed by: INTERNAL MEDICINE

## 2025-09-02 RX ORDER — SODIUM CHLORIDE 9 MG/ML
125 INJECTION, SOLUTION INTRAVENOUS CONTINUOUS
OUTPATIENT
Start: 2025-09-10

## 2025-09-02 RX ORDER — OLANZAPINE 5 MG/1
5 TABLET, FILM COATED ORAL NIGHTLY
OUTPATIENT
Start: 2025-09-08

## 2025-09-02 RX ORDER — ONDANSETRON 8 MG/1
16 TABLET, FILM COATED ORAL ONCE
OUTPATIENT
Start: 2025-09-14 | End: 2025-09-14

## 2025-09-02 RX ORDER — POSACONAZOLE 100 MG/1
300 TABLET, DELAYED RELEASE ORAL EVERY 24 HOURS
OUTPATIENT
Start: 2025-09-12

## 2025-09-02 RX ORDER — ONDANSETRON 8 MG/1
16 TABLET, FILM COATED ORAL ONCE
OUTPATIENT
Start: 2025-09-08

## 2025-09-02 RX ORDER — FAMOTIDINE 10 MG/ML
20 INJECTION, SOLUTION INTRAVENOUS AS NEEDED
Status: CANCELLED | OUTPATIENT
Start: 2025-09-05

## 2025-09-02 RX ORDER — SODIUM CHLORIDE 9 MG/ML
100 INJECTION, SOLUTION INTRAVENOUS CONTINUOUS
OUTPATIENT
Start: 2025-09-13

## 2025-09-02 RX ORDER — MELPHALAN HCL 50 MG
140 VIAL (EA) INTRAVENOUS ONCE
OUTPATIENT
Start: 2025-09-08

## 2025-09-02 RX ORDER — EPINEPHRINE 1 MG/ML
0.3 INJECTION INTRAMUSCULAR; INTRAVENOUS; SUBCUTANEOUS EVERY 5 MIN PRN
Status: CANCELLED | OUTPATIENT
Start: 2025-09-05

## 2025-09-02 RX ORDER — ACETAMINOPHEN 325 MG/1
650 TABLET ORAL ONCE
OUTPATIENT
Start: 2025-09-09

## 2025-09-02 RX ORDER — ONDANSETRON 8 MG/1
16 TABLET, FILM COATED ORAL ONCE
OUTPATIENT
Start: 2025-09-13 | End: 2025-09-13

## 2025-09-02 RX ORDER — DIPHENHYDRAMINE HYDROCHLORIDE 50 MG/ML
50 INJECTION, SOLUTION INTRAMUSCULAR; INTRAVENOUS AS NEEDED
Status: CANCELLED | OUTPATIENT
Start: 2025-09-05

## 2025-09-02 RX ORDER — DIPHENHYDRAMINE HCL 50 MG
50 CAPSULE ORAL ONCE
Status: CANCELLED | OUTPATIENT
Start: 2025-09-07

## 2025-09-02 RX ORDER — ALBUTEROL SULFATE 0.83 MG/ML
3 SOLUTION RESPIRATORY (INHALATION) AS NEEDED
Status: CANCELLED | OUTPATIENT
Start: 2025-09-05

## 2025-09-02 RX ORDER — ONDANSETRON 8 MG/1
8 TABLET, FILM COATED ORAL ONCE
OUTPATIENT
Start: 2025-09-10 | End: 2025-09-10

## 2025-09-02 RX ORDER — ACETAMINOPHEN 325 MG/1
650 TABLET ORAL ONCE
Status: CANCELLED | OUTPATIENT
Start: 2025-09-06

## 2025-09-02 RX ORDER — DIPHENHYDRAMINE HCL 50 MG
50 CAPSULE ORAL ONCE
OUTPATIENT
Start: 2025-09-09

## 2025-09-02 RX ORDER — ACETAMINOPHEN 325 MG/1
650 TABLET ORAL ONCE
Status: CANCELLED | OUTPATIENT
Start: 2025-09-07

## 2025-09-02 RX ORDER — DIPHENHYDRAMINE HCL 50 MG
50 CAPSULE ORAL ONCE
Status: CANCELLED | OUTPATIENT
Start: 2025-09-06

## 2025-09-02 RX ORDER — MYCOPHENOLATE MOFETIL 250 MG/1
1000 CAPSULE ORAL 3 TIMES DAILY
OUTPATIENT
Start: 2025-09-15

## 2025-09-02 RX ORDER — ONDANSETRON 8 MG/1
16 TABLET, FILM COATED ORAL ONCE
Status: CANCELLED | OUTPATIENT
Start: 2025-09-05

## 2025-09-02 RX ORDER — VANCOMYCIN HYDROCHLORIDE 125 MG/1
125 CAPSULE ORAL DAILY
Status: CANCELLED | OUTPATIENT
Start: 2025-09-06

## 2025-09-02 RX ORDER — ONDANSETRON 8 MG/1
16 TABLET, FILM COATED ORAL ONCE
OUTPATIENT
Start: 2025-09-09

## 2025-09-02 RX ORDER — PROCHLORPERAZINE EDISYLATE 5 MG/ML
10 INJECTION INTRAMUSCULAR; INTRAVENOUS EVERY 6 HOURS PRN
Status: CANCELLED | OUTPATIENT
Start: 2025-09-05

## 2025-09-02 RX ORDER — ONDANSETRON 8 MG/1
16 TABLET, FILM COATED ORAL ONCE
Status: CANCELLED | OUTPATIENT
Start: 2025-09-06

## 2025-09-02 RX ORDER — POSACONAZOLE 100 MG/1
300 TABLET, DELAYED RELEASE ORAL EVERY 12 HOURS
OUTPATIENT
Start: 2025-09-11

## 2025-09-02 RX ORDER — ACYCLOVIR 400 MG/1
400 TABLET ORAL EVERY 12 HOURS
OUTPATIENT
Start: 2025-09-10

## 2025-09-02 RX ORDER — URSODIOL 300 MG/1
300 CAPSULE ORAL 3 TIMES DAILY
Status: CANCELLED | OUTPATIENT
Start: 2025-09-05

## 2025-09-02 RX ORDER — TACROLIMUS 0.5 MG/1
0.03 CAPSULE ORAL EVERY 12 HOURS
OUTPATIENT
Start: 2025-09-15

## 2025-09-02 RX ORDER — ONDANSETRON 8 MG/1
16 TABLET, FILM COATED ORAL ONCE
Status: CANCELLED | OUTPATIENT
Start: 2025-09-07

## 2025-09-02 RX ORDER — DIPHENHYDRAMINE HCL 25 MG
25 CAPSULE ORAL ONCE
OUTPATIENT
Start: 2025-09-10 | End: 2025-09-10

## 2025-09-02 RX ORDER — PROCHLORPERAZINE MALEATE 10 MG
10 TABLET ORAL EVERY 6 HOURS PRN
Status: CANCELLED | OUTPATIENT
Start: 2025-09-05

## 2025-09-02 ASSESSMENT — PULMONARY FUNCTION TESTS: FEV1 (%PREDICTED): 118

## 2025-09-02 ASSESSMENT — EJECTION FRACTION: LAST EJECTION FRACTION (EF) PRIOR TO CONDITIONING (%): NO

## 2025-09-02 ASSESSMENT — PAIN SCALES - GENERAL: PAINLEVEL_OUTOF10: 0-NO PAIN

## 2025-09-03 ENCOUNTER — SOCIAL WORK (OUTPATIENT)
Dept: CASE MANAGEMENT | Facility: HOSPITAL | Age: 25
End: 2025-09-03
Payer: COMMERCIAL

## 2025-09-03 LAB
ADENOVIRUS QPCR,PLASMA, VIRC: NOT DETECTED COPIES/ML
CMV DNA SERPL NAA+PROBE-LOG IU: NORMAL {LOG_IU}/ML
EBV DNA SPEC NAA+PROBE-LOG#: NORMAL {LOG_COPIES}/ML
HOLD SPECIMEN: NORMAL
LABORATORY COMMENT REPORT: NOT DETECTED
LABORATORY COMMENT REPORT: NOT DETECTED

## 2025-09-04 ENCOUNTER — CLINICAL SUPPORT (OUTPATIENT)
Facility: HOSPITAL | Age: 25
End: 2025-09-04
Payer: COMMERCIAL

## 2025-09-04 LAB
CHIMERISM INTERPRETATION: NORMAL
ELECTRONICALLY SIGNED BY: NORMAL

## 2025-09-05 ENCOUNTER — HOSPITAL ENCOUNTER (INPATIENT)
Facility: HOSPITAL | Age: 25
End: 2025-09-05
Attending: INTERNAL MEDICINE | Admitting: INTERNAL MEDICINE
Payer: COMMERCIAL

## 2025-09-05 ENCOUNTER — HOSPITAL ENCOUNTER (OUTPATIENT)
Dept: RADIOLOGY | Facility: HOSPITAL | Age: 25
Discharge: HOME | End: 2025-09-05
Payer: COMMERCIAL

## 2025-09-05 DIAGNOSIS — C92.01 AML (ACUTE MYELOID LEUKEMIA) IN REMISSION (MULTI): Primary | ICD-10-CM

## 2025-09-05 DIAGNOSIS — C92.01 ACUTE MYELOID LEUKEMIA IN REMISSION (MULTI): ICD-10-CM

## 2025-09-05 DIAGNOSIS — Z76.82 STEM CELL TRANSPLANT CANDIDATE: ICD-10-CM

## 2025-09-05 LAB
ABO GROUP (TYPE) IN BLOOD: NORMAL
ALBUMIN SERPL BCP-MCNC: 4.8 G/DL (ref 3.4–5)
ALP SERPL-CCNC: 79 U/L (ref 33–110)
ALT SERPL W P-5'-P-CCNC: 21 U/L (ref 7–45)
ANION GAP SERPL CALC-SCNC: 13 MMOL/L (ref 10–20)
ANTIBODY SCREEN: NORMAL
APTT PPP: 30 SECONDS (ref 26–36)
AST SERPL W P-5'-P-CCNC: 18 U/L (ref 9–39)
B-HCG SERPL-ACNC: <3 MIU/ML
BASOPHILS # BLD MANUAL: 0.06 X10*3/UL (ref 0–0.1)
BASOPHILS NFR BLD MANUAL: 4.5 %
BILIRUB SERPL-MCNC: 0.7 MG/DL (ref 0–1.2)
BLASTS # BLD MANUAL: 0 X10*3/UL
BLASTS NFR BLD MANUAL: 0 %
BUN SERPL-MCNC: 17 MG/DL (ref 6–23)
CALCIUM SERPL-MCNC: 10 MG/DL (ref 8.6–10.6)
CHLORIDE SERPL-SCNC: 103 MMOL/L (ref 98–107)
CO2 SERPL-SCNC: 27 MMOL/L (ref 21–32)
CREAT SERPL-MCNC: 0.72 MG/DL (ref 0.5–1.05)
EGFRCR SERPLBLD CKD-EPI 2021: >90 ML/MIN/1.73M*2
EOSINOPHIL # BLD MANUAL: 0.1 X10*3/UL (ref 0–0.7)
EOSINOPHIL NFR BLD MANUAL: 8 %
ERYTHROCYTE [DISTWIDTH] IN BLOOD BY AUTOMATED COUNT: 13.8 % (ref 11.5–14.5)
GLUCOSE SERPL-MCNC: 79 MG/DL (ref 74–99)
HCT VFR BLD AUTO: 36.3 % (ref 36–46)
HGB BLD-MCNC: 12.6 G/DL (ref 12–16)
IGG SERPL-MCNC: 721 MG/DL (ref 700–1600)
IMM GRANULOCYTES # BLD AUTO: 0 X10*3/UL (ref 0–0.7)
IMM GRANULOCYTES NFR BLD AUTO: 0 % (ref 0–0.9)
LDH SERPL L TO P-CCNC: 145 U/L (ref 84–246)
LYMPHOCYTES # BLD MANUAL: 0.53 X10*3/UL (ref 1.2–4.8)
LYMPHOCYTES NFR BLD MANUAL: 41.1 %
MAGNESIUM SERPL-MCNC: 1.94 MG/DL (ref 1.6–2.4)
MCH RBC QN AUTO: 31.3 PG (ref 26–34)
MCHC RBC AUTO-ENTMCNC: 34.7 G/DL (ref 32–36)
MCV RBC AUTO: 90 FL (ref 80–100)
METAMYELOCYTES # BLD MANUAL: 0 X10*3/UL
METAMYELOCYTES NFR BLD MANUAL: 0 %
MONOCYTES # BLD MANUAL: 0.01 X10*3/UL (ref 0.1–1)
MONOCYTES NFR BLD MANUAL: 0.9 %
MYELOCYTES # BLD MANUAL: 0 X10*3/UL
MYELOCYTES NFR BLD MANUAL: 0 %
NEUTROPHILS # BLD MANUAL: 0.49 X10*3/UL (ref 1.2–7.7)
NEUTS BAND # BLD MANUAL: 0 X10*3/UL (ref 0–0.7)
NEUTS BAND NFR BLD MANUAL: 0 %
NEUTS SEG # BLD MANUAL: 0.49 X10*3/UL (ref 1.2–7)
NEUTS SEG NFR BLD MANUAL: 37.5 %
NRBC BLD MANUAL-RTO: 0 % (ref 0–0)
NRBC BLD-RTO: 0 /100 WBCS (ref 0–0)
PLASMA CELLS # BLD MANUAL: 0 X10*3/UL
PLASMA CELLS NFR BLD MANUAL: 0 %
PLATELET # BLD AUTO: 128 X10*3/UL (ref 150–450)
POTASSIUM SERPL-SCNC: 3.8 MMOL/L (ref 3.5–5.3)
PROMYELOCYTES # BLD MANUAL: 0 X10*3/UL
PROMYELOCYTES NFR BLD MANUAL: 0 %
PROT SERPL-MCNC: 6.8 G/DL (ref 6.4–8.2)
RBC # BLD AUTO: 4.03 X10*6/UL (ref 4–5.2)
RBC MORPH BLD: ABNORMAL
RH FACTOR (ANTIGEN D): NORMAL
SODIUM SERPL-SCNC: 139 MMOL/L (ref 136–145)
TOTAL CELLS COUNTED BLD: 112
URATE SERPL-MCNC: 4.7 MG/DL (ref 2.3–6.7)
VARIANT LYMPHS # BLD MANUAL: 0.1 X10*3/UL (ref 0–0.5)
VARIANT LYMPHS NFR BLD: 8 %
WBC # BLD AUTO: 1.3 X10*3/UL (ref 4.4–11.3)
WBC OTHER # BLD MANUAL: 0 X10*3/UL
WBC OTHER NFR BLD MANUAL: 0 %

## 2025-09-05 PROCEDURE — 83615 LACTATE (LD) (LDH) ENZYME: CPT

## 2025-09-05 PROCEDURE — 87081 CULTURE SCREEN ONLY: CPT

## 2025-09-05 PROCEDURE — 80053 COMPREHEN METABOLIC PANEL: CPT

## 2025-09-05 PROCEDURE — 82784 ASSAY IGA/IGD/IGG/IGM EACH: CPT

## 2025-09-05 PROCEDURE — 1170000001 HC PRIVATE ONCOLOGY ROOM DAILY

## 2025-09-05 PROCEDURE — 86850 RBC ANTIBODY SCREEN: CPT

## 2025-09-05 PROCEDURE — 85007 BL SMEAR W/DIFF WBC COUNT: CPT

## 2025-09-05 PROCEDURE — 85027 COMPLETE CBC AUTOMATED: CPT

## 2025-09-05 PROCEDURE — 2720000007 HC OR 272 NO HCPCS

## 2025-09-05 PROCEDURE — 84702 CHORIONIC GONADOTROPIN TEST: CPT

## 2025-09-05 PROCEDURE — 83735 ASSAY OF MAGNESIUM: CPT

## 2025-09-05 PROCEDURE — 2500000004 HC RX 250 GENERAL PHARMACY W/ HCPCS (ALT 636 FOR OP/ED): Performed by: INTERNAL MEDICINE

## 2025-09-05 PROCEDURE — 84550 ASSAY OF BLOOD/URIC ACID: CPT

## 2025-09-05 PROCEDURE — 2500000001 HC RX 250 WO HCPCS SELF ADMINISTERED DRUGS (ALT 637 FOR MEDICARE OP)

## 2025-09-05 PROCEDURE — 85730 THROMBOPLASTIN TIME PARTIAL: CPT

## 2025-09-05 PROCEDURE — C1751 CATH, INF, PER/CENT/MIDLINE: HCPCS

## 2025-09-05 PROCEDURE — 2500000002 HC RX 250 W HCPCS SELF ADMINISTERED DRUGS (ALT 637 FOR MEDICARE OP, ALT 636 FOR OP/ED): Performed by: INTERNAL MEDICINE

## 2025-09-05 RX ORDER — PROCHLORPERAZINE MALEATE 10 MG
10 TABLET ORAL EVERY 6 HOURS PRN
Status: ACTIVE | OUTPATIENT
Start: 2025-09-05

## 2025-09-05 RX ORDER — VANCOMYCIN HYDROCHLORIDE 125 MG/1
125 CAPSULE ORAL DAILY
Status: DISPENSED | OUTPATIENT
Start: 2025-09-06

## 2025-09-05 RX ORDER — URSODIOL 300 MG/1
300 CAPSULE ORAL 3 TIMES DAILY
Status: DISPENSED | OUTPATIENT
Start: 2025-09-05

## 2025-09-05 RX ORDER — ONDANSETRON 8 MG/1
16 TABLET, FILM COATED ORAL ONCE
Status: COMPLETED | OUTPATIENT
Start: 2025-09-05 | End: 2025-09-05

## 2025-09-05 RX ORDER — EPINEPHRINE 1 MG/ML
0.3 INJECTION, SOLUTION, CONCENTRATE INTRAVENOUS EVERY 5 MIN PRN
Status: ACTIVE | OUTPATIENT
Start: 2025-09-05

## 2025-09-05 RX ORDER — SERTRALINE HYDROCHLORIDE 50 MG/1
100 TABLET, FILM COATED ORAL DAILY
Status: DISPENSED | OUTPATIENT
Start: 2025-09-05

## 2025-09-05 RX ORDER — DIPHENHYDRAMINE HYDROCHLORIDE 50 MG/ML
50 INJECTION, SOLUTION INTRAMUSCULAR; INTRAVENOUS AS NEEDED
Status: DISPENSED | OUTPATIENT
Start: 2025-09-05

## 2025-09-05 RX ORDER — ACYCLOVIR 400 MG/1
400 TABLET ORAL 2 TIMES DAILY
Status: DISPENSED | OUTPATIENT
Start: 2025-09-05

## 2025-09-05 RX ORDER — PROCHLORPERAZINE EDISYLATE 5 MG/ML
10 INJECTION INTRAMUSCULAR; INTRAVENOUS EVERY 6 HOURS PRN
Status: DISPENSED | OUTPATIENT
Start: 2025-09-05

## 2025-09-05 RX ORDER — FAMOTIDINE 10 MG/ML
20 INJECTION, SOLUTION INTRAVENOUS AS NEEDED
Status: DISPENSED | OUTPATIENT
Start: 2025-09-05

## 2025-09-05 RX ORDER — ALBUTEROL SULFATE 0.83 MG/ML
3 SOLUTION RESPIRATORY (INHALATION) AS NEEDED
Status: ACTIVE | OUTPATIENT
Start: 2025-09-05

## 2025-09-05 RX ORDER — POLYETHYLENE GLYCOL 3350 17 G/17G
17 POWDER, FOR SOLUTION ORAL DAILY PRN
Status: ACTIVE | OUTPATIENT
Start: 2025-09-05

## 2025-09-05 RX ADMIN — FLUDARABINE PHOSPHATE 55 MG: 25 INJECTION, SOLUTION INTRAVENOUS at 20:32

## 2025-09-05 RX ADMIN — URSODIOL 300 MG: 300 CAPSULE ORAL at 20:03

## 2025-09-05 RX ADMIN — ONDANSETRON HYDROCHLORIDE 16 MG: 8 TABLET, FILM COATED ORAL at 20:03

## 2025-09-05 RX ADMIN — ACYCLOVIR 400 MG: 400 TABLET ORAL at 20:03

## 2025-09-05 SDOH — ECONOMIC STABILITY: FOOD INSECURITY: HOW HARD IS IT FOR YOU TO PAY FOR THE VERY BASICS LIKE FOOD, HOUSING, MEDICAL CARE, AND HEATING?: SOMEWHAT HARD

## 2025-09-05 SDOH — SOCIAL STABILITY: SOCIAL INSECURITY
WITHIN THE LAST YEAR, HAVE YOU BEEN RAPED OR FORCED TO HAVE ANY KIND OF SEXUAL ACTIVITY BY YOUR PARTNER OR EX-PARTNER?: NO

## 2025-09-05 SDOH — SOCIAL STABILITY: SOCIAL INSECURITY: WITHIN THE LAST YEAR, HAVE YOU BEEN AFRAID OF YOUR PARTNER OR EX-PARTNER?: NO

## 2025-09-05 SDOH — ECONOMIC STABILITY: HOUSING INSECURITY: AT ANY TIME IN THE PAST 12 MONTHS, WERE YOU HOMELESS OR LIVING IN A SHELTER (INCLUDING NOW)?: NO

## 2025-09-05 SDOH — ECONOMIC STABILITY: FOOD INSECURITY: WITHIN THE PAST 12 MONTHS, THE FOOD YOU BOUGHT JUST DIDN'T LAST AND YOU DIDN'T HAVE MONEY TO GET MORE.: NEVER TRUE

## 2025-09-05 SDOH — ECONOMIC STABILITY: HOUSING INSECURITY: IN THE LAST 12 MONTHS, WAS THERE A TIME WHEN YOU WERE NOT ABLE TO PAY THE MORTGAGE OR RENT ON TIME?: YES

## 2025-09-05 SDOH — SOCIAL STABILITY: SOCIAL INSECURITY: DO YOU FEEL ANYONE HAS EXPLOITED OR TAKEN ADVANTAGE OF YOU FINANCIALLY OR OF YOUR PERSONAL PROPERTY?: NO

## 2025-09-05 SDOH — ECONOMIC STABILITY: INCOME INSECURITY: IN THE PAST 12 MONTHS HAS THE ELECTRIC, GAS, OIL, OR WATER COMPANY THREATENED TO SHUT OFF SERVICES IN YOUR HOME?: YES

## 2025-09-05 SDOH — ECONOMIC STABILITY: TRANSPORTATION INSECURITY: IN THE PAST 12 MONTHS, HAS LACK OF TRANSPORTATION KEPT YOU FROM MEDICAL APPOINTMENTS OR FROM GETTING MEDICATIONS?: NO

## 2025-09-05 SDOH — SOCIAL STABILITY: SOCIAL INSECURITY: WITHIN THE LAST YEAR, HAVE YOU BEEN HUMILIATED OR EMOTIONALLY ABUSED IN OTHER WAYS BY YOUR PARTNER OR EX-PARTNER?: NO

## 2025-09-05 SDOH — SOCIAL STABILITY: SOCIAL INSECURITY: DOES ANYONE TRY TO KEEP YOU FROM HAVING/CONTACTING OTHER FRIENDS OR DOING THINGS OUTSIDE YOUR HOME?: NO

## 2025-09-05 SDOH — SOCIAL STABILITY: SOCIAL INSECURITY: HAS ANYONE EVER THREATENED TO HURT YOUR FAMILY OR YOUR PETS?: NO

## 2025-09-05 SDOH — SOCIAL STABILITY: SOCIAL INSECURITY: WERE YOU ABLE TO COMPLETE ALL THE BEHAVIORAL HEALTH SCREENINGS?: YES

## 2025-09-05 SDOH — ECONOMIC STABILITY: FOOD INSECURITY
WITHIN THE PAST 12 MONTHS, YOU WORRIED THAT YOUR FOOD WOULD RUN OUT BEFORE YOU GOT THE MONEY TO BUY MORE.: SOMETIMES TRUE

## 2025-09-05 SDOH — SOCIAL STABILITY: SOCIAL INSECURITY: DO YOU FEEL UNSAFE GOING BACK TO THE PLACE WHERE YOU ARE LIVING?: NO

## 2025-09-05 SDOH — SOCIAL STABILITY: SOCIAL INSECURITY: ARE YOU OR HAVE YOU BEEN THREATENED OR ABUSED PHYSICALLY, EMOTIONALLY, OR SEXUALLY BY ANYONE?: NO

## 2025-09-05 SDOH — SOCIAL STABILITY: SOCIAL INSECURITY: ABUSE: ADULT

## 2025-09-05 SDOH — SOCIAL STABILITY: SOCIAL INSECURITY: ARE THERE ANY APPARENT SIGNS OF INJURIES/BEHAVIORS THAT COULD BE RELATED TO ABUSE/NEGLECT?: NO

## 2025-09-05 SDOH — SOCIAL STABILITY: SOCIAL INSECURITY: HAVE YOU HAD THOUGHTS OF HARMING ANYONE ELSE?: NO

## 2025-09-05 ASSESSMENT — COGNITIVE AND FUNCTIONAL STATUS - GENERAL
DAILY ACTIVITIY SCORE: 24
MOBILITY SCORE: 24
MOBILITY SCORE: 24
PATIENT BASELINE BEDBOUND: NO
DAILY ACTIVITIY SCORE: 24

## 2025-09-05 ASSESSMENT — PAIN SCALES - GENERAL
PAINLEVEL_OUTOF10: 0 - NO PAIN
PAINLEVEL_OUTOF10: 0 - NO PAIN

## 2025-09-05 ASSESSMENT — ACTIVITIES OF DAILY LIVING (ADL)
LACK_OF_TRANSPORTATION: NO
FEEDING YOURSELF: INDEPENDENT
HEARING - LEFT EAR: FUNCTIONAL
LACK_OF_TRANSPORTATION: NO
BATHING: INDEPENDENT
GROOMING: INDEPENDENT
JUDGMENT_ADEQUATE_SAFELY_COMPLETE_DAILY_ACTIVITIES: YES
TOILETING: INDEPENDENT
ADEQUATE_TO_COMPLETE_ADL: YES
PATIENT'S MEMORY ADEQUATE TO SAFELY COMPLETE DAILY ACTIVITIES?: YES
HEARING - RIGHT EAR: FUNCTIONAL
WALKS IN HOME: INDEPENDENT
DRESSING YOURSELF: INDEPENDENT

## 2025-09-05 ASSESSMENT — LIFESTYLE VARIABLES
HOW MANY STANDARD DRINKS CONTAINING ALCOHOL DO YOU HAVE ON A TYPICAL DAY: PATIENT DOES NOT DRINK
AUDIT-C TOTAL SCORE: 0
HOW OFTEN DO YOU HAVE 6 OR MORE DRINKS ON ONE OCCASION: NEVER
AUDIT-C TOTAL SCORE: 0
SKIP TO QUESTIONS 9-10: 1
HOW OFTEN DO YOU HAVE A DRINK CONTAINING ALCOHOL: NEVER

## 2025-09-05 ASSESSMENT — PAIN - FUNCTIONAL ASSESSMENT
PAIN_FUNCTIONAL_ASSESSMENT: 0-10
PAIN_FUNCTIONAL_ASSESSMENT: 0-10

## 2025-09-06 LAB
ALBUMIN SERPL BCP-MCNC: 4.3 G/DL (ref 3.4–5)
ALP SERPL-CCNC: 70 U/L (ref 33–110)
ALT SERPL W P-5'-P-CCNC: 19 U/L (ref 7–45)
ANION GAP SERPL CALC-SCNC: 11 MMOL/L (ref 10–20)
AST SERPL W P-5'-P-CCNC: 17 U/L (ref 9–39)
BASOPHILS # BLD MANUAL: 0.01 X10*3/UL (ref 0–0.1)
BASOPHILS NFR BLD MANUAL: 0.9 %
BILIRUB DIRECT SERPL-MCNC: 0.1 MG/DL (ref 0–0.3)
BILIRUB SERPL-MCNC: 0.7 MG/DL (ref 0–1.2)
BLASTS # BLD MANUAL: 0 X10*3/UL
BLASTS NFR BLD MANUAL: 0 %
BUN SERPL-MCNC: 17 MG/DL (ref 6–23)
CALCIUM SERPL-MCNC: 9.4 MG/DL (ref 8.6–10.6)
CHLORIDE SERPL-SCNC: 106 MMOL/L (ref 98–107)
CO2 SERPL-SCNC: 28 MMOL/L (ref 21–32)
CREAT SERPL-MCNC: 0.79 MG/DL (ref 0.5–1.05)
EGFRCR SERPLBLD CKD-EPI 2021: >90 ML/MIN/1.73M*2
EOSINOPHIL # BLD MANUAL: 0.04 X10*3/UL (ref 0–0.7)
EOSINOPHIL NFR BLD MANUAL: 4.4 %
ERYTHROCYTE [DISTWIDTH] IN BLOOD BY AUTOMATED COUNT: 13.8 % (ref 11.5–14.5)
GLUCOSE SERPL-MCNC: 103 MG/DL (ref 74–99)
HCT VFR BLD AUTO: 34.9 % (ref 36–46)
HGB BLD-MCNC: 11.8 G/DL (ref 12–16)
IMM GRANULOCYTES # BLD AUTO: 0 X10*3/UL (ref 0–0.7)
IMM GRANULOCYTES NFR BLD AUTO: 0 % (ref 0–0.9)
LYMPHOCYTES # BLD MANUAL: 0.54 X10*3/UL (ref 1.2–4.8)
LYMPHOCYTES NFR BLD MANUAL: 54 %
MAGNESIUM SERPL-MCNC: 1.9 MG/DL (ref 1.6–2.4)
MCH RBC QN AUTO: 31.1 PG (ref 26–34)
MCHC RBC AUTO-ENTMCNC: 33.8 G/DL (ref 32–36)
MCV RBC AUTO: 92 FL (ref 80–100)
METAMYELOCYTES # BLD MANUAL: 0 X10*3/UL
METAMYELOCYTES NFR BLD MANUAL: 0 %
MONOCYTES # BLD MANUAL: 0.01 X10*3/UL (ref 0.1–1)
MONOCYTES NFR BLD MANUAL: 0.9 %
MYELOCYTES # BLD MANUAL: 0 X10*3/UL
MYELOCYTES NFR BLD MANUAL: 0 %
NEUTROPHILS # BLD MANUAL: 0.38 X10*3/UL (ref 1.2–7.7)
NEUTS BAND # BLD MANUAL: 0.01 X10*3/UL (ref 0–0.7)
NEUTS BAND NFR BLD MANUAL: 0.9 %
NEUTS SEG # BLD MANUAL: 0.37 X10*3/UL (ref 1.2–7)
NEUTS SEG NFR BLD MANUAL: 37.1 %
NRBC BLD MANUAL-RTO: 0 % (ref 0–0)
NRBC BLD-RTO: 0 /100 WBCS (ref 0–0)
PHOSPHATE SERPL-MCNC: 4.2 MG/DL (ref 2.5–4.9)
PLASMA CELLS # BLD MANUAL: 0 X10*3/UL
PLASMA CELLS NFR BLD MANUAL: 0 %
PLATELET # BLD AUTO: 100 X10*3/UL (ref 150–450)
POTASSIUM SERPL-SCNC: 3.8 MMOL/L (ref 3.5–5.3)
PROMYELOCYTES # BLD MANUAL: 0 X10*3/UL
PROMYELOCYTES NFR BLD MANUAL: 0 %
PROT SERPL-MCNC: 6 G/DL (ref 6.4–8.2)
RBC # BLD AUTO: 3.8 X10*6/UL (ref 4–5.2)
RBC MORPH BLD: ABNORMAL
SODIUM SERPL-SCNC: 141 MMOL/L (ref 136–145)
TOTAL CELLS COUNTED BLD: 113
VARIANT LYMPHS # BLD MANUAL: 0.02 X10*3/UL (ref 0–0.5)
VARIANT LYMPHS NFR BLD: 1.8 %
WBC # BLD AUTO: 1 X10*3/UL (ref 4.4–11.3)
WBC OTHER # BLD MANUAL: 0 X10*3/UL
WBC OTHER NFR BLD MANUAL: 0 %

## 2025-09-06 PROCEDURE — 1170000001 HC PRIVATE ONCOLOGY ROOM DAILY

## 2025-09-06 PROCEDURE — 2500000004 HC RX 250 GENERAL PHARMACY W/ HCPCS (ALT 636 FOR OP/ED): Performed by: INTERNAL MEDICINE

## 2025-09-06 PROCEDURE — 85007 BL SMEAR W/DIFF WBC COUNT: CPT

## 2025-09-06 PROCEDURE — 85027 COMPLETE CBC AUTOMATED: CPT

## 2025-09-06 PROCEDURE — 2500000002 HC RX 250 W HCPCS SELF ADMINISTERED DRUGS (ALT 637 FOR MEDICARE OP, ALT 636 FOR OP/ED): Performed by: INTERNAL MEDICINE

## 2025-09-06 PROCEDURE — 82248 BILIRUBIN DIRECT: CPT

## 2025-09-06 PROCEDURE — 2500000001 HC RX 250 WO HCPCS SELF ADMINISTERED DRUGS (ALT 637 FOR MEDICARE OP): Performed by: STUDENT IN AN ORGANIZED HEALTH CARE EDUCATION/TRAINING PROGRAM

## 2025-09-06 PROCEDURE — 80053 COMPREHEN METABOLIC PANEL: CPT

## 2025-09-06 PROCEDURE — 84100 ASSAY OF PHOSPHORUS: CPT

## 2025-09-06 PROCEDURE — 2500000001 HC RX 250 WO HCPCS SELF ADMINISTERED DRUGS (ALT 637 FOR MEDICARE OP)

## 2025-09-06 PROCEDURE — 2500000001 HC RX 250 WO HCPCS SELF ADMINISTERED DRUGS (ALT 637 FOR MEDICARE OP): Performed by: INTERNAL MEDICINE

## 2025-09-06 PROCEDURE — 83735 ASSAY OF MAGNESIUM: CPT

## 2025-09-06 PROCEDURE — 2500000002 HC RX 250 W HCPCS SELF ADMINISTERED DRUGS (ALT 637 FOR MEDICARE OP, ALT 636 FOR OP/ED)

## 2025-09-06 RX ORDER — ONDANSETRON 8 MG/1
16 TABLET, FILM COATED ORAL ONCE
Status: COMPLETED | OUTPATIENT
Start: 2025-09-06 | End: 2025-09-06

## 2025-09-06 RX ORDER — LEVOFLOXACIN 500 MG/1
500 TABLET, FILM COATED ORAL EVERY 24 HOURS
Status: DISPENSED | OUTPATIENT
Start: 2025-09-06

## 2025-09-06 RX ORDER — DIPHENHYDRAMINE HCL 50 MG
50 CAPSULE ORAL ONCE
Status: COMPLETED | OUTPATIENT
Start: 2025-09-06 | End: 2025-09-06

## 2025-09-06 RX ORDER — ACETAMINOPHEN 325 MG/1
650 TABLET ORAL ONCE
Status: COMPLETED | OUTPATIENT
Start: 2025-09-06 | End: 2025-09-07

## 2025-09-06 RX ORDER — DIPHENHYDRAMINE HCL 50 MG
50 CAPSULE ORAL ONCE
Status: COMPLETED | OUTPATIENT
Start: 2025-09-06 | End: 2025-09-07

## 2025-09-06 RX ORDER — ACETAMINOPHEN 325 MG/1
650 TABLET ORAL ONCE
Status: COMPLETED | OUTPATIENT
Start: 2025-09-06 | End: 2025-09-06

## 2025-09-06 RX ADMIN — VANCOMYCIN HYDROCHLORIDE 125 MG: 125 CAPSULE ORAL at 09:51

## 2025-09-06 RX ADMIN — FLUDARABINE PHOSPHATE 55 MG: 25 INJECTION, SOLUTION INTRAVENOUS at 21:15

## 2025-09-06 RX ADMIN — URSODIOL 300 MG: 300 CAPSULE ORAL at 20:11

## 2025-09-06 RX ADMIN — ONDANSETRON HYDROCHLORIDE 16 MG: 8 TABLET, FILM COATED ORAL at 20:11

## 2025-09-06 RX ADMIN — METHYLPREDNISOLONE SODIUM SUCCINATE 71.88 MG: 125 INJECTION, POWDER, FOR SOLUTION INTRAMUSCULAR; INTRAVENOUS at 09:51

## 2025-09-06 RX ADMIN — SERTRALINE 100 MG: 50 TABLET, FILM COATED ORAL at 09:51

## 2025-09-06 RX ADMIN — ACYCLOVIR 400 MG: 400 TABLET ORAL at 09:51

## 2025-09-06 RX ADMIN — SODIUM CHLORIDE 100 MG: 0.9 INJECTION, SOLUTION INTRAVENOUS at 10:39

## 2025-09-06 RX ADMIN — URSODIOL 300 MG: 300 CAPSULE ORAL at 09:51

## 2025-09-06 RX ADMIN — PROCHLORPERAZINE EDISYLATE 10 MG: 5 INJECTION INTRAMUSCULAR; INTRAVENOUS at 14:12

## 2025-09-06 RX ADMIN — ACYCLOVIR 400 MG: 400 TABLET ORAL at 20:11

## 2025-09-06 RX ADMIN — LEVOFLOXACIN 500 MG: 500 TABLET, FILM COATED ORAL at 11:49

## 2025-09-06 RX ADMIN — URSODIOL 300 MG: 300 CAPSULE ORAL at 14:12

## 2025-09-06 RX ADMIN — DIPHENHYDRAMINE HYDROCHLORIDE 50 MG: 50 CAPSULE ORAL at 09:51

## 2025-09-06 RX ADMIN — ACETAMINOPHEN 650 MG: 325 TABLET ORAL at 09:51

## 2025-09-06 ASSESSMENT — PAIN SCALES - GENERAL
PAINLEVEL_OUTOF10: 0 - NO PAIN

## 2025-09-06 ASSESSMENT — PAIN - FUNCTIONAL ASSESSMENT
PAIN_FUNCTIONAL_ASSESSMENT: 0-10

## 2025-09-07 VITALS
SYSTOLIC BLOOD PRESSURE: 117 MMHG | HEART RATE: 82 BPM | BODY MASS INDEX: 26.67 KG/M2 | RESPIRATION RATE: 18 BRPM | TEMPERATURE: 97.3 F | OXYGEN SATURATION: 100 % | WEIGHT: 160.05 LBS | DIASTOLIC BLOOD PRESSURE: 70 MMHG | HEIGHT: 65 IN

## 2025-09-07 LAB
ALBUMIN SERPL BCP-MCNC: 4.5 G/DL (ref 3.4–5)
ALP SERPL-CCNC: 61 U/L (ref 33–110)
ALT SERPL W P-5'-P-CCNC: 30 U/L (ref 7–45)
ANION GAP SERPL CALC-SCNC: 12 MMOL/L (ref 10–20)
AST SERPL W P-5'-P-CCNC: 25 U/L (ref 9–39)
BASOPHILS # BLD AUTO: 0 X10*3/UL (ref 0–0.1)
BASOPHILS NFR BLD AUTO: 0 %
BILIRUB DIRECT SERPL-MCNC: 0.2 MG/DL (ref 0–0.3)
BILIRUB SERPL-MCNC: 1.3 MG/DL (ref 0–1.2)
BUN SERPL-MCNC: 14 MG/DL (ref 6–23)
CALCIUM SERPL-MCNC: 9.3 MG/DL (ref 8.6–10.6)
CHLORIDE SERPL-SCNC: 101 MMOL/L (ref 98–107)
CO2 SERPL-SCNC: 29 MMOL/L (ref 21–32)
CREAT SERPL-MCNC: 0.87 MG/DL (ref 0.5–1.05)
EGFRCR SERPLBLD CKD-EPI 2021: >90 ML/MIN/1.73M*2
EOSINOPHIL # BLD AUTO: 0.01 X10*3/UL (ref 0–0.7)
EOSINOPHIL NFR BLD AUTO: 1.7 %
ERYTHROCYTE [DISTWIDTH] IN BLOOD BY AUTOMATED COUNT: 13.7 % (ref 11.5–14.5)
GLUCOSE SERPL-MCNC: 133 MG/DL (ref 74–99)
HCT VFR BLD AUTO: 33.7 % (ref 36–46)
HGB BLD-MCNC: 12.3 G/DL (ref 12–16)
IMM GRANULOCYTES # BLD AUTO: 0.01 X10*3/UL (ref 0–0.7)
IMM GRANULOCYTES NFR BLD AUTO: 1.7 % (ref 0–0.9)
LYMPHOCYTES # BLD AUTO: 0.01 X10*3/UL (ref 1.2–4.8)
LYMPHOCYTES NFR BLD AUTO: 1.7 %
MAGNESIUM SERPL-MCNC: 1.78 MG/DL (ref 1.6–2.4)
MCH RBC QN AUTO: 30.8 PG (ref 26–34)
MCHC RBC AUTO-ENTMCNC: 36.5 G/DL (ref 32–36)
MCV RBC AUTO: 84 FL (ref 80–100)
MONOCYTES # BLD AUTO: 0.01 X10*3/UL (ref 0.1–1)
MONOCYTES NFR BLD AUTO: 1.7 %
NEUTROPHILS # BLD AUTO: 0.56 X10*3/UL (ref 1.2–7.7)
NEUTROPHILS NFR BLD AUTO: 93.2 %
NRBC BLD-RTO: 0 /100 WBCS (ref 0–0)
PHOSPHATE SERPL-MCNC: 3.3 MG/DL (ref 2.5–4.9)
PLATELET # BLD AUTO: ABNORMAL 10*3/UL
POTASSIUM SERPL-SCNC: 3.7 MMOL/L (ref 3.5–5.3)
PROT SERPL-MCNC: 6.7 G/DL (ref 6.4–8.2)
RBC # BLD AUTO: 4 X10*6/UL (ref 4–5.2)
SODIUM SERPL-SCNC: 138 MMOL/L (ref 136–145)
STAPHYLOCOCCUS SPEC CULT: NORMAL
WBC # BLD AUTO: 0.6 X10*3/UL (ref 4.4–11.3)

## 2025-09-07 PROCEDURE — 2500000001 HC RX 250 WO HCPCS SELF ADMINISTERED DRUGS (ALT 637 FOR MEDICARE OP): Performed by: INTERNAL MEDICINE

## 2025-09-07 PROCEDURE — 2500000002 HC RX 250 W HCPCS SELF ADMINISTERED DRUGS (ALT 637 FOR MEDICARE OP, ALT 636 FOR OP/ED)

## 2025-09-07 PROCEDURE — 84100 ASSAY OF PHOSPHORUS: CPT

## 2025-09-07 PROCEDURE — 2500000004 HC RX 250 GENERAL PHARMACY W/ HCPCS (ALT 636 FOR OP/ED): Performed by: INTERNAL MEDICINE

## 2025-09-07 PROCEDURE — 2500000002 HC RX 250 W HCPCS SELF ADMINISTERED DRUGS (ALT 637 FOR MEDICARE OP, ALT 636 FOR OP/ED): Performed by: INTERNAL MEDICINE

## 2025-09-07 PROCEDURE — 85025 COMPLETE CBC W/AUTO DIFF WBC: CPT

## 2025-09-07 PROCEDURE — 83735 ASSAY OF MAGNESIUM: CPT

## 2025-09-07 PROCEDURE — 2500000004 HC RX 250 GENERAL PHARMACY W/ HCPCS (ALT 636 FOR OP/ED): Performed by: REGISTERED NURSE

## 2025-09-07 PROCEDURE — 2500000001 HC RX 250 WO HCPCS SELF ADMINISTERED DRUGS (ALT 637 FOR MEDICARE OP)

## 2025-09-07 PROCEDURE — 1170000001 HC PRIVATE ONCOLOGY ROOM DAILY

## 2025-09-07 PROCEDURE — 2500000001 HC RX 250 WO HCPCS SELF ADMINISTERED DRUGS (ALT 637 FOR MEDICARE OP): Performed by: STUDENT IN AN ORGANIZED HEALTH CARE EDUCATION/TRAINING PROGRAM

## 2025-09-07 PROCEDURE — 80048 BASIC METABOLIC PNL TOTAL CA: CPT

## 2025-09-07 PROCEDURE — 82248 BILIRUBIN DIRECT: CPT

## 2025-09-07 RX ORDER — DIPHENHYDRAMINE HCL 50 MG
50 CAPSULE ORAL ONCE
Status: ACTIVE | OUTPATIENT
Start: 2025-09-07

## 2025-09-07 RX ORDER — ONDANSETRON 8 MG/1
16 TABLET, FILM COATED ORAL ONCE
Status: COMPLETED | OUTPATIENT
Start: 2025-09-07 | End: 2025-09-07

## 2025-09-07 RX ORDER — ACETAMINOPHEN 325 MG/1
650 TABLET ORAL ONCE
Status: ACTIVE | OUTPATIENT
Start: 2025-09-07

## 2025-09-07 RX ORDER — MAGNESIUM SULFATE HEPTAHYDRATE 40 MG/ML
2 INJECTION, SOLUTION INTRAVENOUS ONCE
Status: COMPLETED | OUTPATIENT
Start: 2025-09-07 | End: 2025-09-07

## 2025-09-07 RX ORDER — POTASSIUM CHLORIDE 29.8 MG/ML
40 INJECTION INTRAVENOUS ONCE
Status: COMPLETED | OUTPATIENT
Start: 2025-09-07 | End: 2025-09-07

## 2025-09-07 RX ORDER — PANTOPRAZOLE SODIUM 40 MG/1
40 TABLET, DELAYED RELEASE ORAL
Status: ACTIVE | OUTPATIENT
Start: 2025-09-08

## 2025-09-07 RX ADMIN — FLUDARABINE PHOSPHATE 55 MG: 25 INJECTION, SOLUTION INTRAVENOUS at 20:56

## 2025-09-07 RX ADMIN — SERTRALINE 100 MG: 50 TABLET, FILM COATED ORAL at 08:49

## 2025-09-07 RX ADMIN — MAGNESIUM SULFATE HEPTAHYDRATE 2 G: 40 INJECTION, SOLUTION INTRAVENOUS at 10:53

## 2025-09-07 RX ADMIN — METHYLPREDNISOLONE SODIUM SUCCINATE 71.88 MG: 125 INJECTION, POWDER, FOR SOLUTION INTRAMUSCULAR; INTRAVENOUS at 10:01

## 2025-09-07 RX ADMIN — VANCOMYCIN HYDROCHLORIDE 125 MG: 125 CAPSULE ORAL at 08:49

## 2025-09-07 RX ADMIN — SODIUM CHLORIDE 100 MG: 0.9 INJECTION, SOLUTION INTRAVENOUS at 10:53

## 2025-09-07 RX ADMIN — LEVOFLOXACIN 500 MG: 500 TABLET, FILM COATED ORAL at 10:56

## 2025-09-07 RX ADMIN — URSODIOL 300 MG: 300 CAPSULE ORAL at 20:14

## 2025-09-07 RX ADMIN — ACYCLOVIR 400 MG: 400 TABLET ORAL at 08:48

## 2025-09-07 RX ADMIN — ACETAMINOPHEN 650 MG: 325 TABLET ORAL at 10:01

## 2025-09-07 RX ADMIN — ACYCLOVIR 400 MG: 400 TABLET ORAL at 20:14

## 2025-09-07 RX ADMIN — POTASSIUM CHLORIDE 40 MEQ: 29.8 INJECTION, SOLUTION INTRAVENOUS at 10:53

## 2025-09-07 RX ADMIN — URSODIOL 300 MG: 300 CAPSULE ORAL at 15:06

## 2025-09-07 RX ADMIN — URSODIOL 300 MG: 300 CAPSULE ORAL at 08:48

## 2025-09-07 RX ADMIN — DIPHENHYDRAMINE HYDROCHLORIDE 50 MG: 50 CAPSULE ORAL at 10:00

## 2025-09-07 RX ADMIN — ONDANSETRON HYDROCHLORIDE 16 MG: 8 TABLET, FILM COATED ORAL at 20:14

## 2025-09-07 ASSESSMENT — COGNITIVE AND FUNCTIONAL STATUS - GENERAL
DAILY ACTIVITIY SCORE: 24
MOBILITY SCORE: 24
DAILY ACTIVITIY SCORE: 24
MOBILITY SCORE: 24

## 2025-09-07 ASSESSMENT — PAIN SCALES - GENERAL
PAINLEVEL_OUTOF10: 0 - NO PAIN
PAINLEVEL_OUTOF10: 0 - NO PAIN

## 2025-09-19 ENCOUNTER — APPOINTMENT (OUTPATIENT)
Dept: HEMATOLOGY/ONCOLOGY | Facility: HOSPITAL | Age: 25
End: 2025-09-19
Payer: COMMERCIAL